# Patient Record
Sex: MALE | Race: BLACK OR AFRICAN AMERICAN | Employment: OTHER | URBAN - METROPOLITAN AREA
[De-identification: names, ages, dates, MRNs, and addresses within clinical notes are randomized per-mention and may not be internally consistent; named-entity substitution may affect disease eponyms.]

---

## 2018-05-12 ENCOUNTER — HOSPITAL ENCOUNTER (EMERGENCY)
Age: 68
Discharge: HOME | End: 2018-05-12
Attending: EMERGENCY MEDICINE
Payer: MEDICARE

## 2018-05-12 ENCOUNTER — APPOINTMENT (OUTPATIENT)
Dept: CT IMAGING | Age: 68
End: 2018-05-12
Payer: MEDICARE

## 2018-05-12 VITALS
RESPIRATION RATE: 14 BRPM | HEART RATE: 87 BPM | WEIGHT: 195 LBS | OXYGEN SATURATION: 99 % | HEIGHT: 73 IN | BODY MASS INDEX: 25.84 KG/M2 | TEMPERATURE: 97.5 F | SYSTOLIC BLOOD PRESSURE: 148 MMHG | DIASTOLIC BLOOD PRESSURE: 96 MMHG

## 2018-05-12 DIAGNOSIS — E11.42 DIABETIC POLYNEUROPATHY ASSOCIATED WITH TYPE 2 DIABETES MELLITUS (HCC): Primary | ICD-10-CM

## 2018-05-12 LAB
ANION GAP SERPL CALCULATED.3IONS-SCNC: 16 MMOL/L (ref 7–16)
BASOPHILS ABSOLUTE: 0.03 E9/L (ref 0–0.2)
BASOPHILS RELATIVE PERCENT: 0.6 % (ref 0–2)
BUN BLDV-MCNC: 10 MG/DL (ref 8–23)
CALCIUM SERPL-MCNC: 9.2 MG/DL (ref 8.6–10.2)
CHLORIDE BLD-SCNC: 104 MMOL/L (ref 98–107)
CO2: 19 MMOL/L (ref 22–29)
CREAT SERPL-MCNC: 1.2 MG/DL (ref 0.7–1.2)
EKG ATRIAL RATE: 76 BPM
EKG P AXIS: 68 DEGREES
EKG P-R INTERVAL: 184 MS
EKG Q-T INTERVAL: 380 MS
EKG QRS DURATION: 84 MS
EKG QTC CALCULATION (BAZETT): 427 MS
EKG R AXIS: 18 DEGREES
EKG T AXIS: 18 DEGREES
EKG VENTRICULAR RATE: 76 BPM
EOSINOPHILS ABSOLUTE: 0.06 E9/L (ref 0.05–0.5)
EOSINOPHILS RELATIVE PERCENT: 1.1 % (ref 0–6)
GFR AFRICAN AMERICAN: >60
GFR NON-AFRICAN AMERICAN: >60 ML/MIN/1.73
GLUCOSE BLD-MCNC: 168 MG/DL (ref 74–109)
GLUCOSE BLD-MCNC: 198 MG/DL
HCT VFR BLD CALC: 35.8 % (ref 37–54)
HEMOGLOBIN: 12.4 G/DL (ref 12.5–16.5)
IMMATURE GRANULOCYTES #: 0.02 E9/L
IMMATURE GRANULOCYTES %: 0.4 % (ref 0–5)
LYMPHOCYTES ABSOLUTE: 1.4 E9/L (ref 1.5–4)
LYMPHOCYTES RELATIVE PERCENT: 26.6 % (ref 20–42)
MCH RBC QN AUTO: 28.4 PG (ref 26–35)
MCHC RBC AUTO-ENTMCNC: 34.6 % (ref 32–34.5)
MCV RBC AUTO: 82.1 FL (ref 80–99.9)
METER GLUCOSE: 198 MG/DL (ref 70–110)
MONOCYTES ABSOLUTE: 0.37 E9/L (ref 0.1–0.95)
MONOCYTES RELATIVE PERCENT: 7 % (ref 2–12)
NEUTROPHILS ABSOLUTE: 3.38 E9/L (ref 1.8–7.3)
NEUTROPHILS RELATIVE PERCENT: 64.3 % (ref 43–80)
PDW BLD-RTO: 13.5 FL (ref 11.5–15)
PLATELET # BLD: 169 E9/L (ref 130–450)
PMV BLD AUTO: 10.3 FL (ref 7–12)
POTASSIUM SERPL-SCNC: 4.6 MMOL/L (ref 3.5–5)
RBC # BLD: 4.36 E12/L (ref 3.8–5.8)
SODIUM BLD-SCNC: 139 MMOL/L (ref 132–146)
WBC # BLD: 5.3 E9/L (ref 4.5–11.5)

## 2018-05-12 PROCEDURE — 82962 GLUCOSE BLOOD TEST: CPT

## 2018-05-12 PROCEDURE — 36415 COLL VENOUS BLD VENIPUNCTURE: CPT

## 2018-05-12 PROCEDURE — 93005 ELECTROCARDIOGRAM TRACING: CPT | Performed by: EMERGENCY MEDICINE

## 2018-05-12 PROCEDURE — 85025 COMPLETE CBC W/AUTO DIFF WBC: CPT

## 2018-05-12 PROCEDURE — 99284 EMERGENCY DEPT VISIT MOD MDM: CPT

## 2018-05-12 PROCEDURE — 72125 CT NECK SPINE W/O DYE: CPT

## 2018-05-12 PROCEDURE — 80048 BASIC METABOLIC PNL TOTAL CA: CPT

## 2018-05-12 ASSESSMENT — PAIN DESCRIPTION - PAIN TYPE: TYPE: ACUTE PAIN

## 2018-05-12 ASSESSMENT — PAIN SCALES - GENERAL: PAINLEVEL_OUTOF10: 8

## 2018-05-12 ASSESSMENT — PAIN DESCRIPTION - DESCRIPTORS: DESCRIPTORS: BURNING;ACHING;NUMBNESS

## 2018-05-12 ASSESSMENT — ENCOUNTER SYMPTOMS
SHORTNESS OF BREATH: 0
VOMITING: 0
COUGH: 0
DIARRHEA: 0
WHEEZING: 0
NAUSEA: 0
BACK PAIN: 0
ABDOMINAL PAIN: 0

## 2018-05-12 ASSESSMENT — PAIN DESCRIPTION - ORIENTATION: ORIENTATION: RIGHT;LEFT

## 2018-05-12 ASSESSMENT — PAIN DESCRIPTION - PROGRESSION: CLINICAL_PROGRESSION: GRADUALLY WORSENING

## 2018-05-12 ASSESSMENT — PAIN DESCRIPTION - FREQUENCY: FREQUENCY: CONTINUOUS

## 2018-05-12 ASSESSMENT — PAIN DESCRIPTION - LOCATION: LOCATION: ARM

## 2019-09-14 ENCOUNTER — HOSPITAL ENCOUNTER (EMERGENCY)
Age: 69
Discharge: HOME | End: 2019-09-14
Attending: EMERGENCY MEDICINE
Payer: MEDICARE

## 2019-09-14 ENCOUNTER — APPOINTMENT (OUTPATIENT)
Dept: CT IMAGING | Age: 69
End: 2019-09-14
Payer: MEDICARE

## 2019-09-14 VITALS
BODY MASS INDEX: 26.24 KG/M2 | OXYGEN SATURATION: 99 % | RESPIRATION RATE: 14 BRPM | TEMPERATURE: 98 F | WEIGHT: 198 LBS | SYSTOLIC BLOOD PRESSURE: 142 MMHG | DIASTOLIC BLOOD PRESSURE: 80 MMHG | HEIGHT: 73 IN | HEART RATE: 63 BPM

## 2019-09-14 DIAGNOSIS — R10.9 ABDOMINAL PAIN, UNSPECIFIED ABDOMINAL LOCATION: Primary | ICD-10-CM

## 2019-09-14 LAB
ALBUMIN SERPL-MCNC: 4.2 G/DL (ref 3.5–5.2)
ALP BLD-CCNC: 58 U/L (ref 40–129)
ALT SERPL-CCNC: 28 U/L (ref 0–40)
ANION GAP SERPL CALCULATED.3IONS-SCNC: 13 MMOL/L (ref 7–16)
APTT: 26.7 SEC (ref 24.5–35.1)
AST SERPL-CCNC: 24 U/L (ref 0–39)
BACTERIA: NORMAL /HPF
BASOPHILS ABSOLUTE: 0.03 E9/L (ref 0–0.2)
BASOPHILS RELATIVE PERCENT: 0.6 % (ref 0–2)
BILIRUB SERPL-MCNC: 0.5 MG/DL (ref 0–1.2)
BILIRUBIN URINE: NEGATIVE
BLOOD, URINE: NEGATIVE
BUN BLDV-MCNC: 8 MG/DL (ref 8–23)
CALCIUM SERPL-MCNC: 9.5 MG/DL (ref 8.6–10.2)
CHLORIDE BLD-SCNC: 102 MMOL/L (ref 98–107)
CLARITY: CLEAR
CO2: 24 MMOL/L (ref 22–29)
COLOR: YELLOW
CREAT SERPL-MCNC: 1.5 MG/DL (ref 0.7–1.2)
EKG ATRIAL RATE: 76 BPM
EKG P AXIS: 46 DEGREES
EKG P-R INTERVAL: 184 MS
EKG Q-T INTERVAL: 386 MS
EKG QRS DURATION: 82 MS
EKG QTC CALCULATION (BAZETT): 434 MS
EKG R AXIS: 0 DEGREES
EKG T AXIS: 20 DEGREES
EKG VENTRICULAR RATE: 76 BPM
EOSINOPHILS ABSOLUTE: 0.06 E9/L (ref 0.05–0.5)
EOSINOPHILS RELATIVE PERCENT: 1.1 % (ref 0–6)
GFR AFRICAN AMERICAN: 56
GFR NON-AFRICAN AMERICAN: 56 ML/MIN/1.73
GLUCOSE BLD-MCNC: 178 MG/DL (ref 74–99)
GLUCOSE URINE: NEGATIVE MG/DL
HCT VFR BLD CALC: 36.8 % (ref 37–54)
HEMOGLOBIN: 12.3 G/DL (ref 12.5–16.5)
IMMATURE GRANULOCYTES #: 0.03 E9/L
IMMATURE GRANULOCYTES %: 0.6 % (ref 0–5)
INR BLD: 1
KETONES, URINE: NEGATIVE MG/DL
LACTIC ACID: 1.4 MMOL/L (ref 0.5–2.2)
LEUKOCYTE ESTERASE, URINE: NEGATIVE
LIPASE: 22 U/L (ref 13–60)
LYMPHOCYTES ABSOLUTE: 1.93 E9/L (ref 1.5–4)
LYMPHOCYTES RELATIVE PERCENT: 35.7 % (ref 20–42)
MCH RBC QN AUTO: 28.7 PG (ref 26–35)
MCHC RBC AUTO-ENTMCNC: 33.4 % (ref 32–34.5)
MCV RBC AUTO: 86 FL (ref 80–99.9)
MONOCYTES ABSOLUTE: 0.47 E9/L (ref 0.1–0.95)
MONOCYTES RELATIVE PERCENT: 8.7 % (ref 2–12)
NEUTROPHILS ABSOLUTE: 2.88 E9/L (ref 1.8–7.3)
NEUTROPHILS RELATIVE PERCENT: 53.3 % (ref 43–80)
NITRITE, URINE: NEGATIVE
PDW BLD-RTO: 13.2 FL (ref 11.5–15)
PH UA: 6 (ref 5–9)
PLATELET # BLD: 167 E9/L (ref 130–450)
PMV BLD AUTO: 10.5 FL (ref 7–12)
POTASSIUM REFLEX MAGNESIUM: 4 MMOL/L (ref 3.5–5)
PROTEIN UA: 30 MG/DL
PROTHROMBIN TIME: 10.8 SEC (ref 9.3–12.4)
RBC # BLD: 4.28 E12/L (ref 3.8–5.8)
RBC UA: NORMAL /HPF (ref 0–2)
SODIUM BLD-SCNC: 139 MMOL/L (ref 132–146)
SPECIFIC GRAVITY UA: 1.01 (ref 1–1.03)
TOTAL PROTEIN: 7.2 G/DL (ref 6.4–8.3)
TROPONIN: <0.01 NG/ML (ref 0–0.03)
UROBILINOGEN, URINE: 0.2 E.U./DL
WBC # BLD: 5.4 E9/L (ref 4.5–11.5)
WBC UA: NORMAL /HPF (ref 0–5)

## 2019-09-14 PROCEDURE — 85610 PROTHROMBIN TIME: CPT

## 2019-09-14 PROCEDURE — 80053 COMPREHEN METABOLIC PANEL: CPT

## 2019-09-14 PROCEDURE — 83690 ASSAY OF LIPASE: CPT

## 2019-09-14 PROCEDURE — 93005 ELECTROCARDIOGRAM TRACING: CPT | Performed by: EMERGENCY MEDICINE

## 2019-09-14 PROCEDURE — 36415 COLL VENOUS BLD VENIPUNCTURE: CPT

## 2019-09-14 PROCEDURE — 83605 ASSAY OF LACTIC ACID: CPT

## 2019-09-14 PROCEDURE — 85730 THROMBOPLASTIN TIME PARTIAL: CPT

## 2019-09-14 PROCEDURE — 96374 THER/PROPH/DIAG INJ IV PUSH: CPT

## 2019-09-14 PROCEDURE — 74177 CT ABD & PELVIS W/CONTRAST: CPT

## 2019-09-14 PROCEDURE — 81001 URINALYSIS AUTO W/SCOPE: CPT

## 2019-09-14 PROCEDURE — 2580000003 HC RX 258: Performed by: RADIOLOGY

## 2019-09-14 PROCEDURE — 99284 EMERGENCY DEPT VISIT MOD MDM: CPT

## 2019-09-14 PROCEDURE — 6360000002 HC RX W HCPCS: Performed by: EMERGENCY MEDICINE

## 2019-09-14 PROCEDURE — 85025 COMPLETE CBC W/AUTO DIFF WBC: CPT

## 2019-09-14 PROCEDURE — 96375 TX/PRO/DX INJ NEW DRUG ADDON: CPT

## 2019-09-14 PROCEDURE — 84484 ASSAY OF TROPONIN QUANT: CPT

## 2019-09-14 PROCEDURE — 6360000004 HC RX CONTRAST MEDICATION: Performed by: RADIOLOGY

## 2019-09-14 RX ORDER — SODIUM CHLORIDE 0.9 % (FLUSH) 0.9 %
10 SYRINGE (ML) INJECTION
Status: COMPLETED | OUTPATIENT
Start: 2019-09-14 | End: 2019-09-14

## 2019-09-14 RX ORDER — CIPROFLOXACIN 500 MG/1
500 TABLET, FILM COATED ORAL 2 TIMES DAILY
Qty: 20 TABLET | Refills: 0 | Status: SHIPPED | OUTPATIENT
Start: 2019-09-14 | End: 2019-09-21

## 2019-09-14 RX ORDER — ONDANSETRON 2 MG/ML
4 INJECTION INTRAMUSCULAR; INTRAVENOUS ONCE
Status: COMPLETED | OUTPATIENT
Start: 2019-09-14 | End: 2019-09-14

## 2019-09-14 RX ORDER — METRONIDAZOLE 500 MG/1
500 TABLET ORAL 2 TIMES DAILY
Qty: 14 TABLET | Refills: 0 | Status: SHIPPED | OUTPATIENT
Start: 2019-09-14 | End: 2019-09-21

## 2019-09-14 RX ORDER — MORPHINE SULFATE 2 MG/ML
2 INJECTION, SOLUTION INTRAMUSCULAR; INTRAVENOUS ONCE
Status: COMPLETED | OUTPATIENT
Start: 2019-09-14 | End: 2019-09-14

## 2019-09-14 RX ADMIN — Medication 10 ML: at 08:19

## 2019-09-14 RX ADMIN — IOPAMIDOL 110 ML: 755 INJECTION, SOLUTION INTRAVENOUS at 08:19

## 2019-09-14 RX ADMIN — MORPHINE SULFATE 2 MG: 2 INJECTION, SOLUTION INTRAMUSCULAR; INTRAVENOUS at 06:41

## 2019-09-14 RX ADMIN — ONDANSETRON HYDROCHLORIDE 4 MG: 2 SOLUTION INTRAMUSCULAR; INTRAVENOUS at 06:41

## 2019-09-14 ASSESSMENT — PAIN DESCRIPTION - LOCATION: LOCATION: ABDOMEN

## 2019-09-14 ASSESSMENT — PAIN SCALES - GENERAL
PAINLEVEL_OUTOF10: 7
PAINLEVEL_OUTOF10: 5

## 2019-09-14 ASSESSMENT — PAIN DESCRIPTION - DESCRIPTORS: DESCRIPTORS: SHARP

## 2019-09-14 ASSESSMENT — PAIN DESCRIPTION - PAIN TYPE: TYPE: ACUTE PAIN

## 2019-09-14 ASSESSMENT — PAIN DESCRIPTION - ORIENTATION: ORIENTATION: RIGHT;LOWER

## 2019-09-14 ASSESSMENT — PAIN DESCRIPTION - FREQUENCY: FREQUENCY: CONTINUOUS

## 2019-09-14 ASSESSMENT — ENCOUNTER SYMPTOMS: ABDOMINAL PAIN: 1

## 2019-09-14 NOTE — PROGRESS NOTES
Pt had a CT with contrast today and was given paper work to hold their Metformin for 48 hrs. Patient instructed to follow up with their Personal Physician before resuming Metformin.

## 2020-05-08 ENCOUNTER — HOSPITAL ENCOUNTER (EMERGENCY)
Age: 70
Discharge: HOME | End: 2020-05-08
Attending: EMERGENCY MEDICINE
Payer: MEDICARE

## 2020-05-08 VITALS
RESPIRATION RATE: 17 BRPM | TEMPERATURE: 97.6 F | DIASTOLIC BLOOD PRESSURE: 94 MMHG | HEIGHT: 73 IN | WEIGHT: 210 LBS | SYSTOLIC BLOOD PRESSURE: 199 MMHG | HEART RATE: 87 BPM | BODY MASS INDEX: 27.83 KG/M2 | OXYGEN SATURATION: 98 %

## 2020-05-08 PROCEDURE — 99282 EMERGENCY DEPT VISIT SF MDM: CPT

## 2020-05-08 PROCEDURE — 96372 THER/PROPH/DIAG INJ SC/IM: CPT

## 2020-05-08 PROCEDURE — 6360000002 HC RX W HCPCS: Performed by: EMERGENCY MEDICINE

## 2020-05-08 RX ORDER — KETOROLAC TROMETHAMINE 30 MG/ML
30 INJECTION, SOLUTION INTRAMUSCULAR; INTRAVENOUS ONCE
Status: COMPLETED | OUTPATIENT
Start: 2020-05-08 | End: 2020-05-08

## 2020-05-08 RX ORDER — IBUPROFEN 600 MG/1
600 TABLET ORAL EVERY 6 HOURS PRN
Qty: 40 TABLET | Refills: 0 | Status: ON HOLD | OUTPATIENT
Start: 2020-05-08 | End: 2021-01-01 | Stop reason: HOSPADM

## 2020-05-08 RX ADMIN — KETOROLAC TROMETHAMINE 30 MG: 30 INJECTION, SOLUTION INTRAMUSCULAR at 07:14

## 2020-05-08 ASSESSMENT — PAIN SCALES - GENERAL
PAINLEVEL_OUTOF10: 8
PAINLEVEL_OUTOF10: 8

## 2020-05-08 ASSESSMENT — ENCOUNTER SYMPTOMS
ABDOMINAL PAIN: 0
SHORTNESS OF BREATH: 0
COUGH: 0
BACK PAIN: 0
EYE REDNESS: 0
SINUS PRESSURE: 0
NAUSEA: 0
WHEEZING: 0
SORE THROAT: 0
EYE DISCHARGE: 0
VOMITING: 0
DIARRHEA: 0
EYE PAIN: 0

## 2020-05-08 ASSESSMENT — PAIN DESCRIPTION - ORIENTATION: ORIENTATION: LEFT

## 2020-05-08 ASSESSMENT — PAIN DESCRIPTION - DESCRIPTORS: DESCRIPTORS: DISCOMFORT

## 2020-05-08 ASSESSMENT — PAIN DESCRIPTION - PAIN TYPE: TYPE: CHRONIC PAIN

## 2020-05-08 ASSESSMENT — PAIN DESCRIPTION - LOCATION: LOCATION: SHOULDER

## 2020-05-08 NOTE — ED PROVIDER NOTES
73-year-old male presenting with left-sided arm pain and a mass. He states that this is been there for about a month and is gotten somewhat bigger since that time. States he is noticed pain with palpation and movement of his arm. He denies any fevers or chills, nausea or vomiting, numbness or tingling in the arm associated with the mass. He has no prior history of similar masses. He does have a history of prostate cancer, hypertension, hyperlipidemia, diabetes. He has not taken anything for his discomfort. He follows at the [facility] and discussed his concerns with them and they sent him here for further evaluation. Review of Systems   Constitutional: Negative for chills and fever. HENT: Negative for ear pain, sinus pressure and sore throat. Eyes: Negative for pain, discharge and redness. Respiratory: Negative for cough, shortness of breath and wheezing. Cardiovascular: Negative for chest pain. Gastrointestinal: Negative for abdominal pain, diarrhea, nausea and vomiting. Genitourinary: Negative for dysuria and frequency. Musculoskeletal: Negative for arthralgias and back pain. Left arm and shoulder pain   Skin: Negative for rash and wound. Neurological: Negative for weakness and headaches. Hematological: Negative for adenopathy. All other systems reviewed and are negative. Physical Exam  Vitals signs and nursing note reviewed. Constitutional:       Appearance: He is well-developed. HENT:      Head: Normocephalic and atraumatic. Eyes:      Conjunctiva/sclera: Conjunctivae normal.   Neck:      Musculoskeletal: Normal range of motion and neck supple. Cardiovascular:      Rate and Rhythm: Normal rate and regular rhythm. Heart sounds: Normal heart sounds. No murmur. Pulmonary:      Effort: Pulmonary effort is normal. No respiratory distress. Breath sounds: Normal breath sounds. No wheezing or rales.    Abdominal:      General: Bowel sounds are normal. Palpations: Abdomen is soft. Tenderness: There is no abdominal tenderness. There is no guarding or rebound. Musculoskeletal:         General: No tenderness or deformity. Comments: Pain with passive and active movement of the left shoulder, localized to the rotator cuff with exam.  4 cm x 3 cm freely mobile lesion noted on the shoulder. This was evaluated with ultrasound and appears to be solid, consistent with lipoma. No fluid collections or abscesses noted on ultrasound. Skin:     General: Skin is warm and dry. Neurological:      Mental Status: He is alert and oriented to person, place, and time. Cranial Nerves: No cranial nerve deficit. Coordination: Coordination normal.          Procedures     MDM  Number of Diagnoses or Management Options  Lipoma of left upper extremity:   Rotator cuff dysfunction, left:   Diagnosis management comments: 75-year-old male presenting with 1 month of left-sided arm and shoulder pain and a new mass in the area. Patient was evaluated and appears to have a lipoma on his left arm. He also has physical exam findings consistent with possible rotator cuff impingement. Physical exam is not consistent with an acute process requiring emergent intervention at this time. Patient was given NSAIDs for control of his symptoms and recommended to follow-up with orthopedics and general surgery, as his rotator cuff complaints appear to be longstanding based on history a lipoma may need to be removed of its impinging on superficial nerves. Patient was agreeable to this nose recommend they return if any of his symptoms worsen or if he experiences any issues related to his current complaints.   Patient is agreeable to this plan will be discharged home.           --------------------------------------------- PAST HISTORY ---------------------------------------------  Past Medical History:  has a past medical history of Cancer ([facility] Hyperlipidemia, evidence of an acute process requiring hospitalization or inpatient management. They have remained hemodynamically stable throughout their entire ED visit and are stable for discharge with outpatient follow-up. The plan has been discussed in detail and they are aware of the specific conditions for emergent return, as well as the importance of follow-up. Current Discharge Medication List          Diagnosis:  1. Lipoma of left upper extremity    2. Rotator cuff dysfunction, left        Disposition:  Patient's disposition: Discharge to home  Patient's condition is stable. NOTE: This report was transcribed using voice recognition software. Every effort was made to ensure accuracy; however, inadvertent computerized transcription errors may be present.        Minesh Bob DO  Resident  05/08/20 [id number]

## 2020-05-09 ENCOUNTER — CARE COORDINATION (OUTPATIENT)
Dept: CARE COORDINATION | Age: 70
End: 2020-05-09

## 2020-05-09 NOTE — CARE COORDINATION
Patient contacted regarding recent discharge and COVID-19 risk   Ambulatory Care Manager contacted the patient by telephone to perform post discharge assessment. Verified name and  with patient as identifiers. Patient has following risk factors of: diabetes. ACM reviewed discharge instructions, medical action plan and red flags related to discharge diagnosis. Reviewed and educated them on any new and changed medications related to discharge diagnosis. Advised obtaining a 90-day supply of all daily and as-needed medications. Spoke with Days, stated \"the pain has eased up. \" He has a f/u appointment scheduled with his PCP on 20 telephonically and has blood work scheduled for 20. Encouraged continued social distancing, good hand washing, and wearing a mask when out. He stated he has 3 masks. Education provided regarding infection prevention, and signs and symptoms of COVID-19 and when to seek medical attention with patient who verbalized understanding. Discussed exposure protocols and quarantine from [address] you at higher risk for severe illness  and given an opportunity for questions and concerns. The patient agrees to contact the COVID-19 hotline [phone] or PCP office for questions related to their healthcare. [facility] provided contact information for future reference. From CDC: Are you at higher risk for severe illness?  Wash your hands often.  Avoid close contact (6 feet, which is about two arm lengths) with people who are sick.  Put distance between yourself and other people if COVID-19 is spreading in your community.  Clean and disinfect frequently touched surfaces.  Avoid all cruise travel and non-essential air travel.  Call your healthcare professional if you have concerns about COVID-19 and your underlying condition or if you are sick.     For more information on steps you can take to protect yourself, see CDC's How to [address] [facility] for follow-up call in 7-14 days based on severity of symptoms and risk factors.     PLAN    14 day ED f/u call

## 2020-05-20 ENCOUNTER — CARE COORDINATION (OUTPATIENT)
Dept: CARE COORDINATION | Age: 70
End: 2020-05-20

## 2020-06-06 ENCOUNTER — HOSPITAL ENCOUNTER (EMERGENCY)
Age: 70
Discharge: HOME | End: 2020-06-06
Payer: MEDICARE

## 2020-06-06 VITALS
HEART RATE: 76 BPM | HEIGHT: 74 IN | SYSTOLIC BLOOD PRESSURE: 159 MMHG | OXYGEN SATURATION: 100 % | BODY MASS INDEX: 25.67 KG/M2 | DIASTOLIC BLOOD PRESSURE: 89 MMHG | TEMPERATURE: 98 F | RESPIRATION RATE: 16 BRPM | WEIGHT: 200 LBS

## 2020-06-06 LAB
ALBUMIN SERPL-MCNC: 3.9 G/DL (ref 3.5–5.2)
ALP BLD-CCNC: 56 U/L (ref 40–129)
ALT SERPL-CCNC: 15 U/L (ref 0–40)
ANION GAP SERPL CALCULATED.3IONS-SCNC: 15 MMOL/L (ref 7–16)
AST SERPL-CCNC: 14 U/L (ref 0–39)
BASOPHILS ABSOLUTE: 0.03 E9/L (ref 0–0.2)
BASOPHILS RELATIVE PERCENT: 0.4 % (ref 0–2)
BILIRUB SERPL-MCNC: 0.5 MG/DL (ref 0–1.2)
BUN BLDV-MCNC: 15 MG/DL (ref 8–23)
CALCIUM SERPL-MCNC: 9.1 MG/DL (ref 8.6–10.2)
CHLORIDE BLD-SCNC: 103 MMOL/L (ref 98–107)
CO2: 17 MMOL/L (ref 22–29)
CREAT SERPL-MCNC: 1.6 MG/DL (ref 0.7–1.2)
EOSINOPHILS ABSOLUTE: 0.06 E9/L (ref 0.05–0.5)
EOSINOPHILS RELATIVE PERCENT: 0.9 % (ref 0–6)
GFR AFRICAN AMERICAN: 52
GFR NON-AFRICAN AMERICAN: 52 ML/MIN/1.73
GLUCOSE BLD-MCNC: 210 MG/DL (ref 74–99)
HCT VFR BLD CALC: 36.1 % (ref 37–54)
HEMOGLOBIN: 12.6 G/DL (ref 12.5–16.5)
IMMATURE GRANULOCYTES #: 0.03 E9/L
IMMATURE GRANULOCYTES %: 0.4 % (ref 0–5)
LYMPHOCYTES ABSOLUTE: 1.29 E9/L (ref 1.5–4)
LYMPHOCYTES RELATIVE PERCENT: 18.4 % (ref 20–42)
MCH RBC QN AUTO: 28.7 PG (ref 26–35)
MCHC RBC AUTO-ENTMCNC: 34.9 % (ref 32–34.5)
MCV RBC AUTO: 82.2 FL (ref 80–99.9)
MONOCYTES ABSOLUTE: 0.6 E9/L (ref 0.1–0.95)
MONOCYTES RELATIVE PERCENT: 8.6 % (ref 2–12)
NEUTROPHILS ABSOLUTE: 5 E9/L (ref 1.8–7.3)
NEUTROPHILS RELATIVE PERCENT: 71.3 % (ref 43–80)
PDW BLD-RTO: 13.2 FL (ref 11.5–15)
PLATELET # BLD: 154 E9/L (ref 130–450)
PMV BLD AUTO: 10.5 FL (ref 7–12)
POTASSIUM REFLEX MAGNESIUM: 4.1 MMOL/L (ref 3.5–5)
RBC # BLD: 4.39 E12/L (ref 3.8–5.8)
SODIUM BLD-SCNC: 135 MMOL/L (ref 132–146)
TOTAL PROTEIN: 7.1 G/DL (ref 6.4–8.3)
WBC # BLD: 7 E9/L (ref 4.5–11.5)

## 2020-06-06 PROCEDURE — 80053 COMPREHEN METABOLIC PANEL: CPT

## 2020-06-06 PROCEDURE — 99282 EMERGENCY DEPT VISIT SF MDM: CPT

## 2020-06-06 PROCEDURE — 85025 COMPLETE CBC W/AUTO DIFF WBC: CPT

## 2020-06-06 PROCEDURE — 6370000000 HC RX 637 (ALT 250 FOR IP): Performed by: NURSE PRACTITIONER

## 2020-06-06 RX ORDER — PREDNISONE 20 MG/1
60 TABLET ORAL ONCE
Status: COMPLETED | OUTPATIENT
Start: 2020-06-06 | End: 2020-06-06

## 2020-06-06 RX ORDER — FAMOTIDINE 20 MG/1
20 TABLET, FILM COATED ORAL DAILY
Qty: 10 TABLET | Refills: 0 | Status: ON HOLD | OUTPATIENT
Start: 2020-06-06 | End: 2021-01-01 | Stop reason: HOSPADM

## 2020-06-06 RX ORDER — PREDNISONE 10 MG/1
TABLET ORAL
Qty: 30 TABLET | Refills: 0 | Status: SHIPPED | OUTPATIENT
Start: 2020-06-06 | End: 2020-06-16

## 2020-06-06 RX ORDER — CEPHALEXIN 500 MG/1
500 CAPSULE ORAL 3 TIMES DAILY
Qty: 21 CAPSULE | Refills: 0 | Status: SHIPPED | OUTPATIENT
Start: 2020-06-06 | End: 2020-06-13

## 2020-06-06 RX ORDER — DIPHENHYDRAMINE HCL 25 MG
25 CAPSULE ORAL EVERY 6 HOURS PRN
Qty: 40 CAPSULE | Refills: 0 | Status: SHIPPED | OUTPATIENT
Start: 2020-06-06 | End: 2020-06-16

## 2020-06-06 RX ADMIN — PREDNISONE 60 MG: 20 TABLET ORAL at 09:35

## 2020-06-06 NOTE — ED PROVIDER NOTES
[facility]     Department of Emergency Medicine   ED  Provider Note  Admit Date/RoomTime: 6/6/2020  9:00 AM  ED Room: 15/15   Chief Complaint:   Facial Swelling (woke up with left sided facial swelling, denies pain)    History of Present Illness   Source of history provided by:  patient. History/Exam Limitations: none. Andre Vance is a 65 y.o. old male with a past medical history of:   Past Medical History:   Diagnosis Date    Cancer ([facility]     Diabetes mellitus ([facility]     Hyperlipidemia     Hypertension     Prostate cancer ([facility]    presents to the emergency department by private vehicle, for swelling to left face which occured several hour(s) prior to arrival.  Mechanism of pain/injury: unknown, and is associated with nothing. Loss of consciousness: No.  Previous facial injury: no.  Since onset the symptoms have been mild in degree. There has been no no other pertinent symptoms. Patient is denying any type of facial pain or dental pain at this time. He states he woke up this a.m. and noticed left side of his cheek was swollen as well as just the left side of his bottom lip. He is denying any type of injury. N/A  ROS    Pertinent positives and negatives are stated within HPI, all other systems reviewed and are negative. Past Surgical History:  has a past surgical history that includes Colon surgery; Dilatation, esophagus; and Leg Surgery (Left, 6 years ago). Social History:  reports that he has quit smoking. His smoking use included cigarettes. He smoked 1.00 pack per day. He has never used smokeless tobacco. He reports current alcohol use. He reports that he does not use drugs. Family History: family history is not on file. Allergies: Patient has no known allergies.     Physical Exam           ED Triage Vitals   BP Temp Temp src Pulse Resp SpO2 Height Weight   06/06/20 0857 06/06/20 0858 -- 06/06/20 0857 06/06/20 0857 06/06/20 0857 06/06/20 0857 06/06/20 0857   (!) 183/102 98.3 °F department. He was monitored in the emergency department for several hours. He will be discharged at this time and is follow-up with his primary care provider. He was given prescriptions for Pepcid, Benadryl, prednisone as well as Keflex to go home with. He denied any type of difficulty swallowing or shortness of breath. I did inform him if any symptoms worsen that he is return to emergency room medially. Counseling: The emergency provider has spoken with the patient and discussed todays results, in addition to providing specific details for the plan of care and counseling regarding the diagnosis and prognosis. Questions are answered at this time and they are agreeable with the plan to be discharged. Assessment      1. Allergic reaction, initial encounter      Plan   Discharge to home  Patient condition is good    New Medications     Discharge Medication List as of 6/6/2020 10:08 AM      START taking these medications    Details   famotidine (PEPCID) 20 MG tablet Take 1 tablet by mouth daily for 10 days, Disp-10 tablet, R-0Print      diphenhydrAMINE (BENADRYL ALLERGY) 25 MG capsule Take 1 capsule by mouth every 6 hours as needed for Itching, Disp-40 capsule, R-0Print      predniSONE (DELTASONE) 10 MG tablet 5 tablets by mouth once a day x 2 days, then 4 tablets by mouth once a day x 2 days, then 3 tablets by mouth once a day x 2 days, then 2 tablets by mouth once a day x 2 days, then 1 tablets by mouth once a day x 2 days then stop., Disp-30 tablet, R-0Pri nt      cephALEXin (KEFLEX) 500 MG capsule Take 1 capsule by mouth 3 times daily for 7 days, Disp-21 capsule, R-0Print           Electronically signed by GURPREET Merchant NP   DD: 6/6/20  **This report was transcribed using voice recognition software. Every effort was made to ensure accuracy; however, inadvertent computerized transcription errors may be present.   END OF ED PROVIDER NOTE      GURPREET España NP  06/08/20 [id number]

## 2020-06-08 ENCOUNTER — CARE COORDINATION (OUTPATIENT)
Dept: CARE COORDINATION | Age: 70
End: 2020-06-08

## 2020-06-08 NOTE — CARE COORDINATION
Date/Time:  6/8/2020 3:21 PM  Attempted to reach patient by telephone. Left HIPPA compliant message requesting a return call. Will attempt to reach patient again.

## 2020-06-10 ENCOUNTER — CARE COORDINATION (OUTPATIENT)
Dept: CARE COORDINATION | Age: 70
End: 2020-06-10

## 2020-06-10 NOTE — CARE COORDINATION
Date/Time:  6/10/2020 3:39 PM  Second attempt made to reach patient by telephone. Left HIPPA compliant message requesting a return call. Will attempt to reach patient again.

## 2020-08-16 ENCOUNTER — APPOINTMENT (OUTPATIENT)
Dept: GENERAL RADIOLOGY | Age: 70
End: 2020-08-16
Payer: MEDICARE

## 2020-08-16 ENCOUNTER — HOSPITAL ENCOUNTER (EMERGENCY)
Age: 70
Discharge: HOME | End: 2020-08-16
Attending: EMERGENCY MEDICINE
Payer: MEDICARE

## 2020-08-16 ENCOUNTER — APPOINTMENT (OUTPATIENT)
Dept: CT IMAGING | Age: 70
End: 2020-08-16
Payer: MEDICARE

## 2020-08-16 VITALS
SYSTOLIC BLOOD PRESSURE: 158 MMHG | HEART RATE: 77 BPM | WEIGHT: 200 LBS | RESPIRATION RATE: 16 BRPM | OXYGEN SATURATION: 99 % | BODY MASS INDEX: 26.51 KG/M2 | DIASTOLIC BLOOD PRESSURE: 74 MMHG | HEIGHT: 73 IN | TEMPERATURE: 97.5 F

## 2020-08-16 LAB
ANION GAP SERPL CALCULATED.3IONS-SCNC: 14 MMOL/L (ref 7–16)
BASOPHILS ABSOLUTE: 0.02 E9/L (ref 0–0.2)
BASOPHILS RELATIVE PERCENT: 0.4 % (ref 0–2)
BUN BLDV-MCNC: 17 MG/DL (ref 8–23)
CALCIUM SERPL-MCNC: 9.9 MG/DL (ref 8.6–10.2)
CHLORIDE BLD-SCNC: 102 MMOL/L (ref 98–107)
CO2: 19 MMOL/L (ref 22–29)
CREAT SERPL-MCNC: 1.5 MG/DL (ref 0.7–1.2)
D DIMER: 736 NG/ML DDU
EKG ATRIAL RATE: 68 BPM
EKG P AXIS: 76 DEGREES
EKG P-R INTERVAL: 204 MS
EKG Q-T INTERVAL: 366 MS
EKG QRS DURATION: 82 MS
EKG QTC CALCULATION (BAZETT): 389 MS
EKG R AXIS: 25 DEGREES
EKG T AXIS: 35 DEGREES
EKG VENTRICULAR RATE: 68 BPM
EOSINOPHILS ABSOLUTE: 0.08 E9/L (ref 0.05–0.5)
EOSINOPHILS RELATIVE PERCENT: 1.6 % (ref 0–6)
GFR AFRICAN AMERICAN: 56
GFR NON-AFRICAN AMERICAN: 56 ML/MIN/1.73
GLUCOSE BLD-MCNC: 216 MG/DL (ref 74–99)
HCT VFR BLD CALC: 31.2 % (ref 37–54)
HEMOGLOBIN: 10.5 G/DL (ref 12.5–16.5)
IMMATURE GRANULOCYTES #: 0.03 E9/L
IMMATURE GRANULOCYTES %: 0.6 % (ref 0–5)
LYMPHOCYTES ABSOLUTE: 1.31 E9/L (ref 1.5–4)
LYMPHOCYTES RELATIVE PERCENT: 26.6 % (ref 20–42)
MCH RBC QN AUTO: 28.2 PG (ref 26–35)
MCHC RBC AUTO-ENTMCNC: 33.7 % (ref 32–34.5)
MCV RBC AUTO: 83.9 FL (ref 80–99.9)
MONOCYTES ABSOLUTE: 0.39 E9/L (ref 0.1–0.95)
MONOCYTES RELATIVE PERCENT: 7.9 % (ref 2–12)
NEUTROPHILS ABSOLUTE: 3.1 E9/L (ref 1.8–7.3)
NEUTROPHILS RELATIVE PERCENT: 62.9 % (ref 43–80)
PDW BLD-RTO: 13.8 FL (ref 11.5–15)
PLATELET # BLD: 134 E9/L (ref 130–450)
PMV BLD AUTO: 10.5 FL (ref 7–12)
POTASSIUM REFLEX MAGNESIUM: 4.7 MMOL/L (ref 3.5–5)
RBC # BLD: 3.72 E12/L (ref 3.8–5.8)
REASON FOR REJECTION: NORMAL
REJECTED TEST: NORMAL
SODIUM BLD-SCNC: 135 MMOL/L (ref 132–146)
WBC # BLD: 4.9 E9/L (ref 4.5–11.5)

## 2020-08-16 PROCEDURE — 71275 CT ANGIOGRAPHY CHEST: CPT

## 2020-08-16 PROCEDURE — 93005 ELECTROCARDIOGRAM TRACING: CPT | Performed by: STUDENT IN AN ORGANIZED HEALTH CARE EDUCATION/TRAINING PROGRAM

## 2020-08-16 PROCEDURE — 85378 FIBRIN DEGRADE SEMIQUANT: CPT

## 2020-08-16 PROCEDURE — 71045 X-RAY EXAM CHEST 1 VIEW: CPT

## 2020-08-16 PROCEDURE — 85025 COMPLETE CBC W/AUTO DIFF WBC: CPT

## 2020-08-16 PROCEDURE — 99284 EMERGENCY DEPT VISIT MOD MDM: CPT

## 2020-08-16 PROCEDURE — 6360000004 HC RX CONTRAST MEDICATION: Performed by: RADIOLOGY

## 2020-08-16 PROCEDURE — 2580000003 HC RX 258: Performed by: RADIOLOGY

## 2020-08-16 PROCEDURE — 36415 COLL VENOUS BLD VENIPUNCTURE: CPT

## 2020-08-16 PROCEDURE — 80048 BASIC METABOLIC PNL TOTAL CA: CPT

## 2020-08-16 PROCEDURE — 99285 EMERGENCY DEPT VISIT HI MDM: CPT

## 2020-08-16 PROCEDURE — 93010 ELECTROCARDIOGRAM REPORT: CPT | Performed by: INTERNAL MEDICINE

## 2020-08-16 RX ORDER — SODIUM CHLORIDE 0.9 % (FLUSH) 0.9 %
10 SYRINGE (ML) INJECTION
Status: COMPLETED | OUTPATIENT
Start: 2020-08-16 | End: 2020-08-16

## 2020-08-16 RX ORDER — PREDNISONE 20 MG/1
20 TABLET ORAL DAILY
Qty: 5 TABLET | Refills: 0 | Status: SHIPPED | OUTPATIENT
Start: 2020-08-16 | End: 2020-08-21

## 2020-08-16 RX ADMIN — Medication 10 ML: at 09:47

## 2020-08-16 RX ADMIN — IOPAMIDOL 60 ML: 755 INJECTION, SOLUTION INTRAVENOUS at 09:47

## 2020-08-16 ASSESSMENT — ENCOUNTER SYMPTOMS
COLOR CHANGE: 0
EYE DISCHARGE: 0
VOMITING: 0
SHORTNESS OF BREATH: 0
SORE THROAT: 0
WHEEZING: 0
COUGH: 0
ABDOMINAL DISTENTION: 0
ABDOMINAL PAIN: 0
EYE PAIN: 0
NAUSEA: 0
EYE REDNESS: 0
SINUS PRESSURE: 0
BACK PAIN: 0
DIARRHEA: 0

## 2020-08-16 ASSESSMENT — PAIN DESCRIPTION - ORIENTATION: ORIENTATION: LEFT

## 2020-08-16 ASSESSMENT — PAIN DESCRIPTION - ONSET: ONSET: ON-GOING

## 2020-08-16 ASSESSMENT — PAIN SCALES - GENERAL: PAINLEVEL_OUTOF10: 7

## 2020-08-16 ASSESSMENT — PAIN DESCRIPTION - PROGRESSION: CLINICAL_PROGRESSION: GRADUALLY IMPROVING

## 2020-08-16 ASSESSMENT — PAIN DESCRIPTION - FREQUENCY: FREQUENCY: INTERMITTENT

## 2020-08-16 ASSESSMENT — PAIN DESCRIPTION - LOCATION: LOCATION: RIB CAGE

## 2020-08-16 ASSESSMENT — PAIN DESCRIPTION - DESCRIPTORS: DESCRIPTORS: STABBING

## 2020-08-16 NOTE — ED PROVIDER NOTES
77-year-old male with a past medical history of diabetes, hyperlipidemia, hypertension, prostate cancer presents with complaints of left rib pain. Patient states that for the last 3 days he has had sharp, pleuritic, chest pain. Is nonradiating and moderate in nature. No alleviating factors. He is not taking any medications to alleviate his pain. States he has never had this pain before in his life. Denies fevers, chills, nausea, vomiting, shortness of breath, abdominal pain, flank pain, dysuria, hematuria, diarrhea, constipation, new rashes or sores, recent illness, and recent muscle strain. Review of Systems   Constitutional: Negative for chills and fever. HENT: Negative for congestion, ear pain, sinus pressure and sore throat. Eyes: Negative for pain, discharge and redness. Respiratory: Negative for cough, shortness of breath and wheezing. Cardiovascular: Negative for chest pain. Gastrointestinal: Negative for abdominal distention, abdominal pain, diarrhea, nausea and vomiting. Genitourinary: Negative for dysuria and frequency. Musculoskeletal: Negative for arthralgias, back pain, myalgias, neck pain and neck stiffness. Rib pain left. Skin: Negative for color change, pallor, rash and wound. Neurological: Negative for weakness and headaches. Hematological: Negative for adenopathy. Psychiatric/Behavioral: Negative for agitation. All other systems reviewed and are negative. Physical Exam  Vitals signs and nursing note reviewed. Constitutional:       General: He is not in acute distress. Appearance: Normal appearance. He is well-developed. He is not ill-appearing or diaphoretic. HENT:      Head: Normocephalic and atraumatic. Nose: No congestion or rhinorrhea. Mouth/Throat:      Mouth: Mucous membranes are moist.      Pharynx: Oropharynx is clear. No oropharyngeal exudate or posterior oropharyngeal erythema.    Eyes:      Extraocular Movements: nodules on patient's chest.  There is no bruising. Patient's EKG is normal sinus rhythm at 68 bpm.  No ST elevations or depressions. Diagnostic labs are significant for an elevated d-dimer. A CTA will be done to rule out a pulmonary embolism. CTA was negative. Patient's pain is likely pleurisy. Patient was given steroids outpatient for his symptoms. Patient was formed all the results of evaluation. Patient is agreeable with plan. ED Course as of Aug 16 1746   Sun Aug 16, 2020   9496 CTA pulmonary ordered. D-Dimer, Quant: 620 [JV]      ED Course User Index  [JV] Alexa Garibay MD            ED Course as of Aug 16 1746   Sun Aug 16, 2020   4221 CTA pulmonary ordered. D-Dimer, Quant: 497 [JV]      ED Course User Index  [JV] Alexa Garibay MD       --------------------------------------------- PAST HISTORY ---------------------------------------------  Past Medical History:  has a [facility] Hyperlipidemia, Hypertension, and Prostate cancer ([facility] Past Surgical History:  has a past surgical history that includes Colon surgery; Dilatation, esophagus; and Leg Surgery (Left, 6 years ago). Social History:  reports that he has quit smoking. His smoking use included cigarettes. He smoked 1.00 pack per day. He has never used smokeless tobacco. He reports current alcohol use. He reports that he does not use drugs. Family History: family history is not on file. The patients home medications have been reviewed. Allergies: Patient has no known allergies.     -------------------------------------------------- RESULTS -------------------------------------------------  Labs:  Results for orders placed or performed during the hospital encounter of 08/16/20   CBC Auto Differential   Result Value Ref Range    WBC 4.9 4.5 - 11.5 E9/L    RBC 3.72 (L) 3.80 - 5.80 E12/L    Hemoglobin 10.5 (L) 12.5 - 16.5 g/dL    Hematocrit 31.2 (L) 37.0 - 54.0 %    MCV 83.9 80.0 - 99.9 fL    MCH 28.2 26.0 - 35.0 pg    MCHC 33.7 32.0 - 34.5 %    RDW 13.8 11.5 - 15.0 fL    Platelets 417 306 - 950 E9/L    MPV 10.5 7.0 - 12.0 fL    Neutrophils % 62.9 43.0 - 80.0 %    Immature Granulocytes % 0.6 0.0 - 5.0 %    Lymphocytes % 26.6 20.0 - 42.0 %    Monocytes % 7.9 2.0 - 12.0 %    Eosinophils % 1.6 0.0 - 6.0 %    Basophils % 0.4 0.0 - 2.0 %    Neutrophils Absolute 3.10 1.80 - 7.30 E9/L    Immature Granulocytes # 0.03 E9/L    Lymphocytes Absolute 1.31 (L) 1.50 - 4.00 E9/L    Monocytes Absolute 0.39 0.10 - 0.95 E9/L    Eosinophils Absolute 0.08 0.05 - 0.50 E9/L    Basophils Absolute 0.02 0.00 - 0.20 Z1/H   Basic Metabolic Panel w/ Reflex to MG   Result Value Ref Range    Sodium 135 132 - 146 mmol/L    Potassium reflex Magnesium 4.7 3.5 - 5.0 mmol/L    Chloride 102 98 - 107 mmol/L    CO2 19 (L) 22 - 29 mmol/L    Anion Gap 14 7 - 16 mmol/L    Glucose 216 (H) 74 - 99 mg/dL    BUN 17 8 - 23 mg/dL    CREATININE 1.5 (H) 0.7 - 1.2 mg/dL    GFR Non-African American 56 >=60 mL/min/1.73    GFR African American 56     Calcium 9.9 8.6 - 10.2 mg/dL   SPECIMEN REJECTION   Result Value Ref Range    Rejected Test dimer     Reason for Rejection see below    D-Dimer, Quantitative   Result Value Ref Range    D-Dimer, Quant 736 ng/mL DDU   EKG 12 Lead   Result Value Ref Range    Ventricular Rate 68 BPM    Atrial Rate 68 BPM    P-R Interval 204 ms    QRS Duration 82 ms    Q-T Interval 366 ms    QTc Calculation (Bazett) 389 ms    P Axis 76 degrees    R Axis 25 degrees    T Axis 35 degrees       Radiology:  CTA PULMONARY W CONTRAST   Final Result   Findings suggesting scar superimposed upon emphysema   Findings compatible with atherosclerotic disease                  XR CHEST PORTABLE   Final Result   No acute cardiopulmonary disease process is identified.                         ------------------------- NURSING NOTES AND VITALS REVIEWED ---------------------------  Date / Time Roomed:  8/16/2020  6:39 AM  ED Bed Assignment:  07/07    The nursing notes within the ED encounter and vital signs as below have been reviewed. BP (!) 158/74   Pulse 77   Temp 97.5 °F (36.4 °C)   Resp 16   Ht 6' 1\" (1.854 m)   Wt 200 lb (90.7 kg)   SpO2 99%   BMI 26.39 kg/m²   Oxygen Saturation Interpretation: Normal      ------------------------------------------ PROGRESS NOTES ------------------------------------------  6:41 AM EDT  I have spoken with the patient and discussed todays results, in addition to providing specific details for the plan of care and counseling regarding the diagnosis and prognosis. Their questions are answered at this time and they are agreeable with the plan. I discussed at length with them reasons for immediate return here for re evaluation. They will followup with their primary care physician by calling their office Tomorrow. Vincenzo Tinsley --------------------------------- ADDITIONAL PROVIDER NOTES ---------------------------------  At this time the patient is without objective evidence of an acute process requiring hospitalization or inpatient management. They have remained hemodynamically stable throughout their entire ED visit and are stable for discharge with outpatient follow-up. The plan has been discussed in detail and they are aware of the specific conditions for emergent return, as well as the importance of follow-up. Discharge Medication List as of 8/16/2020 10:08 AM      START taking these medications    Details   predniSONE (DELTASONE) 20 MG tablet Take 1 tablet by mouth daily for 5 days, Disp-5 tablet,R-0Print             Diagnosis:  1. Pleurisy    2. Rib pain on left side        Disposition:  Patient's disposition: Discharge to home  Patient's condition is stable.        Sarah Liz MD  Resident  08/16/20 [id number]

## 2020-08-16 NOTE — ED NOTES
Bed: 07  Expected date:   Expected time:   Means of arrival:   Comments:  Triage     Scott Agudelo RN  08/16/20 [id number]

## 2020-10-22 ENCOUNTER — HOSPITAL ENCOUNTER (EMERGENCY)
Age: 70
Discharge: HOME | End: 2020-10-22
Attending: EMERGENCY MEDICINE
Payer: MEDICARE

## 2020-10-22 ENCOUNTER — APPOINTMENT (OUTPATIENT)
Dept: GENERAL RADIOLOGY | Age: 70
End: 2020-10-22
Payer: MEDICARE

## 2020-10-22 ENCOUNTER — APPOINTMENT (OUTPATIENT)
Dept: CT IMAGING | Age: 70
End: 2020-10-22
Payer: MEDICARE

## 2020-10-22 VITALS
SYSTOLIC BLOOD PRESSURE: 170 MMHG | TEMPERATURE: 97.1 F | HEART RATE: 72 BPM | HEIGHT: 74 IN | RESPIRATION RATE: 17 BRPM | BODY MASS INDEX: 26.95 KG/M2 | OXYGEN SATURATION: 98 % | DIASTOLIC BLOOD PRESSURE: 99 MMHG | WEIGHT: 210 LBS

## 2020-10-22 LAB
ALBUMIN SERPL-MCNC: 4.2 G/DL (ref 3.5–5.2)
ALP BLD-CCNC: 51 U/L (ref 40–129)
ALT SERPL-CCNC: 20 U/L (ref 0–40)
ANION GAP SERPL CALCULATED.3IONS-SCNC: 13 MMOL/L (ref 7–16)
AST SERPL-CCNC: 15 U/L (ref 0–39)
BASOPHILS ABSOLUTE: 0.02 E9/L (ref 0–0.2)
BASOPHILS RELATIVE PERCENT: 0.5 % (ref 0–2)
BILIRUB SERPL-MCNC: 0.3 MG/DL (ref 0–1.2)
BUN BLDV-MCNC: 12 MG/DL (ref 8–23)
CALCIUM SERPL-MCNC: 9.9 MG/DL (ref 8.6–10.2)
CHLORIDE BLD-SCNC: 103 MMOL/L (ref 98–107)
CO2: 20 MMOL/L (ref 22–29)
CREAT SERPL-MCNC: 1.4 MG/DL (ref 0.7–1.2)
EKG ATRIAL RATE: 77 BPM
EKG P AXIS: 65 DEGREES
EKG P-R INTERVAL: 190 MS
EKG Q-T INTERVAL: 370 MS
EKG QRS DURATION: 80 MS
EKG QTC CALCULATION (BAZETT): 418 MS
EKG R AXIS: 22 DEGREES
EKG T AXIS: 48 DEGREES
EKG VENTRICULAR RATE: 77 BPM
EOSINOPHILS ABSOLUTE: 0.05 E9/L (ref 0.05–0.5)
EOSINOPHILS RELATIVE PERCENT: 1.2 % (ref 0–6)
GFR AFRICAN AMERICAN: >60
GFR NON-AFRICAN AMERICAN: >60 ML/MIN/1.73
GLUCOSE BLD-MCNC: 225 MG/DL (ref 74–99)
HCT VFR BLD CALC: 30.8 % (ref 37–54)
HEMOGLOBIN: 10.7 G/DL (ref 12.5–16.5)
IMMATURE GRANULOCYTES #: 0.02 E9/L
IMMATURE GRANULOCYTES %: 0.5 % (ref 0–5)
LYMPHOCYTES ABSOLUTE: 1.17 E9/L (ref 1.5–4)
LYMPHOCYTES RELATIVE PERCENT: 28.1 % (ref 20–42)
MCH RBC QN AUTO: 29.2 PG (ref 26–35)
MCHC RBC AUTO-ENTMCNC: 34.7 % (ref 32–34.5)
MCV RBC AUTO: 83.9 FL (ref 80–99.9)
MONOCYTES ABSOLUTE: 0.35 E9/L (ref 0.1–0.95)
MONOCYTES RELATIVE PERCENT: 8.4 % (ref 2–12)
NEUTROPHILS ABSOLUTE: 2.56 E9/L (ref 1.8–7.3)
NEUTROPHILS RELATIVE PERCENT: 61.3 % (ref 43–80)
PDW BLD-RTO: 12.6 FL (ref 11.5–15)
PLATELET # BLD: 154 E9/L (ref 130–450)
PMV BLD AUTO: 10.6 FL (ref 7–12)
POTASSIUM SERPL-SCNC: 4.8 MMOL/L (ref 3.5–5)
PRO-BNP: 231 PG/ML (ref 0–125)
RBC # BLD: 3.67 E12/L (ref 3.8–5.8)
SODIUM BLD-SCNC: 136 MMOL/L (ref 132–146)
TOTAL PROTEIN: 6.7 G/DL (ref 6.4–8.3)
TROPONIN: <0.01 NG/ML (ref 0–0.03)
TROPONIN: <0.01 NG/ML (ref 0–0.03)
WBC # BLD: 4.2 E9/L (ref 4.5–11.5)

## 2020-10-22 PROCEDURE — 85025 COMPLETE CBC W/AUTO DIFF WBC: CPT

## 2020-10-22 PROCEDURE — 71045 X-RAY EXAM CHEST 1 VIEW: CPT

## 2020-10-22 PROCEDURE — 93005 ELECTROCARDIOGRAM TRACING: CPT | Performed by: EMERGENCY MEDICINE

## 2020-10-22 PROCEDURE — 96374 THER/PROPH/DIAG INJ IV PUSH: CPT

## 2020-10-22 PROCEDURE — 99283 EMERGENCY DEPT VISIT LOW MDM: CPT

## 2020-10-22 PROCEDURE — 93010 ELECTROCARDIOGRAM REPORT: CPT | Performed by: INTERNAL MEDICINE

## 2020-10-22 PROCEDURE — 6360000002 HC RX W HCPCS: Performed by: EMERGENCY MEDICINE

## 2020-10-22 PROCEDURE — 80053 COMPREHEN METABOLIC PANEL: CPT

## 2020-10-22 PROCEDURE — 84484 ASSAY OF TROPONIN QUANT: CPT

## 2020-10-22 PROCEDURE — 72125 CT NECK SPINE W/O DYE: CPT

## 2020-10-22 PROCEDURE — 83880 ASSAY OF NATRIURETIC PEPTIDE: CPT

## 2020-10-22 RX ORDER — KETOROLAC TROMETHAMINE 30 MG/ML
15 INJECTION, SOLUTION INTRAMUSCULAR; INTRAVENOUS ONCE
Status: COMPLETED | OUTPATIENT
Start: 2020-10-22 | End: 2020-10-22

## 2020-10-22 RX ORDER — NAPROXEN 500 MG/1
500 TABLET ORAL 2 TIMES DAILY
Qty: 30 TABLET | Refills: 0 | Status: ON HOLD | OUTPATIENT
Start: 2020-10-22 | End: 2021-01-01 | Stop reason: HOSPADM

## 2020-10-22 RX ADMIN — KETOROLAC TROMETHAMINE 15 MG: 30 INJECTION, SOLUTION INTRAMUSCULAR; INTRAVENOUS at 08:59

## 2020-10-22 ASSESSMENT — PAIN DESCRIPTION - ORIENTATION
ORIENTATION: LEFT;RIGHT
ORIENTATION: LEFT;RIGHT

## 2020-10-22 ASSESSMENT — PAIN SCALES - GENERAL
PAINLEVEL_OUTOF10: 7
PAINLEVEL_OUTOF10: 5
PAINLEVEL_OUTOF10: 7

## 2020-10-22 ASSESSMENT — PAIN DESCRIPTION - DESCRIPTORS
DESCRIPTORS: BURNING
DESCRIPTORS: DISCOMFORT

## 2020-10-22 ASSESSMENT — PAIN DESCRIPTION - LOCATION
LOCATION: ARM
LOCATION: ARM

## 2020-10-22 ASSESSMENT — PAIN DESCRIPTION - PAIN TYPE
TYPE: ACUTE PAIN
TYPE: ACUTE PAIN

## 2020-10-22 NOTE — ED PROVIDER NOTES
Department of Emergency Medicine   ED  Provider Note  Admit Date/RoomTime: 10/22/2020  8:34 AM  ED Room: 19/19          History of Present Illness:  10/22/20, Time: 9:24 AM EDT  Chief Complaint   Patient presents with    Arm Pain     COMPLAINS OF BILATERAL ARM PAIN AND LEFT LOWER RIB CAGE AREA FOR OVER A WEEK, DENIES INJURY                Days Audrey Mendez is a 65 y.o. male presenting to the ED for arm pain. Patient had continuous bilateral arm pain for the past week. Denies any direct injury or,, describes a shooting sensation that starts at the base of his neck and goes down, worse when he turns his head, improves with rest.  He has not had this in the past.  Denies any weakness or numbness in the arms. He still has full range of motion and strength in the arms. He also complains of a left-sided rib pain. He said he is got a left rib pain, worse when he bends and twists, also when he pushes on the area, denies any direct injury or trauma. He has had this in the past.  Cough, sputum, shortness of breath, change in bowel or bladder, neck pain or stiffness, paresthesias, lethargy, or any other symptoms or complaints. Review of Systems:   Pertinent positives and negatives are stated within HPI, all other systems reviewed and are negative.        --------------------------------------------- PAST HISTORY ---------------------------------------------  Past Medical History:  has a past medical history of Cancer ([facility] [address] Diabetes mellitus ([facility] [address] Hyperlipidemia, Hypertension, and [facility] Past Surgical History:  has a past surgical history that includes Colon surgery; Dilatation, esophagus; and Leg Surgery (Left, 6 years ago). Social History:  reports that he has quit smoking. His smoking use included cigarettes. He smoked 1.00 pack per day. He has never used smokeless tobacco. He reports current alcohol use. He reports that he does not use drugs.     Family History: family history is not on file.. Unless otherwise noted, family history is non contributory    The patients home medications have been reviewed. Allergies: Patient has no known allergies. ---------------------------------------------------PHYSICAL EXAM--------------------------------------    Constitutional/General: Alert and oriented x3  Head: Normocephalic and atraumatic  Eyes: PERRL, EOMI, sclera non icteric  Mouth: Oropharynx clear, handling secretions, no trismus, no asymmetry of the posterior oropharynx or uvular edema  Neck: Supple, full ROM, no stridor, no meningeal signs  Respiratory: Lungs clear to auscultation bilaterally, no wheezes, rales, or rhonchi. Not in respiratory distress  Cardiovascular:  Regular rate. Regular rhythm. 2+ distal pulses. Equal extremity pulses. Chest: Point tenderness of left mid rib, axillary area  GI:  Abdomen Soft, Non tender, Non distended. No rebound, guarding, or rigidity. No pulsatile masses. Musculoskeletal: Moves all extremities x 4. Warm and well perfused, no clubbing, cyanosis, or edema. Capillary refill <3 seconds. No gross signs of injury or trauma, radial pulse 2+ bilaterally, no signs of ischemic limb or compartment syndrome in the upper extremities  Integument: skin warm and dry. No rashes. Neurologic: GCS 15, no focal deficits, symmetric strength 5/5 in the upper and lower extremities bilaterally  Psychiatric: Normal Affect          -------------------------------------------------- RESULTS -------------------------------------------------  I have personally reviewed all laboratory and imaging results for this patient. Results are listed below.      LABS: (Lab results interpreted by me)  Results for orders placed or performed during the hospital encounter of 10/22/20   CBC Auto Differential   Result Value Ref Range    WBC 4.2 (L) 4.5 - 11.5 E9/L    RBC 3.67 (L) 3.80 - 5.80 E12/L    Hemoglobin 10.7 (L) 12.5 - 16.5 g/dL    Hematocrit 30.8 (L) 37.0 - 54.0 %    MCV 83.9 80.0 - 99.9 fL    MCH 29.2 26.0 - 35.0 pg    MCHC 34.7 (H) 32.0 - 34.5 %    RDW 12.6 11.5 - 15.0 fL    Platelets 353 819 - 923 E9/L    MPV 10.6 7.0 - 12.0 fL    Neutrophils % 61.3 43.0 - 80.0 %    Immature Granulocytes % 0.5 0.0 - 5.0 %    Lymphocytes % 28.1 20.0 - 42.0 %    Monocytes % 8.4 2.0 - 12.0 %    Eosinophils % 1.2 0.0 - 6.0 %    Basophils % 0.5 0.0 - 2.0 %    Neutrophils Absolute 2.56 1.80 - 7.30 E9/L    Immature Granulocytes # 0.02 E9/L    Lymphocytes Absolute 1.17 (L) 1.50 - 4.00 E9/L    Monocytes Absolute 0.35 0.10 - 0.95 E9/L    Eosinophils Absolute 0.05 0.05 - 0.50 E9/L    Basophils Absolute 0.02 0.00 - 0.20 E9/L   Troponin   Result Value Ref Range    Troponin <0.01 0.00 - 0.03 ng/mL   Comprehensive Metabolic Panel   Result Value Ref Range    Sodium 136 132 - 146 mmol/L    Potassium 4.8 3.5 - 5.0 mmol/L    Chloride 103 98 - 107 mmol/L    CO2 20 (L) 22 - 29 mmol/L    Anion Gap 13 7 - 16 mmol/L    Glucose 225 (H) 74 - 99 mg/dL    BUN 12 8 - 23 mg/dL    CREATININE 1.4 (H) 0.7 - 1.2 mg/dL    GFR Non-African American >60 >=60 mL/min/1.73    GFR African American >60     Calcium 9.9 8.6 - 10.2 mg/dL    Total Protein 6.7 6.4 - 8.3 g/dL    Alb 4.2 3.5 - 5.2 g/dL    Total Bilirubin 0.3 0.0 - 1.2 mg/dL    Alkaline Phosphatase 51 40 - 129 U/L    ALT 20 0 - 40 U/L    AST 15 0 - 39 U/L   Brain Natriuretic Peptide   Result Value Ref Range    Pro- (H) 0 - 125 pg/mL   Troponin   Result Value Ref Range    Troponin <0.01 0.00 - 0.03 ng/mL   EKG 12 Lead   Result Value Ref Range    Ventricular Rate 77 BPM    Atrial Rate 77 BPM    P-R Interval 190 ms    QRS Duration 80 ms    Q-T Interval 370 ms    QTc Calculation (Bazett) 418 ms    P Axis 65 degrees    R Axis 22 degrees    T Axis 48 degrees   ,       RADIOLOGY:  Interpreted by Radiologist unless otherwise specified  CT CERVICAL SPINE WO CONTRAST   Final Result   1. There is no acute compression fracture or malalignment of the cervical   spine   2.  Advanced multilevel degenerative disc and degenerative joint disease. 3. Mild bilateral bony neural foraminal narrowing at the C2-3 through C4-5   level secondary to the degenerative changes. XR CHEST PORTABLE   Final Result   No acute process. EKG Interpretation  Interpreted by emergency department physician, Dr. Vicky Arevalo     Sinus rhythm, rate 77, no ischemic change        ------------------------- NURSING NOTES AND VITALS REVIEWED ---------------------------   The nursing notes within the ED encounter and vital signs as below have been reviewed by myself  BP (!) 170/99   Pulse 72   Temp 97.1 °F (36.2 °C)   Resp 17   Ht 6' 2\" (1.88 m)   Wt 210 lb (95.3 kg)   SpO2 98%   BMI 26.96 kg/m²     Oxygen Saturation Interpretation: Normal    The patients available past medical records and past encounters were reviewed. ------------------------------ ED COURSE/MEDICAL DECISION MAKING----------------------  Medications   ketorolac (TORADOL) injection 15 mg (15 mg Intravenous Given 10/22/20 0859)           The cardiac monitor revealed Sinus with a heart rate in the 70s as interpreted by me. The cardiac monitor was ordered secondary to the patient's arm pain and to monitor the patient for dysrhythmia. CPT [id number]         Medical Decision Making:    Patient medicated as above. Labs and imaging reviewed. Reevaluation, patient's resting comfortably. He has no symptoms or complaints after medication. Repeat troponin negative. Patient's pain was reproducible over his chest, been gone for several days, with a normal EKG and 2 - troponins. Unlikely to be cardiac. His arm pain seems to be radicular nature. Therefore, patient be discharged, he is to follow-up with his PCP in 1 to 2 days. He is educated on signs and symptoms that require emergent evaluation. Counseling:    The emergency provider has spoken with the patient and discussed todays results, in addition to providing specific details for the plan of care and counseling regarding the diagnosis and prognosis. Questions are answered at this time and they are agreeable with the plan.       --------------------------------- IMPRESSION AND DISPOSITION ---------------------------------    IMPRESSION  1. Radiculopathy, unspecified spinal region    2. Chest wall pain        DISPOSITION  Disposition: Discharge to home  Patient condition is stable        NOTE: This report was transcribed using voice recognition software.  Every effort was made to ensure accuracy; however, inadvertent computerized transcription errors may be present        Celso Becker MD  10/22/20 9789

## 2020-12-22 ENCOUNTER — APPOINTMENT (OUTPATIENT)
Dept: GENERAL RADIOLOGY | Age: 70
DRG: 207 | End: 2020-12-22
Payer: MEDICARE

## 2020-12-22 ENCOUNTER — HOSPITAL ENCOUNTER (INPATIENT)
Age: 70
LOS: 10 days | Discharge: ANOTHER HOSPITAL | DRG: 207 | End: 2021-01-01
Attending: EMERGENCY MEDICINE | Admitting: FAMILY MEDICINE
Payer: MEDICARE

## 2020-12-22 ENCOUNTER — APPOINTMENT (OUTPATIENT)
Dept: CT IMAGING | Age: 70
DRG: 207 | End: 2020-12-22
Payer: MEDICARE

## 2020-12-22 DIAGNOSIS — E10.10 DKA, TYPE 1, NOT AT GOAL (HCC): Primary | ICD-10-CM

## 2020-12-22 DIAGNOSIS — N17.9 ACUTE RENAL FAILURE, UNSPECIFIED ACUTE RENAL FAILURE TYPE (HCC): ICD-10-CM

## 2020-12-22 DIAGNOSIS — E87.29 HIGH ANION GAP METABOLIC ACIDOSIS: ICD-10-CM

## 2020-12-22 DIAGNOSIS — U07.1 COVID-19: ICD-10-CM

## 2020-12-22 DIAGNOSIS — E87.5 HYPERKALEMIA: ICD-10-CM

## 2020-12-22 DIAGNOSIS — K85.90 ACUTE PANCREATITIS, UNSPECIFIED COMPLICATION STATUS, UNSPECIFIED PANCREATITIS TYPE: ICD-10-CM

## 2020-12-22 DIAGNOSIS — G93.41 ACUTE METABOLIC ENCEPHALOPATHY: ICD-10-CM

## 2020-12-22 LAB
ALBUMIN SERPL-MCNC: 2.3 G/DL (ref 3.5–5.2)
ALBUMIN SERPL-MCNC: 2.5 G/DL (ref 3.5–5.2)
ALBUMIN SERPL-MCNC: 2.9 G/DL (ref 3.5–5.2)
ALP BLD-CCNC: 102 U/L (ref 40–129)
ALP BLD-CCNC: 103 U/L (ref 40–129)
ALP BLD-CCNC: 93 U/L (ref 40–129)
ALT SERPL-CCNC: 11 U/L (ref 0–40)
ALT SERPL-CCNC: 13 U/L (ref 0–40)
ALT SERPL-CCNC: 16 U/L (ref 0–40)
AMORPHOUS: ABNORMAL
AMORPHOUS: ABNORMAL
ANION GAP SERPL CALCULATED.3IONS-SCNC: 19 MMOL/L (ref 7–16)
ANION GAP SERPL CALCULATED.3IONS-SCNC: 20 MMOL/L (ref 7–16)
ANION GAP SERPL CALCULATED.3IONS-SCNC: 25 MMOL/L (ref 7–16)
ANION GAP SERPL CALCULATED.3IONS-SCNC: 28 MMOL/L (ref 7–16)
APTT: 20.4 SEC (ref 24.5–35.1)
AST SERPL-CCNC: 14 U/L (ref 0–39)
AST SERPL-CCNC: 26 U/L (ref 0–39)
AST SERPL-CCNC: 39 U/L (ref 0–39)
B.E.: -25.9 MMOL/L (ref -3–3)
BACTERIA: ABNORMAL /HPF
BACTERIA: ABNORMAL /HPF
BASOPHILS ABSOLUTE: 0.07 E9/L (ref 0–0.2)
BASOPHILS RELATIVE PERCENT: 0.4 % (ref 0–2)
BILIRUB SERPL-MCNC: 0.6 MG/DL (ref 0–1.2)
BILIRUB SERPL-MCNC: 0.6 MG/DL (ref 0–1.2)
BILIRUB SERPL-MCNC: 0.7 MG/DL (ref 0–1.2)
BILIRUBIN URINE: NEGATIVE
BILIRUBIN URINE: NEGATIVE
BLOOD, URINE: ABNORMAL
BLOOD, URINE: ABNORMAL
BUN BLDV-MCNC: 91 MG/DL (ref 8–23)
BUN BLDV-MCNC: 95 MG/DL (ref 8–23)
BURR CELLS: ABNORMAL
CALCIUM SERPL-MCNC: 8.3 MG/DL (ref 8.6–10.2)
CALCIUM SERPL-MCNC: 8.3 MG/DL (ref 8.6–10.2)
CALCIUM SERPL-MCNC: 8.5 MG/DL (ref 8.6–10.2)
CALCIUM SERPL-MCNC: 9.1 MG/DL (ref 8.6–10.2)
CHLORIDE BLD-SCNC: 100 MMOL/L (ref 98–107)
CHLORIDE BLD-SCNC: 91 MMOL/L (ref 98–107)
CHLORIDE BLD-SCNC: 95 MMOL/L (ref 98–107)
CHLORIDE BLD-SCNC: 98 MMOL/L (ref 98–107)
CHLORIDE URINE RANDOM: <20 MMOL/L
CHLORIDE URINE RANDOM: <20 MMOL/L
CHP ED QC CHECK: YES
CLARITY: CLEAR
CLARITY: CLEAR
CO2: 10 MMOL/L (ref 22–29)
CO2: 15 MMOL/L (ref 22–29)
CO2: 4 MMOL/L (ref 22–29)
CO2: 4 MMOL/L (ref 22–29)
COHB: 0.3 % (ref 0–1.5)
COLOR: YELLOW
COLOR: YELLOW
CREAT SERPL-MCNC: 5.1 MG/DL (ref 0.7–1.2)
CREAT SERPL-MCNC: 5.4 MG/DL (ref 0.7–1.2)
CREAT SERPL-MCNC: 5.5 MG/DL (ref 0.7–1.2)
CREAT SERPL-MCNC: 5.7 MG/DL (ref 0.7–1.2)
CREATININE URINE: 68 MG/DL (ref 40–278)
CRITICAL: ABNORMAL
D DIMER: >5250 NG/ML DDU
DATE ANALYZED: ABNORMAL
DATE OF COLLECTION: ABNORMAL
EOSINOPHILS ABSOLUTE: 0.01 E9/L (ref 0.05–0.5)
EOSINOPHILS RELATIVE PERCENT: 0.1 % (ref 0–6)
EPITHELIAL CELLS, UA: ABNORMAL /HPF
EPITHELIAL CELLS, UA: ABNORMAL /HPF
GFR AFRICAN AMERICAN: 12
GFR AFRICAN AMERICAN: 12
GFR AFRICAN AMERICAN: 13
GFR AFRICAN AMERICAN: 14
GFR AFRICAN AMERICAN: 14
GFR AFRICAN AMERICAN: 15
GFR NON-AFRICAN AMERICAN: 11 ML/MIN/1.73
GFR NON-AFRICAN AMERICAN: 12 ML/MIN/1.73
GFR NON-AFRICAN AMERICAN: 13 ML/MIN/1.73
GFR NON-AFRICAN AMERICAN: 14 ML/MIN/1.73
GLUCOSE BLD-MCNC: 1020 MG/DL (ref 74–99)
GLUCOSE BLD-MCNC: 1162 MG/DL (ref 74–99)
GLUCOSE BLD-MCNC: 119 MG/DL (ref 74–99)
GLUCOSE BLD-MCNC: 1332 MG/DL (ref 74–99)
GLUCOSE BLD-MCNC: 856 MG/DL (ref 74–99)
GLUCOSE BLD-MCNC: 955 MG/DL (ref 74–99)
GLUCOSE BLD-MCNC: >700 MG/DL (ref 74–99)
GLUCOSE BLD-MCNC: >700 MG/DL (ref 74–99)
GLUCOSE BLD-MCNC: NORMAL MG/DL
GLUCOSE URINE: >=1000 MG/DL
GLUCOSE URINE: >=1000 MG/DL
HCO3: 4.5 MMOL/L (ref 22–26)
HCT VFR BLD CALC: 35.7 % (ref 37–54)
HEMOGLOBIN: 11.1 G/DL (ref 12.5–16.5)
HHB: 4.6 % (ref 0–5)
IMMATURE GRANULOCYTES #: 0.74 E9/L
IMMATURE GRANULOCYTES %: 4.1 % (ref 0–5)
INR BLD: 1.1
KETONES, URINE: NEGATIVE MG/DL
KETONES, URINE: NEGATIVE MG/DL
LAB: ABNORMAL
LACTIC ACID, SEPSIS: 11.8 MMOL/L (ref 0.5–1.9)
LEUKOCYTE ESTERASE, URINE: NEGATIVE
LEUKOCYTE ESTERASE, URINE: NEGATIVE
LIPASE: 2067 U/L (ref 13–60)
LIPASE: >3000 U/L (ref 13–60)
LYMPHOCYTES ABSOLUTE: 2.34 E9/L (ref 1.5–4)
LYMPHOCYTES RELATIVE PERCENT: 12.9 % (ref 20–42)
Lab: ABNORMAL
MAGNESIUM: 4 MG/DL (ref 1.6–2.6)
MAGNESIUM: 4.5 MG/DL (ref 1.6–2.6)
MCH RBC QN AUTO: 28.2 PG (ref 26–35)
MCHC RBC AUTO-ENTMCNC: 31.1 % (ref 32–34.5)
MCV RBC AUTO: 90.8 FL (ref 80–99.9)
METER GLUCOSE: 488 MG/DL (ref 74–99)
METER GLUCOSE: >500 MG/DL (ref 74–99)
METHB: 0.3 % (ref 0–1.5)
MODE: ABNORMAL
MONOCYTES ABSOLUTE: 1.1 E9/L (ref 0.1–0.95)
MONOCYTES RELATIVE PERCENT: 6.1 % (ref 2–12)
NEUTROPHILS ABSOLUTE: 13.92 E9/L (ref 1.8–7.3)
NEUTROPHILS RELATIVE PERCENT: 76.4 % (ref 43–80)
NITRITE, URINE: NEGATIVE
NITRITE, URINE: NEGATIVE
O2 CONTENT: 17.1 ML/DL
O2 SATURATION: 95.4 % (ref 92–98.5)
O2HB: 94.8 % (ref 94–97)
OPERATOR ID: ABNORMAL
OSMOLALITY URINE: 429 MOSM/KG (ref 300–900)
PATIENT TEMP: 37 C
PCO2: 19.9 MMHG (ref 35–45)
PDW BLD-RTO: 13.7 FL (ref 11.5–15)
PERFORMED ON: ABNORMAL
PERFORMED ON: ABNORMAL
PH BLOOD GAS: 6.97 (ref 7.35–7.45)
PH UA: 5 (ref 5–9)
PH UA: 5 (ref 5–9)
PHOSPHORUS: 13 MG/DL (ref 2.5–4.5)
PHOSPHORUS: 7.4 MG/DL (ref 2.5–4.5)
PLATELET # BLD: 224 E9/L (ref 130–450)
PMV BLD AUTO: 11.4 FL (ref 7–12)
PO2: 109.1 MMHG (ref 75–100)
POC CHLORIDE: 102 MMOL/L (ref 100–108)
POC CHLORIDE: 109 MMOL/L (ref 100–108)
POC CREATININE: 4.8 MG/DL (ref 0.7–1.2)
POC CREATININE: 5 MG/DL (ref 0.7–1.2)
POC POTASSIUM: 6.7 MMOL/L (ref 3.5–5)
POC POTASSIUM: 7.1 MMOL/L (ref 3.5–5)
POC SODIUM: 125 MMOL/L (ref 132–146)
POC SODIUM: 129 MMOL/L (ref 132–146)
POIKILOCYTES: ABNORMAL
POLYCHROMASIA: ABNORMAL
POTASSIUM SERPL-SCNC: 5.2 MMOL/L (ref 3.5–5)
POTASSIUM SERPL-SCNC: 5.4 MMOL/L (ref 3.5–5)
POTASSIUM SERPL-SCNC: 6.6 MMOL/L (ref 3.5–5)
POTASSIUM SERPL-SCNC: 7.1 MMOL/L (ref 3.5–5)
POTASSIUM, UR: 20.9 MMOL/L
POTASSIUM, UR: 21 MMOL/L
PRO-BNP: 1249 PG/ML (ref 0–125)
PROTEIN PROTEIN: 170 MG/DL (ref 0–12)
PROTEIN UA: 100 MG/DL
PROTEIN UA: 100 MG/DL
PROTEIN/CREAT RATIO: 2.5
PROTEIN/CREAT RATIO: 2.5 (ref 0–0.2)
PROTHROMBIN TIME: 12 SEC (ref 9.3–12.4)
RBC # BLD: 3.93 E12/L (ref 3.8–5.8)
RBC UA: ABNORMAL /HPF (ref 0–2)
RBC UA: ABNORMAL /HPF (ref 0–2)
SARS-COV-2, NAAT: DETECTED
SODIUM BLD-SCNC: 123 MMOL/L (ref 132–146)
SODIUM BLD-SCNC: 127 MMOL/L (ref 132–146)
SODIUM BLD-SCNC: 129 MMOL/L (ref 132–146)
SODIUM BLD-SCNC: 130 MMOL/L (ref 132–146)
SODIUM URINE: <20 MMOL/L
SODIUM URINE: <20 MMOL/L
SOURCE, BLOOD GAS: ABNORMAL
SPECIFIC GRAVITY UA: 1.02 (ref 1–1.03)
SPECIFIC GRAVITY UA: 1.02 (ref 1–1.03)
THB: 12.7 G/DL (ref 11.5–16.5)
TIME ANALYZED: 1217
TOTAL PROTEIN: 6 G/DL (ref 6.4–8.3)
TOTAL PROTEIN: 6.1 G/DL (ref 6.4–8.3)
TOTAL PROTEIN: 6.6 G/DL (ref 6.4–8.3)
TROPONIN: 0.01 NG/ML (ref 0–0.03)
UREA NITROGEN, UR: 361 MG/DL (ref 800–1666)
UROBILINOGEN, URINE: 0.2 E.U./DL
UROBILINOGEN, URINE: 0.2 E.U./DL
WBC # BLD: 18.2 E9/L (ref 4.5–11.5)
WBC UA: ABNORMAL /HPF (ref 0–5)
WBC UA: ABNORMAL /HPF (ref 0–5)

## 2020-12-22 PROCEDURE — 81001 URINALYSIS AUTO W/SCOPE: CPT

## 2020-12-22 PROCEDURE — 84132 ASSAY OF SERUM POTASSIUM: CPT

## 2020-12-22 PROCEDURE — 83690 ASSAY OF LIPASE: CPT

## 2020-12-22 PROCEDURE — 85025 COMPLETE CBC W/AUTO DIFF WBC: CPT

## 2020-12-22 PROCEDURE — 82570 ASSAY OF URINE CREATININE: CPT

## 2020-12-22 PROCEDURE — 84295 ASSAY OF SERUM SODIUM: CPT

## 2020-12-22 PROCEDURE — 6370000000 HC RX 637 (ALT 250 FOR IP): Performed by: EMERGENCY MEDICINE

## 2020-12-22 PROCEDURE — 80048 BASIC METABOLIC PNL TOTAL CA: CPT

## 2020-12-22 PROCEDURE — 85378 FIBRIN DEGRADE SEMIQUANT: CPT

## 2020-12-22 PROCEDURE — 2580000003 HC RX 258: Performed by: EMERGENCY MEDICINE

## 2020-12-22 PROCEDURE — 83880 ASSAY OF NATRIURETIC PEPTIDE: CPT

## 2020-12-22 PROCEDURE — 96374 THER/PROPH/DIAG INJ IV PUSH: CPT

## 2020-12-22 PROCEDURE — 84484 ASSAY OF TROPONIN QUANT: CPT

## 2020-12-22 PROCEDURE — 82805 BLOOD GASES W/O2 SATURATION: CPT

## 2020-12-22 PROCEDURE — 6360000002 HC RX W HCPCS: Performed by: EMERGENCY MEDICINE

## 2020-12-22 PROCEDURE — 80053 COMPREHEN METABOLIC PANEL: CPT

## 2020-12-22 PROCEDURE — 71045 X-RAY EXAM CHEST 1 VIEW: CPT

## 2020-12-22 PROCEDURE — 36415 COLL VENOUS BLD VENIPUNCTURE: CPT

## 2020-12-22 PROCEDURE — 83935 ASSAY OF URINE OSMOLALITY: CPT

## 2020-12-22 PROCEDURE — 85730 THROMBOPLASTIN TIME PARTIAL: CPT

## 2020-12-22 PROCEDURE — 84100 ASSAY OF PHOSPHORUS: CPT

## 2020-12-22 PROCEDURE — 84300 ASSAY OF URINE SODIUM: CPT

## 2020-12-22 PROCEDURE — 87040 BLOOD CULTURE FOR BACTERIA: CPT

## 2020-12-22 PROCEDURE — 82962 GLUCOSE BLOOD TEST: CPT

## 2020-12-22 PROCEDURE — 82436 ASSAY OF URINE CHLORIDE: CPT

## 2020-12-22 PROCEDURE — 70450 CT HEAD/BRAIN W/O DYE: CPT

## 2020-12-22 PROCEDURE — 84540 ASSAY OF URINE/UREA-N: CPT

## 2020-12-22 PROCEDURE — 99284 EMERGENCY DEPT VISIT MOD MDM: CPT

## 2020-12-22 PROCEDURE — 84156 ASSAY OF PROTEIN URINE: CPT

## 2020-12-22 PROCEDURE — 2500000003 HC RX 250 WO HCPCS: Performed by: EMERGENCY MEDICINE

## 2020-12-22 PROCEDURE — 82565 ASSAY OF CREATININE: CPT

## 2020-12-22 PROCEDURE — U0002 COVID-19 LAB TEST NON-CDC: HCPCS

## 2020-12-22 PROCEDURE — 83735 ASSAY OF MAGNESIUM: CPT

## 2020-12-22 PROCEDURE — 85610 PROTHROMBIN TIME: CPT

## 2020-12-22 PROCEDURE — 2000000000 HC ICU R&B

## 2020-12-22 PROCEDURE — 82435 ASSAY OF BLOOD CHLORIDE: CPT

## 2020-12-22 PROCEDURE — 93005 ELECTROCARDIOGRAM TRACING: CPT | Performed by: EMERGENCY MEDICINE

## 2020-12-22 PROCEDURE — 83605 ASSAY OF LACTIC ACID: CPT

## 2020-12-22 PROCEDURE — 84133 ASSAY OF URINE POTASSIUM: CPT

## 2020-12-22 PROCEDURE — 82947 ASSAY GLUCOSE BLOOD QUANT: CPT

## 2020-12-22 PROCEDURE — 74176 CT ABD & PELVIS W/O CONTRAST: CPT

## 2020-12-22 RX ORDER — DEXTROSE, SODIUM CHLORIDE, AND POTASSIUM CHLORIDE 5; .45; .15 G/100ML; G/100ML; G/100ML
INJECTION INTRAVENOUS CONTINUOUS PRN
Status: CANCELLED | OUTPATIENT
Start: 2020-12-22

## 2020-12-22 RX ORDER — MAGNESIUM SULFATE 1 G/100ML
1 INJECTION INTRAVENOUS PRN
Status: CANCELLED | OUTPATIENT
Start: 2020-12-22

## 2020-12-22 RX ORDER — DEXTROSE MONOHYDRATE 25 G/50ML
12.5 INJECTION, SOLUTION INTRAVENOUS PRN
Status: CANCELLED | OUTPATIENT
Start: 2020-12-22

## 2020-12-22 RX ORDER — ONDANSETRON 2 MG/ML
4 INJECTION INTRAMUSCULAR; INTRAVENOUS EVERY 6 HOURS PRN
Status: CANCELLED | OUTPATIENT
Start: 2020-12-22

## 2020-12-22 RX ORDER — SODIUM CHLORIDE 9 MG/ML
INJECTION, SOLUTION INTRAVENOUS CONTINUOUS
Status: DISCONTINUED | OUTPATIENT
Start: 2020-12-22 | End: 2020-12-22

## 2020-12-22 RX ORDER — DEXTROSE MONOHYDRATE 25 G/50ML
12.5 INJECTION, SOLUTION INTRAVENOUS PRN
Status: DISCONTINUED | OUTPATIENT
Start: 2020-12-22 | End: 2021-01-01 | Stop reason: HOSPADM

## 2020-12-22 RX ORDER — SODIUM CHLORIDE 0.9 % (FLUSH) 0.9 %
10 SYRINGE (ML) INJECTION PRN
Status: CANCELLED | OUTPATIENT
Start: 2020-12-22

## 2020-12-22 RX ORDER — POLYETHYLENE GLYCOL 3350 17 G/17G
17 POWDER, FOR SOLUTION ORAL DAILY PRN
Status: CANCELLED | OUTPATIENT
Start: 2020-12-22

## 2020-12-22 RX ORDER — 0.9 % SODIUM CHLORIDE 0.9 %
30 INTRAVENOUS SOLUTION INTRAVENOUS ONCE
Status: COMPLETED | OUTPATIENT
Start: 2020-12-22 | End: 2020-12-22

## 2020-12-22 RX ORDER — SODIUM CHLORIDE 0.9 % (FLUSH) 0.9 %
10 SYRINGE (ML) INJECTION EVERY 12 HOURS SCHEDULED
Status: CANCELLED | OUTPATIENT
Start: 2020-12-22

## 2020-12-22 RX ORDER — ACETAMINOPHEN 650 MG/1
650 SUPPOSITORY RECTAL EVERY 6 HOURS PRN
Status: CANCELLED | OUTPATIENT
Start: 2020-12-22

## 2020-12-22 RX ORDER — SODIUM CHLORIDE 9 MG/ML
INJECTION, SOLUTION INTRAVENOUS CONTINUOUS
Status: CANCELLED | OUTPATIENT
Start: 2020-12-22

## 2020-12-22 RX ORDER — PROMETHAZINE HYDROCHLORIDE 25 MG/1
12.5 TABLET ORAL EVERY 6 HOURS PRN
Status: CANCELLED | OUTPATIENT
Start: 2020-12-22

## 2020-12-22 RX ORDER — HEPARIN SODIUM 5000 [USP'U]/ML
5000 INJECTION, SOLUTION INTRAVENOUS; SUBCUTANEOUS EVERY 8 HOURS SCHEDULED
Status: CANCELLED | OUTPATIENT
Start: 2020-12-22

## 2020-12-22 RX ORDER — DEXTROSE AND SODIUM CHLORIDE 5; .45 G/100ML; G/100ML
INJECTION, SOLUTION INTRAVENOUS CONTINUOUS PRN
Status: DISCONTINUED | OUTPATIENT
Start: 2020-12-22 | End: 2020-12-22

## 2020-12-22 RX ORDER — VITAMIN B COMPLEX
6000 TABLET ORAL DAILY
Status: CANCELLED | OUTPATIENT
Start: 2020-12-22

## 2020-12-22 RX ORDER — GUAIFENESIN/DEXTROMETHORPHAN 100-10MG/5
5 SYRUP ORAL EVERY 4 HOURS PRN
Status: CANCELLED | OUTPATIENT
Start: 2020-12-22

## 2020-12-22 RX ORDER — DEXTROSE AND SODIUM CHLORIDE 5; .45 G/100ML; G/100ML
INJECTION, SOLUTION INTRAVENOUS CONTINUOUS PRN
Status: DISCONTINUED | OUTPATIENT
Start: 2020-12-22 | End: 2020-12-24

## 2020-12-22 RX ORDER — SODIUM CHLORIDE 450 MG/100ML
INJECTION, SOLUTION INTRAVENOUS CONTINUOUS
Status: DISCONTINUED | OUTPATIENT
Start: 2020-12-22 | End: 2020-12-24

## 2020-12-22 RX ORDER — POTASSIUM CHLORIDE 7.45 MG/ML
10 INJECTION INTRAVENOUS PRN
Status: CANCELLED | OUTPATIENT
Start: 2020-12-22

## 2020-12-22 RX ORDER — ACETAMINOPHEN 325 MG/1
650 TABLET ORAL EVERY 6 HOURS PRN
Status: CANCELLED | OUTPATIENT
Start: 2020-12-22

## 2020-12-22 RX ORDER — DEXAMETHASONE SODIUM PHOSPHATE 10 MG/ML
6 INJECTION INTRAMUSCULAR; INTRAVENOUS ONCE
Status: COMPLETED | OUTPATIENT
Start: 2020-12-22 | End: 2020-12-22

## 2020-12-22 RX ADMIN — DEXAMETHASONE SODIUM PHOSPHATE 6 MG: 10 INJECTION INTRAMUSCULAR; INTRAVENOUS at 14:50

## 2020-12-22 RX ADMIN — SODIUM CHLORIDE 0.1 UNITS/KG/HR: 9 INJECTION, SOLUTION INTRAVENOUS at 13:21

## 2020-12-22 RX ADMIN — INSULIN HUMAN 10 UNITS: 100 INJECTION, SOLUTION PARENTERAL at 13:20

## 2020-12-22 RX ADMIN — CEFTRIAXONE SODIUM 1 G: 1 INJECTION, POWDER, FOR SOLUTION INTRAMUSCULAR; INTRAVENOUS at 14:50

## 2020-12-22 RX ADMIN — SODIUM CHLORIDE: 4.5 INJECTION, SOLUTION INTRAVENOUS at 23:21

## 2020-12-22 RX ADMIN — SODIUM BICARBONATE: 84 INJECTION, SOLUTION INTRAVENOUS at 12:49

## 2020-12-22 RX ADMIN — SODIUM CHLORIDE 2859 ML: 9 INJECTION, SOLUTION INTRAVENOUS at 12:33

## 2020-12-22 RX ADMIN — Medication 1 G: at 15:05

## 2020-12-22 RX ADMIN — SODIUM CHLORIDE: 4.5 INJECTION, SOLUTION INTRAVENOUS at 19:12

## 2020-12-22 RX ADMIN — SODIUM BICARBONATE: 84 INJECTION, SOLUTION INTRAVENOUS at 23:21

## 2020-12-22 ASSESSMENT — PAIN SCALES - GENERAL: PAINLEVEL_OUTOF10: 0

## 2020-12-22 NOTE — ED PROVIDER NOTES
Department of Emergency Medicine   ED  Provider Note  Admit Date/RoomTime: 12/22/2020 11:54 AM  ED Room: 16/16          History of Present Illness:  12/22/20, Time: 12:19 PM EST  Chief Complaint   Patient presents with    Shortness of Breath    Altered Mental Status    Positive For Covid-19                Days Luciano Damian is a 65 y.o. male presenting to the ED for AMS, beginning unknown time. The complaint has been persistent, moderate in severity, and worsened by nothing. Patient presents via EMS for altered mental status. Reportedly called to the patient's home for altered mental status, he is reportedly Covid positive although the timeline on this as well as when the patient's diagnosis was is not clear. Upon arrival he is orientated to person only. Cannot give additional history. Tachypneic. In respiratory distress. Seen upon arrival in the resuscitation bay    Review of Systems:   Unobtainable secondary to patient's mental status        --------------------------------------------- PAST HISTORY ---------------------------------------------  Past Medical History:  has a past medical history of Cancer ([facility] [address] Diabetes mellitus ([facility] [address] Hyperlipidemia, Hypertension, and Prostate cancer ([facility] Past Surgical History:  has a past surgical history that includes Colon surgery; Dilatation, esophagus; and Leg Surgery (Left, 6 years ago). Social History:  reports that he has quit smoking. His smoking use included cigarettes. He smoked 1.00 pack per day. He has never used smokeless tobacco. He reports current alcohol use. He reports that he does not use drugs. Family History: family history is not on file. . Unless otherwise noted, family history is non contributory    The patients home medications have been reviewed. Allergies: Patient has no known allergies.         ---------------------------------------------------PHYSICAL EXAM--------------------------------------    Constitutional/General: Alert and oriented x1 tachypneic, altered,  Head: Normocephalic and atraumatic  Eyes: PERRL, EOMI, sclera non icteric  Mouth: Oropharynx clear, handling secretions, no trismus, no asymmetry of the posterior oropharynx or uvular edema right mucous membranes  Neck: Supple, full ROM, no stridor, no meningeal signs no JVD   Respiratory: Diminished throughout, tachypneic,,Not in respiratory distress  Cardiovascular: Tachycardic and regular 2+ distal pulses. Equal extremity pulses. Chest: No chest wall tenderness  GI:  Abdomen Soft, Non tender, Non distended. No rebound, guarding, or rigidity. Musculoskeletal: Moves all extremities x 4. Warm and well perfused, no clubbing, cyanosis, or edema. Capillary refill <3 seconds  Integument: skin warm and dry. No rashes. Neurologic: Glascow Coma Scale  Best Eye Response 3 - Opens eyes to loud noise or command   Best Verbal Response 4 - Seems confused, disoriented   Best Motor Response 6 - Follows simple motor commands   Total 13           EKG: Interpreted by emergency department physician, Dr. Gordon Ring   This EKG is signed and interpreted by me. Rate: 86  Rhythm: Sinus  Interpretation: This rhythm with borderline first-degree block, MA is 216, QRS is 88, QTc is 437, nonspecific T changes. The MA interval has increased compared prior EKG from August 2020  Comparison: changes compared to previous EKG      -------------------------------------------------- RESULTS -------------------------------------------------  I have personally reviewed all laboratory and imaging results for this patient. Results are listed below.      LABS: (Lab results interpreted by me)  Results for orders placed or performed during the hospital encounter of 12/22/20   Troponin   Result Value Ref Range    Troponin 0.01 0.00 - 0.03 ng/mL   CBC Auto Differential   Result Value Ref Range    WBC 18.2 (H) 4.5 - 11.5 E9/L    RBC 3.93 3.80 - 5.80 E12/L    Hemoglobin 11.1 (L) 12.5 - 16.5 g/dL    Hematocrit 35.7 (L) 37.0 - 54.0 %    MCV 90.8 80.0 - 99.9 fL    MCH 28.2 26.0 - 35.0 pg    MCHC 31.1 (L) 32.0 - 34.5 %    RDW 13.7 11.5 - 15.0 fL    Platelets 456 869 - 162 E9/L    MPV 11.4 7.0 - 12.0 fL    Neutrophils % 76.4 43.0 - 80.0 %    Immature Granulocytes % 4.1 0.0 - 5.0 %    Lymphocytes % 12.9 (L) 20.0 - 42.0 %    Monocytes % 6.1 2.0 - 12.0 %    Eosinophils % 0.1 0.0 - 6.0 %    Basophils % 0.4 0.0 - 2.0 %    Neutrophils Absolute 13.92 (H) 1.80 - 7.30 E9/L    Immature Granulocytes # 0.74 E9/L    Lymphocytes Absolute 2.34 1.50 - 4.00 E9/L    Monocytes Absolute 1.10 (H) 0.10 - 0.95 E9/L    Eosinophils Absolute 0.01 (L) 0.05 - 0.50 E9/L    Basophils Absolute 0.07 0.00 - 0.20 E9/L    Polychromasia 1+     Poikilocytes 2+     [place] Cells 2+    Protime-INR   Result Value Ref Range    Protime 12.0 9.3 - 12.4 sec    INR 1.1    Comprehensive Metabolic Panel   Result Value Ref Range    Sodium 123 (L) 132 - 146 mmol/L    Potassium 7.1 (HH) 3.5 - 5.0 mmol/L    Chloride 91 (L) 98 - 107 mmol/L    CO2 4 (LL) 22 - 29 mmol/L    Anion Gap 28 (H) 7 - 16 mmol/L    Glucose 1,332 (HH) 74 - 99 mg/dL    BUN 95 (H) 8 - 23 mg/dL    CREATININE 5.7 (H) 0.7 - 1.2 mg/dL    GFR Non-African American 12 >=60 mL/min/1.73    GFR African American 12     Calcium 9.1 8.6 - 10.2 mg/dL    Total Protein 6.6 6.4 - 8.3 g/dL    Alb 2.9 (L) 3.5 - 5.2 g/dL    Total Bilirubin 0.6 0.0 - 1.2 mg/dL    Alkaline Phosphatase 103 40 - 129 U/L    ALT 11 0 - 40 U/L    AST 14 0 - 39 U/L   APTT   Result Value Ref Range    aPTT 20.4 (L) 24.5 - 35.1 sec   Brain Natriuretic Peptide   Result Value Ref Range    Pro-BNP 1,249 (H) 0 - 125 pg/mL   Magnesium   Result Value Ref Range    Magnesium 4.5 (H) 1.6 - 2.6 mg/dL   D-Dimer, Quantitative   Result Value Ref Range    D-Dimer, Quant >5250 ng/mL DDU   Phosphorus   Result Value Ref Range    Phosphorus 13.0 (HH) 2.5 - 4.5 mg/dL   Urinalysis   Result Value Ref Range    Color, UA Yellow Straw/Yellow    Clarity, UA Clear Clear    Glucose, Ur >=1000 (A) Negative mg/dL    Bilirubin Urine Negative Negative    Ketones, Urine Negative Negative mg/dL    Specific Gravity, UA 1.020 1.005 - 1.030    Blood, Urine SMALL (A) Negative    pH, UA 5.0 5.0 - 9.0    Protein,  (A) Negative mg/dL    Urobilinogen, Urine 0.2 <2.0 E.U./dL    Nitrite, Urine Negative Negative    Leukocyte Esterase, Urine Negative Negative   Lipase   Result Value Ref Range    Lipase >3,000 (H) 13 - 60 U/L   Lactate, Sepsis   Result Value Ref Range    Lactic Acid, Sepsis 11.8 (HH) 0.5 - 1.9 mmol/L   COVID-19   Result Value Ref Range    SARS-CoV-2, NAAT DETECTED (A) Not Detected   Blood Gas, Arterial   Result Value Ref Range    Date Analyzed 20201222     Time Analyzed 1217     Source: Blood Arterial     pH, Blood Gas 6.969 (LL) 7.350 - 7.450    PCO2 19.9 (L) 35.0 - 45.0 mmHg    PO2 109.1 (H) 75.0 - 100.0 mmHg    HCO3 4.5 (L) 22.0 - 26.0 mmol/L    B.E. -25.9 (L) -3.0 - 3.0 mmol/L    O2 Sat 95.4 92.0 - 98.5 %    O2Hb 94.8 94.0 - 97.0 %    COHb 0.3 0.0 - 1.5 %    MetHb 0.3 0.0 - 1.5 %    O2 Content 17.1 mL/dL    HHb 4.6 0.0 - 5.0 %    tHb (est) 12.7 11.5 - 16.5 g/dL    Mode RA     Date Of Collection      Time Collected      Pt Temp 37.0 C     ID [id number]     Lab [id number]     Critical(s) Notified Handed report to Dr/RN    Microscopic Urinalysis   Result Value Ref Range    WBC, UA 0-1 0 - 5 /HPF    RBC, UA NONE 0 - 2 /HPF    Epithelial Cells, UA RARE /HPF    Bacteria, UA RARE (A) None Seen /HPF    Amorphous, UA FEW    Comprehensive Metabolic Panel   Result Value Ref Range    Sodium 127 (L) 132 - 146 mmol/L    Potassium 6.6 (HH) 3.5 - 5.0 mmol/L    Chloride 98 98 - 107 mmol/L    CO2 4 (LL) 22 - 29 mmol/L    Anion Gap 25 (H) 7 - 16 mmol/L    Glucose 1,162 (HH) 74 - 99 mg/dL    BUN 95 (H) 8 - 23 mg/dL    CREATININE 5.5 (H) 0.7 - 1.2 mg/dL    GFR Non-African American 12 >=60 mL/min/1.73    GFR African American 12     Calcium 8.3 (L) 8.6 - 10.2 mg/dL    Total Protein 6.1 (L) 6.4 - 8.3 g/dL    Alb 2.3 (L) 3.5 - 5.2 g/dL    Total Bilirubin 0.7 0.0 - 1.2 mg/dL    Alkaline Phosphatase 93 40 - 129 U/L    ALT 13 0 - 40 U/L    AST 26 0 - 39 U/L   POCT Glucose   Result Value Ref Range    Glucose  mg/dL    QC OK? yes    POCT Glucose   Result Value Ref Range    Meter Glucose >500 (H) 74 - 99 mg/dL   POCT Venous   Result Value Ref Range    POC Sodium 125 (L) 132 - 146 mmol/L    POC Potassium 7.1 (HH) 3.5 - 5.0 mmol/L    POC Chloride 102 100 - 108 mmol/L    POC Glucose >700 (HH) 74 - 99 mg/dl    POC Creatinine 4.8 (H) 0.7 - 1.2 mg/dL    GFR Non-African American 12 >=60 mL/min/1.73    GFR  15     Performed on SEE BELOW    POCT Glucose   Result Value Ref Range    Meter Glucose >500 (H) 74 - 99 mg/dL   POCT Venous   Result Value Ref Range    POC Sodium 129 (L) 132 - 146 mmol/L    POC Potassium 6.7 (HH) 3.5 - 5.0 mmol/L    POC Chloride 109 (H) 100 - 108 mmol/L    POC Glucose >700 (HH) 74 - 99 mg/dl    POC Creatinine 5.0 (H) 0.7 - 1.2 mg/dL    GFR Non-African American 11 >=60 mL/min/1.73    GFR  14     Performed on SEE BELOW    EKG 12 Lead   Result Value Ref Range    Ventricular Rate 86 BPM    Atrial Rate 86 BPM    P-R Interval 216 ms    QRS Duration 88 ms    Q-T Interval 366 ms    QTc Calculation (Bazett) 437 ms    P Axis 7 degrees    R Axis 8 degrees    T Axis 49 degrees   ,       RADIOLOGY:  Interpreted by Radiologist unless otherwise specified  CT HEAD WO CONTRAST   Final Result   1. Negative for acute intracranial process, within the limits of this   non-contrast CT exam.   RECOMMENDATION:   1. If unexplained symptoms persist, consider MRI of the brain. Purm[facility]       CT ABDOMEN PELVIS WO CONTRAST Additional Contrast? None   Final Result   Significant inflammatory changes in the upper abdomen with the epicenter   surrounding the pancreas concerning for acute pancreatitis with inflammation   and inflammatory fluid extending and involving the root of the mesentery,   duodenum and ascending colon resulting in enteritis/colitis. Small amount of   inflammatory ascites is also present. Surveillance recommended. Atelectasis/infiltrates lung bases concerning for pneumonia. XR CHEST PORTABLE   Final Result   No acute process. ------------------------- NURSING NOTES AND VITALS REVIEWED ---------------------------   The nursing notes within the ED encounter and vital signs as below have been reviewed by myself  /67   Pulse 68   Temp 96.5 °F (35.8 °C) (Rectal)   Resp 20   Ht 6' 2\" (1.88 m)   Wt 210 lb (95.3 kg)   SpO2 94%   BMI 26.96 kg/m²     Oxygen Saturation Interpretation: Abnormal    The cardiac monitor revealed NSR with ST with a heart rate in the 100s as interpreted by me. The cardiac monitor was ordered secondary to the patient's heart rate and to monitor the patient for dysrhythmia. CPT 93544    The patients available past medical records and past encounters were reviewed.         ------------------------------ ED COURSE/MEDICAL DECISION MAKING----------------------  Medications   sodium bicarbonate 150 mEq in sterile water 1,000 mL infusion ( Intravenous New Bag 12/22/20 1249)   dextrose 50 % IV solution (has no administration in time range)   insulin regular (HUMULIN R;NOVOLIN R) 100 Units in sodium chloride 0.9 % 100 mL infusion (4.75 Units/hr Intravenous Rate/Dose Change 12/22/20 1547)   0.45 % sodium chloride infusion (has no administration in time range)   dextrose 5 % and 0.45 % sodium chloride infusion (has no administration in time range)   0.9 % sodium chloride bolus (2,859 mLs Intravenous New Bag 12/22/20 1233)   insulin regular (HUMULIN R;NOVOLIN R) injection 10 Units (10 Units Intravenous Given 12/22/20 1320)   dexamethasone (DECADRON) injection 6 mg (6 mg Intravenous Given 12/22/20 1450)   cefTRIAXone (ROCEPHIN) 1 g in sterile water 10 mL IV syringe (0 g Intravenous Stopped 12/22/20 4995)   calcium gluconate 1 g in dextrose 5% 100 mL IVPB (1 g Intravenous New Bag 12/22/20 1505)                    Medical Decision Making:     I, Dr. Augusto Gonzalez am the primary provider of record    Patient was volume resuscitated, evidence of high nongap metabolic acidosis. COVID-19. Multiple consults including medicine, critical care, nephrology. Patient will be mated to the intensive care unit      Re-Evaluations:       Time: 1300  Re-evaluation. Patients symptoms are improving  Repeat physical examination is significantly improved    Time: 1400  Re-evaluation. Patients symptoms show no change  Repeat physical examination is not changed      Sepsis Re-examination  12/22/20   1500 PM EST          Vital Signs:   Vitals:    12/22/20 1157 12/22/20 1332   BP: 110/67 118/67   Pulse: 93 68   Resp: (!) 46 20   Temp: 96.5 °F (35.8 °C)    TempSrc: Rectal    SpO2:  94%   Weight: 210 lb (95.3 kg)    Height: 6' 2\" (1.88 m)      Card/Pulm:  Rhythm: normal rate. Heart Sounds: Normal S1, S2. decreased breath sounds. Capillary Refill: normal.  Radial Pulse:  present 2+. Skin:  Warm. This patient's ED course included: a personal history and physicial examination, multiple bedside re-evaluations, IV medications, cardiac monitoring, continuous pulse oximetry and complex medical decision making and emergency management    This patient has been closely monitored during their ED course. Consultations:  [place] (Medicine). Discussed case. They will admit this patient. Spoke with Dr. Jan Altamirano (Critical care). Discussed case. They will provide consultation. Spoke with Dr. Yovani Starks (Nephrology). Discussed case. They will provide consultation. Critical Care: Please note that the withdrawal or failure to initiate urgent interventions for this patient would likely result in a life threatening deterioration or permanent disability. Accordingly this patient received 46 minutes of critical care time, excluding separately billable procedures. Counseling: The emergency provider has spoken with the patient and discussed todays results, in addition to providing specific details for the plan of care and counseling regarding the diagnosis and prognosis. Questions are answered at this time and they are agreeable with the plan.       --------------------------------- IMPRESSION AND DISPOSITION ---------------------------------    IMPRESSION  1. DKA, type 1, not at goal [facility])    2. COVID-19    3. Acute metabolic encephalopathy    4. Acute pancreatitis, unspecified complication status, unspecified pancreatitis type    5. Acute renal failure, unspecified acute renal failure type ([facility] [address]    6. Hyperkalemia    7. High anion gap metabolic acidosis        DISPOSITION  Disposition: Admit to CCU/ICU  Patient condition is serious        NOTE: This report was transcribed using voice recognition software.  Every effort was made to ensure accuracy; however, inadvertent computerized transcription errors may be present       Alethia Saint, DO  12/22/20 9626

## 2020-12-22 NOTE — H&P
Hospitalist History & Physical      PCP: Lidya Garcia DO    Date of Admission: 12/22/2020    Date of Service: Pt seen/examined on 12/22/2020 and is  admitted to Inpatient with expected LOS greater than two midnights due to medical therapy. Chief Complaint:  had concerns including Shortness of Breath, Altered Mental Status, and Positive For Covid-19. History Of Present Illness:    Mr. Indira Sorto, a 65y.o. year old male  who  has a past medical history of Cancer ([address] Diabetes mellitus ([address] Hyperlipidemia, Hypertension, and Prostate cancer ([address] 65 y.o. male presenting to the ED for AMS, beginning unknown time. The complaint has been persistent, moderate in severity, and worsened by nothing. Patient presents via EMS for altered mental status. Reportedly called to the patient's home for altered mental status, he is reportedly Covid positive although the timeline on this as well as when the patient's diagnosis was is not clear. Upon arrival he is orientated to person only. Cannot give additional history. Tachypneic. In respiratory distress. Seen upon arrival in the resuscitation bay    ER work significant for hyponatremia, HAGMA, glucose of 1162, wbc of 18, + COVID. Past Medical History:        Diagnosis Date    Cancer ([address]     Diabetes mellitus ([address]     Hyperlipidemia     Hypertension     Prostate cancer ([address]        Past Surgical History:        Procedure Laterality Date    COLON SURGERY      DILATATION, ESOPHAGUS      LEG SURGERY Left 6 years ago       Medications Prior to Admission:      Prior to Admission medications    Medication Sig Start Date End Date Taking?  Authorizing Provider   naproxen (NAPROSYN) 500 MG tablet Take 1 tablet by mouth 2 times daily for 30 doses 10/22/20 11/6/20  Patricia Lou MD   famotidine (PEPCID) 20 MG tablet Take 1 tablet by mouth daily for 10 days 6/6/20 6/16/20  GURPREET Cantrell NP   ibuprofen (IBU) 600 MG tablet Take 1 commands  Psychiatric: deferred    Reviewed EKG and CXR personally      CBC:   Recent Labs     12/22/20  1232   WBC 18.2*   RBC 3.93   HGB 11.1*   HCT 35.7*   MCV 90.8   RDW 13.7        BMP:   Recent Labs     12/22/20  1232 12/22/20  1232 12/22/20  1446 12/22/20  1651 12/22/20  1900   *  --  127* 130* 129*   K 7.1*  --  6.6* 5.4* 5.2*   CL 91*  --  98 100 95*   CO2 4*  --  4* 10* 15*   BUN 95*  --  95* 95* 91*   CREATININE 5.7*   < > 5.5* 5.4* 5.1*   MG 4.5*  --   --  4.0*  --    PHOS 13.0*  --   --  7.4*  --     < > = values in this interval not displayed. LFT:  Recent Labs     12/22/20  1232 12/22/20  1446 12/22/20  1651   PROT 6.6 6.1* 6.0*   ALKPHOS 103 93 102   ALT 11 13 16   AST 14 26 39   BILITOT 0.6 0.7 0.6   LIPASE >3,000* 2,067*  --      CE:  Recent Labs     12/22/20  1232   TROPONINI 0.01     PT/INR:   Recent Labs     12/22/20  1232   INR 1.1   APTT 20.4*     BNP: No results for input(s): BNP in the last 72 hours.   ESR: No results found for: SEDRATE  CRP: No results found for: CRP  D Dimer:   Lab Results   Component Value Date    DDIMER >5250 12/22/2020      Folate and B12: No results found for: [id number], No results found for: FOLATE  Lactic Acid:   Lab Results   Component Value Date    LACTA 1.4 09/14/2019     Thyroid Studies: No results found for: TSH, Deborha Sings    Oupatient labs:  Lab Results   Component Value Date    INR 1.1 12/22/2020    LABA1C 6.3 (H) 09/27/2013       Urinalysis:    Lab Results   Component Value Date    NITRU Negative 12/22/2020    WBCUA 0-1 12/22/2020    BACTERIA RARE 12/22/2020    RBCUA 0-1 12/22/2020    BLOODU MODERATE 12/22/2020    SPECGRAV 1.020 12/22/2020    GLUCOSEU >=1000 12/22/2020       Imaging:  Xr Chest Portable    Result Date: 12/22/2020  EXAMINATION: ONE XRAY VIEW OF THE CHEST 12/22/2020 11:46 am COMPARISON: 22 October 2020 HISTORY: ORDERING SYSTEM PROVIDED HISTORY: ams TECHNOLOGIST PROVIDED HISTORY: Reason for exam:->ams What reading provider will be dictating this exam?->CRC FINDINGS: The lungs are without acute focal process. There is no effusion or pneumothorax. The cardiomediastinal silhouette is without acute process. The osseous structures are without acute process. No acute process. ASSESSMENT:    Principal Problem:    Acute metabolic encephalopathy  Active Problems:    Hyperlipidemia    Prostate cancer ([facility]    Hypertension    Diabetes mellitus ([facility]    DKA, type 1, not at goal [facility])    Acute pancreatitis    Acute renal failure (ARF) (HCC)    High anion gap metabolic acidosis  Resolved Problems:    * No resolved hospital problems. *      PLAN:    Admit to ICU  DKA management protocol  Decadron, supportive care for pneumonia  BLAIR management per nephrology.   Appreciate support  Keep n.p.o.  Recommend consultation with general surgery for evaluation management of acute pancreatitis in the setting of alcohol abuse  Agree with IV Zosyn      Diet: Diet NPO Effective Now  Code Status: No Order  Surrogate decision maker confirmed with patient:   Extended Emergency Contact Information  Primary Emergency Contact: Yamileth Irene  Address: [address] [facility], [address] [place] [address] Phone: [phone]  Mobile Phone: [phone]  Relation: Domestic Partner  Secondary Emergency Contact: Manuel Brown  Address: [place], [address] Phone: [phone]  Mobile Phone: [phone]  Relation: Brother/Sister      DVT Prophylaxis: []Lovenox []Heparin []PCD [] [address] []Encouraged ambulation  Disposition: []Med/Surg [] Intermediate [] ICU/CCU  Admit status: [] Observation [] Inpatient     +++++++++++++++++++++++++++++++++++++++++++++++++  Maco Pretty [place]  +++++++++++++++++++++++++++++++++++++++++++++++++  NOTE: This report was transcribed using voice recognition software. Every effort was made to ensure accuracy; however, inadvertent computerized transcription errors may be present.

## 2020-12-22 NOTE — CONSULTS
Department of Internal Medicine  Nephrology Attending Consult Note      Reason for Consult: BLAIR  Requesting Physician:  Dr Leighton Arrington:  Weakness, AMS    History Obtained From:  patient, electronic medical record    HISTORY OF PRESENT ILLNESS:    This is a 65year old  [place] gentleman with past medical history significant for DM2, hypertension, prostate cancer and stage 3 CKD, presented to  ED for AMS beginning unknown time. History is limited to chart review. Upon arrival patient was in respiratory distress and was found to have elevated blood sugar of 1332, lactic acid levelof 11.8, BUN of 95, serum creatinine of 5.7 and serum potassium of 7.1 with CO2 level of 4. Therefore this consultation was requested for management of his BLAIR. His recent history is significant for COVID positivity. Patient is currently receiving iv fluids and insulin drip per DKA protocol and has a chery catheter inserted. Past Medical History:        Diagnosis Date    Cancer ([facility]     Diabetes mellitus ([facility]     Hyperlipidemia     Hypertension     Prostate cancer ([facility]      Past Surgical History:        Procedure Laterality Date    COLON SURGERY      DILATATION, ESOPHAGUS      LEG SURGERY Left 6 years ago     Current Medications:    Current Facility-Administered Medications: sodium bicarbonate 150 mEq in sterile water 1,000 mL infusion, , Intravenous, Continuous  dextrose 50 % IV solution, 12.5 g, Intravenous, PRN  insulin regular (HUMULIN R;NOVOLIN R) 100 Units in sodium chloride 0.9 % 100 mL infusion, 0.1 Units/kg/hr, Intravenous, Continuous  0.45 % sodium chloride infusion, , Intravenous, Continuous  dextrose 5 % and 0.45 % sodium chloride infusion, , Intravenous, Continuous PRN  Allergies:  Patient has no known allergies. Social History:    reports that he has quit smoking. His smoking use included cigarettes. He smoked 1.00 pack per day.  He has never used smokeless tobacco. He reports current alcohol use. He reports that he does not use drugs. Family History:   History reviewed. No pertinent family history. REVIEW OF SYSTEMS:    Review of systems not obtained due to patient factors - mental status  PHYSICAL EXAM:      Vitals:    VITALS:  /67   Pulse 68   Temp 96.5 °F (35.8 °C) (Rectal)   Resp 20   Ht 6' 2\" (1.88 m)   Wt 210 lb (95.3 kg)   SpO2 94%   BMI 26.96 kg/m²   24HR RESPIRATORY RATE RANGE:  Resp  Av  Min: 20  Max: 46  24HR PULSE RANGE: Pulse  Av.5  Min: 68  Max: 93  24HR BLOOD PRESSURE RANGE:  Systolic (49NHR), NFK:695 , Min:110 , HEP:637   ; Diastolic (53FAW), LPA:46, Min:67, Max:67    24HR INTAKE/OUTPUT:  No intake or output data in the 24 hours ending 20 1715  8HR INTAKE OUTPUT:  No intake/output data recorded.     Constitutional:  Alert and oriented x 1 , in mild respiratory distress  HEENT:  NC/AT, oral mucosa dry  Respiratory:  clear  Cardiovascular/Edema:  RRR, s1, s2 no rub or gallop  Gastrointestinal:  Soft, non tender, benign , bowel sounds present  Neurologic:  Confused, disoriented  Skin:  turgor decreased  Other:  No edema    DATA:    CBC:   Lab Results   Component Value Date    WBC 18.2 2020    RBC 3.93 2020    HGB 11.1 2020    HCT 35.7 2020    MCV 90.8 2020    MCH 28.2 2020    MCHC 31.1 2020    RDW 13.7 2020     2020    MPV 11.4 2020     CMP:    Lab Results   Component Value Date     2020    K 6.6 2020    K 4.7 2020    CL 98 2020    CO2 4 2020    BUN 95 2020    CREATININE 5.5 2020    GFRAA 12 2020    LABGLOM 12 2020    GLUCOSE 1,162 2020    PROT 6.1 2020    LABALBU 2.3 2020    CALCIUM 8.3 2020    BILITOT 0.7 2020    ALKPHOS 93 2020    AST 26 2020    ALT 13 2020     Magnesium:    Lab Results   Component Value Date    MG 4.5 2020     Phosphorus:    Lab Results Component Value Date    PHOS 13.0 12/22/2020     U/A:    Lab Results   Component Value Date    COLORU Yellow 12/22/2020    PROTEINU 100 12/22/2020    PHUR 5.0 12/22/2020    WBCUA 0-1 12/22/2020    RBCUA NONE 12/22/2020    BACTERIA RARE 12/22/2020    CLARITYU Clear 12/22/2020    SPECGRAV 1.020 12/22/2020    LEUKOCYTESUR Negative 12/22/2020    UROBILINOGEN 0.2 12/22/2020    BILIRUBINUR Negative 12/22/2020    BLOODU SMALL 12/22/2020    GLUCOSEU >=1000 12/22/2020    AMORPHOUS FEW 12/22/2020     ABG:    Lab Results   Component Value Date    PH 6.969 12/22/2020    PCO2 19.9 12/22/2020    PO2 109.1 12/22/2020    HCO3 4.5 12/22/2020    BE -25.9 12/22/2020    O2SAT 95.4 12/22/2020     HgBA1c:    Lab Results   Component Value Date    LABA1C 6.3 09/27/2013     Microalbumen/Creatinine ratio:  No components found for: RUCREAT  Radiology Review: The lungs are without acute focal process.  There is no effusion or   pneumothorax. The cardiomediastinal silhouette is without acute process. The   osseous structures are without acute process. Significant inflammatory changes in the upper abdomen with the epicenter   surrounding the pancreas concerning for acute pancreatitis with inflammation   and inflammatory fluid extending and involving the root of the mesentery,   duodenum and ascending colon resulting in enteritis/colitis.  Small amount of   inflammatory ascites is also present.  Surveillance recommended. Atelectasis/infiltrates lung bases concerning for pneumonia. 1.  Negative for acute intracranial process, within the limits of this   non-contrast CT exam.       IMPRESSION/RECOMMENDATIONS:      This is a 65year old  [place] gentleman with past medical history significant for DM2, hypertension, prostate cancer and stage 3 CKD, presented to ro ED for AMS beginning unknown time. History is limited to chart review.    Upon arrival patient was in respiratory distress and was found to have elevated blood sugar of 1332, lactic acid levelof 11.8, BUN of 95, serum creatinine of 5.7 and serum potassium of 7.1 with CO2 level of 4. Therefore this consultation was requested for management of his BLAIR. 1. BLAIR on top of stage 2-3 CKD with baseline serum creatinine 1.4 mg/dl- secondary hyperosmolar hypovolemic state with DKA    - chery catheter draining clear yellow urine  - continue 1/2NS at 250 cc/hr  - check urine studies   - monitor urine output  - monitor bmp every 3 hours  - keep MAP>65 mm hg    2. HAGMA secondary to DKA and lactic acdisosis  - continue bicarbonate drip at 150 ml/hr  - monitor bmp every 3 hours  - no urgent indication for dialytic intervention yet    3. Hyperkalemia secondary to BLAIR and metabolic acidosis/DKA and hyperosmolar state  - improving slowly with iv fluids and bicarb drip, last level 6.6  - will monitor    4. Pseudohyponatremia - improving slowly  5. DKA/ acute pancreatitis - lipase>3000      Case was discussed with ED staff. Thank you for letting me take part in the care of this gentleman. Margie Gil MD

## 2020-12-22 NOTE — ED NOTES
Bed: 16  Expected date:   Expected time:   Means of arrival:   Comments:  [id number] [facility], RN  12/22/20 3680

## 2020-12-23 ENCOUNTER — APPOINTMENT (OUTPATIENT)
Dept: GENERAL RADIOLOGY | Age: 70
DRG: 207 | End: 2020-12-23
Payer: MEDICARE

## 2020-12-23 LAB
ACETAMINOPHEN LEVEL: <5 MCG/ML (ref 10–30)
ALBUMIN SERPL-MCNC: 2.7 G/DL (ref 3.5–5.2)
ALP BLD-CCNC: 93 U/L (ref 40–129)
ALT SERPL-CCNC: 25 U/L (ref 0–40)
AMMONIA: 25 UMOL/L (ref 16–60)
AMPHETAMINE SCREEN, URINE: NOT DETECTED
ANION GAP SERPL CALCULATED.3IONS-SCNC: 13 MMOL/L (ref 7–16)
ANION GAP SERPL CALCULATED.3IONS-SCNC: 14 MMOL/L (ref 7–16)
ANION GAP SERPL CALCULATED.3IONS-SCNC: 15 MMOL/L (ref 7–16)
ANION GAP SERPL CALCULATED.3IONS-SCNC: 15 MMOL/L (ref 7–16)
ANION GAP SERPL CALCULATED.3IONS-SCNC: 16 MMOL/L (ref 7–16)
ANION GAP SERPL CALCULATED.3IONS-SCNC: 17 MMOL/L (ref 7–16)
ANION GAP SERPL CALCULATED.3IONS-SCNC: 17 MMOL/L (ref 7–16)
ANION GAP SERPL CALCULATED.3IONS-SCNC: 18 MMOL/L (ref 7–16)
ANISOCYTOSIS: ABNORMAL
AST SERPL-CCNC: 49 U/L (ref 0–39)
B.E.: -5.4 MMOL/L (ref -3–3)
B.E.: -6.3 MMOL/L (ref -3–3)
BARBITURATE SCREEN URINE: NOT DETECTED
BASOPHILS ABSOLUTE: 0 E9/L (ref 0–0.2)
BASOPHILS RELATIVE PERCENT: 0.1 % (ref 0–2)
BENZODIAZEPINE SCREEN, URINE: NOT DETECTED
BETA-HYDROXYBUTYRATE: 0.8 MMOL/L (ref 0.02–0.27)
BILIRUB SERPL-MCNC: 0.6 MG/DL (ref 0–1.2)
BUN BLDV-MCNC: 90 MG/DL (ref 8–23)
BUN BLDV-MCNC: 90 MG/DL (ref 8–23)
BUN BLDV-MCNC: 91 MG/DL (ref 8–23)
BUN BLDV-MCNC: 91 MG/DL (ref 8–23)
BUN BLDV-MCNC: 92 MG/DL (ref 8–23)
BUN BLDV-MCNC: 96 MG/DL (ref 8–23)
BURR CELLS: ABNORMAL
C-REACTIVE PROTEIN: 10.5 MG/DL (ref 0–0.4)
CALCIUM SERPL-MCNC: 7.5 MG/DL (ref 8.6–10.2)
CALCIUM SERPL-MCNC: 7.6 MG/DL (ref 8.6–10.2)
CALCIUM SERPL-MCNC: 7.6 MG/DL (ref 8.6–10.2)
CALCIUM SERPL-MCNC: 7.7 MG/DL (ref 8.6–10.2)
CALCIUM SERPL-MCNC: 7.8 MG/DL (ref 8.6–10.2)
CALCIUM SERPL-MCNC: 8 MG/DL (ref 8.6–10.2)
CANNABINOID SCREEN URINE: NOT DETECTED
CHLORIDE BLD-SCNC: 100 MMOL/L (ref 98–107)
CHLORIDE BLD-SCNC: 100 MMOL/L (ref 98–107)
CHLORIDE BLD-SCNC: 102 MMOL/L (ref 98–107)
CHLORIDE BLD-SCNC: 97 MMOL/L (ref 98–107)
CHLORIDE BLD-SCNC: 98 MMOL/L (ref 98–107)
CHOLESTEROL, TOTAL: 160 MG/DL (ref 0–199)
CO2: 14 MMOL/L (ref 22–29)
CO2: 16 MMOL/L (ref 22–29)
CO2: 18 MMOL/L (ref 22–29)
CO2: 19 MMOL/L (ref 22–29)
COCAINE METABOLITE SCREEN URINE: NOT DETECTED
COHB: 0.3 % (ref 0–1.5)
COHB: 0.3 % (ref 0–1.5)
CREAT SERPL-MCNC: 4.9 MG/DL (ref 0.7–1.2)
CREAT SERPL-MCNC: 5 MG/DL (ref 0.7–1.2)
CREAT SERPL-MCNC: 5 MG/DL (ref 0.7–1.2)
CREAT SERPL-MCNC: 5.1 MG/DL (ref 0.7–1.2)
CREAT SERPL-MCNC: 5.4 MG/DL (ref 0.7–1.2)
CRITICAL: ABNORMAL
CRITICAL: ABNORMAL
DATE ANALYZED: ABNORMAL
DATE ANALYZED: ABNORMAL
DATE OF COLLECTION: ABNORMAL
DATE OF COLLECTION: ABNORMAL
EOSINOPHILS ABSOLUTE: 0 E9/L (ref 0.05–0.5)
EOSINOPHILS RELATIVE PERCENT: 0 % (ref 0–6)
ETHANOL: <10 MG/DL (ref 0–0.08)
FENTANYL SCREEN, URINE: NOT DETECTED
FERRITIN: NORMAL NG/ML
FOLATE: 16.2 NG/ML (ref 4.8–24.2)
GFR AFRICAN AMERICAN: 13
GFR AFRICAN AMERICAN: 14
GFR NON-AFRICAN AMERICAN: 13 ML/MIN/1.73
GFR NON-AFRICAN AMERICAN: 14 ML/MIN/1.73
GLUCOSE BLD-MCNC: 272 MG/DL (ref 74–99)
GLUCOSE BLD-MCNC: 392 MG/DL (ref 74–99)
GLUCOSE BLD-MCNC: 460 MG/DL (ref 74–99)
GLUCOSE BLD-MCNC: 624 MG/DL (ref 74–99)
GLUCOSE BLD-MCNC: 727 MG/DL (ref 74–99)
GLUCOSE BLD-MCNC: 813 MG/DL (ref 74–99)
GLUCOSE BLD-MCNC: 830 MG/DL (ref 74–99)
GLUCOSE BLD-MCNC: 832 MG/DL (ref 74–99)
GLUCOSE BLD-MCNC: 854 MG/DL (ref 74–99)
GLUCOSE BLD-MCNC: 858 MG/DL (ref 74–99)
GLUCOSE BLD-MCNC: 876 MG/DL (ref 74–99)
HBA1C MFR BLD: 11.1 % (ref 4–5.6)
HCO3: 16.9 MMOL/L (ref 22–26)
HCO3: 18.2 MMOL/L (ref 22–26)
HCT VFR BLD CALC: 26.8 % (ref 37–54)
HDLC SERPL-MCNC: 14 MG/DL
HEMOGLOBIN: 9.4 G/DL (ref 12.5–16.5)
HHB: 3.9 % (ref 0–5)
HHB: 6.3 % (ref 0–5)
INR BLD: 1.2
L. PNEUMOPHILA SEROGP 1 UR AG: NORMAL
LAB: ABNORMAL
LAB: ABNORMAL
LACTIC ACID, SEPSIS: 1.5 MMOL/L (ref 0.5–1.9)
LACTIC ACID, SEPSIS: 2.5 MMOL/L (ref 0.5–1.9)
LACTIC ACID: 1.7 MMOL/L (ref 0.5–2.2)
LDL CHOLESTEROL CALCULATED: ABNORMAL MG/DL (ref 0–99)
LIPASE: 2452 U/L (ref 13–60)
LYMPHOCYTES ABSOLUTE: 0.07 E9/L (ref 1.5–4)
LYMPHOCYTES RELATIVE PERCENT: 0.9 % (ref 20–42)
Lab: ABNORMAL
Lab: ABNORMAL
Lab: NORMAL
MAGNESIUM: 2.8 MG/DL (ref 1.6–2.6)
MAGNESIUM: 3 MG/DL (ref 1.6–2.6)
MAGNESIUM: 3 MG/DL (ref 1.6–2.6)
MAGNESIUM: 3.1 MG/DL (ref 1.6–2.6)
MAGNESIUM: 3.4 MG/DL (ref 1.6–2.6)
MCH RBC QN AUTO: 27.9 PG (ref 26–35)
MCHC RBC AUTO-ENTMCNC: 35.1 % (ref 32–34.5)
MCV RBC AUTO: 79.5 FL (ref 80–99.9)
METER GLUCOSE: 216 MG/DL (ref 74–99)
METER GLUCOSE: 264 MG/DL (ref 74–99)
METER GLUCOSE: 278 MG/DL (ref 74–99)
METER GLUCOSE: 319 MG/DL (ref 74–99)
METER GLUCOSE: 323 MG/DL (ref 74–99)
METER GLUCOSE: 328 MG/DL (ref 74–99)
METER GLUCOSE: 382 MG/DL (ref 74–99)
METER GLUCOSE: 399 MG/DL (ref 74–99)
METER GLUCOSE: 419 MG/DL (ref 74–99)
METER GLUCOSE: 428 MG/DL (ref 74–99)
METER GLUCOSE: 445 MG/DL (ref 74–99)
METER GLUCOSE: >500 MG/DL (ref 74–99)
METHADONE SCREEN, URINE: NOT DETECTED
METHB: 0.2 % (ref 0–1.5)
METHB: 0.4 % (ref 0–1.5)
MODE: ABNORMAL
MODE: ABNORMAL
MONOCYTES ABSOLUTE: 0.22 E9/L (ref 0.1–0.95)
MONOCYTES RELATIVE PERCENT: 2.6 % (ref 2–12)
MYELOCYTE PERCENT: 0.9 % (ref 0–0)
NEUTROPHILS ABSOLUTE: 7.08 E9/L (ref 1.8–7.3)
NEUTROPHILS RELATIVE PERCENT: 95.7 % (ref 43–80)
NUCLEATED RED BLOOD CELLS: 1.7 /100 WBC
O2 CONTENT: 13.7 ML/DL
O2 CONTENT: 13.8 ML/DL
O2 SATURATION: 93.7 % (ref 92–98.5)
O2 SATURATION: 96.1 % (ref 92–98.5)
O2HB: 93.2 % (ref 94–97)
O2HB: 95.4 % (ref 94–97)
OPERATOR ID: 1874
OPERATOR ID: ABNORMAL
OPIATE SCREEN URINE: NOT DETECTED
OSMOLALITY: 326 MOSM/KG (ref 285–310)
OVALOCYTES: ABNORMAL
OXYCODONE URINE: NOT DETECTED
PATIENT TEMP: 37 C
PATIENT TEMP: 37 C
PCO2: 26.5 MMHG (ref 35–45)
PCO2: 29.3 MMHG (ref 35–45)
PDW BLD-RTO: 13.1 FL (ref 11.5–15)
PH BLOOD GAS: 7.41 (ref 7.35–7.45)
PH BLOOD GAS: 7.42 (ref 7.35–7.45)
PHENCYCLIDINE SCREEN URINE: NOT DETECTED
PHOSPHORUS: 3.2 MG/DL (ref 2.5–4.5)
PHOSPHORUS: 3.9 MG/DL (ref 2.5–4.5)
PHOSPHORUS: 4.4 MG/DL (ref 2.5–4.5)
PHOSPHORUS: 5.1 MG/DL (ref 2.5–4.5)
PHOSPHORUS: 5.7 MG/DL (ref 2.5–4.5)
PHOSPHORUS: 6 MG/DL (ref 2.5–4.5)
PHOSPHORUS: 6 MG/DL (ref 2.5–4.5)
PLATELET # BLD: 116 E9/L (ref 130–450)
PMV BLD AUTO: 10.7 FL (ref 7–12)
PO2: 71.1 MMHG (ref 75–100)
PO2: 88.8 MMHG (ref 75–100)
POLYCHROMASIA: ABNORMAL
POTASSIUM SERPL-SCNC: 4 MMOL/L (ref 3.5–5)
POTASSIUM SERPL-SCNC: 4.1 MMOL/L (ref 3.5–5)
POTASSIUM SERPL-SCNC: 4.3 MMOL/L (ref 3.5–5)
POTASSIUM SERPL-SCNC: 4.4 MMOL/L (ref 3.5–5)
POTASSIUM SERPL-SCNC: 4.4 MMOL/L (ref 3.5–5)
POTASSIUM SERPL-SCNC: 5.1 MMOL/L (ref 3.5–5)
POTASSIUM SERPL-SCNC: 5.2 MMOL/L (ref 3.5–5)
POTASSIUM SERPL-SCNC: 5.7 MMOL/L (ref 3.5–5)
PROCALCITONIN: 4.17 NG/ML (ref 0–0.08)
PROCALCITONIN: 4.65 NG/ML (ref 0–0.08)
PROTHROMBIN TIME: 13 SEC (ref 9.3–12.4)
RBC # BLD: 3.37 E12/L (ref 3.8–5.8)
REASON FOR REJECTION: NORMAL
REJECTED TEST: NORMAL
SALICYLATE, SERUM: <0.3 MG/DL (ref 0–30)
SEDIMENTATION RATE, ERYTHROCYTE: 72 MM/HR (ref 0–15)
SODIUM BLD-SCNC: 127 MMOL/L (ref 132–146)
SODIUM BLD-SCNC: 130 MMOL/L (ref 132–146)
SODIUM BLD-SCNC: 130 MMOL/L (ref 132–146)
SODIUM BLD-SCNC: 131 MMOL/L (ref 132–146)
SODIUM BLD-SCNC: 131 MMOL/L (ref 132–146)
SODIUM BLD-SCNC: 133 MMOL/L (ref 132–146)
SODIUM BLD-SCNC: 134 MMOL/L (ref 132–146)
SODIUM BLD-SCNC: 134 MMOL/L (ref 132–146)
SOURCE, BLOOD GAS: ABNORMAL
SOURCE, BLOOD GAS: ABNORMAL
STREP PNEUMONIAE ANTIGEN, URINE: NORMAL
TEAR DROP CELLS: ABNORMAL
THB: 10.2 G/DL (ref 11.5–16.5)
THB: 10.4 G/DL (ref 11.5–16.5)
TIME ANALYZED: 1122
TIME ANALYZED: 2123
TOTAL PROTEIN: 5.6 G/DL (ref 6.4–8.3)
TRICYCLIC ANTIDEPRESSANTS SCREEN SERUM: NEGATIVE NG/ML
TRIGL SERPL-MCNC: 439 MG/DL (ref 0–149)
TROPONIN: 0.03 NG/ML (ref 0–0.03)
TROPONIN: 0.03 NG/ML (ref 0–0.03)
TROPONIN: 0.04 NG/ML (ref 0–0.03)
TSH SERPL DL<=0.05 MIU/L-ACNC: 0.54 UIU/ML (ref 0.27–4.2)
VITAMIN B-12: 1518 PG/ML (ref 211–946)
VLDLC SERPL CALC-MCNC: ABNORMAL MG/DL
WBC # BLD: 7.3 E9/L (ref 4.5–11.5)

## 2020-12-23 PROCEDURE — 83735 ASSAY OF MAGNESIUM: CPT

## 2020-12-23 PROCEDURE — 82010 KETONE BODYS QUAN: CPT

## 2020-12-23 PROCEDURE — C9113 INJ PANTOPRAZOLE SODIUM, VIA: HCPCS | Performed by: INTERNAL MEDICINE

## 2020-12-23 PROCEDURE — 6360000002 HC RX W HCPCS: Performed by: INTERNAL MEDICINE

## 2020-12-23 PROCEDURE — 2500000003 HC RX 250 WO HCPCS: Performed by: EMERGENCY MEDICINE

## 2020-12-23 PROCEDURE — 2000000000 HC ICU R&B

## 2020-12-23 PROCEDURE — 2580000003 HC RX 258: Performed by: INTERNAL MEDICINE

## 2020-12-23 PROCEDURE — 80061 LIPID PANEL: CPT

## 2020-12-23 PROCEDURE — 85610 PROTHROMBIN TIME: CPT

## 2020-12-23 PROCEDURE — 87081 CULTURE SCREEN ONLY: CPT

## 2020-12-23 PROCEDURE — 85025 COMPLETE CBC W/AUTO DIFF WBC: CPT

## 2020-12-23 PROCEDURE — 82728 ASSAY OF FERRITIN: CPT

## 2020-12-23 PROCEDURE — 80307 DRUG TEST PRSMV CHEM ANLYZR: CPT

## 2020-12-23 PROCEDURE — 85651 RBC SED RATE NONAUTOMATED: CPT

## 2020-12-23 PROCEDURE — 84100 ASSAY OF PHOSPHORUS: CPT

## 2020-12-23 PROCEDURE — 84145 PROCALCITONIN (PCT): CPT

## 2020-12-23 PROCEDURE — 84681 ASSAY OF C-PEPTIDE: CPT

## 2020-12-23 PROCEDURE — 2500000003 HC RX 250 WO HCPCS: Performed by: INTERNAL MEDICINE

## 2020-12-23 PROCEDURE — 71045 X-RAY EXAM CHEST 1 VIEW: CPT

## 2020-12-23 PROCEDURE — 83605 ASSAY OF LACTIC ACID: CPT

## 2020-12-23 PROCEDURE — 02HV33Z INSERTION OF INFUSION DEVICE INTO SUPERIOR VENA CAVA, PERCUTANEOUS APPROACH: ICD-10-PCS | Performed by: EMERGENCY MEDICINE

## 2020-12-23 PROCEDURE — 82746 ASSAY OF FOLIC ACID SERUM: CPT

## 2020-12-23 PROCEDURE — 82947 ASSAY GLUCOSE BLOOD QUANT: CPT

## 2020-12-23 PROCEDURE — 6370000000 HC RX 637 (ALT 250 FOR IP): Performed by: INTERNAL MEDICINE

## 2020-12-23 PROCEDURE — 87088 URINE BACTERIA CULTURE: CPT

## 2020-12-23 PROCEDURE — 6370000000 HC RX 637 (ALT 250 FOR IP): Performed by: EMERGENCY MEDICINE

## 2020-12-23 PROCEDURE — 36415 COLL VENOUS BLD VENIPUNCTURE: CPT

## 2020-12-23 PROCEDURE — 83036 HEMOGLOBIN GLYCOSYLATED A1C: CPT

## 2020-12-23 PROCEDURE — 82607 VITAMIN B-12: CPT

## 2020-12-23 PROCEDURE — 2580000003 HC RX 258: Performed by: EMERGENCY MEDICINE

## 2020-12-23 PROCEDURE — 87449 NOS EACH ORGANISM AG IA: CPT

## 2020-12-23 PROCEDURE — G0480 DRUG TEST DEF 1-7 CLASSES: HCPCS

## 2020-12-23 PROCEDURE — 84443 ASSAY THYROID STIM HORMONE: CPT

## 2020-12-23 PROCEDURE — 82805 BLOOD GASES W/O2 SATURATION: CPT

## 2020-12-23 PROCEDURE — 84484 ASSAY OF TROPONIN QUANT: CPT

## 2020-12-23 PROCEDURE — 83690 ASSAY OF LIPASE: CPT

## 2020-12-23 PROCEDURE — 82962 GLUCOSE BLOOD TEST: CPT

## 2020-12-23 PROCEDURE — 80053 COMPREHEN METABOLIC PANEL: CPT

## 2020-12-23 PROCEDURE — 36556 INSERT NON-TUNNEL CV CATH: CPT

## 2020-12-23 PROCEDURE — 86140 C-REACTIVE PROTEIN: CPT

## 2020-12-23 PROCEDURE — 82140 ASSAY OF AMMONIA: CPT

## 2020-12-23 PROCEDURE — 80048 BASIC METABOLIC PNL TOTAL CA: CPT

## 2020-12-23 PROCEDURE — 83930 ASSAY OF BLOOD OSMOLALITY: CPT

## 2020-12-23 RX ORDER — PHENOBARBITAL SODIUM 65 MG/ML
65 INJECTION INTRAMUSCULAR ONCE
Status: COMPLETED | OUTPATIENT
Start: 2020-12-23 | End: 2020-12-23

## 2020-12-23 RX ORDER — ONDANSETRON 2 MG/ML
4 INJECTION INTRAMUSCULAR; INTRAVENOUS EVERY 6 HOURS PRN
Status: DISCONTINUED | OUTPATIENT
Start: 2020-12-23 | End: 2020-12-23

## 2020-12-23 RX ORDER — NICOTINE POLACRILEX 4 MG
15 LOZENGE BUCCAL PRN
Status: DISCONTINUED | OUTPATIENT
Start: 2020-12-23 | End: 2021-01-01 | Stop reason: HOSPADM

## 2020-12-23 RX ORDER — PANTOPRAZOLE SODIUM 40 MG/10ML
40 INJECTION, POWDER, LYOPHILIZED, FOR SOLUTION INTRAVENOUS DAILY
Status: DISCONTINUED | OUTPATIENT
Start: 2020-12-23 | End: 2020-12-24

## 2020-12-23 RX ORDER — CHOLECALCIFEROL (VITAMIN D3) 50 MCG
2000 TABLET ORAL DAILY
Status: DISCONTINUED | OUTPATIENT
Start: 2020-12-23 | End: 2021-01-01 | Stop reason: HOSPADM

## 2020-12-23 RX ORDER — LABETALOL HYDROCHLORIDE 5 MG/ML
10 INJECTION, SOLUTION INTRAVENOUS EVERY 4 HOURS PRN
Status: DISCONTINUED | OUTPATIENT
Start: 2020-12-23 | End: 2021-01-01

## 2020-12-23 RX ORDER — LORAZEPAM 1 MG/1
1 TABLET ORAL
Status: DISCONTINUED | OUTPATIENT
Start: 2020-12-23 | End: 2020-12-24

## 2020-12-23 RX ORDER — POLYETHYLENE GLYCOL 3350 17 G/17G
17 POWDER, FOR SOLUTION ORAL DAILY PRN
Status: DISCONTINUED | OUTPATIENT
Start: 2020-12-23 | End: 2021-01-01 | Stop reason: HOSPADM

## 2020-12-23 RX ORDER — PROMETHAZINE HYDROCHLORIDE 25 MG/1
12.5 TABLET ORAL EVERY 6 HOURS PRN
Status: DISCONTINUED | OUTPATIENT
Start: 2020-12-23 | End: 2020-12-23

## 2020-12-23 RX ORDER — LORAZEPAM 2 MG/ML
3 INJECTION INTRAMUSCULAR
Status: DISCONTINUED | OUTPATIENT
Start: 2020-12-23 | End: 2020-12-24

## 2020-12-23 RX ORDER — THIAMINE HYDROCHLORIDE 100 MG/ML
100 INJECTION, SOLUTION INTRAMUSCULAR; INTRAVENOUS DAILY
Status: DISCONTINUED | OUTPATIENT
Start: 2021-01-01 | End: 2021-01-01 | Stop reason: HOSPADM

## 2020-12-23 RX ORDER — ACETAMINOPHEN 650 MG/1
650 SUPPOSITORY RECTAL EVERY 6 HOURS PRN
Status: DISCONTINUED | OUTPATIENT
Start: 2020-12-23 | End: 2020-12-24 | Stop reason: SDUPTHER

## 2020-12-23 RX ORDER — LORAZEPAM 1 MG/1
4 TABLET ORAL
Status: DISCONTINUED | OUTPATIENT
Start: 2020-12-23 | End: 2020-12-24

## 2020-12-23 RX ORDER — SODIUM CHLORIDE 9 MG/ML
10 INJECTION INTRAVENOUS DAILY
Status: DISCONTINUED | OUTPATIENT
Start: 2020-12-23 | End: 2021-01-01 | Stop reason: HOSPADM

## 2020-12-23 RX ORDER — PHENOBARBITAL SODIUM 65 MG/ML
130 INJECTION INTRAMUSCULAR ONCE
Status: COMPLETED | OUTPATIENT
Start: 2020-12-23 | End: 2020-12-23

## 2020-12-23 RX ORDER — LORAZEPAM 2 MG/ML
1 INJECTION INTRAMUSCULAR
Status: DISCONTINUED | OUTPATIENT
Start: 2020-12-23 | End: 2020-12-24

## 2020-12-23 RX ORDER — FOLIC ACID 5 MG/ML
1 INJECTION, SOLUTION INTRAMUSCULAR; INTRAVENOUS; SUBCUTANEOUS DAILY
Status: DISCONTINUED | OUTPATIENT
Start: 2020-12-23 | End: 2021-01-01 | Stop reason: HOSPADM

## 2020-12-23 RX ORDER — INSULIN GLARGINE 100 [IU]/ML
9 INJECTION, SOLUTION SUBCUTANEOUS NIGHTLY
Status: DISCONTINUED | OUTPATIENT
Start: 2020-12-23 | End: 2020-12-24

## 2020-12-23 RX ORDER — SODIUM CHLORIDE 0.9 % (FLUSH) 0.9 %
10 SYRINGE (ML) INJECTION EVERY 12 HOURS SCHEDULED
Status: DISCONTINUED | OUTPATIENT
Start: 2020-12-23 | End: 2021-01-01 | Stop reason: HOSPADM

## 2020-12-23 RX ORDER — SODIUM CHLORIDE 0.9 % (FLUSH) 0.9 %
10 SYRINGE (ML) INJECTION PRN
Status: DISCONTINUED | OUTPATIENT
Start: 2020-12-23 | End: 2021-01-01 | Stop reason: HOSPADM

## 2020-12-23 RX ORDER — ZINC SULFATE 50(220)MG
50 CAPSULE ORAL DAILY
Status: DISCONTINUED | OUTPATIENT
Start: 2020-12-23 | End: 2021-01-01 | Stop reason: HOSPADM

## 2020-12-23 RX ORDER — LORAZEPAM 2 MG/ML
4 INJECTION INTRAMUSCULAR
Status: DISCONTINUED | OUTPATIENT
Start: 2020-12-23 | End: 2020-12-24

## 2020-12-23 RX ORDER — HYDRALAZINE HYDROCHLORIDE 20 MG/ML
10 INJECTION INTRAMUSCULAR; INTRAVENOUS EVERY 4 HOURS PRN
Status: DISCONTINUED | OUTPATIENT
Start: 2020-12-23 | End: 2021-01-01 | Stop reason: HOSPADM

## 2020-12-23 RX ORDER — ACETAMINOPHEN 325 MG/1
650 TABLET ORAL EVERY 6 HOURS PRN
Status: DISCONTINUED | OUTPATIENT
Start: 2020-12-23 | End: 2020-12-24 | Stop reason: SDUPTHER

## 2020-12-23 RX ORDER — LORAZEPAM 2 MG/ML
1 INJECTION INTRAMUSCULAR EVERY 4 HOURS PRN
Status: DISCONTINUED | OUTPATIENT
Start: 2020-12-23 | End: 2020-12-23

## 2020-12-23 RX ORDER — LORAZEPAM 1 MG/1
2 TABLET ORAL
Status: DISCONTINUED | OUTPATIENT
Start: 2020-12-23 | End: 2020-12-24

## 2020-12-23 RX ORDER — SODIUM CHLORIDE 0.9 % (FLUSH) 0.9 %
10 SYRINGE (ML) INJECTION PRN
Status: DISCONTINUED | OUTPATIENT
Start: 2020-12-23 | End: 2020-12-23 | Stop reason: SDUPTHER

## 2020-12-23 RX ORDER — DEXTROSE MONOHYDRATE 50 MG/ML
100 INJECTION, SOLUTION INTRAVENOUS PRN
Status: DISCONTINUED | OUTPATIENT
Start: 2020-12-23 | End: 2021-01-01 | Stop reason: HOSPADM

## 2020-12-23 RX ORDER — ASCORBIC ACID 500 MG
500 TABLET ORAL DAILY
Status: DISCONTINUED | OUTPATIENT
Start: 2020-12-23 | End: 2021-01-01 | Stop reason: HOSPADM

## 2020-12-23 RX ORDER — THIAMINE HYDROCHLORIDE 100 MG/ML
100 INJECTION, SOLUTION INTRAMUSCULAR; INTRAVENOUS DAILY
Status: DISCONTINUED | OUTPATIENT
Start: 2021-01-02 | End: 2020-12-23 | Stop reason: CLARIF

## 2020-12-23 RX ORDER — LORAZEPAM 2 MG/ML
2 INJECTION INTRAMUSCULAR
Status: DISCONTINUED | OUTPATIENT
Start: 2020-12-23 | End: 2020-12-24

## 2020-12-23 RX ORDER — SODIUM CHLORIDE 0.9 % (FLUSH) 0.9 %
10 SYRINGE (ML) INJECTION EVERY 12 HOURS SCHEDULED
Status: DISCONTINUED | OUTPATIENT
Start: 2020-12-23 | End: 2020-12-23 | Stop reason: SDUPTHER

## 2020-12-23 RX ORDER — LORAZEPAM 1 MG/1
3 TABLET ORAL
Status: DISCONTINUED | OUTPATIENT
Start: 2020-12-23 | End: 2020-12-24

## 2020-12-23 RX ORDER — HEPARIN SODIUM 10000 [USP'U]/ML
5000 INJECTION, SOLUTION INTRAVENOUS; SUBCUTANEOUS EVERY 8 HOURS SCHEDULED
Status: DISCONTINUED | OUTPATIENT
Start: 2020-12-23 | End: 2020-12-28

## 2020-12-23 RX ORDER — DEXTROSE MONOHYDRATE 25 G/50ML
12.5 INJECTION, SOLUTION INTRAVENOUS PRN
Status: DISCONTINUED | OUTPATIENT
Start: 2020-12-23 | End: 2021-01-01 | Stop reason: HOSPADM

## 2020-12-23 RX ORDER — THIAMINE HYDROCHLORIDE 100 MG/ML
100 INJECTION, SOLUTION INTRAMUSCULAR; INTRAVENOUS 2 TIMES DAILY
Status: COMPLETED | OUTPATIENT
Start: 2020-12-28 | End: 2020-12-31

## 2020-12-23 RX ADMIN — INSULIN GLARGINE 9 UNITS: 100 INJECTION, SOLUTION SUBCUTANEOUS at 21:51

## 2020-12-23 RX ADMIN — THIAMINE HYDROCHLORIDE 500 MG: 100 INJECTION, SOLUTION INTRAMUSCULAR; INTRAVENOUS at 09:25

## 2020-12-23 RX ADMIN — SODIUM CHLORIDE 0.02 UNITS/KG/HR: 9 INJECTION, SOLUTION INTRAVENOUS at 10:05

## 2020-12-23 RX ADMIN — LORAZEPAM 2 MG: 2 INJECTION INTRAMUSCULAR; INTRAVENOUS at 22:03

## 2020-12-23 RX ADMIN — LORAZEPAM 1 MG: 2 INJECTION INTRAMUSCULAR; INTRAVENOUS at 21:03

## 2020-12-23 RX ADMIN — Medication 10 ML: at 22:08

## 2020-12-23 RX ADMIN — HEPARIN SODIUM 5000 UNITS: 10000 INJECTION INTRAVENOUS; SUBCUTANEOUS at 14:52

## 2020-12-23 RX ADMIN — DEXTROSE AND SODIUM CHLORIDE: 5; 450 INJECTION, SOLUTION INTRAVENOUS at 20:37

## 2020-12-23 RX ADMIN — LABETALOL HYDROCHLORIDE 10 MG: 5 INJECTION INTRAVENOUS at 06:06

## 2020-12-23 RX ADMIN — PIPERACILLIN AND TAZOBACTAM 3.38 G: 3; .375 INJECTION, POWDER, LYOPHILIZED, FOR SOLUTION INTRAVENOUS at 09:28

## 2020-12-23 RX ADMIN — PHENOBARBITAL SODIUM 65 MG: 65 INJECTION INTRAMUSCULAR at 22:07

## 2020-12-23 RX ADMIN — PANTOPRAZOLE SODIUM 40 MG: 40 INJECTION, POWDER, FOR SOLUTION INTRAVENOUS at 09:27

## 2020-12-23 RX ADMIN — HEPARIN SODIUM 5000 UNITS: 10000 INJECTION INTRAVENOUS; SUBCUTANEOUS at 05:35

## 2020-12-23 RX ADMIN — HEPARIN SODIUM 5000 UNITS: 10000 INJECTION INTRAVENOUS; SUBCUTANEOUS at 21:50

## 2020-12-23 RX ADMIN — ACETAMINOPHEN 650 MG: 650 SUPPOSITORY RECTAL at 23:07

## 2020-12-23 RX ADMIN — SODIUM CHLORIDE: 4.5 INJECTION, SOLUTION INTRAVENOUS at 03:43

## 2020-12-23 RX ADMIN — FOLIC ACID 1 MG: 5 INJECTION, SOLUTION INTRAMUSCULAR; INTRAVENOUS; SUBCUTANEOUS at 09:28

## 2020-12-23 RX ADMIN — SODIUM CHLORIDE: 4.5 INJECTION, SOLUTION INTRAVENOUS at 18:47

## 2020-12-23 RX ADMIN — SODIUM CHLORIDE: 4.5 INJECTION, SOLUTION INTRAVENOUS at 10:04

## 2020-12-23 RX ADMIN — PIPERACILLIN AND TAZOBACTAM 3.38 G: 3; .375 INJECTION, POWDER, LYOPHILIZED, FOR SOLUTION INTRAVENOUS at 04:35

## 2020-12-23 RX ADMIN — VANCOMYCIN HYDROCHLORIDE 2000 MG: 10 INJECTION, POWDER, LYOPHILIZED, FOR SOLUTION INTRAVENOUS at 03:33

## 2020-12-23 RX ADMIN — THIAMINE HYDROCHLORIDE 500 MG: 100 INJECTION, SOLUTION INTRAMUSCULAR; INTRAVENOUS at 03:33

## 2020-12-23 RX ADMIN — PIPERACILLIN AND TAZOBACTAM 3.38 G: 3; .375 INJECTION, POWDER, LYOPHILIZED, FOR SOLUTION INTRAVENOUS at 21:41

## 2020-12-23 RX ADMIN — SODIUM BICARBONATE: 84 INJECTION, SOLUTION INTRAVENOUS at 08:20

## 2020-12-23 RX ADMIN — THIAMINE HYDROCHLORIDE 500 MG: 100 INJECTION, SOLUTION INTRAMUSCULAR; INTRAVENOUS at 20:39

## 2020-12-23 RX ADMIN — PHENOBARBITAL SODIUM 130 MG: 65 INJECTION INTRAMUSCULAR at 22:40

## 2020-12-23 RX ADMIN — SODIUM CHLORIDE: 9 INJECTION, SOLUTION INTRAVENOUS at 12:53

## 2020-12-23 ASSESSMENT — PAIN SCALES - GENERAL
PAINLEVEL_OUTOF10: 0

## 2020-12-23 NOTE — PLAN OF CARE
Problem: Airway Clearance - Ineffective  Goal: Achieve or maintain patent airway  Outcome: Met This Shift     Problem: Gas Exchange - Impaired  Goal: Absence of hypoxia  Outcome: Met This Shift  Goal: Promote optimal lung function  Outcome: Met This Shift     Problem: Breathing Pattern - Ineffective  Goal: Ability to achieve and maintain a regular respiratory rate  Outcome: Met This Shift     Problem: Isolation Precautions - Risk of Spread of Infection  Goal: Prevent transmission of infection  Outcome: Met This Shift     Problem: Risk for Fluid Volume Deficit  Goal: Maintain normal heart rhythm  Outcome: Met This Shift     Problem: Skin Integrity:  Goal: Will show no infection signs and symptoms  Description: Will show no infection signs and symptoms  Outcome: Met This Shift  Goal: Absence of new skin breakdown  Description: Absence of new skin breakdown  Outcome: Met This Shift     Problem:  Body Temperature -  Risk of, Imbalanced  Goal: Ability to maintain a body temperature within defined limits  Outcome: Not Met This Shift

## 2020-12-23 NOTE — PROGRESS NOTES
Chemistry called to notify of hemolyzed BMP, Magnesium, and Phosphorus tube. New tube sent. Awaiting results.

## 2020-12-23 NOTE — FLOWSHEET NOTE
Attempted multi[ple positions for pulse oximer placement and the pulse oximeter will not read despite changing it out for a new one.

## 2020-12-23 NOTE — CONSULTS
[facility]  Internal Medicine Residency Program  History and Physical  MICU    Patient:  Andre Moreno 65 y.o. male MRN: [id number]     Date of Service: 12/23/2020    Hospital Day: 2      Chief complaint: AMS x unknown time     History Obtained From:  patient, electronic medical record    History of Present Illness   Days Liya Moreno is a 65 y.o. male who came in with CC of AMS since unknown time. EMS called to home as patient had AMS x unknown time. While in the ED, he was oriented to person only. COVID test returned positive. Other significant labs in ED were Glucose of 1332, pH 6.96, pco2 19, bicarb 4, Na 123, K 7.1, Cl 91, BUN 65, Cr 5.7 ( baseline 1.4) , Phos 13, Mg 4.5, Lactic acid 11.8 , Lipase 3000. ProBNP 1249, Trop T -ve, EKG showed NSR + 1st degree AV block. CXR shows developing right sided infiltrate. CT Abd showed acute pancreatic inflammatory changes + small amount of ascites . CT cervical spine showed advanced multilevel degenerative disc and joint disease. Mild bilateral narrowing C2-3, C4-5 . CT Head negative for acute process. When I examined the patient, he was extremely lethargic, and admitted to having dry cough, abdominal pain , diarrhea . He denied fevers but admitted to drinking more alcohol in the past week than his usual amount which he could not confirm. He was answering in few words . PMHx shows Pt [facility] to [facility] for health issues which include Anxiety, Major depression, B/L Carpal tunnel, Vit D deficiency, Diabetes with neuropathy, erectile dysfunction, hemorrhoids, HLD, HTN , BPH , Hx of lung neoplasm, Prostate neoplasm , tobacco dependence, hx of exposure to [employer] .        Previous Hospital Admission:-    Past Medical History:       Diagnosis Date    [facility] ([facility] [address]     Diabetes mellitus ([facility] [address]     Hyperlipidemia     Hypertension     Prostate cancer [facility])        Past Surgical History:        Procedure Laterality Date    COLON SURGERY      nodules, no cervical lymphadenopathy  · Pulmonary/Chest: clear to auscultation bilaterally- no wheezes, rales or rhonchi, normal air movement, no respiratory distress  · Cardiovascular: normal rate, regular rhythm, normal S1 and S2, no murmurs, rubs, clicks, or gallops, distal pulses intact, no carotid bruits  · Abdomen: epigastric tenderness, BS +ve   · Extremities: no cyanosis, clubbing or edema     Lines     site day    Art line   None    TLC R IJ <1   PICC None    Hemoaccess None      ABG:     Lab Results   Component Value Date    PH 6.969 12/22/2020    PCO2 19.9 12/22/2020    PO2 109.1 12/22/2020    HCO3 4.5 12/22/2020    BE -25.9 12/22/2020    THB 12.7 12/22/2020    O2SAT 95.4 12/22/2020     Labs and Imaging Studies   Basic Labs  CBC:   Lab Results   Component Value Date    WBC 18.2 12/22/2020    RBC 3.93 12/22/2020    HGB 11.1 12/22/2020    HCT 35.7 12/22/2020    MCV 90.8 12/22/2020    RDW 13.7 12/22/2020     12/22/2020     CMP:  Lab Results   Component Value Date     12/23/2020    K 5.2 12/23/2020    K 4.7 08/16/2020    CL 97 12/23/2020    CO2 16 12/23/2020    BUN 92 12/23/2020    PROT 6.0 12/22/2020       Imaging Studies:     Ct Abdomen Pelvis Wo Contrast Additional Contrast? None    Result Date: 12/22/2020  EXAMINATION: CT OF THE ABDOMEN AND PELVIS WITHOUT CONTRAST 12/22/2020 3:17 pm TECHNIQUE: CT of the abdomen and pelvis was performed without the administration of intravenous contrast. Multiplanar reformatted images are provided for review. Dose modulation, iterative reconstruction, and/or weight based adjustment of the mA/kV was utilized to reduce the radiation dose to as low as reasonably achievable.  COMPARISON: September 14, 2019 HISTORY: ORDERING SYSTEM PROVIDED HISTORY: DKA, acute pancreatitis TECHNOLOGIST PROVIDED HISTORY: Reason for exam:->DKA, acute pancreatitis Additional Contrast?->None What reading provider will be dictating this exam?->CRC FINDINGS: The lung bases demonstrate COPD with bronchiectasis. There is minimal atelectasis/infiltrates in the lung bases concerning for pneumonia. There is coronary artery calcification. The liver is fatty infiltrated. Gallbladder is partially distended with mild wall thickening. Consider ultrasonography. Spleen appears normal.  There is diffuse inflammatory changes and edema surrounding the pancreas with inflammatory changes extending to the root of the mesentery to involve the duodenum. Inflammatory fluid is identified in the Morison's pouch with inflammation extending to the ascending colon which is thickened. There is inflammatory stranding densities in the mesentery and fluid in the splenic hilum. Degenerative changes are identified in the lumbar spine. Pelvis. The bladder is contracted with the Jolly catheter and wall thickening. There is diverticulosis of colon without diverticulitis. Significant inflammatory changes in the upper abdomen with the epicenter surrounding the pancreas concerning for acute pancreatitis with inflammation and inflammatory fluid extending and involving the root of the mesentery, duodenum and ascending colon resulting in enteritis/colitis. Small amount of inflammatory ascites is also present. Surveillance recommended. Atelectasis/infiltrates lung bases concerning for pneumonia. Ct Head Wo Contrast    Result Date: 12/22/2020  EXAMINATION: CT OF THE HEAD WITHOUT CONTRAST  12/22/2020 3:17 pm TECHNIQUE: CT of the head was performed without the administration of intravenous contrast. Dose modulation, iterative reconstruction, and/or weight based adjustment of the mA/kV was utilized to reduce the radiation dose to as low as reasonably achievable. COMPARISON: None available to me at the time of this interpretation. HISTORY: ORDERING SYSTEM PROVIDED HISTORY: ams TECHNOLOGIST PROVIDED HISTORY: Has a \"code stroke\" or \"stroke alert\" been called? ->No Reason for exam:->[facility] What reading provider will be dictating this exam?->CRC FINDINGS: This exam is slightly limited by beam-hardening artifacts. Given these: BRAIN/VENTRICLES: Negative for acute intracranial hemorrhage, other intra-/extra-axial fluid collection, or mass effect/midline shift. Minimal atherosclerotic calcification is scattered about the cavernous/supraclinoid segments of both internal carotid arteries. Gray/white matter differentiation appears grossly unremarkable, given technical artifacts. There is age-appropriate, generalized parenchymal volume loss. ORBITS: The right ocular lens has been previously surgically replaced. SINUSES: Trace to mild scattered paranasal sinus mucosal thickening is most notable within the ethmoid sinus. Minimal hyperostosis is noted about the osseous margin of the left aspect of the sphenoid sinus, suggestive of chronic osteitis. The frontal sinus is hypoplastic, markedly on the right, and moderately on the left. There is moderate left, and mild right, mastoid air cell complex hypoplasia. Tiny probable cerumen is present within the right external auditory canal. SOFT TISSUES/SKULL: There is background diffuse osteopenia, with at least minimal upper cervical degenerative disease only incidentally demonstrated. .    1. Negative for acute intracranial process, within the limits of this non-contrast CT exam. RECOMMENDATION: 1. If unexplained symptoms persist, consider MRI of the brain. [place]  Chest Portable    Result Date: 12/22/2020  EXAMINATION: ONE XRAY VIEW OF THE CHEST 12/22/2020 11:46 am COMPARISON: 22 October 2020 HISTORY: ORDERING SYSTEM PROVIDED HISTORY: ams TECHNOLOGIST PROVIDED HISTORY: Reason for exam:->ams What reading provider will be dictating this exam?->CRC FINDINGS: The lungs are without acute focal process. There is no effusion or pneumothorax. The cardiomediastinal silhouette is without acute process. The osseous structures are without acute process. No acute process.       EKG: normal sinus rhythm, 1st degree AV block, unchanged from previous tracings. Resident's Assessment and Plan     Days Alexandra Bullock is a 65 y.o. male came with   has a past medical history of Cancer ([facility] [address] Diabetes mellitus ([facility] [address] Hyperlipidemia, Hypertension, and Prostate cancer ([facility] [address] CC of AMS x unknown time        Currently being managed for :    Neurology:   AMS 2/2 HHS VS DKA vs alcohol use  - Patient obtunded, answering in 2-3 words  - Currently not on any sedatives  - Will continue to monitor mental status    Cardiology:   HTN   - Home med includes amlodipine 10mg and lisinopril 20mg  Will resume amlodipine if BP elevated    HLD  On atorvastatin at home, currently held    Pulmonology  COVID PNA   - CXR shows right sided infiltrate  - Would be unwise to start remdesivir as patient is in renal failure , will consult Pharmacy for the same  - Trending inflammatory markers   - Decadron held in view of severe hyperglycemia   - Currently maintaining SpO2 on room air   - Will give Vit C, Zinc      Nephrology (Fluids/ Electrolytes & Nutrition):    BLAIR Stage 3 on CKD Stage 2/3   - Lowest Cr available is 1.4  Cr 5.7 on admission , FENa 1.1% (intrinsic )  Pt received 8L fluid as part of treatment for DKA/HHS and is net +7.9L , Cr down to 4.9  - Nephrology consulted, will follow recs  - Follow BMP ,Mg, Phos q4 till AG closed     HAGMA ( 2/2 Lactic acidosis vs DKA ) ( resolving )  Alb 2.9, corrected normal AG for this pt is 9 , delta gap 19, Corrected HCO3 23 so only AG acidosis present +   - After compensation pCO2 should be 12-16  , pco2 19 , so maybe a component of respiratory acidosis   -Lactic Acid 11.8-> 1.5 after receiving fluids , AG is 14 now  - Continue IVF     Pseudohyponatremia 2/2 severe hyperglycemia  - Na 123 on admission , when corrected for glucose it is 146 ( WNL )      Gastroenterology:   Acute pancreatitis likely 2/2 alcohol consumption vs COVID   - Pt reports drinking more alcohol recently but cannot quantify as lethargic and altered mental status   - Triglyceride level 439 , Lipase 3000 initially -> 2067-> 2452   - Trend lipase as CRP can be altered due to COVID  - Concern that this could develop into necrotizing pancreatitis, so covering with Zosyn   - Keeping NPO currently but depending on resolution of Severe hyperglycemia , will reintroduce diet gradually once gap is closed     Infectious Disease:   COVID PNA   - Management as above    Rule out other infectious source  - Pancultured, Procal 4.65   - Covering with Zosyn  - Will follow and modify accordingly     Endocrine:   Severe hyperglycemia 2/2 DKA Vs HHS vs mixed   Home meds include 3 oral antidiabetic meds  - Presented with BG of 1132, but BHB not checked   - BHB after 12 hours is elevated at 0.8   - Started on insulin drip + bicarb drip for severe acidosis (pH of 6.9) + 1/2NS @ 250ml/hr   Received total of 8L since admission   - Monitor BG every hour till AG closed     Other problems :  Hx of Anxiety, Major depression  B/L Carpal Tunnel  Vit D deficiency  Erectil dysfunction  Hemorrhoids  BPH, Hx of lung and prostate neoplasm  Tobacco dependence  Exposure to Agent [employer] in [facility]    Next of Kind/ POA:   Jarocho Zavala (Domestic Partner)   [phone]    Code Status:   Full       DVT ppx: Heparin due to renal failure  GI ppx: Protonix     Final Note: DKA vs HHS. Bringing down BG. Treating COVID PNA. Monitoring acute pancreatitis. Treating BLAIR Stage 3. Covering for Sepsis. Deniz Camacho MD, PGY-1   Attending physician: Dr. Rochelle Pryor    I personally saw, examined and provided care for the patient. Radiographs, labs and medication list were reviewed by me independently. I spoke with bedside nursing, therapists and consultants. Critical care services and times documented are independent of procedures and multidisciplinary rounds with Residents.  Additionally comprehensive, multidisciplinary rounds were conducted with the MICU team. The case was discussed in detail and plans for care were established. Review of Residents documentation was conducted and revisions were made as appropriate. I agree with the above documented exam, problem list and plan of care.   Malik Peters MD   CCT excluding procedures:40'

## 2020-12-23 NOTE — PROCEDURES
Central Line Insertion     Procedure: right internal jugular vein Triple Lumen Catheter placement. Indications: vascular access    Consent: The spouse was counseled regarding the procedure, its indications, risks, potential complications and alternatives, and any questions were answered. Consent was obtained to proceed. Number of sticks: 3    Number of Kits used: 1    Procedure: Time Out: Immediately prior to the procedure a \"timeout\" was called to verify the correct patient and procedure. The patient was place in the trendelenburg position and the skin over the right internal jugular vein was prepped with betadine and draped in a sterile fashion. Local anesthesia was obtained by infiltration using 1% Lidocaine without epinephrine. With Ultrasound guidance a large bore needle was used to identify the vein, dark non pulsatile blood returned. The guide wire was then inserted through the needle with minimal resistance. 2 mm nick was made in the skin beside the guidewire. Then a dilator was inserted and removed. A triple lumen catheter was then inserted into the vessel over the guide wire using the Seldinger technique to the  18 cm cynthia. All ports showed good, free flowing blood return and were flushed with saline solution. The catheter was then securely fastened to the skin with sutures and covered with a bio patch and sterile dressing. A post procedure X-ray was ordered and showed good line position. Complications: None   The patient tolerated the procedure well. Estimated blood loss: 5 ml.     Kevin Francisco MD PGY-1  12/23/2020  3:03 AM      Attending: Dr. Bryant Gonsalez

## 2020-12-23 NOTE — FLOWSHEET NOTE
Patient gave consent for his fiance Teresa Owen to receive his VISA Card number [id number] exp 4/21 officer Maria Ines Haynes was to verify ID at door and document rosanna receiving visa card.

## 2020-12-23 NOTE — CARE COORDINATION
12/23 Care Coordination: Days was in ED for AMS,respiratory distress. Also found to have BLAIR and in DKA. He is positive COVID. Admit to MICU, management DKA and BLAIR. Let message for Lexis Hilton to discuss discharge planning. Await her call back. Pt did come in from home. Lives with Lexis Hilton. CM/SW will continue to follow for discharge planning.    Cecelia PATEN,RN-BC  [phone]

## 2020-12-23 NOTE — PROGRESS NOTES
Department of Internal Medicine  Nephrology Attending Progress Note    Subjective: patient is alert and oriented x 2 today, has some abdominal pain, no chest pain or SOB. Drank bottle of [place] prior to admission.     Current Medications:    Current Facility-Administered Medications: sodium chloride flush 0.9 % injection 10 mL, 10 mL, Intravenous, 2 times per day  sodium chloride flush 0.9 % injection 10 mL, 10 mL, Intravenous, PRN  polyethylene glycol (GLYCOLAX) packet 17 g, 17 g, Oral, Daily PRN  acetaminophen (TYLENOL) tablet 650 mg, 650 mg, Oral, Q6H PRN **OR** acetaminophen (TYLENOL) suppository 650 mg, 650 mg, Rectal, Q6H PRN  sennosides (SENOKOT) 8.8 MG/5ML syrup 5 mL, 5 mL, Oral, BID PRN  pantoprazole (PROTONIX) injection 40 mg, 40 mg, Intravenous, Daily **AND** sodium chloride (PF) 0.9 % injection 10 mL, 10 mL, Intravenous, Daily  heparin (porcine) injection 5,000 Units, 5,000 Units, Subcutaneous, 3 times per day  thiamine (B-1) 500 mg in sodium chloride 0.9 % 100 mL IVPB, 500 mg, Intravenous, BID **FOLLOWED BY** [START ON 12/25/2020] thiamine (B-1) 250 mg in sodium chloride 0.9 % 100 mL IVPB, 250 mg, Intravenous, BID **FOLLOWED BY** [START ON 12/28/2020] thiamine (B-1) injection 100 mg, 100 mg, Intravenous, BID **FOLLOWED BY** [START ON 1/1/2021] thiamine (B-1) injection 100 mg, 100 mg, Intravenous, Daily  hydrALAZINE (APRESOLINE) injection 10 mg, 10 mg, Intravenous, Q4H PRN  labetalol (NORMODYNE;TRANDATE) injection 10 mg, 10 mg, Intravenous, Q9Q PRN  folic acid injection 1 mg, 1 mg, Intravenous, Daily  vitamin D (CHOLECALCIFEROL) tablet 2,000 Units, 2,000 Units, Oral, Daily  zinc sulfate (ZINCATE) capsule 50 mg, 50 mg, Oral, Daily  ascorbic acid (VITAMIN C) tablet 500 mg, 500 mg, Oral, Daily  piperacillin-tazobactam (ZOSYN) 3.375 g in dextrose 5 % 100 mL IVPB extended infusion (mini-bag), 3.375 g, Intravenous, Q12H  0.9 % sodium chloride infusion (Zosyn flush), , Intravenous, Q12H  dextrose 50 % IV 12/23/2020    LABALBU 2.7 12/23/2020    CALCIUM 7.5 12/23/2020    BILITOT 0.6 12/23/2020    ALKPHOS 93 12/23/2020    AST 49 12/23/2020    ALT 25 12/23/2020     Magnesium:    Lab Results   Component Value Date    MG 2.8 12/23/2020     Phosphorus:    Lab Results   Component Value Date    PHOS 4.4 12/23/2020     U/A:    Lab Results   Component Value Date    COLORU Yellow 12/22/2020    PROTEINU 100 12/22/2020    PHUR 5.0 12/22/2020    WBCUA 0-1 12/22/2020    RBCUA 0-1 12/22/2020    BACTERIA RARE 12/22/2020    CLARITYU Clear 12/22/2020    SPECGRAV 1.020 12/22/2020    LEUKOCYTESUR Negative 12/22/2020    UROBILINOGEN 0.2 12/22/2020    BILIRUBINUR Negative 12/22/2020    BLOODU MODERATE 12/22/2020    GLUCOSEU >=1000 12/22/2020    AMORPHOUS FEW 12/22/2020     ABG:    Lab Results   Component Value Date    PH 7.411 12/23/2020    PCO2 29.3 12/23/2020    PO2 71.1 12/23/2020    HCO3 18.2 12/23/2020    BE -5.4 12/23/2020    O2SAT 93.7 12/23/2020     HgBA1c:    Lab Results   Component Value Date    LABA1C 11.1 12/23/2020     Microalbumen/Creatinine ratio:  No components found for: RUCREAT  Radiology Review: The lungs are without acute focal process.  There is no effusion or   pneumothorax. The cardiomediastinal silhouette is without acute process. The   osseous structures are without acute process. Significant inflammatory changes in the upper abdomen with the epicenter   surrounding the pancreas concerning for acute pancreatitis with inflammation   and inflammatory fluid extending and involving the root of the mesentery,   duodenum and ascending colon resulting in enteritis/colitis.  Small amount of   inflammatory ascites is also present.  Surveillance recommended. Atelectasis/infiltrates lung bases concerning for pneumonia.      1.  Negative for acute intracranial process, within the limits of this   non-contrast CT exam.       IMPRESSION/RECOMMENDATIONS:      This is a 65year old  [place] gentleman with past medical history significant for DM2, hypertension, prostate cancer and stage 3 CKD, presented to ro ED for AMS beginning unknown time. History is limited to chart review. Upon arrival patient was in respiratory distress and was found to have elevated blood sugar of 1332, lactic acid levelof 11.8, BUN of 95, serum creatinine of 5.7 and serum potassium of 7.1 with CO2 level of 4. Therefore this consultation was requested for management of his BLAIR. 1. BLAIR on top of stage 2-3 CKD with baseline serum creatinine 1.4 mg/dl- secondary hyperosmolar hypovolemic state with DKA  FENa 1.1% (intrinsic )  Pt received 8L fluid as part of treatment for DKA/HHS and is net +7.9L , Cr down to 4.9    - continue 1/2NS at 250 cc/hr  - stop bicarb drip  - monitor urine output  - monitor bmp every 6 hours  - keep MAP>65 mm hg    2. HAGMA secondary to DKA and lactic acdisosis  - stop bicarbonate drip at 150 ml/hr  - lactic acidosis resolved  - monitor bmp every 6 hours  - no urgent indication for dialytic intervention     3. Hyperkalemia secondary to BLAIR and metabolic acidosis/DKA and hyperosmolar state  - improving slowly with iv fluids and bicarb drip, last level 6.6  - will monitor  - resolved    4. Pseudohyponatremia - improving slowly  5. DKA/ acute pancreatitis - lipase>3000  6. Hypocalcemia - check ionized calcium      Case was discussed with MICU staff. Thank you for letting me take part in the care of this gentleman. Teresa Skiff MD

## 2020-12-23 NOTE — PROGRESS NOTES
Pharmacy Consultation Note  (Antibiotic Dosing and Monitoring)    Initial consult date: 12-23-20  Consulting physician: Marciano Ramirez   Drug: Vancomycin  Indication:     Discussed with team and decision to consolidate to Zosyn for now. Will sign off, please re-consult if further assistance is needed with vancomycin.     Oumar Sommers, PharmD, BCPS 12/23/2020 10:36 AM

## 2020-12-23 NOTE — PROGRESS NOTES
Hospitalist Progress Note      SYNOPSIS: Patient admitted on 2020 for       SUBJECTIVE:    Patient seen and examined  Records reviewed. Somewhat oriented today  Complaining of some abdominal issues. Admits to binge drinking    Stable overnight. No other overnight issues reported. Temp (24hrs), Av.1 °F (35.6 °C), Min:93.2 °F (34 °C), Max:98.2 °F (36.8 °C)    DIET: Diet NPO Effective Now  CODE: Full Code    Intake/Output Summary (Last 24 hours) at 2020 1640  Last data filed at 2020 1600  Gross per 24 hour   Intake 7906.76 ml   Output 1440 ml   Net 6466.76 ml       OBJECTIVE:    BP (!) 153/87   Pulse 97   Temp 97.2 °F (36.2 °C) (Bladder)   Resp (!) 34   Ht 6' 2\" (1.88 m)   Wt 208 lb 11.2 oz (94.7 kg)   SpO2 96%   BMI 26.80 kg/m²     General appearance: No apparent distress, appears stated age and cooperative. HEENT:  Conjunctivae/corneas clear. Neck: Supple. No jugular venous distention. Respiratory: Clear to auscultation bilaterally, normal respiratory effort  Cardiovascular: Regular rate rhythm, normal S1-S2  Abdomen: Soft, nontender, nondistended  Musculoskeletal: No clubbing, cyanosis, no bilateral lower extremity edema. Brisk capillary refill. Skin:  No rashes  on visible skin  Neurologic: awake, alert and following commands     ASSESSMENT:    Principal Problem:    Acute metabolic encephalopathy  Active Problems:    Hyperlipidemia    Prostate cancer ([facility] [address]    Hypertension    Diabetes mellitus ([facility] [address]    DKA, type 1, not at goal [facility])    Acute pancreatitis    Acute renal failure (ARF) (HCC)    High anion gap metabolic acidosis  Resolved Problems:    * No resolved hospital problems. *         PLAN:    Manage alcohol withdrawal with CIWA protocol  Continue Decadron, vitamin C, zinc.  Continue Zosyn.   Surgery consulted for pancreatitis evaluation management  Nephrology following for BLAIR  DKA protocol    DISPOSITION: Continue MICU    Medications:  REVIEWED DAILY    Infusion Medications    insulin 0.041 Units/kg/hr (12/23/20 1557)    sodium chloride 250 mL/hr at 12/23/20 1004    dextrose 5 % and 0.45 % NaCl       Scheduled Medications    sodium chloride flush  10 mL Intravenous 2 times per day    pantoprazole  40 mg Intravenous Daily    And    sodium chloride (PF)  10 mL Intravenous Daily    heparin (porcine)  5,000 Units Subcutaneous 3 times per day    thiamine (VITAMIN B1) IVPB  500 mg Intravenous BID    Followed by   Tessa Benavides ON 12/25/2020] thiamine (VITAMIN B1) IVPB  250 mg Intravenous BID    Followed by   Tessa Benavides ON 12/28/2020] thiamine  100 mg Intravenous BID    Followed by   Tessa Benavides ON 1/1/2021] thiamine  100 mg Intravenous Daily    folic acid  1 mg Intravenous Daily    vitamin D  2,000 Units Oral Daily    zinc sulfate  50 mg Oral Daily    ascorbic acid  500 mg Oral Daily    piperacillin-tazobactam  3.375 g Intravenous Q12H    sodium chloride   Intravenous Q12H     PRN Meds: sodium chloride flush, polyethylene glycol, acetaminophen **OR** acetaminophen, sennosides, hydrALAZINE, labetalol, dextrose, dextrose 5 % and 0.45 % NaCl    Labs:     Recent Labs     12/22/20  1232 12/23/20  1119   WBC 18.2* 7.3   HGB 11.1* 9.4*   HCT 35.7* 26.8*    116*       Recent Labs     12/23/20  0812 12/23/20  0944 12/23/20  1119 12/23/20  1507   * 131* 133 134   K 4.4 4.3 4.1 4.4   CL 97* 98 100 100   CO2 16* 19* 18* 19*   BUN 91* 92* 90* 91*   CREATININE 4.9* 4.9* 4.9* 5.1*   CALCIUM 7.5* 7.5* 7.5* 7.6*   PHOS 5.1*  --  4.4 3.9       Recent Labs     12/22/20  1232 12/22/20  1446 12/22/20  1651 12/23/20  0430 12/23/20  0944   PROT 6.6 6.1* 6.0*  --  5.6*   ALKPHOS 103 93 102  --  93   ALT 11 13 16  --  25   AST 14 26 39  --  49*   BILITOT 0.6 0.7 0.6  --  0.6   LIPASE >3,000* 2,067*  --  2,452*  --        Recent Labs     12/22/20  1232 12/23/20  0430   INR 1.1 1.2       Recent Labs     12/22/20  1232 12/23/20  0301 12/23/20  0812   TROPONINI 0.01 0.03 0.04*       Chronic labs:    Lab Results   Component Value Date    CHOL 160 12/23/2020    TRIG 439 (H) 12/23/2020    HDL 14 12/23/2020    LDLCALC - (AA) 12/23/2020    TSH 0.540 12/23/2020    INR 1.2 12/23/2020    LABA1C 11.1 (H) 12/23/2020       Radiology: REVIEWED DAILY    +++++++++++++++++++++++++++++++++++++++++++++++++  [place], [place]  +++++++++++++++++++++++++++++++++++++++++++++++++  NOTE: This report was transcribed using voice recognition software. Every effort was made to ensure accuracy; however, inadvertent computerized transcription errors may be present.

## 2020-12-24 ENCOUNTER — ANESTHESIA EVENT (OUTPATIENT)
Dept: ICU | Age: 70
DRG: 207 | End: 2020-12-24
Payer: MEDICARE

## 2020-12-24 ENCOUNTER — ANESTHESIA (OUTPATIENT)
Dept: ICU | Age: 70
DRG: 207 | End: 2020-12-24
Payer: MEDICARE

## 2020-12-24 ENCOUNTER — APPOINTMENT (OUTPATIENT)
Dept: GENERAL RADIOLOGY | Age: 70
DRG: 207 | End: 2020-12-24
Payer: MEDICARE

## 2020-12-24 LAB
AADO2: 445.1 MMHG
ANION GAP SERPL CALCULATED.3IONS-SCNC: 14 MMOL/L (ref 7–16)
ANION GAP SERPL CALCULATED.3IONS-SCNC: 15 MMOL/L (ref 7–16)
ANION GAP SERPL CALCULATED.3IONS-SCNC: 18 MMOL/L (ref 7–16)
ANISOCYTOSIS: ABNORMAL
ANTISTREPTOLYSIN-O: 31 IU/ML (ref 0–200)
ATYPICAL LYMPHOCYTE RELATIVE PERCENT: 0.9 % (ref 0–4)
B.E.: -10.1 MMOL/L (ref -3–3)
B.E.: -9.9 MMOL/L (ref -3–3)
BASOPHILS ABSOLUTE: 0 E9/L (ref 0–0.2)
BASOPHILS RELATIVE PERCENT: 0.2 % (ref 0–2)
BUN BLDV-MCNC: 90 MG/DL (ref 8–23)
BUN BLDV-MCNC: 95 MG/DL (ref 8–23)
BUN BLDV-MCNC: 96 MG/DL (ref 8–23)
BURR CELLS: ABNORMAL
C-REACTIVE PROTEIN: 20 MG/DL (ref 0–0.4)
CALCIUM SERPL-MCNC: 6.3 MG/DL (ref 8.6–10.2)
CALCIUM SERPL-MCNC: 7.7 MG/DL (ref 8.6–10.2)
CALCIUM SERPL-MCNC: 7.7 MG/DL (ref 8.6–10.2)
CHLORIDE BLD-SCNC: 100 MMOL/L (ref 98–107)
CHLORIDE BLD-SCNC: 102 MMOL/L (ref 98–107)
CHLORIDE BLD-SCNC: 108 MMOL/L (ref 98–107)
CO2: 15 MMOL/L (ref 22–29)
CO2: 16 MMOL/L (ref 22–29)
CO2: 19 MMOL/L (ref 22–29)
COHB: 0.3 % (ref 0–1.5)
COHB: 0.5 % (ref 0–1.5)
CORTISOL TOTAL: 16.32 MCG/DL (ref 2.68–18.4)
CREAT SERPL-MCNC: 5.5 MG/DL (ref 0.7–1.2)
CREAT SERPL-MCNC: 6 MG/DL (ref 0.7–1.2)
CREAT SERPL-MCNC: 6 MG/DL (ref 0.7–1.2)
CRITICAL: ABNORMAL
CRITICAL: ABNORMAL
DATE ANALYZED: ABNORMAL
DATE ANALYZED: ABNORMAL
DATE OF COLLECTION: ABNORMAL
DATE OF COLLECTION: ABNORMAL
EKG ATRIAL RATE: 86 BPM
EKG P AXIS: 7 DEGREES
EKG P-R INTERVAL: 216 MS
EKG Q-T INTERVAL: 366 MS
EKG QRS DURATION: 88 MS
EKG QTC CALCULATION (BAZETT): 437 MS
EKG R AXIS: 8 DEGREES
EKG T AXIS: 49 DEGREES
EKG VENTRICULAR RATE: 86 BPM
EOSINOPHILS ABSOLUTE: 0 E9/L (ref 0.05–0.5)
EOSINOPHILS RELATIVE PERCENT: 0.1 % (ref 0–6)
FERRITIN: 9769 NG/ML
FIO2: 100 %
GFR AFRICAN AMERICAN: 11
GFR AFRICAN AMERICAN: 11
GFR AFRICAN AMERICAN: 12
GFR NON-AFRICAN AMERICAN: 11 ML/MIN/1.73
GFR NON-AFRICAN AMERICAN: 11 ML/MIN/1.73
GFR NON-AFRICAN AMERICAN: 12 ML/MIN/1.73
GLUCOSE BLD-MCNC: 240 MG/DL (ref 74–99)
GLUCOSE BLD-MCNC: 301 MG/DL (ref 74–99)
GLUCOSE BLD-MCNC: 329 MG/DL (ref 74–99)
HBV SURFACE AB TITR SER: REACTIVE {TITER}
HCO3: 15.2 MMOL/L (ref 22–26)
HCO3: 15.8 MMOL/L (ref 22–26)
HCT VFR BLD CALC: 27.2 % (ref 37–54)
HEMOGLOBIN: 9.6 G/DL (ref 12.5–16.5)
HEPATITIS B CORE IGM ANTIBODY: NORMAL
HEPATITIS B SURFACE ANTIGEN INTERPRETATION: NORMAL
HHB: 1.3 % (ref 0–5)
HHB: 9.1 % (ref 0–5)
HYPOCHROMIA: ABNORMAL
INR BLD: 1.2
LAB: ABNORMAL
LAB: ABNORMAL
LIPASE: 709 U/L (ref 13–60)
LYMPHOCYTES ABSOLUTE: 0.71 E9/L (ref 1.5–4)
LYMPHOCYTES RELATIVE PERCENT: 5.2 % (ref 20–42)
Lab: ABNORMAL
Lab: ABNORMAL
MAGNESIUM: 2.4 MG/DL (ref 1.6–2.6)
MAGNESIUM: 2.6 MG/DL (ref 1.6–2.6)
MAGNESIUM: 2.7 MG/DL (ref 1.6–2.6)
MCH RBC QN AUTO: 28.7 PG (ref 26–35)
MCHC RBC AUTO-ENTMCNC: 35.3 % (ref 32–34.5)
MCV RBC AUTO: 81.4 FL (ref 80–99.9)
METER GLUCOSE: 190 MG/DL (ref 74–99)
METER GLUCOSE: 202 MG/DL (ref 74–99)
METER GLUCOSE: 270 MG/DL (ref 74–99)
METER GLUCOSE: 319 MG/DL (ref 74–99)
METER GLUCOSE: 325 MG/DL (ref 74–99)
METER GLUCOSE: 368 MG/DL (ref 74–99)
METHB: 0.2 % (ref 0–1.5)
METHB: 0.6 % (ref 0–1.5)
MODE: ABNORMAL
MODE: AC
MONOCYTES ABSOLUTE: 0.48 E9/L (ref 0.1–0.95)
MONOCYTES RELATIVE PERCENT: 3.5 % (ref 2–12)
MRSA CULTURE ONLY: NORMAL
NEUTROPHILS ABSOLUTE: 10.71 E9/L (ref 1.8–7.3)
NEUTROPHILS RELATIVE PERCENT: 90.4 % (ref 43–80)
NUCLEATED RED BLOOD CELLS: 2.6 /100 WBC
O2 CONTENT: 12.8 ML/DL
O2 CONTENT: 13.1 ML/DL
O2 SATURATION: 90.8 % (ref 92–98.5)
O2 SATURATION: 98.7 % (ref 92–98.5)
O2HB: 90.2 % (ref 94–97)
O2HB: 97.8 % (ref 94–97)
OPERATOR ID: 1023
OPERATOR ID: ABNORMAL
OVALOCYTES: ABNORMAL
PATIENT TEMP: 37 C
PATIENT TEMP: 37 C
PCO2: 31.1 MMHG (ref 35–45)
PCO2: 34.7 MMHG (ref 35–45)
PDW BLD-RTO: 13.6 FL (ref 11.5–15)
PEEP/CPAP: 5 CMH2O
PFO2: 2.08 MMHG/%
PH BLOOD GAS: 7.28 (ref 7.35–7.45)
PH BLOOD GAS: 7.31 (ref 7.35–7.45)
PHOSPHORUS: 4.7 MG/DL (ref 2.5–4.5)
PHOSPHORUS: 5.5 MG/DL (ref 2.5–4.5)
PHOSPHORUS: 6.2 MG/DL (ref 2.5–4.5)
PLATELET # BLD: 137 E9/L (ref 130–450)
PMV BLD AUTO: 11.5 FL (ref 7–12)
PO2: 208.2 MMHG (ref 75–100)
PO2: 66 MMHG (ref 75–100)
POIKILOCYTES: ABNORMAL
POLYCHROMASIA: ABNORMAL
POTASSIUM SERPL-SCNC: 4.6 MMOL/L (ref 3.5–5)
POTASSIUM SERPL-SCNC: 4.7 MMOL/L (ref 3.5–5)
POTASSIUM SERPL-SCNC: 5.2 MMOL/L (ref 3.5–5)
PROTHROMBIN TIME: 13 SEC (ref 9.3–12.4)
RBC # BLD: 3.34 E12/L (ref 3.8–5.8)
RI(T): 2.14
RR MECHANICAL: 14 B/MIN
SEDIMENTATION RATE, ERYTHROCYTE: 106 MM/HR (ref 0–15)
SODIUM BLD-SCNC: 134 MMOL/L (ref 132–146)
SODIUM BLD-SCNC: 136 MMOL/L (ref 132–146)
SODIUM BLD-SCNC: 137 MMOL/L (ref 132–146)
SOURCE, BLOOD GAS: ABNORMAL
SOURCE, BLOOD GAS: ABNORMAL
THB: 10.3 G/DL (ref 11.5–16.5)
THB: 8.9 G/DL (ref 11.5–16.5)
TIME ANALYZED: 1545
TIME ANALYZED: 413
TROPONIN: 0.03 NG/ML (ref 0–0.03)
VT MECHANICAL: 500 ML
WBC # BLD: 11.9 E9/L (ref 4.5–11.5)

## 2020-12-24 PROCEDURE — 82728 ASSAY OF FERRITIN: CPT

## 2020-12-24 PROCEDURE — 82533 TOTAL CORTISOL: CPT

## 2020-12-24 PROCEDURE — 2580000003 HC RX 258: Performed by: INTERNAL MEDICINE

## 2020-12-24 PROCEDURE — 71045 X-RAY EXAM CHEST 1 VIEW: CPT

## 2020-12-24 PROCEDURE — 87340 HEPATITIS B SURFACE AG IA: CPT

## 2020-12-24 PROCEDURE — 6360000002 HC RX W HCPCS: Performed by: INTERNAL MEDICINE

## 2020-12-24 PROCEDURE — 5A1955Z RESPIRATORY VENTILATION, GREATER THAN 96 CONSECUTIVE HOURS: ICD-10-PCS | Performed by: INTERNAL MEDICINE

## 2020-12-24 PROCEDURE — 82962 GLUCOSE BLOOD TEST: CPT

## 2020-12-24 PROCEDURE — 6370000000 HC RX 637 (ALT 250 FOR IP): Performed by: INTERNAL MEDICINE

## 2020-12-24 PROCEDURE — 86705 HEP B CORE ANTIBODY IGM: CPT

## 2020-12-24 PROCEDURE — 86060 ANTISTREPTOLYSIN O TITER: CPT

## 2020-12-24 PROCEDURE — 02HV33Z INSERTION OF INFUSION DEVICE INTO SUPERIOR VENA CAVA, PERCUTANEOUS APPROACH: ICD-10-PCS | Performed by: STUDENT IN AN ORGANIZED HEALTH CARE EDUCATION/TRAINING PROGRAM

## 2020-12-24 PROCEDURE — 5A1D70Z PERFORMANCE OF URINARY FILTRATION, INTERMITTENT, LESS THAN 6 HOURS PER DAY: ICD-10-PCS | Performed by: INTERNAL MEDICINE

## 2020-12-24 PROCEDURE — 85025 COMPLETE CBC W/AUTO DIFF WBC: CPT

## 2020-12-24 PROCEDURE — 85610 PROTHROMBIN TIME: CPT

## 2020-12-24 PROCEDURE — 6360000002 HC RX W HCPCS

## 2020-12-24 PROCEDURE — 2000000000 HC ICU R&B

## 2020-12-24 PROCEDURE — 99223 1ST HOSP IP/OBS HIGH 75: CPT | Performed by: SURGERY

## 2020-12-24 PROCEDURE — 31500 INSERT EMERGENCY AIRWAY: CPT

## 2020-12-24 PROCEDURE — 0BH17EZ INSERTION OF ENDOTRACHEAL AIRWAY INTO TRACHEA, VIA NATURAL OR ARTIFICIAL OPENING: ICD-10-PCS | Performed by: INTERNAL MEDICINE

## 2020-12-24 PROCEDURE — 83690 ASSAY OF LIPASE: CPT

## 2020-12-24 PROCEDURE — 86140 C-REACTIVE PROTEIN: CPT

## 2020-12-24 PROCEDURE — 80048 BASIC METABOLIC PNL TOTAL CA: CPT

## 2020-12-24 PROCEDURE — 2500000003 HC RX 250 WO HCPCS: Performed by: INTERNAL MEDICINE

## 2020-12-24 PROCEDURE — 84100 ASSAY OF PHOSPHORUS: CPT

## 2020-12-24 PROCEDURE — 84484 ASSAY OF TROPONIN QUANT: CPT

## 2020-12-24 PROCEDURE — 74018 RADEX ABDOMEN 1 VIEW: CPT

## 2020-12-24 PROCEDURE — 36592 COLLECT BLOOD FROM PICC: CPT

## 2020-12-24 PROCEDURE — 94002 VENT MGMT INPAT INIT DAY: CPT

## 2020-12-24 PROCEDURE — 86706 HEP B SURFACE ANTIBODY: CPT

## 2020-12-24 PROCEDURE — 90935 HEMODIALYSIS ONE EVALUATION: CPT

## 2020-12-24 PROCEDURE — C9113 INJ PANTOPRAZOLE SODIUM, VIA: HCPCS | Performed by: INTERNAL MEDICINE

## 2020-12-24 PROCEDURE — 83735 ASSAY OF MAGNESIUM: CPT

## 2020-12-24 PROCEDURE — 85651 RBC SED RATE NONAUTOMATED: CPT

## 2020-12-24 PROCEDURE — 82805 BLOOD GASES W/O2 SATURATION: CPT

## 2020-12-24 PROCEDURE — 31500 INSERT EMERGENCY AIRWAY: CPT | Performed by: ANESTHESIOLOGY

## 2020-12-24 RX ORDER — PHENYLEPHRINE HYDROCHLORIDE 10 MG/ML
INJECTION INTRAVENOUS
Status: COMPLETED
Start: 2020-12-24 | End: 2020-12-24

## 2020-12-24 RX ORDER — ETOMIDATE 2 MG/ML
INJECTION INTRAVENOUS
Status: DISCONTINUED
Start: 2020-12-24 | End: 2020-12-24 | Stop reason: WASHOUT

## 2020-12-24 RX ORDER — ACETAMINOPHEN 650 MG/1
650 SUPPOSITORY RECTAL EVERY 6 HOURS PRN
Status: DISCONTINUED | OUTPATIENT
Start: 2020-12-24 | End: 2021-01-01 | Stop reason: HOSPADM

## 2020-12-24 RX ORDER — PHENOBARBITAL SODIUM 65 MG/ML
65 INJECTION INTRAMUSCULAR ONCE
Status: COMPLETED | OUTPATIENT
Start: 2020-12-24 | End: 2020-12-24

## 2020-12-24 RX ORDER — HEPARIN SODIUM (PORCINE) LOCK FLUSH IV SOLN 100 UNIT/ML 100 UNIT/ML
SOLUTION INTRAVENOUS
Status: COMPLETED
Start: 2020-12-24 | End: 2020-12-24

## 2020-12-24 RX ORDER — INSULIN GLARGINE 100 [IU]/ML
15 INJECTION, SOLUTION SUBCUTANEOUS NIGHTLY
Status: DISCONTINUED | OUTPATIENT
Start: 2020-12-24 | End: 2020-12-25

## 2020-12-24 RX ORDER — PANTOPRAZOLE SODIUM 40 MG/10ML
40 INJECTION, POWDER, LYOPHILIZED, FOR SOLUTION INTRAVENOUS DAILY
Status: DISCONTINUED | OUTPATIENT
Start: 2020-12-25 | End: 2020-12-26

## 2020-12-24 RX ORDER — 0.9 % SODIUM CHLORIDE 0.9 %
1000 INTRAVENOUS SOLUTION INTRAVENOUS ONCE
Status: COMPLETED | OUTPATIENT
Start: 2020-12-24 | End: 2020-12-24

## 2020-12-24 RX ORDER — PROPOFOL 10 MG/ML
INJECTION, EMULSION INTRAVENOUS
Status: COMPLETED
Start: 2020-12-24 | End: 2020-12-24

## 2020-12-24 RX ORDER — 0.9 % SODIUM CHLORIDE 0.9 %
1500 INTRAVENOUS SOLUTION INTRAVENOUS ONCE
Status: COMPLETED | OUTPATIENT
Start: 2020-12-24 | End: 2020-12-24

## 2020-12-24 RX ORDER — DEXAMETHASONE SODIUM PHOSPHATE 4 MG/ML
6 INJECTION, SOLUTION INTRA-ARTICULAR; INTRALESIONAL; INTRAMUSCULAR; INTRAVENOUS; SOFT TISSUE EVERY 24 HOURS
Status: DISCONTINUED | OUTPATIENT
Start: 2020-12-24 | End: 2021-01-01 | Stop reason: HOSPADM

## 2020-12-24 RX ORDER — SUCCINYLCHOLINE CHLORIDE 20 MG/ML
INJECTION INTRAMUSCULAR; INTRAVENOUS
Status: DISCONTINUED
Start: 2020-12-24 | End: 2020-12-24 | Stop reason: WASHOUT

## 2020-12-24 RX ORDER — ACETAMINOPHEN 160 MG/5ML
650 SOLUTION ORAL EVERY 6 HOURS PRN
Status: DISCONTINUED | OUTPATIENT
Start: 2020-12-24 | End: 2021-01-01 | Stop reason: HOSPADM

## 2020-12-24 RX ORDER — SODIUM CHLORIDE 9 MG/ML
10 INJECTION INTRAVENOUS DAILY
Status: DISCONTINUED | OUTPATIENT
Start: 2020-12-25 | End: 2020-12-26

## 2020-12-24 RX ORDER — ACETAMINOPHEN 160 MG/5ML
650 SUSPENSION, ORAL (FINAL DOSE FORM) ORAL EVERY 6 HOURS PRN
Status: DISCONTINUED | OUTPATIENT
Start: 2020-12-24 | End: 2020-12-24 | Stop reason: SDUPTHER

## 2020-12-24 RX ADMIN — SODIUM CHLORIDE 1500 ML: 9 INJECTION, SOLUTION INTRAVENOUS at 10:03

## 2020-12-24 RX ADMIN — MIDAZOLAM 1 MG/HR: 5 INJECTION INTRAMUSCULAR; INTRAVENOUS at 09:34

## 2020-12-24 RX ADMIN — Medication 50 MCG/HR: at 09:15

## 2020-12-24 RX ADMIN — INSULIN GLARGINE 15 UNITS: 100 INJECTION, SOLUTION SUBCUTANEOUS at 21:07

## 2020-12-24 RX ADMIN — FOLIC ACID 1 MG: 5 INJECTION, SOLUTION INTRAMUSCULAR; INTRAVENOUS; SUBCUTANEOUS at 09:38

## 2020-12-24 RX ADMIN — HEPARIN SODIUM 5000 UNITS: 10000 INJECTION INTRAVENOUS; SUBCUTANEOUS at 15:16

## 2020-12-24 RX ADMIN — HEPARIN SODIUM 5000 UNITS: 10000 INJECTION INTRAVENOUS; SUBCUTANEOUS at 05:47

## 2020-12-24 RX ADMIN — DEXAMETHASONE SODIUM PHOSPHATE 6 MG: 4 INJECTION, SOLUTION INTRAMUSCULAR; INTRAVENOUS at 12:17

## 2020-12-24 RX ADMIN — INSULIN LISPRO 2 UNITS: 100 INJECTION, SOLUTION INTRAVENOUS; SUBCUTANEOUS at 21:03

## 2020-12-24 RX ADMIN — HEPARIN SODIUM 5000 UNITS: 10000 INJECTION INTRAVENOUS; SUBCUTANEOUS at 21:07

## 2020-12-24 RX ADMIN — PANTOPRAZOLE SODIUM 40 MG: 40 INJECTION, POWDER, FOR SOLUTION INTRAVENOUS at 11:31

## 2020-12-24 RX ADMIN — SODIUM CHLORIDE 1000 ML: 9 INJECTION, SOLUTION INTRAVENOUS at 09:34

## 2020-12-24 RX ADMIN — INSULIN LISPRO 10 UNITS: 100 INJECTION, SOLUTION INTRAVENOUS; SUBCUTANEOUS at 17:02

## 2020-12-24 RX ADMIN — PHENYLEPHRINE HYDROCHLORIDE 2 MG: 10 INJECTION INTRAVENOUS at 09:31

## 2020-12-24 RX ADMIN — THIAMINE HYDROCHLORIDE 500 MG: 100 INJECTION, SOLUTION INTRAMUSCULAR; INTRAVENOUS at 21:09

## 2020-12-24 RX ADMIN — LORAZEPAM 2 MG: 2 INJECTION INTRAMUSCULAR; INTRAVENOUS at 02:34

## 2020-12-24 RX ADMIN — PROPOFOL INJECTABLE EMULSION 70 MG: 10 INJECTION, EMULSION INTRAVENOUS at 09:32

## 2020-12-24 RX ADMIN — SODIUM CHLORIDE, PRESERVATIVE FREE: 5 INJECTION INTRAVENOUS at 15:17

## 2020-12-24 RX ADMIN — INSULIN LISPRO 4 UNITS: 100 INJECTION, SOLUTION INTRAVENOUS; SUBCUTANEOUS at 23:58

## 2020-12-24 RX ADMIN — OXYCODONE HYDROCHLORIDE AND ACETAMINOPHEN 500 MG: 500 TABLET ORAL at 15:15

## 2020-12-24 RX ADMIN — PIPERACILLIN AND TAZOBACTAM 3.38 G: 3; .375 INJECTION, POWDER, LYOPHILIZED, FOR SOLUTION INTRAVENOUS at 11:30

## 2020-12-24 RX ADMIN — SODIUM CHLORIDE, PRESERVATIVE FREE 10 ML: 5 INJECTION INTRAVENOUS at 11:31

## 2020-12-24 RX ADMIN — INSULIN LISPRO 4 UNITS: 100 INJECTION, SOLUTION INTRAVENOUS; SUBCUTANEOUS at 12:17

## 2020-12-24 RX ADMIN — Medication 2000 UNITS: at 15:15

## 2020-12-24 RX ADMIN — Medication 10 ML: at 21:09

## 2020-12-24 RX ADMIN — PHENOBARBITAL SODIUM 65 MG: 65 INJECTION INTRAMUSCULAR at 08:27

## 2020-12-24 RX ADMIN — Medication 50 MG: at 15:15

## 2020-12-24 RX ADMIN — Medication 10 ML: at 11:31

## 2020-12-24 RX ADMIN — THIAMINE HYDROCHLORIDE 500 MG: 100 INJECTION, SOLUTION INTRAMUSCULAR; INTRAVENOUS at 09:39

## 2020-12-24 RX ADMIN — INSULIN LISPRO 2 UNITS: 100 INJECTION, SOLUTION INTRAVENOUS; SUBCUTANEOUS at 00:30

## 2020-12-24 RX ADMIN — INSULIN LISPRO 4 UNITS: 100 INJECTION, SOLUTION INTRAVENOUS; SUBCUTANEOUS at 05:47

## 2020-12-24 ASSESSMENT — PULMONARY FUNCTION TESTS
PIF_VALUE: 30
PIF_VALUE: 23
PIF_VALUE: 24
PIF_VALUE: 22
PIF_VALUE: 25
PIF_VALUE: 24
PIF_VALUE: 27
PIF_VALUE: 22

## 2020-12-24 ASSESSMENT — PAIN SCALES - GENERAL: PAINLEVEL_OUTOF10: 0

## 2020-12-24 NOTE — PROGRESS NOTES
Department of Internal Medicine  Nephrology Attending Progress Note    Above events reviewed and noted: patient was intubated this morning    Subjective: patient is intubated and sedated    Current Medications:    Current Facility-Administered Medications: fentaNYL 5 mcg/ml in 0.9%  ml infusion, 25 mcg/hr, Intravenous, Continuous  midazolam (VERSED) 100 mg in dextrose 5 % 100 mL infusion, 1 mg/hr, Intravenous, Continuous  norepinephrine (LEVOPHED) 16 mg in dextrose 5% 250 mL infusion, 2 mcg/min, Intravenous, Continuous  0.9 % sodium chloride bolus, 1,500 mL, Intravenous, Once  polyethylene glycol (GLYCOLAX) packet 17 g, 17 g, Oral, Daily PRN  acetaminophen (TYLENOL) tablet 650 mg, 650 mg, Oral, Q6H PRN **OR** acetaminophen (TYLENOL) suppository 650 mg, 650 mg, Rectal, Q6H PRN  sennosides (SENOKOT) 8.8 MG/5ML syrup 5 mL, 5 mL, Oral, BID PRN  pantoprazole (PROTONIX) injection 40 mg, 40 mg, Intravenous, Daily **AND** sodium chloride (PF) 0.9 % injection 10 mL, 10 mL, Intravenous, Daily  heparin (porcine) injection 5,000 Units, 5,000 Units, Subcutaneous, 3 times per day  thiamine (B-1) 500 mg in sodium chloride 0.9 % 100 mL IVPB, 500 mg, Intravenous, BID **FOLLOWED BY** [START ON 12/25/2020] thiamine (B-1) 250 mg in sodium chloride 0.9 % 100 mL IVPB, 250 mg, Intravenous, BID **FOLLOWED BY** [START ON 12/28/2020] thiamine (B-1) injection 100 mg, 100 mg, Intravenous, BID **FOLLOWED BY** [START ON 1/1/2021] thiamine (B-1) injection 100 mg, 100 mg, Intravenous, Daily  hydrALAZINE (APRESOLINE) injection 10 mg, 10 mg, Intravenous, Q4H PRN  labetalol (NORMODYNE;TRANDATE) injection 10 mg, 10 mg, Intravenous, C7S PRN  folic acid injection 1 mg, 1 mg, Intravenous, Daily  vitamin D (CHOLECALCIFEROL) tablet 2,000 Units, 2,000 Units, Oral, Daily  zinc sulfate (ZINCATE) capsule 50 mg, 50 mg, Oral, Daily  ascorbic acid (VITAMIN C) tablet 500 mg, 500 mg, Oral, Daily  piperacillin-tazobactam (ZOSYN) 3.375 g in dextrose 5 % 100 mL IVPB extended infusion (mini-bag), 3.375 g, Intravenous, Q12H  0.9 % sodium chloride infusion (Zosyn flush), , Intravenous, Q12H  sodium chloride flush 0.9 % injection 10 mL, 10 mL, Intravenous, 2 times per day  sodium chloride flush 0.9 % injection 10 mL, 10 mL, Intravenous, PRN  insulin glargine (LANTUS) injection vial 9 Units, 9 Units, Subcutaneous, Nightly  glucose (GLUTOSE) 40 % oral gel 15 g, 15 g, Oral, PRN  dextrose 50 % IV solution, 12.5 g, Intravenous, PRN  glucagon (rDNA) injection 1 mg, 1 mg, Intramuscular, PRN  dextrose 5 % solution, 100 mL/hr, Intravenous, PRN  insulin lispro (HUMALOG) injection vial 0-6 Units, 0-6 Units, Subcutaneous, Q6H  dextrose 50 % IV solution, 12.5 g, Intravenous, PRN  0.45 % sodium chloride infusion, , Intravenous, Continuous  dextrose 5 % and 0.45 % sodium chloride infusion, , Intravenous, Continuous PRN  Allergies:  Patient has no known allergies.     PHYSICAL EXAM:      Vitals:    VITALS:  /67   Pulse 95   Temp 99.5 °F (37.5 °C) (Bladder)   Resp 16   Ht 6' 2\" (1.88 m)   Wt 208 lb 15.9 oz (94.8 kg)   SpO2 100%   BMI 26.83 kg/m²   24HR RESPIRATORY RATE RANGE:  Resp  Av.4  Min: 16  Max: 61  24HR PULSE RANGE: Pulse  Av.2  Min: 93  Max: 130  24HR BLOOD PRESSURE RANGE:  Systolic (05UGQ), HKW:725 , Min:100 , GGE:742   ; Diastolic (06BHQ), MUP:19, Min:59, Max:106    24HR INTAKE/OUTPUT:      Intake/Output Summary (Last 24 hours) at 2020 1140  Last data filed at 2020 1130  Gross per 24 hour   Intake 4293.83 ml   Output 1080 ml   Net 3213.83 ml     8HR INTAKE OUTPUT:  In: 10 [I.V.:10]  Out: 15 [Urine:15]    Constitutional:  Sedated, NAD  HEENT:  NC/AT, intubated  Respiratory:  clear  Cardiovascular/Edema:  RRR, s1, s2 no rub or gallop  Gastrointestinal:  Soft, mildly distended , bowel sounds present  Neurologic:  sedated  Skin:  turgor normal  Other:  No edema    DATA:    CBC:   Lab Results   Component Value Date    WBC 11.9 2020    RBC 3.34 12/24/2020    HGB 9.6 12/24/2020    HCT 27.2 12/24/2020    MCV 81.4 12/24/2020    MCH 28.7 12/24/2020    MCHC 35.3 12/24/2020    RDW 13.6 12/24/2020     12/24/2020    MPV 11.5 12/24/2020     CMP:    Lab Results   Component Value Date     12/24/2020    K 5.2 12/24/2020    K 4.7 08/16/2020     12/24/2020    CO2 16 12/24/2020    BUN 96 12/24/2020    CREATININE 6.0 12/24/2020    GFRAA 11 12/24/2020    LABGLOM 11 12/24/2020    GLUCOSE 301 12/24/2020    PROT 5.6 12/23/2020    LABALBU 2.7 12/23/2020    CALCIUM 7.7 12/24/2020    BILITOT 0.6 12/23/2020    ALKPHOS 93 12/23/2020    AST 49 12/23/2020    ALT 25 12/23/2020     Magnesium:    Lab Results   Component Value Date    MG 2.6 12/24/2020     Phosphorus:    Lab Results   Component Value Date    PHOS 5.5 12/24/2020     U/A:    Lab Results   Component Value Date    COLORU Yellow 12/22/2020    PROTEINU 100 12/22/2020    PHUR 5.0 12/22/2020    WBCUA 0-1 12/22/2020    RBCUA 0-1 12/22/2020    BACTERIA RARE 12/22/2020    CLARITYU Clear 12/22/2020    SPECGRAV 1.020 12/22/2020    LEUKOCYTESUR Negative 12/22/2020    UROBILINOGEN 0.2 12/22/2020    BILIRUBINUR Negative 12/22/2020    BLOODU MODERATE 12/22/2020    GLUCOSEU >=1000 12/22/2020    AMORPHOUS FEW 12/22/2020     ABG:    Lab Results   Component Value Date    PH 7.308 12/24/2020    PCO2 31.1 12/24/2020    PO2 66.0 12/24/2020    HCO3 15.2 12/24/2020    BE -9.9 12/24/2020    O2SAT 90.8 12/24/2020     HgBA1c:    Lab Results   Component Value Date    LABA1C 11.1 12/23/2020     Microalbumen/Creatinine ratio:  No components found for: RUCREAT  Radiology Review: The lungs are without acute focal process.  There is no effusion or   pneumothorax. The cardiomediastinal silhouette is without acute process. The   osseous structures are without acute process.      Significant inflammatory changes in the upper abdomen with the epicenter   surrounding the pancreas concerning for acute pancreatitis with inflammation   and inflammatory fluid extending and involving the root of the mesentery,   duodenum and ascending colon resulting in enteritis/colitis.  Small amount of   inflammatory ascites is also present.  Surveillance recommended. Atelectasis/infiltrates lung bases concerning for pneumonia. 1.  Negative for acute intracranial process, within the limits of this   non-contrast CT exam.       IMPRESSION/RECOMMENDATIONS:      This is a 65year old  [place] gentleman with past medical history significant for DM2, hypertension, prostate cancer and stage 3 CKD, presented to ro ED for AMS beginning unknown time. History is limited to chart review. Upon arrival patient was in respiratory distress and was found to have elevated blood sugar of 1332, lactic acid levelof 11.8, BUN of 95, serum creatinine of 5.7 and serum potassium of 7.1 with CO2 level of 4. Therefore this consultation was requested for management of his BLAIR. 1. BLAIR on top of stage 2-3 CKD with baseline serum creatinine 1.4 mg/dl- secondary hyperosmolar hypovolemic state with DKA  FENa 1.1% (intrinsic )  Pt received 8L fluid as part of treatment for DKA/HHS and is net +7.9L , Cr upto 6 again with decrease in urine output to 90 cc/8 hours    - continue 1/2NS at 250 cc/hr  - monitor urine output  - monitor bmp   - keep MAP>65 mm hg  - will initiate dialytic support with SLED today    2. HAGMA secondary to DKA and lactic acdisosis    - lactic acidosis resolved  - monitor bmp     3. Hyperkalemia secondary to BLAIR and metabolic acidosis/DKA and hyperosmolar state  - improving slowly with iv fluids and bicarb drip, last level 6.6  - will monitor  - resolved    4. Pseudohyponatremia - improving slowly  5. DKA/ acute pancreatitis - lipase>3000 , CRP worse 20 today  6. Hypocalcemia - check ionized calcium      Case was discussed with MICU staff and Dr Demar Orantes    Thank you for letting me take part in the care of this gentleman. Lennox Copas MD

## 2020-12-24 NOTE — ANESTHESIA PROCEDURE NOTES
Airway  Date/Time: 12/24/2020 9:00 AM  Urgency: emergent    Airway not difficult    General Information and Staff    Patient location during procedure: ICU  Anesthesiologist: Venice Esposito MD  Resident/CRNA: GURPREET Whitehead CRNA  Performed: resident/CRNA     Consent for Airway (if performed for an anesthetic, see related documentation for consents)  Patient identity confirmed: per hospital policy  Consent: The procedure was performed in an emergent situation. Verbal consent not obtained. Written consent not obtained. Risks and benefits: risks, benefits and alternatives were not discussed      Code status verified:yes  Indications and Patient Condition  Indications for airway management: airway protection and CNS depression  Spontaneous ventilation: present  Sedation level: moderate (conscious sedation)  Preoxygenated: no  Patient position: sniffing  MILS not maintained throughout  Mask difficulty assessment: not attempted    Final Airway Details  Final airway type: endotracheal airway      Successful airway: ETT  Cuffed: yes   Successful intubation technique: video laryngoscopy  Blade type: C-MAC. Blade size: D.  ETT size (mm): 8.0  Cormack-Lehane Classification: grade I - full view of glottis  Placement verified by: chest auscultation   Measured from: lips  ETT to lips (cm): 25  Number of attempts at approach: 1  Ventilation between attempts: bag mask  Number of other approaches attempted: 0    Additional Comments  80 mg of propofol given in divided doses, 100 mcg Thee, during intubation attempt-heavy dry secretions obstructing the cords, slight improvement after suctioning. BBS po0st intubation.      Non-anticipated difficult airway: yes

## 2020-12-24 NOTE — PLAN OF CARE
Problem: Airway Clearance - Ineffective  Goal: Achieve or maintain patent airway  Outcome: Met This Shift     Problem: Isolation Precautions - Risk of Spread of Infection  Goal: Prevent transmission of infection  Outcome: Met This Shift     Problem: Skin Integrity:  Goal: Will show no infection signs and symptoms  Description: Will show no infection signs and symptoms  Outcome: Met This Shift  Goal: Absence of new skin breakdown  Description: Absence of new skin breakdown  Outcome: Met This Shift     Problem: Falls - Risk of:  Goal: Will remain free from falls  Description: Will remain free from falls  Outcome: Met This Shift  Goal: Absence of physical injury  Description: Absence of physical injury  Outcome: Met This Shift     Problem: Breathing Pattern - Ineffective  Goal: Ability to achieve and maintain a regular respiratory rate  Outcome: Not Met This Shift     Problem:  Body Temperature -  Risk of, Imbalanced  Goal: Ability to maintain a body temperature within defined limits  Outcome: Not Met This Shift

## 2020-12-24 NOTE — PROGRESS NOTES
Hospitalist Progress Note      SYNOPSIS: Patient admitted on 2020 for AMS for unknown time. Code positive. Chest x-ray showed right-sided infiltrate. CT abdomen showed acute pancreatic inflammation and small amount of ascites. SUBJECTIVE:    Patient seen and examined  Records reviewed. Intubated sedated    Stable overnight. No other overnight issues reported. Temp (24hrs), Av.6 °F (38.1 °C), Min:99.5 °F (37.5 °C), Max:101.8 °F (38.8 °C)    DIET: Diet NPO Effective Now  CODE: Full Code    Intake/Output Summary (Last 24 hours) at 2020 1632  Last data filed at 2020 1500  Gross per 24 hour   Intake 4050.83 ml   Output 580 ml   Net 3470.83 ml       OBJECTIVE:    BP 99/63   Pulse 75   Temp 99.5 °F (37.5 °C) (Bladder)   Resp 14   Ht 6' 2\" (1.88 m)   Wt 208 lb 15.9 oz (94.8 kg)   SpO2 100%   BMI 26.83 kg/m²     General appearance: Intubated sedated  HEENT:  Conjunctivae/corneas clear. Neck: Supple. No jugular venous distention. Respiratory: Clear to auscultation bilaterally, normal respiratory effort  Cardiovascular: Regular rate rhythm, normal S1-S2  Abdomen: Soft, nontender, nondistended  Musculoskeletal: No clubbing, cyanosis, no bilateral lower extremity edema. Brisk capillary refill. Skin:  No rashes  on visible skin  Neurologic: Intubated sedated    ASSESSMENT:    Principal Problem:    Acute metabolic encephalopathy  Active Problems:    Hyperlipidemia    Prostate cancer ([facility] [address]    Hypertension    Diabetes mellitus ([facility] [address]    DKA, type 1, not at goal [facility])    Acute pancreatitis    Acute renal failure (ARF) (HCC)    High anion gap metabolic acidosis  Resolved Problems:    * No resolved hospital problems. *         PLAN:    Temporal dialysis catheter per vascular surgery. Hemodialysis planned per nephrology noted. Manage alcohol withdrawal with CIWA protocol  Continue Decadron, vitamin C, zinc.  Continue Zosyn.   Surgery consulted for pancreatitis evaluation --  5.6*  --    ALKPHOS 93 102  --  93  --    ALT 13 16  --  25  --    AST 26 39  --  49*  --    BILITOT 0.7 0.6  --  0.6  --    LIPASE 2,067*  --  2,452*  --  709*       Recent Labs     12/22/20  1232 12/23/20  0430 12/24/20  0408   INR 1.1 1.2 1.2       Recent Labs     12/23/20  0812 12/23/20  1958 12/24/20  0025   TROPONINI 0.04* 0.03 0.03       Chronic labs:    Lab Results   Component Value Date    CHOL 160 12/23/2020    TRIG 439 (H) 12/23/2020    HDL 14 12/23/2020    LDLCALC - (AA) 12/23/2020    TSH 0.540 12/23/2020    INR 1.2 12/24/2020    LABA1C 11.1 (H) 12/23/2020       Radiology: REVIEWED DAILY    +++++++++++++++++++++++++++++++++++++++++++++++++  Ulysees Cushing  [employer] Physician - 2020 [facility]  +++++++++++++++++++++++++++++++++++++++++++++++++  NOTE: This report was transcribed using voice recognition software. Every effort was made to ensure accuracy; however, inadvertent computerized transcription errors may be present.

## 2020-12-24 NOTE — CONSULTS
Vascular Surgery Consultation Note    Reason for Consult:  Temporary dialysis access    HPI:    This is a 65 y.o. male who is admitted to the hospital for treatment of COVID pneumonia. He originally presented with altered mental status, hyperglycemia and acute pancreatitis pictire. His respiratory status declined significantly and he was intubated this morning. He also has BLAIR on CKD and nephrology is recommending dialysis.         ROS:  UNABLE TO COMPLETE DUE TO MENTAL STATUS        Past Medical History:   Diagnosis Date    Cancer ([facility]     Diabetes mellitus ([facility]     Hyperlipidemia     Hypertension     Prostate cancer ([facility]         Past Surgical History:   Procedure Laterality Date    COLON SURGERY      DILATATION, ESOPHAGUS      LEG SURGERY Left 6 years ago       Current Medications:    fentaNYL 5 mcg/ml in 0.9%  ml infusion 50 mcg/hr (12/24/20 0915)    midazolam 1 mg/hr (12/24/20 0934)    dextrose        acetaminophen **OR** acetaminophen, polyethylene glycol, sennosides, hydrALAZINE, labetalol, sodium chloride flush, glucose, dextrose, glucagon (rDNA), dextrose, dextrose    dexamethasone  6 mg Intravenous Q24H    insulin glargine  15 Units Subcutaneous Nightly    insulin lispro  0-12 Units Subcutaneous Q4H    [START ON 12/25/2020] pantoprazole  40 mg Intravenous Daily    And    [START ON 12/25/2020] sodium chloride (PF)  10 mL Intravenous Daily    sodium chloride (PF)  10 mL Intravenous Daily    heparin (porcine)  5,000 Units Subcutaneous 3 times per day    thiamine (VITAMIN B1) IVPB  500 mg Intravenous BID    Followed by   Kyle Cleary ON 12/25/2020] thiamine (VITAMIN B1) IVPB  250 mg Intravenous BID    Followed by   Kyle Cleary ON 12/28/2020] thiamine  100 mg Intravenous BID    Followed by   Kyle Cleary ON 1/1/2021] thiamine  100 mg Intravenous Daily    folic acid  1 mg Intravenous Daily    vitamin D  2,000 Units Oral Daily    zinc sulfate  50 mg Oral Daily    ascorbic acid  500 mg Oral Daily    piperacillin-tazobactam  3.375 g Intravenous Q12H    sodium chloride   Intravenous Q12H    sodium chloride flush  10 mL Intravenous 2 times per day        Allergies:  Patient has no known allergies. Social History     Socioeconomic History    Marital status: Single     Spouse name: Not on file    Number of children: Not on file    Years of education: Not on file    Highest education level: Not on file   Occupational History    Not on file   Social Needs    Financial resource strain: Not on file    Food insecurity     Worry: Not on file     Inability: Not on file    Transportation needs     Medical: Not on file     Non-medical: Not on file   Tobacco Use    Smoking status: Former Smoker     Packs/day: 1.00     Types: Cigarettes    Smokeless tobacco: Never Used   Substance and Sexual Activity    Alcohol use: Yes     Comment: \"daily shots\"    Drug use: No    Sexual activity: Not on file   Lifestyle    Physical activity     Days per week: Not on file     Minutes per session: Not on file    Stress: Not on file   Relationships    Social connections     Talks on phone: Not on file     Gets together: Not on file     Attends [employer]ism service: Not on file     Active member of club or organization: Not on file     Attends meetings of clubs or organizations: Not on file     Relationship status: Not on file    Intimate partner violence     Fear of current or ex partner: Not on file     Emotionally abused: Not on file     Physically abused: Not on file     Forced sexual activity: Not on file   Other Topics Concern    Not on file   Social History Narrative    Not on file        History reviewed. No pertinent family history.     PHYSICAL EXAM:    /62   Pulse 79   Temp 99.5 °F (37.5 °C) (Bladder)   Resp 14   Ht 6' 2\" (1.88 m)   Wt 208 lb 15.9 oz (94.8 kg)   SpO2 91%   BMI 26.83 kg/m²   CONSTITUTIONAL: intubated and sedated, not able to answer any questions  EYES:  lids and lashes normal, sclera clear and conjunctiva normal  ENT:  normocepalic, without obvious abnormality, external ears without lesions  NECK:  supple, symmetrical, trachea midline, no jugular venous distension  LUNGS:  On mechanical ventilation  CARDIOVASCULAR:  regular rate and rhythm  ABDOMEN:  soft, non-distended  SKIN:  no bruising or bleeding    EXTREMITIES:    R UE Swelling absent Incisions absent       unable to assess strength    L UE Swelling absent Incisions absent       Unable to assess strength    R LE Edema present     Incisions absent    Varicose veins absent    Wounds absent    normalcaprefill   Unable to assess strength     L LE Edema present     Incisions absent    Varicose veins absent    Wounds absent    normalcaprefill   Unable to assess strength       R brachial 2 L brachial 2   R radial 2 L radial 2   R femoral 2 L femoral 2       LABS:    Lab Results   Component Value Date    WBC 11.9 (H) 12/24/2020    HGB 9.6 (L) 12/24/2020    HCT 27.2 (L) 12/24/2020     12/24/2020    PROTIME 13.0 (H) 12/24/2020    INR 1.2 12/24/2020    K 5.2 (H) 12/24/2020    BUN 96 (H) 12/24/2020    CREATININE 6.0 (H) 12/24/2020       RADIOLOGY:  Ct Abdomen Pelvis Wo Contrast Additional Contrast? None    Result Date: 12/22/2020  EXAMINATION: CT OF THE ABDOMEN AND PELVIS WITHOUT CONTRAST 12/22/2020 3:17 pm TECHNIQUE: CT of the abdomen and pelvis was performed without the administration of intravenous contrast. Multiplanar reformatted images are provided for review. Dose modulation, iterative reconstruction, and/or weight based adjustment of the mA/kV was utilized to reduce the radiation dose to as low as reasonably achievable.  COMPARISON: September 14, 2019 HISTORY: ORDERING SYSTEM PROVIDED HISTORY: DKA, acute pancreatitis TECHNOLOGIST PROVIDED HISTORY: Reason for exam:->DKA, acute pancreatitis Additional Contrast?->None What reading provider will be dictating this exam?->CRC FINDINGS: The lung bases demonstrate COPD with exam?->CRC FINDINGS: This exam is slightly limited by beam-hardening artifacts. Given these: BRAIN/VENTRICLES: Negative for acute intracranial hemorrhage, other intra-/extra-axial fluid collection, or mass effect/midline shift. Minimal atherosclerotic calcification is scattered about the cavernous/supraclinoid segments of both internal carotid arteries. Gray/white matter differentiation appears grossly unremarkable, given technical artifacts. There is age-appropriate, generalized parenchymal volume loss. ORBITS: The right ocular lens has been previously surgically replaced. SINUSES: Trace to mild scattered paranasal sinus mucosal thickening is most notable within the ethmoid sinus. Minimal hyperostosis is noted about the osseous margin of the left aspect of the sphenoid sinus, suggestive of chronic osteitis. The frontal sinus is hypoplastic, markedly on the right, and moderately on the left. There is moderate left, and mild right, mastoid air cell complex hypoplasia. Tiny probable cerumen is present within the right external auditory canal. SOFT TISSUES/SKULL: There is background diffuse osteopenia, with at least minimal upper cervical degenerative disease only incidentally demonstrated. .    1. Negative for acute intracranial process, within the limits of this non-contrast CT exam. RECOMMENDATION: 1. If unexplained symptoms persist, consider MRI of the brain. Divya Gaspar Chest Portable    Result Date: 12/23/2020  EXAMINATION: ONE XRAY VIEW OF THE CHEST 12/23/2020 2:11 am COMPARISON: December 22, 2020 the. HISTORY: ORDERING SYSTEM PROVIDED HISTORY: check position of right sided CVC IJ TECHNOLOGIST PROVIDED HISTORY: Reason for exam:->check position of right sided CVC IJ What reading provider will be dictating this exam?->CRC FINDINGS: Interval right IJ central line placement with the tip projecting at the level of the upper portion of the right atrium.  Cardiomediastinal silhouette is normal.  New densities in the

## 2020-12-24 NOTE — PLAN OF CARE
Problem: Gas Exchange - Impaired  Goal: Absence of hypoxia  Outcome: Met This Shift  Goal: Promote optimal lung function  Outcome: Met This Shift     Problem: Breathing Pattern - Ineffective  Goal: Ability to achieve and maintain a regular respiratory rate  12/24/2020 1600 by Edmond Suggs  Outcome: Met This Shift  12/24/2020 0344 by Mary Pavon RN  Outcome: Not Met This Shift     Problem:  Body Temperature -  Risk of, Imbalanced  Goal: Ability to maintain a body temperature within defined limits  12/24/2020 1600 by Edmond Suggs  Outcome: Met This Shift  12/24/2020 0344 by Mary Pavon RN  Outcome: Not Met This Shift  Goal: Complications related to the disease process, condition or treatment will be avoided or minimized  Outcome: Met This Shift     Problem: Isolation Precautions - Risk of Spread of Infection  Goal: Prevent transmission of infection  12/24/2020 1600 by Nicole Suggs  Outcome: Met This Shift  12/24/2020 0344 by Mary Pavon RN  Outcome: Met This Shift     Problem: Risk for Fluid Volume Deficit  Goal: Maintain normal heart rhythm  Outcome: Met This Shift  Goal: Maintain normal serum potassium, sodium, calcium, phosphorus, and pH  Outcome: Met This Shift     Problem: Skin Integrity:  Goal: Will show no infection signs and symptoms  Description: Will show no infection signs and symptoms  12/24/2020 1600 by Edmond Suggs  Outcome: Met This Shift  12/24/2020 0344 by Mary Pavon RN  Outcome: Met This Shift  Goal: Absence of new skin breakdown  Description: Absence of new skin breakdown  12/24/2020 1600 by Nicole Suggs  Outcome: Met This Shift  12/24/2020 0344 by Mary Pavon RN  Outcome: Met This Shift     Problem: Falls - Risk of:  Goal: Will remain free from falls  Description: Will remain free from falls  12/24/2020 1600 by Edmond Suggs  Outcome: Met This Shift  12/24/2020 0344 by Mary Pavon RN  Outcome: Met This Shift  Goal: Absence of physical

## 2020-12-24 NOTE — PROGRESS NOTES
Patient's respiratory rate currently in the 40's with AMS. Resident's made aware. Will continue to monitor.

## 2020-12-24 NOTE — FLOWSHEET NOTE
Patient restless and reaching for ETT, unable to be verbally redirected. Patient placed in bilateral soft wrist restraints for patient safety.

## 2020-12-24 NOTE — PROCEDURES
Andre Gracia is a 65 y.o. male patient. 1. DKA, type 1, not at goal [facility])    2. COVID-19    3. Acute metabolic encephalopathy    4. Acute pancreatitis, unspecified complication status, unspecified pancreatitis type    5. Acute renal failure, unspecified acute renal failure type ([facility]    6. Hyperkalemia    7. High anion gap metabolic acidosis      Past Medical History:   Diagnosis Date    Cancer ([facility]     Diabetes mellitus ([facility]     Hyperlipidemia     Hypertension     Prostate cancer ([facility]      Blood pressure 105/62, pulse 79, temperature 99.5 °F (37.5 °C), temperature source Bladder, resp. rate 14, height 6' 2\" (1.88 m), weight 208 lb 15.9 oz (94.8 kg), SpO2 91 %. Central Line    Date/Time: 12/24/2020 3:38 PM  Performed by: Ras Whitney MD  Authorized by: Ronnie Villegas MD   Consent: Written consent obtained. Consent given by: power of   Patient identity confirmed: arm band  Time out: Immediately prior to procedure a \"time out\" was called to verify the correct patient, procedure, equipment, support staff and site/side marked as required.   Indications: vascular access  Anesthesia: local infiltration    Anesthesia:  Local Anesthetic: lidocaine 1% without epinephrine  Preparation: skin prepped with ChloraPrep  Skin prep agent dried: skin prep agent completely dried prior to procedure  Sterile barriers: all five maximum sterile barriers used - cap, mask, sterile gown, sterile gloves, and large sterile sheet  Hand hygiene: hand hygiene performed prior to central venous catheter insertion  Location details: right femoral  Patient position: flat  Catheter type: double lumen  Catheter size: 14 Fr  Pre-procedure: landmarks identified  Ultrasound guidance: yes  Sterile ultrasound techniques: sterile gel and sterile probe covers were used  Number of attempts: 1  Successful placement: yes  Post-procedure: line sutured and dressing applied  Assessment: blood return through all ports and free fluid flow  Patient tolerance: patient tolerated the procedure well with no immediate complications          Terry Steiner MD  12/24/2020

## 2020-12-24 NOTE — PROGRESS NOTES
[address] [facility]  Department of Internal Medicine   Internal Medicine Residency   MICU Progress Note    Patient:  Andre Damian 65 y.o. male  MRN: [id number]     Date of Service: 12/24/2020    Allergy: Patient has no known allergies. Subjective      24h change: Overnight, patient heart rate 120+ BPM.  On telemetry rhythm appeared to be MAT. Patient was also tachypneic in the 40s. Suspected patient is going through withdrawal from alcohol (normally drinks 1 bottle bourbon a day). Pt also desaturating to 88%. Patient improved with nonrebreather with sats above 92%. Patient started on CIWA protocol and also given Ativan 2 mg q2h as needed. Continues to have signs of physical withdrawal- patient also given phenobarbital 65 mg and then 180 mg.     Pt seen and examined this AM. He is altered and cannot answer any questions. Appears in respiratory distress. This a.m. patient continues to be tachypneic. His ABG this a.m. 7.3/31.1/66/15. 2.     Overnight he was bridged to 9 units of Lantus nightly and low-dose sliding scale.  Patient potassium this a.m. is 5.2. Creatinine is worsening from 5.5 to 6. Patient anion gap is at 18 (up from 15) after being bridged to lantus and ldss.  Patient intubated this AM.   Per anesthesia patient's esophagus is \"burned and scarred\". ROS: unable to obtain.    Objective     VS: BP 99/63   Pulse 86   Temp 99.5 °F (37.5 °C) (Bladder)   Resp 15   Ht 6' 2\" (1.88 m)   Wt 208 lb 15.9 oz (94.8 kg)   SpO2 100%   BMI 26.83 kg/m²           I & O - 24hr:     Intake/Output Summary (Last 24 hours) at 12/24/2020 1706  Last data filed at 12/24/2020 1500  Gross per 24 hour   Intake 4050.83 ml   Output 495 ml   Net 3555.83 ml       Physical Exam:  · General Appearance: intubated and sedated  · Neck: no adenopathy, no carotid bruit, no JVD, supple, symmetrical, trachea midline and thyroid not enlarged, symmetric, no tenderness/mass/nodules  · Lung: diminished breath sounds. Currently intubated on AC/VC 14/500/5/100%  · Heart: regular rate and rhythm, S1, S2 normal, no murmur, click, rub or gallop  · Abdomen: soft, non-tender; bowel sounds normal; no masses,  no organomegaly  · Extremities:  extremities normal, atraumatic, no cyanosis or edema  · Musculoskeletal: No joint swelling, no muscle tenderness. ROM normal in all joints of extremities.    · Neurologic: Mental status: sedated     Lines     site day    Art line   None    TLC R IJ 1   PICC None    Hemoaccess None    Oxygen:     Requiring mechanical venitlation  Mechanical Ventilation:   Mode: A/C    TV:500 ml RR: 14        PEEP 5cmH2O  FiO2 100%    ABG:     Lab Results   Component Value Date    PH 7.276 12/24/2020    PCO2 34.7 12/24/2020    PO2 208.2 12/24/2020    HCO3 15.8 12/24/2020    BE -10.1 12/24/2020    THB 8.9 12/24/2020    O2SAT 98.7 12/24/2020        Medications     Infusions: (Fluid, Sedation, Vasopressors)  IVF:    none  Vasopressors   none  Sedation   Propofol at rate of 70;Fentanly 50 mcg/ min; Midazolam 1 mg/hr     Nutrition:   NPO  ATB:   Antibiotics  Days   Zosyn              Patient currently has   Urinary cath  Isolation  Restraints  DVT prophylaxis/ GI prophylaxis,      Labs     CBC:   Lab Results   Component Value Date    WBC 11.9 12/24/2020    RBC 3.34 12/24/2020    HGB 9.6 12/24/2020    HCT 27.2 12/24/2020    MCV 81.4 12/24/2020    MCH 28.7 12/24/2020    MCHC 35.3 12/24/2020    RDW 13.6 12/24/2020     12/24/2020    MPV 11.5 12/24/2020     CMP:    Lab Results   Component Value Date     12/24/2020    K 5.2 12/24/2020    K 4.7 08/16/2020     12/24/2020    CO2 16 12/24/2020    BUN 96 12/24/2020    CREATININE 6.0 12/24/2020    GFRAA 11 12/24/2020    LABGLOM 11 12/24/2020    GLUCOSE 301 12/24/2020    PROT 5.6 12/23/2020    LABALBU 2.7 12/23/2020    CALCIUM 7.7 12/24/2020    BILITOT 0.6 12/23/2020    ALKPHOS 93 12/23/2020    AST 49 12/23/2020    ALT 25 12/23/2020       Imaging Studies:  CXR:  12/24/2020         Impression   Bibasilar infiltrates and moderate right effusion have occurred         Resident's Assessment and Plan     Days Lily Power is a 65 y.o. male came with   has a past medical history of Cancer ([facility] Diabetes mellitus ([facility] Hyperlipidemia, Hypertension, and Prostate cancer ([facility] CC of AMS x unknown time     MICU day: 2  Intubation day: 1    Currently being managed for :     Neurology:   Acute metabolic encephalopathy secondary to HHS versus DKA versus alcohol withdrawal  · HHS/DKA has resolved. Patient is now having alcohol withdrawal.  Patient was intubated this AM as he was in respiratory distress. · Patient is currently sedated with propofol and Versed. RASS goal -1. · He has received phenobarbital 65 mg and phenobarbital 180 mg. Continue CIWA protocol.     Cardiology:   HTN   · Home med includes amlodipine 10mg and lisinopril 20mg  · Will resume amlodipine if BP elevated     HLD  · On atorvastatin at home, currently held     Pulmonology  COVID PNA   · CXR significant for bibasilar infiltrates and right-sided effusion. · He is not a candidate for remdesivir as his creatinine is worsening. And creatinine clearance is only 13. · Continue to trend inflammatory markers. · Hold Decadron in the setting of hyperglycemia. · Continue vitamin C and zinc.     Nephrology (Fluids/ Electrolytes & Nutrition): BLAIR Stage 3 on CKD Stage 2/3   · Patient lowest creatinine 1.3 as per EMR. · At admission creatinine 5.7. FENa 1.1%. Patient creatinine continues to worsen. Today creatinine is 6.  · Nephrology recommendations appreciated. Patient will need hemodialysis per nephrology. · Will be receiving HD catheter per vascular surgery. · Hemodialysis to begin today. · Follow daily BMP.     High anion gap metabolic acidosis and normal anion gap acidosis. · This a.m. ABG shows 7.3/31/66/15. Lactic acid is at 1.7.   Suspect this is likely in the setting of alcohol withdrawal.    Pseudohyponatremia 2/2 severe hyperglycemia-resolved  · Sodium 134.     Gastroenterology:   Acute pancreatitis likely 2/2 alcohol consumption vs COVID   · CT abdomen shows acute pancreatic inflammation and small amount of ascites. · Triglyceride level 439 , Lipase 3000 initially -> 2067-> 2452 --> 709  · Continue coverage with Zosyn to cover for necrotizing pancreatitis.     Endocrine:   History of diabetes type 2  Severe hyperglycemia 2/2 DKA Vs HHS vs mixed- resolved  · Last hemoglobin A1c on 12/23/2020: 11.1.    · Home meds include 3 oral antidiabetic medications. · Patient initially presented with blood glucose of 1132; but beta hydroxybutyrate was not checked. After 12 hours beta hydroxybutyrate was elevated at 0.8 however at that time he had already been started on insulin drip and bicarb drip and had received 8 L of normal saline. · Patient bridged on 12/23/2020 to Lantus 90 units nightly and low-dose sliding scale. However this a.m. patient anion gap is open again at 18 and blood sugars continue to be in the upper 200s/low 300s. We will increase Lantus to 15 units and switch to medium dose sliding scale. Continue to monitor  blood glucose every 4 hours.     Other problems :  Hx of Anxiety, Major depression  B/L Carpal Tunnel  Vit D deficiency  Erectil dysfunction  Hemorrhoids  BPH, Hx of lung and prostate neoplasm  Tobacco dependence  Exposure to Agent Orange in [facility]     Next of Kind/ POA:   Nirmal Meadows (Domestic Partner)   [phone]     Code Status:   Full       DVT ppx: Heparin due to renal failure  GI ppx: [facility], , PGY-1    Attending physician: Dr. Larry Hodgkins    I personally saw, examined and provided care for the patient. Radiographs, labs and medication list were reviewed by me independently. I spoke with bedside nursing, therapists and consultants.  Critical care services and times documented are independent of procedures and multidisciplinary rounds

## 2020-12-25 ENCOUNTER — APPOINTMENT (OUTPATIENT)
Dept: GENERAL RADIOLOGY | Age: 70
DRG: 207 | End: 2020-12-25
Payer: MEDICARE

## 2020-12-25 LAB
AADO2: 321.4 MMHG
ANION GAP SERPL CALCULATED.3IONS-SCNC: 13 MMOL/L (ref 7–16)
ANION GAP SERPL CALCULATED.3IONS-SCNC: 14 MMOL/L (ref 7–16)
ANION GAP SERPL CALCULATED.3IONS-SCNC: 14 MMOL/L (ref 7–16)
ANISOCYTOSIS: ABNORMAL
B.E.: -4.5 MMOL/L (ref -3–3)
BASOPHILIC STIPPLING: ABNORMAL
BASOPHILS ABSOLUTE: 0 E9/L (ref 0–0.2)
BASOPHILS ABSOLUTE: 0 E9/L (ref 0–0.2)
BASOPHILS RELATIVE PERCENT: 0 % (ref 0–2)
BASOPHILS RELATIVE PERCENT: 0.2 % (ref 0–2)
BUN BLDV-MCNC: 64 MG/DL (ref 8–23)
BUN BLDV-MCNC: 71 MG/DL (ref 8–23)
BUN BLDV-MCNC: 80 MG/DL (ref 8–23)
C-REACTIVE PROTEIN: 31.8 MG/DL (ref 0–0.4)
CALCIUM IONIZED: 1.05 MMOL/L (ref 1.15–1.33)
CALCIUM SERPL-MCNC: 7.3 MG/DL (ref 8.6–10.2)
CALCIUM SERPL-MCNC: 7.3 MG/DL (ref 8.6–10.2)
CALCIUM SERPL-MCNC: 7.4 MG/DL (ref 8.6–10.2)
CHLORIDE BLD-SCNC: 100 MMOL/L (ref 98–107)
CHLORIDE BLD-SCNC: 103 MMOL/L (ref 98–107)
CHLORIDE BLD-SCNC: 104 MMOL/L (ref 98–107)
CO2: 20 MMOL/L (ref 22–29)
CO2: 21 MMOL/L (ref 22–29)
CO2: 21 MMOL/L (ref 22–29)
COHB: 0.8 % (ref 0–1.5)
CREAT SERPL-MCNC: 4.6 MG/DL (ref 0.7–1.2)
CREAT SERPL-MCNC: 5 MG/DL (ref 0.7–1.2)
CREAT SERPL-MCNC: 5.7 MG/DL (ref 0.7–1.2)
CRITICAL: ABNORMAL
D DIMER: >5250 NG/ML DDU
DATE ANALYZED: ABNORMAL
DATE OF COLLECTION: ABNORMAL
EOSINOPHILS ABSOLUTE: 0 E9/L (ref 0.05–0.5)
EOSINOPHILS ABSOLUTE: 0 E9/L (ref 0.05–0.5)
EOSINOPHILS RELATIVE PERCENT: 0 % (ref 0–6)
EOSINOPHILS RELATIVE PERCENT: 0 % (ref 0–6)
FERRITIN: 5283 NG/ML
FIO2: 70 %
GFR AFRICAN AMERICAN: 12
GFR AFRICAN AMERICAN: 14
GFR AFRICAN AMERICAN: 15
GFR NON-AFRICAN AMERICAN: 12 ML/MIN/1.73
GFR NON-AFRICAN AMERICAN: 14 ML/MIN/1.73
GFR NON-AFRICAN AMERICAN: 15 ML/MIN/1.73
GLUCOSE BLD-MCNC: 225 MG/DL (ref 74–99)
GLUCOSE BLD-MCNC: 239 MG/DL (ref 74–99)
GLUCOSE BLD-MCNC: 260 MG/DL (ref 74–99)
HCO3: 19.1 MMOL/L (ref 22–26)
HCT VFR BLD CALC: 20.1 % (ref 37–54)
HCT VFR BLD CALC: 21.6 % (ref 37–54)
HEMOGLOBIN: 6.9 G/DL (ref 12.5–16.5)
HEMOGLOBIN: 7.3 G/DL (ref 12.5–16.5)
HHB: 1.6 % (ref 0–5)
INR BLD: 1.2
LAB: ABNORMAL
LIPASE: 126 U/L (ref 13–60)
LYMPHOCYTES ABSOLUTE: 0.2 E9/L (ref 1.5–4)
LYMPHOCYTES ABSOLUTE: 0.24 E9/L (ref 1.5–4)
LYMPHOCYTES RELATIVE PERCENT: 2.6 % (ref 20–42)
LYMPHOCYTES RELATIVE PERCENT: 4.3 % (ref 20–42)
Lab: ABNORMAL
MAGNESIUM: 2.3 MG/DL (ref 1.6–2.6)
MAGNESIUM: 2.3 MG/DL (ref 1.6–2.6)
MAGNESIUM: 2.6 MG/DL (ref 1.6–2.6)
MCH RBC QN AUTO: 27.5 PG (ref 26–35)
MCH RBC QN AUTO: 27.8 PG (ref 26–35)
MCHC RBC AUTO-ENTMCNC: 33.8 % (ref 32–34.5)
MCHC RBC AUTO-ENTMCNC: 34.3 % (ref 32–34.5)
MCV RBC AUTO: 81 FL (ref 80–99.9)
MCV RBC AUTO: 81.5 FL (ref 80–99.9)
METER GLUCOSE: 159 MG/DL (ref 74–99)
METER GLUCOSE: 197 MG/DL (ref 74–99)
METER GLUCOSE: 230 MG/DL (ref 74–99)
METER GLUCOSE: 258 MG/DL (ref 74–99)
METER GLUCOSE: 258 MG/DL (ref 74–99)
METER GLUCOSE: 260 MG/DL (ref 74–99)
METHB: 0.5 % (ref 0–1.5)
MODE: AC
MONOCYTES ABSOLUTE: 0.07 E9/L (ref 0.1–0.95)
MONOCYTES ABSOLUTE: 0.18 E9/L (ref 0.1–0.95)
MONOCYTES RELATIVE PERCENT: 0.9 % (ref 2–12)
MONOCYTES RELATIVE PERCENT: 2.6 % (ref 2–12)
MYELOCYTE PERCENT: 0.9 % (ref 0–0)
NEUTROPHILS ABSOLUTE: 5.49 E9/L (ref 1.8–7.3)
NEUTROPHILS ABSOLUTE: 6.6 E9/L (ref 1.8–7.3)
NEUTROPHILS RELATIVE PERCENT: 93 % (ref 43–80)
NEUTROPHILS RELATIVE PERCENT: 95.6 % (ref 43–80)
NUCLEATED RED BLOOD CELLS: 0.9 /100 WBC
O2 CONTENT: 11.1 ML/DL
O2 SATURATION: 98.4 % (ref 92–98.5)
O2HB: 97.1 % (ref 94–97)
OPERATOR ID: ABNORMAL
OVALOCYTES: ABNORMAL
PATIENT TEMP: 37 C
PCO2: 29 MMHG (ref 35–45)
PDW BLD-RTO: 14.2 FL (ref 11.5–15)
PDW BLD-RTO: 14.4 FL (ref 11.5–15)
PEEP/CPAP: 5 CMH2O
PFO2: 1.84 MMHG/%
PH BLOOD GAS: 7.44 (ref 7.35–7.45)
PHOSPHORUS: 5.4 MG/DL (ref 2.5–4.5)
PHOSPHORUS: 5.7 MG/DL (ref 2.5–4.5)
PHOSPHORUS: 5.9 MG/DL (ref 2.5–4.5)
PLATELET # BLD: 85 E9/L (ref 130–450)
PLATELET # BLD: 85 E9/L (ref 130–450)
PLATELET CONFIRMATION: NORMAL
PLATELET CONFIRMATION: NORMAL
PMV BLD AUTO: 11.2 FL (ref 7–12)
PMV BLD AUTO: 11.4 FL (ref 7–12)
PO2: 129 MMHG (ref 75–100)
POIKILOCYTES: ABNORMAL
POIKILOCYTES: ABNORMAL
POLYCHROMASIA: ABNORMAL
POLYCHROMASIA: ABNORMAL
POTASSIUM SERPL-SCNC: 4.3 MMOL/L (ref 3.5–5)
POTASSIUM SERPL-SCNC: 4.3 MMOL/L (ref 3.5–5)
POTASSIUM SERPL-SCNC: 4.4 MMOL/L (ref 3.5–5)
PROTHROMBIN TIME: 13.7 SEC (ref 9.3–12.4)
RBC # BLD: 2.48 E12/L (ref 3.8–5.8)
RBC # BLD: 2.65 E12/L (ref 3.8–5.8)
RI(T): 2.49
RR MECHANICAL: 16 B/MIN
SEDIMENTATION RATE, ERYTHROCYTE: 145 MM/HR (ref 0–15)
SODIUM BLD-SCNC: 134 MMOL/L (ref 132–146)
SODIUM BLD-SCNC: 137 MMOL/L (ref 132–146)
SODIUM BLD-SCNC: 139 MMOL/L (ref 132–146)
SOURCE, BLOOD GAS: ABNORMAL
TARGET CELLS: ABNORMAL
TARGET CELLS: ABNORMAL
THB: 7.9 G/DL (ref 11.5–16.5)
TIME ANALYZED: 438
URINE CULTURE, ROUTINE: NORMAL
VT MECHANICAL: 500 ML
WBC # BLD: 5.9 E9/L (ref 4.5–11.5)
WBC # BLD: 6.8 E9/L (ref 4.5–11.5)

## 2020-12-25 PROCEDURE — 85025 COMPLETE CBC W/AUTO DIFF WBC: CPT

## 2020-12-25 PROCEDURE — 2580000003 HC RX 258: Performed by: INTERNAL MEDICINE

## 2020-12-25 PROCEDURE — 6370000000 HC RX 637 (ALT 250 FOR IP): Performed by: INTERNAL MEDICINE

## 2020-12-25 PROCEDURE — 2500000003 HC RX 250 WO HCPCS: Performed by: INTERNAL MEDICINE

## 2020-12-25 PROCEDURE — 80048 BASIC METABOLIC PNL TOTAL CA: CPT

## 2020-12-25 PROCEDURE — 86140 C-REACTIVE PROTEIN: CPT

## 2020-12-25 PROCEDURE — 2000000000 HC ICU R&B

## 2020-12-25 PROCEDURE — 82728 ASSAY OF FERRITIN: CPT

## 2020-12-25 PROCEDURE — 82330 ASSAY OF CALCIUM: CPT

## 2020-12-25 PROCEDURE — 84100 ASSAY OF PHOSPHORUS: CPT

## 2020-12-25 PROCEDURE — 83690 ASSAY OF LIPASE: CPT

## 2020-12-25 PROCEDURE — 85378 FIBRIN DEGRADE SEMIQUANT: CPT

## 2020-12-25 PROCEDURE — 85651 RBC SED RATE NONAUTOMATED: CPT

## 2020-12-25 PROCEDURE — 6360000002 HC RX W HCPCS: Performed by: INTERNAL MEDICINE

## 2020-12-25 PROCEDURE — 83735 ASSAY OF MAGNESIUM: CPT

## 2020-12-25 PROCEDURE — 85610 PROTHROMBIN TIME: CPT

## 2020-12-25 PROCEDURE — 82805 BLOOD GASES W/O2 SATURATION: CPT

## 2020-12-25 PROCEDURE — 82962 GLUCOSE BLOOD TEST: CPT

## 2020-12-25 PROCEDURE — 36592 COLLECT BLOOD FROM PICC: CPT

## 2020-12-25 PROCEDURE — P9047 ALBUMIN (HUMAN), 25%, 50ML: HCPCS | Performed by: INTERNAL MEDICINE

## 2020-12-25 PROCEDURE — 71045 X-RAY EXAM CHEST 1 VIEW: CPT

## 2020-12-25 PROCEDURE — 90935 HEMODIALYSIS ONE EVALUATION: CPT

## 2020-12-25 PROCEDURE — 94003 VENT MGMT INPAT SUBQ DAY: CPT

## 2020-12-25 PROCEDURE — C9113 INJ PANTOPRAZOLE SODIUM, VIA: HCPCS | Performed by: INTERNAL MEDICINE

## 2020-12-25 RX ORDER — LORAZEPAM 2 MG/ML
1 INJECTION INTRAMUSCULAR
Status: DISCONTINUED | OUTPATIENT
Start: 2020-12-25 | End: 2020-12-28

## 2020-12-25 RX ORDER — SODIUM CHLORIDE 0.9 % (FLUSH) 0.9 %
10 SYRINGE (ML) INJECTION PRN
Status: DISCONTINUED | OUTPATIENT
Start: 2020-12-25 | End: 2021-01-01 | Stop reason: HOSPADM

## 2020-12-25 RX ORDER — ALBUMIN (HUMAN) 12.5 G/50ML
50 SOLUTION INTRAVENOUS
Status: COMPLETED | OUTPATIENT
Start: 2020-12-25 | End: 2020-12-25

## 2020-12-25 RX ORDER — SODIUM CHLORIDE 0.9 % (FLUSH) 0.9 %
10 SYRINGE (ML) INJECTION EVERY 12 HOURS SCHEDULED
Status: DISCONTINUED | OUTPATIENT
Start: 2020-12-25 | End: 2021-01-01 | Stop reason: HOSPADM

## 2020-12-25 RX ORDER — LORAZEPAM 1 MG/1
3 TABLET ORAL
Status: DISCONTINUED | OUTPATIENT
Start: 2020-12-25 | End: 2020-12-28

## 2020-12-25 RX ORDER — LORAZEPAM 2 MG/ML
2 INJECTION INTRAMUSCULAR
Status: DISCONTINUED | OUTPATIENT
Start: 2020-12-25 | End: 2020-12-28

## 2020-12-25 RX ORDER — LORAZEPAM 1 MG/1
1 TABLET ORAL
Status: DISCONTINUED | OUTPATIENT
Start: 2020-12-25 | End: 2020-12-28

## 2020-12-25 RX ORDER — INSULIN GLARGINE 100 [IU]/ML
20 INJECTION, SOLUTION SUBCUTANEOUS NIGHTLY
Status: DISCONTINUED | OUTPATIENT
Start: 2020-12-25 | End: 2020-12-27

## 2020-12-25 RX ORDER — LORAZEPAM 2 MG/ML
3 INJECTION INTRAMUSCULAR
Status: DISCONTINUED | OUTPATIENT
Start: 2020-12-25 | End: 2020-12-28

## 2020-12-25 RX ORDER — LORAZEPAM 1 MG/1
4 TABLET ORAL
Status: DISCONTINUED | OUTPATIENT
Start: 2020-12-25 | End: 2020-12-28

## 2020-12-25 RX ORDER — LORAZEPAM 1 MG/1
2 TABLET ORAL
Status: DISCONTINUED | OUTPATIENT
Start: 2020-12-25 | End: 2020-12-28

## 2020-12-25 RX ORDER — ANTICOAGULANT SODIUM CITRATE SOLUTION 4 G/100ML
2.8 SOLUTION INTRAVENOUS PRN
Status: DISCONTINUED | OUTPATIENT
Start: 2020-12-25 | End: 2021-01-01 | Stop reason: HOSPADM

## 2020-12-25 RX ORDER — LORAZEPAM 2 MG/ML
4 INJECTION INTRAMUSCULAR
Status: DISCONTINUED | OUTPATIENT
Start: 2020-12-25 | End: 2020-12-28

## 2020-12-25 RX ADMIN — Medication 10 ML: at 08:48

## 2020-12-25 RX ADMIN — INSULIN LISPRO 2 UNITS: 100 INJECTION, SOLUTION INTRAVENOUS; SUBCUTANEOUS at 20:37

## 2020-12-25 RX ADMIN — HEPARIN SODIUM 5000 UNITS: 10000 INJECTION INTRAVENOUS; SUBCUTANEOUS at 22:19

## 2020-12-25 RX ADMIN — PIPERACILLIN AND TAZOBACTAM 3.38 G: 3; .375 INJECTION, POWDER, LYOPHILIZED, FOR SOLUTION INTRAVENOUS at 00:06

## 2020-12-25 RX ADMIN — INSULIN LISPRO 6 UNITS: 100 INJECTION, SOLUTION INTRAVENOUS; SUBCUTANEOUS at 04:29

## 2020-12-25 RX ADMIN — FOLIC ACID 1 MG: 5 INJECTION, SOLUTION INTRAMUSCULAR; INTRAVENOUS; SUBCUTANEOUS at 08:47

## 2020-12-25 RX ADMIN — THIAMINE HYDROCHLORIDE 250 MG: 100 INJECTION, SOLUTION INTRAMUSCULAR; INTRAVENOUS at 20:39

## 2020-12-25 RX ADMIN — INSULIN GLARGINE 20 UNITS: 100 INJECTION, SOLUTION SUBCUTANEOUS at 20:37

## 2020-12-25 RX ADMIN — INSULIN LISPRO 6 UNITS: 100 INJECTION, SOLUTION INTRAVENOUS; SUBCUTANEOUS at 11:42

## 2020-12-25 RX ADMIN — PANTOPRAZOLE SODIUM 40 MG: 40 INJECTION, POWDER, FOR SOLUTION INTRAVENOUS at 08:48

## 2020-12-25 RX ADMIN — Medication 75 MCG/HR: at 02:41

## 2020-12-25 RX ADMIN — HEPARIN SODIUM 5000 UNITS: 10000 INJECTION INTRAVENOUS; SUBCUTANEOUS at 14:15

## 2020-12-25 RX ADMIN — PIPERACILLIN AND TAZOBACTAM 3.38 G: 3; .375 INJECTION, POWDER, LYOPHILIZED, FOR SOLUTION INTRAVENOUS at 11:42

## 2020-12-25 RX ADMIN — SODIUM CHLORIDE 0.2 MCG/KG/HR: 9 INJECTION, SOLUTION INTRAVENOUS at 14:17

## 2020-12-25 RX ADMIN — DEXAMETHASONE SODIUM PHOSPHATE 6 MG: 4 INJECTION, SOLUTION INTRAMUSCULAR; INTRAVENOUS at 12:34

## 2020-12-25 RX ADMIN — Medication 4 MCG/MIN: at 18:55

## 2020-12-25 RX ADMIN — Medication 2000 UNITS: at 08:48

## 2020-12-25 RX ADMIN — SODIUM CHLORIDE, PRESERVATIVE FREE 10 ML: 5 INJECTION INTRAVENOUS at 08:48

## 2020-12-25 RX ADMIN — THIAMINE HYDROCHLORIDE 500 MG: 100 INJECTION, SOLUTION INTRAMUSCULAR; INTRAVENOUS at 08:48

## 2020-12-25 RX ADMIN — Medication 10 ML: at 20:39

## 2020-12-25 RX ADMIN — OXYCODONE HYDROCHLORIDE AND ACETAMINOPHEN 500 MG: 500 TABLET ORAL at 08:48

## 2020-12-25 RX ADMIN — INSULIN LISPRO 2 UNITS: 100 INJECTION, SOLUTION INTRAVENOUS; SUBCUTANEOUS at 17:48

## 2020-12-25 RX ADMIN — Medication 50 MG: at 08:47

## 2020-12-25 RX ADMIN — INSULIN LISPRO 6 UNITS: 100 INJECTION, SOLUTION INTRAVENOUS; SUBCUTANEOUS at 08:49

## 2020-12-25 RX ADMIN — ALBUMIN (HUMAN) 50 G: 0.25 INJECTION, SOLUTION INTRAVENOUS at 19:03

## 2020-12-25 ASSESSMENT — PULMONARY FUNCTION TESTS
PIF_VALUE: 17
PIF_VALUE: 18
PIF_VALUE: 20
PIF_VALUE: 19
PIF_VALUE: 17
PIF_VALUE: 16
PIF_VALUE: 18
PIF_VALUE: 16
PIF_VALUE: 16
PIF_VALUE: 18
PIF_VALUE: 16
PIF_VALUE: 19
PIF_VALUE: 18
PIF_VALUE: 21

## 2020-12-25 NOTE — PROGRESS NOTES
Hospitalist Progress Note      SYNOPSIS: Patient admitted on 2020 for AMS for unknown time. Code positive. Chest x-ray showed right-sided infiltrate. CT abdomen showed acute pancreatic inflammation and small amount of ascites. SUBJECTIVE:    Patient seen and examined  Records reviewed. Intubated sedated    Stable overnight. No other overnight issues reported. Temp (24hrs), Av.6 °F (37 °C), Min:98.1 °F (36.7 °C), Max:99 °F (37.2 °C)    DIET: Diet NPO Effective Now  CODE: Full Code    Intake/Output Summary (Last 24 hours) at 2020 1554  Last data filed at 2020 1400  Gross per 24 hour   Intake 923 ml   Output 103 ml   Net 820 ml       OBJECTIVE:    /70   Pulse 82   Temp 98.4 °F (36.9 °C) (Bladder)   Resp 25   Ht 6' 2\" (1.88 m)   Wt 216 lb 0.8 oz (98 kg)   SpO2 90%   BMI 27.74 kg/m²     General appearance: Intubated sedated  HEENT:  Conjunctivae/corneas clear. Neck: Supple. No jugular venous distention. Respiratory: Clear to auscultation bilaterally, normal respiratory effort  Cardiovascular: Regular rate rhythm, normal S1-S2  Abdomen: Soft, nontender, nondistended  Musculoskeletal: No clubbing, cyanosis, no bilateral lower extremity edema. Brisk capillary refill. Skin:  No rashes  on visible skin  Neurologic: Intubated sedated    ASSESSMENT:    Principal Problem:    Acute metabolic encephalopathy  Active Problems:    Hyperlipidemia    Prostate cancer ([facility] [address]    Hypertension    Diabetes mellitus ([facility] [address]    DKA, type 1, not at goal [facility])    Acute pancreatitis    Acute renal failure (ARF) (HCC)    High anion gap metabolic acidosis  Resolved Problems:    * No resolved hospital problems. *         PLAN:    Temporal dialysis catheter per vascular surgery. Hemodialysis planned per nephrology noted. Manage alcohol withdrawal with CIWA protocol  Continue Decadron, vitamin C, zinc.  Continue Zosyn.   Surgery consulted for pancreatitis evaluation management  DKA protocol    DISPOSITION: Continue MICU    Medications:  REVIEWED DAILY    Infusion Medications    dexmedetomidine (PRECEDEX) IV infusion 0.2 mcg/kg/hr (12/25/20 1417)    fentaNYL 5 mcg/ml in 0.9%  ml infusion Stopped (12/25/20 1250)    dextrose       Scheduled Medications    insulin glargine  20 Units Subcutaneous Nightly    sodium chloride flush  10 mL Intravenous 2 times per day    dexamethasone  6 mg Intravenous Q24H    insulin lispro  0-12 Units Subcutaneous Q4H    pantoprazole  40 mg Intravenous Daily    And    sodium chloride (PF)  10 mL Intravenous Daily    sodium chloride (PF)  10 mL Intravenous Daily    heparin (porcine)  5,000 Units Subcutaneous 3 times per day    thiamine (VITAMIN B1) IVPB  250 mg Intravenous BID    Followed by   Jennifer Crabtree ON 12/28/2020] thiamine  100 mg Intravenous BID    Followed by   Jennifer Crabtree ON 1/1/2021] thiamine  100 mg Intravenous Daily    folic acid  1 mg Intravenous Daily    vitamin D  2,000 Units Oral Daily    zinc sulfate  50 mg Oral Daily    ascorbic acid  500 mg Oral Daily    piperacillin-tazobactam  3.375 g Intravenous Q12H    sodium chloride   Intravenous Q12H    sodium chloride flush  10 mL Intravenous 2 times per day     PRN Meds: sodium chloride flush, LORazepam **OR** LORazepam **OR** LORazepam **OR** LORazepam **OR** LORazepam **OR** LORazepam **OR** LORazepam **OR** LORazepam, acetaminophen **OR** acetaminophen, polyethylene glycol, sennosides, hydrALAZINE, labetalol, sodium chloride flush, glucose, dextrose, glucagon (rDNA), dextrose, dextrose    Labs:     Recent Labs     12/24/20  0408 12/25/20  0414 12/25/20  0600   WBC 11.9* 5.9 6.8   HGB 9.6* 6.9* 7.3*   HCT 27.2* 20.1* 21.6*    85* 85*       Recent Labs     12/24/20  2354 12/25/20  0414 12/25/20  1227    134 139   K 4.3 4.4 4.3    100 104   CO2 21* 20* 21*   BUN 64* 71* 80*   CREATININE 4.6* 5.0* 5.7*   CALCIUM 7.3* 7.3* 7.4*   PHOS 5.4* 5.7* 5.9*       Recent Labs 12/22/20  1651 12/23/20  0430 12/23/20  0944 12/24/20  0409 12/25/20  0414   PROT 6.0*  --  5.6*  --   --    ALKPHOS 102  --  93  --   --    ALT 16  --  25  --   --    AST 39  --  49*  --   --    BILITOT 0.6  --  0.6  --   --    LIPASE  --  2,452*  --  709* 126*       Recent Labs     12/23/20  0430 12/24/20  0408 12/25/20  0414   INR 1.2 1.2 1.2       Recent Labs     12/23/20  0812 12/23/20  1958 12/24/20  0025   TROPONINI 0.04* 0.03 0.03       Chronic labs:    Lab Results   Component Value Date    CHOL 160 12/23/2020    TRIG 439 (H) 12/23/2020    HDL 14 12/23/2020    LDLCALC - (AA) 12/23/2020    TSH 0.540 12/23/2020    INR 1.2 12/25/2020    LABA1C 11.1 (H) 12/23/2020       Radiology: REVIEWED DAILY    +++++++++++++++++++++++++++++++++++++++++++++++++  Alma Dallas Physician - [address] [place], [place]  +++++++++++++++++++++++++++++++++++++++++++++++++  NOTE: This report was transcribed using voice recognition software. Every effort was made to ensure accuracy; however, inadvertent computerized transcription errors may be present.

## 2020-12-25 NOTE — PROGRESS NOTES
Department of Internal Medicine  Nephrology Attending Progress Note    Above events reviewed and noted    Subjective: patient is intubated and sedated    Current Medications:    Current Facility-Administered Medications: insulin glargine (LANTUS) injection vial 20 Units, 20 Units, Subcutaneous, Nightly  dexmedetomidine (PRECEDEX) 400 mcg in sodium chloride 0.9 % 100 mL infusion, 0.2 mcg/kg/hr, Intravenous, Continuous  sodium chloride flush 0.9 % injection 10 mL, 10 mL, Intravenous, 2 times per day  sodium chloride flush 0.9 % injection 10 mL, 10 mL, Intravenous, PRN  LORazepam (ATIVAN) tablet 1 mg, 1 mg, Oral, Q1H PRN **OR** LORazepam (ATIVAN) injection 1 mg, 1 mg, Intravenous, Q1H PRN **OR** LORazepam (ATIVAN) tablet 2 mg, 2 mg, Oral, Q1H PRN **OR** LORazepam (ATIVAN) injection 2 mg, 2 mg, Intravenous, Q1H PRN **OR** LORazepam (ATIVAN) tablet 3 mg, 3 mg, Oral, Q1H PRN **OR** LORazepam (ATIVAN) injection 3 mg, 3 mg, Intravenous, Q1H PRN **OR** LORazepam (ATIVAN) tablet 4 mg, 4 mg, Oral, Q1H PRN **OR** LORazepam (ATIVAN) injection 4 mg, 4 mg, Intravenous, Q1H PRN  fentaNYL 5 mcg/ml in 0.9%  ml infusion, 25 mcg/hr, Intravenous, Continuous  midazolam (VERSED) 100 mg in dextrose 5 % 100 mL infusion, 1 mg/hr, Intravenous, Continuous  dexamethasone (DECADRON) injection 6 mg, 6 mg, Intravenous, Q24H  insulin lispro (HUMALOG) injection vial 0-12 Units, 0-12 Units, Subcutaneous, Q4H  pantoprazole (PROTONIX) injection 40 mg, 40 mg, Intravenous, Daily **AND** sodium chloride (PF) 0.9 % injection 10 mL, 10 mL, Intravenous, Daily  acetaminophen (TYLENOL) 160 MG/5ML solution 650 mg, 650 mg, Oral, Q6H PRN **OR** acetaminophen (TYLENOL) suppository 650 mg, 650 mg, Rectal, Q6H PRN  polyethylene glycol (GLYCOLAX) packet 17 g, 17 g, Oral, Daily PRN  sennosides (SENOKOT) 8.8 MG/5ML syrup 5 mL, 5 mL, Oral, BID PRN  [DISCONTINUED] pantoprazole (PROTONIX) injection 40 mg, 40 mg, Intravenous, Daily **AND** sodium chloride (PF) 0.9 % injection 10 mL, 10 mL, Intravenous, Daily  heparin (porcine) injection 5,000 Units, 5,000 Units, Subcutaneous, 3 times per day  [COMPLETED] thiamine (B-1) 500 mg in sodium chloride 0.9 % 100 mL IVPB, 500 mg, Intravenous, BID **FOLLOWED BY** thiamine (B-1) 250 mg in sodium chloride 0.9 % 100 mL IVPB, 250 mg, Intravenous, BID **FOLLOWED BY** [START ON 2020] thiamine (B-1) injection 100 mg, 100 mg, Intravenous, BID **FOLLOWED BY** [START ON 2021] thiamine (B-1) injection 100 mg, 100 mg, Intravenous, Daily  hydrALAZINE (APRESOLINE) injection 10 mg, 10 mg, Intravenous, Q4H PRN  labetalol (NORMODYNE;TRANDATE) injection 10 mg, 10 mg, Intravenous, V3E PRN  folic acid injection 1 mg, 1 mg, Intravenous, Daily  vitamin D (CHOLECALCIFEROL) tablet 2,000 Units, 2,000 Units, Oral, Daily  zinc sulfate (ZINCATE) capsule 50 mg, 50 mg, Oral, Daily  ascorbic acid (VITAMIN C) tablet 500 mg, 500 mg, Oral, Daily  piperacillin-tazobactam (ZOSYN) 3.375 g in dextrose 5 % 100 mL IVPB extended infusion (mini-bag), 3.375 g, Intravenous, Q12H  0.9 % sodium chloride infusion (Zosyn flush), , Intravenous, Q12H  sodium chloride flush 0.9 % injection 10 mL, 10 mL, Intravenous, 2 times per day  sodium chloride flush 0.9 % injection 10 mL, 10 mL, Intravenous, PRN  glucose (GLUTOSE) 40 % oral gel 15 g, 15 g, Oral, PRN  dextrose 50 % IV solution, 12.5 g, Intravenous, PRN  glucagon (rDNA) injection 1 mg, 1 mg, Intramuscular, PRN  dextrose 5 % solution, 100 mL/hr, Intravenous, PRN  dextrose 50 % IV solution, 12.5 g, Intravenous, PRN  Allergies:  Patient has no known allergies.     PHYSICAL EXAM:      Vitals:    VITALS:  /64   Pulse 78   Temp 98.4 °F (36.9 °C) (Bladder)   Resp 17   Ht 6' 2\" (1.88 m)   Wt 216 lb 0.8 oz (98 kg)   SpO2 96%   BMI 27.74 kg/m²   24HR RESPIRATORY RATE RANGE:  Resp  Av  Min: 14  Max: 18  24HR PULSE RANGE: Pulse  Av.9  Min: 69  Max: 106  24HR BLOOD PRESSURE RANGE:  Systolic (41NXC), HMQ:20 , Min:83 PCO2 29.0 12/25/2020    PO2 129.0 12/25/2020    HCO3 19.1 12/25/2020    BE -4.5 12/25/2020    O2SAT 98.4 12/25/2020     HgBA1c:    Lab Results   Component Value Date    LABA1C 11.1 12/23/2020     Microalbumen/Creatinine ratio:  No components found for: RUCREAT  Radiology Review: The lungs are without acute focal process.  There is no effusion or   pneumothorax. The cardiomediastinal silhouette is without acute process. The   osseous structures are without acute process. Significant inflammatory changes in the upper abdomen with the epicenter   surrounding the pancreas concerning for acute pancreatitis with inflammation   and inflammatory fluid extending and involving the root of the mesentery,   duodenum and ascending colon resulting in enteritis/colitis.  Small amount of   inflammatory ascites is also present.  Surveillance recommended. Atelectasis/infiltrates lung bases concerning for pneumonia. 1.  Negative for acute intracranial process, within the limits of this   non-contrast CT exam.       IMPRESSION/RECOMMENDATIONS:      This is a 65year old  [place] gentleman with past medical history significant for DM2, hypertension, prostate cancer and stage 3 CKD, presented to ro ED for AMS beginning unknown time. History is limited to chart review. Upon arrival patient was in respiratory distress and was found to have elevated blood sugar of 1332, lactic acid levelof 11.8, BUN of 95, serum creatinine of 5.7 and serum potassium of 7.1 with CO2 level of 4. Therefore this consultation was requested for management of his BLAIR. 1. BLAIR on top of stage 2-3 CKD with baseline serum creatinine 1.4 mg/dl- secondary hyperosmolar hypovolemic state with DKA  FENa 1.1% (intrinsic )  Pt received 8L fluid as part of treatment for DKA/HHS and is net +7.9L , Cr upto 6 again with decrease in urine output to 90 cc/8 hours.   S/p SLED yesterday, remains anuric    - monitor urine output  - monitor bmp   - keep MAP>65

## 2020-12-25 NOTE — PROGRESS NOTES
[facility]  Department of Internal Medicine   Internal Medicine Residency   MICU Progress Note    Patient:  Andre Chua 65 y.o. male  MRN: [id number]     Date of Service: 12/25/2020    Allergy: Patient has no known allergies. Subjective     Patient seen and examined at bedside, was intubated yesterday due to agitation, tachypnea due to alcohol withdrawal.  No seizures, on minimal sedation, sedation vacation patient following commands. On Trial of PS. To rest on AC mode. Objective     VS: BP (!) 104/59   Pulse 73   Temp 98.4 °F (36.9 °C) (Bladder)   Resp 16   Ht 6' 2\" (1.88 m)   Wt 216 lb 0.8 oz (98 kg)   SpO2 97%   BMI 27.74 kg/m²           I & O - 24hr:     Intake/Output Summary (Last 24 hours) at 12/25/2020 1225  Last data filed at 12/25/2020 0848  Gross per 24 hour   Intake 2777 ml   Output 153 ml   Net 2624 ml       Physical Exam:  · General Appearance: alert, appears stated age, cooperative, no distress and slowed mentation  · Neck: no adenopathy and supple, symmetrical, trachea midline  · Lung: clear to auscultation bilaterally  · Heart: regular rate and rhythm, S1, S2 normal, no murmur, click, rub or gallop  · Abdomen: soft, non-tender; bowel sounds normal; no masses,  no organomegaly  · Extremities:  extremities normal, atraumatic, no cyanosis or edema  · Musculoskeletal: No joint swelling, no muscle tenderness. ROM normal in all joints of extremities.    · Neurologic: Mental status: alertness: alert    Lines     site day    Art line   None    TLC R IJ    PICC None    Hemoaccess R Fem      Oxygen:      Mechanical Ventilation:   Mode: AC/VC   TV:500 ml RR: 16  PEEP 5cmH2O FiO2 60   Pplat: 22    ABG:     Lab Results   Component Value Date    PH 7.436 12/25/2020    PCO2 29.0 12/25/2020    PO2 129.0 12/25/2020    HCO3 19.1 12/25/2020    BE -4.5 12/25/2020    THB 7.9 12/25/2020    O2SAT 98.4 12/25/2020        Medications     Infusions: (Fluid, Sedation, Vasopressors)  IVF:  precedex  Vasopressors   None    Nutrition:   NPO    ATB:   Antibiotics  Days   Zosyn  2             Skin issues: none     Patient currently has   Urinary cath  Isolation  Restraints  DVT prophylaxis/ GI prophylaxis,      Labs     CBC with Differential:    Lab Results   Component Value Date    WBC 6.8 12/25/2020    RBC 2.65 12/25/2020    HGB 7.3 12/25/2020    HCT 21.6 12/25/2020    PLT 85 12/25/2020    MCV 81.5 12/25/2020    MCH 27.5 12/25/2020    MCHC 33.8 12/25/2020    RDW 14.4 12/25/2020    NRBC 0.9 12/25/2020    SEGSPCT 63 09/27/2013    LYMPHOPCT 2.6 12/25/2020    MONOPCT 0.9 12/25/2020    MYELOPCT 0.9 12/25/2020    BASOPCT 0.0 12/25/2020    MONOSABS 0.07 12/25/2020    LYMPHSABS 0.20 12/25/2020    EOSABS 0.00 12/25/2020    BASOSABS 0.00 12/25/2020     CMP:    Lab Results   Component Value Date     12/25/2020    K 4.4 12/25/2020    K 4.7 08/16/2020     12/25/2020    CO2 20 12/25/2020    BUN 71 12/25/2020    CREATININE 5.0 12/25/2020    GFRAA 14 12/25/2020    LABGLOM 14 12/25/2020    GLUCOSE 239 12/25/2020    PROT 5.6 12/23/2020    LABALBU 2.7 12/23/2020    CALCIUM 7.3 12/25/2020    BILITOT 0.6 12/23/2020    ALKPHOS 93 12/23/2020    AST 49 12/23/2020    ALT 25 12/23/2020     Ionized Calcium:  No results found for: IONCA  Magnesium:    Lab Results   Component Value Date    MG 2.3 12/25/2020     Phosphorus:    Lab Results   Component Value Date    PHOS 5.7 12/25/2020     ABG:    Lab Results   Component Value Date    PH 7.436 12/25/2020    PCO2 29.0 12/25/2020    PO2 129.0 12/25/2020    HCO3 19.1 12/25/2020    BE -4.5 12/25/2020    O2SAT 98.4 12/25/2020       Imaging Studies:    CXR: Depth of inspiration is not ideal.  There is bibasilar atelectasis/infiltrate   but some clearing of the right basilar infiltrate and effusion since the   prior study. Lines/tubes are appropriate.     CT scan head: 12/22/20  This exam is slightly limited by beam-hardening artifacts.  Given these:   BRAIN/VENTRICLES: Negative for acute intracranial hemorrhage, other intra-/extra-axial fluid   collection, or mass effect/midline shift. Minimal atherosclerotic calcification is scattered about the   cavernous/supraclinoid segments of both internal carotid arteries. Gray/white matter differentiation appears grossly unremarkable, given   technical artifacts. There is age-appropriate, generalized parenchymal volume loss. ORBITS: The right ocular lens has been previously surgically replaced. SINUSES:   Trace to mild scattered paranasal sinus mucosal thickening is most notable   within the ethmoid sinus.  Minimal hyperostosis is noted about the osseous   margin of the left aspect of the sphenoid sinus, suggestive of chronic   osteitis.  The frontal sinus is hypoplastic, markedly on the right, and   moderately on the left.  There is moderate left, and mild right, mastoid air   cell complex hypoplasia.  Tiny probable cerumen is present within the right   external auditory canal.   SOFT TISSUES/SKULL:   There is background diffuse osteopenia, with at least minimal upper cervical   degenerative disease only incidentally demonstrated. CT abdomen: 12/22/20  The lung bases demonstrate COPD with bronchiectasis. Rafi Letts is minimal   atelectasis/infiltrates in the lung bases concerning for pneumonia.  There is   coronary artery calcification.  The liver is fatty infiltrated.  Gallbladder   is partially distended with mild wall thickening.  Consider ultrasonography.    Spleen appears normal.  There is diffuse inflammatory changes and edema   surrounding the pancreas with inflammatory changes extending to the root of   the mesentery to involve the duodenum.  Inflammatory fluid is identified in   the Morison's pouch with inflammation extending to the ascending colon which   is thickened. Rafi Letts is inflammatory stranding densities in the mesentery and   fluid in the splenic hilum.  Degenerative changes are identified in the   lumbar Vance Cruz MD   CCT excluding procedures:40'

## 2020-12-25 NOTE — PLAN OF CARE
Absence of restraint-related injury  Description: Absence of restraint-related injury  12/25/2020 1846 by Jyoti Suggs  Outcome: Met This Shift  12/25/2020 0653 by Tony Araujo RN  Outcome: Met This Shift     Problem:  Body Temperature -  Risk of, Imbalanced  Goal: Will regain or maintain usual level of consciousness  Outcome: Not Met This Shift     Problem: Nutrition Deficits  Goal: Optimize nutrtional status  Outcome: Not Met This Shift     Problem: Restraint Use - Nonviolent/Non-Self-Destructive Behavior:  Goal: Absence of restraint indications  Description: Absence of restraint indications  12/25/2020 1846 by Jyoti Suggs  Outcome: Not Met This Shift  12/25/2020 1278 by Tony Araujo, RN  Outcome: Not Met This Shift

## 2020-12-25 NOTE — PROGRESS NOTES
Hemodialysis tx #1 completed, tolerated fairly well. UF 600mls, hypotesion throughout tx. Dressing clean dry and intact. Good flows through newly placed Femoral CVC.   Report given to Alfredo

## 2020-12-26 ENCOUNTER — APPOINTMENT (OUTPATIENT)
Dept: GENERAL RADIOLOGY | Age: 70
DRG: 207 | End: 2020-12-26
Payer: MEDICARE

## 2020-12-26 LAB
AADO2: 238.1 MMHG
ABO/RH: NORMAL
ALBUMIN SERPL-MCNC: 3.2 G/DL (ref 3.5–5.2)
ALP BLD-CCNC: 116 U/L (ref 40–129)
ALT SERPL-CCNC: 22 U/L (ref 0–40)
ANION GAP SERPL CALCULATED.3IONS-SCNC: 16 MMOL/L (ref 7–16)
ANION GAP SERPL CALCULATED.3IONS-SCNC: 16 MMOL/L (ref 7–16)
ANION GAP SERPL CALCULATED.3IONS-SCNC: 19 MMOL/L (ref 7–16)
ANION GAP SERPL CALCULATED.3IONS-SCNC: 22 MMOL/L (ref 7–16)
ANISOCYTOSIS: ABNORMAL
ANTIBODY SCREEN: NORMAL
AST SERPL-CCNC: 24 U/L (ref 0–39)
B.E.: -5 MMOL/L (ref -3–3)
BASOPHILS ABSOLUTE: 0 E9/L (ref 0–0.2)
BASOPHILS RELATIVE PERCENT: 0 % (ref 0–2)
BILIRUB SERPL-MCNC: 0.9 MG/DL (ref 0–1.2)
BILIRUBIN DIRECT: 0.4 MG/DL (ref 0–0.3)
BILIRUBIN, INDIRECT: 0.5 MG/DL (ref 0–1)
BLOOD BANK DISPENSE STATUS: NORMAL
BLOOD BANK PRODUCT CODE: NORMAL
BPU ID: NORMAL
BUN BLDV-MCNC: 23 MG/DL (ref 8–23)
BUN BLDV-MCNC: 35 MG/DL (ref 8–23)
BUN BLDV-MCNC: 38 MG/DL (ref 8–23)
BUN BLDV-MCNC: 48 MG/DL (ref 8–23)
C-PEPTIDE: 0.6 NG/ML (ref 0.8–3.5)
C-REACTIVE PROTEIN: 18.9 MG/DL (ref 0–0.4)
CALCIUM IONIZED: 1.12 MMOL/L (ref 1.15–1.33)
CALCIUM SERPL-MCNC: 7.8 MG/DL (ref 8.6–10.2)
CALCIUM SERPL-MCNC: 8 MG/DL (ref 8.6–10.2)
CALCIUM SERPL-MCNC: 8.1 MG/DL (ref 8.6–10.2)
CALCIUM SERPL-MCNC: 8.2 MG/DL (ref 8.6–10.2)
CHLORIDE BLD-SCNC: 96 MMOL/L (ref 98–107)
CHLORIDE BLD-SCNC: 98 MMOL/L (ref 98–107)
CHLORIDE BLD-SCNC: 99 MMOL/L (ref 98–107)
CHLORIDE BLD-SCNC: 99 MMOL/L (ref 98–107)
CO2: 18 MMOL/L (ref 22–29)
CO2: 20 MMOL/L (ref 22–29)
CO2: 23 MMOL/L (ref 22–29)
CO2: 25 MMOL/L (ref 22–29)
COHB: 0.6 % (ref 0–1.5)
CREAT SERPL-MCNC: 2 MG/DL (ref 0.7–1.2)
CREAT SERPL-MCNC: 2.8 MG/DL (ref 0.7–1.2)
CREAT SERPL-MCNC: 3 MG/DL (ref 0.7–1.2)
CREAT SERPL-MCNC: 3.5 MG/DL (ref 0.7–1.2)
CRITICAL: ABNORMAL
D DIMER: >5250 NG/ML DDU
DATE ANALYZED: ABNORMAL
DATE OF COLLECTION: ABNORMAL
DESCRIPTION BLOOD BANK: NORMAL
EOSINOPHILS ABSOLUTE: 0 E9/L (ref 0.05–0.5)
EOSINOPHILS RELATIVE PERCENT: 0 % (ref 0–6)
FERRITIN: 4454 NG/ML
FIO2: 50 %
GFR AFRICAN AMERICAN: 21
GFR AFRICAN AMERICAN: 25
GFR AFRICAN AMERICAN: 27
GFR AFRICAN AMERICAN: 40
GFR NON-AFRICAN AMERICAN: 21 ML/MIN/1.73
GFR NON-AFRICAN AMERICAN: 25 ML/MIN/1.73
GFR NON-AFRICAN AMERICAN: 27 ML/MIN/1.73
GFR NON-AFRICAN AMERICAN: 40 ML/MIN/1.73
GLUCOSE BLD-MCNC: 157 MG/DL (ref 74–99)
GLUCOSE BLD-MCNC: 195 MG/DL (ref 74–99)
GLUCOSE BLD-MCNC: 251 MG/DL (ref 74–99)
GLUCOSE BLD-MCNC: 266 MG/DL (ref 74–99)
HCO3: 19.3 MMOL/L (ref 22–26)
HCT VFR BLD CALC: 20.4 % (ref 37–54)
HCT VFR BLD CALC: 24.2 % (ref 37–54)
HEMOGLOBIN: 6.8 G/DL (ref 12.5–16.5)
HEMOGLOBIN: 8.4 G/DL (ref 12.5–16.5)
HHB: 7.8 % (ref 0–5)
HYPOCHROMIA: ABNORMAL
INR BLD: 1.2
LAB: ABNORMAL
LIPASE: 44 U/L (ref 13–60)
LYMPHOCYTES ABSOLUTE: 0.08 E9/L (ref 1.5–4)
LYMPHOCYTES RELATIVE PERCENT: 0.9 % (ref 20–42)
Lab: ABNORMAL
MAGNESIUM: 2.1 MG/DL (ref 1.6–2.6)
MAGNESIUM: 2.2 MG/DL (ref 1.6–2.6)
MAGNESIUM: 2.2 MG/DL (ref 1.6–2.6)
MAGNESIUM: 2.4 MG/DL (ref 1.6–2.6)
MCH RBC QN AUTO: 27.9 PG (ref 26–35)
MCHC RBC AUTO-ENTMCNC: 33.3 % (ref 32–34.5)
MCV RBC AUTO: 83.6 FL (ref 80–99.9)
METER GLUCOSE: 146 MG/DL (ref 74–99)
METER GLUCOSE: 208 MG/DL (ref 74–99)
METER GLUCOSE: 226 MG/DL (ref 74–99)
METER GLUCOSE: 230 MG/DL (ref 74–99)
METER GLUCOSE: 232 MG/DL (ref 74–99)
METER GLUCOSE: 268 MG/DL (ref 74–99)
METHB: 0.1 % (ref 0–1.5)
MODE: AC
MONOCYTES ABSOLUTE: 0.16 E9/L (ref 0.1–0.95)
MONOCYTES RELATIVE PERCENT: 1.7 % (ref 2–12)
MYELOCYTE PERCENT: 0.9 % (ref 0–0)
NEUTROPHILS ABSOLUTE: 7.66 E9/L (ref 1.8–7.3)
NEUTROPHILS RELATIVE PERCENT: 96.5 % (ref 43–80)
NUCLEATED RED BLOOD CELLS: 0.9 /100 WBC
O2 CONTENT: 9.6 ML/DL
O2 SATURATION: 92.1 % (ref 92–98.5)
O2HB: 91.5 % (ref 94–97)
OPERATOR ID: ABNORMAL
OVALOCYTES: ABNORMAL
PATIENT TEMP: 37 C
PCO2: 32.2 MMHG (ref 35–45)
PDW BLD-RTO: 14.3 FL (ref 11.5–15)
PEEP/CPAP: 5 CMH2O
PFO2: 1.39 MMHG/%
PH BLOOD GAS: 7.4 (ref 7.35–7.45)
PHOSPHORUS: 2.8 MG/DL (ref 2.5–4.5)
PHOSPHORUS: 4.5 MG/DL (ref 2.5–4.5)
PHOSPHORUS: 4.6 MG/DL (ref 2.5–4.5)
PHOSPHORUS: 4.9 MG/DL (ref 2.5–4.5)
PLATELET # BLD: 97 E9/L (ref 130–450)
PLATELET CONFIRMATION: NORMAL
PMV BLD AUTO: 11.6 FL (ref 7–12)
PO2: 69.7 MMHG (ref 75–100)
POIKILOCYTES: ABNORMAL
POLYCHROMASIA: ABNORMAL
POTASSIUM SERPL-SCNC: 4 MMOL/L (ref 3.5–5)
POTASSIUM SERPL-SCNC: 4.1 MMOL/L (ref 3.5–5)
POTASSIUM SERPL-SCNC: 4.3 MMOL/L (ref 3.5–5)
POTASSIUM SERPL-SCNC: 4.4 MMOL/L (ref 3.5–5)
PROTHROMBIN TIME: 13.1 SEC (ref 9.3–12.4)
RBC # BLD: 2.44 E12/L (ref 3.8–5.8)
RI(T): 3.42
RR MECHANICAL: 16 B/MIN
SEDIMENTATION RATE, ERYTHROCYTE: 141 MM/HR (ref 0–15)
SODIUM BLD-SCNC: 135 MMOL/L (ref 132–146)
SODIUM BLD-SCNC: 138 MMOL/L (ref 132–146)
SODIUM BLD-SCNC: 139 MMOL/L (ref 132–146)
SODIUM BLD-SCNC: 139 MMOL/L (ref 132–146)
SOURCE, BLOOD GAS: ABNORMAL
TEAR DROP CELLS: ABNORMAL
THB: 7.4 G/DL (ref 11.5–16.5)
TIME ANALYZED: 436
TOTAL PROTEIN: 6.7 G/DL (ref 6.4–8.3)
VT MECHANICAL: 500 ML
WBC # BLD: 7.9 E9/L (ref 4.5–11.5)

## 2020-12-26 PROCEDURE — 82805 BLOOD GASES W/O2 SATURATION: CPT

## 2020-12-26 PROCEDURE — 82962 GLUCOSE BLOOD TEST: CPT

## 2020-12-26 PROCEDURE — 85014 HEMATOCRIT: CPT

## 2020-12-26 PROCEDURE — 82728 ASSAY OF FERRITIN: CPT

## 2020-12-26 PROCEDURE — 86850 RBC ANTIBODY SCREEN: CPT

## 2020-12-26 PROCEDURE — 2500000003 HC RX 250 WO HCPCS: Performed by: INTERNAL MEDICINE

## 2020-12-26 PROCEDURE — 80048 BASIC METABOLIC PNL TOTAL CA: CPT

## 2020-12-26 PROCEDURE — 85018 HEMOGLOBIN: CPT

## 2020-12-26 PROCEDURE — 36415 COLL VENOUS BLD VENIPUNCTURE: CPT

## 2020-12-26 PROCEDURE — 2580000003 HC RX 258: Performed by: INTERNAL MEDICINE

## 2020-12-26 PROCEDURE — 6360000002 HC RX W HCPCS: Performed by: INTERNAL MEDICINE

## 2020-12-26 PROCEDURE — 71045 X-RAY EXAM CHEST 1 VIEW: CPT

## 2020-12-26 PROCEDURE — 2000000000 HC ICU R&B

## 2020-12-26 PROCEDURE — 86901 BLOOD TYPING SEROLOGIC RH(D): CPT

## 2020-12-26 PROCEDURE — 85651 RBC SED RATE NONAUTOMATED: CPT

## 2020-12-26 PROCEDURE — P9016 RBC LEUKOCYTES REDUCED: HCPCS

## 2020-12-26 PROCEDURE — 86923 COMPATIBILITY TEST ELECTRIC: CPT

## 2020-12-26 PROCEDURE — C9113 INJ PANTOPRAZOLE SODIUM, VIA: HCPCS | Performed by: INTERNAL MEDICINE

## 2020-12-26 PROCEDURE — 36430 TRANSFUSION BLD/BLD COMPNT: CPT

## 2020-12-26 PROCEDURE — 86900 BLOOD TYPING SEROLOGIC ABO: CPT

## 2020-12-26 PROCEDURE — 85025 COMPLETE CBC W/AUTO DIFF WBC: CPT

## 2020-12-26 PROCEDURE — 85610 PROTHROMBIN TIME: CPT

## 2020-12-26 PROCEDURE — XW033E5 INTRODUCTION OF REMDESIVIR ANTI-INFECTIVE INTO PERIPHERAL VEIN, PERCUTANEOUS APPROACH, NEW TECHNOLOGY GROUP 5: ICD-10-PCS | Performed by: EMERGENCY MEDICINE

## 2020-12-26 PROCEDURE — 90935 HEMODIALYSIS ONE EVALUATION: CPT

## 2020-12-26 PROCEDURE — 36592 COLLECT BLOOD FROM PICC: CPT

## 2020-12-26 PROCEDURE — 6370000000 HC RX 637 (ALT 250 FOR IP): Performed by: INTERNAL MEDICINE

## 2020-12-26 PROCEDURE — P9047 ALBUMIN (HUMAN), 25%, 50ML: HCPCS | Performed by: INTERNAL MEDICINE

## 2020-12-26 PROCEDURE — 94003 VENT MGMT INPAT SUBQ DAY: CPT

## 2020-12-26 PROCEDURE — 82330 ASSAY OF CALCIUM: CPT

## 2020-12-26 PROCEDURE — 86140 C-REACTIVE PROTEIN: CPT

## 2020-12-26 PROCEDURE — 84100 ASSAY OF PHOSPHORUS: CPT

## 2020-12-26 PROCEDURE — 83690 ASSAY OF LIPASE: CPT

## 2020-12-26 PROCEDURE — 83735 ASSAY OF MAGNESIUM: CPT

## 2020-12-26 PROCEDURE — 80076 HEPATIC FUNCTION PANEL: CPT

## 2020-12-26 PROCEDURE — 85378 FIBRIN DEGRADE SEMIQUANT: CPT

## 2020-12-26 RX ORDER — 0.9 % SODIUM CHLORIDE 0.9 %
20 INTRAVENOUS SOLUTION INTRAVENOUS ONCE
Status: DISCONTINUED | OUTPATIENT
Start: 2020-12-26 | End: 2021-01-01 | Stop reason: HOSPADM

## 2020-12-26 RX ORDER — ALBUMIN (HUMAN) 12.5 G/50ML
25 SOLUTION INTRAVENOUS ONCE
Status: COMPLETED | OUTPATIENT
Start: 2020-12-26 | End: 2020-12-26

## 2020-12-26 RX ORDER — SODIUM CHLORIDE 9 MG/ML
10 INJECTION INTRAVENOUS 2 TIMES DAILY
Status: DISCONTINUED | OUTPATIENT
Start: 2020-12-26 | End: 2021-01-01 | Stop reason: HOSPADM

## 2020-12-26 RX ORDER — PANTOPRAZOLE SODIUM 40 MG/10ML
40 INJECTION, POWDER, LYOPHILIZED, FOR SOLUTION INTRAVENOUS 2 TIMES DAILY
Status: DISCONTINUED | OUTPATIENT
Start: 2020-12-26 | End: 2021-01-01 | Stop reason: HOSPADM

## 2020-12-26 RX ORDER — 0.9 % SODIUM CHLORIDE 0.9 %
30 INTRAVENOUS SOLUTION INTRAVENOUS PRN
Status: DISCONTINUED | OUTPATIENT
Start: 2020-12-26 | End: 2021-01-01 | Stop reason: HOSPADM

## 2020-12-26 RX ADMIN — Medication 125 MCG/HR: at 00:41

## 2020-12-26 RX ADMIN — Medication 175 MCG/HR: at 12:04

## 2020-12-26 RX ADMIN — FOLIC ACID 1 MG: 5 INJECTION, SOLUTION INTRAMUSCULAR; INTRAVENOUS; SUBCUTANEOUS at 14:15

## 2020-12-26 RX ADMIN — Medication 50 MG: at 14:16

## 2020-12-26 RX ADMIN — INSULIN LISPRO 6 UNITS: 100 INJECTION, SOLUTION INTRAVENOUS; SUBCUTANEOUS at 20:57

## 2020-12-26 RX ADMIN — Medication 10 ML: at 08:06

## 2020-12-26 RX ADMIN — INSULIN LISPRO 2 UNITS: 100 INJECTION, SOLUTION INTRAVENOUS; SUBCUTANEOUS at 12:13

## 2020-12-26 RX ADMIN — INSULIN LISPRO 4 UNITS: 100 INJECTION, SOLUTION INTRAVENOUS; SUBCUTANEOUS at 00:34

## 2020-12-26 RX ADMIN — DEXAMETHASONE SODIUM PHOSPHATE 6 MG: 4 INJECTION, SOLUTION INTRAMUSCULAR; INTRAVENOUS at 12:03

## 2020-12-26 RX ADMIN — Medication 175 MCG/HR: at 20:32

## 2020-12-26 RX ADMIN — SODIUM CHLORIDE, PRESERVATIVE FREE 10 ML: 5 INJECTION INTRAVENOUS at 20:41

## 2020-12-26 RX ADMIN — ALBUMIN (HUMAN) 25 G: 25 SOLUTION INTRAVENOUS at 11:30

## 2020-12-26 RX ADMIN — INSULIN LISPRO 4 UNITS: 100 INJECTION, SOLUTION INTRAVENOUS; SUBCUTANEOUS at 04:08

## 2020-12-26 RX ADMIN — THIAMINE HYDROCHLORIDE 250 MG: 100 INJECTION, SOLUTION INTRAMUSCULAR; INTRAVENOUS at 14:15

## 2020-12-26 RX ADMIN — PANTOPRAZOLE SODIUM 40 MG: 40 INJECTION, POWDER, FOR SOLUTION INTRAVENOUS at 20:40

## 2020-12-26 RX ADMIN — REMDESIVIR 200 MG: 100 INJECTION, POWDER, LYOPHILIZED, FOR SOLUTION INTRAVENOUS at 15:47

## 2020-12-26 RX ADMIN — SODIUM CHLORIDE, PRESERVATIVE FREE 10 ML: 5 INJECTION INTRAVENOUS at 08:05

## 2020-12-26 RX ADMIN — CALCIUM GLUCONATE 1 G: 98 INJECTION, SOLUTION INTRAVENOUS at 14:15

## 2020-12-26 RX ADMIN — Medication 10 ML: at 20:40

## 2020-12-26 RX ADMIN — SODIUM CHLORIDE, PRESERVATIVE FREE 10 ML: 5 INJECTION INTRAVENOUS at 12:03

## 2020-12-26 RX ADMIN — PIPERACILLIN AND TAZOBACTAM 3.38 G: 3; .375 INJECTION, POWDER, LYOPHILIZED, FOR SOLUTION INTRAVENOUS at 15:47

## 2020-12-26 RX ADMIN — THIAMINE HYDROCHLORIDE 250 MG: 100 INJECTION, SOLUTION INTRAMUSCULAR; INTRAVENOUS at 22:33

## 2020-12-26 RX ADMIN — OXYCODONE HYDROCHLORIDE AND ACETAMINOPHEN 500 MG: 500 TABLET ORAL at 14:16

## 2020-12-26 RX ADMIN — PANTOPRAZOLE SODIUM 40 MG: 40 INJECTION, POWDER, FOR SOLUTION INTRAVENOUS at 08:05

## 2020-12-26 RX ADMIN — Medication 2000 UNITS: at 14:16

## 2020-12-26 RX ADMIN — PIPERACILLIN AND TAZOBACTAM 3.38 G: 3; .375 INJECTION, POWDER, LYOPHILIZED, FOR SOLUTION INTRAVENOUS at 00:42

## 2020-12-26 RX ADMIN — INSULIN GLARGINE 20 UNITS: 100 INJECTION, SOLUTION SUBCUTANEOUS at 21:00

## 2020-12-26 RX ADMIN — SODIUM CHLORIDE, PRESERVATIVE FREE 10 ML: 5 INJECTION INTRAVENOUS at 08:15

## 2020-12-26 RX ADMIN — INSULIN LISPRO 4 UNITS: 100 INJECTION, SOLUTION INTRAVENOUS; SUBCUTANEOUS at 08:30

## 2020-12-26 RX ADMIN — INSULIN LISPRO 4 UNITS: 100 INJECTION, SOLUTION INTRAVENOUS; SUBCUTANEOUS at 16:12

## 2020-12-26 ASSESSMENT — PAIN SCALES - GENERAL
PAINLEVEL_OUTOF10: 0
PAINLEVEL_OUTOF10: 0

## 2020-12-26 ASSESSMENT — PULMONARY FUNCTION TESTS
PIF_VALUE: 18
PIF_VALUE: 25
PIF_VALUE: 20
PIF_VALUE: 17
PIF_VALUE: 30
PIF_VALUE: 19
PIF_VALUE: 18
PIF_VALUE: 25
PIF_VALUE: 17
PIF_VALUE: 19
PIF_VALUE: 24
PIF_VALUE: 17

## 2020-12-26 NOTE — PROGRESS NOTES
Department of Internal Medicine  Nephrology Attending Progress Note    Above events reviewed and noted    Subjective: patient is intubated and sedated    Current Medications:    Current Facility-Administered Medications: 0.9 % sodium chloride bolus, 20 mL, Intravenous, Once  pantoprazole (PROTONIX) injection 40 mg, 40 mg, Intravenous, BID **AND** sodium chloride (PF) 0.9 % injection 10 mL, 10 mL, Intravenous, BID  remdesivir 200 mg in sodium chloride 0.9 % 250 mL IVPB, 200 mg, Intravenous, Once **FOLLOWED BY** [START ON 12/27/2020] remdesivir 100 mg in sodium chloride 0.9 % 250 mL IVPB, 100 mg, Intravenous, Q24H  0.9 % sodium chloride bolus, 30 mL, Intravenous, PRN  albumin human 25 % IV solution 25 g, 25 g, Intravenous, Once  insulin glargine (LANTUS) injection vial 20 Units, 20 Units, Subcutaneous, Nightly  dexmedetomidine (PRECEDEX) 400 mcg in sodium chloride 0.9 % 100 mL infusion, 0.2 mcg/kg/hr, Intravenous, Continuous  sodium chloride flush 0.9 % injection 10 mL, 10 mL, Intravenous, 2 times per day  sodium chloride flush 0.9 % injection 10 mL, 10 mL, Intravenous, PRN  LORazepam (ATIVAN) tablet 1 mg, 1 mg, Oral, Q1H PRN **OR** LORazepam (ATIVAN) injection 1 mg, 1 mg, Intravenous, Q1H PRN **OR** LORazepam (ATIVAN) tablet 2 mg, 2 mg, Oral, Q1H PRN **OR** LORazepam (ATIVAN) injection 2 mg, 2 mg, Intravenous, Q1H PRN **OR** LORazepam (ATIVAN) tablet 3 mg, 3 mg, Oral, Q1H PRN **OR** LORazepam (ATIVAN) injection 3 mg, 3 mg, Intravenous, Q1H PRN **OR** LORazepam (ATIVAN) tablet 4 mg, 4 mg, Oral, Q1H PRN **OR** LORazepam (ATIVAN) injection 4 mg, 4 mg, Intravenous, Q1H PRN  norepinephrine (LEVOPHED) 16 mg in dextrose 5% 250 mL infusion, 2 mcg/min, Intravenous, Continuous  anticoagulant sodium citrate 4 GM/100ML solution 0.112 g, 2.8 mL, Intracatheter, PRN  fentaNYL 5 mcg/ml in 0.9%  ml infusion, 25 mcg/hr, Intravenous, Continuous  dexamethasone (DECADRON) injection 6 mg, 6 mg, Intravenous, Q24H  insulin lispro (HUMALOG) injection vial 0-12 Units, 0-12 Units, Subcutaneous, Q4H  acetaminophen (TYLENOL) 160 MG/5ML solution 650 mg, 650 mg, Oral, Q6H PRN **OR** acetaminophen (TYLENOL) suppository 650 mg, 650 mg, Rectal, Q6H PRN  polyethylene glycol (GLYCOLAX) packet 17 g, 17 g, Oral, Daily PRN  sennosides (SENOKOT) 8.8 MG/5ML syrup 5 mL, 5 mL, Oral, BID PRN  [DISCONTINUED] pantoprazole (PROTONIX) injection 40 mg, 40 mg, Intravenous, Daily **AND** sodium chloride (PF) 0.9 % injection 10 mL, 10 mL, Intravenous, Daily  [Held by provider] heparin (porcine) injection 5,000 Units, 5,000 Units, Subcutaneous, 3 times per day  [COMPLETED] thiamine (B-1) 500 mg in sodium chloride 0.9 % 100 mL IVPB, 500 mg, Intravenous, BID **FOLLOWED BY** thiamine (B-1) 250 mg in sodium chloride 0.9 % 100 mL IVPB, 250 mg, Intravenous, BID **FOLLOWED BY** [START ON 12/28/2020] thiamine (B-1) injection 100 mg, 100 mg, Intravenous, BID **FOLLOWED BY** [START ON 1/1/2021] thiamine (B-1) injection 100 mg, 100 mg, Intravenous, Daily  hydrALAZINE (APRESOLINE) injection 10 mg, 10 mg, Intravenous, Q4H PRN  labetalol (NORMODYNE;TRANDATE) injection 10 mg, 10 mg, Intravenous, I1L PRN  folic acid injection 1 mg, 1 mg, Intravenous, Daily  vitamin D (CHOLECALCIFEROL) tablet 2,000 Units, 2,000 Units, Oral, Daily  zinc sulfate (ZINCATE) capsule 50 mg, 50 mg, Oral, Daily  ascorbic acid (VITAMIN C) tablet 500 mg, 500 mg, Oral, Daily  piperacillin-tazobactam (ZOSYN) 3.375 g in dextrose 5 % 100 mL IVPB extended infusion (mini-bag), 3.375 g, Intravenous, Q12H  0.9 % sodium chloride infusion (Zosyn flush), , Intravenous, Q12H  sodium chloride flush 0.9 % injection 10 mL, 10 mL, Intravenous, 2 times per day  sodium chloride flush 0.9 % injection 10 mL, 10 mL, Intravenous, PRN  glucose (GLUTOSE) 40 % oral gel 15 g, 15 g, Oral, PRN  dextrose 50 % IV solution, 12.5 g, Intravenous, PRN  glucagon (rDNA) injection 1 mg, 1 mg, Intramuscular, PRN  dextrose 5 % solution, 100 mL/hr, Intravenous, PRN  dextrose 50 % IV solution, 12.5 g, Intravenous, PRN  Allergies:  Patient has no known allergies. PHYSICAL EXAM:      Vitals:    VITALS:  BP (!) 174/87   Pulse 103   Temp 97.7 °F (36.5 °C)   Resp 17   Ht 6' 2\" (1.88 m)   Wt 215 lb 9.8 oz (97.8 kg)   SpO2 94%   BMI 27.68 kg/m²   24HR RESPIRATORY RATE RANGE:  Resp  Av.9  Min: 15  Max: 26  24HR PULSE RANGE: Pulse  Av.7  Min: 52  Max: 103  24HR BLOOD PRESSURE RANGE:  Systolic (40IVA), YYS:976 , Min:74 , ETF:842   ; Diastolic (06FVY), EBH:07, Min:45, Max:107    24HR INTAKE/OUTPUT:      Intake/Output Summary (Last 24 hours) at 2020 1130  Last data filed at 2020 1000  Gross per 24 hour   Intake 2238 ml   Output 1325 ml   Net 913 ml     8HR INTAKE OUTPUT:  No intake/output data recorded.     Constitutional:  Sedated, NAD  HEENT:  NC/AT, intubated  Respiratory:  clear  Cardiovascular/Edema:  RRR, s1, s2 no rub or gallop  Gastrointestinal:  Soft, mildly distended , bowel sounds present  Neurologic:  sedated  Skin:  turgor normal  Other:  No edema    DATA:    CBC:   Lab Results   Component Value Date    WBC 7.9 2020    RBC 2.44 2020    HGB 6.8 2020    HCT 20.4 2020    MCV 83.6 2020    MCH 27.9 2020    MCHC 33.3 2020    RDW 14.3 2020    PLT 97 2020    MPV 11.6 2020     CMP:    Lab Results   Component Value Date     2020    K 4.3 2020    K 4.7 2020    CL 99 2020    CO2 18 2020    BUN 48 2020    CREATININE 3.5 2020    GFRAA 21 2020    LABGLOM 21 2020    GLUCOSE 251 2020    PROT 5.6 2020    LABALBU 2.7 2020    CALCIUM 8.1 2020    BILITOT 0.6 2020    ALKPHOS 93 2020    AST 49 2020    ALT 25 2020     Magnesium:    Lab Results   Component Value Date    MG 2.4 2020     Phosphorus:    Lab Results   Component Value Date    PHOS 4.9 2020     U/A:    Lab Results Component Value Date    COLORU Yellow 12/22/2020    PROTEINU 100 12/22/2020    PHUR 5.0 12/22/2020    WBCUA 0-1 12/22/2020    RBCUA 0-1 12/22/2020    BACTERIA RARE 12/22/2020    CLARITYU Clear 12/22/2020    SPECGRAV 1.020 12/22/2020    LEUKOCYTESUR Negative 12/22/2020    UROBILINOGEN 0.2 12/22/2020    BILIRUBINUR Negative 12/22/2020    BLOODU MODERATE 12/22/2020    GLUCOSEU >=1000 12/22/2020    AMORPHOUS FEW 12/22/2020     ABG:    Lab Results   Component Value Date    PH 7.396 12/26/2020    PCO2 32.2 12/26/2020    PO2 69.7 12/26/2020    HCO3 19.3 12/26/2020    BE -5.0 12/26/2020    O2SAT 92.1 12/26/2020     HgBA1c:    Lab Results   Component Value Date    LABA1C 11.1 12/23/2020     Microalbumen/Creatinine ratio:  No components found for: [facility]  Radiology Review: The lungs are without acute focal process.  There is no effusion or   pneumothorax. The cardiomediastinal silhouette is without acute process. The   osseous structures are without acute process. Significant inflammatory changes in the upper abdomen with the epicenter   surrounding the pancreas concerning for acute pancreatitis with inflammation   and inflammatory fluid extending and involving the root of the mesentery,   duodenum and ascending colon resulting in enteritis/colitis.  Small amount of   inflammatory ascites is also present.  Surveillance recommended. Atelectasis/infiltrates lung bases concerning for pneumonia. 1.  Negative for acute intracranial process, within the limits of this   non-contrast CT exam.       IMPRESSION/RECOMMENDATIONS:      This is a 65year old  [place] gentleman with past medical history significant for DM2, hypertension, prostate cancer and stage 3 CKD, presented to ro ED for AMS beginning unknown time. History is limited to chart review.    Upon arrival patient was in respiratory distress and was found to have elevated blood sugar of 1332, lactic acid levelof 11.8, BUN of 95, serum creatinine of 5.7 and serum potassium of 7.1 with CO2 level of 4. Therefore this consultation was requested for management of his BLAIR. 1. BLAIR on top of stage 2-3 CKD with baseline serum creatinine 1.4 mg/dl- secondary hyperosmolar hypovolemic state with DKA  FENa 1.1% (intrinsic )  Pt received 8L fluid as part of treatment for DKA/HHS and is net +7.9L , Cr upto 6 again with decrease in urine output to 90 cc/8 hours. S/p SLED yesterday, remains anuric    - monitor urine output  - monitor bmp   - keep MAP>65 mm hg  - will continue dialytic support with SLED x 6 hours again today    2. HAGMA secondary to DKA and lactic acdisosis    - lactic acidosis resolved  - monitor bmp     3. Hyperkalemia secondary to LBAIR and metabolic acidosis/DKA and hyperosmolar state  - improving slowly with iv fluids and bicarb drip, last level 6.6  - will monitor  - resolved    4. Pseudohyponatremia - improving slowly  5. DKA/ acute pancreatitis - lipase>3000 , CRP worse 31.8 today  6. Hypocalcemia - check ionized calcium, replace PRN      Case was discussed with MICU staff    Thank you for letting me take part in the care of this gentleman. Radha Ward MD

## 2020-12-26 NOTE — FLOWSHEET NOTE
Pt ran 5.5 hours, Tx terminated 30 min early r/t impending clott & rising TMP; 89345 bath; No net Fluid removed; stable/tolerated well; denies c/o. HD CVC Citrate locked per lumen fill volume, capped & clamped post Tx. Fair Access flow Lines reversed to achieve prescribed BFR. Next Tx scheduled tomorrow 12/27.     12/26/20 1315   Vital Signs   BP (!) 155/86   Temp 98.4 °F (36.9 °C)   Pulse 97   Resp 19   SpO2 92 %   Weight 216 lb 0.8 oz (98 kg)   Percent Weight Change 0.2   Pain Assessment   Pain Assessment Faces   Pain Level 0   Post-Hemodialysis Assessment   Post-Treatment Procedures Blood returned;Catheter capped, clamped with Citrate x 2 ports   Machine Disinfection Process Exterior Machine Disinfection   Rinseback Volume (ml) 300 ml   Total Liters Processed (l/min) 81.3 l/min   Dialyzer Clearance Heavily streaked   Duration of Treatment (minutes) 330 minutes   Heparin amount administered during treatment (units) 0 units   Hemodialysis Intake (ml) 1200 ml   Hemodialysis Output (ml) 1500 ml   NET Removed (ml) 0 ml   Tolerated Treatment Good   Patient Response to Treatment increasing TMP   Bilateral Breath Sounds Diminished   Edema Generalized Trace   Physician Notified?  No

## 2020-12-26 NOTE — PLAN OF CARE
Problem: Restraint Use - Nonviolent/Non-Self-Destructive Behavior:  Goal: Absence of restraint-related injury  Description: Absence of restraint-related injury  12/26/2020 1813 by Dwaine Gonzalez RN  Outcome: Met This Shift     Problem: Restraint Use - Nonviolent/Non-Self-Destructive Behavior:  Goal: Absence of restraint indications  Description: Absence of restraint indications  12/26/2020 1813 by Dwaine Gonzalez RN  Outcome: Not Met This Shift

## 2020-12-26 NOTE — FLOWSHEET NOTE
12/25/20 2200   Vital Signs   /76   Temp 97 °F (36.1 °C)   Pulse 92   Resp 18   SpO2 95 %   Post-Hemodialysis Assessment   Post-Treatment Procedures Blood returned;Catheter capped, clamped with Citrate x 2 ports   Machine Disinfection Process Acid/Vinegar Clean;Heat Disinfect; Exterior Machine Disinfection   Rinseback Volume (ml) 300 ml   Total Liters Processed (l/min) 74.7 l/min   Dialyzer Clearance Moderately streaked   Duration of Treatment (minutes) 360 minutes   Heparin amount administered during treatment (units) 0 units   Hemodialysis Intake (ml) 1300 ml   Hemodialysis Output (ml) 1300 ml   NET Removed (ml) 0 ml   Tolerated Treatment Fair   Patient Response to Treatment see note   Edema Generalized;Right lower extremity; Left lower extremity;Right upper extremity; Left upper extremity   Edema Generalized +1   RUE Edema Trace   LUE Edema Trace   RLE Edema +1;Non-pitting   LLE Edema +1;Non-pitting   Tolerated  6hr tx fair on 3K 2.25Ca bath per orders, no fluid removed per orders. HD CVC flushed, citrate to dwell, clamped and capped  post tx per policy. Report to floor RN, remains in care of staff.

## 2020-12-26 NOTE — PLAN OF CARE
Problem: Gas Exchange - Impaired  Goal: Absence of hypoxia  12/26/2020 0150 by Christie Bess RN  Outcome: Met This Shift     Problem: Skin Integrity:  Goal: Absence of new skin breakdown  Description: Absence of new skin breakdown  12/26/2020 0150 by Christie Bess RN  Outcome: Met This Shift     Problem: Restraint Use - Nonviolent/Non-Self-Destructive Behavior:  Goal: Absence of restraint-related injury  Description: Absence of restraint-related injury  12/26/2020 0150 by Christie Bess RN  Outcome: Met This Shift     Problem: Restraint Use - Nonviolent/Non-Self-Destructive Behavior:  Goal: Absence of restraint indications  Description: Absence of restraint indications  12/26/2020 0150 by Christie Bess RN  Outcome: Not Met This Shift

## 2020-12-27 ENCOUNTER — APPOINTMENT (OUTPATIENT)
Dept: GENERAL RADIOLOGY | Age: 70
DRG: 207 | End: 2020-12-27
Payer: MEDICARE

## 2020-12-27 LAB
AADO2: 169.8 MMHG
ALBUMIN SERPL-MCNC: 3 G/DL (ref 3.5–5.2)
ALP BLD-CCNC: 140 U/L (ref 40–129)
ALT SERPL-CCNC: 20 U/L (ref 0–40)
ANION GAP SERPL CALCULATED.3IONS-SCNC: 15 MMOL/L (ref 7–16)
ANION GAP SERPL CALCULATED.3IONS-SCNC: 15 MMOL/L (ref 7–16)
ANION GAP SERPL CALCULATED.3IONS-SCNC: 16 MMOL/L (ref 7–16)
APTT: 24.4 SEC (ref 24.5–35.1)
APTT: 39.3 SEC (ref 24.5–35.1)
AST SERPL-CCNC: 21 U/L (ref 0–39)
B.E.: -1.7 MMOL/L (ref -3–3)
BASOPHILS ABSOLUTE: 0.02 E9/L (ref 0–0.2)
BASOPHILS RELATIVE PERCENT: 0.2 % (ref 0–2)
BILIRUB SERPL-MCNC: 0.6 MG/DL (ref 0–1.2)
BILIRUBIN DIRECT: 0.3 MG/DL (ref 0–0.3)
BILIRUBIN, INDIRECT: 0.3 MG/DL (ref 0–1)
BLOOD CULTURE, ROUTINE: NORMAL
BUN BLDV-MCNC: 38 MG/DL (ref 8–23)
BUN BLDV-MCNC: 43 MG/DL (ref 8–23)
BUN BLDV-MCNC: 56 MG/DL (ref 8–23)
C-REACTIVE PROTEIN: 10.2 MG/DL (ref 0–0.4)
CALCIUM IONIZED: 1.15 MMOL/L (ref 1.15–1.33)
CALCIUM SERPL-MCNC: 8 MG/DL (ref 8.6–10.2)
CALCIUM SERPL-MCNC: 8.3 MG/DL (ref 8.6–10.2)
CALCIUM SERPL-MCNC: 8.6 MG/DL (ref 8.6–10.2)
CHLORIDE BLD-SCNC: 96 MMOL/L (ref 98–107)
CHLORIDE BLD-SCNC: 97 MMOL/L (ref 98–107)
CHLORIDE BLD-SCNC: 97 MMOL/L (ref 98–107)
CO2: 22 MMOL/L (ref 22–29)
CO2: 23 MMOL/L (ref 22–29)
CO2: 23 MMOL/L (ref 22–29)
COHB: 0.8 % (ref 0–1.5)
CREAT SERPL-MCNC: 3.1 MG/DL (ref 0.7–1.2)
CREAT SERPL-MCNC: 3.3 MG/DL (ref 0.7–1.2)
CREAT SERPL-MCNC: 4.3 MG/DL (ref 0.7–1.2)
CRITICAL: ABNORMAL
CULTURE, BLOOD 2: NORMAL
D DIMER: >5250 NG/ML DDU
DATE ANALYZED: ABNORMAL
DATE OF COLLECTION: ABNORMAL
EOSINOPHILS ABSOLUTE: 0 E9/L (ref 0.05–0.5)
EOSINOPHILS RELATIVE PERCENT: 0 % (ref 0–6)
FERRITIN: 3614 NG/ML
FIO2: 40 %
GFR AFRICAN AMERICAN: 16
GFR AFRICAN AMERICAN: 22
GFR AFRICAN AMERICAN: 24
GFR NON-AFRICAN AMERICAN: 16 ML/MIN/1.73
GFR NON-AFRICAN AMERICAN: 22 ML/MIN/1.73
GFR NON-AFRICAN AMERICAN: 24 ML/MIN/1.73
GLUCOSE BLD-MCNC: 225 MG/DL (ref 74–99)
GLUCOSE BLD-MCNC: 226 MG/DL (ref 74–99)
GLUCOSE BLD-MCNC: 229 MG/DL (ref 74–99)
HCO3: 21.8 MMOL/L (ref 22–26)
HCT VFR BLD CALC: 25.7 % (ref 37–54)
HCT VFR BLD CALC: 26.2 % (ref 37–54)
HCT VFR BLD CALC: 26.5 % (ref 37–54)
HEMOGLOBIN: 8.5 G/DL (ref 12.5–16.5)
HEMOGLOBIN: 8.7 G/DL (ref 12.5–16.5)
HEMOGLOBIN: 8.8 G/DL (ref 12.5–16.5)
HHB: 6.3 % (ref 0–5)
IMMATURE GRANULOCYTES #: 0.48 E9/L
IMMATURE GRANULOCYTES %: 3.6 % (ref 0–5)
INR BLD: 1.1
INR BLD: 1.1
LAB: ABNORMAL
LIPASE: 25 U/L (ref 13–60)
LYMPHOCYTES ABSOLUTE: 0.8 E9/L (ref 1.5–4)
LYMPHOCYTES RELATIVE PERCENT: 6 % (ref 20–42)
Lab: ABNORMAL
MAGNESIUM: 2.2 MG/DL (ref 1.6–2.6)
MAGNESIUM: 2.2 MG/DL (ref 1.6–2.6)
MAGNESIUM: 2.6 MG/DL (ref 1.6–2.6)
MCH RBC QN AUTO: 27.7 PG (ref 26–35)
MCH RBC QN AUTO: 27.9 PG (ref 26–35)
MCHC RBC AUTO-ENTMCNC: 33.1 % (ref 32–34.5)
MCHC RBC AUTO-ENTMCNC: 33.2 % (ref 32–34.5)
MCV RBC AUTO: 83.7 FL (ref 80–99.9)
MCV RBC AUTO: 84.1 FL (ref 80–99.9)
METER GLUCOSE: 218 MG/DL (ref 74–99)
METER GLUCOSE: 222 MG/DL (ref 74–99)
METER GLUCOSE: 228 MG/DL (ref 74–99)
METER GLUCOSE: 231 MG/DL (ref 74–99)
METER GLUCOSE: 253 MG/DL (ref 74–99)
METER GLUCOSE: 261 MG/DL (ref 74–99)
METHB: 0.4 % (ref 0–1.5)
MODE: AC
MONOCYTES ABSOLUTE: 0.82 E9/L (ref 0.1–0.95)
MONOCYTES RELATIVE PERCENT: 6.2 % (ref 2–12)
NEUTROPHILS ABSOLUTE: 11.17 E9/L (ref 1.8–7.3)
NEUTROPHILS RELATIVE PERCENT: 84 % (ref 43–80)
O2 CONTENT: 10.8 ML/DL
O2 SATURATION: 93.6 % (ref 92–98.5)
O2HB: 92.5 % (ref 94–97)
OPERATOR ID: 1632
PATIENT TEMP: 37 C
PCO2: 31.4 MMHG (ref 35–45)
PDW BLD-RTO: 14.6 FL (ref 11.5–15)
PDW BLD-RTO: 14.6 FL (ref 11.5–15)
PEEP/CPAP: 5 CMH2O
PFO2: 1.73 MMHG/%
PH BLOOD GAS: 7.46 (ref 7.35–7.45)
PHOSPHORUS: 4.8 MG/DL (ref 2.5–4.5)
PHOSPHORUS: 5 MG/DL (ref 2.5–4.5)
PHOSPHORUS: 6.1 MG/DL (ref 2.5–4.5)
PLATELET # BLD: 139 E9/L (ref 130–450)
PLATELET # BLD: 155 E9/L (ref 130–450)
PMV BLD AUTO: 11.3 FL (ref 7–12)
PMV BLD AUTO: 11.5 FL (ref 7–12)
PO2: 69.3 MMHG (ref 75–100)
POTASSIUM SERPL-SCNC: 4 MMOL/L (ref 3.5–5)
POTASSIUM SERPL-SCNC: 4.1 MMOL/L (ref 3.5–5)
POTASSIUM SERPL-SCNC: 4.2 MMOL/L (ref 3.5–5)
PROTHROMBIN TIME: 12.9 SEC (ref 9.3–12.4)
PROTHROMBIN TIME: 12.9 SEC (ref 9.3–12.4)
RBC # BLD: 3.07 E12/L (ref 3.8–5.8)
RBC # BLD: 3.15 E12/L (ref 3.8–5.8)
RI(T): 2.45
RR MECHANICAL: 16 B/MIN
SEDIMENTATION RATE, ERYTHROCYTE: 116 MM/HR (ref 0–15)
SODIUM BLD-SCNC: 134 MMOL/L (ref 132–146)
SODIUM BLD-SCNC: 135 MMOL/L (ref 132–146)
SODIUM BLD-SCNC: 135 MMOL/L (ref 132–146)
SOURCE, BLOOD GAS: ABNORMAL
THB: 8.2 G/DL (ref 11.5–16.5)
TIME ANALYZED: 439
TOTAL PROTEIN: 6.3 G/DL (ref 6.4–8.3)
TROPONIN: 0.01 NG/ML (ref 0–0.03)
TROPONIN: <0.01 NG/ML (ref 0–0.03)
VT MECHANICAL: 500 ML
WBC # BLD: 13.3 E9/L (ref 4.5–11.5)
WBC # BLD: 14.1 E9/L (ref 4.5–11.5)

## 2020-12-27 PROCEDURE — 6370000000 HC RX 637 (ALT 250 FOR IP): Performed by: INTERNAL MEDICINE

## 2020-12-27 PROCEDURE — 80076 HEPATIC FUNCTION PANEL: CPT

## 2020-12-27 PROCEDURE — 94003 VENT MGMT INPAT SUBQ DAY: CPT

## 2020-12-27 PROCEDURE — 2580000003 HC RX 258: Performed by: INTERNAL MEDICINE

## 2020-12-27 PROCEDURE — 85520 HEPARIN ASSAY: CPT

## 2020-12-27 PROCEDURE — 85018 HEMOGLOBIN: CPT

## 2020-12-27 PROCEDURE — C9113 INJ PANTOPRAZOLE SODIUM, VIA: HCPCS | Performed by: INTERNAL MEDICINE

## 2020-12-27 PROCEDURE — 6360000002 HC RX W HCPCS: Performed by: INTERNAL MEDICINE

## 2020-12-27 PROCEDURE — 71045 X-RAY EXAM CHEST 1 VIEW: CPT

## 2020-12-27 PROCEDURE — 84100 ASSAY OF PHOSPHORUS: CPT

## 2020-12-27 PROCEDURE — 85378 FIBRIN DEGRADE SEMIQUANT: CPT

## 2020-12-27 PROCEDURE — 82728 ASSAY OF FERRITIN: CPT

## 2020-12-27 PROCEDURE — 85730 THROMBOPLASTIN TIME PARTIAL: CPT

## 2020-12-27 PROCEDURE — 82805 BLOOD GASES W/O2 SATURATION: CPT

## 2020-12-27 PROCEDURE — 86140 C-REACTIVE PROTEIN: CPT

## 2020-12-27 PROCEDURE — 84484 ASSAY OF TROPONIN QUANT: CPT

## 2020-12-27 PROCEDURE — 85651 RBC SED RATE NONAUTOMATED: CPT

## 2020-12-27 PROCEDURE — 80048 BASIC METABOLIC PNL TOTAL CA: CPT

## 2020-12-27 PROCEDURE — 85027 COMPLETE CBC AUTOMATED: CPT

## 2020-12-27 PROCEDURE — 85610 PROTHROMBIN TIME: CPT

## 2020-12-27 PROCEDURE — 2500000003 HC RX 250 WO HCPCS: Performed by: INTERNAL MEDICINE

## 2020-12-27 PROCEDURE — 85025 COMPLETE CBC W/AUTO DIFF WBC: CPT

## 2020-12-27 PROCEDURE — 82330 ASSAY OF CALCIUM: CPT

## 2020-12-27 PROCEDURE — 2000000000 HC ICU R&B

## 2020-12-27 PROCEDURE — 90935 HEMODIALYSIS ONE EVALUATION: CPT | Performed by: INTERNAL MEDICINE

## 2020-12-27 PROCEDURE — 85014 HEMATOCRIT: CPT

## 2020-12-27 PROCEDURE — 82962 GLUCOSE BLOOD TEST: CPT

## 2020-12-27 PROCEDURE — 2500000003 HC RX 250 WO HCPCS

## 2020-12-27 PROCEDURE — 83735 ASSAY OF MAGNESIUM: CPT

## 2020-12-27 PROCEDURE — 83690 ASSAY OF LIPASE: CPT

## 2020-12-27 PROCEDURE — 36592 COLLECT BLOOD FROM PICC: CPT

## 2020-12-27 PROCEDURE — 93005 ELECTROCARDIOGRAM TRACING: CPT | Performed by: INTERNAL MEDICINE

## 2020-12-27 PROCEDURE — 05HN33Z INSERTION OF INFUSION DEVICE INTO LEFT INTERNAL JUGULAR VEIN, PERCUTANEOUS APPROACH: ICD-10-PCS | Performed by: INTERNAL MEDICINE

## 2020-12-27 RX ORDER — HEPARIN SODIUM 1000 [USP'U]/ML
60 INJECTION, SOLUTION INTRAVENOUS; SUBCUTANEOUS ONCE
Status: DISCONTINUED | OUTPATIENT
Start: 2020-12-27 | End: 2020-12-27 | Stop reason: ALTCHOICE

## 2020-12-27 RX ORDER — HEPARIN SODIUM 10000 [USP'U]/100ML
12 INJECTION, SOLUTION INTRAVENOUS CONTINUOUS
Status: DISCONTINUED | OUTPATIENT
Start: 2020-12-27 | End: 2020-12-30

## 2020-12-27 RX ORDER — INSULIN GLARGINE 100 [IU]/ML
25 INJECTION, SOLUTION SUBCUTANEOUS NIGHTLY
Status: DISCONTINUED | OUTPATIENT
Start: 2020-12-27 | End: 2021-01-01 | Stop reason: HOSPADM

## 2020-12-27 RX ORDER — HEPARIN SODIUM 1000 [USP'U]/ML
30 INJECTION, SOLUTION INTRAVENOUS; SUBCUTANEOUS PRN
Status: DISCONTINUED | OUTPATIENT
Start: 2020-12-27 | End: 2020-12-27 | Stop reason: ALTCHOICE

## 2020-12-27 RX ORDER — METOPROLOL TARTRATE 5 MG/5ML
5 INJECTION INTRAVENOUS ONCE
Status: COMPLETED | OUTPATIENT
Start: 2020-12-27 | End: 2020-12-27

## 2020-12-27 RX ORDER — METOPROLOL TARTRATE 5 MG/5ML
INJECTION INTRAVENOUS
Status: COMPLETED
Start: 2020-12-27 | End: 2020-12-27

## 2020-12-27 RX ORDER — HEPARIN SODIUM 1000 [USP'U]/ML
60 INJECTION, SOLUTION INTRAVENOUS; SUBCUTANEOUS PRN
Status: DISCONTINUED | OUTPATIENT
Start: 2020-12-27 | End: 2020-12-27 | Stop reason: ALTCHOICE

## 2020-12-27 RX ADMIN — INSULIN LISPRO 4 UNITS: 100 INJECTION, SOLUTION INTRAVENOUS; SUBCUTANEOUS at 04:15

## 2020-12-27 RX ADMIN — SODIUM CHLORIDE, PRESERVATIVE FREE 10 ML: 5 INJECTION INTRAVENOUS at 07:57

## 2020-12-27 RX ADMIN — REMDESIVIR 100 MG: 100 INJECTION, POWDER, LYOPHILIZED, FOR SOLUTION INTRAVENOUS at 18:53

## 2020-12-27 RX ADMIN — INSULIN GLARGINE 25 UNITS: 100 INJECTION, SOLUTION SUBCUTANEOUS at 20:49

## 2020-12-27 RX ADMIN — INSULIN LISPRO 6 UNITS: 100 INJECTION, SOLUTION INTRAVENOUS; SUBCUTANEOUS at 00:25

## 2020-12-27 RX ADMIN — METOPROLOL TARTRATE 12.5 MG: 25 TABLET, FILM COATED ORAL at 20:16

## 2020-12-27 RX ADMIN — Medication 50 MG: at 07:57

## 2020-12-27 RX ADMIN — PIPERACILLIN AND TAZOBACTAM 3.38 G: 3; .375 INJECTION, POWDER, LYOPHILIZED, FOR SOLUTION INTRAVENOUS at 18:12

## 2020-12-27 RX ADMIN — INSULIN LISPRO 6 UNITS: 100 INJECTION, SOLUTION INTRAVENOUS; SUBCUTANEOUS at 07:56

## 2020-12-27 RX ADMIN — HEPARIN SODIUM 12 UNITS/KG/HR: 10000 INJECTION, SOLUTION INTRAVENOUS at 11:54

## 2020-12-27 RX ADMIN — PANTOPRAZOLE SODIUM 40 MG: 40 INJECTION, POWDER, FOR SOLUTION INTRAVENOUS at 07:56

## 2020-12-27 RX ADMIN — FOLIC ACID 1 MG: 5 INJECTION, SOLUTION INTRAMUSCULAR; INTRAVENOUS; SUBCUTANEOUS at 08:01

## 2020-12-27 RX ADMIN — METOPROLOL TARTRATE 12.5 MG: 25 TABLET, FILM COATED ORAL at 11:55

## 2020-12-27 RX ADMIN — INSULIN LISPRO 4 UNITS: 100 INJECTION, SOLUTION INTRAVENOUS; SUBCUTANEOUS at 11:53

## 2020-12-27 RX ADMIN — Medication 10 ML: at 21:58

## 2020-12-27 RX ADMIN — THIAMINE HYDROCHLORIDE 250 MG: 100 INJECTION, SOLUTION INTRAMUSCULAR; INTRAVENOUS at 11:55

## 2020-12-27 RX ADMIN — PANTOPRAZOLE SODIUM 40 MG: 40 INJECTION, POWDER, FOR SOLUTION INTRAVENOUS at 21:57

## 2020-12-27 RX ADMIN — PIPERACILLIN AND TAZOBACTAM 3.38 G: 3; .375 INJECTION, POWDER, LYOPHILIZED, FOR SOLUTION INTRAVENOUS at 05:43

## 2020-12-27 RX ADMIN — OXYCODONE HYDROCHLORIDE AND ACETAMINOPHEN 500 MG: 500 TABLET ORAL at 07:56

## 2020-12-27 RX ADMIN — DEXAMETHASONE SODIUM PHOSPHATE 6 MG: 4 INJECTION, SOLUTION INTRAMUSCULAR; INTRAVENOUS at 11:55

## 2020-12-27 RX ADMIN — SODIUM CHLORIDE, PRESERVATIVE FREE 10 ML: 5 INJECTION INTRAVENOUS at 21:58

## 2020-12-27 RX ADMIN — Medication 2000 UNITS: at 07:56

## 2020-12-27 RX ADMIN — METOPROLOL TARTRATE 5 MG: 5 INJECTION INTRAVENOUS at 05:52

## 2020-12-27 RX ADMIN — THIAMINE HYDROCHLORIDE 250 MG: 100 INJECTION, SOLUTION INTRAMUSCULAR; INTRAVENOUS at 20:22

## 2020-12-27 RX ADMIN — INSULIN LISPRO 4 UNITS: 100 INJECTION, SOLUTION INTRAVENOUS; SUBCUTANEOUS at 20:50

## 2020-12-27 RX ADMIN — Medication 10 ML: at 07:58

## 2020-12-27 RX ADMIN — SODIUM CHLORIDE 0.2 MCG/HR: 9 INJECTION, SOLUTION INTRAVENOUS at 20:29

## 2020-12-27 RX ADMIN — Medication 175 MCG/HR: at 03:06

## 2020-12-27 RX ADMIN — INSULIN LISPRO 4 UNITS: 100 INJECTION, SOLUTION INTRAVENOUS; SUBCUTANEOUS at 18:53

## 2020-12-27 ASSESSMENT — PULMONARY FUNCTION TESTS
PIF_VALUE: 16
PIF_VALUE: 17
PIF_VALUE: 16
PIF_VALUE: 20
PIF_VALUE: 17
PIF_VALUE: 17
PIF_VALUE: 15
PIF_VALUE: 16
PIF_VALUE: 16
PIF_VALUE: 19
PIF_VALUE: 19
PIF_VALUE: 17
PIF_VALUE: 18
PIF_VALUE: 16
PIF_VALUE: 16
PIF_VALUE: 17

## 2020-12-27 ASSESSMENT — PAIN SCALES - GENERAL
PAINLEVEL_OUTOF10: 0

## 2020-12-27 NOTE — PLAN OF CARE
Problem: Restraint Use - Nonviolent/Non-Self-Destructive Behavior:  Goal: Absence of restraint indications  Description: Absence of restraint indications  12/27/2020 0557 by Rosemarie Humphreys RN  Outcome: Not Met This Shift     Problem: Restraint Use - Nonviolent/Non-Self-Destructive Behavior:  Goal: Absence of restraint-related injury  Description: Absence of restraint-related injury  12/27/2020 0557 by Rosemarie Humphreys RN  Outcome: Met This Shift

## 2020-12-27 NOTE — PROGRESS NOTES
[address] [facility]  Department of Internal Medicine   Internal Medicine Residency   MICU Progress Note    Patient:  Andre Pelaez 65 y.o. male  MRN: [id number]     Date of Service: 12/27/2020    Allergy: Patient has no known allergies. Subjective     12/26: Hb 6.8 - > Transfused 1 unit . Undergoing CVVHD. 12/27: Pt sedated , intubated. Responds to pain , cough and gag reflex present  Overnight patient had new onset Afib with HR in 110-120s--> Given lopressor 5mg and HR reduced to under 100. Currently in AM HR 110s   Objective     VS: BP (!) 154/94   Pulse 116   Temp 98.1 °F (36.7 °C)   Resp 20   Ht 6' 2\" (1.88 m)   Wt 220 lb 7.4 oz (100 kg)   SpO2 92%   BMI 28.31 kg/m²           I & O - 24hr:     Intake/Output Summary (Last 24 hours) at 12/27/2020 1454  Last data filed at 12/27/2020 0547  Gross per 24 hour   Intake 985 ml   Output 43 ml   Net 942 ml       Physical Exam:  · General Appearance: sedated ,intubated, lying in bed  · Neck: no adenopathy, no carotid bruit, no JVD, supple, symmetrical, trachea midline and thyroid not enlarged, symmetric, no tenderness/mass/nodules  · Lung: decreased breath sounds bilaterally  · Heart: irregularly irregular rhythm and tachycardia  · Abdomen: soft, non-tender; bowel sounds normal; no masses,  no organomegaly  · Extremities:  extremities normal, atraumatic, no cyanosis or edema  · Musculoskeletal: No joint swelling, no muscle tenderness. ROM normal in all joints of extremities.    · Neurologic: Mental status: sedated, intubated, cough, gag reflex +ve , pupils reactive, withdraws to pain, cannot follow commands     Lines     site day    Art line   None    TLC R IJ 4   PICC None    HD cath  Right fem vein 3     Mechanical Ventilation:   Pt was on ACVC but switched to PS overnight and tolerating well   Can consider weaning tomorrow and extubation  ABG:     Lab Results   Component Value Date    PH 7.459 12/27/2020    PCO2 31.4 12/27/2020    PO2 69.3 12/27/2020    HCO3 21.8 12/27/2020    BE -1.7 12/27/2020    THB 8.2 12/27/2020    O2SAT 93.6 12/27/2020        Medications     Infusions: (Fluid, Sedation, Vasopressors)  IVF:    Fentanyl 200 mcg/hr     Nutrition:   NPO    ATB:   Antibiotics  Date   Zossyn 12/23             Cultures; Not growing anything    Consults:    Nephrology For Acute renal failure   Vascular surgery for temporary dialysis access    Labs     CBC with Differential:    Lab Results   Component Value Date    WBC 14.1 12/27/2020    RBC 3.15 12/27/2020    HGB 8.8 12/27/2020    HCT 26.5 12/27/2020     12/27/2020    MCV 84.1 12/27/2020    MCH 27.9 12/27/2020    MCHC 33.2 12/27/2020    RDW 14.6 12/27/2020    NRBC 0.9 12/26/2020    SEGSPCT 63 09/27/2013    LYMPHOPCT 6.0 12/27/2020    MONOPCT 6.2 12/27/2020    MYELOPCT 0.9 12/26/2020    BASOPCT 0.2 12/27/2020    MONOSABS 0.82 12/27/2020    LYMPHSABS 0.80 12/27/2020    EOSABS 0.00 12/27/2020    BASOSABS 0.02 12/27/2020     CMP:    Lab Results   Component Value Date     12/27/2020    K 4.0 12/27/2020    K 4.7 08/16/2020    CL 97 12/27/2020    CO2 23 12/27/2020    BUN 56 12/27/2020    CREATININE 4.3 12/27/2020    GFRAA 16 12/27/2020    LABGLOM 16 12/27/2020    GLUCOSE 229 12/27/2020    PROT 6.3 12/27/2020    LABALBU 3.0 12/27/2020    CALCIUM 8.6 12/27/2020    BILITOT 0.6 12/27/2020    ALKPHOS 140 12/27/2020    AST 21 12/27/2020    ALT 20 12/27/2020       Imaging Studies:  CXR: Stable bilateral opacities unchanged       Resident's Assessment and Plan     Days J Kevin   , 65 y.o. , male came with CC : AMS x unknown time      has a past medical history of Cancer ([facility] [address] Diabetes mellitus ([facility] [address] Hyperlipidemia, Hypertension, and Prostate cancer ([facility] [address] SUMMARY OF HOSPITAL STAY: Briefly, nAdre Kaur is a 65 y.o. male who came in with CC of AMS since unknown time. EMS called to home as patient had AMS x unknown time. While in the ED, he was oriented to person only.  COVID test returned positive. Other significant labs in ED were Glucose of 1332, pH 6.96, pco2 19, bicarb 4, Na 123, K 7.1, Cl 91, BUN 65, Cr 5.7 ( baseline 1.4) , Phos 13, Mg 4.5, Lactic acid 11.8 , Lipase 3000. ProBNP 1249, Trop T -ve, EKG showed NSR + 1st degree AV block. CXR shows developing right sided infiltrate. CT Abd showed acute pancreatic inflammatory changes + small amount of ascites . CT cervical spine showed advanced multilevel degenerative disc and joint disease. Mild bilateral narrowing C2-3, C4-5 . CT Head negative for acute process. When I examined the patient, he was extremely lethargic, and admitted to having dry cough, abdominal pain , diarrhea . He denied fevers but admitted to drinking more alcohol in the past week than his usual amount which he could not confirm. He was answering in few words . PMHx shows Pt goes to [place] for health issues which include Anxiety, Major depression, B/L Carpal tunnel, Vit D deficiency, Diabetes with neuropathy, erectile dysfunction, hemorrhoids, HLD, HTN , BPH , Hx of lung neoplasm, Prostate neoplasm , tobacco dependence, hx of exposure to [employer] . Days since Admission: 5  Days on Ventilator : 3    Currently being managed for :  Acute encephalopathy 2/2 HHS/DKA/mixed vs alcohol withdrawal  - While HHS/DKA has been treated, pt became less responsive, tachypneic and this was consistent with symptoms of alcohol withdrawal. He was therefore intubated on 12/24 due to respiratory distress.   - Currently on fentanyl for sedation, will aim to get off fentanyl and resume precedex if needed for agitation   - Has received 2 doses of phenobarb (65mg+ 180mg) and is on CIWA protocol.     Cardiology:   New onset paroxysmal Afib on 12/26 night   CHADVASC score 3   - Started lopressor 12.g mg BID + heparin low dose drip for anticoag  - Will follow echo, EKG, troponin      HTN   - Home med includes amlodipine 10mg and lisinopril 20mg  Will resume amlodipine if BP elevated     HLD  On atorvastatin at home, currently held     Pulmonology  Acute hypoxic resp failure 2/2 alcohol withdrawal  - Initially was fine on room air but became altered on 12/23 night and was intubated on 12/24 morning due to respiratory distress  Following daily ABGs and will attempt to wean off the vent  - Currently doing well on pressure support, can try weaning paramaters and extubation tomorrow    COVID PNA   - CXR shows right sided infiltrate, currently unchanged  - Trending inflammatory markers   -Started remdesivir, decadron, vit c and zinc         Nephrology (Fluids/ Electrolytes & Nutrition):    BLAIR Stage 3 on CKD Stage 2/3   - Lowest Cr available is 1.4  Cr 5.7 on admission , FENa 1.1% (intrinsic )  Pt received 8L fluid as part of treatment for DKA/HHS and is net +7.9L , Cr down to 4.9 initially  - Cr increased again to 6 with decrease in urine output, pt underwent SLED on 12/26 , and will continue SLEd today x6 hours as per Nephro   - Cr 3.3 today        HAGMA ( 2/2 Lactic acidosis vs DKA ) ( resolved )  Alb 2.9, corrected normal AG for this pt is 9 , delta gap 19, Corrected HCO3 23 so only AG acidosis present +   - After compensation pCO2 should be 12-16  , pco2 19 , so maybe a component of respiratory acidosis   -Lactic Acid 11.8-> 1.5 after receiving fluids , AG is 16 now  - Bridged and transitioned to subQ insulin      Pseudohyponatremia 2/2 severe hyperglycemia (resolved)  - Na 123 on admission , when corrected for glucose it is 146 ( WNL )        Gastroenterology:   Acute pancreatitis likely 2/2 alcohol consumption vs COVID  (resolving)  - Pt reports drinking more alcohol recently but cannot quantify as lethargic and altered mental status   - Triglyceride level 439 , Lipase 3000 initially -> 2067-> 2452 --> 25  - Trend lipase as CRP can be altered due to COVID  - Concern that this could develop into necrotizing pancreatitis, so covering with Zosyn   - Keeping NPO currently     Infectious Disease:   COVID PNA - Management as above     Rule out other infectious source  - Pancultured, Procal 4.65   - Covering with Zosyn  - Will follow and modify accordingly      Endocrine:   Severe hyperglycemia 2/2 DKA Vs HHS vs mixed (resolving)   Home meds include 3 oral antidiabetic meds  - Presented with BG of 1132, but BHB not checked   - BHB after 12 hours is elevated at 0.8   - AG closed and pt bridged to subQ insulin   Monitor POCT Glucose q4,   - Currently on lantus 25 units + MDSSI      Heme/Onc  Normocytic Anemia  Hb 6.8 - was transferred     Other problems :  Hx of Anxiety, Major depression  B/L Carpal Tunnel  Vit D deficiency  Erectil dysfunction  Hemorrhoids  BPH, Hx of lung and prostate neoplasm  Tobacco dependence  Exposure to Agent [place] in [facility]     Next of Kind/ POA:   Damaris Mckeon (Domestic Partner)   [phone]     Code Status:   Full         DVT ppx: Heparin low dose for Afib  GI ppx: Protonix    Final Note:  Intubated after resp distress from alcohol withdrawal . Treating COVID PNA. New onset Afib last night which is being treated with lopressor+ heparin. Acute panc resolving. Watching H&H due to acute anemia. Could benefit from podiatry consult once stable. Valentina Santos MD, PGY-1    Attending physician: Dr. Annie Garcia    I personally saw, examined and provided care for the patient. Radiographs, labs and medication list were reviewed by me independently. I spoke with bedside nursing, therapists and consultants. Critical care services and times documented are independent of procedures and multidisciplinary rounds with Residents. Additionally comprehensive, multidisciplinary rounds were conducted with the MICU team. The case was discussed in detail and plans for care were established. Review of Residents documentation was conducted and revisions were made as appropriate. I agree with the above documented exam, problem list and plan of care.   Celine Kim MD   CCT excluding procedures:38'

## 2020-12-27 NOTE — PROGRESS NOTES
Hospitalist Progress Note      SYNOPSIS: Patient admitted on 2020 for AMS for unknown time. Code positive. Chest x-ray showed right-sided infiltrate. CT abdomen showed acute pancreatic inflammation and small amount of ascites. SUBJECTIVE:    Patient seen and examined  Records reviewed. Intubated sedated    Stable overnight. No other overnight issues reported. Temp (24hrs), Av.8 °F (37.1 °C), Min:98.1 °F (36.7 °C), Max:99.1 °F (37.3 °C)    DIET: Diet NPO Effective Now  CODE: Full Code    Intake/Output Summary (Last 24 hours) at 2020 1420  Last data filed at 2020 0547  Gross per 24 hour   Intake 985 ml   Output 43 ml   Net 942 ml       OBJECTIVE:    BP (!) 147/104   Pulse 115   Temp 98.1 °F (36.7 °C)   Resp 20   Ht 6' 2\" (1.88 m)   Wt 220 lb 7.4 oz (100 kg)   SpO2 92%   BMI 28.31 kg/m²     General appearance: Intubated sedated  HEENT:  Conjunctivae/corneas clear. Neck: Supple. No jugular venous distention. Respiratory: Clear to auscultation bilaterally, normal respiratory effort  Cardiovascular: Regular rate rhythm, normal S1-S2  Abdomen: Soft, nontender, nondistended  Musculoskeletal: No clubbing, cyanosis, no bilateral lower extremity edema. Brisk capillary refill. Skin:  No rashes  on visible skin  Neurologic: Intubated sedated    ASSESSMENT:    Principal Problem:    Acute metabolic encephalopathy  Active Problems:    Hyperlipidemia    Prostate cancer ([facility] [address]    Hypertension    Diabetes mellitus ([facility] [address]    DKA, type 1, not at goal [facility])    Acute pancreatitis    Acute renal failure (ARF) (HCC)    High anion gap metabolic acidosis  Resolved Problems:    * No resolved hospital problems. *         PLAN:    Temporal dialysis catheter per vascular surgery. Hemodialysis planned per nephrology noted. Manage alcohol withdrawal with CIWA protocol  Continue Decadron, vitamin C, zinc.  Continue Zosyn.   Surgery consulted for pancreatitis evaluation management  Start IV heparin  Has been transitioned to subcutaneous insulin.     Echo cardiogram ordered    DISPOSITION: Continue MICU    Medications:  REVIEWED DAILY    Infusion Medications    heparin (PORCINE) Infusion 12 Units/kg/hr (12/27/20 1154)    dexmedetomidine (PRECEDEX) IV infusion Stopped (12/25/20 2006)    norepinephrine Stopped (12/25/20 2120)    fentaNYL 5 mcg/ml in 0.9%  ml infusion 200 mcg/hr (12/27/20 0441)    dextrose       Scheduled Medications    metoprolol tartrate  12.5 mg Oral BID    insulin glargine  25 Units Subcutaneous Nightly    sodium chloride  20 mL Intravenous Once    pantoprazole  40 mg Intravenous BID    And    sodium chloride (PF)  10 mL Intravenous BID    remdesivir IVPB  100 mg Intravenous Q24H    sodium chloride flush  10 mL Intravenous 2 times per day    dexamethasone  6 mg Intravenous Q24H    insulin lispro  0-12 Units Subcutaneous Q4H    sodium chloride (PF)  10 mL Intravenous Daily    [Held by provider] heparin (porcine)  5,000 Units Subcutaneous 3 times per day    thiamine (VITAMIN B1) IVPB  250 mg Intravenous BID    Followed by   Robert Gross ON 12/28/2020] thiamine  100 mg Intravenous BID    Followed by   Robert Gross ON 1/1/2021] thiamine  100 mg Intravenous Daily    folic acid  1 mg Intravenous Daily    vitamin D  2,000 Units Oral Daily    zinc sulfate  50 mg Oral Daily    ascorbic acid  500 mg Oral Daily    piperacillin-tazobactam  3.375 g Intravenous Q12H    sodium chloride   Intravenous Q12H    sodium chloride flush  10 mL Intravenous 2 times per day     PRN Meds: perflutren lipid microspheres, sodium chloride, sodium chloride flush, LORazepam **OR** LORazepam **OR** LORazepam **OR** LORazepam **OR** LORazepam **OR** LORazepam **OR** LORazepam **OR** LORazepam, anticoagulant sodium citrate, acetaminophen **OR** acetaminophen, polyethylene glycol, sennosides, hydrALAZINE, labetalol, sodium chloride flush, glucose, dextrose, glucagon (rDNA), dextrose, dextrose    Labs:

## 2020-12-27 NOTE — PROCEDURES
Arterial line placement    Procedure: Left/Right radial arterial line placement. Indications: Continuous monitoring of blood pressure in a patient with hypotension +/- shock, on Levophed. Anesthesia: Local infiltration of 1% lidocaine. Consent: The family members were counseled regarding the procedure, its indications, risks, potential complications and alternatives, and any questions were answered. Consent was obtained to proceed. Technique: Time Out: Immediately prior to the procedure a \"timeout\" was called to verify the correct patient and procedure. Procedure was done using strict aseptic technique. Tae's test was performed and was normal. left radial site was cleaned with chloraprep and draped. Radial artery was identified, then Lidocaine 1% was infiltrated locally. Radial arterial line was inserted, a good blood flow was obtained, after which guidewire was inserted all the way with no resistance. Then the canula was inserted and needle with guidewire was withdrawn. Pulsatile bright red blood flow was observed. The canula was connected to BP monitoring apparatus and a good quality waveform was noted. Then the canula was secured with 2 stay sutures of 3-0 silk after Lidocaine infiltration, following which dressing was applied. Number of sticks: 1. Number of Kits used: 1. Complications: No immediate complication. Estimated blood loss: About 1 ml. Comment: Patient tolerated the procedure well.      Matt Sales MD PGY-3   12/27/2020 2:20 PM

## 2020-12-27 NOTE — FLOWSHEET NOTE
12/27/20 1608   Vital Signs   BP (!) 144/108   Temp 98.1 °F (36.7 °C)   Pulse 155   Resp 22   SpO2 91 %   Weight 220 lb 7.4 oz (100 kg)   Weight Method Bed scale   Percent Weight Change 0   Pain Assessment   Pain Assessment CPOT   Pain Level 0   Patient's Stated Pain Goal No pain   Response to Pain Intervention Asleep with RR greater than 10   Post-Hemodialysis Assessment   Post-Treatment Procedures Blood returned;Catheter capped, clamped with Citrate x 2 ports   Machine Disinfection Process Acid/Vinegar Clean;Exterior Machine Disinfection; Heat Disinfect; Machine Absence of Bleach Machine   Rinseback Volume (ml) 300 ml   Total Liters Processed (l/min) 41.5 l/min   Dialyzer Clearance Lightly streaked   Duration of Treatment (minutes) 180 minutes   Hemodialysis Intake (ml) 884 ml   Hemodialysis Output (ml) 884 ml   NET Removed (ml) 0 ml   Tolerated Treatment Fair   Patient Response to Treatment dialysis stopped at 3 hour cynthia per Dr Toney Cleary, pt is a fib RVR, with -150., pt blood returned, cath care given, both ports of cath flushed c 10 cc ns, citrate instilled to fill volume, caps applied, report given to Amy Chin, HALLIE   Bilateral Breath Sounds Diminished   Edema Generalized;Right upper extremity; Left upper extremity;Right lower extremity; Left lower extremity   Edema Generalized Pitting;+2   RUE Edema +3;Pitting   LUE Edema +1;Pitting   RLE Edema Trace   LLE Edema Trace   Physician Notified?  Yes

## 2020-12-27 NOTE — PROCEDURES
Central Line Insertion     Procedure: left internal jugular vein Triple Lumen Catheter placement. Indications: vascular access    Consent: The family members were counseled regarding the procedure, its indications, risks, potential complications and alternatives, and any questions were answered. Consent was obtained to proceed. Number of sticks: 1    Number of Kits used: 1    Procedure: Time Out: Immediately prior to the procedure a \"timeout\" was called to verify the correct patient and procedure. The patient was place in the trendelenburg position and the skin over the left internal jugular vein was prepped with betadine and draped in a sterile fashion. Local anesthesia was not performed patient already sedated. With Ultrasound guidance a large bore needle was used to identify the vein, dark non pulsatile blood returned. The guide wire was then inserted through the needle with minimal resistance. 2 mm nick was made in the skin beside the guidewire. Then a dilator was inserted and removed. A triple lumen catheter was then inserted into the vessel over the guide wire using the Seldinger technique to the 15 cm cynthia. All ports showed good, free flowing blood return and were flushed with saline solution. The catheter was then securely fastened to the skin with sutures and covered with a bio patch and sterile dressing. A post procedure X-ray was ordered and is still pending at this time. Complications: None   The patient tolerated the procedure well. Estimated blood loss: 2 ml.     Gt Sutherland MD PGY-3  12/27/2020  2:18 PM

## 2020-12-27 NOTE — PROGRESS NOTES
Hospitalist Progress Note      SYNOPSIS: Patient admitted on 2020 for AMS for unknown time. Code positive. Chest x-ray showed right-sided infiltrate. CT abdomen showed acute pancreatic inflammation and small amount of ascites. SUBJECTIVE:    Patient seen and examined  Records reviewed. Intubated sedated    Stable overnight. No other overnight issues reported. Temp (24hrs), Av.8 °F (37.1 °C), Min:98.1 °F (36.7 °C), Max:99.1 °F (37.3 °C)    DIET: Diet NPO Effective Now  CODE: Full Code    Intake/Output Summary (Last 24 hours) at 2020 1419  Last data filed at 2020 0547  Gross per 24 hour   Intake 985 ml   Output 43 ml   Net 942 ml       OBJECTIVE:    BP (!) 147/104   Pulse 115   Temp 98.1 °F (36.7 °C)   Resp 20   Ht 6' 2\" (1.88 m)   Wt 220 lb 7.4 oz (100 kg)   SpO2 92%   BMI 28.31 kg/m²     General appearance: Intubated sedated  HEENT:  Conjunctivae/corneas clear. Neck: Supple. No jugular venous distention. Respiratory: Clear to auscultation bilaterally, normal respiratory effort  Cardiovascular: Regular rate rhythm, normal S1-S2  Abdomen: Soft, nontender, nondistended  Musculoskeletal: No clubbing, cyanosis, no bilateral lower extremity edema. Brisk capillary refill. Skin:  No rashes  on visible skin  Neurologic: Intubated sedated    ASSESSMENT:    Principal Problem:    Acute metabolic encephalopathy  Active Problems:    Hyperlipidemia    Prostate cancer ([facility] [address]    Hypertension    Diabetes mellitus ([facility] [address]    DKA, type 1, not at goal [facility])    Acute pancreatitis    Acute renal failure (ARF) (HCC)    High anion gap metabolic acidosis  Resolved Problems:    * No resolved hospital problems. *         PLAN:    Temporal dialysis catheter per vascular surgery. Hemodialysis planned per nephrology noted. Manage alcohol withdrawal with CIWA protocol  Continue Decadron, vitamin C, zinc.  Continue Zosyn.   Surgery consulted for pancreatitis evaluation management  DKA protocol    DISPOSITION: Continue MICU    Medications:  REVIEWED DAILY    Infusion Medications    heparin (PORCINE) Infusion 12 Units/kg/hr (12/27/20 1154)    dexmedetomidine (PRECEDEX) IV infusion Stopped (12/25/20 2006)    norepinephrine Stopped (12/25/20 2120)    fentaNYL 5 mcg/ml in 0.9%  ml infusion 200 mcg/hr (12/27/20 0441)    dextrose       Scheduled Medications    metoprolol tartrate  12.5 mg Oral BID    insulin glargine  25 Units Subcutaneous Nightly    sodium chloride  20 mL Intravenous Once    pantoprazole  40 mg Intravenous BID    And    sodium chloride (PF)  10 mL Intravenous BID    remdesivir IVPB  100 mg Intravenous Q24H    sodium chloride flush  10 mL Intravenous 2 times per day    dexamethasone  6 mg Intravenous Q24H    insulin lispro  0-12 Units Subcutaneous Q4H    sodium chloride (PF)  10 mL Intravenous Daily    [Held by provider] heparin (porcine)  5,000 Units Subcutaneous 3 times per day    thiamine (VITAMIN B1) IVPB  250 mg Intravenous BID    Followed by   Jorge Vázquez ON 12/28/2020] thiamine  100 mg Intravenous BID    Followed by   Jorge Vázquez ON 1/1/2021] thiamine  100 mg Intravenous Daily    folic acid  1 mg Intravenous Daily    vitamin D  2,000 Units Oral Daily    zinc sulfate  50 mg Oral Daily    ascorbic acid  500 mg Oral Daily    piperacillin-tazobactam  3.375 g Intravenous Q12H    sodium chloride   Intravenous Q12H    sodium chloride flush  10 mL Intravenous 2 times per day     PRN Meds: perflutren lipid microspheres, sodium chloride, sodium chloride flush, LORazepam **OR** LORazepam **OR** LORazepam **OR** LORazepam **OR** LORazepam **OR** LORazepam **OR** LORazepam **OR** LORazepam, anticoagulant sodium citrate, acetaminophen **OR** acetaminophen, polyethylene glycol, sennosides, hydrALAZINE, labetalol, sodium chloride flush, glucose, dextrose, glucagon (rDNA), dextrose, dextrose    Labs:     Recent Labs     12/26/20  0522 12/26/20  1402 12/27/20  8295 12/27/20  1143   WBC 7.9  --  13.3* 14.1*   HGB 6.8* 8.4* 8.5* 8.8*   HCT 20.4* 24.2* 25.7* 26.5*   PLT 97*  --  139 155       Recent Labs     12/26/20  2046 12/27/20  0211 12/27/20  1143    135 135   K 4.4 4.2 4.0   CL 96* 97* 97*   CO2 23 22 23   BUN 35* 43* 56*   CREATININE 2.8* 3.3* 4.3*   CALCIUM 8.0* 8.0* 8.6   PHOS 4.6* 4.8* 6.1*       Recent Labs     12/25/20  0414 12/26/20  0522 12/26/20  1402 12/27/20  0533   PROT  --   --  6.7 6.3*   ALKPHOS  --   --  116 140*   ALT  --   --  22 20   AST  --   --  24 21   BILITOT  --   --  0.9 0.6   LIPASE 126* 44  --  25       Recent Labs     12/26/20  0522 12/27/20  0533 12/27/20  1143   INR 1.2 1.1 1.1       Recent Labs     12/27/20  1143   TROPONINI 0.01       Chronic labs:    Lab Results   Component Value Date    CHOL 160 12/23/2020    TRIG 439 (H) 12/23/2020    HDL 14 12/23/2020    LDLCALC - (AA) 12/23/2020    TSH 0.540 12/23/2020    INR 1.1 12/27/2020    LABA1C 11.1 (H) 12/23/2020       Radiology: REVIEWED DAILY    +++++++++++++++++++++++++++++++++++++++++++++++++  Louisa Dallas Physician - 2020 [facility], [place]  +++++++++++++++++++++++++++++++++++++++++++++++++  NOTE: This report was transcribed using voice recognition software. Every effort was made to ensure accuracy; however, inadvertent computerized transcription errors may be present.

## 2020-12-27 NOTE — PROGRESS NOTES
Intracatheter, PRN  fentaNYL 5 mcg/ml in 0.9%  ml infusion, 25 mcg/hr, Intravenous, Continuous  dexamethasone (DECADRON) injection 6 mg, 6 mg, Intravenous, Q24H  insulin lispro (HUMALOG) injection vial 0-12 Units, 0-12 Units, Subcutaneous, Q4H  acetaminophen (TYLENOL) 160 MG/5ML solution 650 mg, 650 mg, Oral, Q6H PRN **OR** acetaminophen (TYLENOL) suppository 650 mg, 650 mg, Rectal, Q6H PRN  polyethylene glycol (GLYCOLAX) packet 17 g, 17 g, Oral, Daily PRN  sennosides (SENOKOT) 8.8 MG/5ML syrup 5 mL, 5 mL, Oral, BID PRN  [DISCONTINUED] pantoprazole (PROTONIX) injection 40 mg, 40 mg, Intravenous, Daily **AND** sodium chloride (PF) 0.9 % injection 10 mL, 10 mL, Intravenous, Daily  [Held by provider] heparin (porcine) injection 5,000 Units, 5,000 Units, Subcutaneous, 3 times per day  [COMPLETED] thiamine (B-1) 500 mg in sodium chloride 0.9 % 100 mL IVPB, 500 mg, Intravenous, BID **FOLLOWED BY** thiamine (B-1) 250 mg in sodium chloride 0.9 % 100 mL IVPB, 250 mg, Intravenous, BID **FOLLOWED BY** [START ON 12/28/2020] thiamine (B-1) injection 100 mg, 100 mg, Intravenous, BID **FOLLOWED BY** [START ON 1/1/2021] thiamine (B-1) injection 100 mg, 100 mg, Intravenous, Daily  hydrALAZINE (APRESOLINE) injection 10 mg, 10 mg, Intravenous, Q4H PRN  labetalol (NORMODYNE;TRANDATE) injection 10 mg, 10 mg, Intravenous, E0F PRN  folic acid injection 1 mg, 1 mg, Intravenous, Daily  vitamin D (CHOLECALCIFEROL) tablet 2,000 Units, 2,000 Units, Oral, Daily  zinc sulfate (ZINCATE) capsule 50 mg, 50 mg, Oral, Daily  ascorbic acid (VITAMIN C) tablet 500 mg, 500 mg, Oral, Daily  piperacillin-tazobactam (ZOSYN) 3.375 g in dextrose 5 % 100 mL IVPB extended infusion (mini-bag), 3.375 g, Intravenous, Q12H  0.9 % sodium chloride infusion (Zosyn flush), , Intravenous, Q12H  sodium chloride flush 0.9 % injection 10 mL, 10 mL, Intravenous, 2 times per day  sodium chloride flush 0.9 % injection 10 mL, 10 mL, Intravenous, PRN  glucose (GLUTOSE) 40 % oral gel 15 g, 15 g, Oral, PRN  dextrose 50 % IV solution, 12.5 g, Intravenous, PRN  glucagon (rDNA) injection 1 mg, 1 mg, Intramuscular, PRN  dextrose 5 % solution, 100 mL/hr, Intravenous, PRN  dextrose 50 % IV solution, 12.5 g, Intravenous, PRN  Allergies:  Patient has no known allergies. PHYSICAL EXAM:      Vitals:    VITALS:  BP (!) 147/104   Pulse 115   Temp 98.1 °F (36.7 °C)   Resp 20   Ht 6' 2\" (1.88 m)   Wt 220 lb 7.4 oz (100 kg)   SpO2 92%   BMI 28.31 kg/m²   24HR RESPIRATORY RATE RANGE:  Resp  Av  Min: 15  Max: 31  24HR PULSE RANGE: Pulse  Av.4  Min: 60  Max: 135  24HR BLOOD PRESSURE RANGE:  Systolic (23XFB), RTH:771 , Min:112 , PAN:258   ; Diastolic (97BKV), USN:21, Min:67, Max:107    24HR INTAKE/OUTPUT:      Intake/Output Summary (Last 24 hours) at 2020 1439  Last data filed at 2020 0547  Gross per 24 hour   Intake 985 ml   Output 43 ml   Net 942 ml     8HR INTAKE OUTPUT:  No intake/output data recorded.     Constitutional:  Sedated, NAD  HEENT:  NC/AT, intubated  Respiratory:  clear  Cardiovascular/Edema:  RRR, s1, s2 no rub or gallop  Gastrointestinal:  Soft, mildly distended , bowel sounds present  Neurologic:  sedated  Skin:  turgor normal  Other:  No edema    DATA:    CBC:   Lab Results   Component Value Date    WBC 14.1 2020    RBC 3.15 2020    HGB 8.8 2020    HCT 26.5 2020    MCV 84.1 2020    MCH 27.9 2020    MCHC 33.2 2020    RDW 14.6 2020     2020    MPV 11.5 2020     CMP:    Lab Results   Component Value Date     2020    K 4.0 2020    K 4.7 2020    CL 97 2020    CO2 23 2020    BUN 56 2020    CREATININE 4.3 2020    GFRAA 16 2020    LABGLOM 16 2020    GLUCOSE 229 2020    PROT 6.3 2020    LABALBU 3.0 2020    CALCIUM 8.6 2020    BILITOT 0.6 2020    ALKPHOS 140 2020    AST 21 2020    ALT 20 2020 Magnesium:    Lab Results   Component Value Date    MG 2.6 12/27/2020     Phosphorus:    Lab Results   Component Value Date    PHOS 6.1 12/27/2020     U/A:    Lab Results   Component Value Date    COLORU Yellow 12/22/2020    PROTEINU 100 12/22/2020    PHUR 5.0 12/22/2020    WBCUA 0-1 12/22/2020    RBCUA 0-1 12/22/2020    BACTERIA RARE 12/22/2020    CLARITYU Clear 12/22/2020    SPECGRAV 1.020 12/22/2020    LEUKOCYTESUR Negative 12/22/2020    UROBILINOGEN 0.2 12/22/2020    BILIRUBINUR Negative 12/22/2020    BLOODU MODERATE 12/22/2020    GLUCOSEU >=1000 12/22/2020    AMORPHOUS FEW 12/22/2020     ABG:    Lab Results   Component Value Date    PH 7.459 12/27/2020    PCO2 31.4 12/27/2020    PO2 69.3 12/27/2020    HCO3 21.8 12/27/2020    BE -1.7 12/27/2020    O2SAT 93.6 12/27/2020     HgBA1c:    Lab Results   Component Value Date    LABA1C 11.1 12/23/2020     Microalbumen/Creatinine ratio:  No components found for: RUCREAT  Radiology Review: The lungs are without acute focal process.  There is no effusion or   pneumothorax. The cardiomediastinal silhouette is without acute process. The   osseous structures are without acute process. Significant inflammatory changes in the upper abdomen with the epicenter   surrounding the pancreas concerning for acute pancreatitis with inflammation   and inflammatory fluid extending and involving the root of the mesentery,   duodenum and ascending colon resulting in enteritis/colitis.  Small amount of   inflammatory ascites is also present.  Surveillance recommended. Atelectasis/infiltrates lung bases concerning for pneumonia. 1.  Negative for acute intracranial process, within the limits of this   non-contrast CT exam.       IMPRESSION/RECOMMENDATIONS:      This is a 65year old  [place] gentleman with past medical history significant for DM2, hypertension, prostate cancer and stage 3 CKD, presented to ro ED for AMS beginning unknown time.  History is limited to chart review. Upon arrival patient was in respiratory distress and was found to have elevated blood sugar of 1332, lactic acid levelof 11.8, BUN of 95, serum creatinine of 5.7 and serum potassium of 7.1 with CO2 level of 4. Therefore this consultation was requested for management of his BLAIR. 1. BLAIR on top of stage 2-3 CKD with baseline serum creatinine 1.4 mg/dl- secondary hyperosmolar hypovolemic state with DKA  FENa 1.1% (intrinsic )  Pt received 8L fluid as part of treatment for DKA/HHS and is net +7.9L , Cr upto 6 again with decrease in urine output to 90 cc/8 hours. S/p SLED yesterday, remains anuric    - monitor urine output  - monitor bmp   - keep MAP>65 mm hg  - will continue dialytic support with SLED x 6 hours again today    2. HAGMA secondary to DKA and lactic acdisosis    - lactic acidosis resolved  - monitor bmp     3. Hyperkalemia secondary to BLAIR and metabolic acidosis/DKA and hyperosmolar state  - improving slowly with iv fluids and bicarb drip, last level 6.6  - will monitor  - resolved    4. Pseudohyponatremia - improving slowly  5. DKA/ acute pancreatitis - lipase>3000 , CRP worse 31.8 today  6. Hypocalcemia - check ionized calcium, replace PRN      Case was discussed with MICU staff    Thank you for letting me take part in the care of this gentleman. Leah Pacheco MD

## 2020-12-28 ENCOUNTER — APPOINTMENT (OUTPATIENT)
Dept: GENERAL RADIOLOGY | Age: 70
DRG: 207 | End: 2020-12-28
Payer: MEDICARE

## 2020-12-28 LAB
AADO2: 180.5 MMHG
ALBUMIN SERPL-MCNC: 2.6 G/DL (ref 3.5–5.2)
ALP BLD-CCNC: 157 U/L (ref 40–129)
ALT SERPL-CCNC: 17 U/L (ref 0–40)
ANION GAP SERPL CALCULATED.3IONS-SCNC: 11 MMOL/L (ref 7–16)
ANION GAP SERPL CALCULATED.3IONS-SCNC: 13 MMOL/L (ref 7–16)
ANION GAP SERPL CALCULATED.3IONS-SCNC: 13 MMOL/L (ref 7–16)
ANION GAP SERPL CALCULATED.3IONS-SCNC: 16 MMOL/L (ref 7–16)
APTT: 106.1 SEC (ref 24.5–35.1)
APTT: 45.1 SEC (ref 24.5–35.1)
APTT: 49.7 SEC (ref 24.5–35.1)
AST SERPL-CCNC: 22 U/L (ref 0–39)
B.E.: -0.7 MMOL/L (ref -3–3)
BASOPHILS ABSOLUTE: 0.01 E9/L (ref 0–0.2)
BASOPHILS RELATIVE PERCENT: 0.1 % (ref 0–2)
BILIRUB SERPL-MCNC: 0.4 MG/DL (ref 0–1.2)
BILIRUBIN DIRECT: <0.2 MG/DL (ref 0–0.3)
BILIRUBIN, INDIRECT: ABNORMAL MG/DL (ref 0–1)
BUN BLDV-MCNC: 29 MG/DL (ref 8–23)
BUN BLDV-MCNC: 36 MG/DL (ref 8–23)
BUN BLDV-MCNC: 45 MG/DL (ref 8–23)
BUN BLDV-MCNC: 48 MG/DL (ref 8–23)
C-REACTIVE PROTEIN: 6.2 MG/DL (ref 0–0.4)
CALCIUM IONIZED: 1.15 MMOL/L (ref 1.15–1.33)
CALCIUM SERPL-MCNC: 8 MG/DL (ref 8.6–10.2)
CALCIUM SERPL-MCNC: 8.2 MG/DL (ref 8.6–10.2)
CALCIUM SERPL-MCNC: 8.4 MG/DL (ref 8.6–10.2)
CALCIUM SERPL-MCNC: 8.4 MG/DL (ref 8.6–10.2)
CHLORIDE BLD-SCNC: 101 MMOL/L (ref 98–107)
CHLORIDE BLD-SCNC: 96 MMOL/L (ref 98–107)
CHLORIDE BLD-SCNC: 97 MMOL/L (ref 98–107)
CHLORIDE BLD-SCNC: 99 MMOL/L (ref 98–107)
CO2: 22 MMOL/L (ref 22–29)
CO2: 23 MMOL/L (ref 22–29)
CO2: 24 MMOL/L (ref 22–29)
CO2: 27 MMOL/L (ref 22–29)
COHB: 0.1 % (ref 0–1.5)
CREAT SERPL-MCNC: 2.7 MG/DL (ref 0.7–1.2)
CREAT SERPL-MCNC: 3 MG/DL (ref 0.7–1.2)
CREAT SERPL-MCNC: 3.6 MG/DL (ref 0.7–1.2)
CREAT SERPL-MCNC: 3.9 MG/DL (ref 0.7–1.2)
CRITICAL: ABNORMAL
D DIMER: >5250 NG/ML DDU
DATE ANALYZED: ABNORMAL
DATE OF COLLECTION: ABNORMAL
EKG ATRIAL RATE: 153 BPM
EKG Q-T INTERVAL: 298 MS
EKG QRS DURATION: 82 MS
EKG QTC CALCULATION (BAZETT): 454 MS
EKG R AXIS: 14 DEGREES
EKG T AXIS: 141 DEGREES
EKG VENTRICULAR RATE: 140 BPM
EOSINOPHILS ABSOLUTE: 0 E9/L (ref 0.05–0.5)
EOSINOPHILS RELATIVE PERCENT: 0 % (ref 0–6)
FERRITIN: 2819 NG/ML
FIO2: 40 %
GFR AFRICAN AMERICAN: 18
GFR AFRICAN AMERICAN: 20
GFR AFRICAN AMERICAN: 25
GFR AFRICAN AMERICAN: 28
GFR NON-AFRICAN AMERICAN: 18 ML/MIN/1.73
GFR NON-AFRICAN AMERICAN: 20 ML/MIN/1.73
GFR NON-AFRICAN AMERICAN: 25 ML/MIN/1.73
GFR NON-AFRICAN AMERICAN: 28 ML/MIN/1.73
GLUCOSE BLD-MCNC: 117 MG/DL (ref 74–99)
GLUCOSE BLD-MCNC: 179 MG/DL (ref 74–99)
GLUCOSE BLD-MCNC: 212 MG/DL (ref 74–99)
GLUCOSE BLD-MCNC: 222 MG/DL (ref 74–99)
HCO3: 21.5 MMOL/L (ref 22–26)
HCT VFR BLD CALC: 24.8 % (ref 37–54)
HCT VFR BLD CALC: 25.1 % (ref 37–54)
HCT VFR BLD CALC: 25.1 % (ref 37–54)
HEMOGLOBIN: 8.5 G/DL (ref 12.5–16.5)
HEMOGLOBIN: 8.6 G/DL (ref 12.5–16.5)
HEMOGLOBIN: 8.7 G/DL (ref 12.5–16.5)
HHB: 8 % (ref 0–5)
IMMATURE GRANULOCYTES #: 0.25 E9/L
IMMATURE GRANULOCYTES %: 2.5 % (ref 0–5)
INR BLD: 1.3
LAB: ABNORMAL
LIPASE: 17 U/L (ref 13–60)
LYMPHOCYTES ABSOLUTE: 0.69 E9/L (ref 1.5–4)
LYMPHOCYTES RELATIVE PERCENT: 7 % (ref 20–42)
Lab: ABNORMAL
MAGNESIUM: 2.1 MG/DL (ref 1.6–2.6)
MAGNESIUM: 2.2 MG/DL (ref 1.6–2.6)
MAGNESIUM: 2.2 MG/DL (ref 1.6–2.6)
MAGNESIUM: 2.3 MG/DL (ref 1.6–2.6)
MCH RBC QN AUTO: 29.1 PG (ref 26–35)
MCHC RBC AUTO-ENTMCNC: 34.7 % (ref 32–34.5)
MCV RBC AUTO: 83.8 FL (ref 80–99.9)
METER GLUCOSE: 118 MG/DL (ref 74–99)
METER GLUCOSE: 162 MG/DL (ref 74–99)
METER GLUCOSE: 177 MG/DL (ref 74–99)
METER GLUCOSE: 220 MG/DL (ref 74–99)
METER GLUCOSE: 220 MG/DL (ref 74–99)
METER GLUCOSE: 226 MG/DL (ref 74–99)
METHB: 0.2 % (ref 0–1.5)
MODE: AC
MONOCYTES ABSOLUTE: 0.85 E9/L (ref 0.1–0.95)
MONOCYTES RELATIVE PERCENT: 8.6 % (ref 2–12)
NEUTROPHILS ABSOLUTE: 8.06 E9/L (ref 1.8–7.3)
NEUTROPHILS RELATIVE PERCENT: 81.8 % (ref 43–80)
O2 CONTENT: 13.6 ML/DL
O2 SATURATION: 92 % (ref 92–98.5)
O2HB: 91.7 % (ref 94–97)
OPERATOR ID: 1632
PATIENT TEMP: 37 C
PCO2: 27.5 MMHG (ref 35–45)
PDW BLD-RTO: 14.4 FL (ref 11.5–15)
PEEP/CPAP: 5 CMH2O
PFO2: 1.58 MMHG/%
PH BLOOD GAS: 7.51 (ref 7.35–7.45)
PHOSPHORUS: 3.7 MG/DL (ref 2.5–4.5)
PHOSPHORUS: 4.9 MG/DL (ref 2.5–4.5)
PHOSPHORUS: 5.5 MG/DL (ref 2.5–4.5)
PHOSPHORUS: 5.7 MG/DL (ref 2.5–4.5)
PLATELET # BLD: 143 E9/L (ref 130–450)
PMV BLD AUTO: 12 FL (ref 7–12)
PO2: 63.1 MMHG (ref 75–100)
POTASSIUM SERPL-SCNC: 3.5 MMOL/L (ref 3.5–5)
POTASSIUM SERPL-SCNC: 4.1 MMOL/L (ref 3.5–5)
POTASSIUM SERPL-SCNC: 4.2 MMOL/L (ref 3.5–5)
POTASSIUM SERPL-SCNC: 4.3 MMOL/L (ref 3.5–5)
PROTHROMBIN TIME: 14.5 SEC (ref 9.3–12.4)
RBC # BLD: 2.96 E12/L (ref 3.8–5.8)
RI(T): 2.86
RR MECHANICAL: 16 B/MIN
SEDIMENTATION RATE, ERYTHROCYTE: 100 MM/HR (ref 0–15)
SODIUM BLD-SCNC: 133 MMOL/L (ref 132–146)
SODIUM BLD-SCNC: 134 MMOL/L (ref 132–146)
SODIUM BLD-SCNC: 136 MMOL/L (ref 132–146)
SODIUM BLD-SCNC: 139 MMOL/L (ref 132–146)
SOURCE, BLOOD GAS: ABNORMAL
THB: 10.5 G/DL (ref 11.5–16.5)
TIME ANALYZED: 500
TOTAL PROTEIN: 5.7 G/DL (ref 6.4–8.3)
VT MECHANICAL: 500 ML
WBC # BLD: 9.9 E9/L (ref 4.5–11.5)

## 2020-12-28 PROCEDURE — 2580000003 HC RX 258: Performed by: INTERNAL MEDICINE

## 2020-12-28 PROCEDURE — 6370000000 HC RX 637 (ALT 250 FOR IP): Performed by: INTERNAL MEDICINE

## 2020-12-28 PROCEDURE — 82728 ASSAY OF FERRITIN: CPT

## 2020-12-28 PROCEDURE — 85018 HEMOGLOBIN: CPT

## 2020-12-28 PROCEDURE — 6360000002 HC RX W HCPCS: Performed by: INTERNAL MEDICINE

## 2020-12-28 PROCEDURE — C9113 INJ PANTOPRAZOLE SODIUM, VIA: HCPCS | Performed by: INTERNAL MEDICINE

## 2020-12-28 PROCEDURE — 84100 ASSAY OF PHOSPHORUS: CPT

## 2020-12-28 PROCEDURE — 71045 X-RAY EXAM CHEST 1 VIEW: CPT

## 2020-12-28 PROCEDURE — 2000000000 HC ICU R&B

## 2020-12-28 PROCEDURE — 85651 RBC SED RATE NONAUTOMATED: CPT

## 2020-12-28 PROCEDURE — 90935 HEMODIALYSIS ONE EVALUATION: CPT

## 2020-12-28 PROCEDURE — 83735 ASSAY OF MAGNESIUM: CPT

## 2020-12-28 PROCEDURE — 86140 C-REACTIVE PROTEIN: CPT

## 2020-12-28 PROCEDURE — 85378 FIBRIN DEGRADE SEMIQUANT: CPT

## 2020-12-28 PROCEDURE — 82330 ASSAY OF CALCIUM: CPT

## 2020-12-28 PROCEDURE — 85014 HEMATOCRIT: CPT

## 2020-12-28 PROCEDURE — 36415 COLL VENOUS BLD VENIPUNCTURE: CPT

## 2020-12-28 PROCEDURE — 2500000003 HC RX 250 WO HCPCS: Performed by: INTERNAL MEDICINE

## 2020-12-28 PROCEDURE — 85025 COMPLETE CBC W/AUTO DIFF WBC: CPT

## 2020-12-28 PROCEDURE — 82805 BLOOD GASES W/O2 SATURATION: CPT

## 2020-12-28 PROCEDURE — 93010 ELECTROCARDIOGRAM REPORT: CPT | Performed by: INTERNAL MEDICINE

## 2020-12-28 PROCEDURE — 85730 THROMBOPLASTIN TIME PARTIAL: CPT

## 2020-12-28 PROCEDURE — 82962 GLUCOSE BLOOD TEST: CPT

## 2020-12-28 PROCEDURE — 99233 SBSQ HOSP IP/OBS HIGH 50: CPT | Performed by: INTERNAL MEDICINE

## 2020-12-28 PROCEDURE — 80048 BASIC METABOLIC PNL TOTAL CA: CPT

## 2020-12-28 PROCEDURE — 85610 PROTHROMBIN TIME: CPT

## 2020-12-28 PROCEDURE — 94003 VENT MGMT INPAT SUBQ DAY: CPT

## 2020-12-28 PROCEDURE — 80076 HEPATIC FUNCTION PANEL: CPT

## 2020-12-28 PROCEDURE — 85520 HEPARIN ASSAY: CPT

## 2020-12-28 PROCEDURE — 83690 ASSAY OF LIPASE: CPT

## 2020-12-28 RX ORDER — MIDAZOLAM HYDROCHLORIDE 2 MG/2ML
2 INJECTION, SOLUTION INTRAMUSCULAR; INTRAVENOUS
Status: DISCONTINUED | OUTPATIENT
Start: 2020-12-28 | End: 2021-01-01 | Stop reason: HOSPADM

## 2020-12-28 RX ADMIN — Medication 10 ML: at 21:23

## 2020-12-28 RX ADMIN — DEXAMETHASONE SODIUM PHOSPHATE 6 MG: 4 INJECTION, SOLUTION INTRAMUSCULAR; INTRAVENOUS at 12:28

## 2020-12-28 RX ADMIN — INSULIN LISPRO 2 UNITS: 100 INJECTION, SOLUTION INTRAVENOUS; SUBCUTANEOUS at 17:05

## 2020-12-28 RX ADMIN — PIPERACILLIN AND TAZOBACTAM 3.38 G: 3; .375 INJECTION, POWDER, LYOPHILIZED, FOR SOLUTION INTRAVENOUS at 17:00

## 2020-12-28 RX ADMIN — INSULIN LISPRO 4 UNITS: 100 INJECTION, SOLUTION INTRAVENOUS; SUBCUTANEOUS at 00:53

## 2020-12-28 RX ADMIN — LORAZEPAM 1 MG: 2 INJECTION INTRAMUSCULAR; INTRAVENOUS at 08:46

## 2020-12-28 RX ADMIN — SODIUM CHLORIDE, PRESERVATIVE FREE 10 ML: 5 INJECTION INTRAVENOUS at 14:24

## 2020-12-28 RX ADMIN — INSULIN LISPRO 4 UNITS: 100 INJECTION, SOLUTION INTRAVENOUS; SUBCUTANEOUS at 04:44

## 2020-12-28 RX ADMIN — SODIUM CHLORIDE 0.4 MCG/KG/HR: 9 INJECTION, SOLUTION INTRAVENOUS at 21:23

## 2020-12-28 RX ADMIN — SODIUM CHLORIDE, PRESERVATIVE FREE 10 ML: 5 INJECTION INTRAVENOUS at 08:52

## 2020-12-28 RX ADMIN — INSULIN LISPRO 4 UNITS: 100 INJECTION, SOLUTION INTRAVENOUS; SUBCUTANEOUS at 20:46

## 2020-12-28 RX ADMIN — Medication 10 ML: at 20:43

## 2020-12-28 RX ADMIN — Medication 50 MG: at 08:23

## 2020-12-28 RX ADMIN — LABETALOL HYDROCHLORIDE 10 MG: 5 INJECTION INTRAVENOUS at 14:24

## 2020-12-28 RX ADMIN — METOPROLOL TARTRATE 12.5 MG: 25 TABLET, FILM COATED ORAL at 08:23

## 2020-12-28 RX ADMIN — THIAMINE HYDROCHLORIDE 100 MG: 100 INJECTION, SOLUTION INTRAMUSCULAR; INTRAVENOUS at 21:23

## 2020-12-28 RX ADMIN — Medication 2 MCG/MIN: at 09:32

## 2020-12-28 RX ADMIN — SODIUM CHLORIDE, PRESERVATIVE FREE 10 ML: 5 INJECTION INTRAVENOUS at 20:43

## 2020-12-28 RX ADMIN — REMDESIVIR 100 MG: 100 INJECTION, POWDER, LYOPHILIZED, FOR SOLUTION INTRAVENOUS at 18:09

## 2020-12-28 RX ADMIN — INSULIN GLARGINE 25 UNITS: 100 INJECTION, SOLUTION SUBCUTANEOUS at 20:46

## 2020-12-28 RX ADMIN — SODIUM CHLORIDE, PRESERVATIVE FREE 10 ML: 5 INJECTION INTRAVENOUS at 12:30

## 2020-12-28 RX ADMIN — PANTOPRAZOLE SODIUM 40 MG: 40 INJECTION, POWDER, FOR SOLUTION INTRAVENOUS at 20:43

## 2020-12-28 RX ADMIN — Medication 10 ML: at 08:28

## 2020-12-28 RX ADMIN — HEPARIN SODIUM 14 UNITS/KG/HR: 10000 INJECTION, SOLUTION INTRAVENOUS at 07:30

## 2020-12-28 RX ADMIN — SODIUM CHLORIDE, PRESERVATIVE FREE 10 ML: 5 INJECTION INTRAVENOUS at 08:30

## 2020-12-28 RX ADMIN — PIPERACILLIN AND TAZOBACTAM 3.38 G: 3; .375 INJECTION, POWDER, LYOPHILIZED, FOR SOLUTION INTRAVENOUS at 04:41

## 2020-12-28 RX ADMIN — Medication 2000 UNITS: at 08:23

## 2020-12-28 RX ADMIN — FOLIC ACID 1 MG: 5 INJECTION, SOLUTION INTRAMUSCULAR; INTRAVENOUS; SUBCUTANEOUS at 08:26

## 2020-12-28 RX ADMIN — INSULIN LISPRO 2 UNITS: 100 INJECTION, SOLUTION INTRAVENOUS; SUBCUTANEOUS at 08:50

## 2020-12-28 RX ADMIN — METOPROLOL TARTRATE 12.5 MG: 25 TABLET, FILM COATED ORAL at 20:43

## 2020-12-28 RX ADMIN — PANTOPRAZOLE SODIUM 40 MG: 40 INJECTION, POWDER, FOR SOLUTION INTRAVENOUS at 08:23

## 2020-12-28 RX ADMIN — OXYCODONE HYDROCHLORIDE AND ACETAMINOPHEN 500 MG: 500 TABLET ORAL at 08:23

## 2020-12-28 RX ADMIN — THIAMINE HYDROCHLORIDE 250 MG: 100 INJECTION, SOLUTION INTRAMUSCULAR; INTRAVENOUS at 08:27

## 2020-12-28 ASSESSMENT — PAIN SCALES - GENERAL
PAINLEVEL_OUTOF10: 0
PAINLEVEL_OUTOF10: 0

## 2020-12-28 ASSESSMENT — PULMONARY FUNCTION TESTS
PIF_VALUE: 17
PIF_VALUE: 18
PIF_VALUE: 19
PIF_VALUE: 18
PIF_VALUE: 16
PIF_VALUE: 27
PIF_VALUE: 17
PIF_VALUE: 18
PIF_VALUE: 17

## 2020-12-28 NOTE — FLOWSHEET NOTE
Pt continues to reach for lines and tubes despite attempts to deter. Restraints continued for pt safety.  Francisco J Kruse

## 2020-12-28 NOTE — FLOWSHEET NOTE
12/28/20 1119   Vital Signs   BP (!) 129/95   Temp 97.5 °F (36.4 °C)   Pulse 115   Resp 20   SpO2 99 %   Weight 218 lb 11.1 oz (99.2 kg)   Percent Weight Change 0.2   Pain Assessment   Pain Assessment Faces   Pain Level 0   Post-Hemodialysis Assessment   Post-Treatment Procedures Blood returned;Catheter capped, clamped with Citrate x 2 ports   Machine Disinfection Process Exterior Machine Disinfection   Rinseback Volume (ml) 300 ml   Dialyzer Clearance Heavily streaked   Duration of Treatment (minutes) 240 minutes   Hemodialysis Intake (ml) 800 ml   Hemodialysis Output (ml) 0 ml   NET Removed (ml) 0 ml   Tolerated Treatment Fair   Patient Response to Treatment Tolerated tx fair had low BPs throughout   Bilateral Breath Sounds Diminished   Edema Generalized

## 2020-12-28 NOTE — PROGRESS NOTES
Department of Internal Medicine  Nephrology  Progress Note       Events reviewed. SUBJECTIVE: We are following Mr. Brown for BLAIR on dialysis. Bedside exam deferred due to patient's Covid positive status. Chart review performed and discussion held with dialysis nursing. Patient requiring Levophed this AM 2/2 hypotension during SLED. No fluid removal planned during dialysis today. Physical Exam:    VITALS:  /70   Pulse 120   Temp 97.3 °F (36.3 °C) (Bladder)   Resp 19   Ht 6' 2\" (1.88 m)   Wt 218 lb 11.1 oz (99.2 kg)   SpO2 96%   BMI 28.08 kg/m²   24HR INTAKE/OUTPUT:    Intake/Output Summary (Last 24 hours) at 12/28/2020 1333  Last data filed at 12/28/2020 1119  Gross per 24 hour   Intake 3153 ml   Output 1539 ml   Net 1614 ml     Physical exam:  Physical exam deferred as patient is currently Covid positive. Chart review performed, patient newly requiring Levophed this AM 2/2 hypotension during SLED. Per nursing review, patient is having improved urine output, but does still have mild edema of BUE.     Scheduled Meds:   metoprolol tartrate  12.5 mg Oral BID    insulin glargine  25 Units Subcutaneous Nightly    sodium chloride  20 mL Intravenous Once    pantoprazole  40 mg Intravenous BID    And    sodium chloride (PF)  10 mL Intravenous BID    remdesivir IVPB  100 mg Intravenous Q24H    sodium chloride flush  10 mL Intravenous 2 times per day    dexamethasone  6 mg Intravenous Q24H    insulin lispro  0-12 Units Subcutaneous Q4H    sodium chloride (PF)  10 mL Intravenous Daily    thiamine  100 mg Intravenous BID    Followed by   Dhruv Fritz ON 1/1/2021] thiamine  100 mg Intravenous Daily    folic acid  1 mg Intravenous Daily    vitamin D  2,000 Units Oral Daily    zinc sulfate  50 mg Oral Daily    ascorbic acid  500 mg Oral Daily    piperacillin-tazobactam  3.375 g Intravenous Q12H    sodium chloride   Intravenous Q12H    sodium chloride flush  10 mL Intravenous 2 times per day HCO3 21.5 12/28/2020    BE -0.7 12/28/2020    O2SAT 92.0 12/28/2020     Ionized Calcium  Calcium, Ion 1.15  1.15 - 1.33 mmol/L Final 12/28/2020  4:24 AM  - [facility]     Radiology Results:           Impression   1. No interval change in the multifocal bilateral lower lobe infiltrates. 2. Stable position of support lines and tubes. BRIEF SUMMARY OF INITIAL CONSULT:  Briefly, Mr. Jim Chris is a 65year old AA man with h/o CKD stage 3, baseline creatinine 1.4 mg/dL, HTN, Type 2 DM, prostate cancer, who was admitted on 12/22/2020 with altered metal status. He was found to have glucose level of 1332 mg/dL, lactic acid levelof 11.8, BUN of 95, serum creatinine of 5.7 mg/dL and serum potassium of 7.1 with CO2 level of 4; reason for this consultation. His medications prior to admission included lisinopril, ibuprofen and naproxen. Resolved problems:  · Hyperkalemia  · Pseudohyponatremia  · Hypocalcemia  · Acute pancreatitis  · Hemodynamic shock       IMPRESSION/RECOMMENDATIONS:      1. BLAIR stage III on CKD, ischemic ATN, multifactorial 2/2 to severe intravascular volume depletion (osmotic diurseis-hyperglycemia) in the setting of ACE inhibition and NSAID's. Started on renal replacement therapy on 12/25/2020. Urine output has improved last 24 hours. Patient currently net +16.1 L, received 8 L of fluid for treatment of DKA. Having SLED today. 2. CKD stage III, baseline creatinine approximately 1.4 mg/dL, with proteinuria, probably DKD   3. Alkalemia, PH: 7.5; with respiratory alkalosis, metabolic alkalosis (bicarbonate administration on dialysis) and HAGMA (uremia)     -------------------------------------------------  4. Respiratory failure s/p intubation   5. COVID-19  infection, on dexamethasone, remdesivir    6. Pneumonia, on piperacillin-tazobactam   7.  DKA with subsequent acute pancreatitis-initial lipase >3000, now improved to 17 this AM. Glucose control improving this AM -patient still n.p.o.  8. Anemia, normocytic, dropping H/H   9. Nutrition, n.p.o.     Plan:    · SLED today x 4 hours   · We will continue to assess need for SLED daily    · We will consider gentle diuresis if patient does not need SLED tomorrow.   If SLED is required, will consider removing fluid if BP tolerates  · Continue to monitor urine output  · MAP goal >65 mmHg  · Monitor ionized calcium-we will replete as necessary      Case discussed with Dr. Inocencio Elkins and ICU team     Valeri Ferguson DO, PGY1  12/28/2020

## 2020-12-28 NOTE — PROGRESS NOTES
[address] [facility]  Department of Internal Medicine   Internal Medicine Residency   MICU Progress Note    Patient:  Andre Kong 65 y.o. male  MRN: [id number]     Date of Service: 2020    Allergy: Patient has no known allergies. Hospital day #6, Vent #4  Subjective     Patient seen and examined. No acute changes overnight. Patient intubated and sedated (precedex). Oliguric (0.21 cc/kg/hr), 505 cc out over last 24 hrs, net +16 L since admission. On heparin gtt, Hg stable. On Zosyn for concerns of necrotizing pancreatitis. AC/VC 16/500/5/40% FiO2, plan for SBT tomorrow. AB.510, pCO2 27.5, pO2 63, bicarb 21.5, p/f ratio 158. Patient had SLED this morning without fluid removal, but became hypotensive and required 2 mcg/min levophed. Objective     VS: BP (!) 160/105   Pulse 144   Temp 98.1 °F (36.7 °C)   Resp 24   Ht 6' 2\" (1.88 m)   Wt 220 lb 7.4 oz (100 kg)   SpO2 94%   BMI 28.31 kg/m²           I & O - 24hr:     Intake/Output Summary (Last 24 hours) at 2020  Last data filed at 2020  Gross per 24 hour   Intake 1869 ml   Output 1142 ml   Net 727 ml       Physical Exam:  · General Appearance: Intubated and sedated. · Neck: no JVD and supple, symmetrical, trachea midline  · Lung: clear to auscultation bilaterally and diminished breath sounds bilaterally  · Heart: irregularly irregular rhythm  · Abdomen: soft, non-tender; bowel sounds normal; no masses,  no organomegaly  · Extremities:  extremities normal, atraumatic, no cyanosis or edema  · Musculoskeletal: No joint swelling. · Neurologic: Mental status: Intubated, sedated, positive cough and gag reflex, withdraws to pain, does not follow commands. Lines     site day    Art line   None    TLC R IJ 5   PICC None    Hemoaccess R Fem 4     Mechanical Ventilation:   On PS yesterday and tolerated well. Plan to wean and extubate soon.      AC/VC 16/500/5/40% FiO2    ABG:     Lab Results   Component C4-5.  CT head unremarkable. Neurology:   1. Acute Encephalopathy likely 2/2 HHS/DKA/mixed vs alcohol withdrawal. S/p HHS/DKA treated. Patient became less responsive and tachypneic, consistent with alcohol withdrawal and intubated on  due to respiratory distress   -Patient currently sedated on fentanyl 200 mcg/hr   -Plan to wean off fentanyl, and resume Precedex as needed for agitation   -Continue folic acid 1 mg IV daily   -Continue thiamine 250 mg IV BID x 6 doses -> 100 mg BID x 6 doses -> 100 mg IV daily     Cardiology:   1. New onset paroxysmal A. fib with RVR  likely 2/2 Covid infection. Troponins negative. -[id number] score 3   -Continue Lopressor 12.5 mg twice daily   --120 today    -Continue heparin drip   -APTT 49.7 today   -Continue to monitor APTT Q6 hrs    -Repeat EKG showing   -Follow-up echo    2. History of hypertension, home medications include amlodipine 10 mg and lisinopril 20 mg OD   -Blood pressure stable   -We will resume amlodipine if blood pressure is elevated    3. History of HLD   -Continue to hold atorvastatin    Pulmonary:  1. Acute hypoxic respiratory Failure likely 2/2 Covid infection versus alcohol withdrawal.  Patient was intubated on  for respiratory distress.   -Patient did well on pressure support yesterday   -Plan to wean off vent and extubate soon   -AC/VC 16/500/5/40% FiO2   -Plan for SBT tomorrow. -AB.510, pCO2 27.5, pO2 63, bicarb 21.5, p/f ratio 158. Nephrology (Fluids/ Electrolytes & Nutrition):   1. BLAIR stage III on CKD stage III, Likely 2/2 oliguric intrinsic ATN 2/2 hypotension. Baseline creatinine 1.4 mg/dL, creatinine 5.7 mg/dL on admission and went up to 6 mg/dL, requiring temporary dialysis. Urine electrolytes unreliable due to BMP and electrolytes not drawn correctly. Received sled , , and .    -Patient had 240 min SLED today and fairly tolerated it, requiring 2 mcg/min levophed due to low BP throughout. -Creatinine up-trending from 2.7 to 3.0 mg/dL. -Plan to repeat urine indices.    -Continue to monitor urine output, strict I's and O's   -Avoid hypotension   -Continue to hold home dose lisinopril 10 mg OD   -Plan to hold SLED tomorrow.    -Continue to monitor renal function with BMP Q6 hrs    -Nephrology following    2. History of CKD stage II-III likely 2/2 hypertensive nephrosclerosis versus diabetic nephropathy    Infectious Disease:   1. COVID-19 pneumonia, procalcitonin 4.65   -Chest x-ray with right-sided infiltrate, stable   -Continue remdesivir (day 3)   -Continue Decadron 6 mg IV Q 24 hrs    -Continue vitamin C and zinc   -Continue to follow inflammatory markers   -Pancultures pending to rule out other infectious source    Endocrine:   1. Type II DM, with severe hyperglycemia 2/2 DKA vs HHS vs mixed on admission- Resolved. Patient presented with blood glucose >1000, but BHB was not checked. BHB after 12 hours was 0.8. Anion gap closed and patient bridged to subcutaneous insulin. Hemoglobin A1c 11.1%. -BG range, 160-220    -Continue Lantus 25 units and  MDSS   -Continue POCT glucose every 4 hours    Hematology/ Oncology:  1. Acute on Chronic normocytic anemia likely 2/2 ACD vs acute blood loss. Baseline hemoglobin 10-12 g/dL. -Hemoglobin stable   -Continue to monitor H&H Q8 hrs    -Maintain hemoglobin >7, transfuse as needed    Gastroenterology:   1. Acute pancreatitis likely 2/2 alcohol versus Covid, resolving. Patient reported drinking more alcohol recently, , lipase 3000 initially. Lipase trended instead of COPD due to Covid. -Lipase downtrending 25->17 today   -Plan to discontinue daily lipase labs. -Continue Zosyn for concerns of developing necrotizing pancreatitis (started 12/23)   -Currently n.p.o., we can begin TF now that the lipase is WNL.        Next of Kin/ POA:   Jose Antonio Malagon (domestic partner)  Code Status:   Full    PT/OT evaluation: Not indicated at this time   DVT

## 2020-12-28 NOTE — PROGRESS NOTES
Hospitalist Progress Note      SYNOPSIS: Patient admitted on 2020 for Acute metabolic encephalopathy. CXR done showed a right sided infiltrate and CT of the abdomen showed acute pancreatitis. He is also Covid positive. Patient also has BLAIR and is undergoing dialysis. SUBJECTIVE:    Patient seen. He is on low-dose Levophed for vasopressor support. He was having dialysis at time of review. He remains intubated and sedated. Temp (24hrs), Av.2 °F (36.8 °C), Min:97.5 °F (36.4 °C), Max:99.3 °F (37.4 °C)    DIET: Diet NPO Effective Now  CODE: Full Code    Intake/Output Summary (Last 24 hours) at 2020 1128  Last data filed at 2020 1119  Gross per 24 hour   Intake 3153 ml   Output 1539 ml   Net 1614 ml       OBJECTIVE:    BP (!) 129/95   Pulse 115   Temp 97.5 °F (36.4 °C)   Resp 20   Ht 6' 2\" (1.88 m)   Wt 218 lb 11.1 oz (99.2 kg)   SpO2 99%   BMI 28.08 kg/m²     Patient not examined in ICU due to Covid positive status and to preserve PPE's father health caregivers. Patient's labs and notes reviewed. ASSESSMENT:  Acute hypoxic respiratory failure respiratory distress   acute alcohol withdrawal  COVID 19 pneumonia  Acute metabolic encephalopathy  BLAIR  Acute pancreatitis  Anion gap metabolic acidosis  Anemia  New onset paroxysmal afib     PLAN:  Has dialysis catheter in place, and is having dialysis now. Nephrology on board  Remains on low dose levophed for vasopressor support  On remdesivir and decadron; on vitamin C and zinc  Continue lantus and ISS for diabetes mellitus  Also on IV zosyn due to concerns that the pancreatitis could evolve into necrotizing pancreatitis.   On alcohol withdrawal protocol as needed for alcohol withdrawal  Monitor Hb; to transfuse if Hb <7; has been transfused one unit of PRBCs  Received lopressor prn for paroxysmal afib; on heparin drip  2D echo ordered  On thiamine    DISPOSITION: to be determined once medically stable    Medications:  REVIEWED DAILY    Infusion Medications    heparin (PORCINE) Infusion 16 Units/kg/hr (12/28/20 1013)    dexmedetomidine (PRECEDEX) IV infusion 0.4 mcg/kg/hr (12/28/20 0655)    norepinephrine Stopped (12/25/20 2120)    dextrose       Scheduled Medications    metoprolol tartrate  12.5 mg Oral BID    insulin glargine  25 Units Subcutaneous Nightly    sodium chloride  20 mL Intravenous Once    pantoprazole  40 mg Intravenous BID    And    sodium chloride (PF)  10 mL Intravenous BID    remdesivir IVPB  100 mg Intravenous Q24H    sodium chloride flush  10 mL Intravenous 2 times per day    dexamethasone  6 mg Intravenous Q24H    insulin lispro  0-12 Units Subcutaneous Q4H    sodium chloride (PF)  10 mL Intravenous Daily    [Held by provider] heparin (porcine)  5,000 Units Subcutaneous 3 times per day    thiamine  100 mg Intravenous BID    Followed by   Chano Aguillon ON 1/1/2021] thiamine  100 mg Intravenous Daily    folic acid  1 mg Intravenous Daily    vitamin D  2,000 Units Oral Daily    zinc sulfate  50 mg Oral Daily    ascorbic acid  500 mg Oral Daily    piperacillin-tazobactam  3.375 g Intravenous Q12H    sodium chloride   Intravenous Q12H    sodium chloride flush  10 mL Intravenous 2 times per day     PRN Meds: perflutren lipid microspheres, sodium chloride, sodium chloride flush, LORazepam **OR** LORazepam **OR** LORazepam **OR** LORazepam **OR** LORazepam **OR** LORazepam **OR** LORazepam **OR** LORazepam, anticoagulant sodium citrate, acetaminophen **OR** acetaminophen, polyethylene glycol, sennosides, hydrALAZINE, labetalol, sodium chloride flush, glucose, dextrose, glucagon (rDNA), dextrose, dextrose    Labs:     Recent Labs     12/27/20  0533 12/27/20  1143 12/27/20  1143 12/28/20  0147 12/28/20  0424 12/28/20  0934   WBC 13.3* 14.1*  --   --  9.9  --    HGB 8.5* 8.8*   < > 8.5* 8.6* 8.7*   HCT 25.7* 26.5*   < > 25.1* 24.8* 25.1*    155  --   --  143  --     < > = values in this interval not displayed. Recent Labs     12/27/20 1958 12/28/20  0147 12/28/20  0424    133 134   K 4.1 4.2 4.1   CL 96* 97* 96*   CO2 23 23 22   BUN 38* 45* 48*   CREATININE 3.1* 3.6* 3.9*   CALCIUM 8.3* 8.2* 8.4*   PHOS 5.0* 5.5* 5.7*       Recent Labs     12/26/20  0522 12/26/20  1402 12/27/20  0533 12/28/20  0424   PROT  --  6.7 6.3* 5.7*   ALKPHOS  --  116 140* 157*   ALT  --  22 20 17   AST  --  24 21 22   BILITOT  --  0.9 0.6 0.4   LIPASE 44  --  25 17       Recent Labs     12/27/20  0533 12/27/20  1143 12/28/20  0424   INR 1.1 1.1 1.3       Recent Labs     12/27/20  1143 12/27/20  1528   TROPONINI 0.01 <0.01       Chronic labs:    Lab Results   Component Value Date    CHOL 160 12/23/2020    TRIG 439 (H) 12/23/2020    HDL 14 12/23/2020    LDLCALC - (AA) 12/23/2020    TSH 0.540 12/23/2020    INR 1.3 12/28/2020    LABA1C 11.1 (H) 12/23/2020       Radiology: REVIEWED DAILY    +++++++++++++++++++++++++++++++++++++++++++++++++  [place], [place]  +++++++++++++++++++++++++++++++++++++++++++++++++  NOTE: This report was transcribed using voice recognition software. Every effort was made to ensure accuracy; however, inadvertent computerized transcription errors may be present.

## 2020-12-28 NOTE — PROGRESS NOTES
Gave the following belongings to patient's yecenia Hookariatia Abbottludwinkaren: Jeans, Belt, Underwear, socks, Shirt, 4 Gold rings, 1 sliver ring, 1 bracelet, 1 mask, and some coins.

## 2020-12-28 NOTE — CARE COORDINATION
12/28 Care Coordination:Days remains in MICU, On vent,BLAIR,HD. On IV pressor,sedation. COVID positive. Spoke with Elmervioleta, She states PTA he was independent. Discussed discharge planning. Talked about LTAC. Options reviewed, [facility]st choice, [facility] 2nd choice. Referral called [facility]. She at this time did not want to talk about [facility]. CM/SW will continue to follow for discharge planning.    Bradley Jackson BSN,RN-BC  [phone]

## 2020-12-29 ENCOUNTER — APPOINTMENT (OUTPATIENT)
Dept: GENERAL RADIOLOGY | Age: 70
DRG: 207 | End: 2020-12-29
Payer: MEDICARE

## 2020-12-29 LAB
AADO2: 146.3 MMHG
ALBUMIN SERPL-MCNC: 2.5 G/DL (ref 3.5–5.2)
ALP BLD-CCNC: 138 U/L (ref 40–129)
ALT SERPL-CCNC: 15 U/L (ref 0–40)
ANION GAP SERPL CALCULATED.3IONS-SCNC: 14 MMOL/L (ref 7–16)
ANION GAP SERPL CALCULATED.3IONS-SCNC: 15 MMOL/L (ref 7–16)
ANION GAP SERPL CALCULATED.3IONS-SCNC: 15 MMOL/L (ref 7–16)
ANION GAP SERPL CALCULATED.3IONS-SCNC: 19 MMOL/L (ref 7–16)
APTT: 71.7 SEC (ref 24.5–35.1)
APTT: 93.7 SEC (ref 24.5–35.1)
AST SERPL-CCNC: 18 U/L (ref 0–39)
B.E.: -0.6 MMOL/L (ref -3–3)
BILIRUB SERPL-MCNC: 0.4 MG/DL (ref 0–1.2)
BILIRUBIN DIRECT: <0.2 MG/DL (ref 0–0.3)
BILIRUBIN, INDIRECT: ABNORMAL MG/DL (ref 0–1)
BUN BLDV-MCNC: 44 MG/DL (ref 8–23)
BUN BLDV-MCNC: 50 MG/DL (ref 8–23)
BUN BLDV-MCNC: 53 MG/DL (ref 8–23)
BUN BLDV-MCNC: 58 MG/DL (ref 8–23)
C-REACTIVE PROTEIN: 5.3 MG/DL (ref 0–0.4)
CALCIUM IONIZED: 1.13 MMOL/L (ref 1.15–1.33)
CALCIUM SERPL-MCNC: 8.1 MG/DL (ref 8.6–10.2)
CALCIUM SERPL-MCNC: 8.1 MG/DL (ref 8.6–10.2)
CALCIUM SERPL-MCNC: 8.4 MG/DL (ref 8.6–10.2)
CALCIUM SERPL-MCNC: 8.8 MG/DL (ref 8.6–10.2)
CHLORIDE BLD-SCNC: 100 MMOL/L (ref 98–107)
CHLORIDE BLD-SCNC: 97 MMOL/L (ref 98–107)
CHLORIDE BLD-SCNC: 97 MMOL/L (ref 98–107)
CHLORIDE BLD-SCNC: 99 MMOL/L (ref 98–107)
CHLORIDE URINE RANDOM: 67 MMOL/L
CO2: 21 MMOL/L (ref 22–29)
CO2: 23 MMOL/L (ref 22–29)
CO2: 23 MMOL/L (ref 22–29)
CO2: 24 MMOL/L (ref 22–29)
COHB: 0.3 % (ref 0–1.5)
CREAT SERPL-MCNC: 3.7 MG/DL (ref 0.7–1.2)
CREAT SERPL-MCNC: 3.9 MG/DL (ref 0.7–1.2)
CREAT SERPL-MCNC: 4.4 MG/DL (ref 0.7–1.2)
CREAT SERPL-MCNC: 4.8 MG/DL (ref 0.7–1.2)
CREATININE URINE: 17 MG/DL (ref 40–278)
CRITICAL: ABNORMAL
D DIMER: >5250 NG/ML DDU
DATE ANALYZED: ABNORMAL
DATE OF COLLECTION: ABNORMAL
FERRITIN: 2600 NG/ML
FIBRINOGEN: 621 MG/DL (ref 225–540)
FIO2: 40 %
GFR AFRICAN AMERICAN: 15
GFR AFRICAN AMERICAN: 16
GFR AFRICAN AMERICAN: 18
GFR AFRICAN AMERICAN: 20
GFR NON-AFRICAN AMERICAN: 15 ML/MIN/1.73
GFR NON-AFRICAN AMERICAN: 16 ML/MIN/1.73
GFR NON-AFRICAN AMERICAN: 18 ML/MIN/1.73
GFR NON-AFRICAN AMERICAN: 20 ML/MIN/1.73
GLUCOSE BLD-MCNC: 163 MG/DL (ref 74–99)
GLUCOSE BLD-MCNC: 183 MG/DL (ref 74–99)
GLUCOSE BLD-MCNC: 183 MG/DL (ref 74–99)
GLUCOSE BLD-MCNC: 208 MG/DL (ref 74–99)
HCO3: 21.4 MMOL/L (ref 22–26)
HCT VFR BLD CALC: 23.3 % (ref 37–54)
HCT VFR BLD CALC: 23.7 % (ref 37–54)
HCT VFR BLD CALC: 25 % (ref 37–54)
HEMOGLOBIN: 8 G/DL (ref 12.5–16.5)
HEMOGLOBIN: 8.3 G/DL (ref 12.5–16.5)
HEMOGLOBIN: 8.8 G/DL (ref 12.5–16.5)
HHB: 2.7 % (ref 0–5)
INR BLD: 1.5
LAB: ABNORMAL
Lab: ABNORMAL
MAGNESIUM: 2.1 MG/DL (ref 1.6–2.6)
MAGNESIUM: 2.1 MG/DL (ref 1.6–2.6)
MAGNESIUM: 2.2 MG/DL (ref 1.6–2.6)
MAGNESIUM: 2.3 MG/DL (ref 1.6–2.6)
METER GLUCOSE: 169 MG/DL (ref 74–99)
METER GLUCOSE: 169 MG/DL (ref 74–99)
METER GLUCOSE: 187 MG/DL (ref 74–99)
METER GLUCOSE: 189 MG/DL (ref 74–99)
METER GLUCOSE: 201 MG/DL (ref 74–99)
METER GLUCOSE: 213 MG/DL (ref 74–99)
METHB: 0.2 % (ref 0–1.5)
MODE: AC
O2 CONTENT: 13.2 ML/DL
O2 SATURATION: 97.3 % (ref 92–98.5)
O2HB: 96.8 % (ref 94–97)
OPERATOR ID: 2593
PATIENT TEMP: 37 C
PCO2: 26.4 MMHG (ref 35–45)
PEEP/CPAP: 5 CMH2O
PFO2: 2.46 MMHG/%
PH BLOOD GAS: 7.53 (ref 7.35–7.45)
PHOSPHORUS: 6.2 MG/DL (ref 2.5–4.5)
PHOSPHORUS: 6.5 MG/DL (ref 2.5–4.5)
PHOSPHORUS: 6.5 MG/DL (ref 2.5–4.5)
PHOSPHORUS: 7.1 MG/DL (ref 2.5–4.5)
PO2: 98.5 MMHG (ref 75–100)
POTASSIUM SERPL-SCNC: 3.8 MMOL/L (ref 3.5–5)
POTASSIUM SERPL-SCNC: 3.8 MMOL/L (ref 3.5–5)
POTASSIUM SERPL-SCNC: 3.9 MMOL/L (ref 3.5–5)
POTASSIUM SERPL-SCNC: 4.1 MMOL/L (ref 3.5–5)
POTASSIUM, UR: 14.2 MMOL/L
PROTHROMBIN TIME: 16.4 SEC (ref 9.3–12.4)
RI(T): 1.49
RR MECHANICAL: 16 B/MIN
SEDIMENTATION RATE, ERYTHROCYTE: 85 MM/HR (ref 0–15)
SODIUM BLD-SCNC: 135 MMOL/L (ref 132–146)
SODIUM BLD-SCNC: 137 MMOL/L (ref 132–146)
SODIUM BLD-SCNC: 137 MMOL/L (ref 132–146)
SODIUM BLD-SCNC: 138 MMOL/L (ref 132–146)
SODIUM URINE: 86 MMOL/L
SOURCE, BLOOD GAS: ABNORMAL
THB: 9.6 G/DL (ref 11.5–16.5)
TIME ANALYZED: 435
TOTAL PROTEIN: 5.2 G/DL (ref 6.4–8.3)
UREA NITROGEN, UR: 139 MG/DL (ref 800–1666)
VT MECHANICAL: 500 ML

## 2020-12-29 PROCEDURE — 6360000002 HC RX W HCPCS: Performed by: INTERNAL MEDICINE

## 2020-12-29 PROCEDURE — 2580000003 HC RX 258: Performed by: INTERNAL MEDICINE

## 2020-12-29 PROCEDURE — 85018 HEMOGLOBIN: CPT

## 2020-12-29 PROCEDURE — 2500000003 HC RX 250 WO HCPCS: Performed by: INTERNAL MEDICINE

## 2020-12-29 PROCEDURE — 99233 SBSQ HOSP IP/OBS HIGH 50: CPT | Performed by: INTERNAL MEDICINE

## 2020-12-29 PROCEDURE — 82436 ASSAY OF URINE CHLORIDE: CPT

## 2020-12-29 PROCEDURE — 83735 ASSAY OF MAGNESIUM: CPT

## 2020-12-29 PROCEDURE — C9113 INJ PANTOPRAZOLE SODIUM, VIA: HCPCS | Performed by: INTERNAL MEDICINE

## 2020-12-29 PROCEDURE — 6370000000 HC RX 637 (ALT 250 FOR IP): Performed by: INTERNAL MEDICINE

## 2020-12-29 PROCEDURE — 2700000000 HC OXYGEN THERAPY PER DAY

## 2020-12-29 PROCEDURE — 71045 X-RAY EXAM CHEST 1 VIEW: CPT

## 2020-12-29 PROCEDURE — 82570 ASSAY OF URINE CREATININE: CPT

## 2020-12-29 PROCEDURE — 84133 ASSAY OF URINE POTASSIUM: CPT

## 2020-12-29 PROCEDURE — 80048 BASIC METABOLIC PNL TOTAL CA: CPT

## 2020-12-29 PROCEDURE — 85610 PROTHROMBIN TIME: CPT

## 2020-12-29 PROCEDURE — 85384 FIBRINOGEN ACTIVITY: CPT

## 2020-12-29 PROCEDURE — 85651 RBC SED RATE NONAUTOMATED: CPT

## 2020-12-29 PROCEDURE — 36415 COLL VENOUS BLD VENIPUNCTURE: CPT

## 2020-12-29 PROCEDURE — 85730 THROMBOPLASTIN TIME PARTIAL: CPT

## 2020-12-29 PROCEDURE — 86140 C-REACTIVE PROTEIN: CPT

## 2020-12-29 PROCEDURE — 82805 BLOOD GASES W/O2 SATURATION: CPT

## 2020-12-29 PROCEDURE — P9047 ALBUMIN (HUMAN), 25%, 50ML: HCPCS | Performed by: INTERNAL MEDICINE

## 2020-12-29 PROCEDURE — 94003 VENT MGMT INPAT SUBQ DAY: CPT

## 2020-12-29 PROCEDURE — 84540 ASSAY OF URINE/UREA-N: CPT

## 2020-12-29 PROCEDURE — 82962 GLUCOSE BLOOD TEST: CPT

## 2020-12-29 PROCEDURE — 85378 FIBRIN DEGRADE SEMIQUANT: CPT

## 2020-12-29 PROCEDURE — 80076 HEPATIC FUNCTION PANEL: CPT

## 2020-12-29 PROCEDURE — 82330 ASSAY OF CALCIUM: CPT

## 2020-12-29 PROCEDURE — 2000000000 HC ICU R&B

## 2020-12-29 PROCEDURE — 85014 HEMATOCRIT: CPT

## 2020-12-29 PROCEDURE — 84300 ASSAY OF URINE SODIUM: CPT

## 2020-12-29 PROCEDURE — 84100 ASSAY OF PHOSPHORUS: CPT

## 2020-12-29 PROCEDURE — 36592 COLLECT BLOOD FROM PICC: CPT

## 2020-12-29 PROCEDURE — 85520 HEPARIN ASSAY: CPT

## 2020-12-29 PROCEDURE — 82728 ASSAY OF FERRITIN: CPT

## 2020-12-29 RX ORDER — POTASSIUM CHLORIDE 29.8 MG/ML
20 INJECTION INTRAVENOUS ONCE
Status: COMPLETED | OUTPATIENT
Start: 2020-12-29 | End: 2020-12-29

## 2020-12-29 RX ORDER — BUMETANIDE 0.25 MG/ML
2 INJECTION, SOLUTION INTRAMUSCULAR; INTRAVENOUS EVERY 12 HOURS
Status: COMPLETED | OUTPATIENT
Start: 2020-12-29 | End: 2020-12-30

## 2020-12-29 RX ORDER — METOPROLOL TARTRATE 5 MG/5ML
2.5 INJECTION INTRAVENOUS EVERY 6 HOURS
Status: DISCONTINUED | OUTPATIENT
Start: 2020-12-29 | End: 2020-12-30

## 2020-12-29 RX ORDER — ALBUMIN (HUMAN) 12.5 G/50ML
25 SOLUTION INTRAVENOUS EVERY 8 HOURS
Status: COMPLETED | OUTPATIENT
Start: 2020-12-29 | End: 2020-12-31

## 2020-12-29 RX ADMIN — INSULIN GLARGINE 25 UNITS: 100 INJECTION, SOLUTION SUBCUTANEOUS at 20:35

## 2020-12-29 RX ADMIN — SODIUM CHLORIDE 0.04 MCG/KG/HR: 9 INJECTION, SOLUTION INTRAVENOUS at 08:42

## 2020-12-29 RX ADMIN — SODIUM CHLORIDE, PRESERVATIVE FREE 10 ML: 5 INJECTION INTRAVENOUS at 09:08

## 2020-12-29 RX ADMIN — SODIUM CHLORIDE, PRESERVATIVE FREE 10 ML: 5 INJECTION INTRAVENOUS at 08:43

## 2020-12-29 RX ADMIN — INSULIN LISPRO 4 UNITS: 100 INJECTION, SOLUTION INTRAVENOUS; SUBCUTANEOUS at 00:27

## 2020-12-29 RX ADMIN — PANTOPRAZOLE SODIUM 40 MG: 40 INJECTION, POWDER, FOR SOLUTION INTRAVENOUS at 20:31

## 2020-12-29 RX ADMIN — METOPROLOL TARTRATE 2.5 MG: 5 INJECTION INTRAVENOUS at 20:31

## 2020-12-29 RX ADMIN — INSULIN LISPRO 4 UNITS: 100 INJECTION, SOLUTION INTRAVENOUS; SUBCUTANEOUS at 20:35

## 2020-12-29 RX ADMIN — THIAMINE HYDROCHLORIDE 100 MG: 100 INJECTION, SOLUTION INTRAMUSCULAR; INTRAVENOUS at 08:44

## 2020-12-29 RX ADMIN — Medication 2000 UNITS: at 09:07

## 2020-12-29 RX ADMIN — SODIUM CHLORIDE, PRESERVATIVE FREE 10 ML: 5 INJECTION INTRAVENOUS at 20:31

## 2020-12-29 RX ADMIN — ALBUMIN (HUMAN) 25 G: 0.25 INJECTION, SOLUTION INTRAVENOUS at 09:09

## 2020-12-29 RX ADMIN — Medication 50 MG: at 08:43

## 2020-12-29 RX ADMIN — INSULIN LISPRO 2 UNITS: 100 INJECTION, SOLUTION INTRAVENOUS; SUBCUTANEOUS at 12:13

## 2020-12-29 RX ADMIN — INSULIN LISPRO 2 UNITS: 100 INJECTION, SOLUTION INTRAVENOUS; SUBCUTANEOUS at 08:47

## 2020-12-29 RX ADMIN — PIPERACILLIN AND TAZOBACTAM 3.38 G: 3; .375 INJECTION, POWDER, LYOPHILIZED, FOR SOLUTION INTRAVENOUS at 15:36

## 2020-12-29 RX ADMIN — OXYCODONE HYDROCHLORIDE AND ACETAMINOPHEN 500 MG: 500 TABLET ORAL at 08:43

## 2020-12-29 RX ADMIN — POTASSIUM CHLORIDE 20 MEQ: 400 INJECTION, SOLUTION INTRAVENOUS at 09:09

## 2020-12-29 RX ADMIN — Medication 10 ML: at 09:08

## 2020-12-29 RX ADMIN — LABETALOL HYDROCHLORIDE 10 MG: 5 INJECTION INTRAVENOUS at 17:44

## 2020-12-29 RX ADMIN — METOPROLOL TARTRATE 12.5 MG: 25 TABLET, FILM COATED ORAL at 08:43

## 2020-12-29 RX ADMIN — FOLIC ACID 1 MG: 5 INJECTION, SOLUTION INTRAMUSCULAR; INTRAVENOUS; SUBCUTANEOUS at 08:44

## 2020-12-29 RX ADMIN — Medication 10 ML: at 20:31

## 2020-12-29 RX ADMIN — CALCIUM GLUCONATE 1 G: 98 INJECTION, SOLUTION INTRAVENOUS at 08:43

## 2020-12-29 RX ADMIN — THIAMINE HYDROCHLORIDE 100 MG: 100 INJECTION, SOLUTION INTRAMUSCULAR; INTRAVENOUS at 20:30

## 2020-12-29 RX ADMIN — HEPARIN SODIUM 12 UNITS/KG/HR: 10000 INJECTION, SOLUTION INTRAVENOUS at 00:57

## 2020-12-29 RX ADMIN — DEXAMETHASONE SODIUM PHOSPHATE 6 MG: 4 INJECTION, SOLUTION INTRAMUSCULAR; INTRAVENOUS at 12:05

## 2020-12-29 RX ADMIN — REMDESIVIR 100 MG: 100 INJECTION, POWDER, LYOPHILIZED, FOR SOLUTION INTRAVENOUS at 17:22

## 2020-12-29 RX ADMIN — INSULIN LISPRO 2 UNITS: 100 INJECTION, SOLUTION INTRAVENOUS; SUBCUTANEOUS at 04:22

## 2020-12-29 RX ADMIN — INSULIN LISPRO 2 UNITS: 100 INJECTION, SOLUTION INTRAVENOUS; SUBCUTANEOUS at 15:36

## 2020-12-29 RX ADMIN — PIPERACILLIN AND TAZOBACTAM 3.38 G: 3; .375 INJECTION, POWDER, LYOPHILIZED, FOR SOLUTION INTRAVENOUS at 04:16

## 2020-12-29 RX ADMIN — ALBUMIN (HUMAN) 25 G: 0.25 INJECTION, SOLUTION INTRAVENOUS at 15:36

## 2020-12-29 RX ADMIN — PANTOPRAZOLE SODIUM 40 MG: 40 INJECTION, POWDER, FOR SOLUTION INTRAVENOUS at 08:43

## 2020-12-29 RX ADMIN — HEPARIN SODIUM 12 UNITS/KG/HR: 10000 INJECTION, SOLUTION INTRAVENOUS at 22:52

## 2020-12-29 RX ADMIN — BUMETANIDE 2 MG: 0.25 INJECTION, SOLUTION INTRAMUSCULAR; INTRAVENOUS at 15:35

## 2020-12-29 ASSESSMENT — PAIN SCALES - GENERAL
PAINLEVEL_OUTOF10: 0

## 2020-12-29 ASSESSMENT — PULMONARY FUNCTION TESTS
PIF_VALUE: 16
PIF_VALUE: 16
PIF_VALUE: 18
PIF_VALUE: 16
PIF_VALUE: 17
PIF_VALUE: 17
PIF_VALUE: 16
PIF_VALUE: 16
PIF_VALUE: 17
PIF_VALUE: 15
PIF_VALUE: 17
PIF_VALUE: 16

## 2020-12-29 NOTE — FLOWSHEET NOTE
Inpatient Wound Care (Initial consult) 4430    Admit Date: 12/22/2020 11:54 AM    Reason for consult:  Right foot    Significant history: Per H&P     Chief Complaint:  had concerns including Shortness of Breath, Altered Mental Status, and Positive For Covid-19.     History Of Present Illness:    Mr. Rohit Sargent, a 65y.o. year old male  who  has a past medical history of Cancer ([facility] [address] Diabetes mellitus ([facility] [address] Hyperlipidemia, Hypertension, and Prostate cancer ([facility] [address]       65 y. o. male presenting to the ED for AMS, beginning unknown time.  The complaint has been persistent, moderate in severity, and worsened by nothing.  Patient presents via EMS for altered mental status.  Reportedly called to the patient's home for altered mental status, he is reportedly Covid positive although the timeline on this as well as when the patient's diagnosis was is not clear. [address] arrival he is orientated to person only. Nelia Cabral give additional history.  Tachypneic.  In respiratory distress.  Seen upon arrival in the resuscitation bay    Findings:      12/29/20 0927   Skin Integrity   Skin Integrity   (skin discoloration)   Location   (right heel)   Wound 12/25/20 Foot Plantar   Date First Assessed/Time First Assessed: 12/25/20 0555   Present on Hospital Admission: No  Primary Wound Type: Pressure Injury  Location: Foot  Wound Location Orientation: Plantar   Wound Image    Wound Etiology Deep tissue/Injury   Dressing/Treatment Open to air   Wound Length (cm) 2.8 cm   Wound Width (cm) 5 cm   Wound Depth (cm)   (unable to determine)   Wound Surface Area (cm^2) 14 cm^2   Change in Wound Size % (l*w) -3.7   Wound Assessment Purple/maroon   Drainage Amount None   Shaina-wound Assessment Intact   Wound 12/29/20 Heel Left   Date First Assessed/Time First Assessed: 12/29/20 0928   Present on Hospital Admission: No  Location: Heel  Wound Location Orientation: Left   Wound Image    Wound Etiology Deep tissue/Injury   Dressing/Treatment Open

## 2020-12-29 NOTE — PROGRESS NOTES
Department of Internal Medicine  Nephrology  Progress Note    Events reviewed. SUBJECTIVE: We are following Mr. Brown for BLAIR on dialysis. Bedside exam deferred due to patient's Covid positive status. Chart review performed and discussion held with nursing staff. Precedex is being weaned to assess pt's baseline mental status. No acute events overnight. Physical Exam:    VITALS:  /77   Pulse 96   Temp 99 °F (37.2 °C) (Bladder)   Resp 16   Ht 6' 2\" (1.88 m)   Wt 218 lb 0.6 oz (98.9 kg)   SpO2 99%   BMI 27.99 kg/m²   24HR INTAKE/OUTPUT:      Intake/Output Summary (Last 24 hours) at 12/29/2020 0838  Last data filed at 12/29/2020 0600  Gross per 24 hour   Intake 2251 ml   Output 1010 ml   Net 1241 ml     Physical exam:  Physical exam deferred as patient is currently Covid positive. Chart review performed, patient not on any vasopressors at this time. Per nursing review, pt is still having BUE edema, but continues to have good urine output.      Scheduled Meds:   potassium chloride  20 mEq Intravenous Once    calcium gluconate IVPB  1 g Intravenous Once    albumin human  25 g Intravenous Q8H    metoprolol tartrate  12.5 mg Oral BID    insulin glargine  25 Units Subcutaneous Nightly    sodium chloride  20 mL Intravenous Once    pantoprazole  40 mg Intravenous BID    And    sodium chloride (PF)  10 mL Intravenous BID    remdesivir IVPB  100 mg Intravenous Q24H    sodium chloride flush  10 mL Intravenous 2 times per day    dexamethasone  6 mg Intravenous Q24H    insulin lispro  0-12 Units Subcutaneous Q4H    sodium chloride (PF)  10 mL Intravenous Daily    thiamine  100 mg Intravenous BID    Followed by   Tish Marquez ON 1/1/2021] thiamine  100 mg Intravenous Daily    folic acid  1 mg Intravenous Daily    vitamin D  2,000 Units Oral Daily    zinc sulfate  50 mg Oral Daily    ascorbic acid  500 mg Oral Daily    piperacillin-tazobactam  3.375 g Intravenous Q12H    sodium chloride Date    PH 7.526 12/29/2020    PCO2 26.4 12/29/2020    PO2 98.5 12/29/2020    HCO3 21.4 12/29/2020    BE -0.6 12/29/2020    O2SAT 97.3 12/29/2020     Ionized Calcium  Calcium, Ion 1.15  1.15 - 1.33 mmol/L Final 12/28/2020  4:24 AM [facility]     Radiology Results:     Narrative   EXAMINATION:   ONE XRAY VIEW OF THE CHEST   12/29/2020 8:44 am   COMPARISON:   12/28/2020   HISTORY:   ORDERING SYSTEM PROVIDED HISTORY: intubated   TECHNOLOGIST PROVIDED HISTORY:   Reason for exam:->intubated   What reading provider will be dictating this exam?->CRC   FINDINGS:   Bibasilar infiltrates and small effusions are unchanged.  Lines/tubes are   appropriate.  Heart size is at the upper limits of normal.     BRIEF SUMMARY OF INITIAL CONSULT:    Briefly, Mr. Diane He is a 65year old AA man with h/o CKD stage 3, baseline creatinine 1.4 mg/dL, HTN, Type 2 DM, prostate cancer, who was admitted on 12/22/2020 with altered metal status. He was found to have glucose level of 1332 mg/dL, lactic acid levelof 11.8, BUN of 95, serum creatinine of 5.7 mg/dL and serum potassium of 7.1 with CO2 level of 4; reason for this consultation. His medications prior to admission included lisinopril, ibuprofen and naproxen. Resolved problems:  · Hyperkalemia  · Pseudohyponatremia  · Hypocalcemia  · Acute pancreatitis  · Hemodynamic shock     IMPRESSION/RECOMMENDATIONS:      1. BLAIR stage III on CKD, ischemic ATN, multifactorial 2/2 to severe intravascular volume depletion (osmotic diurseis-hyperglycemia) in the setting of ACE inhibition and NSAID's. Started on renal replacement therapy on 12/25/2020. Patient currently net +17.4 L, received 8 L of fluid for treatment of DKA. · Urine volume has increase but without significant clearance, creatinine has increased since yesterday. We will hold dialysis today and add diuretics.      2. CKD stage III, baseline creatinine approximately 1.4 mg/dL, with proteinuria, probably DKD 3. Hypocalcemia, due to coexisting alkalemia, rule out vitamin D deficiency  4. Alkalemia, PH: 7.526; with respiratory alkalosis, metabolic alkalosis (bicarbonate administration on dialysis) and HAGMA (uremia)     -------------------------------------------------  5. Respiratory failure s/p intubation   6. COVID-19  infection, on dexamethasone, remdesivir    7. Pneumonia, on piperacillin-tazobactam   8. DKA with subsequent acute pancreatitis-initial lipase >3000, now improved to 17 this AM. Glucose control improving this AM -patient still n.p.o.  9. Anemia, normocytic, dropping H/H   10.  Nutrition, n.p.o.     Plan:    · Will hold SLED today and continue to monitor renal function recovery  · We will continue to assess need for SLED daily    · Will give Bumex 2mg every 12 hours x2 doses   · Continue to monitor urine output  · MAP goal >65 mmHg  · Monitor ionized calcium-we will replete as necessary    Case discussed with Dr. Anna Danielson and ICU team     Jayla Solomon DO, PGY1  12/29/2020

## 2020-12-29 NOTE — PLAN OF CARE
Problem: Restraint Use - Nonviolent/Non-Self-Destructive Behavior:  Goal: Absence of restraint indications  Description: Absence of restraint indications  12/29/2020 1021 by Giovanna Easley RN  Outcome: Not Met This Shift     Problem: Restraint Use - Nonviolent/Non-Self-Destructive Behavior:  Goal: Absence of restraint-related injury  Description: Absence of restraint-related injury  12/29/2020 1021 by Giovanna Easley RN  Outcome: Met This Shift

## 2020-12-29 NOTE — PLAN OF CARE
Problem: Restraint Use - Nonviolent/Non-Self-Destructive Behavior:  Goal: Absence of restraint indications  Description: Absence of restraint indications  12/29/2020 1718 by Gerald Sotomayor RN  Outcome: Completed     Problem: Restraint Use - Nonviolent/Non-Self-Destructive Behavior:  Goal: Absence of restraint-related injury  Description: Absence of restraint-related injury  12/29/2020 1718 by Gerald Sotomayor RN  Outcome: Completed  Note: Patient following commands and redirects appropriately; bilateral restraints discontinued.

## 2020-12-29 NOTE — PLAN OF CARE
Problem: Airway Clearance - Ineffective  Goal: Achieve or maintain patent airway  Outcome: Met This Shift     Problem: Gas Exchange - Impaired  Goal: Absence of hypoxia  Outcome: Met This Shift  Goal: Promote optimal lung function  Outcome: Met This Shift     Problem: Breathing Pattern - Ineffective  Goal: Ability to achieve and maintain a regular respiratory rate  Outcome: Met This Shift     Problem:  Body Temperature -  Risk of, Imbalanced  Goal: Ability to maintain a body temperature within defined limits  Outcome: Met This Shift  Goal: Will regain or maintain usual level of consciousness  Outcome: Met This Shift  Goal: Complications related to the disease process, condition or treatment will be avoided or minimized  Outcome: Met This Shift     Problem: Isolation Precautions - Risk of Spread of Infection  Goal: Prevent transmission of infection  Outcome: Met This Shift     Problem: Nutrition Deficits  Goal: Optimize nutrtional status  Outcome: Met This Shift     Problem: Risk for Fluid Volume Deficit  Goal: Maintain normal heart rhythm  Outcome: Met This Shift  Goal: Maintain normal serum potassium, sodium, calcium, phosphorus, and pH  Outcome: Met This Shift     Problem: Skin Integrity:  Goal: Will show no infection signs and symptoms  Description: Will show no infection signs and symptoms  Outcome: Met This Shift  Goal: Absence of new skin breakdown  Description: Absence of new skin breakdown  Outcome: Met This Shift     Problem: Falls - Risk of:  Goal: Will remain free from falls  Description: Will remain free from falls  Outcome: Met This Shift  Goal: Absence of physical injury  Description: Absence of physical injury  Outcome: Met This Shift     Problem: Restraint Use - Nonviolent/Non-Self-Destructive Behavior:  Goal: Absence of restraint-related injury  Description: Absence of restraint-related injury  12/29/2020 0357 by Kathi Suggs  Outcome: Met This Shift    Problem: Restraint Use -

## 2020-12-29 NOTE — PROGRESS NOTES
[address] [facility]  Department of Internal Medicine   Internal Medicine Residency   MICU Progress Note    Patient:  Andre Pelaez 65 y.o. male  MRN: [id number]     Date of Service: 12/29/2020    Allergy: Patient has no known allergies. Hospital day #7, Vent #5  Subjective     Patient seen and examined. No acute changes overnight. Awake and following commands. Urine output improving today, 1 L out over last 24 hours, net +17.4 L since admission. Albumin 2.5, replacing with 25 g x 6 doses every 8 hours. Calcium ionized is 1.13, replaced. Potassium 3.8, replaced. On heparin gtt, Hg stable. Patient extubated and placed on 5 L nasal cannula. Weaning off Precedex today. Failed bedside swallow, plan for SLP eval tomorrow. No plan for sled today   consulting podiatry today for nail care and right plantar foot wound. Objective     VS: /77   Pulse 102   Temp 99 °F (37.2 °C) (Bladder)   Resp 16   Ht 6' 2\" (1.88 m)   Wt 218 lb 0.6 oz (98.9 kg)   SpO2 98%   BMI 27.99 kg/m²           I & O - 24hr:     Intake/Output Summary (Last 24 hours) at 12/29/2020 9317  Last data filed at 12/29/2020 0600  Gross per 24 hour   Intake 2251 ml   Output 1010 ml   Net 1241 ml       Physical Exam:  · General Appearance: Extubated on nasal cannula, Awake and following commands  · Neck: no JVD and supple, symmetrical, trachea midline  · Lung: clear to auscultation bilaterally and diminished breath sounds bilaterally  · Heart: irregularly irregular rhythm  · Abdomen: soft, non-tender; bowel sounds normal; no masses,  no organomegaly  · Extremities:  extremities normal, atraumatic, no cyanosis or edema. Right plantar wound with skin discoloration. · Musculoskeletal: No joint swelling. · Neurologic: Mental status:positive cough and gag reflex, withdraws to pain, follows commands. Lines     site day    Art line   None    TLC R IJ 6   PICC None    Hemoaccess R Fem 5     Oxygen:  On 5 L nasal cannula    ABG: Lab Results   Component Value Date    PH 7.526 12/29/2020    PCO2 26.4 12/29/2020    PO2 98.5 12/29/2020    HCO3 21.4 12/29/2020    BE -0.6 12/29/2020    THB 9.6 12/29/2020    O2SAT 97.3 12/29/2020        Medications     Infusions: (Fluid, Sedation, Vasopressors)  Sedation   precedex at 0.1 mcg/kg/hr    Nutrition:   N.p.o.  ATB:   Antibiotics  Date   Zosyn 12/23             Skin issues:   Patient currently has   Urinary cath  Isolation  DVT prophylaxis/ GI prophylaxis,     Labs     CBC:   Lab Results   Component Value Date    WBC 9.9 12/28/2020    RBC 2.96 12/28/2020    HGB 8.3 12/29/2020    HCT 23.7 12/29/2020    MCV 83.8 12/28/2020    MCH 29.1 12/28/2020    MCHC 34.7 12/28/2020    RDW 14.4 12/28/2020     12/28/2020    MPV 12.0 12/28/2020     BMP:    Lab Results   Component Value Date     12/29/2020    K 3.8 12/29/2020    K 4.7 08/16/2020     12/29/2020    CO2 23 12/29/2020    BUN 50 12/29/2020    LABALBU 2.5 12/29/2020    CREATININE 3.9 12/29/2020    CALCIUM 8.1 12/29/2020    GFRAA 18 12/29/2020    LABGLOM 18 12/29/2020    GLUCOSE 183 12/29/2020       Imaging Studies:  CXR:      Resident's Assessment and Plan   Patient is a 64-year-old male with past medical history of anxiety, depression, diabetes with neuropathy, HLD, HTN, history of lung and prostate neoplasm. Patient presented by EMS from home for AMS, for unknown amount of time. Patient was found to have Covid positive, glucose 1332, pH 6.96, PCO2 19, bicarb 4, sodium 123, potassium 7.1, chloride 91, BUN 65, creatinine 5.7 (baseline 1.4), phosphate 13, magnesium 4.5, lactic acid 11.8, lipase 3000. EKG NSR with first-degree AV block. Chest x-ray with right sided infiltrate. CT abdomen with acute pancreatic inflammatory changes in the small bowel with ascites. CT cervical spine showing DJD with narrowing C2-3, C4-5. CT head unremarkable. Neurology:   1.  Acute Encephalopathy likely 2/2 HHS/DKA/mixed vs alcohol withdrawal. S/p Hypocalcemia likely 2/2 vitamin D deficiency    -Calcium ionized is 1.13, replaced   -Follow up vitamin D 25 hydroxy level    -Continue to monitor ionized calcium daily and replace as needed     Infectious Disease:   1. COVID-19 pneumonia, procalcitonin 4.65   -Chest x-ray with right-sided infiltrate, stable   -Continue remdesivir (day 4)   -Continue Decadron 6 mg IV Q 24 hrs    -Continue vitamin C and zinc   -Continue to follow inflammatory markers   -Pancultures negative     Endocrine:   1. Type II DM, with severe hyperglycemia 2/2 DKA vs HHS vs mixed on admission- Resolved. Patient presented with blood glucose >1000, but BHB was not checked. BHB after 12 hours was 0.8. Anion gap closed and patient bridged to subcutaneous insulin. Hemoglobin A1c 11.1%. -BG range, 160-220    -Continue Lantus 25 units and  MDSS   -Continue POCT glucose every 4 hours    Hematology/ Oncology:  1. Acute on Chronic normocytic anemia likely 2/2 ACD vs acute blood loss. Baseline hemoglobin 10-12 g/dL. -Hemoglobin stable   -Continue to monitor H&H Q8 hrs    -Maintain hemoglobin >7, transfuse as needed    Gastroenterology:   1. Acute pancreatitis likely 2/2 alcohol versus Covid, resolving. Patient reported drinking more alcohol recently, , lipase 3000 initially. Lipase trended instead of COPD due to Covid.  Lipase 17.    -Continue Zosyn for concerns of developing necrotizing pancreatitis (started 12/23)   -Currently n.p.o., failed bedside swallow and plan for SLP eval tomorrow      Next of Kin/ POA:   Smith Millet (domestic partner)  Code Status:   Full    PT/OT evaluation:  Consulted  for tomorrow  DVT prophylaxis/ GI prophylaxis: Heparin/Protonix  Disposition: Continue current care      Lety Nesbitt MD, PGY-1  Attending physician: [address] [facility]  Department of Pulmonary, Critical Care and Sleep Medicine  [address] [facility]  Department of Internal Medicine      During multidisciplinary team rounds Days Chad Chu is a 65 y.o. male was seen, examined and discussed. This is confirmation that I have personally seen and examined the patient and that the key elements of the encounter were performed by me (> 85 % time). The medications & laboratory data was discussed and adjusted where necessary. The radiographic images were reviewed or with radiologist or consultant if felt dis-concordant with the exam or history. The above findings were corroborated, plans confirmed and changes made if needed. Family is updated at the bedside as available. Key issues of the case were discussed among consultants. Critical Care time is documented if appropriate.       [employer], DO, FACP, FCCP, [facility],

## 2020-12-29 NOTE — PROGRESS NOTES
Comprehensive Nutrition Assessment    Type and Reason for Visit:  Initial    Nutrition Recommendations/Plan: NPO status x 7 days    If ongoing NPO x 10 days, initiate TPN. Please consult for recs as needed. Pt at high risk for re-feeding syndrome   Noted pancreatitis lipase >3000 on admit now WNL  Noted DKA glucose improving   Noted SLED, Phos 7.1     Nutrition Assessment:  Pt at nutritional risk d/t inability to consume po 2/2 intubation +COVID19, DKA, & Pancreatitis 2/2 ETOH abuse. Pt at further risk d/t SLED initiation. Noted hx lung/prostate CA. NPO x 7 days. Will continue to monitor/ provide recs as needed. Malnutrition Assessment:  Malnutrition Status: At risk for malnutrition  Context:  Acute Illness     Findings of the 6 clinical characteristics of malnutrition:  Energy Intake: 50% or less of estimated energy requirements for 5 or more days  Weight Loss:  Unable to assess(no hx on file)     Body Fat Loss:  Unable to assess   Muscle Mass Loss:  Unable to assess  Fluid Accumulation:  No significant fluid accumulation   Strength:  Not Performed    Estimated Daily Nutrient Needs:  Energy (kcal):  PS3B 1961; 3515-7908;  Weight Used for Energy Requirements: admit  Protein (g):  130-155(1.5-1.8 g/kg monitor LFTS ammonia/Bili  WNL)  Weight Used for Protein Requirements:  Ideal        Fluid (ml/day):  per renal    Nutrition Related Findings:  Pt intubated/sedated, hypotension on pressor, SLED, pancreatitis (Lipase >3000 on admit), ETOH abuse small ascites noted (ammonia WNL), , +I/O's 17L, +1/+2 edema, diarrhea, active BS, OGT clamped      Wounds:  Multiple, Deep Tissue Injury(foot/heel)       Current Nutrition Therapies:    Diet NPO Effective Now    Anthropometric Measures:  · Height: 6' 2\" (188 cm)  · Current Body Weight: 208 lb (94.3 kg)(12/23 admit wt as CBW elevated d/t +fluids)   · Admission Body Weight: 208 lb (94.3 kg)(first measured)    · Usual Body Weight: (UTO no actual EMR hx on file) · Ideal Body Weight: 190 lbs  · BMI: 26.7 BMI Categories: Overweight (BMI 25.0-29. 9)       Nutrition Diagnosis:   · Inadequate oral intake related to inadequate protein-energy intake as evidenced by NPO or clear liquid status due to medical condition, intubation    Nutrition Interventions:   Nutrition Education/Counseling:  Education not indicated   Coordination of Nutrition Care:  Continue to monitor while inpatient    Goals:  Nutrition progression       Nutrition Monitoring and Evaluation:   Food/Nutrient Intake Outcomes:  Diet Advancement/Tolerance  Physical Signs/Symptoms Outcomes:  Biochemical Data, Nutrition Focused Physical Findings, Skin, Weight, GI Status, Fluid Status or Edema, Hemodynamic Status, Diarrhea     Discharge Planning:     Too soon to determine     Electronically signed by Mary Morton RD, LD on 12/29/20 at 1:43 PM EST    Contact: Ext [phone]

## 2020-12-29 NOTE — PROGRESS NOTES
Wean parameter done    VT= 508 mls  F= 19 B/M  V= 9.66 l/m  NIF=-25 cmH2O  VC= na L    Rsbi=22    Gross air leak detected. Patient following commands.

## 2020-12-29 NOTE — PROGRESS NOTES
Hospitalist Progress Note      SYNOPSIS: Patient admitted on 2020 for Acute metabolic encephalopathy. CXR done showed a right sided infiltrate and CT of the abdomen showed acute pancreatitis. He is also Covid positive. Patient also has BLAIR and is undergoing regular dialysis.         SUBJECTIVE:    Patient seen. Per discussion with his nurse, he is being weaned off precedex slowly. He is more responsive now, per his nurse. He remains on heparin drip. No other active issues  Records reviewed. Temp (24hrs), Av.7 °F (37.1 °C), Min:97.3 °F (36.3 °C), Max:99.3 °F (37.4 °C)    DIET: Diet NPO Effective Now  CODE: Full Code    Intake/Output Summary (Last 24 hours) at 2020 1126  Last data filed at 2020 1100  Gross per 24 hour   Intake 1371 ml   Output 1135 ml   Net 236 ml       OBJECTIVE:    BP (!) 140/74   Pulse 119   Temp 98.4 °F (36.9 °C) (Bladder)   Resp 18   Ht 6' 2\" (1.88 m)   Wt 218 lb 0.6 oz (98.9 kg)   SpO2 98%   BMI 27.99 kg/m²     Due to the current efforts to prevent transmission of COVID-19 and also the need to preserve PPE for other caregivers, a face-to-face encounter with the patient was not performed. That being said, all relevant records and diagnostic tests were reviewed, including laboratory results and imaging. Please reference any relevant documentation elsewhere. Care will be coordinated with the primary service.     ASSESSMENT:  Acute hypoxic respiratory failure respiratory distress   acute alcohol withdrawal  COVID 19 pneumonia  Acute metabolic encephalopathy  BLAIR  Acute pancreatitis  Anion gap metabolic acidosis  Anemia  New onset paroxysmal afib       PLAN:  Has dialysis catheter in place, getting regular dialysis. Nephrology on board  Weaned off levophed  On remdesivir and decadron; on vitamin C and zinc  Continue lantus and ISS for diabetes mellitus  Also on IV zosyn due to concerns that the pancreatitis could evolve into necrotizing pancreatitis.   Received albumin per critical care  On alcohol withdrawal protocol as needed for alcohol withdrawal  Monitor Hb; to transfuse if Hb <7; has been transfused one unit of PRBCs  Received lopressor prn for paroxysmal afib; on heparin drip  Being weaned off precedex.  Spontaneous breathing trial to be attempted later today  2D echo ordered and pending  On thiamine    DVT prophylaxis: on heparin drip   GI prophylaxis: pantoprazole    DISPOSITION: to be determined once medically stable    Medications:  REVIEWED DAILY    Infusion Medications    heparin (PORCINE) Infusion 12 Units/kg/hr (12/29/20 0057)    dexmedetomidine (PRECEDEX) IV infusion 0.1 mcg/kg/hr (12/29/20 1112)    dextrose       Scheduled Medications    albumin human  25 g Intravenous Q8H    metoprolol tartrate  12.5 mg Oral BID    insulin glargine  25 Units Subcutaneous Nightly    sodium chloride  20 mL Intravenous Once    pantoprazole  40 mg Intravenous BID    And    sodium chloride (PF)  10 mL Intravenous BID    remdesivir IVPB  100 mg Intravenous Q24H    sodium chloride flush  10 mL Intravenous 2 times per day    dexamethasone  6 mg Intravenous Q24H    insulin lispro  0-12 Units Subcutaneous Q4H    sodium chloride (PF)  10 mL Intravenous Daily    thiamine  100 mg Intravenous BID    Followed by   Ariel Reagan ON 1/1/2021] thiamine  100 mg Intravenous Daily    folic acid  1 mg Intravenous Daily    vitamin D  2,000 Units Oral Daily    zinc sulfate  50 mg Oral Daily    ascorbic acid  500 mg Oral Daily    piperacillin-tazobactam  3.375 g Intravenous Q12H    sodium chloride   Intravenous Q12H    sodium chloride flush  10 mL Intravenous 2 times per day     PRN Meds: midazolam, perflutren lipid microspheres, sodium chloride, sodium chloride flush, anticoagulant sodium citrate, acetaminophen **OR** acetaminophen, polyethylene glycol, sennosides, hydrALAZINE, labetalol, sodium chloride flush, glucose, dextrose, glucagon (rDNA), dextrose, dextrose    Labs: Recent Labs     12/27/20  0533 12/27/20  1143 12/27/20  1143 12/28/20  0424 12/28/20  0934 12/29/20  0247   WBC 13.3* 14.1*  --  9.9  --   --    HGB 8.5* 8.8*   < > 8.6* 8.7* 8.3*   HCT 25.7* 26.5*   < > 24.8* 25.1* 23.7*    155  --  143  --   --     < > = values in this interval not displayed. Recent Labs     12/28/20  1616 12/29/20  0015 12/29/20  0410    135 138   K 4.3 4.1 3.8   CL 99 97* 100   CO2 24 24 23   BUN 36* 44* 50*   CREATININE 3.0* 3.7* 3.9*   CALCIUM 8.4* 8.1* 8.1*   PHOS 4.9* 6.2* 6.5*       Recent Labs     12/27/20  0533 12/28/20  0424 12/29/20  0410   PROT 6.3* 5.7* 5.2*   ALKPHOS 140* 157* 138*   ALT 20 17 15   AST 21 22 18   BILITOT 0.6 0.4 0.4   LIPASE 25 17  --        Recent Labs     12/27/20  1143 12/28/20  0424 12/29/20  0410   INR 1.1 1.3 1.5       Recent Labs     12/27/20  1143 12/27/20  1528   TROPONINI 0.01 <0.01       Chronic labs:    Lab Results   Component Value Date    CHOL 160 12/23/2020    TRIG 439 (H) 12/23/2020    HDL 14 12/23/2020    LDLCALC - (AA) 12/23/2020    TSH 0.540 12/23/2020    INR 1.5 12/29/2020    LABA1C 11.1 (H) 12/23/2020       Radiology: REVIEWED DAILY    +++++++++++++++++++++++++++++++++++++++++++++++++  Sameer Elizondo  +++++++++++++++++++++++++++++++++++++++++++++++++  NOTE: This report was transcribed using voice recognition software. Every effort was made to ensure accuracy; however, inadvertent computerized transcription errors may be present.

## 2020-12-29 NOTE — CARE COORDINATION
Social work followed up with [address] liaison tSefany Andres, pt has LTACH criteria and [facility] will continue to follow until pt is medically stable. SW provided update to Beata Tovar, she is grateful and agreeable for the plan. Sticky notes placed for RN and physician.     Electronically signed by NICK Brown on 12/29/2020 at 10:54 AM

## 2020-12-30 ENCOUNTER — APPOINTMENT (OUTPATIENT)
Dept: GENERAL RADIOLOGY | Age: 70
DRG: 207 | End: 2020-12-30
Payer: MEDICARE

## 2020-12-30 LAB
ALBUMIN SERPL-MCNC: 3.4 G/DL (ref 3.5–5.2)
ALP BLD-CCNC: 108 U/L (ref 40–129)
ALT SERPL-CCNC: 13 U/L (ref 0–40)
ANION GAP SERPL CALCULATED.3IONS-SCNC: 18 MMOL/L (ref 7–16)
ANION GAP SERPL CALCULATED.3IONS-SCNC: 19 MMOL/L (ref 7–16)
ANION GAP SERPL CALCULATED.3IONS-SCNC: 19 MMOL/L (ref 7–16)
ANION GAP SERPL CALCULATED.3IONS-SCNC: 20 MMOL/L (ref 7–16)
ANTI-XA LMW HEPARIN: 0.12 U/ML (ref 0.5–1.1)
ANTI-XA LMW HEPARIN: 0.14 U/ML (ref 0.5–1.1)
ANTI-XA LMW HEPARIN: 0.19 U/ML (ref 0.5–1.1)
ANTI-XA LMW HEPARIN: 0.21 U/ML (ref 0.5–1.1)
APTT: 60.3 SEC (ref 24.5–35.1)
AST SERPL-CCNC: 16 U/L (ref 0–39)
B.E.: -3.1 MMOL/L (ref -3–3)
BASOPHILS ABSOLUTE: 0 E9/L (ref 0–0.2)
BASOPHILS RELATIVE PERCENT: 0 % (ref 0–2)
BILIRUB SERPL-MCNC: 0.5 MG/DL (ref 0–1.2)
BILIRUBIN DIRECT: 0.2 MG/DL (ref 0–0.3)
BILIRUBIN, INDIRECT: 0.3 MG/DL (ref 0–1)
BUN BLDV-MCNC: 67 MG/DL (ref 8–23)
BUN BLDV-MCNC: 68 MG/DL (ref 8–23)
BUN BLDV-MCNC: 68 MG/DL (ref 8–23)
BUN BLDV-MCNC: 74 MG/DL (ref 8–23)
C-REACTIVE PROTEIN: 4.6 MG/DL (ref 0–0.4)
CALCIUM IONIZED: 1.16 MMOL/L (ref 1.15–1.33)
CALCIUM SERPL-MCNC: 8.6 MG/DL (ref 8.6–10.2)
CALCIUM SERPL-MCNC: 8.6 MG/DL (ref 8.6–10.2)
CALCIUM SERPL-MCNC: 8.7 MG/DL (ref 8.6–10.2)
CALCIUM SERPL-MCNC: 8.7 MG/DL (ref 8.6–10.2)
CHLORIDE BLD-SCNC: 101 MMOL/L (ref 98–107)
CHLORIDE BLD-SCNC: 97 MMOL/L (ref 98–107)
CHLORIDE BLD-SCNC: 98 MMOL/L (ref 98–107)
CHLORIDE BLD-SCNC: 98 MMOL/L (ref 98–107)
CO2: 22 MMOL/L (ref 22–29)
CO2: 24 MMOL/L (ref 22–29)
COHB: 0.4 % (ref 0–1.5)
CREAT SERPL-MCNC: 5.4 MG/DL (ref 0.7–1.2)
CREAT SERPL-MCNC: 5.6 MG/DL (ref 0.7–1.2)
CREAT SERPL-MCNC: 6.1 MG/DL (ref 0.7–1.2)
CREAT SERPL-MCNC: 6.5 MG/DL (ref 0.7–1.2)
CRITICAL: ABNORMAL
D DIMER: 4618 NG/ML DDU
DATE ANALYZED: ABNORMAL
DATE OF COLLECTION: ABNORMAL
EOSINOPHILS ABSOLUTE: 0 E9/L (ref 0.05–0.5)
EOSINOPHILS RELATIVE PERCENT: 0 % (ref 0–6)
FERRITIN: 1938 NG/ML
GFR AFRICAN AMERICAN: 10
GFR AFRICAN AMERICAN: 11
GFR AFRICAN AMERICAN: 12
GFR AFRICAN AMERICAN: 13
GFR NON-AFRICAN AMERICAN: 10 ML/MIN/1.73
GFR NON-AFRICAN AMERICAN: 11 ML/MIN/1.73
GFR NON-AFRICAN AMERICAN: 12 ML/MIN/1.73
GFR NON-AFRICAN AMERICAN: 13 ML/MIN/1.73
GLUCOSE BLD-MCNC: 114 MG/DL (ref 74–99)
GLUCOSE BLD-MCNC: 144 MG/DL (ref 74–99)
GLUCOSE BLD-MCNC: 149 MG/DL (ref 74–99)
GLUCOSE BLD-MCNC: 172 MG/DL (ref 74–99)
HCO3: 19.6 MMOL/L (ref 22–26)
HCT VFR BLD CALC: 21.9 % (ref 37–54)
HCT VFR BLD CALC: 22.6 % (ref 37–54)
HCT VFR BLD CALC: 22.6 % (ref 37–54)
HCT VFR BLD CALC: 22.7 % (ref 37–54)
HEMOGLOBIN: 7.6 G/DL (ref 12.5–16.5)
HEMOGLOBIN: 7.6 G/DL (ref 12.5–16.5)
HEMOGLOBIN: 7.8 G/DL (ref 12.5–16.5)
HEMOGLOBIN: 7.8 G/DL (ref 12.5–16.5)
HHB: 3.7 % (ref 0–5)
IMMATURE GRANULOCYTES #: 0.14 E9/L
IMMATURE GRANULOCYTES %: 1.7 % (ref 0–5)
INR BLD: 1.5
LAB: ABNORMAL
LACTIC ACID: 1.6 MMOL/L (ref 0.5–2.2)
LYMPHOCYTES ABSOLUTE: 0.82 E9/L (ref 1.5–4)
LYMPHOCYTES RELATIVE PERCENT: 10.2 % (ref 20–42)
Lab: ABNORMAL
MAGNESIUM: 2.3 MG/DL (ref 1.6–2.6)
MAGNESIUM: 2.4 MG/DL (ref 1.6–2.6)
MCH RBC QN AUTO: 28.6 PG (ref 26–35)
MCHC RBC AUTO-ENTMCNC: 34.5 % (ref 32–34.5)
MCV RBC AUTO: 82.8 FL (ref 80–99.9)
METER GLUCOSE: 107 MG/DL (ref 74–99)
METER GLUCOSE: 130 MG/DL (ref 74–99)
METER GLUCOSE: 130 MG/DL (ref 74–99)
METER GLUCOSE: 152 MG/DL (ref 74–99)
METER GLUCOSE: 155 MG/DL (ref 74–99)
METER GLUCOSE: 177 MG/DL (ref 74–99)
METHB: 0.2 % (ref 0–1.5)
MODE: ABNORMAL
MONOCYTES ABSOLUTE: 0.91 E9/L (ref 0.1–0.95)
MONOCYTES RELATIVE PERCENT: 11.3 % (ref 2–12)
NEUTROPHILS ABSOLUTE: 6.19 E9/L (ref 1.8–7.3)
NEUTROPHILS RELATIVE PERCENT: 76.8 % (ref 43–80)
O2 CONTENT: 11.7 ML/DL
O2 SATURATION: 96.3 % (ref 92–98.5)
O2HB: 95.7 % (ref 94–97)
OPERATOR ID: 2067
PATIENT TEMP: 37 C
PCO2: 26.7 MMHG (ref 35–45)
PDW BLD-RTO: 14.6 FL (ref 11.5–15)
PH BLOOD GAS: 7.48 (ref 7.35–7.45)
PHOSPHORUS: 7.5 MG/DL (ref 2.5–4.5)
PHOSPHORUS: 7.8 MG/DL (ref 2.5–4.5)
PHOSPHORUS: 8.2 MG/DL (ref 2.5–4.5)
PHOSPHORUS: 8.2 MG/DL (ref 2.5–4.5)
PLATELET # BLD: 130 E9/L (ref 130–450)
PMV BLD AUTO: 12.1 FL (ref 7–12)
PO2: 92.3 MMHG (ref 75–100)
POTASSIUM SERPL-SCNC: 3.4 MMOL/L (ref 3.5–5)
POTASSIUM SERPL-SCNC: 3.7 MMOL/L (ref 3.5–5)
POTASSIUM SERPL-SCNC: 3.9 MMOL/L (ref 3.5–5)
POTASSIUM SERPL-SCNC: 4.6 MMOL/L (ref 3.5–5)
PROTHROMBIN TIME: 16.9 SEC (ref 9.3–12.4)
RBC # BLD: 2.73 E12/L (ref 3.8–5.8)
SEDIMENTATION RATE, ERYTHROCYTE: 65 MM/HR (ref 0–15)
SODIUM BLD-SCNC: 139 MMOL/L (ref 132–146)
SODIUM BLD-SCNC: 139 MMOL/L (ref 132–146)
SODIUM BLD-SCNC: 141 MMOL/L (ref 132–146)
SODIUM BLD-SCNC: 141 MMOL/L (ref 132–146)
SOURCE, BLOOD GAS: ABNORMAL
THB: 8.6 G/DL (ref 11.5–16.5)
TIME ANALYZED: 501
TOTAL PROTEIN: 6 G/DL (ref 6.4–8.3)
WBC # BLD: 8.1 E9/L (ref 4.5–11.5)

## 2020-12-30 PROCEDURE — 82962 GLUCOSE BLOOD TEST: CPT

## 2020-12-30 PROCEDURE — 6360000002 HC RX W HCPCS: Performed by: INTERNAL MEDICINE

## 2020-12-30 PROCEDURE — P9047 ALBUMIN (HUMAN), 25%, 50ML: HCPCS | Performed by: INTERNAL MEDICINE

## 2020-12-30 PROCEDURE — 2700000000 HC OXYGEN THERAPY PER DAY

## 2020-12-30 PROCEDURE — 2500000003 HC RX 250 WO HCPCS: Performed by: INTERNAL MEDICINE

## 2020-12-30 PROCEDURE — 71045 X-RAY EXAM CHEST 1 VIEW: CPT

## 2020-12-30 PROCEDURE — 84100 ASSAY OF PHOSPHORUS: CPT

## 2020-12-30 PROCEDURE — 97162 PT EVAL MOD COMPLEX 30 MIN: CPT

## 2020-12-30 PROCEDURE — 2580000003 HC RX 258: Performed by: INTERNAL MEDICINE

## 2020-12-30 PROCEDURE — 97530 THERAPEUTIC ACTIVITIES: CPT

## 2020-12-30 PROCEDURE — 85730 THROMBOPLASTIN TIME PARTIAL: CPT

## 2020-12-30 PROCEDURE — 85651 RBC SED RATE NONAUTOMATED: CPT

## 2020-12-30 PROCEDURE — 6370000000 HC RX 637 (ALT 250 FOR IP): Performed by: INTERNAL MEDICINE

## 2020-12-30 PROCEDURE — 83735 ASSAY OF MAGNESIUM: CPT

## 2020-12-30 PROCEDURE — 82330 ASSAY OF CALCIUM: CPT

## 2020-12-30 PROCEDURE — 92610 EVALUATE SWALLOWING FUNCTION: CPT

## 2020-12-30 PROCEDURE — 85378 FIBRIN DEGRADE SEMIQUANT: CPT

## 2020-12-30 PROCEDURE — 83605 ASSAY OF LACTIC ACID: CPT

## 2020-12-30 PROCEDURE — 2000000000 HC ICU R&B

## 2020-12-30 PROCEDURE — C9113 INJ PANTOPRAZOLE SODIUM, VIA: HCPCS | Performed by: INTERNAL MEDICINE

## 2020-12-30 PROCEDURE — 82805 BLOOD GASES W/O2 SATURATION: CPT

## 2020-12-30 PROCEDURE — 85025 COMPLETE CBC W/AUTO DIFF WBC: CPT

## 2020-12-30 PROCEDURE — 85014 HEMATOCRIT: CPT

## 2020-12-30 PROCEDURE — 80076 HEPATIC FUNCTION PANEL: CPT

## 2020-12-30 PROCEDURE — 85520 HEPARIN ASSAY: CPT

## 2020-12-30 PROCEDURE — 82728 ASSAY OF FERRITIN: CPT

## 2020-12-30 PROCEDURE — 99233 SBSQ HOSP IP/OBS HIGH 50: CPT | Performed by: INTERNAL MEDICINE

## 2020-12-30 PROCEDURE — 97166 OT EVAL MOD COMPLEX 45 MIN: CPT

## 2020-12-30 PROCEDURE — 80048 BASIC METABOLIC PNL TOTAL CA: CPT

## 2020-12-30 PROCEDURE — 86140 C-REACTIVE PROTEIN: CPT

## 2020-12-30 PROCEDURE — 85610 PROTHROMBIN TIME: CPT

## 2020-12-30 PROCEDURE — 36592 COLLECT BLOOD FROM PICC: CPT

## 2020-12-30 PROCEDURE — 85018 HEMOGLOBIN: CPT

## 2020-12-30 PROCEDURE — 97535 SELF CARE MNGMENT TRAINING: CPT

## 2020-12-30 RX ORDER — POTASSIUM CHLORIDE 29.8 MG/ML
40 INJECTION INTRAVENOUS ONCE
Status: COMPLETED | OUTPATIENT
Start: 2020-12-30 | End: 2020-12-30

## 2020-12-30 RX ORDER — ACETAMINOPHEN 650 MG
TABLET, EXTENDED RELEASE ORAL
Status: DISPENSED
Start: 2020-12-30 | End: 2020-12-31

## 2020-12-30 RX ORDER — METOPROLOL TARTRATE 5 MG/5ML
5 INJECTION INTRAVENOUS EVERY 6 HOURS
Status: DISCONTINUED | OUTPATIENT
Start: 2020-12-30 | End: 2021-01-01

## 2020-12-30 RX ADMIN — SODIUM CHLORIDE, PRESERVATIVE FREE 10 ML: 5 INJECTION INTRAVENOUS at 08:25

## 2020-12-30 RX ADMIN — PANTOPRAZOLE SODIUM 40 MG: 40 INJECTION, POWDER, FOR SOLUTION INTRAVENOUS at 08:24

## 2020-12-30 RX ADMIN — Medication 10 ML: at 08:24

## 2020-12-30 RX ADMIN — PANTOPRAZOLE SODIUM 40 MG: 40 INJECTION, POWDER, FOR SOLUTION INTRAVENOUS at 20:49

## 2020-12-30 RX ADMIN — METOPROLOL TARTRATE 2.5 MG: 5 INJECTION INTRAVENOUS at 08:24

## 2020-12-30 RX ADMIN — ALBUMIN (HUMAN) 25 G: 0.25 INJECTION, SOLUTION INTRAVENOUS at 08:24

## 2020-12-30 RX ADMIN — INSULIN GLARGINE 25 UNITS: 100 INJECTION, SOLUTION SUBCUTANEOUS at 22:28

## 2020-12-30 RX ADMIN — METOPROLOL TARTRATE 2.5 MG: 5 INJECTION INTRAVENOUS at 02:03

## 2020-12-30 RX ADMIN — REMDESIVIR 100 MG: 100 INJECTION, POWDER, LYOPHILIZED, FOR SOLUTION INTRAVENOUS at 18:02

## 2020-12-30 RX ADMIN — BUMETANIDE 2 MG: 0.25 INJECTION, SOLUTION INTRAMUSCULAR; INTRAVENOUS at 02:03

## 2020-12-30 RX ADMIN — INSULIN LISPRO 2 UNITS: 100 INJECTION, SOLUTION INTRAVENOUS; SUBCUTANEOUS at 00:22

## 2020-12-30 RX ADMIN — Medication 10 ML: at 20:49

## 2020-12-30 RX ADMIN — Medication 10 ML: at 21:00

## 2020-12-30 RX ADMIN — ALBUMIN (HUMAN) 25 G: 0.25 INJECTION, SOLUTION INTRAVENOUS at 16:32

## 2020-12-30 RX ADMIN — DEXAMETHASONE SODIUM PHOSPHATE 6 MG: 4 INJECTION, SOLUTION INTRAMUSCULAR; INTRAVENOUS at 12:30

## 2020-12-30 RX ADMIN — ALBUMIN (HUMAN) 25 G: 0.25 INJECTION, SOLUTION INTRAVENOUS at 00:22

## 2020-12-30 RX ADMIN — METOPROLOL TARTRATE 5 MG: 5 INJECTION INTRAVENOUS at 20:49

## 2020-12-30 RX ADMIN — SODIUM CHLORIDE, PRESERVATIVE FREE 10 ML: 5 INJECTION INTRAVENOUS at 22:00

## 2020-12-30 RX ADMIN — SODIUM CHLORIDE 0.2 MCG/KG/HR: 9 INJECTION, SOLUTION INTRAVENOUS at 16:32

## 2020-12-30 RX ADMIN — FOLIC ACID 1 MG: 5 INJECTION, SOLUTION INTRAMUSCULAR; INTRAVENOUS; SUBCUTANEOUS at 08:24

## 2020-12-30 RX ADMIN — INSULIN LISPRO 2 UNITS: 100 INJECTION, SOLUTION INTRAVENOUS; SUBCUTANEOUS at 04:36

## 2020-12-30 RX ADMIN — THIAMINE HYDROCHLORIDE 100 MG: 100 INJECTION, SOLUTION INTRAMUSCULAR; INTRAVENOUS at 20:49

## 2020-12-30 RX ADMIN — PIPERACILLIN AND TAZOBACTAM 3.38 G: 3; .375 INJECTION, POWDER, LYOPHILIZED, FOR SOLUTION INTRAVENOUS at 04:35

## 2020-12-30 RX ADMIN — POTASSIUM CHLORIDE 40 MEQ: 400 INJECTION, SOLUTION INTRAVENOUS at 15:37

## 2020-12-30 RX ADMIN — INSULIN LISPRO 2 UNITS: 100 INJECTION, SOLUTION INTRAVENOUS; SUBCUTANEOUS at 20:54

## 2020-12-30 RX ADMIN — METOPROLOL TARTRATE 5 MG: 5 INJECTION INTRAVENOUS at 15:37

## 2020-12-30 RX ADMIN — THIAMINE HYDROCHLORIDE 100 MG: 100 INJECTION, SOLUTION INTRAMUSCULAR; INTRAVENOUS at 08:24

## 2020-12-30 ASSESSMENT — PAIN SCALES - GENERAL
PAINLEVEL_OUTOF10: 0

## 2020-12-30 ASSESSMENT — PULMONARY FUNCTION TESTS: PIF_VALUE: 0

## 2020-12-30 NOTE — PLAN OF CARE
Problem: Airway Clearance - Ineffective  Goal: Achieve or maintain patent airway  Outcome: Met This Shift     Problem: Gas Exchange - Impaired  Goal: Absence of hypoxia  Outcome: Met This Shift  Goal: Promote optimal lung function  Outcome: Met This Shift     Problem: Breathing Pattern - Ineffective  Goal: Ability to achieve and maintain a regular respiratory rate  Outcome: Met This Shift     Problem:  Body Temperature -  Risk of, Imbalanced  Goal: Ability to maintain a body temperature within defined limits  Outcome: Met This Shift  Goal: Will regain or maintain usual level of consciousness  Outcome: Met This Shift  Goal: Complications related to the disease process, condition or treatment will be avoided or minimized  Outcome: Met This Shift     Problem: Isolation Precautions - Risk of Spread of Infection  Goal: Prevent transmission of infection  Outcome: Met This Shift     Problem: Risk for Fluid Volume Deficit  Goal: Maintain absence of muscle cramping  Outcome: Met This Shift  Goal: Maintain normal serum potassium, sodium, calcium, phosphorus, and pH  Outcome: Met This Shift     Problem: Loneliness or Risk for Loneliness  Goal: Demonstrate positive use of time alone when socialization is not possible  Outcome: Met This Shift     Problem: Skin Integrity:  Goal: Will show no infection signs and symptoms  Description: Will show no infection signs and symptoms  Outcome: Met This Shift  Goal: Absence of new skin breakdown  Description: Absence of new skin breakdown  Outcome: Met This Shift     Problem: Falls - Risk of:  Goal: Will remain free from falls  Description: Will remain free from falls  Outcome: Met This Shift  Goal: Absence of physical injury  Description: Absence of physical injury  Outcome: Met This Shift     Problem: Nutrition Deficits  Goal: Optimize nutrtional status  Outcome: Not Met This Shift     Problem: Risk for Fluid Volume Deficit  Goal: Maintain normal heart rhythm  Outcome: Not Met This Shift     Problem: Fatigue  Goal: Verbalize increase energy and improved vitality  Outcome: Not Met This Shift

## 2020-12-30 NOTE — PROGRESS NOTES
SPEECH/LANGUAGE PATHOLOGY  BEDSIDE SWALLOWING EVALUATION    PATIENT NAME:  Andre Brown      :  1950          TODAY'S DATE:  2020 ROOM:  4430/4430-A      SUMMARY OF EVALUATION     DYSPHAGIA DIAGNOSIS:  Marked pharyngeal dysphagia      DIET RECOMMENDATIONS:  NPO (nothing by mouth including oral meds)       FEEDING RECOMMENDATIONS:     Assistance level:  Not applicable      Compensatory strategies recommended: Not applicable    THERAPY RECOMMENDATIONS:      Repeat Bedside Swallow evaluation is recommended in 3-5 days and requires a physician order                   PROCEDURE     Consistencies Administered During the Evaluation   Liquids: thin liquid   Solids:  pureed foods      Method of Intake:   cup, spoon  Fed by clinician      Position:   Seated, upright, Lateral plane                  RESULTS     Oral Stage:         Delayed A-P transit due to: cognitive function       Pharyngeal Stage:      Wet respirations were noted after presentation of thin liquid, Wet/gurgly vocal quality was noted after presentation of thin liquid and pureed foods, Multiple swallows were noted after presentation of thin liquid and pureed foods and Delayed initiation of the pharyngeal swallow noted                    CPT code:  99124  bedside swallow eval      [x]The admitting diagnosis and active problem list, as listed below have been reviewed prior to initiation of this evaluation.      ADMITTING DIAGNOSIS: DKA, type 1, not at goal [facility]) [E10.10]     ACTIVE PROBLEM LIST:   Patient Active Problem List   Diagnosis    Hyperlipidemia    Prostate cancer ([facility] [address]    Hypertension    Diabetes mellitus ([facility] [address]    Shoulder pain, bilateral    Abdominal pain, right lower quadrant    DKA, type 1, not at goal [facility])    Acute metabolic encephalopathy    Acute pancreatitis    Acute renal failure (ARF) (HCC)    High anion gap metabolic acidosis

## 2020-12-30 NOTE — PROGRESS NOTES
Occupational Therapy  Occupational Therapy Initial Evaluation   Date:2020  Patient Name: Natali Hunt  MRN: [id number]  : 1950  Room: 30/[facility]    Referring Provider: Crys Payne MD    Evaluating OT: Anamaria Helton OTR/L #[id number]    AM-PAC Daily Activity Raw Score: 10/24    Recommended Adaptive Equipment: TBD     Diagnosis: DKA, type 1, not at goal [facility]) [E10.10]    Reason for admission: SOB, AMS, covid+    Surgery/Procedure:  Intubated ; Extubated      Pertinent Medical History: cancer, DM, HLD, HTN, prostate cancer    Precautions:  Falls, prevalon boots, covid+, droplet plus precautions, O2     Home Living: Pt lives with girlfriend  in a 2 story house with 5 step(s) to enter and 1 rail(s); bed/bath on 2nd floor; Bathroom setup: tub/shower on 1st floor  Equipment owned: cane    Prior Level of Function: Independent with ADLs; Independent with IADLs. No AD for ambulation. Driving: Yes  Occupation: retired    Pain Level: pt c/o minimal pain this session; did not identify location       Cognition: A&O: (self and \"hospital\") Follows 1-2 step commands intermittently; intermittently confused   Memory: fair   Comprehension fair   Problem solving: fair   Judgement/safety: fair               Communication skills:            Vision: wfl              Glasses: readers                                                   Hearing: wfl     RASS: 0 to -1  CAM-ICU: (NT) Delirium    UE Assessment:  Hand Dominance: Right [x]  Left []     ROM Strength STM goal: PRN   RUE  WFL 3+/5 4/5     LUE WFL 3+/5 4/5       Sensation: No c/o numbness or tingling in extremities   Tone: WNL   Edema: none noted     Functional Assessment   Initial Eval Status  Date: 20 Treatment Status  Date: STG=LTG  5-14  days    Feeding NPO                        Independent  while seated up in chair to increase activity tolerance        Grooming Mod.  A to wash face/hands seated EOB (thoroughness of completion of task)                    SBA   while standing sink level requiring AD for balance and demonstrating good tolerance      UB dressing Mod. A                        SBA       LB dressing Dep to don socks  Mod. A   using AE as needed for safe reach/ energy conservation     Bathing Max A (simulated)                        Min. A   using AE as needed for safe reach/ energy conservation       Toileting Dep (assisted with bedpan; assistance w/ hygiene/clothing management)                        Mod. A     Bed Mobility  Supine to sit: Max A    Sit to supine: Max A                        SBA  in prep of ADL tasks & transfers   Functional Transfers Sit to stand: Max A x2    Stand to sit: Max A x2                        SBA  sit<>stand/functional bathroom transfers using AD/DME as needed for balance and safety   Functional Mobility Max A x2 w/ ww (small side step towards HOB in preparation for functional mobility)                       Min. A   functional/bathroom mobility using AD as needed & demonstrating good safety     Balance Sitting:     Static: SBA    Dynamic:Min. A  Standing: Max A x2   Independent dynamic sitting balance; min. A dynamic standing balance  during ADL tasks & transfers   Endurance/Activity Tolerance   fair tolerance with light activity. Increased HR during session. Rest breaks noted during session. Fair+   tolerance with moderate activity/self care routine   Visual/  Perceptual   WFL                       Vitals:   HR at rest: 127 bpm HR at end of session: 112 bpm   Spo2 at rest: 92% Spo2 at end of session 93%   BP at rest:133/84 mmHg BP at end of session 124/75 mmHg   HR increased >150 bpm occasionally throughout session     Treatment:   · Bed mobility: Facilitated bed mobility with cues for proper body mechanics and sequencing to prepare for ADL completion. · Functional transfers: Facilitated transfers from EOB with cues for body alignment, safety and hand placement.   · ADL completion: Self-care retraining for the above-mentioned ADLs; training on proper hand placement, safety technique, sequencing, and energy conservation techniques. · Therapeutic Exercises: B UE AROM completed at all levels to maintain strength/flexibility and to decrease edema/contractures to enhance ADL completion. · Postural Balance: Sitting and standing balance retraining to improve righting reactions with postural changes during ADLS. Use of ww for support in standing. · Cognitive/Perceptual training: retraining exercises to improve attention, mentation, and spatial awareness for ADLs & transfers. · Skilled positioning: Proper positioning to improve interaction with environment, overall functioning and decrease/prevent edema and contractures. · Delirium Prevention/Management: Implementation of non-pharmacologic interventions for delirium prevention incorporated throughout session to patient. Including environmental and sensory modifications to decrease patient's internal distraction caused by delirium, and improve overall mentation. Comments: OK from RN to see patient. Upon arrival, patient lying in bed. Pt demo fair tolerance with fair understanding of education/techniques. At end of session, patient lying in bed w/ HOB elevated. Call light within reach, all lines and tubes intact. Pt instructed on use of call light for assistance and fall prevention. Line management and environmental modifications made prior to and end of session to ensure patient safety and to increase efficiency of session. Skilled monitoring of HR, O2 saturation, blood pressure and patient's response to activity performed throughout session. Overall, pt presents with decreased activity tolerance, dynamic balance, functional mobility limiting completion of ADLs and safe return home. Pt can benefit from continued skilled OT to increase safety and functional independence.      Evaluation Complexity: Mod    · History: Expanded chart review of consults, imaging, and psychosocial history related to current functional performance. · Exam: 5+ performance deficits identified limiting functional independence and safe return home   · Assistance/Modification: Min/mod assistance or modifications required to perform tasks. May have comorbidities that affect occupational performance. Assessment of current deficits   Functional mobility [x]  ADLs [x] Strength [x]  Cognition [x]  Functional transfers  [x] IADLs [x] Safety Awareness [x]  Endurance [x]  Fine Motor Coordination [x] Balance [x] Vision/perception [] Sensation [x]   Gross Motor Coordination [] ROM [x] Delirium [x]                  Communication []    Plan of Care: 1-3 days/week for 1-2 weeks PRN   [x]ADL retraining/adapted techniques and AE recommendations to increase functional independence within precautions                    [x]Energy conservation techniques to improve tolerance for selfcare routine   [x]Functional transfer/mobility training/DME recommendations for increased independence, safety and fall prevention         [x]Patient/family education to increase safety and functional independence             [x]Environmental modifications for safe mobility and completion of ADLs                             [x]Cognitive retraining ex's to improve problem solving skills & safe participation in ADLs/IADLs     [x]Sensory re-education techniques to improve extremity awareness, maintain skin integrity and improve hand function                             []Visual/Perceptual retraining  to improve body awareness and safety during transfers and ADLs  []Splinting/positioning needs to maintain joint/skin integrity and prevent contractures  [x]Therapeutic activity to improve functional performance during ADLs.                                          [x]Therapeutic exercise to improve tolerance and functional strength for ADLs   [x]Balance retraining/tolerance tasks for facilitation of postural control with dynamic challenges during ADLs . [x]Neuromuscular re-education: facilitation of righting/equilibrium reactions, midline orientation, scapular stability/mobility, Normalization muscle     tone and facilitation active functional movement/Attention                         []Delirium prevention/treatment    []Positioning to improve functional independence  []Other:       Rehab Potential:  Good for established goals     Patient / Family Goal: not stated      Patient and/or family were instructed on functional diagnosis, prognosis/goals and OT plan of care. Demonstrated fair understanding. Time In: 10:35  Time Out: 11:20  Total Treatment Time: 38 minutes    Min Units   OT Eval Low 01347       OT Eval Medium 97166  x 1   OT Eval High 44621       OT Re-Eval [id number]       Therapeutic Ex 57777       Therapeutic Activities 61482  23  2   ADL/Self Care 76583  15  1   Orthotic Management 08958       Neuro Re-Ed 11788       Non-Billable Time          Evaluation Time includes thorough review of current medical information, gathering information on past medical history/social history and prior level of function, completion of standardized testing/informal observation of tasks, assessment of data and education on plan of care and goals.     Martha Elias OTR/L #[id number]

## 2020-12-30 NOTE — PROGRESS NOTES
Department of Internal Medicine  Nephrology  Progress Note    Events reviewed. SUBJECTIVE: We are following Mr. Brown for BLAIR on dialysis. Remains intubated. Physical Exam:    VITALS:  /84   Pulse 129   Temp 98.4 °F (36.9 °C) (Bladder)   Resp 24   Ht 6' 2\" (1.88 m)   Wt 233 lb 4 oz (105.8 kg)   SpO2 96%   BMI 29.95 kg/m²   24HR INTAKE/OUTPUT:      Intake/Output Summary (Last 24 hours) at 12/30/2020 1316  Last data filed at 12/30/2020 1100  Gross per 24 hour   Intake 1343 ml   Output 2780 ml   Net -1437 ml     Physical exam:  Physical exam deferred as patient is currently Covid positive. Chart review performed, patient not on any vasopressors at this time. Per nursing review, pt is still having BUE edema, but continues to have good urine output.      Scheduled Meds:   metoprolol  5 mg Intravenous Q6H    albumin human  25 g Intravenous Q8H    insulin glargine  25 Units Subcutaneous Nightly    sodium chloride  20 mL Intravenous Once    pantoprazole  40 mg Intravenous BID    And    sodium chloride (PF)  10 mL Intravenous BID    remdesivir IVPB  100 mg Intravenous Q24H    sodium chloride flush  10 mL Intravenous 2 times per day    dexamethasone  6 mg Intravenous Q24H    insulin lispro  0-12 Units Subcutaneous Q4H    sodium chloride (PF)  10 mL Intravenous Daily    thiamine  100 mg Intravenous BID    Followed by   Enoch Goodman ON 1/1/2021] thiamine  100 mg Intravenous Daily    folic acid  1 mg Intravenous Daily    vitamin D  2,000 Units Oral Daily    zinc sulfate  50 mg Oral Daily    ascorbic acid  500 mg Oral Daily    piperacillin-tazobactam  3.375 g Intravenous Q12H    sodium chloride   Intravenous Q12H    sodium chloride flush  10 mL Intravenous 2 times per day     Continuous Infusions:   heparin (PORCINE) Infusion 12 Units/kg/hr (12/29/20 2252)    dexmedetomidine (PRECEDEX) IV infusion 0.2 mcg/kg/hr (12/29/20 2252)    dextrose       PRN Meds:.midazolam, perflutren lipid microspheres, sodium chloride, sodium chloride flush, anticoagulant sodium citrate, acetaminophen **OR** acetaminophen, polyethylene glycol, sennosides, hydrALAZINE, labetalol, sodium chloride flush, glucose, dextrose, glucagon (rDNA), dextrose, dextrose    DATA:    CBC with Differential:    Lab Results   Component Value Date    WBC 8.1 12/30/2020    RBC 2.73 12/30/2020    HGB 7.8 12/30/2020    HCT 22.6 12/30/2020     12/30/2020    MCV 82.8 12/30/2020    MCH 28.6 12/30/2020    MCHC 34.5 12/30/2020    RDW 14.6 12/30/2020    NRBC 0.9 12/26/2020    SEGSPCT 63 09/27/2013    LYMPHOPCT 10.2 12/30/2020    MONOPCT 11.3 12/30/2020    MYELOPCT 0.9 12/26/2020    BASOPCT 0.0 12/30/2020    MONOSABS 0.91 12/30/2020    LYMPHSABS 0.82 12/30/2020    EOSABS 0.00 12/30/2020    BASOSABS 0.00 12/30/2020     BMP:    Lab Results   Component Value Date     12/30/2020    K 3.4 12/30/2020    K 4.7 08/16/2020    CL 98 12/30/2020    CO2 24 12/30/2020    BUN 68 12/30/2020    LABALBU 3.4 12/30/2020    CREATININE 6.1 12/30/2020    CALCIUM 8.7 12/30/2020    GFRAA 11 12/30/2020    LABGLOM 11 12/30/2020    GLUCOSE 114 12/30/2020     Hepatic Function Panel:    Lab Results   Component Value Date    ALKPHOS 108 12/30/2020    ALT 13 12/30/2020    AST 16 12/30/2020    PROT 6.0 12/30/2020    BILITOT 0.5 12/30/2020    BILIDIR 0.2 12/30/2020    IBILI 0.3 12/30/2020    LABALBU 3.4 12/30/2020     Magnesium:    Lab Results   Component Value Date    MG 2.4 12/30/2020     Phosphorus:    Lab Results   Component Value Date    PHOS 8.2 12/30/2020     PTT:    Lab Results   Component Value Date    APTT 60.3 12/30/2020     ABG:    Lab Results   Component Value Date    PH 7.483 12/30/2020    PCO2 26.7 12/30/2020    PO2 92.3 12/30/2020    HCO3 19.6 12/30/2020    BE -3.1 12/30/2020    O2SAT 96.3 12/30/2020     Ionized Calcium  Calcium, Ion 1.15  1.15 - 1.33 mmol/L Final 12/28/2020  4:24 AM  - [facility]     Radiology Results: Narrative   EXAMINATION:   ONE XRAY VIEW OF THE CHEST   12/29/2020 8:44 am   COMPARISON:   12/28/2020   HISTORY:   ORDERING SYSTEM PROVIDED HISTORY: intubated   TECHNOLOGIST PROVIDED HISTORY:   Reason for exam:->intubated   What reading provider will be dictating this exam?->CRC   FINDINGS:   Bibasilar infiltrates and small effusions are unchanged.  Lines/tubes are   appropriate.  Heart size is at the upper limits of normal.     BRIEF SUMMARY OF INITIAL CONSULT:    Briefly, Mr. Andrés Mena is a 65year old AA man with h/o CKD stage 3, baseline creatinine 1.4 mg/dL, HTN, Type 2 DM, prostate cancer, who was admitted on 12/22/2020 with altered metal status. He was found to have glucose level of 1332 mg/dL, lactic acid levelof 11.8, BUN of 95, serum creatinine of 5.7 mg/dL and serum potassium of 7.1 with CO2 level of 4; reason for this consultation. His medications prior to admission included lisinopril, ibuprofen and naproxen. Resolved problems:  · Hyperkalemia  · Pseudohyponatremia  · Hypocalcemia  · Acute pancreatitis  · Hemodynamic shock     IMPRESSION/RECOMMENDATIONS:      1. BLAIR stage III on CKD, ischemic ATN, multifactorial 2/2 to severe intravascular volume depletion (osmotic diurseis-hyperglycemia) in the setting of ACE inhibition and NSAID's. Started on renal replacement therapy on 12/25/2020. Patient currently net + 16.4L. Very good response to Bumex 2 mg IV x2 doses. To hold dialysis today and monitor renal function and urine output. 2. CKD stage III, baseline creatinine approximately 1.4 mg/dL, with proteinuria, probably DKD   3. Hypocalcemia, due to coexisting alkalemia, rule out vitamin D deficiency  4. Alkalemia, PH: 7.483; with respiratory alkalosis, metabolic alkalosis (bicarbonate administration on dialysis) and HAGMA (uremia)     -------------------------------------------------  5. Respiratory failure s/p intubation   6. COVID-19  infection, on dexamethasone, remdesivir    7.  Pneumonia, on piperacillin-tazobactam   8. DKA with subsequent acute pancreatitis-initial lipase >3000, now improved to 17 this AM. Glucose control improving this AM -patient still n.p.o.  9. Anemia, normocytic, dropping H/H   10. Nutrition, n.p.o.     Plan:    · Hold dialysis today  · HD x4 hours tomorrow  · Watch urine function and urine output without diuretics today  · Continue to monitor renal function.

## 2020-12-30 NOTE — PLAN OF CARE
Problem: Skin Integrity:  Goal: Will show no infection signs and symptoms  Description: Will show no infection signs and symptoms  12/30/2020 1847 by Jozef Kessler RN  Outcome: Met This Shift     Problem: Skin Integrity:  Goal: Absence of new skin breakdown  Description: Absence of new skin breakdown  12/30/2020 1847 by Jozef Kessler RN  Outcome: Met This Shift     Problem: Falls - Risk of:  Goal: Will remain free from falls  Description: Will remain free from falls  12/30/2020 1847 by Jozef Kessler RN  Outcome: Met This Shift     Problem: Falls - Risk of:  Goal: Absence of physical injury  Description: Absence of physical injury  12/30/2020 1847 by Jozef Kessler RN  Outcome: Met This Shift

## 2020-12-30 NOTE — PROGRESS NOTES
Hospitalist Progress Note      SYNOPSIS: Patient admitted on 2020 for Acute metabolic encephalopathy. CXR done showed a right sided infiltrate and CT of the abdomen showed acute pancreatitis.  He is also Covid positive.  Patient also has BLAIR and is undergoing regular dialysis.         SUBJECTIVE:    Patient seen. He was extubated yesterday and is now on 3 L. He remains on heparin drip. No other active issues. Review of systems otherwise negative. Records reviewed. Temp (24hrs), Av.4 °F (36.9 °C), Min:98.1 °F (36.7 °C), Max:99 °F (37.2 °C)    DIET: Diet NPO Effective Now  CODE: Full Code    Intake/Output Summary (Last 24 hours) at 2020 1219  Last data filed at 2020 1100  Gross per 24 hour   Intake 1343 ml   Output 2780 ml   Net -1437 ml       OBJECTIVE:    BP (!) 132/105   Pulse 131   Temp 98.4 °F (36.9 °C) (Bladder)   Resp 23   Ht 6' 2\" (1.88 m)   Wt 233 lb 4 oz (105.8 kg)   SpO2 93%   BMI 29.95 kg/m²     Due to the current efforts to prevent transmission of COVID-19 and also the need to preserve PPE for other caregivers, a face-to-face encounter with the patient was not performed. That being said, all relevant records and diagnostic tests were reviewed, including laboratory results and imaging. Please reference any relevant documentation elsewhere. Care will be coordinated with the primary service.     ASSESSMENT:  Acute hypoxic respiratory failure respiratory distress   acute alcohol withdrawal  COVID 19 pneumonia  Acute metabolic encephalopathy  BLAIR  Acute pancreatitis  Anemia  New onset paroxysmal afib     PLAN:  Was extubated yesterday, and is doing well on 3L of oxygen  Has dialysis catheter in place, getting regular dialysis.  Nephrology on board  Weaned off levophed  On remdesivir and decadron; on vitamin C and zinc  Continue lantus and ISS for diabetes mellitus  Also on IV zosyn   On  Albumin prn per critical care  On alcohol withdrawal protocol as needed for alcohol withdrawal  Monitor Hb Hb today is 7.8. ; to transfuse if Hb <7; has been transfused one unit of PRBCs  Received lopressor prn for paroxysmal afib; on heparin drip  2D echo ordered and pending  On thiamine     DVT prophylaxis: on heparin drip   GI prophylaxis: pantoprazole    DISPOSITION: to be determined once medically stable    Medications:  REVIEWED DAILY    Infusion Medications    heparin (PORCINE) Infusion 12 Units/kg/hr (12/29/20 2252)    dexmedetomidine (PRECEDEX) IV infusion 0.2 mcg/kg/hr (12/29/20 2252)    dextrose       Scheduled Medications    metoprolol  5 mg Intravenous Q6H    albumin human  25 g Intravenous Q8H    insulin glargine  25 Units Subcutaneous Nightly    sodium chloride  20 mL Intravenous Once    pantoprazole  40 mg Intravenous BID    And    sodium chloride (PF)  10 mL Intravenous BID    remdesivir IVPB  100 mg Intravenous Q24H    sodium chloride flush  10 mL Intravenous 2 times per day    dexamethasone  6 mg Intravenous Q24H    insulin lispro  0-12 Units Subcutaneous Q4H    sodium chloride (PF)  10 mL Intravenous Daily    thiamine  100 mg Intravenous BID    Followed by   Dossie  ON 1/1/2021] thiamine  100 mg Intravenous Daily    folic acid  1 mg Intravenous Daily    vitamin D  2,000 Units Oral Daily    zinc sulfate  50 mg Oral Daily    ascorbic acid  500 mg Oral Daily    piperacillin-tazobactam  3.375 g Intravenous Q12H    sodium chloride   Intravenous Q12H    sodium chloride flush  10 mL Intravenous 2 times per day     PRN Meds: midazolam, perflutren lipid microspheres, sodium chloride, sodium chloride flush, anticoagulant sodium citrate, acetaminophen **OR** acetaminophen, polyethylene glycol, sennosides, hydrALAZINE, labetalol, sodium chloride flush, glucose, dextrose, glucagon (rDNA), dextrose, dextrose    Labs:     Recent Labs     12/28/20  0424 12/28/20  0424 12/30/20  0154 12/30/20  0412 12/30/20  1113   WBC 9.9  --   --  8.1  --    HGB 8.6*   < > 7.6* 7. 8* 7.8*   HCT 24.8*   < > 22.7* 22.6* 22.6*     --   --  130  --     < > = values in this interval not displayed. Recent Labs     12/29/20  1710 12/30/20  0002 12/30/20  0412 12/30/20  1113    139 139 141   K 3.9 3.9 3.7 3.4*   CL 97* 98 97* 98   CO2 21* 22 22 24   BUN 58* 67* 68* 68*   CREATININE 4.8* 5.4* 5.6* 6.1*   CALCIUM 8.8 8.6 8.6 8.7   PHOS 6.5* 7.5* 7.8*  --        Recent Labs     12/28/20  0424 12/29/20  0410 12/30/20  0412   PROT 5.7* 5.2* 6.0*   ALKPHOS 157* 138* 108   ALT 17 15 13   AST 22 18 16   BILITOT 0.4 0.4 0.5   LIPASE 17  --   --        Recent Labs     12/28/20  0424 12/29/20  0410 12/30/20  0412   INR 1.3 1.5 1.5       Recent Labs     12/27/20  1528   TROPONINI <0.01       Chronic labs:    Lab Results   Component Value Date    CHOL 160 12/23/2020    TRIG 439 (H) 12/23/2020    HDL 14 12/23/2020    LDLCALC - (AA) 12/23/2020    TSH 0.540 12/23/2020    INR 1.5 12/30/2020    LABA1C 11.1 (H) 12/23/2020       Radiology: REVIEWED DAILY    +++++++++++++++++++++++++++++++++++++++++++++++++  [place], [place]  +++++++++++++++++++++++++++++++++++++++++++++++++  NOTE: This report was transcribed using voice recognition software. Every effort was made to ensure accuracy; however, inadvertent computerized transcription errors may be present.

## 2020-12-30 NOTE — PROGRESS NOTES
Physical Therapy    Physical Therapy Initial Assessment     Name: Shelby Dutton  : 1950  MRN: [id number]    Referring Provider:        Deborah You MD         Date of Service: 2020    Evaluating PT:  Galo Villavicencio PT, DPT [id number]     Room #:  4890/9653-W  Diagnosis:  DKA  PMHx/PSHx:  CA, DM, HLD, HTN, prostate CA  Procedure/Surgery:  Intubated ; Extubated    Precautions:  Falls, Droplet Plus isolation, COVID-19, O2  Equipment Needs:  TBD    SUBJECTIVE:    Pt lives with his girlfriend in a 2 story home with 5 stairs to enter and 1 rail. Bedroom is on the 2nd floor but pt states he can stay on the 1st floor and has full bathroom on 1st floor. Pt ambulated with no AD, independent, and drives PTA. OBJECTIVE:   Initial Evaluation  Date: 20 Treatment Short Term/ Long Term   Goals   AM-PAC 6 Clicks      Was pt agreeable to Eval/treatment? Yes     Does pt have pain? No c/o pain      Bed Mobility  Rolling: Mod A  Supine to sit: Max A  Sit to supine: Max A  Scooting: Max A  Rolling: SBA  Supine to sit: SBA  Sit to supine: SBA  Scooting: SBA   Transfers Sit to stand: Max A x2  Stand to sit: Max A x2  Stand pivot: NT  Sit to stand: SBA  Stand to sit: SBA  Stand pivot: SBA with AAD   Ambulation    Few side steps with [employer] Max A x2  >75 feet with AAD Min A   Stair negotiation: ascended and descended  NT  >2 steps with 2 rail Min A   ROM BUE:  Per OT  BLE:  WFL     Strength BUE:  Per OT  BLE:  Grossly 4+/5      Balance Sitting EOB:  SBA  Dynamic Standing:   Max A x2  Sitting EOB:  Independent   Dynamic Standing:  SBA     Pt is A & O x self and location -- stated \"2021\" for month/year and was not oriented to situation   Sensation:  Pt denies numbness and tingling to extremities  Edema:  Unremarkable     Vitals:   HR at rest: 127 bpm HR at end of session: 112 bpm   Spo2 at rest: 92% Spo2 at end of session 93%   BP at rest:133/84 mmHg BP at end of session 124/75 mmHg   HR increased >150 bpm occasionally throughout session       Therapeutic Exercises:     BLE ROM    Patient education  Pt educated on safety during functional mobility, PT role/POC, explained situation at length to improve cognition     Patient response to education:   Pt verbalized understanding Pt demonstrated skill Pt requires further education in this area   Yes  Yes  Reinforce      ASSESSMENT:    Comments:  Pt received supine and agreeable to PT evaluation with OT collab. Spoke with RN prior to session. Vitals monitored during session. Pt limited by gross weakness and tachycardia. Requires assistance of LE and trunk during supine>sit but he is able to participate and felt good sitting up. He did have spikes in HR up in the 150s both at EOB and during standing. Pt required assistance to stand to [employer] but was able to achieve full standing with assist.  He was able to take side steps along EOB but was stopped by PT d/t high HR and lowering SpO2. Pt recovered once sitting EOB. Assisted back to supine and scooted up. Stated he needed to have BM so rolled L<>R and placed bed pan. Assisted off bed pan and with hygiene. Placed in chair position. Spoke with RN. Pt left with call button in reach, lines attached, and needs met.       Treatment:  Patient practiced and was instructed in the following treatment:     Bed mobility training - pt given verbal and tactile cues to facilitate proper sequencing and safety during rolling and supine>sit as well as provided with physical assistance to complete task     Sitting EOB for >10 minutes for upright tolerance, postural awareness and BLE ROM   STS training and side stepping at EOB to progress gait tolerance - pt educated on proper hand and foot placement, safety and sequencing, and use of WW to safely complete sit<>stand and side stepping along EOB with hands on assistance to complete task safely    Skilled positioning - Pt placed in the chair position with pillows utilized to facilitate upright posture, joint and skin integrity, and interaction with environment.  Non-pharmacological treatment and prevention of ICU delirium - Pt oriented to date, time, time of day, place, and situation as well as provided with visual and auditory stimuli in order to improve cognition and combat effects of ICU delirium. Pt's/ family goals   1. Home with girlfriend     Patient and or family understand(s) diagnosis, prognosis, and plan of care. Yes     PLAN:    Current Treatment Recommendations     [x] Strengthening     [x] ROM   [x] Balance Training   [x] Endurance Training   [x] Transfer Training   [x] Gait Training   [x] Stair Training   [x] Positioning   [x] Safety and Education Training   [x] Patient/Caregiver Education   [x] HEP  [] Other     Frequency of treatments: 2-5x/week x 1-2 weeks. Time in  1035  Time out  1120    Total Treatment Time  40 minutes     Evaluation Time includes thorough review of current medical information, gathering information on past medical history/social history and prior level of function, completion of standardized testing/informal observation of tasks, assessment of data and education on plan of care and goals.     CPT codes:  [] Low Complexity PT evaluation 55839  [x] Moderate Complexity PT evaluation 55891  [] High Complexity PT evaluation 19803  [] PT Re-evaluation 64646  [] Gait training 35137 -- minutes  [] Manual therapy 22555 -- minutes  [x] Therapeutic activities 62402 40 minutes  [] Therapeutic exercises 51006 -- minutes  [] Neuromuscular reeducation 27180 -- minutes     Sergey Bliss, PT, DPT  [id number]

## 2020-12-31 ENCOUNTER — APPOINTMENT (OUTPATIENT)
Dept: GENERAL RADIOLOGY | Age: 70
DRG: 207 | End: 2020-12-31
Payer: MEDICARE

## 2020-12-31 LAB
ABO/RH: NORMAL
ANION GAP SERPL CALCULATED.3IONS-SCNC: 16 MMOL/L (ref 7–16)
ANION GAP SERPL CALCULATED.3IONS-SCNC: 20 MMOL/L (ref 7–16)
ANION GAP SERPL CALCULATED.3IONS-SCNC: 20 MMOL/L (ref 7–16)
ANION GAP SERPL CALCULATED.3IONS-SCNC: 22 MMOL/L (ref 7–16)
ANTI-XA LMW HEPARIN: 0.48 U/ML (ref 0.5–1.1)
ANTIBODY SCREEN: NORMAL
BASOPHILS ABSOLUTE: 0 E9/L (ref 0–0.2)
BASOPHILS RELATIVE PERCENT: 0 % (ref 0–2)
BLOOD BANK DISPENSE STATUS: NORMAL
BLOOD BANK PRODUCT CODE: NORMAL
BPU ID: NORMAL
BUN BLDV-MCNC: 49 MG/DL (ref 8–23)
BUN BLDV-MCNC: 74 MG/DL (ref 8–23)
BUN BLDV-MCNC: 82 MG/DL (ref 8–23)
BUN BLDV-MCNC: 86 MG/DL (ref 8–23)
C-REACTIVE PROTEIN: 5.5 MG/DL (ref 0–0.4)
CALCIUM IONIZED: 1.19 MMOL/L (ref 1.15–1.33)
CALCIUM SERPL-MCNC: 8 MG/DL (ref 8.6–10.2)
CALCIUM SERPL-MCNC: 8.6 MG/DL (ref 8.6–10.2)
CALCIUM SERPL-MCNC: 8.7 MG/DL (ref 8.6–10.2)
CALCIUM SERPL-MCNC: 8.9 MG/DL (ref 8.6–10.2)
CHLORIDE BLD-SCNC: 101 MMOL/L (ref 98–107)
CHLORIDE BLD-SCNC: 104 MMOL/L (ref 98–107)
CHLORIDE BLD-SCNC: 105 MMOL/L (ref 98–107)
CHLORIDE BLD-SCNC: 99 MMOL/L (ref 98–107)
CO2: 19 MMOL/L (ref 22–29)
CO2: 20 MMOL/L (ref 22–29)
CO2: 21 MMOL/L (ref 22–29)
CO2: 21 MMOL/L (ref 22–29)
CREAT SERPL-MCNC: 4.1 MG/DL (ref 0.7–1.2)
CREAT SERPL-MCNC: 6.7 MG/DL (ref 0.7–1.2)
CREAT SERPL-MCNC: 7 MG/DL (ref 0.7–1.2)
CREAT SERPL-MCNC: 7.7 MG/DL (ref 0.7–1.2)
D DIMER: 4989 NG/ML DDU
DESCRIPTION BLOOD BANK: NORMAL
EOSINOPHILS ABSOLUTE: 0 E9/L (ref 0.05–0.5)
EOSINOPHILS RELATIVE PERCENT: 0 % (ref 0–6)
FERRITIN: 1240 NG/ML
GFR AFRICAN AMERICAN: 10
GFR AFRICAN AMERICAN: 17
GFR AFRICAN AMERICAN: 8
GFR AFRICAN AMERICAN: 9
GFR NON-AFRICAN AMERICAN: 10 ML/MIN/1.73
GFR NON-AFRICAN AMERICAN: 17 ML/MIN/1.73
GFR NON-AFRICAN AMERICAN: 8 ML/MIN/1.73
GFR NON-AFRICAN AMERICAN: 9 ML/MIN/1.73
GLUCOSE BLD-MCNC: 108 MG/DL (ref 74–99)
GLUCOSE BLD-MCNC: 125 MG/DL (ref 74–99)
GLUCOSE BLD-MCNC: 149 MG/DL (ref 74–99)
GLUCOSE BLD-MCNC: 88 MG/DL (ref 74–99)
HCT VFR BLD CALC: 20.6 % (ref 37–54)
HCT VFR BLD CALC: 21.1 % (ref 37–54)
HCT VFR BLD CALC: 21.2 % (ref 37–54)
HCT VFR BLD CALC: 24.8 % (ref 37–54)
HEMOGLOBIN: 7 G/DL (ref 12.5–16.5)
HEMOGLOBIN: 7 G/DL (ref 12.5–16.5)
HEMOGLOBIN: 7.1 G/DL (ref 12.5–16.5)
HEMOGLOBIN: 8.4 G/DL (ref 12.5–16.5)
IMMATURE GRANULOCYTES #: 0.08 E9/L
IMMATURE GRANULOCYTES %: 1 % (ref 0–5)
INR BLD: 1.6
LYMPHOCYTES ABSOLUTE: 0.62 E9/L (ref 1.5–4)
LYMPHOCYTES RELATIVE PERCENT: 7.8 % (ref 20–42)
MAGNESIUM: 2 MG/DL (ref 1.6–2.6)
MAGNESIUM: 2.4 MG/DL (ref 1.6–2.6)
MAGNESIUM: 2.4 MG/DL (ref 1.6–2.6)
MAGNESIUM: 2.6 MG/DL (ref 1.6–2.6)
MCH RBC QN AUTO: 28.6 PG (ref 26–35)
MCHC RBC AUTO-ENTMCNC: 34.5 % (ref 32–34.5)
MCV RBC AUTO: 83.1 FL (ref 80–99.9)
METER GLUCOSE: 101 MG/DL (ref 74–99)
METER GLUCOSE: 112 MG/DL (ref 74–99)
METER GLUCOSE: 119 MG/DL (ref 74–99)
METER GLUCOSE: 150 MG/DL (ref 74–99)
METER GLUCOSE: 160 MG/DL (ref 74–99)
METER GLUCOSE: 80 MG/DL (ref 74–99)
MONOCYTES ABSOLUTE: 0.83 E9/L (ref 0.1–0.95)
MONOCYTES RELATIVE PERCENT: 10.5 % (ref 2–12)
NEUTROPHILS ABSOLUTE: 6.37 E9/L (ref 1.8–7.3)
NEUTROPHILS RELATIVE PERCENT: 80.7 % (ref 43–80)
PDW BLD-RTO: 14.7 FL (ref 11.5–15)
PHOSPHORUS: 5.6 MG/DL (ref 2.5–4.5)
PHOSPHORUS: 8.3 MG/DL (ref 2.5–4.5)
PHOSPHORUS: 8.3 MG/DL (ref 2.5–4.5)
PHOSPHORUS: 9.6 MG/DL (ref 2.5–4.5)
PLATELET # BLD: 131 E9/L (ref 130–450)
PMV BLD AUTO: 11.5 FL (ref 7–12)
POTASSIUM SERPL-SCNC: 3.7 MMOL/L (ref 3.5–5)
POTASSIUM SERPL-SCNC: 3.9 MMOL/L (ref 3.5–5)
POTASSIUM SERPL-SCNC: 4.1 MMOL/L (ref 3.5–5)
POTASSIUM SERPL-SCNC: 4.2 MMOL/L (ref 3.5–5)
PROTHROMBIN TIME: 18 SEC (ref 9.3–12.4)
RBC # BLD: 2.48 E12/L (ref 3.8–5.8)
SEDIMENTATION RATE, ERYTHROCYTE: 55 MM/HR (ref 0–15)
SODIUM BLD-SCNC: 135 MMOL/L (ref 132–146)
SODIUM BLD-SCNC: 142 MMOL/L (ref 132–146)
SODIUM BLD-SCNC: 145 MMOL/L (ref 132–146)
SODIUM BLD-SCNC: 146 MMOL/L (ref 132–146)
WBC # BLD: 7.9 E9/L (ref 4.5–11.5)

## 2020-12-31 PROCEDURE — 83735 ASSAY OF MAGNESIUM: CPT

## 2020-12-31 PROCEDURE — P9016 RBC LEUKOCYTES REDUCED: HCPCS

## 2020-12-31 PROCEDURE — 85018 HEMOGLOBIN: CPT

## 2020-12-31 PROCEDURE — P9047 ALBUMIN (HUMAN), 25%, 50ML: HCPCS | Performed by: INTERNAL MEDICINE

## 2020-12-31 PROCEDURE — 2580000003 HC RX 258: Performed by: INTERNAL MEDICINE

## 2020-12-31 PROCEDURE — 85014 HEMATOCRIT: CPT

## 2020-12-31 PROCEDURE — 6360000002 HC RX W HCPCS: Performed by: INTERNAL MEDICINE

## 2020-12-31 PROCEDURE — 97530 THERAPEUTIC ACTIVITIES: CPT

## 2020-12-31 PROCEDURE — 85025 COMPLETE CBC W/AUTO DIFF WBC: CPT

## 2020-12-31 PROCEDURE — C9113 INJ PANTOPRAZOLE SODIUM, VIA: HCPCS | Performed by: INTERNAL MEDICINE

## 2020-12-31 PROCEDURE — 84100 ASSAY OF PHOSPHORUS: CPT

## 2020-12-31 PROCEDURE — 90935 HEMODIALYSIS ONE EVALUATION: CPT

## 2020-12-31 PROCEDURE — 36430 TRANSFUSION BLD/BLD COMPNT: CPT

## 2020-12-31 PROCEDURE — 82962 GLUCOSE BLOOD TEST: CPT

## 2020-12-31 PROCEDURE — 86140 C-REACTIVE PROTEIN: CPT

## 2020-12-31 PROCEDURE — 6370000000 HC RX 637 (ALT 250 FOR IP): Performed by: INTERNAL MEDICINE

## 2020-12-31 PROCEDURE — 86923 COMPATIBILITY TEST ELECTRIC: CPT

## 2020-12-31 PROCEDURE — 97535 SELF CARE MNGMENT TRAINING: CPT

## 2020-12-31 PROCEDURE — 2700000000 HC OXYGEN THERAPY PER DAY

## 2020-12-31 PROCEDURE — 82728 ASSAY OF FERRITIN: CPT

## 2020-12-31 PROCEDURE — 2500000003 HC RX 250 WO HCPCS: Performed by: INTERNAL MEDICINE

## 2020-12-31 PROCEDURE — 82330 ASSAY OF CALCIUM: CPT

## 2020-12-31 PROCEDURE — 99233 SBSQ HOSP IP/OBS HIGH 50: CPT | Performed by: INTERNAL MEDICINE

## 2020-12-31 PROCEDURE — 36415 COLL VENOUS BLD VENIPUNCTURE: CPT

## 2020-12-31 PROCEDURE — 2000000000 HC ICU R&B

## 2020-12-31 PROCEDURE — 74018 RADEX ABDOMEN 1 VIEW: CPT

## 2020-12-31 PROCEDURE — 85378 FIBRIN DEGRADE SEMIQUANT: CPT

## 2020-12-31 PROCEDURE — 80048 BASIC METABOLIC PNL TOTAL CA: CPT

## 2020-12-31 PROCEDURE — 86901 BLOOD TYPING SEROLOGIC RH(D): CPT

## 2020-12-31 PROCEDURE — 85651 RBC SED RATE NONAUTOMATED: CPT

## 2020-12-31 PROCEDURE — 85610 PROTHROMBIN TIME: CPT

## 2020-12-31 PROCEDURE — 86850 RBC ANTIBODY SCREEN: CPT

## 2020-12-31 PROCEDURE — 86900 BLOOD TYPING SEROLOGIC ABO: CPT

## 2020-12-31 RX ORDER — 0.9 % SODIUM CHLORIDE 0.9 %
20 INTRAVENOUS SOLUTION INTRAVENOUS ONCE
Status: COMPLETED | OUTPATIENT
Start: 2020-12-31 | End: 2020-12-31

## 2020-12-31 RX ORDER — QUETIAPINE FUMARATE 25 MG/1
50 TABLET, FILM COATED ORAL 2 TIMES DAILY
Status: DISCONTINUED | OUTPATIENT
Start: 2020-12-31 | End: 2021-01-01 | Stop reason: HOSPADM

## 2020-12-31 RX ORDER — DEXTROSE AND SODIUM CHLORIDE 5; .45 G/100ML; G/100ML
INJECTION, SOLUTION INTRAVENOUS CONTINUOUS
Status: DISCONTINUED | OUTPATIENT
Start: 2020-12-31 | End: 2021-01-01 | Stop reason: HOSPADM

## 2020-12-31 RX ORDER — METOPROLOL TARTRATE 5 MG/5ML
2.5 INJECTION INTRAVENOUS ONCE
Status: COMPLETED | OUTPATIENT
Start: 2020-12-31 | End: 2020-12-31

## 2020-12-31 RX ADMIN — THIAMINE HYDROCHLORIDE 100 MG: 100 INJECTION, SOLUTION INTRAMUSCULAR; INTRAVENOUS at 08:12

## 2020-12-31 RX ADMIN — METOPROLOL TARTRATE 5 MG: 5 INJECTION INTRAVENOUS at 20:10

## 2020-12-31 RX ADMIN — ALBUMIN (HUMAN) 25 G: 0.25 INJECTION, SOLUTION INTRAVENOUS at 00:16

## 2020-12-31 RX ADMIN — DEXAMETHASONE SODIUM PHOSPHATE 6 MG: 4 INJECTION, SOLUTION INTRAMUSCULAR; INTRAVENOUS at 11:56

## 2020-12-31 RX ADMIN — MIDAZOLAM 2 MG: 1 INJECTION INTRAMUSCULAR; INTRAVENOUS at 10:12

## 2020-12-31 RX ADMIN — SODIUM CHLORIDE 1.2 MCG/KG/HR: 9 INJECTION, SOLUTION INTRAVENOUS at 10:43

## 2020-12-31 RX ADMIN — METOPROLOL TARTRATE 2.5 MG: 5 INJECTION INTRAVENOUS at 15:48

## 2020-12-31 RX ADMIN — Medication 10 ML: at 20:11

## 2020-12-31 RX ADMIN — Medication 2000 UNITS: at 08:12

## 2020-12-31 RX ADMIN — SODIUM CHLORIDE, PRESERVATIVE FREE 10 ML: 5 INJECTION INTRAVENOUS at 08:12

## 2020-12-31 RX ADMIN — Medication 10 ML: at 08:11

## 2020-12-31 RX ADMIN — FOLIC ACID 1 MG: 5 INJECTION, SOLUTION INTRAMUSCULAR; INTRAVENOUS; SUBCUTANEOUS at 08:48

## 2020-12-31 RX ADMIN — SODIUM CHLORIDE 1.2 MCG/KG/HR: 9 INJECTION, SOLUTION INTRAVENOUS at 08:48

## 2020-12-31 RX ADMIN — Medication 10 ML: at 09:04

## 2020-12-31 RX ADMIN — QUETIAPINE FUMARATE 50 MG: 25 TABLET ORAL at 18:05

## 2020-12-31 RX ADMIN — METOPROLOL TARTRATE 5 MG: 5 INJECTION INTRAVENOUS at 13:54

## 2020-12-31 RX ADMIN — MIDAZOLAM 2 MG: 1 INJECTION INTRAMUSCULAR; INTRAVENOUS at 15:36

## 2020-12-31 RX ADMIN — SODIUM CHLORIDE 20 ML: 9 INJECTION, SOLUTION INTRAVENOUS at 12:06

## 2020-12-31 RX ADMIN — Medication 50 MG: at 08:12

## 2020-12-31 RX ADMIN — QUETIAPINE FUMARATE 50 MG: 25 TABLET ORAL at 20:10

## 2020-12-31 RX ADMIN — OXYCODONE HYDROCHLORIDE AND ACETAMINOPHEN 500 MG: 500 TABLET ORAL at 09:04

## 2020-12-31 RX ADMIN — METOPROLOL TARTRATE 5 MG: 5 INJECTION INTRAVENOUS at 04:01

## 2020-12-31 RX ADMIN — METOPROLOL TARTRATE 5 MG: 5 INJECTION INTRAVENOUS at 08:11

## 2020-12-31 RX ADMIN — PANTOPRAZOLE SODIUM 40 MG: 40 INJECTION, POWDER, FOR SOLUTION INTRAVENOUS at 20:10

## 2020-12-31 RX ADMIN — DEXTROSE AND SODIUM CHLORIDE: 5; 450 INJECTION, SOLUTION INTRAVENOUS at 07:01

## 2020-12-31 RX ADMIN — PANTOPRAZOLE SODIUM 40 MG: 40 INJECTION, POWDER, FOR SOLUTION INTRAVENOUS at 08:11

## 2020-12-31 RX ADMIN — SODIUM CHLORIDE, PRESERVATIVE FREE 10 ML: 5 INJECTION INTRAVENOUS at 08:48

## 2020-12-31 ASSESSMENT — PAIN SCALES - GENERAL
PAINLEVEL_OUTOF10: 0

## 2020-12-31 NOTE — PLAN OF CARE
Problem: Restraint Use - Nonviolent/Non-Self-Destructive Behavior:  Goal: Absence of restraint-related injury  12/31/2020 1631 by Poonam Green RN  Outcome: Met This Shift     Problem: Restraint Use - Nonviolent/Non-Self-Destructive Behavior:  Goal: Absence of restraint indications  12/31/2020 1631 by Clay Woo RN  Outcome: Not Met This Shift

## 2020-12-31 NOTE — PROGRESS NOTES
Pt threatening to leave AMA. Pt oriented to person, and place; disoriented to time & situation. Intermittently confused, notified residents. Restraints applied as pt refusing all care. Discussed situation with partner Rose Bryant. Agrees with medical advice that he needs to stay and is unstable to leave. Monitoring closely for any other changes in mentation.

## 2020-12-31 NOTE — PROGRESS NOTES
[address] [facility]  Department of Internal Medicine   Internal Medicine Residency   MICU Progress Note    Patient:  Andre Montano 65 y.o. male  MRN: [id number]     Date of Service: 12/30/2020    Allergy: Patient has no known allergies. Hospital day #8, Vent #6  Subjective     Patient seen and examined. No acute changes overnight. Awake and following commands. On a small amount of sedation, Precedex 0.2 MCG/kg/hr. Extubated yesterday and doing well on 3 L nasal cannula, satting well. Hemoglobin dropped this morning from 8.8->7.8, no signs of active bleeding, held heparin drip and transitioning to Eliquis 5 mg twice daily today. Creatinine uptrending from 4.8 to 5.6 mg/dL today, no Sled plan for today per nephrology. Potassium 3.4, replace. Making good urine output, 2.3 L out over the last 24 hours, net +60 L since admission. Heart rate in the 110s-120s, on Lopressor IV 2.5 mg every 6 hours and increasing to 5 mg IV every 6 hours. Patient failed bedside swallow yesterday after extubation. Discontinued Zosyn today. Plan for SLP eval and PT/OT today    Objective     VS: BP (!) 144/100   Pulse 122   Temp 98.2 °F (36.8 °C) (Bladder)   Resp 21   Ht 6' 2\" (1.88 m)   Wt 233 lb 4 oz (105.8 kg)   SpO2 90%   BMI 29.95 kg/m²           I & O - 24hr:     Intake/Output Summary (Last 24 hours) at 12/30/2020 1934  Last data filed at 12/30/2020 1800  Gross per 24 hour   Intake 1481.98 ml   Output 2745 ml   Net -1263.02 ml       Physical Exam:  · General Appearance: Extubated on nasal cannula, Awake and following commands  · Neck: no JVD and supple, symmetrical, trachea midline  · Lung: clear to auscultation bilaterally and diminished breath sounds bilaterally  · Heart: irregularly irregular rhythm  · Abdomen: soft, non-tender; bowel sounds normal; no masses,  no organomegaly  · Extremities:  extremities normal, atraumatic, no cyanosis or edema.   Right plantar wound and heel with skin discoloration. · Musculoskeletal: No joint swelling. · Neurologic: Mental status: Following commands appropriately. Lines     site day    Art line   None    TLC R IJ 7   PICC None    Hemoaccess R Fem 6     Oxygen: On 3 L nasal cannula    ABG:     Lab Results   Component Value Date    PH 7.483 12/30/2020    PCO2 26.7 12/30/2020    PO2 92.3 12/30/2020    HCO3 19.6 12/30/2020    BE -3.1 12/30/2020    THB 8.6 12/30/2020    O2SAT 96.3 12/30/2020        Medications     Infusions: (Fluid, Sedation, Vasopressors)  Sedation   precedex at 0.2 mcg/kg/hr    Nutrition:   SLP eval  ATB:   Antibiotics  Date   Zosyn-discontinued 12/23             Skin issues:   Patient currently has   Urinary cath  Isolation  DVT prophylaxis/ GI prophylaxis,     Labs     CBC:   Lab Results   Component Value Date    WBC 8.1 12/30/2020    RBC 2.73 12/30/2020    HGB 7.8 12/30/2020    HCT 22.6 12/30/2020    MCV 82.8 12/30/2020    MCH 28.6 12/30/2020    MCHC 34.5 12/30/2020    RDW 14.6 12/30/2020     12/30/2020    MPV 12.1 12/30/2020     BMP:    Lab Results   Component Value Date     12/30/2020    K 3.4 12/30/2020    K 4.7 08/16/2020    CL 98 12/30/2020    CO2 24 12/30/2020    BUN 68 12/30/2020    LABALBU 3.4 12/30/2020    CREATININE 6.1 12/30/2020    CALCIUM 8.7 12/30/2020    GFRAA 11 12/30/2020    LABGLOM 11 12/30/2020    GLUCOSE 114 12/30/2020       Resident's Assessment and Plan   Patient is a 78-year-old male with past medical history of anxiety, depression, diabetes with neuropathy, HLD, HTN, history of lung and prostate neoplasm. Patient presented by EMS from home for AMS, for unknown amount of time. Patient was found to have Covid positive, glucose 1332, pH 6.96, PCO2 19, bicarb 4, sodium 123, potassium 7.1, chloride 91, BUN 65, creatinine 5.7 (baseline 1.4), phosphate 13, magnesium 4.5, lactic acid 11.8, lipase 3000. EKG NSR with first-degree AV block. Chest x-ray with right sided infiltrate.   CT abdomen with acute pancreatic inflammatory changes in the small bowel with ascites. CT cervical spine showing DJD with narrowing C2-3, C4-5. CT head unremarkable. Neurology:   1. Acute Encephalopathy likely 2/2 HHS/DKA/mixed vs alcohol withdrawal. S/p HHS/DKA treated. Patient became less responsive and tachypneic, consistent with alcohol withdrawal and intubated on 12/24 due to respiratory distress   -Patient extubated and weaning off Precedex   -Continue folic acid 1 mg IV daily   -Continue thiamine 250 mg IV BID x 6 doses -> 100 mg BID x 6 doses -> 100 mg IV daily     Cardiology:   1. New onset paroxysmal A. fib with RVR 12/26 likely 2/2 Covid infection. Troponins negative. -[id number] score 3   -Continue Lopressor 5 mg IV every 6 hours   --120 today; starting Eliquis 5 mg twice daily   -Follow-up echo    2. History of hypertension, home medications include amlodipine 10 mg and lisinopril 20 mg OD   -Blood pressure stable   -We will resume amlodipine if blood pressure is elevated    3. History of HLD   -Continue to hold atorvastatin    Pulmonary:  1. Acute hypoxic respiratory Failure likely 2/2 Covid infection versus alcohol withdrawal.  Patient was intubated on 12/24 for respiratory distress. Resolving   -Patient on 3 L nasal cannula    -Continue to monitor respiratory status    Nephrology (Fluids/ Electrolytes & Nutrition):   1. BLAIR stage III on CKD stage III, Likely 2/2 oliguric intrinsic ATN 2/2 hypotension. Baseline creatinine 1.4 mg/dL, creatinine 5.7 mg/dL on admission and went up to 6 mg/dL, requiring temporary dialysis. Urine electrolytes unreliable due to BMP and electrolytes not drawn correctly. Received sled 12/24, 12/25, and 12/28.    -No sled scheduled for today   -Creatinine up-trending from 4.8-5.6 mg/dL. -Continue to hold home dose lisinopril 10 mg OD   -Continue to monitor renal function with BMP Q6 hrs    -Nephrology following    2.  History of CKD stage II-III likely 2/2 hypertensive nephrosclerosis versus diabetic nephropathy    3. Hypocalcemia likely 2/2 vitamin D deficiency    -Calcium ionized is 1.13, replaced    Infectious Disease:   1. COVID-19 pneumonia, procalcitonin 4.65   -Chest x-ray with right-sided infiltrate, stable   -Continue remdesivir (day 5)   -Continue Decadron 6 mg IV Q 24 hrs    -Continue vitamin C and zinc   -Pancultures negative     Endocrine:   1. Type II DM, with severe hyperglycemia 2/2 DKA vs HHS vs mixed on admission- Resolved. Patient presented with blood glucose >1000, but BHB was not checked. BHB after 12 hours was 0.8. Anion gap closed and patient bridged to subcutaneous insulin. Hemoglobin A1c 11.1%. -BG stable   -Continue Lantus 25 units and  MDSS   -Continue POCT glucose every 4 hours    Hematology/ Oncology:  1. Acute on Chronic normocytic anemia likely 2/2 ACD vs acute blood loss. Baseline hemoglobin 10-12 g/dL. -Hemoglobin downtrending from 8.8-7.8, heparin drip held and no signs of active bleeding   -Continue to monitor H&H Q8 hrs    -Maintain hemoglobin >7, transfuse as needed    Gastroenterology:   1. Acute pancreatitis likely 2/2 alcohol versus Covid, resolving. Patient reported drinking more alcohol recently, , lipase 3000 initially. Lipase trended instead of COPD due to Covid. Lipase 17.    -Continuing Zosyn   -Currently n.p.o., failed bedside swallow and plan for SLP eval      Next of Kin/ POA:   Sotomayor Stairs (domestic partner)  Code Status:   Full    PT/OT evaluation:  Consulted   DVT prophylaxis/ GI prophylaxis: Eliquis/Protonix  Disposition: Continue current care      John Michele MD, PGY-1  Attending physician: [address] [facility]  Department of Pulmonary, Critical Care and Sleep Medicine  [address] [facility]  Department of Internal Medicine      During multidisciplinary team rounds Days Christine Kilgore is a 65 y.o. male was seen, examined and discussed.  This is confirmation

## 2020-12-31 NOTE — PLAN OF CARE
Problem: Restraint Use - Nonviolent/Non-Self-Destructive Behavior:  Goal: Absence of restraint-related injury  Outcome: Met This Shift     Problem: Restraint Use - Nonviolent/Non-Self-Destructive Behavior:  Goal: Absence of restraint indications  Outcome: Not Met This Shift

## 2020-12-31 NOTE — PROGRESS NOTES
Toro Ryan M.D.,[facility]  Mohinder Trimble D.O., F.A.C.O.I., Stephany Vail M.D. Allen Roberts M.D., Juan Sigala M.D. Shahab Benedict D.O. Daily Pulmonary Progress Note    Patient:  Andre Gonzalez 65 y.o. male MRN: [id number]     Date of Service: 12/31/2020        Subjective      Patient was seen and examined. No acute changes.     Objective   Vitals: /72   Pulse 120   Temp 96.1 °F (35.6 °C) (Bladder)   Resp (!) 36   Ht 6' 2\" (1.88 m)   Wt 232 lb 12.9 oz (105.6 kg)   SpO2 100%   BMI 29.89 kg/m²     I/O:    Intake/Output Summary (Last 24 hours) at 12/31/2020 1242  Last data filed at 12/31/2020 0900  Gross per 24 hour   Intake 1437.7 ml   Output 1925 ml   Net -487.3 ml       CURRENT MEDS :  Scheduled Meds:   QUEtiapine  50 mg Oral BID    sodium chloride  20 mL Intravenous Once    metoprolol  5 mg Intravenous Q6H    [Held by provider] apixaban  5 mg Oral BID    insulin glargine  25 Units Subcutaneous Nightly    sodium chloride  20 mL Intravenous Once    pantoprazole  40 mg Intravenous BID    And    sodium chloride (PF)  10 mL Intravenous BID    sodium chloride flush  10 mL Intravenous 2 times per day    dexamethasone  6 mg Intravenous Q24H    insulin lispro  0-12 Units Subcutaneous Q4H    sodium chloride (PF)  10 mL Intravenous Daily    [START ON 1/1/2021] thiamine  100 mg Intravenous Daily    folic acid  1 mg Intravenous Daily    vitamin D  2,000 Units Oral Daily    zinc sulfate  50 mg Oral Daily    ascorbic acid  500 mg Oral Daily    sodium chloride flush  10 mL Intravenous 2 times per day       Continuous Infusions:   dextrose 5 % and 0.45 % NaCl 40 mL/hr at 12/31/20 0701    dexmedetomidine (PRECEDEX) IV infusion 1.2 mcg/kg/hr (12/31/20 1043)    dextrose         PRN Meds:  midazolam, perflutren lipid microspheres, sodium chloride, sodium chloride flush, anticoagulant sodium citrate, acetaminophen **OR** acetaminophen, polyethylene glycol, sennosides, hydrALAZINE, labetalol, sodium chloride flush, glucose, dextrose, glucagon (rDNA), dextrose, dextrose      Physical Exam:  Due to the current efforts to prevent transmission of COVID-19 and also the need to preserve PPE for other caregivers, a face-to-face encounter with the patient was not performed. That being said, all relevant records and diagnostic tests were reviewed, including laboratory results and imaging. Please reference any relevant documentation elsewhere. Care will be coordinated with the primary service. Pertinent/ New Labs and Imaging Studies     Pulmonary Function Testing personally reviewed and interpreted. PERTINENT LAB RESULTS: Labs reviewed. DIAGNOSTICS: Pertinent imaging reviewed. Assessment:      1. Acute hypoxic respiratory failure  2. Covid 19 pneumonia  3. BLAIR requiring hemodialysis  4. Encephalopathy  5. Anemia  6. Acute pancreatitis  7. EtOH abuse  8. Paroxysmal A. fib      Plan:      Continue supplemental oxygen to maintain SPO2 greater than 88%   Monitor chest imaging   Bronchopulmonary gene   Fluid removal by HD   Anticoagulant started. Monitor hemoglobin levels, transfuse for hemoglobin greater than 7    Good LTAC candidate    Thank you for allowing me to participate in the care of Andre Brown. Please feel free to call with questions.      Electronically signed by Deanna Hernandez DO on 12/31/2020 at 12:42 PM

## 2020-12-31 NOTE — PROGRESS NOTES
Department of Internal Medicine  Nephrology  Progress Note    Events reviewed. Patient extubated. SUBJECTIVE: We are following Mr. Brown for BLAIR on dialysis. Patient lethargic. Physical Exam:    VITALS:  /84   Pulse 101   Temp 98.4 °F (36.9 °C) (Bladder)   Resp 25   Ht 6' 2\" (1.88 m)   Wt 233 lb 4 oz (105.8 kg)   SpO2 97%   BMI 29.95 kg/m²   24HR INTAKE/OUTPUT:      Intake/Output Summary (Last 24 hours) at 12/31/2020 0610  Last data filed at 12/31/2020 0400  Gross per 24 hour   Intake 943.8 ml   Output 2310 ml   Net -1366.2 ml     Physical exam:  Patient is lethargic, rest of physical exam deferred as patient is currently Covid positive. Chart review performed, patient not on any vasopressors at this time.      Scheduled Meds:   metoprolol  5 mg Intravenous Q6H    apixaban  5 mg Oral BID    povidone-iodine        insulin glargine  25 Units Subcutaneous Nightly    sodium chloride  20 mL Intravenous Once    pantoprazole  40 mg Intravenous BID    And    sodium chloride (PF)  10 mL Intravenous BID    sodium chloride flush  10 mL Intravenous 2 times per day    dexamethasone  6 mg Intravenous Q24H    insulin lispro  0-12 Units Subcutaneous Q4H    sodium chloride (PF)  10 mL Intravenous Daily    thiamine  100 mg Intravenous BID    Followed by   Luna Biggs ON 1/1/2021] thiamine  100 mg Intravenous Daily    folic acid  1 mg Intravenous Daily    vitamin D  2,000 Units Oral Daily    zinc sulfate  50 mg Oral Daily    ascorbic acid  500 mg Oral Daily    sodium chloride flush  10 mL Intravenous 2 times per day     Continuous Infusions:   dexmedetomidine (PRECEDEX) IV infusion 0.2 mcg/kg/hr (12/30/20 1632)    dextrose       PRN Meds:.midazolam, perflutren lipid microspheres, sodium chloride, sodium chloride flush, anticoagulant sodium citrate, acetaminophen **OR** acetaminophen, polyethylene glycol, sennosides, hydrALAZINE, labetalol, sodium chloride flush, glucose, dextrose, glucagon (rDNA), dextrose, dextrose    DATA:      CBC with Differential:    Lab Results   Component Value Date    WBC 8.1 12/30/2020    RBC 2.73 12/30/2020    HGB 7.0 12/31/2020    HCT 21.1 12/31/2020     12/30/2020    MCV 82.8 12/30/2020    MCH 28.6 12/30/2020    MCHC 34.5 12/30/2020    RDW 14.6 12/30/2020    NRBC 0.9 12/26/2020    SEGSPCT 63 09/27/2013    LYMPHOPCT 10.2 12/30/2020    MONOPCT 11.3 12/30/2020    MYELOPCT 0.9 12/26/2020    BASOPCT 0.0 12/30/2020    MONOSABS 0.91 12/30/2020    LYMPHSABS 0.82 12/30/2020    EOSABS 0.00 12/30/2020    BASOSABS 0.00 12/30/2020     CMP:    Lab Results   Component Value Date     12/31/2020    K 4.1 12/31/2020    K 4.7 08/16/2020     12/31/2020    CO2 21 12/31/2020    BUN 74 12/31/2020    CREATININE 6.7 12/31/2020    GFRAA 10 12/31/2020    LABGLOM 10 12/31/2020    GLUCOSE 125 12/31/2020    PROT 6.0 12/30/2020    LABALBU 3.4 12/30/2020    CALCIUM 8.6 12/31/2020    BILITOT 0.5 12/30/2020    ALKPHOS 108 12/30/2020    AST 16 12/30/2020    ALT 13 12/30/2020     Magnesium:    Lab Results   Component Value Date    MG 2.4 12/31/2020     Phosphorus:    Lab Results   Component Value Date    PHOS 8.3 12/31/2020     ABG:    Lab Results   Component Value Date    PH 7.483 12/30/2020    PCO2 26.7 12/30/2020    PO2 92.3 12/30/2020    HCO3 19.6 12/30/2020    BE -3.1 12/30/2020    O2SAT 96.3 12/30/2020       Radiology Results:     Chest x-ray December 30, 2020   Multifocal bilateral airspace disease, unchanged when compared with the prior   study. BRIEF SUMMARY OF INITIAL CONSULT:    Briefly, Mr. Rusty Cooper is a 65year old AA man with h/o CKD stage 3, baseline creatinine 1.4 mg/dL, HTN, Type 2 DM, prostate cancer, who was admitted on 12/22/2020 with altered metal status. He was found to have glucose level of 1332 mg/dL, lactic acid levelof 11.8, BUN of 95, serum creatinine of 5.7 mg/dL and serum potassium of 7.1 with CO2 level of 4; reason for this consultation.  His medications prior to admission included lisinopril, ibuprofen and naproxen. Resolved problems:  · Hyperkalemia  · Pseudohyponatremia  · Hypocalcemia  · Acute pancreatitis  · Hemodynamic shock   · Hypocalcemia, due to coexisting alkalemia, rule out vitamin D deficiency  · Acute pancreatitis-initial lipase >3000, now improved to 17  · Respiratory failure s/p intubation   · DKA   · Pneumonia, on piperacillin-tazobactam     IMPRESSION/RECOMMENDATIONS:      1. BLAIR stage III on CKD, ischemic ATN, multifactorial 2/2 to severe intravascular volume depletion (osmotic diurseis-hyperglycemia) in the setting of ACE inhibition and NSAID's. Started on renal replacement therapy on 12/25/2020. Nonoliguric with good response to diuretics, nevertheless renal clearance still lacking recovery. Patient currently net + 15L. We will proceed with dialysis today    2. CKD stage III, baseline creatinine approximately 1.4 mg/dL, with proteinuria, probably DKD   3. HTN, on metoprolol 5 mg IV every 6 hours  4. Alkalemia, PH: 7.483; with respiratory alkalosis, metabolic alkalosis (bicarbonate administration on dialysis) and HAGMA (uremia)     -------------------------------------------------  5. COVID-19  infection, on dexamethasone, remdesivir    6. New onset AF, on metoprolol and apixaban  7. Anemia, normocytic, dropping H/H   8. Nutrition, n.p.o.     Plan:    · Start D51/2 NS at 40 cc/hour until tube feedings restarted.   · HD x4 hours today  · Continue to monitor renal function

## 2020-12-31 NOTE — PROGRESS NOTES
[address] [facility]  Department of Internal Medicine   Internal Medicine Residency   MICU Progress Note    Patient:  Andre Hicks 65 y.o. male  MRN: [id number]     Date of Service: 12/31/2020    Allergy: Patient has no known allergies. Hospital day #9, extubated on 12/29  Subjective     Patient seen and examined. No acute changes overnight. Awake and following commands. Remained on precedex gtt -> will switch to Seroquel for sedation -> QTc wnl on 12/22, will repeat EKG  Saturation well on NC  hgb dropped to 7.0 this AM, will transfuse 1 U  Cr cont to trend up to 7 mg/dL today, dialysis plan for today per nephrology. Heart rate in the 110s-120s, on Lopressor IV 5 mg every 6 hours  Possible transfer to LTAC after dialysis and hgb >7, NG tube in for PO medication    Objective     VS: /80   Pulse 137   Temp 96.6 °F (35.9 °C) (Bladder)   Resp 26   Ht 6' 2\" (1.88 m)   Wt 232 lb 12.9 oz (105.6 kg)   SpO2 100%   BMI 29.89 kg/m²         I & O - 24hr:     Intake/Output Summary (Last 24 hours) at 12/31/2020 1127  Last data filed at 12/31/2020 0900  Gross per 24 hour   Intake 1437.7 ml   Output 2000 ml   Net -562.3 ml       Physical Exam:  · General Appearance: Extubated on nasal cannula, Awake and following commands  · Neck: no JVD and supple, symmetrical, trachea midline  · Lung: clear to auscultation bilaterally and diminished breath sounds bilaterally  · Heart: irregularly irregular rhythm  · Abdomen: soft, non-tender; bowel sounds normal; no masses,  no organomegaly  · Extremities:  extremities normal, atraumatic, no cyanosis or edema. Right plantar wound and heel with skin discoloration. · Musculoskeletal: No joint swelling. · Neurologic: Mental status: Following commands appropriately. Lines     site day    Art line   None    TLC R IJ 7   PICC None    Hemoaccess R Fem 6     Oxygen:  On 3 L nasal cannula    ABG:     Lab Results   Component Value Date    PH 7.483 12/30/2020    PCO2 26.7 12/30/2020    PO2 92.3 12/30/2020    HCO3 19.6 12/30/2020    BE -3.1 12/30/2020    THB 8.6 12/30/2020    O2SAT 96.3 12/30/2020        Medications     Infusions: (Fluid, Sedation, Vasopressors)  Sedation   precedex at 0.2 mcg/kg/hr    Nutrition:   SLP eval  ATB:   Antibiotics  Date   Zosyn-discontinued 12/23 - 12/30             Skin issues:   Patient currently has   Urinary cath  Isolation  DVT prophylaxis/ GI prophylaxis,     Labs     CBC:   Lab Results   Component Value Date    WBC 7.9 12/31/2020    RBC 2.48 12/31/2020    HGB 7.1 12/31/2020    HCT 20.6 12/31/2020    MCV 83.1 12/31/2020    MCH 28.6 12/31/2020    MCHC 34.5 12/31/2020    RDW 14.7 12/31/2020     12/31/2020    MPV 11.5 12/31/2020     BMP:    Lab Results   Component Value Date     12/31/2020    K 3.7 12/31/2020    K 4.7 08/16/2020     12/31/2020    CO2 21 12/31/2020    BUN 82 12/31/2020    LABALBU 3.4 12/30/2020    CREATININE 7.0 12/31/2020    CALCIUM 8.7 12/31/2020    GFRAA 9 12/31/2020    LABGLOM 9 12/31/2020    GLUCOSE 88 12/31/2020       Resident's Assessment and Plan   Patient is a 68-year-old male with past medical history of anxiety, depression, diabetes with neuropathy, HLD, HTN, history of lung and prostate neoplasm. Patient presented by EMS from home for AMS, for unknown amount of time. Patient was found to have Covid positive, glucose 1332, pH 6.96, PCO2 19, bicarb 4, sodium 123, potassium 7.1, chloride 91, BUN 65, creatinine 5.7 (baseline 1.4), phosphate 13, magnesium 4.5, lactic acid 11.8, lipase 3000. EKG NSR with first-degree AV block. Chest x-ray with right sided infiltrate. CT abdomen with acute pancreatic inflammatory changes in the small bowel with ascites. CT cervical spine showing DJD with narrowing C2-3, C4-5. CT head unremarkable. Neurology:   1. Acute Encephalopathy likely 2/2 HHS/DKA/mixed vs alcohol withdrawal. S/p HHS/DKA treated.  Patient became less responsive and tachypneic, consistent with alcohol withdrawal and intubated on 12/24 due to respiratory distress   -Patient extubated and weaning off Precedex               -switch to Seroquel for sedation     -Continue folic acid 1 mg IV daily   -Continue thiamine 250 mg IV BID x 6 doses -> 100 mg BID x 6 doses -> 100 mg IV daily     Cardiology:   1. New onset paroxysmal A. fib with RVR 12/26 likely 2/2 Covid infection. Troponins negative. -[id number] score 3   -Continue Lopressor 5 mg IV every 6 hours   --120 today; pending NG for initiating Eliquis 5 mg twice daily   -Follow-up echo    2. History of hypertension, home medications include amlodipine 10 mg and lisinopril 20 mg OD   -Blood pressure stable   -We will resume amlodipine if blood pressure is elevated    3. History of HLD   -Continue to hold atorvastatin    Pulmonary:  1. Acute hypoxic respiratory Failure likely 2/2 Covid infection versus alcohol withdrawal.  Patient was intubated on 12/24 for respiratory distress. Resolving   -Patient on 3 L nasal cannula    -Continue to monitor respiratory status    Nephrology (Fluids/ Electrolytes & Nutrition):   1. BLAIR stage III on CKD stage III, Likely 2/2 oliguric intrinsic ATN 2/2 hypotension. Baseline creatinine 1.4 mg/dL, creatinine 5.7 mg/dL on admission and went up to 6 mg/dL, requiring temporary dialysis. Urine electrolytes unreliable due to BMP and electrolytes not drawn correctly. Received sled 12/24, 12/25, and 12/28.    -dialysis scheduled for today   -Creatinine up-trending from 4.8-5.6 mg/dL. -Continue to hold home dose lisinopril 10 mg OD   -Continue to monitor renal function with BMP Q6 hrs    -Nephrology following    2. History of CKD stage II-III likely 2/2 hypertensive nephrosclerosis versus diabetic nephropathy    3. Hypocalcemia likely 2/2 vitamin D deficiency    -Calcium ionized is 1.13, replaced    Infectious Disease:   1.  COVID-19 pneumonia, procalcitonin 4.65   -Chest x-ray with right-sided infiltrate, stable   -Continue remdesivir (day 5)   -Continue Decadron 6 mg IV Q 24 hrs    -Continue vitamin C and zinc   -Pancultures negative     Endocrine:   1. Type II DM, with severe hyperglycemia 2/2 DKA vs HHS vs mixed on admission- Resolved. Patient presented with blood glucose >1000, but BHB was not checked. BHB after 12 hours was 0.8. Anion gap closed and patient bridged to subcutaneous insulin. Hemoglobin A1c 11.1%. -BG stable   -Continue Lantus 25 units and  MDSS   -Continue POCT glucose every 4 hours    Hematology/ Oncology:  1. Acute on Chronic normocytic anemia likely 2/2 ACD vs acute blood loss. Baseline hemoglobin 10-12 g/dL. -Hgb 7.0 this AM, will transfuse 1 U pRBC   -Continue to monitor H&H Q8 hrs    -Maintain hemoglobin >7, transfuse as needed    Gastroenterology:   1. Acute pancreatitis likely 2/2 alcohol versus Covid, resolving. Patient reported drinking more alcohol recently, , lipase 3000 initially. Lipase trended instead of COPD due to Covid. Lipase 17.    -Continuing Zosyn -> discontinued    -Currently n.p.o., failed bedside swallow X 2, will try NG tube for PO medication and tube feeding       Next of Kin/ POA:   Alisa Layton (domestic partner)  Code Status:   Full    PT/OT evaluation:  Consulted   DVT prophylaxis/ GI prophylaxis: Eliquis/Protonix  Disposition: Continue current care, possible transfer to Kaushik Roy MD, PGY-3  Attending physician: [facility]  Department of Pulmonary, Critical Care and Sleep Medicine  [address] [facility]  Department of Internal Medicine      During multidisciplinary team rounds Days Chad Chu is a 65 y.o. male was seen, examined and discussed. This is confirmation that I have personally seen and examined the patient and that the key elements of the encounter were performed by me (> 85 % time). The medications & laboratory data was discussed and adjusted where necessary.  The radiographic images were reviewed or with radiologist or consultant if felt dis-concordant with the exam or history. The above findings were corroborated, plans confirmed and changes made if needed. Family is updated at the bedside as available. Key issues of the case were discussed among consultants. Critical Care time is documented if appropriate.       Sumanth Soares DO, FACP, FCCP, [place],

## 2020-12-31 NOTE — PROGRESS NOTES
seated up in chair to increase activity tolerance         Grooming Mod. A to wash face/hands seated EOB (thoroughness of completion of task)  Mod. A to wash face/hands seated EOB (cueing for safety and thoroughness of completion of task)                   SBA   while standing sink level requiring AD for balance and demonstrating good tolerance       UB dressing Mod. A  Max A to don gown                       SBA         LB dressing Dep to don socks  Dep Mod. A   using AE as needed for safe reach/ energy conservation     Bathing Max A (simulated) Max A (simulated)                        Min. A   using AE as needed for safe reach/ energy conservation        Toileting Dep (assisted with bedpan; assistance w/ hygiene/clothing management)  Dep                       Mod. A      Bed Mobility  Supine to sit: Max A     Sit to supine: Max A Supine to sit: Mod. A x2    Sit to supine: Max A x2                        SBA  in prep of ADL tasks & transfers   Functional Transfers Sit to stand: Max A x2     Stand to sit: Max A x2  Sit to stand: Max A  Stand to sit: Max A                       SBA  sit<>stand/functional bathroom transfers using AD/DME as needed for balance and safety   Functional Mobility Max A x2 w/ ww (small side step towards HOB in preparation for functional mobility)  Max A w/ ww (side steps towards HOB)                      Min. A   functional/bathroom mobility using AD as needed & demonstrating good safety      Balance Sitting:     Static: SBA    Dynamic:Min. A  Standing: Max A x2 Sitting:     Static: SBA    Dynamic:Min. A  Standing: Max A    Independent dynamic sitting balance; min. A dynamic standing balance  during ADL tasks & transfers   Endurance/Activity Tolerance    fair tolerance with light activity. Increased HR during session. Rest breaks noted during session. Fair tolerance w/ light activity. Increased HR this session. Rest breaks noted seated EOB to decrease HR.    Fair+   tolerance with moderate of call light for assistance and fall prevention. Line management and environmental modifications made prior to and end of session to ensure patient safety and to increase efficiency of session. Skilled monitoring of HR, O2 saturation, blood pressure and patient's response to activity performed throughout session. Overall, pt presents with decreased activity tolerance, dynamic balance, functional mobility limiting completion of ADLs and safe return home. Pt can benefit from continued skilled OT to increase safety and functional independence. · Pt has made fair progress towards set goals.    · Continue with current plan of care    Time in: 7:50                               Time Out: 8:25  Total Treatment Time: 35 minutes           Treatment Charges: Mins Units   Ther Ex  [id number]     Manual Therapy Darlene Jackson [phone] [id number] [phone]   ADL/Home Mgt [id number] [phone] [id number]   Neuro Re-ed [id number]     Group Therapy      Orthotic manage/training  [id number]     Non-Billable Time         Zamzam Oliver OTR/L, #[id number]

## 2020-12-31 NOTE — CARE COORDINATION
12/31Care Coordination: Days remains in MICU. Evaluating today to see if ready for [facility] Adkins has COVID Beds. Spoke with resident, they will discuss with ICU attending. Ambulance and envelope in soft chart. If after hours Unit will need to call [facility] to set up  time. Also Unit will need to notify the family. CM/SW will continue to follow for discharge planning.    Hemanth PATEN,RN--[phone]

## 2020-12-31 NOTE — PROGRESS NOTES
Comprehensive Nutrition Assessment    Type and Reason for Visit:  Reassess    Nutrition Recommendations/Plan: Continue NPO. Recommend starting EN as medically feasible if access placed - please consult for appropriate recs if needed  Pt at high risk for refeeding syndrome d/t NPO status x9d since admit  Monitor electrolytes closely    Nutrition Assessment:  Pt remains at high nutritional risk d/t admit w/ COVID-19, DKA, pancreatitis, hx ETOH abuse s/p intubated. Pt extubated 12/29, s/p failed swallow eval. Continues w/ dialysis. Recommend starting EN as medically feasible. Will monitor. Malnutrition Assessment:  Malnutrition Status:   At risk for malnutrition (Comment)    Context:  Acute Illness     Findings of the 6 clinical characteristics of malnutrition:  Energy Intake:  7 - 50% or less of estimated energy requirements for 5 or more days  Weight Loss:  Unable to assess     Body Fat Loss:  Unable to assess     Muscle Mass Loss:  Unable to assess    Fluid Accumulation:  No significant fluid accumulation     Strength:  Not Performed    Estimated Daily Nutrient Needs:  Energy (kcal):  ; Weight Used for Energy Requirements:  Admission     Protein (g):  115-130; Weight Used for Protein Requirements:  Ideal(1.3-1.5)        Fluid (ml/day):  per renal; Method Used for Fluid Requirements:         Nutrition Related Findings:  pt extubated, A&Ox3, BS present, soft abd, +2 edema, +I/Os 15L, HD, ETOH abuse, failed swallow eval 12/29, lipase WNL      Wounds:  Multiple, Deep Tissue Injury(foot/heel)       Current Nutrition Therapies:    Diet NPO Effective Now    Anthropometric Measures:  · Height: 6' 2\" (188 cm)  · Current Body Weight: 232 lb (105.2 kg)(12/31)   · Admission Body Weight: 208 lb (94.3 kg)(first measured)    · Usual Body Weight: (UTO no actual EMR hx on file)     · Ideal Body Weight: 190 lbs; % Ideal Body Weight 122.1 %   · BMI: 29.8  · BMI Categories: Overweight (BMI 25.0-29.9)(using admit wt BMI 26.7)       Nutrition Diagnosis:   · Inadequate oral intake related to impaired respiratory function as evidenced by NPO or clear liquid status due to medical condition    Nutrition Interventions:   Food and/or Nutrient Delivery:  Continue NPO  Nutrition Education/Counseling:  Education not indicated   Coordination of Nutrition Care:  Continue to monitor while inpatient    Goals:  Nutrition progression       Nutrition Monitoring and Evaluation:   Food/Nutrient Intake Outcomes:  Diet Advancement/Tolerance  Physical Signs/Symptoms Outcomes:  Biochemical Data, Chewing or Swallowing, GI Status, Fluid Status or Edema, Hemodynamic Status, Nutrition Focused Physical Findings, Skin, Weight     Discharge Planning:     Too soon to determine     Electronically signed by Deysi Chopra MS, RD, LD on 12/31/20 at 1:30 PM EST    Contact: [phone]

## 2020-12-31 NOTE — PROGRESS NOTES
Hospitalist Progress Note    Chief complaint:  Chief Complaint   Patient presents with    Shortness of Breath    Altered Mental Status    Positive For Covid-19        Admit date:  12/22/2020    Days in hospital:    9    Synopsis :  Patient is a 64-year-old male with past medical history of anxiety, depression, diabetes with neuropathy, HLD, HTN, history of lung and prostate neoplasm. Patient presented by EMS from home for AMS, for unknown amount of time. Patient was found to have Covid positive, glucose 1332, pH 6.96, PCO2 19, bicarb 4, sodium 123, potassium 7.1, chloride 91, BUN 65, creatinine 5.7 (baseline 1.4), phosphate 13, magnesium 4.5, lactic acid 11.8, lipase 3000. EKG NSR with first-degree AV block. Chest x-ray with right sided infiltrate. CT abdomen with acute pancreatic inflammatory changes in the small bowel with ascites. CT cervical spine showing DJD with narrowing C2-3, C4-5. CT head unremarkable. He is now requiring dialysis for his acute kidney injury. Subjective:   Confused , encephalopathy  Mild distress due to SOB    Objective:    Review of system:  12 point review of system was done with the patient in detail. Review of systems negative except as above under subjective. Physical examination:  VS: /72   Pulse 120   Temp 96.1 °F (35.6 °C) (Bladder)   Resp (!) 36   Ht 6' 2\" (1.88 m)   Wt 232 lb 12.9 oz (105.6 kg)   SpO2 100%   BMI 29.89 kg/m²   I/O:     Intake/Output Summary (Last 24 hours) at 12/31/2020 1320  Last data filed at 12/31/2020 0900  Gross per 24 hour   Intake 1437.7 ml   Output 1825 ml   Net -387.3 ml     Due to the current efforts to prevent transmission of COVID-19 and also the need to preserve PPE for other caregivers, a face-to-face encounter with the patient was not performed.      Patient was visualized at bedside    That being said, all relevant records and diagnostic tests were reviewed, including laboratory results and imaging.   Plan discussed with nursing staff        Medications:  Scheduled Meds:   QUEtiapine  50 mg Oral BID    sodium chloride  20 mL Intravenous Once    metoprolol  5 mg Intravenous Q6H    [Held by provider] apixaban  5 mg Oral BID    insulin glargine  25 Units Subcutaneous Nightly    sodium chloride  20 mL Intravenous Once    pantoprazole  40 mg Intravenous BID    And    sodium chloride (PF)  10 mL Intravenous BID    sodium chloride flush  10 mL Intravenous 2 times per day    dexamethasone  6 mg Intravenous Q24H    insulin lispro  0-12 Units Subcutaneous Q4H    sodium chloride (PF)  10 mL Intravenous Daily    [START ON 1/1/2021] thiamine  100 mg Intravenous Daily    folic acid  1 mg Intravenous Daily    vitamin D  2,000 Units Oral Daily    zinc sulfate  50 mg Oral Daily    ascorbic acid  500 mg Oral Daily    sodium chloride flush  10 mL Intravenous 2 times per day       PRN Meds:  midazolam, perflutren lipid microspheres, sodium chloride, sodium chloride flush, anticoagulant sodium citrate, acetaminophen **OR** acetaminophen, polyethylene glycol, sennosides, hydrALAZINE, labetalol, sodium chloride flush, glucose, dextrose, glucagon (rDNA), dextrose, dextrose    IV:   dextrose 5 % and 0.45 % NaCl 40 mL/hr at 12/31/20 0701    dexmedetomidine (PRECEDEX) IV infusion 1.2 mcg/kg/hr (12/31/20 1043)    dextrose         LABS:  Recent Results (from the past 24 hour(s))   POCT Glucose    Collection Time: 12/30/20  4:38 PM   Result Value Ref Range    Meter Glucose 130 (H) 74 - 99 mg/dL   POCT Glucose    Collection Time: 12/30/20  8:52 PM   Result Value Ref Range    Meter Glucose 155 (H) 74 - 99 mg/dL   Magnesium    Collection Time: 12/30/20  9:10 PM   Result Value Ref Range    Magnesium 2.4 1.6 - 2.6 mg/dL   Phosphorus    Collection Time: 12/30/20  9:10 PM   Result Value Ref Range    Phosphorus 8.2 (H) 2.5 - 4.5 mg/dL   Basic Metabolic Panel    Collection Time: 12/30/20 9:10 PM   Result Value Ref Range    Sodium 141 132 - 146 mmol/L    Potassium 4.6 3.5 - 5.0 mmol/L    Chloride 101 98 - 107 mmol/L    CO2 22 22 - 29 mmol/L    Anion Gap 18 (H) 7 - 16 mmol/L    Glucose 149 (H) 74 - 99 mg/dL    BUN 74 (H) 8 - 23 mg/dL    CREATININE 6.5 (H) 0.7 - 1.2 mg/dL    GFR Non-African American 10 >=60 mL/min/1.73    GFR African American 10     Calcium 8.7 8.6 - 10.2 mg/dL   Hemoglobin and hematocrit, blood    Collection Time: 12/30/20  9:10 PM   Result Value Ref Range    Hemoglobin 7.6 (L) 12.5 - 16.5 g/dL    Hematocrit 21.9 (L) 37.0 - 54.0 %   POCT Glucose    Collection Time: 12/31/20 12:13 AM   Result Value Ref Range    Meter Glucose 119 (H) 74 - 99 mg/dL   Magnesium    Collection Time: 12/31/20 12:20 AM   Result Value Ref Range    Magnesium 2.4 1.6 - 2.6 mg/dL   Phosphorus    Collection Time: 12/31/20 12:20 AM   Result Value Ref Range    Phosphorus 8.3 (H) 2.5 - 4.5 mg/dL   Basic Metabolic Panel    Collection Time: 12/31/20 12:20 AM   Result Value Ref Range    Sodium 142 132 - 146 mmol/L    Potassium 4.1 3.5 - 5.0 mmol/L    Chloride 101 98 - 107 mmol/L    CO2 21 (L) 22 - 29 mmol/L    Anion Gap 20 (H) 7 - 16 mmol/L    Glucose 125 (H) 74 - 99 mg/dL    BUN 74 (H) 8 - 23 mg/dL    CREATININE 6.7 (H) 0.7 - 1.2 mg/dL    GFR Non-African American 10 >=60 mL/min/1.73    GFR African American 10     Calcium 8.6 8.6 - 10.2 mg/dL   POCT Glucose    Collection Time: 12/31/20  3:59 AM   Result Value Ref Range    Meter Glucose 101 (H) 74 - 99 mg/dL   Hemoglobin and hematocrit, blood    Collection Time: 12/31/20  4:10 AM   Result Value Ref Range    Hemoglobin 7.0 (L) 12.5 - 16.5 g/dL    Hematocrit 21.1 (L) 37.0 - 54.0 %   Protime-INR    Collection Time: 12/31/20  4:11 AM   Result Value Ref Range    Protime 18.0 (H) 9.3 - 12.4 sec    INR 1.6    Sedimentation Rate    Collection Time: 12/31/20  4:11 AM   Result Value Ref Range    Sed Rate 55 (H) 0 - 15 mm/Hr   C-reactive protein    Collection Time: 12/31/20  4:11 AM   Result Value Ref Range    CRP 5.5 (H) 0.0 - 0.4 mg/dL   Ferritin    Collection Time: 12/31/20  4:11 AM   Result Value Ref Range    Ferritin 1,240 ng/mL   D-dimer, quantitative    Collection Time: 12/31/20  4:11 AM   Result Value Ref Range    D-Dimer, Quant 4989 ng/mL DDU   CBC Auto Differential    Collection Time: 12/31/20  4:11 AM   Result Value Ref Range    WBC 7.9 4.5 - 11.5 E9/L    RBC 2.48 (L) 3.80 - 5.80 E12/L    Hemoglobin 7.1 (L) 12.5 - 16.5 g/dL    Hematocrit 20.6 (L) 37.0 - 54.0 %    MCV 83.1 80.0 - 99.9 fL    MCH 28.6 26.0 - 35.0 pg    MCHC 34.5 32.0 - 34.5 %    RDW 14.7 11.5 - 15.0 fL    Platelets 245 479 - 931 E9/L    MPV 11.5 7.0 - 12.0 fL    Neutrophils % 80.7 (H) 43.0 - 80.0 %    Immature Granulocytes % 1.0 0.0 - 5.0 %    Lymphocytes % 7.8 (L) 20.0 - 42.0 %    Monocytes % 10.5 2.0 - 12.0 %    Eosinophils % 0.0 0.0 - 6.0 %    Basophils % 0.0 0.0 - 2.0 %    Neutrophils Absolute 6.37 1.80 - 7.30 E9/L    Immature Granulocytes # 0.08 E9/L    Lymphocytes Absolute 0.62 (L) 1.50 - 4.00 E9/L    Monocytes Absolute 0.83 0.10 - 0.95 E9/L    Eosinophils Absolute 0.00 (L) 0.05 - 0.50 E9/L    Basophils Absolute 0.00 0.00 - 0.20 E9/L   Calcium, ionized    Collection Time: 12/31/20  4:11 AM   Result Value Ref Range    Calcium, Ion 1.19 1.15 - 1.33 mmol/L   Magnesium    Collection Time: 12/31/20  4:11 AM   Result Value Ref Range    Magnesium 2.4 1.6 - 2.6 mg/dL   Phosphorus    Collection Time: 12/31/20  4:11 AM   Result Value Ref Range    Phosphorus 8.3 (H) 2.5 - 4.5 mg/dL   Basic Metabolic Panel    Collection Time: 12/31/20  4:11 AM   Result Value Ref Range    Sodium 146 132 - 146 mmol/L    Potassium 3.7 3.5 - 5.0 mmol/L    Chloride 105 98 - 107 mmol/L    CO2 21 (L) 22 - 29 mmol/L    Anion Gap 20 (H) 7 - 16 mmol/L    Glucose 88 74 - 99 mg/dL    BUN 82 (H) 8 - 23 mg/dL    CREATININE 7.0 (H) 0.7 - 1.2 mg/dL    GFR Non-African American 9 >=60 mL/min/1.73    GFR African American 9     Calcium 8.7 8.6 - 10.2 mg/dL POCT Glucose    Collection Time: 12/31/20  8:25 AM   Result Value Ref Range    Meter Glucose 80 74 - 99 mg/dL   POCT Glucose    Collection Time: 12/31/20 11:57 AM   Result Value Ref Range    Meter Glucose 112 (H) 74 - 99 mg/dL   Hemoglobin and hematocrit, blood    Collection Time: 12/31/20 12:34 PM   Result Value Ref Range    Hemoglobin 7.0 (L) 12.5 - 16.5 g/dL    Hematocrit 21.2 (L) 37.0 - 54.0 %       RADIOLOGY:  Ct Abdomen Pelvis Wo Contrast Additional Contrast? None    Result Date: 12/22/2020  EXAMINATION: CT OF THE ABDOMEN AND PELVIS WITHOUT CONTRAST 12/22/2020 3:17 pm TECHNIQUE: CT of the abdomen and pelvis was performed without the administration of intravenous contrast. Multiplanar reformatted images are provided for review. Dose modulation, iterative reconstruction, and/or weight based adjustment of the mA/kV was utilized to reduce the radiation dose to as low as reasonably achievable. COMPARISON: September 14, 2019 HISTORY: ORDERING SYSTEM PROVIDED HISTORY: DKA, acute pancreatitis TECHNOLOGIST PROVIDED HISTORY: Reason for exam:->DKA, acute pancreatitis Additional Contrast?->None What reading provider will be dictating this exam?->CRC FINDINGS: The lung bases demonstrate COPD with bronchiectasis. There is minimal atelectasis/infiltrates in the lung bases concerning for pneumonia. There is coronary artery calcification. The liver is fatty infiltrated. Gallbladder is partially distended with mild wall thickening. Consider ultrasonography. Spleen appears normal.  There is diffuse inflammatory changes and edema surrounding the pancreas with inflammatory changes extending to the root of the mesentery to involve the duodenum. Inflammatory fluid is identified in the Morison's pouch with inflammation extending to the ascending colon which is thickened. There is inflammatory stranding densities in the mesentery and fluid in the splenic hilum. Degenerative changes are identified in the lumbar spine. Pelvis. The bladder is contracted with the Jolly catheter and wall thickening. There is diverticulosis of colon without diverticulitis. Significant inflammatory changes in the upper abdomen with the epicenter surrounding the pancreas concerning for acute pancreatitis with inflammation and inflammatory fluid extending and involving the root of the mesentery, duodenum and ascending colon resulting in enteritis/colitis. Small amount of inflammatory ascites is also present. Surveillance recommended. Atelectasis/infiltrates lung bases concerning for pneumonia. Ct Head Wo Contrast    Result Date: 12/22/2020  EXAMINATION: CT OF THE HEAD WITHOUT CONTRAST  12/22/2020 3:17 pm TECHNIQUE: CT of the head was performed without the administration of intravenous contrast. Dose modulation, iterative reconstruction, and/or weight based adjustment of the mA/kV was utilized to reduce the radiation dose to as low as reasonably achievable. COMPARISON: None available to me at the time of this interpretation. HISTORY: ORDERING SYSTEM PROVIDED HISTORY: ams TECHNOLOGIST PROVIDED HISTORY: Has a \"code stroke\" or \"stroke alert\" been called? ->No Reason for exam:->ams What reading provider will be dictating this exam?->CRC FINDINGS: This exam is slightly limited by beam-hardening artifacts. Given these: BRAIN/VENTRICLES: Negative for acute intracranial hemorrhage, other intra-/extra-axial fluid collection, or mass effect/midline shift. Minimal atherosclerotic calcification is scattered about the cavernous/supraclinoid segments of both internal carotid arteries. Gray/white matter differentiation appears grossly unremarkable, given technical artifacts. There is age-appropriate, generalized parenchymal volume loss. ORBITS: The right ocular lens has been previously surgically replaced. SINUSES: Trace to mild scattered paranasal sinus mucosal thickening is most notable within the ethmoid sinus.   Minimal hyperostosis is noted about the osseous margin of the left aspect of the sphenoid sinus, suggestive of chronic osteitis. The frontal sinus is hypoplastic, markedly on the right, and moderately on the left. There is moderate left, and mild right, mastoid air cell complex hypoplasia. Tiny probable cerumen is present within the right external auditory canal. SOFT TISSUES/SKULL: There is background diffuse osteopenia, with at least minimal upper cervical degenerative disease only incidentally demonstrated. .    1. Negative for acute intracranial process, within the limits of this non-contrast CT exam. RECOMMENDATION: 1. If unexplained symptoms persist, consider MRI of the brain. [facility] Chest Portable    Result Date: 12/30/2020  EXAMINATION: ONE XRAY VIEW OF THE CHEST 12/30/2020 8:15 am COMPARISON: 12/29/2020 HISTORY: ORDERING SYSTEM PROVIDED HISTORY: intubated TECHNOLOGIST PROVIDED HISTORY: Reason for exam:->intubated What reading provider will be dictating this exam?->CRC FINDINGS: The endotracheal tube and NG tube have been removed. The right internal jugular central venous catheter is unchanged in position. Multifocal bilateral airspace disease is noted, most prominent within the right lung base, unchanged when compared with the prior study. The heart is at the upper limits of normal in size. Multifocal bilateral airspace disease, unchanged when compared with the prior study. Xr Chest Portable    Result Date: 12/29/2020  EXAMINATION: ONE XRAY VIEW OF THE CHEST 12/29/2020 8:44 am COMPARISON: 12/28/2020 HISTORY: ORDERING SYSTEM PROVIDED HISTORY: intubated TECHNOLOGIST PROVIDED HISTORY: Reason for exam:->intubated What reading provider will be dictating this exam?->CRC FINDINGS: Bibasilar infiltrates and small effusions are unchanged. Lines/tubes are appropriate.   Heart size is at the upper limits of normal.    No interval change    Xr Chest Portable    Result Date: 12/28/2020  EXAMINATION: ONE XRAY VIEW OF THE CHEST 12/28/2020 12:35 pm pleural effusions. Heart size is enlarged. No pneumothorax. 1. Compatible atelectasis and/or pneumonia. Small bilateral pleural effusions. 2. Stable cardiomegaly. Xr Chest Portable    Result Date: 12/25/2020  EXAMINATION: ONE XRAY VIEW OF THE CHEST 12/25/2020 3:20 am COMPARISON: 12/24/2020 HISTORY: ORDERING SYSTEM PROVIDED HISTORY: intubated TECHNOLOGIST PROVIDED HISTORY: Reason for exam:->intubated FINDINGS: Depth of inspiration is not ideal.  There is bibasilar atelectasis/infiltrate but some clearing of the right basilar infiltrate and effusion since the prior study. Lines/tubes are appropriate. Some clearing of right basilar infiltrate and effusion since prior study    Xr Chest Portable    Result Date: 12/24/2020  EXAMINATION: ONE XRAY VIEW OF THE CHEST 12/24/2020 10:49 am COMPARISON: 12/23/2020 HISTORY: ORDERING SYSTEM PROVIDED HISTORY: s/p intubation TECHNOLOGIST PROVIDED HISTORY: Reason for exam:->s/p intubation What reading provider will be dictating this exam?->CRC FINDINGS: There has been significant elevation of the right hemidiaphragm and right lower lobe infiltrate and effusion. There is been placement of the ET and NG tubes which appear appropriate. There is suggestion of some left lower lobe infiltrate. Upper lobes are normal.    Bibasilar infiltrates and moderate right effusion have occurred    Xr Chest Portable    Result Date: 12/23/2020  EXAMINATION: ONE XRAY VIEW OF THE CHEST 12/23/2020 2:11 am COMPARISON: December 22, 2020 the. HISTORY: ORDERING SYSTEM PROVIDED HISTORY: check position of right sided CVC IJ TECHNOLOGIST PROVIDED HISTORY: Reason for exam:->check position of right sided CVC IJ What reading provider will be dictating this exam?->CRC FINDINGS: Interval right IJ central line placement with the tip projecting at the level of the upper portion of the right atrium. Cardiomediastinal silhouette is normal.  New densities in the right mid lung and left lung base.   No pneumothorax or pleural effusion. Osseous structures are unremarkable. 1.  Interval right IJ central line placement with the tip projecting at the level of the upper portion of the right atrium. 2.  New densities in the right mid lung and  left lung base which may represent atelectasis versus infiltrate. 3.  No pneumothorax. Xr Chest Portable    Result Date: 12/22/2020  EXAMINATION: ONE XRAY VIEW OF THE CHEST 12/22/2020 11:46 am COMPARISON: 22 October 2020 HISTORY: ORDERING SYSTEM PROVIDED HISTORY: ams TECHNOLOGIST PROVIDED HISTORY: Reason for exam:->ams What reading provider will be dictating this exam?->CRC FINDINGS: The lungs are without acute focal process. There is no effusion or pneumothorax. The cardiomediastinal silhouette is without acute process. The osseous structures are without acute process. No acute process. Xr Abdomen For Ng/og/ne Tube Placement    Result Date: 12/24/2020  EXAMINATION: ONE SUPINE XRAY VIEW(S) OF THE ABDOMEN 12/24/2020 1:24 pm COMPARISON: None. HISTORY: ORDERING SYSTEM PROVIDED HISTORY: Confirmation of course of NG/OG/NE tube and location of tip of tube TECHNOLOGIST PROVIDED HISTORY: Reason for exam:->Confirmation of course of NG/OG/NE tube and location of tip of tube Portable? ->Yes What reading provider will be dictating this exam?->CRC FINDINGS: There is satisfactory position of the NG tube within the stomach. 1. Satisfactory position of the NG tube within the stomach      Assessment and plan:  Principal Problem:    Acute metabolic encephalopathy  Active Problems:    Hyperlipidemia    Prostate cancer ([facility] [address]    Hypertension    Diabetes mellitus ([facility] [address]    DKA, type 1, not at goal [facility])    Acute pancreatitis    Acute renal failure (ARF) (HCC)    High anion gap metabolic acidosis  Resolved Problems:    * No resolved hospital problems. *      Impression  1. Ventilator dependent respiratory failure, status post extubation, in the setting of COVID-19 pneumonia  2.  Alcohol withdrawal, history of alcohol abuse  3. HHS/DKA status post treatment  4. Acute pancreatitis likely due to alcohol versus Covid, resolving  5. Acute metabolic encephalopathy in the setting of above  6. Acute kidney injury on chronic renal disease stage III, possible ATN due to hypotension, He is now requiring dialysis for his acute kidney injury. 7. New onset atrial fibrillation with RVR, [id number] score 3  8. Hypocalcemia due to vitamin D deficiency  9. Type 2 diabetes mellitus with severe hyperglycemia on presentation, hemoglobin A1c 11.1  10. Acute on chronic normocytic anemia likely anemia of chronic disease, no acute concern for blood loss. 11. History of hypertension  12. History of hyperlipidemia    Plan  · Continue COVID-19 treatment per infectious disease recommendation  · Continue Lantus and insulin sliding scale  · Continue current cardiac medication  · Echocardiogram pending  · Critical care infection disease following    DVT prophylaxis: Subcutaneous heparin  CODE STATUS: Patient is a full code  Discharge plan: Patient can be discharged next 3 to 4 days to home with and without home health.,  Pending clinical improvement, pending work-up and clearance by consulting services. Electronically signed by Vicenta Meléndez MD on 12/31/2020 at 1:20 PM  NOTE: This report was transcribed using voice recognition software. Every effort was made to ensure accuracy; however, inadvertent computerized transcription errors may be present.

## 2020-12-31 NOTE — PROGRESS NOTES
Physical Therapy    Physical Therapy Daily Treatment Note      Name: Leslee Rodriguez  : 1950  MRN: [id number]    Referring Provider:        Kaleb Owens MD         Date of Service: 2020    Evaluating PT:  Marquis Carrillo, PT, DPT [id number]     Room #:  8433/0319-B  Diagnosis:  DKA  PMHx/PSHx:  CA, DM, HLD, HTN, prostate CA  Procedure/Surgery:  Intubated ; Extubated    Precautions:  Falls, Droplet Plus isolation, COVID-19, O2  Equipment Needs:  TBD    SUBJECTIVE:    Pt lives with his girlfriend in a 2 story home with 5 stairs to enter and 1 rail. Bedroom is on the 2nd floor but pt states he can stay on the 1st floor and has full bathroom on 1st floor. Pt ambulated with no AD, independent, and drives PTA. OBJECTIVE:   Initial Evaluation  Date: 20 Treatment  20 Short Term/ Long Term   Goals   AM-PAC 6 Clicks  94/85    Was pt agreeable to Eval/treatment? Yes Yes    Does pt have pain? No c/o pain  No c/o pain     Bed Mobility  Rolling: Mod A  Supine to sit: Max A  Sit to supine: Max A  Scooting: Max A Rolling: Mod A  Supine to sit: Mod A x2  Sit to supine: Max A x2  Scooting: Max A Rolling: SBA  Supine to sit: SBA  Sit to supine: SBA  Scooting: SBA   Transfers Sit to stand: Max A x2  Stand to sit: Max A x2  Stand pivot: NT Sit to stand: Max A  Stand to sit: Max A  Stand pivot: NT Sit to stand: SBA  Stand to sit: SBA  Stand pivot: SBA with AAD   Ambulation    Few side steps with [employer] Max A x2 Few side steps with [employer] Max A    Limited by tachycardia  >75 feet with AAD Min A   Stair negotiation: ascended and descended  NT NT >2 steps with 2 rail Min A   ROM BUE:  Per OT  BLE:  WFL WFL    Strength BUE:  Per OT  BLE:  Grossly 4+/5  WFL    Balance Sitting EOB:  SBA  Dynamic Standing: Max A x2 Sitting EOB:  SBA  Dynamic Standing:   Max A with [employer] Sitting EOB:  Independent   Dynamic Standing:  SBA     Pt is A & O x self and location   Sensation:  Pt denies numbness and tingling to upright tolerance, postural awareness and BLE ROM   · Lower extremity therapeutic exercises    · STS training and side stepping at EOB to progress gait tolerance - pt educated on proper hand and foot placement, safety and sequencing, and use of WW to safely complete sit<>stand and side stepping along EOB with hands on assistance to complete task safely   ·  Skilled positioning - Pt placed in the chair position with pillows utilized to facilitate upright posture, joint and skin integrity, and interaction with environment. · Non-pharmacological treatment and prevention of ICU delirium - Pt oriented to date, time, time of day, place, and situation as well as provided with visual and auditory stimuli in order to improve cognition and combat effects of ICU delirium. PLAN:  Pt is making fair progress towards established goals. Continue PT POC.        Time in  0750  Time out  0825    Total Treatment Time  35 minutes       CPT codes:  [] Low Complexity PT evaluation 69878  [] Moderate Complexity PT evaluation 84146  [] High Complexity PT evaluation 93976  [] PT Re-evaluation 14737  [] Gait training 69725 -- minutes  [] Manual therapy 98725 -- minutes  [x] Therapeutic activities 89975 35 minutes  [] Therapeutic exercises 52177 -- minutes  [] Neuromuscular reeducation 53466 -- minutes     Arn Mortimer, PT, DPT  [id number]

## 2020-12-31 NOTE — FLOWSHEET NOTE
12/31/20 1705   Vital Signs   /73   Temp 96.3 °F (35.7 °C)   Pulse 136   Resp 24   SpO2 100 %   Weight 249 lb 1.9 oz (113 kg)   Weight Method Bed scale   Percent Weight Change 0.89   Post-Hemodialysis Assessment   Post-Treatment Procedures Blood returned;Catheter capped, clamped with Citrate x 2 ports   Machine Disinfection Process Acid/Vinegar Clean;Heat Disinfect; Exterior Machine Disinfection   Rinseback Volume (ml) 300 ml   Total Liters Processed (l/min) 68.4 l/min   Dialyzer Clearance Lightly streaked   Duration of Treatment (minutes) 240 minutes   Hemodialysis Intake (ml) 900 ml   Hemodialysis Output (ml) 0 ml   NET Removed (ml) 0 ml   Tolerated Treatment Fair   Patient Response to Treatment [place]. tx fair, tachycardic throught tx. BP stable upon dc   Bilateral Breath Sounds Diminished   Edema Generalized;Right upper extremity; Left upper extremity; Left lower extremity;Right lower extremity   Edema Generalized +2   RUE Edema +2   LUE Edema +2   RLE Edema +2   Physician Notified?  Yes

## 2021-01-01 VITALS
RESPIRATION RATE: 26 BRPM | HEIGHT: 74 IN | HEART RATE: 104 BPM | WEIGHT: 249.12 LBS | OXYGEN SATURATION: 98 % | TEMPERATURE: 98.1 F | DIASTOLIC BLOOD PRESSURE: 95 MMHG | BODY MASS INDEX: 31.97 KG/M2 | SYSTOLIC BLOOD PRESSURE: 129 MMHG

## 2021-01-01 LAB
ANION GAP SERPL CALCULATED.3IONS-SCNC: 15 MMOL/L (ref 7–16)
ANION GAP SERPL CALCULATED.3IONS-SCNC: 17 MMOL/L (ref 7–16)
ANION GAP SERPL CALCULATED.3IONS-SCNC: 18 MMOL/L (ref 7–16)
ANTI-XA LMW HEPARIN: 0.56 U/ML (ref 0.5–1.1)
BASOPHILS ABSOLUTE: 0.01 E9/L (ref 0–0.2)
BASOPHILS RELATIVE PERCENT: 0.1 % (ref 0–2)
BUN BLDV-MCNC: 60 MG/DL (ref 8–23)
BUN BLDV-MCNC: 64 MG/DL (ref 8–23)
BUN BLDV-MCNC: 67 MG/DL (ref 8–23)
C-REACTIVE PROTEIN: 5.1 MG/DL (ref 0–0.4)
CALCIUM IONIZED: 1.17 MMOL/L (ref 1.15–1.33)
CALCIUM SERPL-MCNC: 8.3 MG/DL (ref 8.6–10.2)
CALCIUM SERPL-MCNC: 8.4 MG/DL (ref 8.6–10.2)
CALCIUM SERPL-MCNC: 8.5 MG/DL (ref 8.6–10.2)
CHLORIDE BLD-SCNC: 100 MMOL/L (ref 98–107)
CHLORIDE BLD-SCNC: 101 MMOL/L (ref 98–107)
CHLORIDE BLD-SCNC: 97 MMOL/L (ref 98–107)
CO2: 21 MMOL/L (ref 22–29)
CO2: 22 MMOL/L (ref 22–29)
CO2: 23 MMOL/L (ref 22–29)
CREAT SERPL-MCNC: 4.9 MG/DL (ref 0.7–1.2)
CREAT SERPL-MCNC: 5.2 MG/DL (ref 0.7–1.2)
CREAT SERPL-MCNC: 5.3 MG/DL (ref 0.7–1.2)
D DIMER: 4957 NG/ML DDU
EOSINOPHILS ABSOLUTE: 0 E9/L (ref 0.05–0.5)
EOSINOPHILS RELATIVE PERCENT: 0 % (ref 0–6)
FERRITIN: 1886 NG/ML
GFR AFRICAN AMERICAN: 13
GFR AFRICAN AMERICAN: 13
GFR AFRICAN AMERICAN: 14
GFR NON-AFRICAN AMERICAN: 13 ML/MIN/1.73
GFR NON-AFRICAN AMERICAN: 13 ML/MIN/1.73
GFR NON-AFRICAN AMERICAN: 14 ML/MIN/1.73
GLUCOSE BLD-MCNC: 151 MG/DL (ref 74–99)
GLUCOSE BLD-MCNC: 180 MG/DL (ref 74–99)
GLUCOSE BLD-MCNC: 185 MG/DL (ref 74–99)
HCT VFR BLD CALC: 24.8 % (ref 37–54)
HCT VFR BLD CALC: 25.2 % (ref 37–54)
HCT VFR BLD CALC: 25.5 % (ref 37–54)
HEMOGLOBIN: 8.2 G/DL (ref 12.5–16.5)
HEMOGLOBIN: 8.4 G/DL (ref 12.5–16.5)
HEMOGLOBIN: 8.5 G/DL (ref 12.5–16.5)
IMMATURE GRANULOCYTES #: 0.05 E9/L
IMMATURE GRANULOCYTES %: 0.7 % (ref 0–5)
INR BLD: 1.5
LYMPHOCYTES ABSOLUTE: 0.75 E9/L (ref 1.5–4)
LYMPHOCYTES RELATIVE PERCENT: 10.1 % (ref 20–42)
MAGNESIUM: 2.1 MG/DL (ref 1.6–2.6)
MAGNESIUM: 2.2 MG/DL (ref 1.6–2.6)
MAGNESIUM: 2.3 MG/DL (ref 1.6–2.6)
MCH RBC QN AUTO: 28.7 PG (ref 26–35)
MCHC RBC AUTO-ENTMCNC: 33.7 % (ref 32–34.5)
MCV RBC AUTO: 85.1 FL (ref 80–99.9)
METER GLUCOSE: 111 MG/DL (ref 74–99)
METER GLUCOSE: 137 MG/DL (ref 74–99)
METER GLUCOSE: 151 MG/DL (ref 74–99)
METER GLUCOSE: 175 MG/DL (ref 74–99)
METER GLUCOSE: 184 MG/DL (ref 74–99)
MONOCYTES ABSOLUTE: 0.45 E9/L (ref 0.1–0.95)
MONOCYTES RELATIVE PERCENT: 6.1 % (ref 2–12)
NEUTROPHILS ABSOLUTE: 6.13 E9/L (ref 1.8–7.3)
NEUTROPHILS RELATIVE PERCENT: 83 % (ref 43–80)
PDW BLD-RTO: 15 FL (ref 11.5–15)
PHOSPHORUS: 7.9 MG/DL (ref 2.5–4.5)
PHOSPHORUS: 8.2 MG/DL (ref 2.5–4.5)
PHOSPHORUS: 8.9 MG/DL (ref 2.5–4.5)
PLATELET # BLD: 130 E9/L (ref 130–450)
PMV BLD AUTO: 12.5 FL (ref 7–12)
POTASSIUM SERPL-SCNC: 4 MMOL/L (ref 3.5–5)
POTASSIUM SERPL-SCNC: 4 MMOL/L (ref 3.5–5)
POTASSIUM SERPL-SCNC: 4.3 MMOL/L (ref 3.5–5)
PROTHROMBIN TIME: 17.6 SEC (ref 9.3–12.4)
RBC # BLD: 2.96 E12/L (ref 3.8–5.8)
SEDIMENTATION RATE, ERYTHROCYTE: 55 MM/HR (ref 0–15)
SODIUM BLD-SCNC: 136 MMOL/L (ref 132–146)
SODIUM BLD-SCNC: 138 MMOL/L (ref 132–146)
SODIUM BLD-SCNC: 140 MMOL/L (ref 132–146)
WBC # BLD: 7.4 E9/L (ref 4.5–11.5)

## 2021-01-01 PROCEDURE — 82962 GLUCOSE BLOOD TEST: CPT

## 2021-01-01 PROCEDURE — 99233 SBSQ HOSP IP/OBS HIGH 50: CPT | Performed by: INTERNAL MEDICINE

## 2021-01-01 PROCEDURE — 85018 HEMOGLOBIN: CPT

## 2021-01-01 PROCEDURE — 86140 C-REACTIVE PROTEIN: CPT

## 2021-01-01 PROCEDURE — 2580000003 HC RX 258: Performed by: INTERNAL MEDICINE

## 2021-01-01 PROCEDURE — 85025 COMPLETE CBC W/AUTO DIFF WBC: CPT

## 2021-01-01 PROCEDURE — 2500000003 HC RX 250 WO HCPCS: Performed by: INTERNAL MEDICINE

## 2021-01-01 PROCEDURE — 6370000000 HC RX 637 (ALT 250 FOR IP): Performed by: INTERNAL MEDICINE

## 2021-01-01 PROCEDURE — 2700000000 HC OXYGEN THERAPY PER DAY

## 2021-01-01 PROCEDURE — 85014 HEMATOCRIT: CPT

## 2021-01-01 PROCEDURE — C9113 INJ PANTOPRAZOLE SODIUM, VIA: HCPCS | Performed by: INTERNAL MEDICINE

## 2021-01-01 PROCEDURE — 6360000002 HC RX W HCPCS: Performed by: INTERNAL MEDICINE

## 2021-01-01 PROCEDURE — 80048 BASIC METABOLIC PNL TOTAL CA: CPT

## 2021-01-01 PROCEDURE — 82330 ASSAY OF CALCIUM: CPT

## 2021-01-01 PROCEDURE — 85651 RBC SED RATE NONAUTOMATED: CPT

## 2021-01-01 PROCEDURE — 36415 COLL VENOUS BLD VENIPUNCTURE: CPT

## 2021-01-01 PROCEDURE — 83735 ASSAY OF MAGNESIUM: CPT

## 2021-01-01 PROCEDURE — 85610 PROTHROMBIN TIME: CPT

## 2021-01-01 PROCEDURE — 85378 FIBRIN DEGRADE SEMIQUANT: CPT

## 2021-01-01 PROCEDURE — P9016 RBC LEUKOCYTES REDUCED: HCPCS

## 2021-01-01 PROCEDURE — 36430 TRANSFUSION BLD/BLD COMPNT: CPT

## 2021-01-01 PROCEDURE — 82728 ASSAY OF FERRITIN: CPT

## 2021-01-01 PROCEDURE — 84100 ASSAY OF PHOSPHORUS: CPT

## 2021-01-01 RX ORDER — POLYETHYLENE GLYCOL 3350 17 G/17G
17 POWDER, FOR SOLUTION ORAL DAILY PRN
Qty: 527 G | Refills: 1 | Status: SHIPPED | OUTPATIENT
Start: 2021-01-01 | End: 2021-01-31

## 2021-01-01 RX ORDER — PANTOPRAZOLE SODIUM 40 MG/1
40 TABLET, DELAYED RELEASE ORAL
Qty: 30 TABLET | Refills: 3 | Status: SHIPPED | OUTPATIENT
Start: 2021-01-01 | End: 2021-06-11

## 2021-01-01 RX ORDER — INSULIN GLARGINE 100 [IU]/ML
25 INJECTION, SOLUTION SUBCUTANEOUS NIGHTLY
Qty: 1 VIAL | Refills: 3 | Status: ON HOLD | OUTPATIENT
Start: 2021-01-01 | End: 2021-03-01 | Stop reason: HOSPADM

## 2021-01-01 RX ORDER — ASCORBIC ACID 500 MG
500 TABLET ORAL DAILY
Qty: 30 TABLET | Refills: 3 | Status: SHIPPED | OUTPATIENT
Start: 2021-01-02 | End: 2021-06-22

## 2021-01-01 RX ORDER — DEXAMETHASONE SODIUM PHOSPHATE 4 MG/ML
6 INJECTION, SOLUTION INTRA-ARTICULAR; INTRALESIONAL; INTRAMUSCULAR; INTRAVENOUS; SOFT TISSUE EVERY 24 HOURS
Qty: 7.5 ML | Refills: 0 | Status: SHIPPED | OUTPATIENT
Start: 2021-01-02 | End: 2021-01-07

## 2021-01-01 RX ORDER — QUETIAPINE FUMARATE 50 MG/1
50 TABLET, FILM COATED ORAL 2 TIMES DAILY
Qty: 60 TABLET | Refills: 3 | Status: ON HOLD | OUTPATIENT
Start: 2021-01-01 | End: 2021-01-16 | Stop reason: HOSPADM

## 2021-01-01 RX ORDER — BUMETANIDE 0.25 MG/ML
2 INJECTION, SOLUTION INTRAMUSCULAR; INTRAVENOUS ONCE
Status: COMPLETED | OUTPATIENT
Start: 2021-01-01 | End: 2021-01-01

## 2021-01-01 RX ORDER — ZINC SULFATE 50(220)MG
50 CAPSULE ORAL DAILY
Qty: 30 CAPSULE | Refills: 3 | COMMUNITY
Start: 2021-01-02 | End: 2021-06-11

## 2021-01-01 RX ORDER — CHOLECALCIFEROL (VITAMIN D3) 50 MCG
2000 TABLET ORAL DAILY
Qty: 30 TABLET | Refills: 0 | Status: SHIPPED | OUTPATIENT
Start: 2021-01-02 | End: 2021-06-22

## 2021-01-01 RX ORDER — METOPROLOL TARTRATE 50 MG/1
50 TABLET, FILM COATED ORAL 2 TIMES DAILY
Status: DISCONTINUED | OUTPATIENT
Start: 2021-01-01 | End: 2021-01-01 | Stop reason: HOSPADM

## 2021-01-01 RX ORDER — METOPROLOL TARTRATE 5 MG/5ML
2.5 INJECTION INTRAVENOUS EVERY 6 HOURS PRN
Status: DISCONTINUED | OUTPATIENT
Start: 2021-01-01 | End: 2021-01-01 | Stop reason: HOSPADM

## 2021-01-01 RX ADMIN — INSULIN LISPRO 2 UNITS: 100 INJECTION, SOLUTION INTRAVENOUS; SUBCUTANEOUS at 12:34

## 2021-01-01 RX ADMIN — PANTOPRAZOLE SODIUM 40 MG: 40 INJECTION, POWDER, FOR SOLUTION INTRAVENOUS at 09:24

## 2021-01-01 RX ADMIN — QUETIAPINE FUMARATE 50 MG: 25 TABLET ORAL at 09:24

## 2021-01-01 RX ADMIN — THIAMINE HYDROCHLORIDE 100 MG: 100 INJECTION, SOLUTION INTRAMUSCULAR; INTRAVENOUS at 09:25

## 2021-01-01 RX ADMIN — METOPROLOL TARTRATE 5 MG: 5 INJECTION INTRAVENOUS at 01:46

## 2021-01-01 RX ADMIN — Medication 10 ML: at 09:25

## 2021-01-01 RX ADMIN — METOPROLOL TARTRATE 5 MG: 5 INJECTION INTRAVENOUS at 09:24

## 2021-01-01 RX ADMIN — Medication 2000 UNITS: at 09:24

## 2021-01-01 RX ADMIN — Medication 50 MG: at 09:25

## 2021-01-01 RX ADMIN — DEXAMETHASONE SODIUM PHOSPHATE 6 MG: 4 INJECTION, SOLUTION INTRAMUSCULAR; INTRAVENOUS at 13:33

## 2021-01-01 RX ADMIN — SODIUM CHLORIDE, PRESERVATIVE FREE 10 ML: 5 INJECTION INTRAVENOUS at 12:18

## 2021-01-01 RX ADMIN — OXYCODONE HYDROCHLORIDE AND ACETAMINOPHEN 500 MG: 500 TABLET ORAL at 09:24

## 2021-01-01 RX ADMIN — DEXTROSE AND SODIUM CHLORIDE: 5; 450 INJECTION, SOLUTION INTRAVENOUS at 09:25

## 2021-01-01 RX ADMIN — INSULIN LISPRO 2 UNITS: 100 INJECTION, SOLUTION INTRAVENOUS; SUBCUTANEOUS at 04:24

## 2021-01-01 RX ADMIN — FOLIC ACID 1 MG: 5 INJECTION, SOLUTION INTRAMUSCULAR; INTRAVENOUS; SUBCUTANEOUS at 09:24

## 2021-01-01 RX ADMIN — BUMETANIDE 2 MG: 0.25 INJECTION, SOLUTION INTRAMUSCULAR; INTRAVENOUS at 14:32

## 2021-01-01 RX ADMIN — SODIUM CHLORIDE 0.5 MCG/KG/HR: 9 INJECTION, SOLUTION INTRAVENOUS at 05:53

## 2021-01-01 RX ADMIN — SODIUM CHLORIDE, PRESERVATIVE FREE 10 ML: 5 INJECTION INTRAVENOUS at 09:25

## 2021-01-01 RX ADMIN — Medication 10 ML: at 09:03

## 2021-01-01 NOTE — DISCHARGE INSTR - COC
Surface Area (cm^2) 0.09 cm^2 20 0927   Wound Assessment Purple/maroon 21 1600   Drainage Amount None 21 1600   Shaina-wound Assessment Intact 21 1600   Number of days: 3        Elimination:  Continence:   · Bowel: {YES / PZ:81083}  · Bladder: {YES / SM:17595}  Urinary Catheter: {Urinary Catheter:299496489}   Colostomy/Ileostomy/Ileal Conduit: {YES / RL:97603}       Date of Last BM: ***    Intake/Output Summary (Last 24 hours) at 2021 1622  Last data filed at 2021 1500  Gross per 24 hour   Intake 3448 ml   Output 1295 ml   Net 2153 ml     I/O last 3 completed shifts: In: 8621 [I.V.:1718;  Blood:650; NG/GT:240]  Out: 2604 [Urine:1345]    Safety Concerns:     [employer] Safety Concerns:536019420}    Impairments/Disabilities:      [employer] Impairments/Disabilities:947643399}    Nutrition Therapy:  Current Nutrition Therapy:   [employer] Diet List:415462965}    Routes of Feeding: {CHP DME Other Feedings:418084302}  Liquids: {Slp liquid thickness:35817}  Daily Fluid Restriction: {CHP DME Yes amt example:657891730}  Last Modified Barium Swallow with Video (Video Swallowing Test): {Done Not Done CJHD:907112086}    Treatments at the Time of Hospital Discharge:   Respiratory Treatments: ***  Oxygen Therapy:  {Therapy; copd oxygen:28654}  Ventilator:    { CC Vent TTOM:838736733}    Rehab Therapies: {THERAPEUTIC INTERVENTION:2190218400}  Weight Bearing Status/Restrictions: [employer] Weight Bearin}  Other Medical Equipment (for information only, NOT a DME order):  {EQUIPMENT:134385393}  Other Treatments: ***    Patient's personal belongings (please select all that are sent with patient):  {P DME Belongings:892716500}    RN SIGNATURE:  {Esignature:681910394}    CASE MANAGEMENT/SOCIAL WORK SECTION    Inpatient Status Date: ***    Readmission Risk Assessment Score:  Readmission Risk              Risk of Unplanned Readmission:        37           Discharging to Facility/ Agency   · Name: · Address:  · Phone:  · Fax:    Dialysis Facility (if applicable)   · Name:  · Address:  · Dialysis Schedule:  · Phone:  · Fax:    / signature: {Esignature:217692741}    PHYSICIAN SECTION    Prognosis: Guarded    Condition at Discharge: Stable    Rehab Potential (if transferring to Rehab): Fair    Recommended Labs or Other Treatments After Discharge: ***    Physician Certification: I certify the above information and transfer of Days J Kevin  is necessary for the continuing treatment of the diagnosis listed and that he requires {Admit to Appropriate Level of Care:37823} for {GREATER/LESS:980725873} 30 days.      Update Admission H&P: {CHP DME Changes in [facility]:277935577}    PHYSICIAN SIGNATURE:  {Esignature:087282876}

## 2021-01-01 NOTE — PROGRESS NOTES
Spoke with domestic partner, Aren Nichole, updated to approximate D/C place & time. States she does not want him leaving to [facility], does not drive & location is too far from where there reside currently,  requested he stay here on rehab floor. Will update all to POC.

## 2021-01-01 NOTE — PLAN OF CARE
Problem: Restraint Use - Nonviolent/Non-Self-Destructive Behavior:  Goal: Absence of restraint-related injury  1/1/2021 0727 by Ambreen Woo RN  Outcome: Met This Shift     Problem: Nutrition Deficits  Goal: Optimize nutrtional status  12/31/2020 2123 by Nilson Neely RN  Outcome: Ongoing     Problem: Restraint Use - Nonviolent/Non-Self-Destructive Behavior:  Goal: Absence of restraint indications  1/1/2021 0727 by Juan Manuel Monroe RN  Outcome: Not Met This Shift

## 2021-01-01 NOTE — PROGRESS NOTES
[facility] transport patient en route to [facility]. Tiffanie Cerda received report about an hour ago. Domestic partner \"Yamileth\" aware of all.

## 2021-01-01 NOTE — DISCHARGE SUMMARY
Hospitalist Discharge Summary    Patient ID:  Andre Chaparro  [id number]  1 y.o.  1950    Admit date: 12/22/2020    Discharge date: 1/1/2021    Disposition: Back to home    Admission Diagnoses:   Patient Active Problem List   Diagnosis    Hyperlipidemia    Prostate cancer ([facility]    Hypertension    Diabetes mellitus ([facility]    Shoulder pain, bilateral    Abdominal pain, right lower quadrant    DKA, type 1, not at goal [facility])    Acute metabolic encephalopathy    Acute pancreatitis    Acute renal failure (ARF) (HCC)    High anion gap metabolic acidosis       Discharge Diagnoses: Principal Problem:    Acute metabolic encephalopathy  Active Problems:    Hyperlipidemia    [facility] ([facility]    Hypertension    Diabetes mellitus ([facility]    DKA, type 1, not at goal [facility])    Acute pancreatitis    Acute renal failure (ARF) (HCC)    High anion gap metabolic acidosis  Resolved Problems:    * No resolved hospital problems. *      Code Status:  Full Code    Condition:  Stable     Discharge Diet: Diet:  DIET TUBE FEED CONTINUOUS/CYCLIC NPO; Diabetic 1.5; Nasogastric; 10; 48      Hospital Course:   Patient is a 22-year-old male with past medical history of anxiety, depression, diabetes with neuropathy, HLD, HTN, history of lung and prostate neoplasm. Patient presented by EMS from home for AMS, for unknown amount of time. Patient was found to have Covid positive, glucose 1332, pH 6.96, PCO2 19, bicarb 4, sodium 123, potassium 7.1, chloride 91, BUN 65, creatinine 5.7 (baseline 1.4), phosphate 13, magnesium 4.5, lactic acid 11.8, lipase 3000. EKG NSR with first-degree AV block. Chest x-ray with right sided infiltrate. CT abdomen with acute pancreatic inflammatory changes in the small bowel with ascites. CT cervical spine showing DJD with narrowing C2-3, C4-5. CT head unremarkable. He is now requiring dialysis for his acute kidney injury. Impression  1.  Ventilator dependent respiratory failure, status post extubation, in the setting of COVID-19 pneumonia  2. Alcohol withdrawal, history of alcohol abuse  3. Acute pancreatitis likely due to alcohol versus Covid, resolving  4. HHS/DKA status post treatment,  Type 2 diabetes mellitus with severe hyperglycemia on presentation, hemoglobin A1c 11.1  5. Acute kidney injury on chronic renal disease stage III, possible ATN due to hypotension, He is now requiring dialysis for his acute kidney injury. 6. New onset atrial fibrillation with RVR, [id number] score 3  7. Hypocalcemia due to vitamin D deficiency  8. Acute metabolic encephalopathy in the setting of above  9. Acute on chronic normocytic anemia likely anemia of chronic disease, no acute concern for blood loss. 10. History of hypertension  11. History of hyperlipidemia  12.  Patient is a full code      Plan  · Patient is status post extubation and respiratory status is stable, still requiring oxygen  · Patient kidney function pancreatitis clinically improved  · Continue Lantus  · Continue current cardiac medication  · Patient is being transferred to [address] [place] [address]      Discharge Medications:   Current Discharge Medication List      START taking these medications    Details   dexamethasone (DECADRON) 4 MG/ML injection Infuse 1.5 mLs intravenously every 24 hours for 5 days  Qty: 7.5 mL, Refills: 0      vitamin D (CHOLECALCIFEROL) 50 MCG (2000 UT) TABS tablet Take 1 tablet by mouth daily  Qty: 30 tablet, Refills: 0      ascorbic acid (VITAMIN C) 500 MG tablet Take 1 tablet by mouth daily  Qty: 30 tablet, Refills: 3      zinc sulfate (ZINCATE) 220 (50 Zn) MG capsule Take 1 capsule by mouth daily  Qty: 30 capsule, Refills: 3      polyethylene glycol (GLYCOLAX) 17 g packet Take 17 g by mouth daily as needed for Constipation  Qty: 527 g, Refills: 1      sennosides (SENOKOT) 8.8 MG/5ML syrup Take 5 mLs by mouth 2 times daily as needed (Constipation)  Qty: 1 Bottle, Refills: 0      metoprolol tartrate (LOPRESSOR) 25 MG tablet Take 1 tablet by mouth 2 times daily  Qty: 60 tablet, Refills: 3      QUEtiapine (SEROQUEL) 50 MG tablet Take 1 tablet by mouth 2 times daily  Qty: 60 tablet, Refills: 3      insulin glargine (LANTUS) 100 UNIT/ML injection vial Inject 25 Units into the skin nightly  Qty: 1 vial, Refills: 3      apixaban (ELIQUIS) 5 MG TABS tablet Take 1 tablet by mouth 2 times daily  Qty: 60 tablet, Refills: 0      pantoprazole (PROTONIX) 40 MG tablet Take 1 tablet by mouth daily (with breakfast)  Qty: 30 tablet, Refills: 3           Current Discharge Medication List        Current Discharge Medication List      CONTINUE these medications which have NOT CHANGED    Details   simvastatin (ZOCOR) 20 MG tablet Take 20 mg by mouth nightly. Current Discharge Medication List      STOP taking these medications       naproxen (NAPROSYN) 500 MG tablet Comments:   Reason for Stopping:         famotidine (PEPCID) 20 MG tablet Comments:   Reason for Stopping:         ibuprofen (IBU) 600 MG tablet Comments:   Reason for Stopping:         gabapentin (NEURONTIN) 300 MG capsule Comments:   Reason for Stopping:         aspirin EC 81 MG EC tablet Comments:   Reason for Stopping:         metformin (GLUCOPHAGE) 1000 MG tablet Comments:   Reason for Stopping:         lisinopril (PRINIVIL;ZESTRIL) 10 MG tablet Comments:   Reason for Stopping:                     Assessment on Discharge: Stable, improved       Discharge Exam:  BP (!) 117/90   Pulse 115   Temp 97.9 °F (36.6 °C) (Bladder)   Resp 19   Ht 6' 2\" (1.88 m)   Wt 249 lb 1.9 oz (113 kg)   SpO2 98%   BMI 31.99 kg/m²     Due to the current efforts to prevent transmission of COVID-19 and also the need to preserve PPE for other caregivers, a face-to-face encounter with the patient was not performed. Patient was visualized at bedside    That being said, all relevant records and diagnostic tests were reviewed, including laboratory results and imaging.   Plan discussed with nursing staff        Pertinent Studies During Hospital Stay:  Radiology:  Ct Abdomen Pelvis Wo Contrast Additional Contrast? None    Result Date: 12/22/2020  EXAMINATION: CT OF THE ABDOMEN AND PELVIS WITHOUT CONTRAST 12/22/2020 3:17 pm TECHNIQUE: CT of the abdomen and pelvis was performed without the administration of intravenous contrast. Multiplanar reformatted images are provided for review. Dose modulation, iterative reconstruction, and/or weight based adjustment of the mA/kV was utilized to reduce the radiation dose to as low as reasonably achievable. COMPARISON: September 14, 2019 HISTORY: ORDERING SYSTEM PROVIDED HISTORY: DKA, acute pancreatitis TECHNOLOGIST PROVIDED HISTORY: Reason for exam:->DKA, acute pancreatitis Additional Contrast?->None What reading provider will be dictating this exam?->CRC FINDINGS: The lung bases demonstrate COPD with bronchiectasis. There is minimal atelectasis/infiltrates in the lung bases concerning for pneumonia. There is coronary artery calcification. The liver is fatty infiltrated. Gallbladder is partially distended with mild wall thickening. Consider ultrasonography. Spleen appears normal.  There is diffuse inflammatory changes and edema surrounding the pancreas with inflammatory changes extending to the root of the mesentery to involve the duodenum. Inflammatory fluid is identified in the Morison's pouch with inflammation extending to the ascending colon which is thickened. There is inflammatory stranding densities in the mesentery and fluid in the splenic hilum. Degenerative changes are identified in the lumbar spine. Pelvis. The bladder is contracted with the Jolly catheter and wall thickening. There is diverticulosis of colon without diverticulitis.     Significant inflammatory changes in the upper abdomen with the epicenter surrounding the pancreas concerning for acute pancreatitis with inflammation and inflammatory fluid extending and involving the root of the mesentery, duodenum and ascending colon resulting in enteritis/colitis. Small amount of inflammatory ascites is also present. Surveillance recommended. Atelectasis/infiltrates lung bases concerning for pneumonia. Ct Head Wo Contrast    Result Date: 12/22/2020  EXAMINATION: CT OF THE HEAD WITHOUT CONTRAST  12/22/2020 3:17 pm TECHNIQUE: CT of the head was performed without the administration of intravenous contrast. Dose modulation, iterative reconstruction, and/or weight based adjustment of the mA/kV was utilized to reduce the radiation dose to as low as reasonably achievable. COMPARISON: None available to me at the time of this interpretation. HISTORY: ORDERING SYSTEM PROVIDED HISTORY: ams TECHNOLOGIST PROVIDED HISTORY: Has a \"code stroke\" or \"stroke alert\" been called? ->No Reason for exam:->ams What reading provider will be dictating this exam?->CRC FINDINGS: This exam is slightly limited by beam-hardening artifacts. Given these: BRAIN/VENTRICLES: Negative for acute intracranial hemorrhage, other intra-/extra-axial fluid collection, or mass effect/midline shift. Minimal atherosclerotic calcification is scattered about the cavernous/supraclinoid segments of both internal carotid arteries. Gray/white matter differentiation appears grossly unremarkable, given technical artifacts. There is age-appropriate, generalized parenchymal volume loss. ORBITS: The right ocular lens has been previously surgically replaced. SINUSES: Trace to mild scattered paranasal sinus mucosal thickening is most notable within the ethmoid sinus. Minimal hyperostosis is noted about the osseous margin of the left aspect of the sphenoid sinus, suggestive of chronic osteitis. The frontal sinus is hypoplastic, markedly on the right, and moderately on the left. There is moderate left, and mild right, mastoid air cell complex hypoplasia.   Tiny probable cerumen is present within the right external auditory canal. SOFT TISSUES/SKULL: There is background diffuse osteopenia, with at least minimal upper cervical degenerative disease only incidentally demonstrated. .    1. Negative for acute intracranial process, within the limits of this non-contrast CT exam. RECOMMENDATION: 1. If unexplained symptoms persist, consider MRI of the brain. Chiki Vegan Chest Portable    Result Date: 12/30/2020  EXAMINATION: ONE XRAY VIEW OF THE CHEST 12/30/2020 8:15 am COMPARISON: 12/29/2020 HISTORY: ORDERING SYSTEM PROVIDED HISTORY: intubated TECHNOLOGIST PROVIDED HISTORY: Reason for exam:->intubated What reading provider will be dictating this exam?->CRC FINDINGS: The endotracheal tube and NG tube have been removed. The right internal jugular central venous catheter is unchanged in position. Multifocal bilateral airspace disease is noted, most prominent within the right lung base, unchanged when compared with the prior study. The heart is at the upper limits of normal in size. Multifocal bilateral airspace disease, unchanged when compared with the prior study. Xr Chest Portable    Result Date: 12/29/2020  EXAMINATION: ONE XRAY VIEW OF THE CHEST 12/29/2020 8:44 am COMPARISON: 12/28/2020 HISTORY: ORDERING SYSTEM PROVIDED HISTORY: intubated TECHNOLOGIST PROVIDED HISTORY: Reason for exam:->intubated What reading provider will be dictating this exam?->CRC FINDINGS: Bibasilar infiltrates and small effusions are unchanged. Lines/tubes are appropriate. Heart size is at the upper limits of normal.    No interval change    Xr Chest Portable    Result Date: 12/28/2020  EXAMINATION: ONE XRAY VIEW OF THE CHEST 12/28/2020 12:35 pm COMPARISON: None. HISTORY: ORDERING SYSTEM PROVIDED HISTORY: intubated TECHNOLOGIST PROVIDED HISTORY: Reason for exam:->intubated What reading provider will be dictating this exam?->CRC FINDINGS: There is no change in position of the support lines and tubes. There are patchy infiltrates seen within the right lung base and within the left lower lobe.     1. No interval change in the multifocal bilateral lower lobe infiltrates. 2. Stable position of support lines and tubes. Xr Chest Portable    Result Date: 12/27/2020  EXAMINATION: ONE XRAY VIEW OF THE CHEST 12/27/2020 7:55 am COMPARISON: 12/26/2020 HISTORY: ORDERING SYSTEM PROVIDED HISTORY: intubated TECHNOLOGIST PROVIDED HISTORY: Reason for exam:->intubated What reading provider will be dictating this exam?->CRC FINDINGS: Pulmonary opacities in the lower lungs bilaterally are stable to mildly increased as of yesterday. The cardiomediastinal contour is mildly prominent. No pneumothorax is seen. Small pleural effusions cannot be excluded. The endotracheal tube is well positioned, with the tip approximately 5 cm above the jian. The tip of the nasogastric tube overlies the stomach. The right internal jugular vein central venous catheter is well positioned, with the tip overlying the cavoatrial junction. 1. Stable to mildly increased bibasilar pulmonary opacities which may represent atelectasis or pneumonia. 2.  The life-support lines and tubes are well positioned. Xr Chest Portable    Result Date: 12/26/2020  EXAMINATION: ONE XRAY VIEW OF THE CHEST 12/26/2020 7:59 pm COMPARISON: 12/25/2020 7:04 a.m. HISTORY: ORDERING SYSTEM PROVIDED HISTORY: intubated TECHNOLOGIST PROVIDED HISTORY: Reason for exam:->intubated What reading provider will be dictating this exam?->CRC FINDINGS: Monitor wires project over the chest.  Rotated chest.  ETT tip 3 cm above the jian. Right IJ catheter tip in the right atrium. Hypoinflated lungs. Bibasilar opacities. Bilateral pleural effusions. Heart size is enlarged. No pneumothorax. 1. Compatible atelectasis and/or pneumonia. Small bilateral pleural effusions. 2. Stable cardiomegaly.     Xr Chest Portable    Result Date: 12/25/2020  EXAMINATION: ONE XRAY VIEW OF THE CHEST 12/25/2020 3:20 am COMPARISON: 12/24/2020 HISTORY: ORDERING SYSTEM PROVIDED HISTORY: intubated TECHNOLOGIST PROVIDED HISTORY: Reason for exam:->intubated FINDINGS: Depth of inspiration is not ideal.  There is bibasilar atelectasis/infiltrate but some clearing of the right basilar infiltrate and effusion since the prior study. Lines/tubes are appropriate. Some clearing of right basilar infiltrate and effusion since prior study    Xr Chest Portable    Result Date: 12/24/2020  EXAMINATION: ONE XRAY VIEW OF THE CHEST 12/24/2020 10:49 am COMPARISON: 12/23/2020 HISTORY: ORDERING SYSTEM PROVIDED HISTORY: s/p intubation TECHNOLOGIST PROVIDED HISTORY: Reason for exam:->s/p intubation What reading provider will be dictating this exam?->CRC FINDINGS: There has been significant elevation of the right hemidiaphragm and right lower lobe infiltrate and effusion. There is been placement of the ET and NG tubes which appear appropriate. There is suggestion of some left lower lobe infiltrate. Upper lobes are normal.    Bibasilar infiltrates and moderate right effusion have occurred    Xr Chest Portable    Result Date: 12/23/2020  EXAMINATION: ONE XRAY VIEW OF THE CHEST 12/23/2020 2:11 am COMPARISON: December 22, 2020 the. HISTORY: ORDERING SYSTEM PROVIDED HISTORY: check position of right sided CVC IJ TECHNOLOGIST PROVIDED HISTORY: Reason for exam:->check position of right sided CVC IJ What reading provider will be dictating this exam?->CRC FINDINGS: Interval right IJ central line placement with the tip projecting at the level of the upper portion of the right atrium. Cardiomediastinal silhouette is normal.  New densities in the right mid lung and left lung base. No pneumothorax or pleural effusion. Osseous structures are unremarkable. 1.  Interval right IJ central line placement with the tip projecting at the level of the upper portion of the right atrium. 2.  New densities in the right mid lung and  left lung base which may represent atelectasis versus infiltrate. 3.  No pneumothorax.     Xr Chest Portable    Result Date: 12/22/2020  EXAMINATION: ONE XRAY VIEW OF THE CHEST 12/22/2020 11:46 am COMPARISON: 22 October 2020 HISTORY: ORDERING SYSTEM PROVIDED HISTORY: ams TECHNOLOGIST PROVIDED HISTORY: Reason for exam:->ams What reading provider will be dictating this exam?->CRC FINDINGS: The lungs are without acute focal process. There is no effusion or pneumothorax. The cardiomediastinal silhouette is without acute process. The osseous structures are without acute process. No acute process. Xr Abdomen For Ng/og/ne Tube Placement    Result Date: 12/31/2020  EXAMINATION: ONE SUPINE XRAY VIEW(S) OF THE ABDOMEN 12/31/2020 6:06 pm COMPARISON: None. HISTORY: ORDERING SYSTEM PROVIDED HISTORY: Confirmation of course of NG/OG/NE tube and location of tip of tube TECHNOLOGIST PROVIDED HISTORY: Reason for exam:->Confirmation of course of NG/OG/NE tube and location of tip of tube Portable? ->Yes What reading provider will be dictating this exam?->CRC FINDINGS: Tip of NG tube noted below the diaphragm. No free air. Nonobstructive bowel gas pattern. Tip of NG tube below the diaphragm in the mid abdomen. Xr Abdomen For Ng/og/ne Tube Placement    Result Date: 12/24/2020  EXAMINATION: ONE SUPINE XRAY VIEW(S) OF THE ABDOMEN 12/24/2020 1:24 pm COMPARISON: None. HISTORY: ORDERING SYSTEM PROVIDED HISTORY: Confirmation of course of NG/OG/NE tube and location of tip of tube TECHNOLOGIST PROVIDED HISTORY: Reason for exam:->Confirmation of course of NG/OG/NE tube and location of tip of tube Portable? ->Yes What reading provider will be dictating this exam?->CRC FINDINGS: There is satisfactory position of the NG tube within the stomach.     1. Satisfactory position of the NG tube within the stomach        Last Labs on Discharge:   Recent Results (from the past 24 hour(s))   Magnesium    Collection Time: 12/31/20  6:04 PM   Result Value Ref Range    Magnesium 2.0 1.6 - 2.6 mg/dL   Phosphorus    Collection Time: 12/31/20  6:04 PM   Result Value Ref Range    Phosphorus 5.6 (H) 2.5 - 4.5 mg/dL   Basic Metabolic Panel    Collection Time: 12/31/20  6:04 PM   Result Value Ref Range    Sodium 135 132 - 146 mmol/L    Potassium 3.9 3.5 - 5.0 mmol/L    Chloride 99 98 - 107 mmol/L    CO2 20 (L) 22 - 29 mmol/L    Anion Gap 16 7 - 16 mmol/L    Glucose 149 (H) 74 - 99 mg/dL    BUN 49 (H) 8 - 23 mg/dL    CREATININE 4.1 (H) 0.7 - 1.2 mg/dL    GFR Non-African American 17 >=60 mL/min/1.73    GFR African American 17     Calcium 8.0 (L) 8.6 - 10.2 mg/dL   Hemoglobin and hematocrit, blood    Collection Time: 12/31/20  6:04 PM   Result Value Ref Range    Hemoglobin 8.4 (L) 12.5 - 16.5 g/dL    Hematocrit 24.8 (L) 37.0 - 54.0 %   POCT Glucose    Collection Time: 12/31/20  6:25 PM   Result Value Ref Range    Meter Glucose 160 (H) 74 - 99 mg/dL   POCT Glucose    Collection Time: 12/31/20  9:42 PM   Result Value Ref Range    Meter Glucose 150 (H) 74 - 99 mg/dL   Magnesium    Collection Time: 01/01/21 12:17 AM   Result Value Ref Range    Magnesium 2.2 1.6 - 2.6 mg/dL   Phosphorus    Collection Time: 01/01/21 12:17 AM   Result Value Ref Range    Phosphorus 7.9 (H) 2.5 - 4.5 mg/dL   Basic Metabolic Panel    Collection Time: 01/01/21 12:17 AM   Result Value Ref Range    Sodium 140 132 - 146 mmol/L    Potassium 4.3 3.5 - 5.0 mmol/L    Chloride 100 98 - 107 mmol/L    CO2 23 22 - 29 mmol/L    Anion Gap 17 (H) 7 - 16 mmol/L    Glucose 185 (H) 74 - 99 mg/dL    BUN 60 (H) 8 - 23 mg/dL    CREATININE 4.9 (H) 0.7 - 1.2 mg/dL    GFR Non-African American 14 >=60 mL/min/1.73    GFR African American 14     Calcium 8.5 (L) 8.6 - 10.2 mg/dL   POCT Glucose    Collection Time: 01/01/21 12:21 AM   Result Value Ref Range    Meter Glucose 175 (H) 74 - 99 mg/dL   Protime-INR    Collection Time: 01/01/21  4:13 AM   Result Value Ref Range    Protime 17.6 (H) 9.3 - 12.4 sec    INR 1.5    Sedimentation Rate    Collection Time: 01/01/21  4:13 AM   Result Value Ref Range    Sed Rate 55 (H) 0 - 15 mm/Hr   C-reactive protein    Collection Time: 01/01/21  4:13 AM   Result Value Ref Range    CRP 5.1 (H) 0.0 - 0.4 mg/dL   Ferritin    Collection Time: 01/01/21  4:13 AM   Result Value Ref Range    Ferritin 1,886 ng/mL   D-dimer, quantitative    Collection Time: 01/01/21  4:13 AM   Result Value Ref Range    D-Dimer, Quant 4957 ng/mL DDU   CBC Auto Differential    Collection Time: 01/01/21  4:13 AM   Result Value Ref Range    WBC 7.4 4.5 - 11.5 E9/L    RBC 2.96 (L) 3.80 - 5.80 E12/L    Hemoglobin 8.5 (L) 12.5 - 16.5 g/dL    Hematocrit 25.2 (L) 37.0 - 54.0 %    MCV 85.1 80.0 - 99.9 fL    MCH 28.7 26.0 - 35.0 pg    MCHC 33.7 32.0 - 34.5 %    RDW 15.0 11.5 - 15.0 fL    Platelets 492 391 - 705 E9/L    MPV 12.5 (H) 7.0 - 12.0 fL    Neutrophils % 83.0 (H) 43.0 - 80.0 %    Immature Granulocytes % 0.7 0.0 - 5.0 %    Lymphocytes % 10.1 (L) 20.0 - 42.0 %    Monocytes % 6.1 2.0 - 12.0 %    Eosinophils % 0.0 0.0 - 6.0 %    Basophils % 0.1 0.0 - 2.0 %    Neutrophils Absolute 6.13 1.80 - 7.30 E9/L    Immature Granulocytes # 0.05 E9/L    Lymphocytes Absolute 0.75 (L) 1.50 - 4.00 E9/L    Monocytes Absolute 0.45 0.10 - 0.95 E9/L    Eosinophils Absolute 0.00 (L) 0.05 - 0.50 E9/L    Basophils Absolute 0.01 0.00 - 0.20 E9/L   Calcium, ionized    Collection Time: 01/01/21  4:13 AM   Result Value Ref Range    Calcium, Ion 1.17 1.15 - 1.33 mmol/L   Magnesium    Collection Time: 01/01/21  4:13 AM   Result Value Ref Range    Magnesium 2.1 1.6 - 2.6 mg/dL   Phosphorus    Collection Time: 01/01/21  4:13 AM   Result Value Ref Range    Phosphorus 8.2 (H) 2.5 - 4.5 mg/dL   Basic Metabolic Panel    Collection Time: 01/01/21  4:13 AM   Result Value Ref Range    Sodium 136 132 - 146 mmol/L    Potassium 4.0 3.5 - 5.0 mmol/L    Chloride 97 (L) 98 - 107 mmol/L    CO2 21 (L) 22 - 29 mmol/L    Anion Gap 18 (H) 7 - 16 mmol/L    Glucose 180 (H) 74 - 99 mg/dL    BUN 64 (H) 8 - 23 mg/dL    CREATININE 5.2 (H) 0.7 - 1.2 mg/dL    GFR Non-African American 13 >=60 mL/min/1.73 GFR African American 13     Calcium 8.4 (L) 8.6 - 10.2 mg/dL   POCT Glucose    Collection Time: 01/01/21  4:20 AM   Result Value Ref Range    Meter Glucose 184 (H) 74 - 99 mg/dL   Hemoglobin and hematocrit, blood    Collection Time: 01/01/21  5:23 AM   Result Value Ref Range    Hemoglobin 8.4 (L) 12.5 - 16.5 g/dL    Hematocrit 24.8 (L) 37.0 - 54.0 %   POCT Glucose    Collection Time: 01/01/21  9:20 AM   Result Value Ref Range    Meter Glucose 137 (H) 74 - 99 mg/dL   Hemoglobin and hematocrit, blood    Collection Time: 01/01/21 12:10 PM   Result Value Ref Range    Hemoglobin 8.2 (L) 12.5 - 16.5 g/dL    Hematocrit 25.5 (L) 37.0 - 54.0 %   Magnesium    Collection Time: 01/01/21 12:10 PM   Result Value Ref Range    Magnesium 2.3 1.6 - 2.6 mg/dL   Phosphorus    Collection Time: 01/01/21 12:10 PM   Result Value Ref Range    Phosphorus 8.9 (H) 2.5 - 4.5 mg/dL   Basic Metabolic Panel    Collection Time: 01/01/21 12:10 PM   Result Value Ref Range    Sodium 138 132 - 146 mmol/L    Potassium 4.0 3.5 - 5.0 mmol/L    Chloride 101 98 - 107 mmol/L    CO2 22 22 - 29 mmol/L    Anion Gap 15 7 - 16 mmol/L    Glucose 151 (H) 74 - 99 mg/dL    BUN 67 (H) 8 - 23 mg/dL    CREATININE 5.3 (H) 0.7 - 1.2 mg/dL    GFR Non-African American 13 >=60 mL/min/1.73    GFR African American 13     Calcium 8.3 (L) 8.6 - 10.2 mg/dL   POCT Glucose    Collection Time: 01/01/21 12:22 PM   Result Value Ref Range    Meter Glucose 151 (H) 74 - 99 mg/dL         Follow up: with Jazmine Cardenas DO    Note that over 30 minutes was spent in preparing discharge papers, discussing discharge with patient, medication review, etc.    Thank you Jazmine Cardenas DO for the opportunity to be involved in this patient's care. If you have any questions or concerns please feel free to contact me at [phone]. Electronically signed by Ana María Hayward MD on 1/1/2021 at 3:53 PM    NOTE: This report was transcribed using voice recognition software.  Every effort was made to ensure accuracy; however, inadvertent computerized transcription errors may be present.

## 2021-01-01 NOTE — PROGRESS NOTES
Department of Internal Medicine  Nephrology  Progress Note    Events reviewed. Patient extubated. SUBJECTIVE: We are following Mr. Brown for BLAIR on dialysis. Patient lethargic. Physical Exam:    VITALS:  BP (!) 134/90   Pulse 115   Temp 97.9 °F (36.6 °C) (Bladder)   Resp 18   Ht 6' 2\" (1.88 m)   Wt 249 lb 1.9 oz (113 kg)   SpO2 100%   BMI 31.99 kg/m²   24HR INTAKE/OUTPUT:      Intake/Output Summary (Last 24 hours) at 1/1/2021 1030  Last data filed at 1/1/2021 0555  Gross per 24 hour   Intake 2617 ml   Output 1000 ml   Net 1617 ml     Physical exam:  Patient is lethargic, rest of physical exam deferred as patient is currently Covid positive. Chart review performed, patient not on any vasopressors at this time.      Scheduled Meds:   metoprolol tartrate  25 mg Oral BID    QUEtiapine  50 mg Oral BID    apixaban  5 mg Oral BID    insulin glargine  25 Units Subcutaneous Nightly    sodium chloride  20 mL Intravenous Once    pantoprazole  40 mg Intravenous BID    And    sodium chloride (PF)  10 mL Intravenous BID    sodium chloride flush  10 mL Intravenous 2 times per day    dexamethasone  6 mg Intravenous Q24H    insulin lispro  0-12 Units Subcutaneous Q4H    sodium chloride (PF)  10 mL Intravenous Daily    thiamine  100 mg Intravenous Daily    folic acid  1 mg Intravenous Daily    vitamin D  2,000 Units Oral Daily    zinc sulfate  50 mg Oral Daily    ascorbic acid  500 mg Oral Daily    sodium chloride flush  10 mL Intravenous 2 times per day     Continuous Infusions:   dextrose 5 % and 0.45 % NaCl 40 mL/hr at 01/01/21 0925    dextrose       PRN Meds:.metoprolol, midazolam, perflutren lipid microspheres, sodium chloride, sodium chloride flush, anticoagulant sodium citrate, acetaminophen **OR** acetaminophen, polyethylene glycol, sennosides, hydrALAZINE, sodium chloride flush, glucose, dextrose, glucagon (rDNA), dextrose, dextrose    DATA:      CBC with Differential:    Lab Results Component Value Date    WBC 7.4 01/01/2021    RBC 2.96 01/01/2021    HGB 8.4 01/01/2021    HCT 24.8 01/01/2021     01/01/2021    MCV 85.1 01/01/2021    MCH 28.7 01/01/2021    MCHC 33.7 01/01/2021    RDW 15.0 01/01/2021    NRBC 0.9 12/26/2020    SEGSPCT 63 09/27/2013    LYMPHOPCT 10.1 01/01/2021    MONOPCT 6.1 01/01/2021    MYELOPCT 0.9 12/26/2020    BASOPCT 0.1 01/01/2021    MONOSABS 0.45 01/01/2021    LYMPHSABS 0.75 01/01/2021    EOSABS 0.00 01/01/2021    BASOSABS 0.01 01/01/2021     CMP:    Lab Results   Component Value Date     01/01/2021    K 4.0 01/01/2021    K 4.7 08/16/2020    CL 97 01/01/2021    CO2 21 01/01/2021    BUN 64 01/01/2021    CREATININE 5.2 01/01/2021    GFRAA 13 01/01/2021    LABGLOM 13 01/01/2021    GLUCOSE 180 01/01/2021    PROT 6.0 12/30/2020    LABALBU 3.4 12/30/2020    CALCIUM 8.4 01/01/2021    BILITOT 0.5 12/30/2020    ALKPHOS 108 12/30/2020    AST 16 12/30/2020    ALT 13 12/30/2020     Magnesium:    Lab Results   Component Value Date    MG 2.1 01/01/2021     Phosphorus:    Lab Results   Component Value Date    PHOS 8.2 01/01/2021     ABG:    Lab Results   Component Value Date    PH 7.483 12/30/2020    PCO2 26.7 12/30/2020    PO2 92.3 12/30/2020    HCO3 19.6 12/30/2020    BE -3.1 12/30/2020    O2SAT 96.3 12/30/2020       Radiology Results:     Chest x-ray December 30, 2020   Multifocal bilateral airspace disease, unchanged when compared with the prior   study. BRIEF SUMMARY OF INITIAL CONSULT:    Briefly, Mr. Abi Dow is a 65year old AA man with h/o CKD stage 3, baseline creatinine 1.4 mg/dL, HTN, Type 2 DM, prostate cancer, who was admitted on 12/22/2020 with altered metal status. He was found to have glucose level of 1332 mg/dL, lactic acid levelof 11.8, BUN of 95, serum creatinine of 5.7 mg/dL and serum potassium of 7.1 with CO2 level of 4; reason for this consultation. His medications prior to admission included lisinopril, ibuprofen and naproxen.     Resolved problems:  · Hyperkalemia  · Pseudohyponatremia  · Hypocalcemia  · Acute pancreatitis  · Hemodynamic shock   · Hypocalcemia, due to coexisting alkalemia, rule out vitamin D deficiency  · Acute pancreatitis-initial lipase >3000, now improved to 17  · Respiratory failure s/p intubation   · DKA   · Pneumonia, on piperacillin-tazobactam   · Alkalemia, PH: 7.483; with respiratory alkalosis, metabolic alkalosis (bicarbonate administration on dialysis) and HAGMA (uremia)     IMPRESSION/RECOMMENDATIONS:      1. BLAIR stage III on CKD, ischemic ATN, multifactorial 2/2 to severe intravascular volume depletion (osmotic diurseis-hyperglycemia) in the setting of ACE inhibition and NSAID's. Started on renal replacement therapy on 12/25/2020. Nonoliguric with good response to diuretics, nevertheless renal clearance still lacking recovery. Patient had dialysis yesterday. Chemistry values fair today. 2. CKD stage III, baseline creatinine approximately 1.4 mg/dL, with proteinuria, probably DKD   3. HTN, on metoprolol 5 mg IV every 6 hours    -------------------------------------------------  4. COVID-19  infection, on dexamethasone, remdesivir    5. New onset AF, on metoprolol and apixaban  6. Anemia, normocytic, dropping H/H   7. Nutrition, n.p.o.     Plan:    · Continue D51/2 NS at 40 cc/hour until tube feedings restarted.   · No HD today, will plan for HD x4 hours tomorrow  · Bumex 2 mg IV x one

## 2021-01-01 NOTE — PLAN OF CARE
Problem: Airway Clearance - Ineffective  Goal: Achieve or maintain patent airway  1/1/2021 0033 by Laura Gupta RN  Outcome: Ongoing  12/31/2020 2123 by Laura Gupta RN  Outcome: Ongoing     Problem: Gas Exchange - Impaired  Goal: Absence of hypoxia  1/1/2021 0033 by Laura Gupta RN  Outcome: Ongoing  12/31/2020 2123 by Laura Gupta RN  Outcome: Ongoing  Goal: Promote optimal lung function  1/1/2021 0033 by Laura Gupta RN  Outcome: Ongoing  12/31/2020 2123 by Laura Gupta RN  Outcome: Ongoing     Problem: Breathing Pattern - Ineffective  Goal: Ability to achieve and maintain a regular respiratory rate  1/1/2021 0033 by Laura Gupta RN  Outcome: Ongoing  12/31/2020 2123 by Laura Gupta RN  Outcome: Ongoing     Problem:  Body Temperature -  Risk of, Imbalanced  Goal: Ability to maintain a body temperature within defined limits  1/1/2021 0033 by Laura Gupta RN  Outcome: Ongoing  12/31/2020 2123 by Laura Gupta RN  Outcome: Ongoing  Goal: Will regain or maintain usual level of consciousness  1/1/2021 0033 by Laura Gupta RN  Outcome: Ongoing  12/31/2020 2123 by Laura Gupta RN  Outcome: Ongoing  Goal: Complications related to the disease process, condition or treatment will be avoided or minimized  1/1/2021 0033 by Laura Gupta RN  Outcome: Ongoing  12/31/2020 2123 by Laura Gupta RN  Outcome: Ongoing     Problem: Isolation Precautions - Risk of Spread of Infection  Goal: Prevent transmission of infection  1/1/2021 0033 by Laura Gupta RN  Outcome: Ongoing  12/31/2020 2123 by Laura Gupta RN  Outcome: Ongoing     Problem: Nutrition Deficits  Goal: Optimize nutrtional status  1/1/2021 0033 by Laura Gupta RN  Outcome: Ongoing  12/31/2020 2123 by Laura Gupta RN  Outcome: Ongoing     Problem: Risk for Fluid Volume Deficit  Goal: Maintain normal heart rhythm  1/1/2021 0033 by Laura Gupta RN  Outcome: Ongoing  12/31/2020 2123 by Laura Gupta RN  Outcome: Ongoing  Goal: Maintain absence of muscle cramping  1/1/2021 0033 by Tony Bradford RN  Outcome: Ongoing  12/31/2020 2123 by Tony Bradford RN  Outcome: Ongoing  Goal: Maintain normal serum potassium, sodium, calcium, phosphorus, and pH  1/1/2021 0033 by Tony Bradford RN  Outcome: Ongoing  12/31/2020 2123 by Tony Bradford RN  Outcome: Ongoing     Problem: Loneliness or Risk for Loneliness  Goal: Demonstrate positive use of time alone when socialization is not possible  1/1/2021 0033 by Tony Bradford RN  Outcome: Ongoing  12/31/2020 2123 by Tony Bradford RN  Outcome: Ongoing     Problem: Fatigue  Goal: Verbalize increase energy and improved vitality  1/1/2021 0033 by Tony Bradford RN  Outcome: Ongoing  12/31/2020 2123 by Tony Bradford RN  Outcome: Ongoing     Problem: Patient Education: Go to Patient Education Activity  Goal: Patient/Family Education  1/1/2021 0033 by Tony Bradford RN  Outcome: Ongoing  12/31/2020 2123 by Tony Bradford RN  Outcome: Ongoing     Problem: Skin Integrity:  Goal: Will show no infection signs and symptoms  Description: Will show no infection signs and symptoms  1/1/2021 0033 by Tony Bradford RN  Outcome: Ongoing  12/31/2020 2123 by Tony Bradford RN  Outcome: Ongoing  Goal: Absence of new skin breakdown  Description: Absence of new skin breakdown  1/1/2021 0033 by Tony Bradford RN  Outcome: Ongoing  12/31/2020 2123 by Toyn Bradford RN  Outcome: Ongoing     Problem: Falls - Risk of:  Goal: Will remain free from falls  Description: Will remain free from falls  1/1/2021 0033 by Tony Bradford RN  Outcome: Ongoing  12/31/2020 2123 by Tony Bradford RN  Outcome: Ongoing  Goal: Absence of physical injury  Description: Absence of physical injury  1/1/2021 0033 by Tony Bradford RN  Outcome: Ongoing  12/31/2020 2123 by Tony Bradford RN  Outcome: Ongoing     Problem: Restraint Use - Nonviolent/Non-Self-Destructive Behavior:  Goal: Absence of restraint indications  Description: Absence of restraint indications  1/1/2021 0033 by Bryant Ospina RN  Outcome: Not Met This Shift  12/31/2020 2123 by Bryant Ospina RN  Outcome: Not Met This Shift  12/31/2020 1631 by José Luis Olsen RN  Outcome: Not Met This Shift  Goal: Absence of restraint-related injury  Description: Absence of restraint-related injury  1/1/2021 0033 by Bryant Ospina RN  Outcome: Met This Shift  12/31/2020 2123 by Bryant Ospina RN  Outcome: Met This Shift  12/31/2020 1631 by José Luis Olsen RN  Outcome: Met This Shift     Problem: Inadequate oral food/beverage intake (NI-2.1)  Goal: Food and/or Nutrient Delivery  Description: Individualized approach for food/nutrient provision.   12/31/2020 1330 by Rashmi Conn MS, RD, LD  Outcome: Ongoing     Problem: Pain:  Goal: Pain level will decrease  Description: Pain level will decrease  1/1/2021 0033 by Bryant Ospina RN  Outcome: Ongoing  12/31/2020 2123 by Bryant Ospina RN  Outcome: Ongoing  Goal: Control of acute pain  Description: Control of acute pain  1/1/2021 0033 by Bryant Ospina RN  Outcome: Ongoing  12/31/2020 2123 by Bryant Ospina RN  Outcome: Ongoing  Goal: Control of chronic pain  Description: Control of chronic pain  1/1/2021 0033 by Bryant Ospina RN  Outcome: Ongoing  12/31/2020 2123 by Bryant Ospina RN  Outcome: Ongoing

## 2021-01-01 NOTE — FLOWSHEET NOTE
Patient confused and attempts to reach for ng tube and lines, unable to follow commands at this time restraints continued for patient safety

## 2021-01-01 NOTE — PROGRESS NOTES
[facility]  Department of Internal Medicine   Internal Medicine Residency   MICU Progress Note    Patient:  Andre Montano 65 y.o. male  MRN: [id number]     Date of Service: 1/1/2021    Allergy: Patient has no known allergies. Follow resp failure   Hospital day #10, extubated on 12/29  Subjective     Patient seen and examined. No acute changes overnight. Awake and following commands. precedex weaning off, Seroquel for sedation   Saturation well on NC  hgb up to 8.4 this AM post transfuse 1 U yesterday   Heart rate better controlled in 100-110s, on Lopressor IV 5 mg every 6 hours -> will switch to lopressor 25 BID for NG tube in   transfer to LTAC     Objective     VS: BP (!) 129/95   Pulse 104   Temp 98.1 °F (36.7 °C) (Bladder)   Resp 26   Ht 6' 2\" (1.88 m)   Wt 249 lb 1.9 oz (113 kg)   SpO2 98%   BMI 31.99 kg/m²         I & O - 24hr:     Intake/Output Summary (Last 24 hours) at 1/1/2021 2031  Last data filed at 1/1/2021 1700  Gross per 24 hour   Intake 1898 ml   Output 1185 ml   Net 713 ml       Physical Exam:  · General Appearance: Extubated on nasal cannula, Awake and following commands  · Neck: no JVD and supple, symmetrical, trachea midline  · Lung: clear to auscultation bilaterally and diminished breath sounds bilaterally  · Heart: irregularly irregular rhythm  · Abdomen: soft, non-tender; bowel sounds normal; no masses,  no organomegaly  · Extremities:  extremities normal, atraumatic, no cyanosis or edema. Right plantar wound and heel with skin discoloration. · Musculoskeletal: No joint swelling. · Neurologic: Mental status: Following commands appropriately. Lines     site day    Art line   None    TLC R IJ 7   PICC None    Hemoaccess R Fem 6     Oxygen:  On 3 L nasal cannula    ABG:     Lab Results   Component Value Date    PH 7.483 12/30/2020    PCO2 26.7 12/30/2020    PO2 92.3 12/30/2020    HCO3 19.6 12/30/2020    BE -3.1 12/30/2020    THB 8.6 12/30/2020    O2SAT 96.3 12/30/2020        Medications     Infusions: (Fluid, Sedation, Vasopressors)  Sedation   precedex at 0.2 mcg/kg/hr    Nutrition:   SLP eval  ATB:   Antibiotics  Date   Zosyn-discontinued 12/23 - 12/30             Skin issues:   Patient currently has   Urinary cath  Isolation  DVT prophylaxis/ GI prophylaxis,     Labs     CBC:   Lab Results   Component Value Date    WBC 7.4 01/01/2021    RBC 2.96 01/01/2021    HGB 8.2 01/01/2021    HCT 25.5 01/01/2021    MCV 85.1 01/01/2021    MCH 28.7 01/01/2021    MCHC 33.7 01/01/2021    RDW 15.0 01/01/2021     01/01/2021    MPV 12.5 01/01/2021     BMP:    Lab Results   Component Value Date     01/01/2021    K 4.0 01/01/2021    K 4.7 08/16/2020     01/01/2021    CO2 22 01/01/2021    BUN 67 01/01/2021    LABALBU 3.4 12/30/2020    CREATININE 5.3 01/01/2021    CALCIUM 8.3 01/01/2021    GFRAA 13 01/01/2021    LABGLOM 13 01/01/2021    GLUCOSE 151 01/01/2021       Resident's Assessment and Plan   Patient is a 58-year-old male with past medical history of anxiety, depression, diabetes with neuropathy, HLD, HTN, history of lung and prostate neoplasm. Patient presented by EMS from home for AMS, for unknown amount of time. Patient was found to have Covid positive, glucose 1332, pH 6.96, PCO2 19, bicarb 4, sodium 123, potassium 7.1, chloride 91, BUN 65, creatinine 5.7 (baseline 1.4), phosphate 13, magnesium 4.5, lactic acid 11.8, lipase 3000. EKG NSR with first-degree AV block. Chest x-ray with right sided infiltrate. CT abdomen with acute pancreatic inflammatory changes in the small bowel with ascites. CT cervical spine showing DJD with narrowing C2-3, C4-5. CT head unremarkable. Neurology:   1. Acute Encephalopathy likely 2/2 HHS/DKA/mixed vs alcohol withdrawal. S/p HHS/DKA treated.  Patient became less responsive and tachypneic, consistent with alcohol withdrawal and intubated on 12/24 due to respiratory distress   -Patient extubated and weaning off Precedex -switch to Seroquel for sedation     -Continue folic acid 1 mg IV daily   -Continue thiamine 250 mg IV BID x 6 doses -> 100 mg BID x 6 doses -> 100 mg IV daily     Cardiology:   1. New onset paroxysmal A. fib with RVR 12/26 likely 2/2 Covid infection. Troponins negative. -[id number] score 3   -Continue Lopressor for rate control   --120 today; AC: Eliquis 5 mg twice daily    2. History of hypertension, home medications include amlodipine 10 mg and lisinopril 20 mg OD   -Blood pressure stable   -We will resume amlodipine if blood pressure is more stable     3. History of HLD   -Continue to hold atorvastatin -> resumed for NG tube in     Pulmonary:  1. Acute hypoxic respiratory Failure likely 2/2 Covid infection versus alcohol withdrawal.  Patient was intubated on 12/24 for respiratory distress. Resolving   -Patient on 3 L nasal cannula    -Continue to monitor respiratory status    Nephrology (Fluids/ Electrolytes & Nutrition):   1. BLAIR stage III on CKD stage III, Likely 2/2 oliguric intrinsic ATN 2/2 hypotension. Baseline creatinine 1.4 mg/dL, creatinine 5.7 mg/dL on admission and went up to 6 mg/dL, requiring temporary dialysis. Urine electrolytes unreliable due to BMP and electrolytes not drawn correctly. Received sled 12/24, 12/25, and 12/28.    -dialysis scheduled for today   -Creatinine up-trending from 4.8-5.6 mg/dL. -Continue to hold home dose lisinopril 10 mg OD   -Nephrology following    2. History of CKD stage II-III likely 2/2 hypertensive nephrosclerosis versus diabetic nephropathy    Infectious Disease:   1. COVID-19 pneumonia, procalcitonin 4.65   -Chest x-ray with right-sided infiltrate, stable   -Completed remdesivir (day 5)   -Continue Decadron 6 mg IV Q 24 hrs    -Continue vitamin C and zinc   -Pancultures negative     Endocrine:   1. Type II DM, with severe hyperglycemia 2/2 DKA vs HHS vs mixed on admission- Resolved.   Patient presented with blood glucose >1000, but BHB was not checked. BHB after 12 hours was 0.8. Anion gap closed and patient bridged to subcutaneous insulin. Hemoglobin A1c 11.1%. -BG stable   -Continue Lantus 25 units and  MDSS -> held lantus last night for NPO   -Continue POCT glucose every 4 hours    Hematology/ Oncology:  1. Acute on Chronic normocytic anemia likely 2/2 ACD vs acute blood loss. Baseline hemoglobin 10-12 g/dL. -Hgb 8.4 stable, post 1 U transfusion yesterday    -Continue to monitor H&H Q8 hrs    -Maintain hemoglobin >7, transfuse as needed    Gastroenterology:   1. Acute pancreatitis likely 2/2 alcohol versus Covid, resolving. Patient reported drinking more alcohol recently, , lipase 3000 initially. Lipase trended instead of COPD due to Covid. Lipase 17.    -Continuing Zosyn -> discontinued    -Currently n.p.o., failed bedside swallow X 2, will try NG tube for PO medication and tube feeding       Next of Kin/ POA:   Charmayne Cruzito (domestic partner)  Code Status:   Full    PT/OT evaluation:  Consulted   DVT prophylaxis/ GI prophylaxis: Eliquis/Protonix  Disposition: transfer to Kirby Wells MD, PGY-3  Attending physician: Milton Peter     I personally saw, examined and provided care for the patient. Radiographs, labs and medication list were reviewed by me independently. I spoke with bedside nursing, therapists and consultants. Critical care services and times documented are independent of procedures and multidisciplinary rounds with Residents. Additionally comprehensive, multidisciplinary rounds were conducted with the MICU team. The case was discussed in detail and plans for care were established. Review of Residents documentation was conducted and revisions were made as appropriate. I agree with the above documented exam, problem list and plan of care.   Hospital [facility]  Came to pancreatis   COVID pneumonia  HHS   A fib   Lopressor/heparin drip   Extubated on 12/30   ESRD HD  Fail swallow study   On tube feeding    ,increase BB  Stable Hb   Check stool hemoccult   SUE CONWAY

## 2021-01-01 NOTE — PROGRESS NOTES
Spoke with Nicolette Buckley, updated to all re:  Discharge to [address] [place] [address]. Voices understanding.

## 2021-01-02 LAB
ANTI-XA LMW HEPARIN: 0.22 U/ML (ref 0.5–1.1)
ANTI-XA LMW HEPARIN: 0.22 U/ML (ref 0.5–1.1)
ANTI-XA LMW HEPARIN: 0.31 U/ML (ref 0.5–1.1)
ANTI-XA LMW HEPARIN: 0.34 U/ML (ref 0.5–1.1)

## 2021-01-03 LAB — ANTI-XA LMW HEPARIN: 0.23 U/ML (ref 0.5–1.1)

## 2021-01-09 ENCOUNTER — HOSPITAL ENCOUNTER (INPATIENT)
Age: 71
LOS: 6 days | Discharge: HOME | DRG: 314 | End: 2021-01-16
Attending: EMERGENCY MEDICINE | Admitting: INTERNAL MEDICINE
Payer: MEDICARE

## 2021-01-09 ENCOUNTER — APPOINTMENT (OUTPATIENT)
Dept: GENERAL RADIOLOGY | Age: 71
DRG: 314 | End: 2021-01-09
Payer: MEDICARE

## 2021-01-09 DIAGNOSIS — N18.6 ESRD ON DIALYSIS (HCC): ICD-10-CM

## 2021-01-09 DIAGNOSIS — R45.1 AGITATION: ICD-10-CM

## 2021-01-09 DIAGNOSIS — Z99.2 ESRD ON DIALYSIS (HCC): ICD-10-CM

## 2021-01-09 DIAGNOSIS — N30.01 ACUTE CYSTITIS WITH HEMATURIA: Primary | ICD-10-CM

## 2021-01-09 LAB
BASOPHILS ABSOLUTE: 0.01 E9/L (ref 0–0.2)
BASOPHILS RELATIVE PERCENT: 0.1 % (ref 0–2)
BILIRUBIN URINE: NEGATIVE
BLOOD, URINE: ABNORMAL
CLARITY: CLEAR
COLOR: YELLOW
EOSINOPHILS ABSOLUTE: 0.03 E9/L (ref 0.05–0.5)
EOSINOPHILS RELATIVE PERCENT: 0.3 % (ref 0–6)
GLUCOSE URINE: 500 MG/DL
HCT VFR BLD CALC: 33.3 % (ref 37–54)
HEMOGLOBIN: 10.7 G/DL (ref 12.5–16.5)
IMMATURE GRANULOCYTES #: 0.05 E9/L
IMMATURE GRANULOCYTES %: 0.6 % (ref 0–5)
KETONES, URINE: NEGATIVE MG/DL
LEUKOCYTE ESTERASE, URINE: ABNORMAL
LYMPHOCYTES ABSOLUTE: 0.72 E9/L (ref 1.5–4)
LYMPHOCYTES RELATIVE PERCENT: 8.2 % (ref 20–42)
MCH RBC QN AUTO: 27.8 PG (ref 26–35)
MCHC RBC AUTO-ENTMCNC: 32.1 % (ref 32–34.5)
MCV RBC AUTO: 86.5 FL (ref 80–99.9)
MONOCYTES ABSOLUTE: 0.65 E9/L (ref 0.1–0.95)
MONOCYTES RELATIVE PERCENT: 7.4 % (ref 2–12)
NEUTROPHILS ABSOLUTE: 7.34 E9/L (ref 1.8–7.3)
NEUTROPHILS RELATIVE PERCENT: 83.4 % (ref 43–80)
NITRITE, URINE: NEGATIVE
PDW BLD-RTO: 14 FL (ref 11.5–15)
PH UA: 6 (ref 5–9)
PLATELET # BLD: 162 E9/L (ref 130–450)
PMV BLD AUTO: 12.2 FL (ref 7–12)
PROTEIN UA: 100 MG/DL
RBC # BLD: 3.85 E12/L (ref 3.8–5.8)
SPECIFIC GRAVITY UA: 1.01 (ref 1–1.03)
UROBILINOGEN, URINE: 0.2 E.U./DL
WBC # BLD: 8.8 E9/L (ref 4.5–11.5)

## 2021-01-09 PROCEDURE — 99285 EMERGENCY DEPT VISIT HI MDM: CPT

## 2021-01-09 PROCEDURE — 80053 COMPREHEN METABOLIC PANEL: CPT

## 2021-01-09 PROCEDURE — 84484 ASSAY OF TROPONIN QUANT: CPT

## 2021-01-09 PROCEDURE — 83605 ASSAY OF LACTIC ACID: CPT

## 2021-01-09 PROCEDURE — 71045 X-RAY EXAM CHEST 1 VIEW: CPT

## 2021-01-09 PROCEDURE — 87040 BLOOD CULTURE FOR BACTERIA: CPT

## 2021-01-09 PROCEDURE — 83880 ASSAY OF NATRIURETIC PEPTIDE: CPT

## 2021-01-09 PROCEDURE — 85730 THROMBOPLASTIN TIME PARTIAL: CPT

## 2021-01-09 PROCEDURE — 81001 URINALYSIS AUTO W/SCOPE: CPT

## 2021-01-09 PROCEDURE — 96374 THER/PROPH/DIAG INJ IV PUSH: CPT

## 2021-01-09 PROCEDURE — 87186 SC STD MICRODIL/AGAR DIL: CPT

## 2021-01-09 PROCEDURE — 87088 URINE BACTERIA CULTURE: CPT

## 2021-01-09 PROCEDURE — 36415 COLL VENOUS BLD VENIPUNCTURE: CPT

## 2021-01-09 PROCEDURE — 85025 COMPLETE CBC W/AUTO DIFF WBC: CPT

## 2021-01-09 RX ORDER — SODIUM CHLORIDE 0.9 % (FLUSH) 0.9 %
10 SYRINGE (ML) INJECTION PRN
Status: DISCONTINUED | OUTPATIENT
Start: 2021-01-09 | End: 2021-01-11 | Stop reason: SDUPTHER

## 2021-01-09 RX ORDER — SODIUM CHLORIDE 0.9 % (FLUSH) 0.9 %
10 SYRINGE (ML) INJECTION EVERY 12 HOURS SCHEDULED
Status: DISCONTINUED | OUTPATIENT
Start: 2021-01-09 | End: 2021-01-11 | Stop reason: SDUPTHER

## 2021-01-10 ENCOUNTER — APPOINTMENT (OUTPATIENT)
Dept: CT IMAGING | Age: 71
DRG: 314 | End: 2021-01-10
Payer: MEDICARE

## 2021-01-10 PROBLEM — N39.0 COMPLICATED UTI (URINARY TRACT INFECTION): Status: ACTIVE | Noted: 2021-01-10

## 2021-01-10 LAB
ALBUMIN SERPL-MCNC: 2.8 G/DL (ref 3.5–5.2)
ALP BLD-CCNC: 130 U/L (ref 40–129)
ALT SERPL-CCNC: 27 U/L (ref 0–40)
ANION GAP SERPL CALCULATED.3IONS-SCNC: 12 MMOL/L (ref 7–16)
APTT: 27.4 SEC (ref 24.5–35.1)
AST SERPL-CCNC: 35 U/L (ref 0–39)
BACTERIA: ABNORMAL /HPF
BILIRUB SERPL-MCNC: 0.5 MG/DL (ref 0–1.2)
BUN BLDV-MCNC: 80 MG/DL (ref 8–23)
CALCIUM SERPL-MCNC: 9.2 MG/DL (ref 8.6–10.2)
CHLORIDE BLD-SCNC: 102 MMOL/L (ref 98–107)
CO2: 24 MMOL/L (ref 22–29)
CREAT SERPL-MCNC: 4 MG/DL (ref 0.7–1.2)
EKG ATRIAL RATE: 105 BPM
EKG Q-T INTERVAL: 308 MS
EKG QRS DURATION: 76 MS
EKG QTC CALCULATION (BAZETT): 395 MS
EKG R AXIS: 9 DEGREES
EKG T AXIS: 38 DEGREES
EKG VENTRICULAR RATE: 99 BPM
EPITHELIAL CELLS, UA: ABNORMAL /HPF
GFR AFRICAN AMERICAN: 18
GFR NON-AFRICAN AMERICAN: 18 ML/MIN/1.73
GLUCOSE BLD-MCNC: 253 MG/DL (ref 74–99)
LACTIC ACID, SEPSIS: 2.7 MMOL/L (ref 0.5–1.9)
METER GLUCOSE: 275 MG/DL (ref 74–99)
METER GLUCOSE: 295 MG/DL (ref 74–99)
METER GLUCOSE: 319 MG/DL (ref 74–99)
METER GLUCOSE: 331 MG/DL (ref 74–99)
METER GLUCOSE: 376 MG/DL (ref 74–99)
POTASSIUM REFLEX MAGNESIUM: 6.4 MMOL/L (ref 3.5–5)
POTASSIUM SERPL-SCNC: 4.8 MMOL/L (ref 3.5–5)
PRO-BNP: 2329 PG/ML (ref 0–125)
RBC UA: ABNORMAL /HPF (ref 0–2)
SARS-COV-2, NAAT: NOT DETECTED
SODIUM BLD-SCNC: 138 MMOL/L (ref 132–146)
TOTAL PROTEIN: 7.4 G/DL (ref 6.4–8.3)
TROPONIN: 0.03 NG/ML (ref 0–0.03)
WBC UA: ABNORMAL /HPF (ref 0–5)
YEAST: PRESENT /HPF

## 2021-01-10 PROCEDURE — 2580000003 HC RX 258: Performed by: INTERNAL MEDICINE

## 2021-01-10 PROCEDURE — 99223 1ST HOSP IP/OBS HIGH 75: CPT | Performed by: SURGERY

## 2021-01-10 PROCEDURE — 2580000003 HC RX 258: Performed by: STUDENT IN AN ORGANIZED HEALTH CARE EDUCATION/TRAINING PROGRAM

## 2021-01-10 PROCEDURE — U0002 COVID-19 LAB TEST NON-CDC: HCPCS

## 2021-01-10 PROCEDURE — 2700000000 HC OXYGEN THERAPY PER DAY

## 2021-01-10 PROCEDURE — 2500000003 HC RX 250 WO HCPCS: Performed by: INTERNAL MEDICINE

## 2021-01-10 PROCEDURE — 6360000002 HC RX W HCPCS: Performed by: STUDENT IN AN ORGANIZED HEALTH CARE EDUCATION/TRAINING PROGRAM

## 2021-01-10 PROCEDURE — 6370000000 HC RX 637 (ALT 250 FOR IP): Performed by: INTERNAL MEDICINE

## 2021-01-10 PROCEDURE — 70450 CT HEAD/BRAIN W/O DYE: CPT

## 2021-01-10 PROCEDURE — 36415 COLL VENOUS BLD VENIPUNCTURE: CPT

## 2021-01-10 PROCEDURE — 93005 ELECTROCARDIOGRAM TRACING: CPT | Performed by: STUDENT IN AN ORGANIZED HEALTH CARE EDUCATION/TRAINING PROGRAM

## 2021-01-10 PROCEDURE — 1200000000 HC SEMI PRIVATE

## 2021-01-10 PROCEDURE — 99223 1ST HOSP IP/OBS HIGH 75: CPT | Performed by: INTERNAL MEDICINE

## 2021-01-10 PROCEDURE — 82962 GLUCOSE BLOOD TEST: CPT

## 2021-01-10 PROCEDURE — 84132 ASSAY OF SERUM POTASSIUM: CPT

## 2021-01-10 RX ORDER — ASCORBIC ACID 500 MG
500 TABLET ORAL DAILY
Status: DISCONTINUED | OUTPATIENT
Start: 2021-01-10 | End: 2021-01-16 | Stop reason: HOSPADM

## 2021-01-10 RX ORDER — AMIODARONE HYDROCHLORIDE 200 MG/1
200 TABLET ORAL 2 TIMES DAILY
Status: ON HOLD | COMMUNITY
End: 2021-03-01 | Stop reason: SDUPTHER

## 2021-01-10 RX ORDER — BUMETANIDE 0.25 MG/ML
2 INJECTION, SOLUTION INTRAMUSCULAR; INTRAVENOUS ONCE
Status: COMPLETED | OUTPATIENT
Start: 2021-01-10 | End: 2021-01-10

## 2021-01-10 RX ORDER — SENNA PLUS 8.6 MG/1
1 TABLET ORAL 2 TIMES DAILY PRN
Status: DISCONTINUED | OUTPATIENT
Start: 2021-01-10 | End: 2021-01-16 | Stop reason: HOSPADM

## 2021-01-10 RX ORDER — FOLIC ACID 1 MG/1
1 TABLET ORAL DAILY
Status: ON HOLD | COMMUNITY
End: 2021-03-01 | Stop reason: SDUPTHER

## 2021-01-10 RX ORDER — HALOPERIDOL 5 MG/ML
5 INJECTION INTRAMUSCULAR ONCE
Status: COMPLETED | OUTPATIENT
Start: 2021-01-10 | End: 2021-01-10

## 2021-01-10 RX ORDER — PANTOPRAZOLE SODIUM 40 MG/1
40 TABLET, DELAYED RELEASE ORAL
Status: DISCONTINUED | OUTPATIENT
Start: 2021-01-10 | End: 2021-01-16 | Stop reason: HOSPADM

## 2021-01-10 RX ORDER — DEXTROSE MONOHYDRATE 50 MG/ML
100 INJECTION, SOLUTION INTRAVENOUS PRN
Status: DISCONTINUED | OUTPATIENT
Start: 2021-01-10 | End: 2021-01-16 | Stop reason: HOSPADM

## 2021-01-10 RX ORDER — POLYETHYLENE GLYCOL 3350 17 G/17G
17 POWDER, FOR SOLUTION ORAL DAILY PRN
Status: DISCONTINUED | OUTPATIENT
Start: 2021-01-10 | End: 2021-01-16 | Stop reason: HOSPADM

## 2021-01-10 RX ORDER — SIMVASTATIN 20 MG
20 TABLET ORAL NIGHTLY
Status: DISCONTINUED | OUTPATIENT
Start: 2021-01-10 | End: 2021-01-10 | Stop reason: CLARIF

## 2021-01-10 RX ORDER — VITAMIN B COMPLEX
2000 TABLET ORAL DAILY
Status: DISCONTINUED | OUTPATIENT
Start: 2021-01-10 | End: 2021-01-16 | Stop reason: HOSPADM

## 2021-01-10 RX ORDER — DEXTROSE MONOHYDRATE 25 G/50ML
12.5 INJECTION, SOLUTION INTRAVENOUS PRN
Status: DISCONTINUED | OUTPATIENT
Start: 2021-01-10 | End: 2021-01-16 | Stop reason: HOSPADM

## 2021-01-10 RX ORDER — NICOTINE POLACRILEX 4 MG
15 LOZENGE BUCCAL PRN
Status: DISCONTINUED | OUTPATIENT
Start: 2021-01-10 | End: 2021-01-16 | Stop reason: HOSPADM

## 2021-01-10 RX ORDER — OMEPRAZOLE 20 MG/1
20 CAPSULE, DELAYED RELEASE ORAL DAILY
Status: ON HOLD | COMMUNITY
End: 2021-01-16 | Stop reason: HOSPADM

## 2021-01-10 RX ORDER — SODIUM CHLORIDE 0.9 % (FLUSH) 0.9 %
10 SYRINGE (ML) INJECTION PRN
Status: DISCONTINUED | OUTPATIENT
Start: 2021-01-10 | End: 2021-01-16 | Stop reason: HOSPADM

## 2021-01-10 RX ORDER — ONDANSETRON 2 MG/ML
4 INJECTION INTRAMUSCULAR; INTRAVENOUS EVERY 6 HOURS PRN
Status: DISCONTINUED | OUTPATIENT
Start: 2021-01-10 | End: 2021-01-16 | Stop reason: HOSPADM

## 2021-01-10 RX ORDER — SODIUM CHLORIDE 0.9 % (FLUSH) 0.9 %
10 SYRINGE (ML) INJECTION EVERY 12 HOURS SCHEDULED
Status: DISCONTINUED | OUTPATIENT
Start: 2021-01-10 | End: 2021-01-16 | Stop reason: HOSPADM

## 2021-01-10 RX ORDER — CHLORHEXIDINE GLUCONATE 0.12 MG/ML
15 RINSE ORAL 2 TIMES DAILY
COMMUNITY
End: 2021-06-22

## 2021-01-10 RX ORDER — INSULIN GLARGINE 100 [IU]/ML
20 INJECTION, SOLUTION SUBCUTANEOUS NIGHTLY
Status: DISCONTINUED | OUTPATIENT
Start: 2021-01-10 | End: 2021-01-11

## 2021-01-10 RX ORDER — PROMETHAZINE HYDROCHLORIDE 25 MG/1
12.5 TABLET ORAL EVERY 6 HOURS PRN
Status: DISCONTINUED | OUTPATIENT
Start: 2021-01-10 | End: 2021-01-16 | Stop reason: HOSPADM

## 2021-01-10 RX ORDER — QUETIAPINE FUMARATE 25 MG/1
50 TABLET, FILM COATED ORAL 2 TIMES DAILY
Status: DISCONTINUED | OUTPATIENT
Start: 2021-01-10 | End: 2021-01-14

## 2021-01-10 RX ORDER — ACETAMINOPHEN 650 MG/1
650 SUPPOSITORY RECTAL EVERY 6 HOURS PRN
Status: DISCONTINUED | OUTPATIENT
Start: 2021-01-10 | End: 2021-01-16 | Stop reason: HOSPADM

## 2021-01-10 RX ORDER — ZINC SULFATE 50(220)MG
50 CAPSULE ORAL DAILY
Status: DISCONTINUED | OUTPATIENT
Start: 2021-01-10 | End: 2021-01-16 | Stop reason: HOSPADM

## 2021-01-10 RX ORDER — ACETAMINOPHEN 325 MG/1
650 TABLET ORAL EVERY 6 HOURS PRN
Status: DISCONTINUED | OUTPATIENT
Start: 2021-01-10 | End: 2021-01-16 | Stop reason: HOSPADM

## 2021-01-10 RX ORDER — SODIUM CHLORIDE 9 MG/ML
INJECTION, SOLUTION INTRAVENOUS CONTINUOUS
Status: DISCONTINUED | OUTPATIENT
Start: 2021-01-10 | End: 2021-01-11

## 2021-01-10 RX ORDER — INSULIN GLARGINE 100 [IU]/ML
25 INJECTION, SOLUTION SUBCUTANEOUS NIGHTLY
Status: DISCONTINUED | OUTPATIENT
Start: 2021-01-10 | End: 2021-01-10

## 2021-01-10 RX ORDER — ATORVASTATIN CALCIUM 10 MG/1
10 TABLET, FILM COATED ORAL NIGHTLY
Status: DISCONTINUED | OUTPATIENT
Start: 2021-01-10 | End: 2021-01-16 | Stop reason: HOSPADM

## 2021-01-10 RX ORDER — POLYETHYLENE GLYCOL 3350 17 G/17G
17 POWDER, FOR SOLUTION ORAL DAILY PRN
Status: DISCONTINUED | OUTPATIENT
Start: 2021-01-10 | End: 2021-01-11 | Stop reason: SDUPTHER

## 2021-01-10 RX ADMIN — HALOPERIDOL LACTATE 5 MG: 5 INJECTION, SOLUTION INTRAMUSCULAR at 01:50

## 2021-01-10 RX ADMIN — Medication 10 ML: at 09:33

## 2021-01-10 RX ADMIN — SODIUM CHLORIDE, PRESERVATIVE FREE 10 ML: 5 INJECTION INTRAVENOUS at 13:25

## 2021-01-10 RX ADMIN — INSULIN LISPRO 3 UNITS: 100 INJECTION, SOLUTION INTRAVENOUS; SUBCUTANEOUS at 16:31

## 2021-01-10 RX ADMIN — INSULIN GLARGINE 20 UNITS: 100 INJECTION, SOLUTION SUBCUTANEOUS at 20:23

## 2021-01-10 RX ADMIN — BUMETANIDE 2 MG: 0.25 INJECTION, SOLUTION INTRAMUSCULAR; INTRAVENOUS at 13:25

## 2021-01-10 RX ADMIN — Medication 10 ML: at 22:01

## 2021-01-10 RX ADMIN — CEFTRIAXONE SODIUM 1 G: 1 INJECTION, POWDER, FOR SOLUTION INTRAMUSCULAR; INTRAVENOUS at 02:36

## 2021-01-10 RX ADMIN — SODIUM CHLORIDE: 9 INJECTION, SOLUTION INTRAVENOUS at 06:00

## 2021-01-10 RX ADMIN — INSULIN LISPRO 5 UNITS: 100 INJECTION, SOLUTION INTRAVENOUS; SUBCUTANEOUS at 06:03

## 2021-01-10 RX ADMIN — INSULIN LISPRO 3 UNITS: 100 INJECTION, SOLUTION INTRAVENOUS; SUBCUTANEOUS at 20:23

## 2021-01-10 RX ADMIN — INSULIN LISPRO 4 UNITS: 100 INJECTION, SOLUTION INTRAVENOUS; SUBCUTANEOUS at 11:40

## 2021-01-10 RX ADMIN — SODIUM CHLORIDE: 9 INJECTION, SOLUTION INTRAVENOUS at 16:49

## 2021-01-10 ASSESSMENT — PAIN SCALES - GENERAL
PAINLEVEL_OUTOF10: 0
PAINLEVEL_OUTOF10: 0

## 2021-01-10 ASSESSMENT — PAIN SCALES - PAIN ASSESSMENT IN ADVANCED DEMENTIA (PAINAD)
BODYLANGUAGE: 0
CONSOLABILITY: 0
BREATHING: 0
NEGVOCALIZATION: 0
BREATHING: 0
BREATHING: 0
TOTALSCORE: 0
CONSOLABILITY: 0
FACIALEXPRESSION: 0
FACIALEXPRESSION: 0
TOTALSCORE: 0

## 2021-01-10 NOTE — ED NOTES
Bed: 09  Expected date:   Expected time:   Means of arrival:   Comments:  LANES Miller Kingfisher, RN  01/09/21 0025

## 2021-01-10 NOTE — H&P
ascorbic acid (VITAMIN C) 500 MG tablet Take 1 tablet by mouth daily 1/2/21   Chapito Ballesteros, MD   zinc sulfate (ZINCATE) 220 (50 Zn) MG capsule Take 1 capsule by mouth daily 1/2/21   Chapito Ballesteros, MD   polyethylene glycol (GLYCOLAX) 17 g packet Take 17 g by mouth daily as needed for Constipation 1/1/21 1/31/21  Chapito Ballesteros, MD   sennosides (SENOKOT) 8.8 MG/5ML syrup Take 5 mLs by mouth 2 times daily as needed (Constipation) 1/1/21 1/31/21  Chapito Ballesteros, MD   metoprolol tartrate (LOPRESSOR) 25 MG tablet Take 1 tablet by mouth 2 times daily 1/1/21   Chapito Ballesteros, MD   QUEtiapine (SEROQUEL) 50 MG tablet Take 1 tablet by mouth 2 times daily 1/1/21   Chapito Ballesteros, MD   insulin glargine (LANTUS) 100 UNIT/ML injection vial Inject 25 Units into the skin nightly 1/1/21   Chapito Ballesteros, MD   apixaban (ELIQUIS) 5 MG TABS tablet Take 1 tablet by mouth 2 times daily 1/1/21   Chapito Ballesteros, MD   pantoprazole (PROTONIX) 40 MG tablet Take 1 tablet by mouth daily (with breakfast) 1/1/21   Chapito Night, MD   simvastatin (ZOCOR) 20 MG tablet Take 20 mg by mouth nightly. Historical Provider, MD       Allergies:    Patient has no known allergies. Social History:    reports that he has quit smoking. His smoking use included cigarettes. He smoked 1.00 pack per day. He has never used smokeless tobacco. He reports current alcohol use. He reports that he does not use drugs. Family History:   family history is not on file. Patient unable to provide any family history. PHYSICAL EXAM:  Vitals:  BP (!) 128/106   Pulse 94   Temp 98 °F (36.7 °C) (Axillary)   Resp 16   Ht 6' (1.829 m)   Wt 230 lb (104.3 kg)   SpO2 100%   BMI 31.19 kg/m²     General Appearance: he was sleeping.   Skin: warm and dry  Head: normocephalic and atraumatic  Eyes:  Could not be evaluated due to sleeping  Neck: neck supple and non tender without mass   Pulmonary/Chest: clear to auscultation bilaterally- no wheezes, rales or rhonchi, normal air movement, no respiratory distress  Cardiovascular: irregularly irregular. Abdomen: soft, non-tender, non-distended, normal bowel sounds, no masses or organomegaly  Extremities: no cyanosis, no clubbing and no edema  Neurologic: could not be evaluated        LABS:  Recent Labs     01/09/21  2341 01/10/21  0153     --    K 6.4* 4.8     --    CO2 24  --    BUN 80*  --    CREATININE 4.0*  --    GLUCOSE 253*  --    CALCIUM 9.2  --        Recent Labs     01/09/21  2341   WBC 8.8   RBC 3.85   HGB 10.7*   HCT 33.3*   MCV 86.5   MCH 27.8   MCHC 32.1   RDW 14.0      MPV 12.2*       No results for input(s): POCGLU in the last 72 hours. Radiology:   XR CHEST PORTABLE   Final Result   Significantly decreased in the previously seen bilateral infiltrates with   minimal residual infiltrate in the right lung base. EKG: Atrial fibrillation    ASSESSMENT:      Active Problems:    Complicated UTI (urinary tract infection)  Resolved Problems:    * No resolved hospital problems. *      PLAN:    1. Agitations: Patient is sleeping now and controlled. 2.  Atrial fibrillation: Continue monitoring and hold Eliquis, possible vascular procedure for access of dialysis. 3.  End-stage renal disease: Patient missed dialysis Friday, need vascular access for dialysis, nephrology consult and vascular consult. 4.  Diabetes mellitus: He threw his NG tube out not eating anything at this moment, continue IV fluid, continue insulin, reinsert NG tube when patient wake up. 5.  UTI: Continue ceftriaxone IV 1 g daily follow-up urine culture and sensitivity. Code Status: Full  DVT prophylaxis: SCD      NOTE: This report was transcribed using voice recognition software. Every effort was made to ensure accuracy; however, inadvertent computerized transcription errors may be present.   Electronically signed by Jose De La Paz MD on 1/10/2021 at 2:37 AM

## 2021-01-10 NOTE — PROGRESS NOTES
Spoke with Kevin Plata RN from [facility] to get answers for the database. Patients baseline is lethargic, confused, and sometimes agitated.

## 2021-01-10 NOTE — ED PROVIDER NOTES
Patient is a 75-year-old male with past medical history of end-stage renal disease on dialysis, COVID-19 positive, dementia who presents via EMS from skilled nursing facility for report of agitation and pulled out his NG in Tessio today. EMS reports patient has been at the facility for the past 4 days and is receiving dialysis through his Tessio. They report that his last full treatment was last Wednesday. Patient does have COVID-19. Patient has been agitated and has been restrained for the past 3 days however today he was able to free himself from his restraints and pull out his NG tube as well as his right femoral Tessio. Bleeding was controlled. There is no trauma reported patient has dementia and ROS is limited. Review of Systems   Unable to perform ROS: Dementia        Physical Exam  Vitals signs and nursing note reviewed. Constitutional:       General: He is not in acute distress. Appearance: He is ill-appearing. He is not toxic-appearing. HENT:      Head: Normocephalic and atraumatic. Mouth/Throat:      Mouth: Mucous membranes are moist.      Pharynx: Oropharynx is clear. Eyes:      Extraocular Movements: Extraocular movements intact. Pupils: Pupils are equal, round, and reactive to light. Neck:      Musculoskeletal: Normal range of motion. Cardiovascular:      Rate and Rhythm: Regular rhythm. Tachycardia present. Pulses: Normal pulses. Heart sounds: Normal heart sounds. Neurological:      Mental Status: He is alert. MDM  Number of Diagnoses or Management Options  Acute cystitis with hematuria  Agitation  ESRD on dialysis [facility])  Diagnosis management comments: Is a 75-year-old male who presented from skilled nursing facility for agitation and patient self removal of his Tesio catheter as well as NG tube. Patient's last dialysis treatment was last Wednesday. Patient had been in restraints in the nursing facility for the last 3 days as per EMS. ---------------------------------------------  Past Medical History:  has a past medical history of Cancer ([facility] Diabetes mellitus ([facility] Hyperlipidemia, Hypertension, and Prostate cancer ([facility] Past Surgical History:  has a past surgical history that includes Colon surgery; Dilatation, esophagus; and Leg Surgery (Left, 6 years ago). Social History:  reports that he has quit smoking. His smoking use included cigarettes. He smoked 1.00 pack per day. He has never used smokeless tobacco. He reports current alcohol use. He reports that he does not use drugs. Family History: family history is not on file. The patients home medications have been reviewed. Allergies: Patient has no known allergies.     -------------------------------------------------- RESULTS -------------------------------------------------    LABS:  Results for orders placed or performed during the hospital encounter of 01/09/21   CBC Auto Differential   Result Value Ref Range    WBC 8.8 4.5 - 11.5 E9/L    RBC 3.85 3.80 - 5.80 E12/L    Hemoglobin 10.7 (L) 12.5 - 16.5 g/dL    Hematocrit 33.3 (L) 37.0 - 54.0 %    MCV 86.5 80.0 - 99.9 fL    MCH 27.8 26.0 - 35.0 pg    MCHC 32.1 32.0 - 34.5 %    RDW 14.0 11.5 - 15.0 fL    Platelets 718 676 - 440 E9/L    MPV 12.2 (H) 7.0 - 12.0 fL    Neutrophils % 83.4 (H) 43.0 - 80.0 %    Immature Granulocytes % 0.6 0.0 - 5.0 %    Lymphocytes % 8.2 (L) 20.0 - 42.0 %    Monocytes % 7.4 2.0 - 12.0 %    Eosinophils % 0.3 0.0 - 6.0 %    Basophils % 0.1 0.0 - 2.0 %    Neutrophils Absolute 7.34 (H) 1.80 - 7.30 E9/L    Immature Granulocytes # 0.05 E9/L    Lymphocytes Absolute 0.72 (L) 1.50 - 4.00 E9/L    Monocytes Absolute 0.65 0.10 - 0.95 E9/L    Eosinophils Absolute 0.03 (L) 0.05 - 0.50 E9/L    Basophils Absolute 0.01 0.00 - 0.20 E9/L   Comprehensive Metabolic Panel w/ Reflex to MG   Result Value Ref Range    Sodium 138 132 - 146 mmol/L    Potassium reflex Magnesium 6.4 (H) 3.5 - 5.0 mmol/L    Chloride 102 98 - 107 mmol/L    CO2 24 22 - 29 mmol/L    Anion Gap 12 7 - 16 mmol/L    Glucose 253 (H) 74 - 99 mg/dL    BUN 80 (H) 8 - 23 mg/dL    CREATININE 4.0 (H) 0.7 - 1.2 mg/dL    GFR Non-African American 18 >=60 mL/min/1.73    GFR African American 18     Calcium 9.2 8.6 - 10.2 mg/dL    Total Protein 7.4 6.4 - 8.3 g/dL    Alb 2.8 (L) 3.5 - 5.2 g/dL    Total Bilirubin 0.5 0.0 - 1.2 mg/dL    Alkaline Phosphatase 130 (H) 40 - 129 U/L    ALT 27 0 - 40 U/L    AST 35 0 - 39 U/L   Troponin   Result Value Ref Range    Troponin 0.03 0.00 - 0.03 ng/mL   APTT   Result Value Ref Range    aPTT 27.4 24.5 - 35.1 sec   Brain Natriuretic Peptide   Result Value Ref Range    Pro-BNP 2,329 (H) 0 - 125 pg/mL   Urinalysis, reflex to microscopic   Result Value Ref Range    Color, UA Yellow Straw/Yellow    Clarity, UA Clear Clear    Glucose, Ur 500 (A) Negative mg/dL    Bilirubin Urine Negative Negative    Ketones, Urine Negative Negative mg/dL    Specific Gravity, UA 1.015 1.005 - 1.030    Blood, Urine LARGE (A) Negative    pH, UA 6.0 5.0 - 9.0    Protein,  (A) Negative mg/dL    Urobilinogen, Urine 0.2 <2.0 E.U./dL    Nitrite, Urine Negative Negative    Leukocyte Esterase, Urine SMALL (A) Negative   Lactate, Sepsis   Result Value Ref Range    Lactic Acid, Sepsis 2.7 (H) 0.5 - 1.9 mmol/L   Microscopic Urinalysis   Result Value Ref Range    WBC, UA 5-10 (A) 0 - 5 /HPF    RBC, UA 10-20 (A) 0 - 2 /HPF    Epithelial Cells, UA RARE /HPF    Bacteria, UA NONE SEEN None Seen /HPF    Yeast, UA Present (A) None Seen /HPF   Potassium   Result Value Ref Range    Potassium 4.8 3.5 - 5.0 mmol/L   COVID-19   Result Value Ref Range    SARS-CoV-2, NAAT Not Detected Not Detected   POCT Glucose   Result Value Ref Range    Meter Glucose 331 (H) 74 - 99 mg/dL   POCT Glucose   Result Value Ref Range    Meter Glucose 376 (H) 74 - 99 mg/dL   EKG 12 Lead   Result Value Ref Range    Ventricular Rate 99 BPM    Atrial Rate 105 BPM    QRS Duration 76 ms    Q-T Interval 308 ms    QTc Calculation (Bazett) 395 ms    R Axis 9 degrees    T Axis 38 degrees       RADIOLOGY:  XR CHEST PORTABLE   Final Result   Significantly decreased in the previously seen bilateral infiltrates with   minimal residual infiltrate in the right lung base. EKG: This EKG is signed and interpreted by me. Rate: 105  Rhythm: Sinus  Interpretation: sinus tachycardia  Comparison: stable as compared to patient's most recent EKG      ------------------------- NURSING NOTES AND VITALS REVIEWED ---------------------------  Date / Time Roomed:  1/9/2021 10:39 PM  ED Bed Assignment:  [id number]    The nursing notes within the ED encounter and vital signs as below have been reviewed. Patient Vitals for the past 24 hrs:   BP Temp Temp src Pulse Resp SpO2 Height Weight   01/10/21 0420 115/75 97.4 °F (36.3 °C) Axillary 101 18 99 % -- --   01/10/21 0247 97/75 -- -- 91 16 97 % -- --   01/10/21 0234 -- -- -- 94 16 100 % -- --   01/10/21 0043 (!) 128/106 -- -- 90 16 100 % -- --   01/09/21 2250 (!) 128/106 98 °F (36.7 °C) Axillary 106 16 96 % 6' (1.829 m) 230 lb (104.3 kg)     Oxygen Saturation Interpretation: Abnormal and Improved after treatment    ------------------------------------------ PROGRESS NOTES ------------------------------------------  Re-evaluation(s):  Patients symptoms show no change  Repeat physical examination is not changed    Counseling:  I have spoken with the patient and SNF and discussed todays results, in addition to providing specific details for the plan of care and counseling regarding the diagnosis and prognosis. Their questions are answered at this time and they are agreeable with the plan of admission.    --------------------------------- ADDITIONAL PROVIDER NOTES ---------------------------------  Consultations:  Spoke with Dr. Emely Pinto.  Discussed case. They will admit the patient.     This patient's ED course included: a personal history and physicial examination, multiple bedside re-evaluations, IV medications, cardiac monitoring and continuous pulse oximetry    This patient has remained hemodynamically stable during their ED course. Diagnosis:  1. Acute cystitis with hematuria    2. ESRD on dialysis ([address]    3. Agitation      Disposition:  Patient's disposition: Admit to telemetry  Patient's condition is stable. 1/10/21, 2:33 AM EST.     This note is prepared by Marline Kendrick MD -PGY-1           Marline Kendrick MD  Resident  01/10/21 6779

## 2021-01-10 NOTE — PROGRESS NOTES
[facility] ([facility]) Hospitalist Progress Note    Admission Date  1/9/2021 10:39 PM  Chief Complaint Wound check  Admit Dx   Complicated UTI (urinary tract infection) [N39.0]    Subjective  History of Present Illness  70M PMH DM, HPL, HTN, prostate Ca, hx questionable EtOH abuse. Originally lived home independent. EMS initially called 12/22 for AMS, pt admitted to MICU at [facility] for sepsis / [place] / DKA, developed ESRD (s/p placement of Tesio and initiation of HD), anemia, and a fib. Required intubation and extubated 12/29, DC'd to [address] [place] [address] 1/1 with restraints on, NG in place and tube feeds running through that. Pt had not had formal swallow eval that I could find to determine how pt should even be fed. At [facility], pt's restraints were removed, he pulled out his NG tube and Tesio catheter and was sent here for further evaluation. not performed by me today as pt already examined and billed by another physician on this service today    Review of Systems not performed by me today as pt already examined and billed by another physician on this service today    Objective  Physical Exam not performed by me today as pt already examined and billed by another physician on this service today    Assessment / Plan  Agitation / encephalopathy - apparently baseline is A&Ox3 living home as early as mid-December before [place] / DKA / ICU / intubation. Sent to [facility] still altered in restraints on Jan 1, tube feeds via NG without formal swallow eval.  Had CT in ED, no further head imaging, no neuro consult, no psych consult during his admission. Check CT head    ? Dysphagia - arrived to LTAC w NG and tube feeds. Pt pulled NG. Would have speech see and make recommendations. NG and tube feeds not mentioned anywhere in DC summary from Jan 1    Hypoxic respiratory failure, acute vs acute-on-chronic - had been intubated related to admission for COVID in December, on Levindale Uzbek here initially now down to [facility].   CXR showing significant

## 2021-01-10 NOTE — PROGRESS NOTES
Called Dr. Kye Richmond regarding patients blood sugar of 331. See orders. Dr. Kye Richmond also stated to insert NG tube during day shift.

## 2021-01-11 ENCOUNTER — APPOINTMENT (OUTPATIENT)
Dept: GENERAL RADIOLOGY | Age: 71
DRG: 314 | End: 2021-01-11
Payer: MEDICARE

## 2021-01-11 LAB
ALBUMIN SERPL-MCNC: 2.7 G/DL (ref 3.5–5.2)
ALP BLD-CCNC: 119 U/L (ref 40–129)
ALT SERPL-CCNC: 20 U/L (ref 0–40)
ANION GAP SERPL CALCULATED.3IONS-SCNC: 17 MMOL/L (ref 7–16)
AST SERPL-CCNC: 12 U/L (ref 0–39)
BILIRUB SERPL-MCNC: 0.4 MG/DL (ref 0–1.2)
BUN BLDV-MCNC: 95 MG/DL (ref 8–23)
CALCIUM SERPL-MCNC: 9.2 MG/DL (ref 8.6–10.2)
CHLORIDE BLD-SCNC: 110 MMOL/L (ref 98–107)
CO2: 20 MMOL/L (ref 22–29)
CREAT SERPL-MCNC: 4.6 MG/DL (ref 0.7–1.2)
GFR AFRICAN AMERICAN: 15
GFR NON-AFRICAN AMERICAN: 15 ML/MIN/1.73
GLUCOSE BLD-MCNC: 282 MG/DL (ref 74–99)
HCT VFR BLD CALC: 30.5 % (ref 37–54)
HEMOGLOBIN: 9.5 G/DL (ref 12.5–16.5)
MCH RBC QN AUTO: 27.5 PG (ref 26–35)
MCHC RBC AUTO-ENTMCNC: 31.1 % (ref 32–34.5)
MCV RBC AUTO: 88.2 FL (ref 80–99.9)
METER GLUCOSE: 293 MG/DL (ref 74–99)
METER GLUCOSE: 301 MG/DL (ref 74–99)
METER GLUCOSE: 314 MG/DL (ref 74–99)
METER GLUCOSE: 341 MG/DL (ref 74–99)
PDW BLD-RTO: 14.2 FL (ref 11.5–15)
PHOSPHORUS: 6.5 MG/DL (ref 2.5–4.5)
PLATELET # BLD: 154 E9/L (ref 130–450)
PMV BLD AUTO: 12.3 FL (ref 7–12)
POTASSIUM SERPL-SCNC: 5.1 MMOL/L (ref 3.5–5)
RBC # BLD: 3.46 E12/L (ref 3.8–5.8)
SODIUM BLD-SCNC: 147 MMOL/L (ref 132–146)
TOTAL PROTEIN: 7.3 G/DL (ref 6.4–8.3)
WBC # BLD: 6.8 E9/L (ref 4.5–11.5)

## 2021-01-11 PROCEDURE — 84100 ASSAY OF PHOSPHORUS: CPT

## 2021-01-11 PROCEDURE — 6370000000 HC RX 637 (ALT 250 FOR IP): Performed by: INTERNAL MEDICINE

## 2021-01-11 PROCEDURE — U0003 INFECTIOUS AGENT DETECTION BY NUCLEIC ACID (DNA OR RNA); SEVERE ACUTE RESPIRATORY SYNDROME CORONAVIRUS 2 (SARS-COV-2) (CORONAVIRUS DISEASE [COVID-19]), AMPLIFIED PROBE TECHNIQUE, MAKING USE OF HIGH THROUGHPUT TECHNOLOGIES AS DESCRIBED BY CMS-2020-01-R: HCPCS

## 2021-01-11 PROCEDURE — 99233 SBSQ HOSP IP/OBS HIGH 50: CPT | Performed by: INTERNAL MEDICINE

## 2021-01-11 PROCEDURE — 2700000000 HC OXYGEN THERAPY PER DAY

## 2021-01-11 PROCEDURE — 2580000003 HC RX 258: Performed by: NURSE PRACTITIONER

## 2021-01-11 PROCEDURE — 82962 GLUCOSE BLOOD TEST: CPT

## 2021-01-11 PROCEDURE — 80053 COMPREHEN METABOLIC PANEL: CPT

## 2021-01-11 PROCEDURE — 2580000003 HC RX 258: Performed by: INTERNAL MEDICINE

## 2021-01-11 PROCEDURE — 92526 ORAL FUNCTION THERAPY: CPT | Performed by: SPEECH-LANGUAGE PATHOLOGIST

## 2021-01-11 PROCEDURE — 92611 MOTION FLUOROSCOPY/SWALLOW: CPT | Performed by: SPEECH-LANGUAGE PATHOLOGIST

## 2021-01-11 PROCEDURE — 2500000003 HC RX 250 WO HCPCS: Performed by: RADIOLOGY

## 2021-01-11 PROCEDURE — 85027 COMPLETE CBC AUTOMATED: CPT

## 2021-01-11 PROCEDURE — 1200000000 HC SEMI PRIVATE

## 2021-01-11 PROCEDURE — 99221 1ST HOSP IP/OBS SF/LOW 40: CPT | Performed by: INTERNAL MEDICINE

## 2021-01-11 PROCEDURE — 92610 EVALUATE SWALLOWING FUNCTION: CPT | Performed by: SPEECH-LANGUAGE PATHOLOGIST

## 2021-01-11 PROCEDURE — 36415 COLL VENOUS BLD VENIPUNCTURE: CPT

## 2021-01-11 PROCEDURE — 6360000002 HC RX W HCPCS: Performed by: INTERNAL MEDICINE

## 2021-01-11 PROCEDURE — 74230 X-RAY XM SWLNG FUNCJ C+: CPT

## 2021-01-11 RX ORDER — CALCIUM ACETATE 667 MG/1
1334 CAPSULE ORAL 2 TIMES DAILY WITH MEALS
Status: DISCONTINUED | OUTPATIENT
Start: 2021-01-11 | End: 2021-01-11

## 2021-01-11 RX ORDER — DEXTROSE MONOHYDRATE 50 MG/ML
INJECTION, SOLUTION INTRAVENOUS CONTINUOUS
Status: DISCONTINUED | OUTPATIENT
Start: 2021-01-11 | End: 2021-01-11

## 2021-01-11 RX ORDER — INSULIN GLARGINE 100 [IU]/ML
22 INJECTION, SOLUTION SUBCUTANEOUS NIGHTLY
Status: DISCONTINUED | OUTPATIENT
Start: 2021-01-11 | End: 2021-01-12

## 2021-01-11 RX ORDER — CHLORHEXIDINE GLUCONATE 0.12 MG/ML
15 RINSE ORAL 4 TIMES DAILY
Status: DISCONTINUED | OUTPATIENT
Start: 2021-01-11 | End: 2021-01-16 | Stop reason: HOSPADM

## 2021-01-11 RX ORDER — CALCIUM ACETATE 667 MG/1
1334 CAPSULE ORAL 2 TIMES DAILY WITH MEALS
Status: DISCONTINUED | OUTPATIENT
Start: 2021-01-12 | End: 2021-01-16 | Stop reason: HOSPADM

## 2021-01-11 RX ORDER — INSULIN GLARGINE 100 [IU]/ML
30 INJECTION, SOLUTION SUBCUTANEOUS NIGHTLY
Status: DISCONTINUED | OUTPATIENT
Start: 2021-01-11 | End: 2021-01-11

## 2021-01-11 RX ADMIN — CHLORHEXIDINE GLUCONATE 0.12% ORAL RINSE 15 ML: 1.2 LIQUID ORAL at 17:19

## 2021-01-11 RX ADMIN — INSULIN LISPRO 8 UNITS: 100 INJECTION, SOLUTION INTRAVENOUS; SUBCUTANEOUS at 22:00

## 2021-01-11 RX ADMIN — SODIUM CHLORIDE: 9 INJECTION, SOLUTION INTRAVENOUS at 11:45

## 2021-01-11 RX ADMIN — BARIUM SULFATE 120 ML: 400 SUSPENSION ORAL at 15:40

## 2021-01-11 RX ADMIN — METOPROLOL TARTRATE 25 MG: 25 TABLET, FILM COATED ORAL at 20:59

## 2021-01-11 RX ADMIN — Medication 10 ML: at 21:00

## 2021-01-11 RX ADMIN — INSULIN LISPRO 4 UNITS: 100 INJECTION, SOLUTION INTRAVENOUS; SUBCUTANEOUS at 06:25

## 2021-01-11 RX ADMIN — DEXTROSE MONOHYDRATE: 50 INJECTION, SOLUTION INTRAVENOUS at 14:31

## 2021-01-11 RX ADMIN — INSULIN LISPRO 6 UNITS: 100 INJECTION, SOLUTION INTRAVENOUS; SUBCUTANEOUS at 17:19

## 2021-01-11 RX ADMIN — INSULIN LISPRO 5 UNITS: 100 INJECTION, SOLUTION INTRAVENOUS; SUBCUTANEOUS at 17:18

## 2021-01-11 RX ADMIN — INSULIN GLARGINE 22 UNITS: 100 INJECTION, SOLUTION SUBCUTANEOUS at 21:04

## 2021-01-11 RX ADMIN — CHLORHEXIDINE GLUCONATE 0.12% ORAL RINSE 15 ML: 1.2 LIQUID ORAL at 09:30

## 2021-01-11 RX ADMIN — BARIUM SULFATE 20 ML: 0.81 POWDER, FOR SUSPENSION ORAL at 15:40

## 2021-01-11 RX ADMIN — ENOXAPARIN SODIUM 30 MG: 30 INJECTION SUBCUTANEOUS at 11:43

## 2021-01-11 RX ADMIN — ATORVASTATIN CALCIUM 10 MG: 10 TABLET, FILM COATED ORAL at 20:59

## 2021-01-11 RX ADMIN — CHLORHEXIDINE GLUCONATE 0.12% ORAL RINSE 15 ML: 1.2 LIQUID ORAL at 11:43

## 2021-01-11 RX ADMIN — INSULIN LISPRO 8 UNITS: 100 INJECTION, SOLUTION INTRAVENOUS; SUBCUTANEOUS at 12:40

## 2021-01-11 RX ADMIN — QUETIAPINE FUMARATE 50 MG: 25 TABLET ORAL at 20:59

## 2021-01-11 RX ADMIN — BARIUM SULFATE 10 ML: 400 PASTE ORAL at 15:40

## 2021-01-11 RX ADMIN — CHLORHEXIDINE GLUCONATE 0.12% ORAL RINSE 15 ML: 1.2 LIQUID ORAL at 20:59

## 2021-01-11 NOTE — PROGRESS NOTES
[facility] ([facility]) Hospitalist Progress Note    Admission Date  1/9/2021 10:39 PM  Chief Complaint Wound check  Admit Dx   Complicated UTI (urinary tract infection) [N39.0]    Subjective  History of Present Illness  70M PMH DM, HPL, HTN, prostate Ca, hx questionable EtOH abuse. Originally lived home independent. EMS initially called 12/22 for AMS, pt admitted to MICU at [facility] for sepsis / [place] / DKA, developed ESRD (s/p placement of Tesio and initiation of HD), anemia, and a fib. Required intubation and extubated 12/29, DC'd to [address] [place] [address] 1/1 with restraints on, NG in place and tube feeds running through that. Pt had not had formal swallow eval that I could find to determine how pt should even be fed. At [facility], pt's restraints were removed, he pulled out his NG tube and Tesio catheter and was sent here for further evaluation. Interim History  Pt seen at bedside, speech about to work with pt at the time, but due to the pt's exam, video swallow suggested and ordered. Nephro has seen pt, planning for HD tomorrow morning after tunneled cath placement.     Review of Systems - unable to obtain d/t pt mental status    Objective  Physical Exam  Vitals: /82   Pulse 95   Temp 97.4 °F (36.3 °C) (Axillary)   Resp 18   Ht 6' (1.829 m)   Wt 171 lb (77.6 kg)   SpO2 98%   BMI 23.19 kg/m²   General: well-developed, well-nourished, no acute distress, cooperative  Skin: warm, dry, intact, normal color without cyanosis  HEENT: normocephalic, atraumatic, mucous membranes normal  Respiratory: clear to auscultation bilaterally without respiratory distress  Cardiovascular: regular rate and rhythm without murmur / rub / gallop  Abdominal: soft, nontender, nondistended, normoactive bowel sounds  Extremities: no mottling, pulses intact, no edema  Neurologic: awake, alert, no focal deficits  Psychiatric: normal affect, cooperative    Assessment / Plan  Agitation / encephalopathy - apparently baseline is A&Ox3 living home as early as mid-December before COVID / DKA / ICU / intubation. Sent to [facility] still altered in restraints on Jan 1, tube feeds via NG without formal swallow eval.  During last admission had CT head in ED, no further head imaging, no neuro consult, no psych consult during his admission. CT head unremarkable. ? Dysphagia - arrived to LTAC w NG and tube feeds. Pt pulled NG.  NG and tube feeds not mentioned anywhere in DC summary from Jan 1. Speech has seen, pt sometimes coughing / choking; video swallow recommended and ordered. Hypoxic respiratory failure, acute vs acute-on-chronic - had been intubated related to admission for COVID in December, on Levindale Sami here initially now down to [facility]. CXR showing significant decrease in b/l infiltrates    ESRD / HD - recent dx developed during ICU hospitalization for COVID. Last tx reported last Weds and pt has pulled Tesio. Vascular planning for re-placement tomorrow 1/12    IDDM - home med list reviewed; on Lantus 20 U qHS, Humalog med-dose ISS, target -180, significantly above goal and have consulted endocrinology     Abnormal UA - not septic.  UA showed no nitrite / small LE / no bacteria; ur cx sent and NGTD; Rocephin given in ED x1 but do not feel active UTI and not continuing abx; given duration of pt's mental status changes would not expect this to be the culprit    Pt's PMH otherwise pertinent for HTN, HPL, hx prostate Ca. Continue outpt med regimen; if any particular issues regarding these items, will address and move to active problem list above.      Code status  Full  DVT prophylaxis Lovenox, SCDs  Disposition  Likely [facility]    Electronically signed by Liam Meraz DO on 1/11/2021 at 7:22 AM

## 2021-01-11 NOTE — DISCHARGE INSTR - COC
Continuity of Care Form    Patient Name: Sadie Crowley   :  1950  MRN:  [id number]    Admit date:  2021  Discharge date:  ***    Code Status Order: Full Code   Advance Directives:   Advance Care Flowsheet Documentation     Date/Time Healthcare Directive Type of Healthcare Directive Copy in [address] Agent's Name Healthcare Agent's Phone Number    01/10/21 0445  No, patient does not have an advance directive for healthcare treatment -- -- -- -- --          Admitting Physician:  Pepe Boykin MD  PCP: Rose Marie Whittaker DO    Discharging Nurse: [facility] Unit/Room#: 4686/2321-W  Discharging Unit Phone Number: ***    Emergency Contact:   Extended Emergency Contact Information  Primary Emergency Contact: Yamileth Irene  Address: [address] [facility] [address], [address] [place] [address] Phone: [phone]  Mobile Phone: [phone]  Relation: Domestic Partner  Secondary Emergency Contact: Manuel Brown  Address: [facility], [address] [place] [address] Phone: [phone]  Mobile Phone: [phone]  Relation: Brother/Sister    Past Surgical History:  Past Surgical History:   Procedure Laterality Date    COLON SURGERY      DILATATION, ESOPHAGUS      LEG SURGERY Left 6 years ago       Immunization History: There is no immunization history on file for this patient.     Active Problems:  Patient Active Problem List   Diagnosis Code    Hyperlipidemia E78.5    Prostate cancer ([facility] C61    Hypertension I10    Diabetes mellitus ([address] E11.9    Shoulder pain, bilateral M25.511, M25.512    Abdominal pain, right lower quadrant R10.31    DKA, type 1, not at goal [facility]) E10.10    Acute metabolic encephalopathy P74.95    Acute pancreatitis K85.90    Acute renal failure (ARF) (HCC) N17.9    High anion gap metabolic acidosis A44.7    Complicated UTI (urinary tract infection) N39.0       Isolation/Infection: Impairments/Disabilities:890031859}    Nutrition Therapy:  Current Nutrition Therapy:   [address] [place] Diet List:388782942}    Routes of Feeding: {CHP DME Other Feedings:241989111}  Liquids: {Slp liquid thickness:71541}  Daily Fluid Restriction: {CHP DME Yes amt example:055673554}  Last Modified Barium Swallow with Video (Video Swallowing Test): {Done Not Done BBKV:066639253}    Treatments at the Time of Hospital Discharge:   Respiratory Treatments: ***  Oxygen Therapy:  {Therapy; copd oxygen:40149}  Ventilator:    { CC Vent GEZE:943866004}    Rehab Therapies: {THERAPEUTIC INTERVENTION:6841802044}  Weight Bearing Status/Restrictions: [address] CC Weight Bearin}  Other Medical Equipment (for information only, NOT a DME order):  {EQUIPMENT:109777716}  Other Treatments: ***    Patient's personal belongings (please select all that are sent with patient):  {P DME Belongings:014251342}    RN SIGNATURE:  {Esignature:785328410}    CASE MANAGEMENT/SOCIAL WORK SECTION    Inpatient Status Date: ***    Readmission Risk Assessment Score:  Readmission Risk              Risk of Unplanned Readmission:        44           Discharging to Facility/ Agency   · Name: [facility]  Address:   [address] [place] [address]         Phone: [phone]       Fax: [phone]        Dialysis Facility (if applicable)   · Name:  · Address:  · Dialysis Schedule:  · Phone:  · Fax:    / signature: Electronically signed by ELLIOT Dubosi on 21 at 9:29 AM EST    PHYSICIAN SECTION    Prognosis: {Prognosis:7913526727}    Condition at Discharge: Stable    Rehab Potential (if transferring to Rehab): {Prognosis:5953858414}    Recommended Labs or Other Treatments After Discharge: ***    Physician Certification: I certify the above information and transfer of Andre Brown  is necessary for the continuing treatment of the diagnosis listed and that he requires LTAC for less 30 days.      Update Admission H&P: {CHP DME Changes in [facility]:669536166}    PHYSICIAN SIGNATURE:  {Esignature:270497863}

## 2021-01-11 NOTE — PROGRESS NOTES
Department of Internal Medicine  Nephrology Progress Note      Events Reviewed:      Subjective: We are following Mr. Andre Oliver for ESRD on HD- momoo pulled out.       Current Medications:    Current Facility-Administered Medications: chlorhexidine (PERIDEX) 0.12 % solution 15 mL, 15 mL, Mouth/Throat, 4x Daily  insulin glargine (LANTUS) injection vial 30 Units, 30 Units, Subcutaneous, Nightly  insulin lispro (HUMALOG) injection vial 0-12 Units, 0-12 Units, Subcutaneous, Q6H  ascorbic acid (VITAMIN C) tablet 500 mg, 500 mg, Oral, Daily  metoprolol tartrate (LOPRESSOR) tablet 25 mg, 25 mg, Oral, BID  pantoprazole (PROTONIX) tablet 40 mg, 40 mg, Oral, Daily with breakfast  polyethylene glycol (GLYCOLAX) packet 17 g, 17 g, Oral, Daily PRN  QUEtiapine (SEROQUEL) tablet 50 mg, 50 mg, Oral, BID  senna (SENOKOT) tablet 8.6 mg, 1 tablet, Oral, BID PRN  vitamin D (CHOLECALCIFEROL) tablet 2,000 Units, 2,000 Units, Oral, Daily  zinc sulfate (ZINCATE) capsule 50 mg, 50 mg, Oral, Daily  sodium chloride flush 0.9 % injection 10 mL, 10 mL, Intravenous, 2 times per day  sodium chloride flush 0.9 % injection 10 mL, 10 mL, Intravenous, PRN  enoxaparin (LOVENOX) injection 30 mg, 30 mg, Subcutaneous, Daily  promethazine (PHENERGAN) tablet 12.5 mg, 12.5 mg, Oral, Q6H PRN **OR** ondansetron (ZOFRAN) injection 4 mg, 4 mg, Intravenous, Q6H PRN  polyethylene glycol (GLYCOLAX) packet 17 g, 17 g, Oral, Daily PRN  acetaminophen (TYLENOL) tablet 650 mg, 650 mg, Oral, Q6H PRN **OR** acetaminophen (TYLENOL) suppository 650 mg, 650 mg, Rectal, Q6H PRN  atorvastatin (LIPITOR) tablet 10 mg, 10 mg, Oral, Nightly  0.9 % sodium chloride infusion, , Intravenous, Continuous  glucose (GLUTOSE) 40 % oral gel 15 g, 15 g, Oral, PRN  dextrose 50 % IV solution, 12.5 g, Intravenous, PRN  glucagon (rDNA) injection 1 mg, 1 mg, Intramuscular, PRN  dextrose 5 % solution, 100 mL/hr, Intravenous, PRN  ceFAZolin (ANCEF) 1 g in sterile water 10 mL IV syringe, 1 g, Intravenous, See Admin Instructions  sodium chloride flush 0.9 % injection 10 mL, 10 mL, Intravenous, 2 times per day  sodium chloride flush 0.9 % injection 10 mL, 10 mL, Intravenous, PRN  Allergies:  Patient has no known allergies. PHYSICAL EXAM:      Vitals:    VITALS:  BP (!) 152/79   Pulse 90   Temp 96.2 °F (35.7 °C) (Axillary)   Resp 18   Ht 6' (1.829 m)   Wt 171 lb (77.6 kg)   SpO2 100%   BMI 23.19 kg/m²   CURRENT PULSE OXIMETRY:  SpO2: 100 %  24HR INTAKE/OUTPUT:      Intake/Output Summary (Last 24 hours) at 1/11/2021 1142  Last data filed at 1/11/2021 0528  Gross per 24 hour   Intake 989 ml   Output 2000 ml   Net -1011 ml       Access:  None present. For tunneled catheter in the am  Constitutional:  Alert, speech difficult to understand, thick garbled  HEENT:  Normocephalic, Atraumatic, mucous membranes dry  Respiratory:  2 L per nasal cannula 98% sat  Cardiovascular/Edema:  Irregular S1/S2 No edema  Gastrointestinal:  Abdomen soft non distended BS + x 4 quadrants  Musculoskeletal: weak but ALVES, no edema  Neurologic:  Alert, speech difficult to understand.   Other: Behavior is calm     DATA:    CBC with Differential:    Lab Results   Component Value Date    WBC 6.8 01/11/2021    RBC 3.46 01/11/2021    HGB 9.5 01/11/2021    HCT 30.5 01/11/2021     01/11/2021    MCV 88.2 01/11/2021    MCH 27.5 01/11/2021    MCHC 31.1 01/11/2021    RDW 14.2 01/11/2021    NRBC 0.9 12/26/2020    SEGSPCT 63 09/27/2013    LYMPHOPCT 8.2 01/09/2021    MONOPCT 7.4 01/09/2021    MYELOPCT 0.9 12/26/2020    BASOPCT 0.1 01/09/2021    MONOSABS 0.65 01/09/2021    LYMPHSABS 0.72 01/09/2021    EOSABS 0.03 01/09/2021    BASOSABS 0.01 01/09/2021     CMP:    Lab Results   Component Value Date     01/11/2021    K 5.1 01/11/2021    K 6.4 01/09/2021     01/11/2021    CO2 20 01/11/2021    BUN 95 01/11/2021    CREATININE 4.6 01/11/2021    GFRAA 15 01/11/2021    LABGLOM 15 01/11/2021    GLUCOSE 282 01/11/2021    PROT 7.3 01/11/2021    LABALBU 2.7 01/11/2021    CALCIUM 9.2 01/11/2021    BILITOT 0.4 01/11/2021    ALKPHOS 119 01/11/2021    AST 12 01/11/2021    ALT 20 01/11/2021     Hepatic Function Panel:    Lab Results   Component Value Date    ALKPHOS 119 01/11/2021    ALT 20 01/11/2021    AST 12 01/11/2021    PROT 7.3 01/11/2021    BILITOT 0.4 01/11/2021    BILIDIR 0.2 12/30/2020    IBILI 0.3 12/30/2020    LABALBU 2.7 01/11/2021     Albumin:    Lab Results   Component Value Date    LABALBU 2.7 01/11/2021     Calcium:    Lab Results   Component Value Date    CALCIUM 9.2 01/11/2021   Magnesium:    Lab Results   Component Value Date    MG 2.3 01/01/2021     Phosphorus:    Lab Results   Component Value Date    PHOS 6.5 01/11/2021     PT/INR:    Lab Results   Component Value Date    PROTIME 17.6 01/01/2021    INR 1.5 01/01/2021     Radiology Review:     Chest xray 1/9/2021   Significantly decreased in the previously seen bilateral infiltrates with minimal residual infiltrate in the right lung base.         CT head 1/10/21   No acute intracranial abnormality. IMPRESSION/RECOMMENDATIONS:      1. End-stage-renal-disease. Needs tunneled catheter placed. Vascular surgery to place in       am  2. Chronic respiratory failure on 2 L nasal cannula  3. Chronic Atrial fibrillation. Eliquis is on hold for procedure  4. Diabetes mellitus  On SS insulin, endocrinology consulted  5. Anemia (10.7/33.3)  6. 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 16. Start phos binder when no longer NPO  7. Malnutrition, hypoalbuminemia , Pulled NGT out, currently NPO  8. Agitation, Received 1 x dose Haldol, on Seroquel BID    Plan:    · Vascular Surgery to place tunneled dialysis catheter in am  · Hemodialysis tomorrow after the tunneled dialysis cath placement- orders reviewed with dialysis nurse  · Start D5W at 50cc/hr.   · Daily weights  · Strict I & Os  · Start Calcium Acetate 1334mg PO BID when no longer NPO

## 2021-01-11 NOTE — PROGRESS NOTES
Per Surgery, no tessio insertion today. He will be on the board for tomorrow.     Electronically signed by Meet Mireles RN on 1/11/2021 at 9:36 AM

## 2021-01-11 NOTE — CONSULTS
ENDOCRINOLOGY INITIAL CONSULTATION NOTE      Date of admission: 1/9/2021  Date of service: 1/11/2021  Admitting physician: Telly Jose MD   Primary Care Physician: Lidya Garcia DO  Consultant physician: Amanda Arriaga MD     Reason for the consultation:  Uncontrolled DM    History of Present Illness: The history is provided from medical records. The patient has dementia and history is limited     Days HEALDIO Ramirez is a very pleasant 65 y.o. old male with PMH of DM type 2, HTN, HLD, ESRD on dialysis and other listed below admitted to [facility] on 1/9/2021 from nursing home because of agitation and pulled out his NG and right femoral Tessio, endocrine service was consulted for diabetes management. Patient does have COVID-19.  per medical records, the pt has been agitated and has been restrained for the past 3 days however on admission day he was able to free himself from his restraints and pull out his NG tube as well as his right femoral Tessio     Prior to admission  The patient was diagnosed with type 2 DM long time ago. Prior to admission patient was on humalog sliding scale. He was on Tube feeds. BS has been above goal prior to admission.  In addition, patient's DM complicated with both macrovascular or microvascular complications   Lab Results   Component Value Date    LABA1C 11.1 12/23/2020     Inpatient diet:   Carb Restricted diet     Point of care glucose monitoring (Independently reviewed)   Recent Labs     01/10/21  0433 01/10/21  0603 01/10/21  1100 01/10/21  1610 01/10/21  1959 01/11/21  0624   GLUMET 331* 376* 319* 295* 275* 301*       Past medical history:   Past Medical History:   Diagnosis Date    [facility]     Diabetes mellitus ([facility] [facility]     Hyperlipidemia     Hypertension     Prostate cancer ([facility] [address] [facility]        Past surgical history:  Past Surgical History:   Procedure Laterality Date    COLON SURGERY      DILATATION, ESOPHAGUS      LEG SURGERY Left 6 years ago       Social history:   Tobacco: reports that he has quit smoking. His smoking use included cigarettes. He smoked 1.00 pack per day. He has never used smokeless tobacco.  Alcohol:   reports current alcohol use. Drugs:   reports no history of drug use. Family history:    No family history on file.     Allergy and drug reactions:   No Known Allergies    Scheduled Meds:   chlorhexidine  15 mL Mouth/Throat 4x Daily    insulin glargine  30 Units Subcutaneous Nightly    insulin lispro  0-12 Units Subcutaneous Q6H    ascorbic acid  500 mg Oral Daily    metoprolol tartrate  25 mg Oral BID    pantoprazole  40 mg Oral Daily with breakfast    QUEtiapine  50 mg Oral BID    Vitamin D  2,000 Units Oral Daily    zinc sulfate  50 mg Oral Daily    sodium chloride flush  10 mL Intravenous 2 times per day    enoxaparin  30 mg Subcutaneous Daily    atorvastatin  10 mg Oral Nightly    ceFAZolin  1 g Intravenous See Admin Instructions    sodium chloride flush  10 mL Intravenous 2 times per day     PRN Meds:       polyethylene glycol, 17 g, Daily PRN      senna, 1 tablet, BID PRN      sodium chloride flush, 10 mL, PRN      promethazine, 12.5 mg, Q6H PRN    Or      ondansetron, 4 mg, Q6H PRN      polyethylene glycol, 17 g, Daily PRN      acetaminophen, 650 mg, Q6H PRN    Or      acetaminophen, 650 mg, Q6H PRN      glucose, 15 g, PRN      dextrose, 12.5 g, PRN      glucagon (rDNA), 1 mg, PRN      dextrose, 100 mL/hr, PRN      sodium chloride flush, 10 mL, PRN      Continuous Infusions:   sodium chloride 50 mL/hr at 01/10/21 1649    dextrose         Review of Systems  Non-obtainable     OBJECTIVE    /82   Pulse 95   Temp 97.4 °F (36.3 °C) (Axillary)   Resp 18   Ht 6' (1.829 m)   Wt 171 lb (77.6 kg)   SpO2 98%   BMI 23.19 kg/m²     Intake/Output Summary (Last 24 hours) at 1/11/2021 0737  Last data filed at 1/11/2021 0528  Gross per 24 hour   Intake 989 ml   Output 2000 ml   Net -1011 ml       Physical examination:  General: sleepy HEENT: normocephalic non-traumatic   Neck: supple,   Pulm: Clear equal air entry no added sounds, no wheezing or rhonchi    CVS: S1 + S2, no murmur, no heave  Abd: soft lax, no tenderness, no organomegaly, audible bowel sounds. Skin: warm, no lesions, no rash. Feet: no wounds, no ulcers   Neuro: CN intact, muscle power normal  Psych: normal mood, and affect    Review of Laboratory Data:  I personally reviewed the following labs:   Recent Labs     01/09/21  2341 01/11/21  0540   WBC 8.8 6.8   RBC 3.85 3.46*   HGB 10.7* 9.5*   HCT 33.3* 30.5*   MCV 86.5 88.2   MCH 27.8 27.5   MCHC 32.1 31.1*   RDW 14.0 14.2    154   MPV 12.2* 12.3*     Recent Labs     01/09/21  2341 01/10/21  0153 01/11/21  0540     --  147*   K 6.4* 4.8 5.1*     --  110*   CO2 24  --  20*   BUN 80*  --  95*   CREATININE 4.0*  --  4.6*   GLUCOSE 253*  --  282*   CALCIUM 9.2  --  9.2   PROT 7.4  --  7.3   LABALBU 2.8*  --  2.7*   BILITOT 0.5  --  0.4   ALKPHOS 130*  --  119   AST 35  --  12   ALT 27  --  20     Beta-Hydroxybutyrate   Date Value Ref Range Status   12/23/2020 0.80 (H) 0.02 - 0.27 mmol/L Final     Lab Results   Component Value Date    LABA1C 11.1 12/23/2020    LABA1C 6.3 09/27/2013     Lab Results   Component Value Date/Time    TSH 0.540 12/23/2020 03:00 AM     Lab Results   Component Value Date    LABA1C 11.1 12/23/2020    GLUCOSE 282 01/11/2021    LABCREA 17 12/29/2020     Lab Results   Component Value Date    TRIG 439 12/23/2020    HDL 14 12/23/2020    [address] - 12/23/2020    CHOL 160 12/23/2020       Blood culture   Lab Results   Component Value Date    BC 5 Days no growth 12/22/2020       Radiology:  CT HEAD WO CONTRAST   Final Result   No acute intracranial abnormality. XR CHEST PORTABLE   Final Result   Significantly decreased in the previously seen bilateral infiltrates with   minimal residual infiltrate in the right lung base.           Medical Records/Labs/Images review:   I personally reviewed and summarized previous records   All labs and imaging were reviewed independently     ASSESSMENT & PLAN   Days HELADIO Brown, a 65 y.o.-old male seen today for inpatient diabetes management     Diabetes Mellitus type 2   · Patient's diabetes is uncontrolled, A1c 11.1%    · Pt pulled out tube feeds and now NPO   · For now, will change diabetes regimen to:  · Change Lantus to 22 units nightly   · Medium dose sliding scale with meals and at night   · To start small dose of Humalog (5u) if he passed swallow eval   · Continue glucose check with meals and at bedtime   · Will titrate insulin dose based on the blood glucose trend & insulin requirement  · Will arrange for patient to be seen in endocrinology clinic upon discharge for routine diabetes maintenance and prevention. COVID   · Dx 12/22, repeated this on this admission was negative   · Managed by primary team  · Will continue monitoring BS and adjust insulin as needed     ESRD   On dialysis    Patient is at risk for hypoglycemia while receiving insulin due to ESRD   Continue to monitor blood glucose closely    HLD   · On lipitor 10 mg daily     UTI  · On Cefazolin   · Will continue monitoring BS while treating infection     Thank you for allowing us to participate in the care of this patient. Please do not hesitate to contact us with any additional questions. Alvaro Dahl MD  Endocrinologist, [facility] End[facility]   [address] [facility] [address]   Phone: [phone]  Fax: [phone]  --------------------------------------------  An electronic signature was used to authenticate this note.  Micah Snoi MD on 1/11/2021 at 7:37 AM

## 2021-01-11 NOTE — PLAN OF CARE
HALLIE Santos  Outcome: Met This Shift     Problem: Mood - Altered:  Goal: Mood stable  Description: Mood stable  1/11/2021 1417 by Itz Razo RN  Outcome: Met This Shift     Problem: Mood - Altered:  Goal: Absence of abusive behavior  Description: Absence of abusive behavior  1/11/2021 1417 by Itz Razo RN  Outcome: Met This Shift     Problem: Mood - Altered:  Goal: Verbalizations of feeling emotionally comfortable while being cared for will increase  Description: Verbalizations of feeling emotionally comfortable while being cared for will increase  1/11/2021 1417 by Itz Razo RN  Outcome: Met This Shift     Problem: Psychomotor Activity - Altered:  Goal: Absence of psychomotor disturbance signs and symptoms  Description: Absence of psychomotor disturbance signs and symptoms  1/11/2021 1417 by Itz Razo RN  Outcome: Met This Shift     Problem: Sensory Perception - Impaired:  Goal: Demonstrations of improved sensory functioning will increase  Description: Demonstrations of improved sensory functioning will increase  1/11/2021 1417 by Itz Razo RN  Outcome: Met This Shift     Problem: Sensory Perception - Impaired:  Goal: Decrease in sensory misperception frequency  Description: Decrease in sensory misperception frequency  1/11/2021 1417 by Itz Razo RN  Outcome: Met This Shift     Problem: Sensory Perception - Impaired:  Goal: Able to refrain from responding to false sensory perceptions  Description: Able to refrain from responding to false sensory perceptions  1/11/2021 1417 by Itz Razo RN  Outcome: Met This Shift     Problem: Sensory Perception - Impaired:  Goal: Demonstrates accurate environmental perceptions  Description: Demonstrates accurate environmental perceptions  1/11/2021 1417 by Itz Razo RN  Outcome: Met This Shift     Problem: Sensory Perception - Impaired:  Goal: Able to distinguish between reality-based and nonreality-based thinking  Description: Able to distinguish between reality-based and nonreality-based thinking  1/11/2021 1417 by Renay Garza RN  Outcome: Met This Shift     Problem: Sensory Perception - Impaired:  Goal: Able to interrupt nonreality-based thinking  Description: Able to interrupt nonreality-based thinking  1/11/2021 1417 by Renay Garza RN  Outcome: Met This Shift     Problem: Sleep Pattern Disturbance:  Goal: Appears well-rested  Description: Appears well-rested  1/11/2021 1417 by Renay Garza RN  Outcome: Met This Shift

## 2021-01-11 NOTE — PROGRESS NOTES
SPEECH/LANGUAGE PATHOLOGY  VIDEOFLUOROSCOPIC STUDY OF SWALLOWING (MBS)      PATIENT NAME:  Andre Brown      :  1950      TODAY'S DATE:  2021   ROOM:  [address] [place] [address]    SUMMARY OF EVALUATION    Pt noted to be less alert this PM. Pt verbalizing with weak voicing. DYSPHAGIA DIAGNOSIS:  Moderate-marked pharyngeal dysphagia-pt at risk of aspiration of thin, NTL and HTL liquids at this time      DIET RECOMMENDATIONS:  Dysphagia 2,  Mechanical Soft (Minced & Moist) solids with  pudding consistency (extremely thick) liquids as tolerated    If change in respiratory status occurs and pt requiring significant increase in supplemental oxygen please notify SLP to reassess toleration of current diet. FEEDING RECOMMENDATIONS:     Assistance level:  Stand by assistance is needed during all oral intake      Compensatory strategies recommended: Double swallow, Small bites/sips, No straw and Effortful swallow    THERAPY RECOMMENDATIONS:        Pt will tolerate the least restrictive PO diet to maintain adequate nutrition/ hydration via use of safe swallow/ compensatory strategies. SLP to follow-up with nursing and/or patient to ensure diet tolerance and adjust as needed.                   PROCEDURE     Consistencies Administered During the Evaluation   Liquids: nectar thick liquid and honey thick liquid   Solids:  pureed foods and solid foods      Method of Intake:   cup, spoon  Self fed, Fed by clinician      Position:   Seated, upright, Lateral plane    Current Respiratory Status   nasal canula                RESULTS     ORAL STAGE       The oral stage of swallowing was within functional limits        PHARYNGEAL STAGE           ONSET TIME     Delayed initiation of the pharyngeal swallow was noted with swallow reflex triggered at the level of the pyriform sinus with NTL       PHARYNGEAL RESIDUALS          Vallecula/Pharyngeal Wall           Residuals in the vallecula due to inadequate epiglottic inversion were noted for nectar consistency liquid, honey consistency liquid and solid foods which mostly cleared with spontaneous double swallow and cued multiple swallow      Pyriform Sinuses      Residuals in the pyriform sinuses were noted due to pharyngeal weakness and cricopharyngeal dysfunction for nectar consistency liquid, honey consistency liquid and solid foods  which  mostly cleared with spontaneous double swallow and cued multiple swallow    LARYNGEAL PENETRATION     Laryngeal penetration occurred in the absence of aspiration DURING the swallow for nectar consistency liquid and honey consistency liquid due to  inadequate laryngeal closure which remained in the laryngeal vestibule. and penetrated deep into the laryngeal vestibule (to the level of the true vocal folds). Laryngeal penetration was moderate-marked and occurred inconsistently-  an absent cough/throat clear was noted                 ASPIRATION    Aspiration  was not present during this evaluation however there is high risk for aspiration  of thin liquid, nectar consistency liquid and honey consistency liquid due to laryngeal penetration and pharyngeal residuals         COMPENSATORY STRATEGIES       Compensatory strategies that were beneficial included Double swallow, Multiple swallow, Small bites/sips and Effortful swallow      STRUCTURAL/FUNCTIONAL ANOMALIES       No structural/functional anomalies were noted      CERVICAL ESOPHAGEAL STAGE :        Was not assessed                            The Speech Language Pathologist (SLP) completed education with the patient regarding results of evaluation. Explained that Speech Pathology intervention is warranted  at this time   Prognosis for improvements is fair     This plan will be re-evaluated and revised in 1 week  if warranted.     Patient stated goals: Agreed with above,   Treatment goals discussed with Patient   The Patient understand(s) the diagnosis, prognosis and plan of care INTERVENTION/EDUCATION    Pt educated on above results and plan of care. Pt trained on compensatory strategies for safe swallow with fair outcome. Pt encouraged to engage in question/answer session. All questions answered and pt verbalized understanding of above. CPT code:  84790  dysphagia study, 68137 dysphagia therapy 15 mins        [x]The admitting diagnosis and active problem list, as listed below have been reviewed prior to initiation of this evaluation.      ADMITTING DIAGNOSIS: Complicated UTI (urinary tract infection) [N39.0]     ACTIVE PROBLEM LIST:   Patient Active Problem List   Diagnosis    Hyperlipidemia    [facility]    Hypertension    Diabetes mellitus ([facility] [facility]    Shoulder pain, bilateral    Abdominal pain, right lower quadrant    DKA, type 1, not at goal [facility])    Acute metabolic encephalopathy    Acute pancreatitis    Acute renal failure (ARF) (HCC)    High anion gap metabolic acidosis    Complicated UTI (urinary tract infection)

## 2021-01-11 NOTE — PATIENT CARE CONFERENCE
[facility] Quality Flow/Interdisciplinary Rounds Progress Note        Quality Flow Rounds held on January 11, 2021    Disciplines Attending:  Bedside Nurse, ,  and Nursing Unit Leadership    Days Alexandra Bullock was admitted on 1/9/2021 10:39 PM    Anticipated Discharge Date:  Expected Discharge Date: 01/13/21    Disposition:    Demetrius Score:  Demetrius Scale Score: 15    Readmission Risk              Risk of Unplanned Readmission:        44           Discussed patient goal for the day, patient clinical progression, and barriers to discharge.   The following Goal(s) of the Day/Commitment(s) have been identified:  Diagnostics - Report Results and Labs - Report Results      Myriam German  January 11, 2021

## 2021-01-11 NOTE — PROGRESS NOTES
Comprehensive Nutrition Assessment    Type and Reason for Visit:  Initial, Positive Nutrition Screen    Nutrition Recommendations/Plan: Continue NPO. Please consult for TF recs if EN access is placed    Nutrition Assessment:  Pt admit w/ noted AMS s/p pulled NG tube and tesio catheter. Noted h/o ESRD and HD. Pt pending MBSS. Will continue to monitor. Malnutrition Assessment:  Malnutrition Status:   At risk for malnutrition (Comment)    Context:  Acute Illness     Findings of the 6 clinical characteristics of malnutrition:  Energy Intake:  Mild decrease in energy intake (Comment)  Weight Loss:  Unable to assess(large wt loss/fluctuations noted per EMR, however may be r/t fluid shifts w/ HD)     Body Fat Loss:  Unable to assess     Muscle Mass Loss:  Unable to assess    Fluid Accumulation:  No significant fluid accumulation     Strength:  Not Performed    Estimated Daily Nutrient Needs:  Energy (kcal):  ; Weight Used for Energy Requirements:  Current     Protein (g):  100-115; Weight Used for Protein Requirements:  Current(1.3-1.5)        Fluid (ml/day):  per renal; Method Used for Fluid Requirements:  Standard Renal      Nutrition Related Findings:  AMS PTA, s/p pulled NGT, pt alert, active BS, soft abd, no edema, -I/Os, SLP bedside swallow eval recs NPO      Wounds:  (tessio site)       Current Nutrition Therapies:    Diet NPO, After Midnight    Anthropometric Measures:  · Height: 6' (182.9 cm)  · Current Body Weight: 171 lb (77.6 kg)(1/11)   · Usual Body Weight: 208 lb (94.3 kg)(12/23/20 actual per EMR)     · Ideal Body Weight: 178 lbs; % Ideal Body Weight 96.1 %   · BMI: 23.2  · BMI Categories: Normal Weight (BMI 22.0 to 24.9) age over 72       Nutrition Diagnosis:   · Inadequate oral intake related to cognitive or neurological impairment as evidenced by NPO or clear liquid status due to medical condition, poor intake prior to admission, swallow study results    Nutrition Interventions:   Food and/or Nutrient Delivery:  Continue NPO  Nutrition Education/Counseling:  Education not indicated   Coordination of Nutrition Care:  Continue to monitor while inpatient    Goals:  nutrition progression       Nutrition Monitoring and Evaluation:   Food/Nutrient Intake Outcomes:  Diet Advancement/Tolerance, Enteral Nutrition Intake/Tolerance  Physical Signs/Symptoms Outcomes:  Biochemical Data, Chewing or Swallowing, GI Status, Fluid Status or Edema, Nutrition Focused Physical Findings, Skin, Weight     Discharge Planning:     Too soon to determine     Electronically signed by Sung Rajput, MS, RD, LD on 1/11/21 at 2:54 PM EST    Contact: [phone]

## 2021-01-11 NOTE — PROGRESS NOTES
Per lexii Pimentel to order diet recommended per swallow study.     Electronically signed by Halle Rowland RN on 1/11/2021 at 4:40 PM

## 2021-01-12 ENCOUNTER — ANESTHESIA (OUTPATIENT)
Dept: OPERATING ROOM | Age: 71
DRG: 314 | End: 2021-01-12
Payer: MEDICARE

## 2021-01-12 ENCOUNTER — APPOINTMENT (OUTPATIENT)
Dept: GENERAL RADIOLOGY | Age: 71
DRG: 314 | End: 2021-01-12
Payer: MEDICARE

## 2021-01-12 ENCOUNTER — ANESTHESIA EVENT (OUTPATIENT)
Dept: OPERATING ROOM | Age: 71
DRG: 314 | End: 2021-01-12
Payer: MEDICARE

## 2021-01-12 VITALS — OXYGEN SATURATION: 100 % | DIASTOLIC BLOOD PRESSURE: 68 MMHG | SYSTOLIC BLOOD PRESSURE: 140 MMHG

## 2021-01-12 LAB
ALBUMIN SERPL-MCNC: 3 G/DL (ref 3.5–5.2)
ALP BLD-CCNC: 126 U/L (ref 40–129)
ALT SERPL-CCNC: 20 U/L (ref 0–40)
ANION GAP SERPL CALCULATED.3IONS-SCNC: 13 MMOL/L (ref 7–16)
AST SERPL-CCNC: 16 U/L (ref 0–39)
BILIRUB SERPL-MCNC: 0.4 MG/DL (ref 0–1.2)
BUN BLDV-MCNC: 92 MG/DL (ref 8–23)
C-REACTIVE PROTEIN: 7.9 MG/DL (ref 0–0.4)
CALCIUM SERPL-MCNC: 9.6 MG/DL (ref 8.6–10.2)
CHLORIDE BLD-SCNC: 116 MMOL/L (ref 98–107)
CO2: 21 MMOL/L (ref 22–29)
CREAT SERPL-MCNC: 4.3 MG/DL (ref 0.7–1.2)
GFR AFRICAN AMERICAN: 16
GFR NON-AFRICAN AMERICAN: 16 ML/MIN/1.73
GLUCOSE BLD-MCNC: 100 MG/DL (ref 74–99)
HCT VFR BLD CALC: 34.5 % (ref 37–54)
HEMOGLOBIN: 10.8 G/DL (ref 12.5–16.5)
MCH RBC QN AUTO: 28.1 PG (ref 26–35)
MCHC RBC AUTO-ENTMCNC: 31.3 % (ref 32–34.5)
MCV RBC AUTO: 89.6 FL (ref 80–99.9)
METER GLUCOSE: 167 MG/DL (ref 74–99)
METER GLUCOSE: 209 MG/DL (ref 74–99)
METER GLUCOSE: 87 MG/DL (ref 74–99)
PDW BLD-RTO: 14.5 FL (ref 11.5–15)
PLATELET # BLD: 172 E9/L (ref 130–450)
PMV BLD AUTO: 12.4 FL (ref 7–12)
POTASSIUM SERPL-SCNC: 4.6 MMOL/L (ref 3.5–5)
PROCALCITONIN: 0.52 NG/ML (ref 0–0.08)
RBC # BLD: 3.85 E12/L (ref 3.8–5.8)
SARS-COV-2, PCR: NOT DETECTED
SODIUM BLD-SCNC: 150 MMOL/L (ref 132–146)
TOTAL PROTEIN: 7.9 G/DL (ref 6.4–8.3)
WBC # BLD: 6.9 E9/L (ref 4.5–11.5)

## 2021-01-12 PROCEDURE — 82962 GLUCOSE BLOOD TEST: CPT

## 2021-01-12 PROCEDURE — 2709999900 HC NON-CHARGEABLE SUPPLY: Performed by: SURGERY

## 2021-01-12 PROCEDURE — 2580000003 HC RX 258: Performed by: SPECIALIST

## 2021-01-12 PROCEDURE — 84145 PROCALCITONIN (PCT): CPT

## 2021-01-12 PROCEDURE — 86140 C-REACTIVE PROTEIN: CPT

## 2021-01-12 PROCEDURE — 71045 X-RAY EXAM CHEST 1 VIEW: CPT

## 2021-01-12 PROCEDURE — 1200000000 HC SEMI PRIVATE

## 2021-01-12 PROCEDURE — 7100000011 HC PHASE II RECOVERY - ADDTL 15 MIN: Performed by: SURGERY

## 2021-01-12 PROCEDURE — 0JH63XZ INSERTION OF TUNNELED VASCULAR ACCESS DEVICE INTO CHEST SUBCUTANEOUS TISSUE AND FASCIA, PERCUTANEOUS APPROACH: ICD-10-PCS | Performed by: SURGERY

## 2021-01-12 PROCEDURE — 80053 COMPREHEN METABOLIC PANEL: CPT

## 2021-01-12 PROCEDURE — 99233 SBSQ HOSP IP/OBS HIGH 50: CPT | Performed by: INTERNAL MEDICINE

## 2021-01-12 PROCEDURE — 6370000000 HC RX 637 (ALT 250 FOR IP): Performed by: INTERNAL MEDICINE

## 2021-01-12 PROCEDURE — 2580000003 HC RX 258: Performed by: INTERNAL MEDICINE

## 2021-01-12 PROCEDURE — 7100000010 HC PHASE II RECOVERY - FIRST 15 MIN: Performed by: SURGERY

## 2021-01-12 PROCEDURE — 90935 HEMODIALYSIS ONE EVALUATION: CPT | Performed by: INTERNAL MEDICINE

## 2021-01-12 PROCEDURE — 5A1D70Z PERFORMANCE OF URINARY FILTRATION, INTERMITTENT, LESS THAN 6 HOURS PER DAY: ICD-10-PCS | Performed by: INTERNAL MEDICINE

## 2021-01-12 PROCEDURE — 36415 COLL VENOUS BLD VENIPUNCTURE: CPT

## 2021-01-12 PROCEDURE — 3209999900 FLUORO FOR SURGICAL PROCEDURES

## 2021-01-12 PROCEDURE — 6360000002 HC RX W HCPCS: Performed by: NURSE ANESTHETIST, CERTIFIED REGISTERED

## 2021-01-12 PROCEDURE — C1881 DIALYSIS ACCESS SYSTEM: HCPCS | Performed by: SURGERY

## 2021-01-12 PROCEDURE — 2500000003 HC RX 250 WO HCPCS: Performed by: SURGERY

## 2021-01-12 PROCEDURE — 77001 FLUOROGUIDE FOR VEIN DEVICE: CPT | Performed by: SURGERY

## 2021-01-12 PROCEDURE — 3600000013 HC SURGERY LEVEL 3 ADDTL 15MIN: Performed by: SURGERY

## 2021-01-12 PROCEDURE — 6360000002 HC RX W HCPCS: Performed by: SPECIALIST

## 2021-01-12 PROCEDURE — 2580000003 HC RX 258: Performed by: NURSE ANESTHETIST, CERTIFIED REGISTERED

## 2021-01-12 PROCEDURE — 6360000002 HC RX W HCPCS: Performed by: SURGERY

## 2021-01-12 PROCEDURE — 02HV33Z INSERTION OF INFUSION DEVICE INTO SUPERIOR VENA CAVA, PERCUTANEOUS APPROACH: ICD-10-PCS | Performed by: SURGERY

## 2021-01-12 PROCEDURE — 85027 COMPLETE CBC AUTOMATED: CPT

## 2021-01-12 PROCEDURE — 3600000003 HC SURGERY LEVEL 3 BASE: Performed by: SURGERY

## 2021-01-12 PROCEDURE — 36558 INSERT TUNNELED CV CATH: CPT | Performed by: SURGERY

## 2021-01-12 PROCEDURE — 6370000000 HC RX 637 (ALT 250 FOR IP): Performed by: SURGERY

## 2021-01-12 PROCEDURE — 99232 SBSQ HOSP IP/OBS MODERATE 35: CPT | Performed by: INTERNAL MEDICINE

## 2021-01-12 PROCEDURE — 3700000001 HC ADD 15 MINUTES (ANESTHESIA): Performed by: SURGERY

## 2021-01-12 PROCEDURE — 2580000003 HC RX 258: Performed by: SURGERY

## 2021-01-12 PROCEDURE — 2700000000 HC OXYGEN THERAPY PER DAY

## 2021-01-12 PROCEDURE — 3700000000 HC ANESTHESIA ATTENDED CARE: Performed by: SURGERY

## 2021-01-12 RX ORDER — HEPARIN SODIUM (PORCINE) LOCK FLUSH IV SOLN 100 UNIT/ML 100 UNIT/ML
SOLUTION INTRAVENOUS PRN
Status: DISCONTINUED | OUTPATIENT
Start: 2021-01-12 | End: 2021-01-12 | Stop reason: ALTCHOICE

## 2021-01-12 RX ORDER — FENTANYL CITRATE 50 UG/ML
INJECTION, SOLUTION INTRAMUSCULAR; INTRAVENOUS PRN
Status: DISCONTINUED | OUTPATIENT
Start: 2021-01-12 | End: 2021-01-12 | Stop reason: SDUPTHER

## 2021-01-12 RX ORDER — INSULIN GLARGINE 100 [IU]/ML
20 INJECTION, SOLUTION SUBCUTANEOUS NIGHTLY
Status: DISCONTINUED | OUTPATIENT
Start: 2021-01-12 | End: 2021-01-14

## 2021-01-12 RX ORDER — PROPOFOL 10 MG/ML
INJECTION, EMULSION INTRAVENOUS CONTINUOUS PRN
Status: DISCONTINUED | OUTPATIENT
Start: 2021-01-12 | End: 2021-01-12 | Stop reason: SDUPTHER

## 2021-01-12 RX ORDER — FENTANYL CITRATE 50 UG/ML
25 INJECTION, SOLUTION INTRAMUSCULAR; INTRAVENOUS EVERY 5 MIN PRN
Status: DISCONTINUED | OUTPATIENT
Start: 2021-01-12 | End: 2021-01-12 | Stop reason: HOSPADM

## 2021-01-12 RX ORDER — HEPARIN SODIUM 1000 [USP'U]/ML
INJECTION, SOLUTION INTRAVENOUS; SUBCUTANEOUS PRN
Status: DISCONTINUED | OUTPATIENT
Start: 2021-01-12 | End: 2021-01-12 | Stop reason: ALTCHOICE

## 2021-01-12 RX ORDER — SODIUM CHLORIDE 9 MG/ML
INJECTION, SOLUTION INTRAVENOUS CONTINUOUS PRN
Status: DISCONTINUED | OUTPATIENT
Start: 2021-01-12 | End: 2021-01-12 | Stop reason: SDUPTHER

## 2021-01-12 RX ORDER — HALOPERIDOL 5 MG/ML
5 INJECTION INTRAMUSCULAR
Status: ACTIVE | OUTPATIENT
Start: 2021-01-12 | End: 2021-01-12

## 2021-01-12 RX ADMIN — FENTANYL CITRATE 50 MCG: 50 INJECTION, SOLUTION INTRAMUSCULAR; INTRAVENOUS at 14:43

## 2021-01-12 RX ADMIN — PROPOFOL 50 MCG/KG/MIN: 10 INJECTION, EMULSION INTRAVENOUS at 14:58

## 2021-01-12 RX ADMIN — VANCOMYCIN HYDROCHLORIDE 1.5 G: 10 INJECTION, POWDER, LYOPHILIZED, FOR SOLUTION INTRAVENOUS at 15:11

## 2021-01-12 RX ADMIN — INSULIN GLARGINE 20 UNITS: 100 INJECTION, SOLUTION SUBCUTANEOUS at 22:44

## 2021-01-12 RX ADMIN — CHLORHEXIDINE GLUCONATE 0.12% ORAL RINSE 15 ML: 1.2 LIQUID ORAL at 22:48

## 2021-01-12 RX ADMIN — FENTANYL CITRATE 50 MCG: 50 INJECTION, SOLUTION INTRAMUSCULAR; INTRAVENOUS at 14:48

## 2021-01-12 RX ADMIN — SODIUM CHLORIDE: 9 INJECTION, SOLUTION INTRAVENOUS at 14:45

## 2021-01-12 RX ADMIN — INSULIN LISPRO 4 UNITS: 100 INJECTION, SOLUTION INTRAVENOUS; SUBCUTANEOUS at 22:45

## 2021-01-12 RX ADMIN — Medication 10 ML: at 22:48

## 2021-01-12 RX ADMIN — Medication 10 ML: at 08:45

## 2021-01-12 ASSESSMENT — PULMONARY FUNCTION TESTS
PIF_VALUE: 0
PIF_VALUE: 1
PIF_VALUE: 0
PIF_VALUE: 0

## 2021-01-12 ASSESSMENT — PAIN SCALES - GENERAL
PAINLEVEL_OUTOF10: 0

## 2021-01-12 ASSESSMENT — ENCOUNTER SYMPTOMS
DYSPNEA ACTIVITY LEVEL: AFTER AMBULATING 1 FLIGHT OF STAIRS
SHORTNESS OF BREATH: 1

## 2021-01-12 NOTE — ANESTHESIA POSTPROCEDURE EVALUATION
Department of Anesthesiology  Postprocedure Note    Patient: Andre Oscar  MRN: [id number]  YOB: 1950  Date of evaluation: 1/12/2021  Time:  3:25 PM     Procedure Summary     Date: 01/12/21 Room / Location: [address]    Anesthesia Start: 9357 Anesthesia Stop: 5651    Procedure: TEMPORARY HEMODIALYSIS CATHETER INSERTION (N/A ) Diagnosis: (/)    Surgeons: Timmy Felipe MD Responsible Provider: Peter Lu DO    Anesthesia Type: MAC ASA Status: 4          Anesthesia Type: No value filed. Blade Phase I: Blade Score: 9    Blade Phase II:      Last vitals: Reviewed and per EMR flowsheets.        Anesthesia Post Evaluation    Patient location during evaluation: bedside  Patient participation: complete - patient participated  Level of consciousness: responsive to verbal stimuli  Pain score: 3  Airway patency: patent  Nausea & Vomiting: no vomiting and no nausea  Complications: no  Cardiovascular status: hemodynamically stable  Respiratory status: acceptable  Hydration status: stable

## 2021-01-12 NOTE — PROGRESS NOTES
[facility] ([facility]) Hospitalist Progress Note    Admission Date  1/9/2021 10:39 PM  Chief Complaint Wound check  Admit Dx   Complicated UTI (urinary tract infection) [N39.0]    Subjective  History of Present Illness  70M PMH DM, HPL, HTN, prostate Ca, hx questionable EtOH abuse. Originally lived home independent. EMS initially called 12/22 for AMS, pt admitted to MICU at [facility] for sepsis / [place] / DKA, developed ESRD (s/p placement of Tesio and initiation of HD), anemia, and a fib. Required intubation and extubated 12/29, DC'd to [address] [place] [address] 1/1 with restraints on, NG in place and tube feeds running through that. Pt had not had formal swallow eval that I could find to determine how pt should even be fed. At [facility], pt's restraints were removed, he pulled out his NG tube and Tesio catheter and was sent here for further evaluation. Speech pathology working w pt, video swallow showed mod-marked pharyngeal dysphagia and diet recs made. Interim History  For tunneled HD cath placement this AM with sendout COVID negative. Hopefully w this neg we can get MRI to further evaluate behavioral changes. If negative, would ask for neurology recommendations. Importantly, pt did have positive blood cx GPC in clusters in 1 bottle. Checked procal, CRP, repeated cx, consulted ID who feels can proceed w HD cath, giving vanc instead of Ancef postop.       Review of Systems - unable to obtain d/t pt mental status    Objective  Physical Exam  Vitals: BP (!) 156/98   Pulse 104   Temp 97.3 °F (36.3 °C) (Temporal)   Resp 18   Ht 6' (1.829 m)   Wt 171 lb 9.6 oz (77.8 kg)   SpO2 99%   BMI 23.27 kg/m²   General: well-developed, well-nourished, no acute distress, cooperative  Skin: warm, dry, intact, normal color without cyanosis  HEENT: normocephalic, atraumatic, mucous membranes normal  Respiratory: clear to auscultation bilaterally without respiratory distress  Cardiovascular: regular rate and rhythm without murmur / rub / (Weds)    Electronically signed by Minerva Hawkins DO on 1/12/2021 at 3:19 PM

## 2021-01-12 NOTE — PATIENT CARE CONFERENCE
P Quality Flow/Interdisciplinary Rounds Progress Note        Quality Flow Rounds held on January 12, 2021    Disciplines Attending:  Bedside Nurse, ,  and Nursing Unit Leadership    Days Cassie Mckay was admitted on 1/9/2021 10:39 PM    Anticipated Discharge Date:  Expected Discharge Date: 01/13/21    Disposition:    Demetrius Score:  Demetrius Scale Score: 21    Readmission Risk              Risk of Unplanned Readmission:        44           Discussed patient goal for the day, patient clinical progression, and barriers to discharge.   The following Goal(s) of the Day/Commitment(s) have been identified:  Diagnostics - Report Results      Kenneth Kim  January 12, 2021

## 2021-01-12 NOTE — PROGRESS NOTES
Spoke with Karlie Vazquez from surgery and made her aware that patient is COVID isolation. Also made Dr. Power Presume aware that patient is covid isolation.     Electronically signed by Isatu Cody RN on 1/12/2021 at 9:53 AM

## 2021-01-12 NOTE — CONSULTS
[address] [place] [address] [facility]  Consultation Note     Admit Date: 1/9/2021 10:39 PM    Reason for Consult:   Positive blood cultures    Attending Physician:  Linh Haile DO    HISTORY OF PRESENT ILLNESS:             The history is obtained from extensive review of available past medical records. The patient is a 65 y.o. male who was admitted to [facility] on 10/22/2020 with SARS-CoV-2, DKA, pancreatitis. He was temporarily on a ventilator and managed in the ICU by pulmonary/critical care. He was not given Remdesivir because of chronic kidney disease. He was followed by nephrology and was on temporary hemodialysis. He was discharged on 1/1/2020 to a skilled nursing facility. The patient was readmitted to the hospital on 1/9/2021 because he had pulled an NG tube and his right femoral temporary HD catheter. He does recall being in a daze and confused when his happened. As part of his work-up he had blood cultures done. One bottle just turning out with gram-positive cocci in clusters. For this reason ID was asked to see him in consultation since the patient was scheduled to have a tunneled dialysis catheter.     Past Medical History:        Diagnosis Date    Cancer ([facility]     Diabetes mellitus ([facility]     Hyperlipidemia     Hypertension     Prostate cancer ([facility]      Past Surgical History:        Procedure Laterality Date    COLON SURGERY      DILATATION, ESOPHAGUS      LEG SURGERY Left 6 years ago     Current Medications:   Scheduled Meds:   chlorhexidine  15 mL Mouth/Throat 4x Daily    insulin lispro  0-12 Units Subcutaneous Q6H    Calcium Acetate (Phos Binder)  1,334 mg Oral BID WC    [Held by provider] insulin lispro  5 Units Subcutaneous TID     insulin glargine  22 Units Subcutaneous Nightly    ascorbic acid  500 mg Oral Daily    metoprolol tartrate  25 mg Oral BID    pantoprazole  40 mg Oral Daily with breakfast    QUEtiapine  50 mg Oral BID    Vitamin D 2,000 Units Oral Daily    zinc sulfate  50 mg Oral Daily    sodium chloride flush  10 mL Intravenous 2 times per day    enoxaparin  30 mg Subcutaneous Daily    atorvastatin  10 mg Oral Nightly    ceFAZolin  1 g Intravenous See Admin Instructions     Continuous Infusions:   dextrose       PRN Meds:haloperidol lactate, senna, sodium chloride flush, promethazine **OR** ondansetron, polyethylene glycol, acetaminophen **OR** acetaminophen, glucose, dextrose, glucagon (rDNA), dextrose    Allergies:  Patient has no known allergies.     Social History:   Social History     Socioeconomic History    Marital status: Single     Spouse name: Not on file    Number of children: Not on file    Years of education: Not on file    Highest education level: Not on file   Occupational History    Not on file   Social Needs    Financial resource strain: Not on file    Food insecurity     Worry: Not on file     Inability: Not on file    Transportation needs     Medical: Not on file     Non-medical: Not on file   Tobacco Use    Smoking status: Former Smoker     Packs/day: 1.00     Types: Cigarettes    Smokeless tobacco: Never Used   Substance and Sexual Activity    Alcohol use: Yes     Comment: \"daily shots\"    Drug use: No    Sexual activity: Not on file   Lifestyle    Physical activity     Days per week: Not on file     Minutes per session: Not on file    Stress: Not on file   Relationships    Social connections     Talks on phone: Not on file     Gets together: Not on file     Attends Protestant service: Not on file     Active member of club or organization: Not on file     Attends meetings of clubs or organizations: Not on file     Relationship status: Not on file    Intimate partner violence     Fear of current or ex partner: Not on file     Emotionally abused: Not on file     Physically abused: Not on file     Forced sexual activity: Not on file   Other Topics Concern    Not on file   Social History Narrative    Not on file      Pets: None  Travel: No  The patient was living at home with his wife before he got sick. 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  He retired from [employer]. Family History:   No family history on file. . Otherwise non-pertinent to the chief complaint. REVIEW OF SYSTEMS:    The patient denies having any headache, nausea, abdominal pain, chest pain, diarrhea. He is complaining of the Jolly catheter. He has not had any fevers during this admission. There is a history of dementia. A 10 point review of systems could not be obtained from the patient himself given a history of confusion. His mouth is also very dry and he cannot speak very well on account of this. PHYSICAL EXAM:    Vitals:   /82   Pulse 98   Temp 97.4 °F (36.3 °C) (Oral)   Resp 16   Ht 6' (1.829 m)   Wt 171 lb 9.6 oz (77.8 kg)   SpO2 97%   BMI 23.27 kg/m²   Constitutional: The patient is awake, alert, and oriented to person and place dry mouth. No thrush. No ulcerations. .   Skin: Warm and dry. No rashes were noted. HEENT: Eyes show round, and reactive pupils. No jaundice. Moist mucous membranes, no ulcerations, no thrush. Neck: Supple to movements. No lymphadenopathy. Chest: No use of accessory muscles to breathe. Symmetrical expansion. Auscultation reveals no wheezing, crackles, or rhonchi. Cardiovascular: S1 and S2 are rhythmic and regular. No murmurs appreciated. Abdomen: Positive bowel sounds to auscultation. Benign to palpation. No masses felt. No hepatosplenomegaly. Extremities: No clubbing, no cyanosis, no edema. Lines: peripheral  Jolly catheter with brownish urine.     CBC+dif:  Recent Labs     01/09/21  2341 01/11/21  0540 01/12/21  0540   WBC 8.8 6.8 6.9   HGB 10.7* 9.5* 10.8*   HCT 33.3* 30.5* 34.5*   MCV 86.5 88.2 89.6    154 172   NEUTROABS 7.34*  --   --      Lab Results   Component Value Date    CRP 5.1 (H) 01/01/2021    CRP 5.5 (H) 12/31/2020    CRP 4.6 (H) hours. No results for input(s): ASO in the last 72 hours. No results for input(s): CULTRESP in the last 72 hours. No results for input(s): PROCAL in the last 72 hours. Assessment:  · Acute on chronic kidney disease, requiring dialysis  · Confusion  · Bacteremia with GPC in clusters in 1 of 4 bottles. CLABSI is less likely. Blood cultures have turned +3 days after he was admitted. This is most likely a contaminant    Plan:    · The patient can have a tunneled dialysis catheter from ID standpoint since he has been line free for 3 days and he has been afebrile with a normal white count. I will, however, give him 1 dose of Vancomycin instead of Cefazolin preoperatively  · Check final blood cultures, baseline ESR, CRP  · We will not repeat blood cultures now unless blood cultures turn out Staphylococcus aureus  · Monitor labs  · Will follow with you    Thank you for having us see this patient in consultation. I will be discussing this case with the treating physicians. Spoke with nursing.     Lynn Llanos  12:55 PM  1/12/2021

## 2021-01-12 NOTE — OP NOTE
Surrounding Skin Dry; Intact 12/27/20 2200   Securement device Yes 12/29/20 1600   Status Clamped 12/29/20 1600   Placement Verified by X-Ray (Initial) 12/27/20 2200   NG/OG/NJ/NE External Measurement (cm) 59 cm 12/27/20 0547   Drainage Appearance Bile;Green 12/27/20 2200   Tube Feeding Intake (mL) 0 ml 12/29/20 0600   Free Water Flush (mL) 0 mL 12/29/20 0600   Residual Volume (ml) 0 ml 12/29/20 0600   Output (mL) 0 ml 12/29/20 0600       [REMOVED] NG/OG/NJ/NE Tube Nasogastric 16 fr Right nostril (Removed)   Surrounding Skin Dry; Intact 01/01/21 1000   Securement device Yes 01/01/21 1000   Status Clamped 12/31/20 1245   Placement Verified by Gastric Contents 12/31/20 1245   NG/OG/NJ/NE External Measurement (cm) 65 cm 01/01/21 1000   Drainage Appearance Yellow 12/31/20 1245   Tube Feeding Diabetic 01/01/21 1000   Tube Feeding Status Continuous 01/01/21 1000   Rate/Schedule 30 mL/hr 01/01/21 1400   Free Water Flush (mL) 60 mL 01/01/21 1400   Residual Volume (ml) 10 ml 12/31/20 1245       [REMOVED] Urethral Catheter Temperature probe (Removed)   Catheter Indications Need for fluid management in critically ill patients in a critical care setting not able to be managed by other means such as BSC with hat, bedpan, urinal, condom catheter, or short term intermittent urethral catherization 01/01/21 1600   Site Assessment No urethral drainage 01/01/21 1600   Urine Color Yellow 01/01/21 1600   Urine Appearance Hazy 01/01/21 1600   Output (mL) 75 mL 01/01/21 1700         Electronically signed by Ronnie Villegas MD on 1/12/2021 at 3:09 PM

## 2021-01-12 NOTE — CARE COORDINATION
Received notification from Rod Schneider at [facility] able to accept patient back today after line placement, noting that they can do HD treatment at facility if needed.

## 2021-01-13 ENCOUNTER — APPOINTMENT (OUTPATIENT)
Dept: MRI IMAGING | Age: 71
DRG: 314 | End: 2021-01-13
Payer: MEDICARE

## 2021-01-13 LAB
ALBUMIN SERPL-MCNC: 2.7 G/DL (ref 3.5–5.2)
ALP BLD-CCNC: 122 U/L (ref 40–129)
ALT SERPL-CCNC: 21 U/L (ref 0–40)
ANION GAP SERPL CALCULATED.3IONS-SCNC: 15 MMOL/L (ref 7–16)
AST SERPL-CCNC: 18 U/L (ref 0–39)
BILIRUB SERPL-MCNC: 0.4 MG/DL (ref 0–1.2)
BUN BLDV-MCNC: 61 MG/DL (ref 8–23)
CALCIUM SERPL-MCNC: 8.7 MG/DL (ref 8.6–10.2)
CHLORIDE BLD-SCNC: 105 MMOL/L (ref 98–107)
CO2: 24 MMOL/L (ref 22–29)
CREAT SERPL-MCNC: 3.8 MG/DL (ref 0.7–1.2)
GFR AFRICAN AMERICAN: 19
GFR NON-AFRICAN AMERICAN: 19 ML/MIN/1.73
GLUCOSE BLD-MCNC: 196 MG/DL (ref 74–99)
HCT VFR BLD CALC: 29.8 % (ref 37–54)
HEMOGLOBIN: 9.4 G/DL (ref 12.5–16.5)
MCH RBC QN AUTO: 27.6 PG (ref 26–35)
MCHC RBC AUTO-ENTMCNC: 31.5 % (ref 32–34.5)
MCV RBC AUTO: 87.4 FL (ref 80–99.9)
METER GLUCOSE: 116 MG/DL (ref 74–99)
METER GLUCOSE: 151 MG/DL (ref 74–99)
METER GLUCOSE: 173 MG/DL (ref 74–99)
METER GLUCOSE: 210 MG/DL (ref 74–99)
ORGANISM: ABNORMAL
PDW BLD-RTO: 14.2 FL (ref 11.5–15)
PLATELET # BLD: 147 E9/L (ref 130–450)
PMV BLD AUTO: 12.1 FL (ref 7–12)
POTASSIUM SERPL-SCNC: 4.4 MMOL/L (ref 3.5–5)
RBC # BLD: 3.41 E12/L (ref 3.8–5.8)
SODIUM BLD-SCNC: 144 MMOL/L (ref 132–146)
TOTAL PROTEIN: 7.5 G/DL (ref 6.4–8.3)
URINE CULTURE, ROUTINE: ABNORMAL
WBC # BLD: 6.4 E9/L (ref 4.5–11.5)

## 2021-01-13 PROCEDURE — 99232 SBSQ HOSP IP/OBS MODERATE 35: CPT | Performed by: INTERNAL MEDICINE

## 2021-01-13 PROCEDURE — 2580000003 HC RX 258: Performed by: SPECIALIST

## 2021-01-13 PROCEDURE — 92526 ORAL FUNCTION THERAPY: CPT | Performed by: SPEECH-LANGUAGE PATHOLOGIST

## 2021-01-13 PROCEDURE — 6360000002 HC RX W HCPCS: Performed by: INTERNAL MEDICINE

## 2021-01-13 PROCEDURE — 6370000000 HC RX 637 (ALT 250 FOR IP): Performed by: INTERNAL MEDICINE

## 2021-01-13 PROCEDURE — 1200000000 HC SEMI PRIVATE

## 2021-01-13 PROCEDURE — 6370000000 HC RX 637 (ALT 250 FOR IP): Performed by: SURGERY

## 2021-01-13 PROCEDURE — 80053 COMPREHEN METABOLIC PANEL: CPT

## 2021-01-13 PROCEDURE — 85027 COMPLETE CBC AUTOMATED: CPT

## 2021-01-13 PROCEDURE — 36415 COLL VENOUS BLD VENIPUNCTURE: CPT

## 2021-01-13 PROCEDURE — 2700000000 HC OXYGEN THERAPY PER DAY

## 2021-01-13 PROCEDURE — 87040 BLOOD CULTURE FOR BACTERIA: CPT

## 2021-01-13 PROCEDURE — 6360000002 HC RX W HCPCS: Performed by: SPECIALIST

## 2021-01-13 PROCEDURE — 70551 MRI BRAIN STEM W/O DYE: CPT

## 2021-01-13 PROCEDURE — 2580000003 HC RX 258: Performed by: SURGERY

## 2021-01-13 PROCEDURE — 2500000003 HC RX 250 WO HCPCS: Performed by: SURGERY

## 2021-01-13 PROCEDURE — 82962 GLUCOSE BLOOD TEST: CPT

## 2021-01-13 PROCEDURE — 99233 SBSQ HOSP IP/OBS HIGH 50: CPT | Performed by: INTERNAL MEDICINE

## 2021-01-13 RX ORDER — HEPARIN SODIUM 10000 [USP'U]/ML
5000 INJECTION, SOLUTION INTRAVENOUS; SUBCUTANEOUS EVERY 8 HOURS
Status: DISCONTINUED | OUTPATIENT
Start: 2021-01-13 | End: 2021-01-16 | Stop reason: HOSPADM

## 2021-01-13 RX ADMIN — METOPROLOL TARTRATE 25 MG: 25 TABLET, FILM COATED ORAL at 08:32

## 2021-01-13 RX ADMIN — INSULIN GLARGINE 20 UNITS: 100 INJECTION, SOLUTION SUBCUTANEOUS at 21:36

## 2021-01-13 RX ADMIN — INSULIN LISPRO 4 UNITS: 100 INJECTION, SOLUTION INTRAVENOUS; SUBCUTANEOUS at 11:20

## 2021-01-13 RX ADMIN — CHLORHEXIDINE GLUCONATE 0.12% ORAL RINSE 15 ML: 1.2 LIQUID ORAL at 13:04

## 2021-01-13 RX ADMIN — Medication 10 ML: at 08:42

## 2021-01-13 RX ADMIN — Medication 10 ML: at 21:41

## 2021-01-13 RX ADMIN — PANTOPRAZOLE SODIUM 40 MG: 40 TABLET, DELAYED RELEASE ORAL at 08:32

## 2021-01-13 RX ADMIN — CHLORHEXIDINE GLUCONATE 0.12% ORAL RINSE 15 ML: 1.2 LIQUID ORAL at 08:34

## 2021-01-13 RX ADMIN — CALCIUM ACETATE 1334 MG: 667 CAPSULE ORAL at 08:32

## 2021-01-13 RX ADMIN — QUETIAPINE FUMARATE 50 MG: 25 TABLET ORAL at 21:18

## 2021-01-13 RX ADMIN — EPOETIN ALFA-EPBX 3000 UNITS: 3000 INJECTION, SOLUTION INTRAVENOUS; SUBCUTANEOUS at 21:35

## 2021-01-13 RX ADMIN — CHLORHEXIDINE GLUCONATE 0.12% ORAL RINSE 15 ML: 1.2 LIQUID ORAL at 21:35

## 2021-01-13 RX ADMIN — Medication 2000 UNITS: at 08:32

## 2021-01-13 RX ADMIN — CALCIUM ACETATE 1334 MG: 667 CAPSULE ORAL at 16:48

## 2021-01-13 RX ADMIN — ZINC SULFATE 220 MG (50 MG) CAPSULE 50 MG: CAPSULE at 08:32

## 2021-01-13 RX ADMIN — QUETIAPINE FUMARATE 50 MG: 25 TABLET ORAL at 08:32

## 2021-01-13 RX ADMIN — METOPROLOL TARTRATE 25 MG: 25 TABLET, FILM COATED ORAL at 21:19

## 2021-01-13 RX ADMIN — ATORVASTATIN CALCIUM 10 MG: 10 TABLET, FILM COATED ORAL at 21:19

## 2021-01-13 RX ADMIN — CEFEPIME HYDROCHLORIDE 2 G: 2 INJECTION, POWDER, FOR SOLUTION INTRAVENOUS at 16:48

## 2021-01-13 RX ADMIN — OXYCODONE HYDROCHLORIDE AND ACETAMINOPHEN 500 MG: 500 TABLET ORAL at 08:32

## 2021-01-13 NOTE — PROGRESS NOTES
Upon attempt to assess patient and obtain vital signs on return from dialysis, patient became combative and verbally aggressive. Patient began swinging arms, hitting and grabbing forcefully at staff members yelling out \"you're all trying to kill me!\" Patient very agitated and acutely delirious. Patient seemed to calm down minimally after speaking to sister on the phone, still verbally aggressive with staff and his sister. I sat and talked quietly with patient for over 45 minutes for patient safety. Patient's fiancee/POA and sister stated upon phone update that they would prefer patient has a sedative if patient becomes similarly agitated again. Electronically signed by Hermila Segura.  HALLIE Sandoval on 1/13/2021 at 4:31 AM

## 2021-01-13 NOTE — PROGRESS NOTES
[facility] ([facility]) Hospitalist Progress Note    Admission Date  1/9/2021 10:39 PM  Chief Complaint Wound check  Admit Dx   Complicated UTI (urinary tract infection) [N39.0]    Subjective  History of Present Illness  70M PMH DM, HPL, HTN, prostate Ca, hx questionable EtOH abuse. Originally lived home independent. EMS initially called 12/22 for AMS, pt admitted to MICU at [facility] for sepsis / [place] / DKA, developed ESRD (s/p placement of Tesio and initiation of HD), anemia, and a fib. Required intubation and extubated 12/29, DC'd to [address] [place] [address] 1/1 with restraints on, NG in place and tube feeds running through that. Pt had not had formal swallow eval that I could find to determine how pt should even be fed. At [facility], pt's restraints were removed, he pulled out his NG tube and Tesio catheter and was sent here for further evaluation. Speech pathology working w pt, video swallow showed mod-marked pharyngeal dysphagia and diet recs made. S/p tunneled HD cath placement 1/12.  1 bottle blood cx also returned positive on 1/12, growing Staph aureus; ID was consulted and gave pt a dose of vancomycin. Interim History  Refusing labs this AM; per nursing notes:  Patient adamantly refusing lab work. Patient swinging arms and swatting upon attempt by phlebotomist, with staff assistance.      Patient states \"I'm leaving today no matter what, I don't care what anyone says. \" Explained to patient that we need to see his labwork so we can know if he needs dialysis today. Explained that if he gets dialysis he may get to go home sooner. Patient continuing to refuse labwork and states \"I don't care, I'm leaving anyway. \"     Definitely not safe to leave. ID repeating cx as original blood cx did show Staph aureus. Dose of cefepime given.     Review of Systems - unable to obtain d/t pt mental status    Objective  Physical Exam  Vitals: /86   Pulse 100   Temp 97.3 °F (36.3 °C) (Axillary)   Resp 18   Ht 6' (1.829 m)   Wt 170 lb (77.1 kg)   SpO2 100%   BMI 23.06 kg/m²   General: well-developed, well-nourished, no acute distress, cooperative  Skin: warm, dry, intact, normal color without cyanosis  HEENT: normocephalic, atraumatic, mucous membranes normal  Respiratory: clear to auscultation bilaterally without respiratory distress  Cardiovascular: regular rate and rhythm without murmur / rub / gallop  Abdominal: soft, nontender, nondistended, normoactive bowel sounds  Extremities: no mottling, pulses intact, no edema  Neurologic: awake, alert, no focal deficits  Psychiatric: normal affect, cooperative    Assessment / Plan  Agitation / encephalopathy - apparently baseline is A&Ox3 living home as early as mid-December before COVID / DKA / ICU / intubation. Sent to [facility] still altered in restraints on Jan 1, tube feeds via NG without formal swallow eval.  During last admission had CT head in ED, no further head imaging, no neuro consult, no psych consult during his admission. CT head unremarkable. Mod-marked pharyngeal dysphagia - arrived to LTAC w NG and tube feeds. Pt pulled NG.  NG and tube feeds not mentioned anywhere in DC summary from Jan 1. Video swallow showed mod-marked pharyngeal dysphagia, pt recommended mechanical soft food w pudding thick liquids. Hypoxic respiratory failure, acute vs acute-on-chronic - had been intubated / [facility] in December, on [facility] here initially now on [facility]. CXR showing significant decrease in b/l infiltrates    ESRD / HD - recent dx developed during ICU hospitalization for COVID. Last tx reported last Weds and pt has pulled Tesio.   S/p re-placement 1/12 with vascular    IDDM - home med list reviewed; on Lantus 20 U qHS, Humalog med-dose ISS, target -180, significantly above goal and have consulted endocrinology - input appreciated    Abnormal UA - do not feel pt has UTI, had Rocephin x1    Abnormal blood cx - GPC in clusters in 1/4 bottles; ID consulted; checking CRP procal; rpt cx unless current cx shows Staph aureus; this AM procal 0.52 CRP 7.9, ID giving dose of cefepime and repeating cx    Pt's PMH otherwise pertinent for HTN, HPL, hx prostate Ca. Continue outpt med regimen; if any particular issues regarding these items, will address and move to active problem list above. Code status  Full  DVT prophylaxis Lovenox, SCDs  Disposition  [place] soon?  If pt does not sign out [facility]    Electronically signed by Alesha Moore DO on 1/13/2021 at 2:58 PM

## 2021-01-13 NOTE — PATIENT CARE CONFERENCE
P Quality Flow/Interdisciplinary Rounds Progress Note        Quality Flow Rounds held on January 13, 2021    Disciplines Attending:  ,  and Nursing Unit Leadership    Days Rosetta Buckley was admitted on 1/9/2021 10:39 PM    Anticipated Discharge Date:  Expected Discharge Date: 01/13/21    Disposition:    Demetrius Score:  Demetrius Scale Score: 20    Readmission Risk              Risk of Unplanned Readmission:        44           Discussed patient goal for the day, patient clinical progression, and barriers to discharge.   The following Goal(s) of the Day/Commitment(s) have been identified:  Discharge - Obtain Order      Mari Barnett  January 13, 2021

## 2021-01-13 NOTE — PROGRESS NOTES
[facility] cancelled - spoke with Janell Xiong.   Electronically signed by Ness Witt RN on 1/13/2021 at 2:48 PM

## 2021-01-13 NOTE — PROGRESS NOTES
[address] [place] [address] [facility]  Progress Note    SUBJECTIVE:  Chief Complaint   Patient presents with    Wound Check     pulled tessio out of the right fem      The patient says he wants to go home on his own. Denies any other complaints. He had his dialysis catheter placed. Review of systems:  As stated above in the chief complaint, otherwise negative. Medications:  Scheduled Meds:   heparin (porcine)  5,000 Units Subcutaneous Q8H    cefepime  2 g Intravenous Once    insulin glargine  20 Units Subcutaneous Nightly    chlorhexidine  15 mL Mouth/Throat 4x Daily    insulin lispro  0-12 Units Subcutaneous Q6H    Calcium Acetate (Phos Binder)  1,334 mg Oral BID WC    [Held by provider] insulin lispro  5 Units Subcutaneous TID WC    ascorbic acid  500 mg Oral Daily    metoprolol tartrate  25 mg Oral BID    pantoprazole  40 mg Oral Daily with breakfast    QUEtiapine  50 mg Oral BID    Vitamin D  2,000 Units Oral Daily    zinc sulfate  50 mg Oral Daily    sodium chloride flush  10 mL Intravenous 2 times per day    atorvastatin  10 mg Oral Nightly     Continuous Infusions:   dextrose       PRN Meds:senna, sodium chloride flush, promethazine **OR** ondansetron, polyethylene glycol, acetaminophen **OR** acetaminophen, glucose, dextrose, glucagon (rDNA), dextrose    OBJECTIVE:  /86   Pulse 100   Temp 97.3 °F (36.3 °C) (Axillary)   Resp 18   Ht 6' (1.829 m)   Wt 170 lb (77.1 kg)   SpO2 100%   BMI 23.06 kg/m²   Temp  Av.9 °F (37.7 °C)  Min: 97.3 °F (36.3 °C)  Max: 111 °F (43.9 °C)  Constitutional: The patient is awake, alert, and partially oriented. Skin: Warm and dry. No rashes were noted. HEENT: Round and reactive pupils. Moist mucous membranes. No ulcerations or thrush. Neck: Supple to movements. Chest: No use of accessory muscles to breathe. Symmetrical expansion. No wheezing, crackles or rhonchi. Right tunneled HD catheter.   Cardiovascular: S1 and S2 are rhythmic and regular. No murmurs appreciated. Abdomen: Positive bowel sounds to auscultation. Benign to palpation. No masses felt. No hepatosplenomegaly. Extremities: No clubbing, no cyanosis, no edema. Lines: peripheral  Jolly catheter with bloody urine    Laboratory and Tests Review:  Lab Results   Component Value Date    WBC 6.4 01/13/2021    WBC 6.9 01/12/2021    WBC 6.8 01/11/2021    HGB 9.4 (L) 01/13/2021    HCT 29.8 (L) 01/13/2021    MCV 87.4 01/13/2021     01/13/2021     Lab Results   Component Value Date    NEUTROABS 7.34 (H) 01/09/2021    NEUTROABS 6.13 01/01/2021    NEUTROABS 6.37 12/31/2020     No results found for: [facility]  Lab Results   Component Value Date    ALT 21 01/13/2021    AST 18 01/13/2021    ALKPHOS 122 01/13/2021    BILITOT 0.4 01/13/2021     Lab Results   Component Value Date     01/13/2021    K 4.4 01/13/2021    K 6.4 01/09/2021     01/13/2021    CO2 24 01/13/2021    BUN 61 01/13/2021    CREATININE 3.8 01/13/2021    CREATININE 4.3 01/12/2021    CREATININE 4.6 01/11/2021    GFRAA 19 01/13/2021    LABGLOM 19 01/13/2021    GLUCOSE 196 01/13/2021    PROT 7.5 01/13/2021    LABALBU 2.7 01/13/2021    CALCIUM 8.7 01/13/2021    BILITOT 0.4 01/13/2021    ALKPHOS 122 01/13/2021    AST 18 01/13/2021    ALT 21 01/13/2021     Lab Results   Component Value Date    CRP 7.9 (H) 01/12/2021    CRP 5.1 (H) 01/01/2021    CRP 5.5 (H) 12/31/2020     Lab Results   Component Value Date    SEDRATE 55 (H) 01/01/2021    SEDRATE 55 (H) 12/31/2020    SEDRATE 65 (H) 12/30/2020     Radiology:      Microbiology:   Blood cultures 1/9/2021 M_SA, GNR  Urine culture 1/9/2021 >100 K nonalbicans yeast    Recent Labs     01/12/21  1700   PROCAL 0.52*       ASSESSMENT:  · Probable CLABSI secondary to infected temporary HD catheter. The patient removed it on his own and was sent to the hospital.  He had positive blood cultures on the day that he was admitted.   He has been line free for at least 3 days and is currently afebrile  · Polymicrobial bacteremia associated to the above with GNR and M_SA, most likely transient  · Candiduria  · Confusion    PLAN:  · We will treat him prophylactically with 1 dose of Cefepime  · Repeat blood cultures today  · Check random Vancomycin tomorrow  · Check final blood cultures  · Monitor labs    Franky Mckenna  1:54 PM  1/13/2021

## 2021-01-13 NOTE — PLAN OF CARE
Problem: Skin Integrity:  Goal: Will show no infection signs and symptoms  Description: Will show no infection signs and symptoms  Outcome: Met This Shift  Goal: Absence of new skin breakdown  Description: Absence of new skin breakdown  Outcome: Met This Shift     Problem: Falls - Risk of:  Goal: Will remain free from falls  Description: Will remain free from falls  Outcome: Met This Shift  Goal: Absence of physical injury  Description: Absence of physical injury  Outcome: Met This Shift     Problem: Confusion - Acute:  Goal: Absence of continued neurological deterioration signs and symptoms  Description: Absence of continued neurological deterioration signs and symptoms  Outcome: Met This Shift  Goal: Mental status will be restored to baseline  Description: Mental status will be restored to baseline  Outcome: Met This Shift     Problem: Discharge Planning:  Goal: Ability to perform activities of daily living will improve  Description: Ability to perform activities of daily living will improve  Outcome: Met This Shift  Goal: Participates in care planning  Description: Participates in care planning  Outcome: Met This Shift     Problem: Injury - Risk of, Physical Injury:  Goal: Will remain free from falls  Description: Will remain free from falls  Outcome: Met This Shift  Goal: Absence of physical injury  Description: Absence of physical injury  Outcome: Met This Shift     Problem: Mood - Altered:  Goal: Mood stable  Description: Mood stable  Outcome: Met This Shift  Goal: Absence of abusive behavior  Description: Absence of abusive behavior  Outcome: Met This Shift  Goal: Verbalizations of feeling emotionally comfortable while being cared for will increase  Description: Verbalizations of feeling emotionally comfortable while being cared for will increase  Outcome: Met This Shift     Problem: Psychomotor Activity - Altered:  Goal: Absence of psychomotor disturbance signs and symptoms  Description: Absence of

## 2021-01-13 NOTE — PROGRESS NOTES
Dr. Jose Luis Salinas notified of patient's refusal to undergo bloodwork. Electronically signed by Jorden Sandoval RN on 1/13/2021 at 8:02 AM

## 2021-01-13 NOTE — PROGRESS NOTES
Refusing Melburn Cheers at discharge and wants to go home per patient and patients sister Lauro Baileyc.

## 2021-01-13 NOTE — FLOWSHEET NOTE
01/12/21 1920   Vital Signs   /73   Temp 98.2 °F (36.8 °C)   Pulse 90   Resp 20   Pain Assessment   Pain Assessment 0-10   Pain Level 0   Post-Hemodialysis Assessment   Post-Treatment Procedures Blood returned;Catheter capped, clamped and heparinized x 2 ports   Machine Disinfection Process Acid/Vinegar Clean;Heat Disinfect; Exterior Machine Disinfection   Rinseback Volume (ml) 300 ml   Total Liters Processed (l/min) 48.5 l/min   Dialyzer Clearance Moderately streaked   Duration of Treatment (minutes) 150 minutes   Hemodialysis Intake (ml) 300 ml   Hemodialysis Output (ml) 900 ml   NET Removed (ml) 300 ml   Tolerated Treatment Poor   Patient Response to Treatment rinsed back with 43min left in tx; hypotensive; bolus' given throughout tx; Dr. Lisbeth Lyles notified   Bilateral Breath Sounds Diminished; Other (Comment)  (coarse)   Edema None   Physician Notified?  Yes

## 2021-01-13 NOTE — PROGRESS NOTES
Department of Internal Medicine  Nephrology Progress Note      Events Reviewed:      Subjective:  We are following Mr. Andre Oliver for ESRD on HD, I have seen him at bedside today, he is confused, states \" I am going home\"    Current Medications:    Current Facility-Administered Medications: heparin (porcine) injection 5,000 Units, 5,000 Units, Subcutaneous, Q8H  insulin glargine (LANTUS) injection vial 20 Units, 20 Units, Subcutaneous, Nightly  chlorhexidine (PERIDEX) 0.12 % solution 15 mL, 15 mL, Mouth/Throat, 4x Daily  insulin lispro (HUMALOG) injection vial 0-12 Units, 0-12 Units, Subcutaneous, Q6H  Calcium Acetate (Phos Binder) CAPS 1,334 mg, 1,334 mg, Oral, BID WC  [Held by provider] insulin lispro (HUMALOG) injection vial 5 Units, 5 Units, Subcutaneous, TID WC  ascorbic acid (VITAMIN C) tablet 500 mg, 500 mg, Oral, Daily  metoprolol tartrate (LOPRESSOR) tablet 25 mg, 25 mg, Oral, BID  pantoprazole (PROTONIX) tablet 40 mg, 40 mg, Oral, Daily with breakfast  QUEtiapine (SEROQUEL) tablet 50 mg, 50 mg, Oral, BID  senna (SENOKOT) tablet 8.6 mg, 1 tablet, Oral, BID PRN  vitamin D (CHOLECALCIFEROL) tablet 2,000 Units, 2,000 Units, Oral, Daily  zinc sulfate (ZINCATE) capsule 50 mg, 50 mg, Oral, Daily  sodium chloride flush 0.9 % injection 10 mL, 10 mL, Intravenous, 2 times per day  sodium chloride flush 0.9 % injection 10 mL, 10 mL, Intravenous, PRN  promethazine (PHENERGAN) tablet 12.5 mg, 12.5 mg, Oral, Q6H PRN **OR** ondansetron (ZOFRAN) injection 4 mg, 4 mg, Intravenous, Q6H PRN  polyethylene glycol (GLYCOLAX) packet 17 g, 17 g, Oral, Daily PRN  acetaminophen (TYLENOL) tablet 650 mg, 650 mg, Oral, Q6H PRN **OR** acetaminophen (TYLENOL) suppository 650 mg, 650 mg, Rectal, Q6H PRN  atorvastatin (LIPITOR) tablet 10 mg, 10 mg, Oral, Nightly  glucose (GLUTOSE) 40 % oral gel 15 g, 15 g, Oral, PRN  dextrose 50 % IV solution, 12.5 g, Intravenous, PRN  glucagon (rDNA) injection 1 mg, 1 mg, Intramuscular, PRN  dextrose 5 % solution, 100 mL/hr, Intravenous, PRN  Allergies:  Patient has no known allergies. PHYSICAL EXAM:      Vitals:    VITALS:  /86   Pulse 100   Temp 97.3 °F (36.3 °C) (Axillary)   Resp 18   Ht 6' (1.829 m)   Wt 170 lb (77.1 kg)   SpO2 100%   BMI 23.06 kg/m²   CURRENT PULSE OXIMETRY:  SpO2: 100 %  24HR INTAKE/OUTPUT:      Intake/Output Summary (Last 24 hours) at 1/13/2021 1335  Last data filed at 1/13/2021 1320  Gross per 24 hour   Intake 400 ml   Output 1550 ml   Net -1150 ml       Access: Right IJ TDC  Constitutional:  Alert, speech difficult to understand, thick garbled  HEENT:  Normocephalic, Atraumatic, mucous membranes dry  Respiratory:  2 L per nasal cannula 98% sat  Cardiovascular/Edema:  Irregular S1/S2 No edema  Gastrointestinal:  Abdomen soft non distended BS + x 4 quadrants, chery with evidence of gross hematuria  Musculoskeletal: weak but ALVES, no edema  Neurologic:  Alert, speech difficult to understand.   Other: Behavior is calm     DATA:    CBC with Differential:    Lab Results   Component Value Date    WBC 6.4 01/13/2021    RBC 3.41 01/13/2021    HGB 9.4 01/13/2021    HCT 29.8 01/13/2021     01/13/2021    MCV 87.4 01/13/2021    MCH 27.6 01/13/2021    MCHC 31.5 01/13/2021    RDW 14.2 01/13/2021    NRBC 0.9 12/26/2020    SEGSPCT 63 09/27/2013    LYMPHOPCT 8.2 01/09/2021    MONOPCT 7.4 01/09/2021    MYELOPCT 0.9 12/26/2020    BASOPCT 0.1 01/09/2021    MONOSABS 0.65 01/09/2021    LYMPHSABS 0.72 01/09/2021    EOSABS 0.03 01/09/2021    BASOSABS 0.01 01/09/2021     CMP:    Lab Results   Component Value Date     01/13/2021    K 4.4 01/13/2021    K 6.4 01/09/2021     01/13/2021    CO2 24 01/13/2021    BUN 61 01/13/2021    CREATININE 3.8 01/13/2021    GFRAA 19 01/13/2021    LABGLOM 19 01/13/2021    GLUCOSE 196 01/13/2021    PROT 7.5 01/13/2021    LABALBU 2.7 01/13/2021    CALCIUM 8.7 01/13/2021    BILITOT 0.4 01/13/2021    ALKPHOS 122 01/13/2021    AST 18 01/13/2021    ALT 21 01/13/2021     Hepatic Function Panel:    Lab Results   Component Value Date    ALKPHOS 122 01/13/2021    ALT 21 01/13/2021    AST 18 01/13/2021    PROT 7.5 01/13/2021    BILITOT 0.4 01/13/2021    BILIDIR 0.2 12/30/2020    IBILI 0.3 12/30/2020    LABALBU 2.7 01/13/2021     Albumin:    Lab Results   Component Value Date    LABALBU 2.7 01/13/2021     Calcium:    Lab Results   Component Value Date    CALCIUM 8.7 01/13/2021   Magnesium:    Lab Results   Component Value Date    MG 2.3 01/01/2021     Phosphorus:    Lab Results   Component Value Date    PHOS 6.5 01/11/2021     PT/INR:    Lab Results   Component Value Date    PROTIME 17.6 01/01/2021    INR 1.5 01/01/2021     Radiology Review:     Chest xray 1/9/2021   Significantly decreased in the previously seen bilateral infiltrates with minimal residual infiltrate in the right lung base.         CT head 1/10/21   No acute intracranial abnormality. IMPRESSION/RECOMMENDATIONS:      1. End-stage-renal-disease. Needs tunneled catheter placed. Vascular surgery to place in       am  2. Chronic respiratory failure on 2 L nasal cannula  3. Chronic Atrial fibrillation. Eliquis is on hold for procedure  4. Diabetes mellitus  On SS insulin, endocrinology consulted  5. Anemia (10.7/33.3)  6. 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 16. Start phos binder when no longer NPO  7. Malnutrition, hypoalbuminemia , Pulled NGT out, currently NPO  8.  Agitation, Received 1 x dose Haldol, on Seroquel BID    Plan:    · HD tomorrow, orders reviewed with the dialysis nurse  · Remove chery cath  · Antibiotics dosed to his GFR per ID recommendation  · Retacrit 3000 units SQ three times/week

## 2021-01-13 NOTE — PROGRESS NOTES
Patient adamantly refusing lab work. Patient swinging arms and swatting upon attempt by phlebotomist, with staff assistance. Patient states \"I'm leaving today no matter what, I don't care what anyone says. \" Explained to patient that we need to see his labwork so we can know if he needs dialysis today. Explained that if he gets dialysis he may get to go home sooner. Patient continuing to refuse labwork and states \"I don't care, I'm leaving anyway. \"   Patient left to rest with call light within reach, bed alarm applied. Electronically signed by Reshma Sandoval RN on 1/13/2021 at 4:23 AM

## 2021-01-13 NOTE — CARE COORDINATION
Social Work discharge planning   Sw called [facility] [phone] and spoke to [address] [place] [address] to set up transport back to [facility] today. Angie set pt for [facility]  today. Danna notified charge HALLIE Artis with [facility], pt's primary contact in [place] at [phone] all notified.   Electronically signed by Henry Lo on 1/13/2021 at 11:29 AM

## 2021-01-13 NOTE — PROGRESS NOTES
ENDOCRINOLOGY INITIAL CONSULTATION NOTE      Date of admission: 1/9/2021  Date of service: 1/12/2021  Admitting physician: Marylene Brick, MD   Primary Care Physician: Di Sutton DO  Consultant physician: Mitzy Sheth MD     Reason for the consultation:  Uncontrolled DM    History of Present Illness: The history is provided from medical records. The patient has dementia and history is limited     Days HELADIO Kaur is a very pleasant 65 y.o. old male with PMH of DM type 2, HTN, HLD, ESRD on dialysis and other listed below admitted to [facility] on 1/9/2021 from nursing home because of agitation and pulled out his NG and right femoral Tessio, endocrine service was consulted for diabetes management.     Subjective   Seen this AM, no acute issues overnight     Inpatient diet:   Carb Restricted diet     Point of care glucose monitoring (Independently reviewed)   Recent Labs     01/10/21  1610 01/10/21  1959 01/11/21  0624 01/11/21  1141 01/11/21  1718 01/11/21 2058 01/12/21  0539 01/12/21  1122   GLUMET 295* 275* 301* 314* 293* 341* 87 167*     Scheduled Meds:   insulin glargine  20 Units Subcutaneous Nightly    chlorhexidine  15 mL Mouth/Throat 4x Daily    insulin lispro  0-12 Units Subcutaneous Q6H    Calcium Acetate (Phos Binder)  1,334 mg Oral BID WC    [Held by provider] insulin lispro  5 Units Subcutaneous TID WC    ascorbic acid  500 mg Oral Daily    metoprolol tartrate  25 mg Oral BID    pantoprazole  40 mg Oral Daily with breakfast    QUEtiapine  50 mg Oral BID    Vitamin D  2,000 Units Oral Daily    zinc sulfate  50 mg Oral Daily    sodium chloride flush  10 mL Intravenous 2 times per day    enoxaparin  30 mg Subcutaneous Daily    atorvastatin  10 mg Oral Nightly     PRN Meds:       haloperidol lactate, 5 mg, Once PRN      fentanNYL, 25 mcg, Q5 Min PRN      senna, 1 tablet, BID PRN      sodium chloride flush, 10 mL, PRN      promethazine, 12.5 mg, Q6H PRN    Or      ondansetron, 4 mg, Q6H Lab Results   Component Value Date    LABA1C 11.1 12/23/2020    LABA1C 6.3 09/27/2013     Lab Results   Component Value Date/Time    TSH 0.540 12/23/2020 03:00 AM     Lab Results   Component Value Date    LABA1C 11.1 12/23/2020    GLUCOSE 100 01/12/2021    LABCREA 17 12/29/2020     Lab Results   Component Value Date    TRIG 439 12/23/2020    HDL 14 12/23/2020    [address] - 12/23/2020    CHOL 160 12/23/2020       Blood culture   Lab Results   Component Value Date    [facility]  01/09/2021     24 Hours no growth  Gram stain performed from blood culture bottle media  Gram positive cocci in clusters      BC 5 Days no growth 12/22/2020       Radiology:  XR CHEST PORTABLE   Final Result   Dialysis catheter proximal tip in the distal SVC and distal tip just above   the caval atrial junction. No pneumothorax. FLUORO FOR SURGICAL PROCEDURES   Final Result   Intraprocedural fluoroscopic spot images as above. See separate procedure   report for more information. Fluoroscopy modified barium swallow with video   Final Result   Penetration to nectar and honey consistencies of barium contrast.   Please see separate speech pathology report for full discussion of findings   and recommendations. CT HEAD WO CONTRAST   Final Result   No acute intracranial abnormality. XR CHEST PORTABLE   Final Result   Significantly decreased in the previously seen bilateral infiltrates with   minimal residual infiltrate in the right lung base.       MRI BRAIN WO CONTRAST    (Results Pending)       Medical Records/Labs/Images review:   I personally reviewed and summarized previous records   All labs and imaging were reviewed independently     ASSESSMENT & PLAN   Days HELADIO Kevin, a 65 y.o.-old male seen today for inpatient diabetes management     Diabetes Mellitus type 2   · Patient's diabetes is uncontrolled, A1c 11.1%    · Will change diabetes regimen to:  · Change Lantus to 20 units nightly   · Medium dose sliding scale with meals and at night   · To start small dose of Humalog once start eating   · Continue glucose check with meals and at bedtime   · Will titrate insulin dose based on the blood glucose trend & insulin requirement  · Will arrange for patient to be seen in endocrinology clinic upon discharge for routine diabetes maintenance and prevention. H/o COVID   · Dx 12/22, repeated this on this admission was negative   · Managed by primary team  · Will continue monitoring BS and adjust insulin as needed     ESRD   On dialysis    Patient is at risk for hypoglycemia while receiving insulin due to ESRD   Continue to monitor blood glucose closely    HLD   · On lipitor 10 mg daily       Thank you for allowing us to participate in the care of this patient. Please do not hesitate to contact us with any additional questions. Cruzito Bradley MD  Endocrinologist, [facility] Diabetes [facility] and Endocrinology   [address] [place] [id number]   Phone: [phone]  Fax: [phone]  --------------------------------------------  An electronic signature was used to authenticate this note.  Vincenzo Jennings MD on 1/12/2021 at 7:17 PM

## 2021-01-13 NOTE — PROGRESS NOTES
SPEECH LANGUAGE PATHOLOGY  DAILY PROGRESS NOTE        PATIENT NAME:  Andre Brown      :  1950          TODAY'S DATE:  2021 ROOM:  [address] [place], [place] [address]X        SWALLOWING    Pt in bed, alert and verbalizing however poor intelligibility noted. Oral cavity examined. Dried blood/mucous noted on hard and soft palate. Discussed with RN. RN reports poor oral intake due to refusal. SLP encouraged increase in oral care which may improve intelligibility as well as acceptance of oral intake. Oral trials to be completed following adequate oral care. DYSPHAGIA DIAGNOSIS:  Moderate-marked pharyngeal dysphagia-pt at risk of aspiration of thin, NTL and HTL liquids at this time                            DIET RECOMMENDATIONS:  Dysphagia 2,  Mechanical Soft (Minced & Moist) solids with  pudding consistency (extremely thick) liquids as tolerated     If change in respiratory status occurs and pt requiring significant increase in supplemental oxygen please notify SLP to reassess toleration of current diet.                     FEEDING RECOMMENDATIONS:                             Assistance level:  Stand by assistance is needed during all oral intake                             Compensatory strategies recommended: Double swallow, Small bites/sips, No straw and Effortful swallow        Education- Pt educated on results and POC. Pt trained on compensatory strategies for safe swallow with fair outcome. Questions answered. Will continue SP intervention as per previously established POC. Rosie ABDI CCC/SLP [id number]  Speech Pathologist              CPT code(s) 19826  dysphagia tx  Total minutes :  15 minutes

## 2021-01-14 LAB
ALBUMIN SERPL-MCNC: 2.8 G/DL (ref 3.5–5.2)
ALP BLD-CCNC: 121 U/L (ref 40–129)
ALT SERPL-CCNC: 22 U/L (ref 0–40)
ANION GAP SERPL CALCULATED.3IONS-SCNC: 16 MMOL/L (ref 7–16)
AST SERPL-CCNC: 19 U/L (ref 0–39)
BILIRUB SERPL-MCNC: 0.4 MG/DL (ref 0–1.2)
BUN BLDV-MCNC: 83 MG/DL (ref 8–23)
CALCIUM SERPL-MCNC: 9.5 MG/DL (ref 8.6–10.2)
CHLORIDE BLD-SCNC: 109 MMOL/L (ref 98–107)
CO2: 23 MMOL/L (ref 22–29)
CREAT SERPL-MCNC: 4.7 MG/DL (ref 0.7–1.2)
GFR AFRICAN AMERICAN: 15
GFR NON-AFRICAN AMERICAN: 15 ML/MIN/1.73
GLUCOSE BLD-MCNC: 155 MG/DL (ref 74–99)
HCT VFR BLD CALC: 30.5 % (ref 37–54)
HEMOGLOBIN: 9.2 G/DL (ref 12.5–16.5)
MCH RBC QN AUTO: 27 PG (ref 26–35)
MCHC RBC AUTO-ENTMCNC: 30.2 % (ref 32–34.5)
MCV RBC AUTO: 89.4 FL (ref 80–99.9)
METER GLUCOSE: 114 MG/DL (ref 74–99)
METER GLUCOSE: 148 MG/DL (ref 74–99)
METER GLUCOSE: 166 MG/DL (ref 74–99)
METER GLUCOSE: 193 MG/DL (ref 74–99)
PDW BLD-RTO: 14.3 FL (ref 11.5–15)
PLATELET # BLD: 167 E9/L (ref 130–450)
PMV BLD AUTO: 12.2 FL (ref 7–12)
POTASSIUM SERPL-SCNC: 4.4 MMOL/L (ref 3.5–5)
RBC # BLD: 3.41 E12/L (ref 3.8–5.8)
SODIUM BLD-SCNC: 148 MMOL/L (ref 132–146)
TOTAL PROTEIN: 7.7 G/DL (ref 6.4–8.3)
VANCOMYCIN RANDOM: 10.4 MCG/ML (ref 5–40)
WBC # BLD: 8.4 E9/L (ref 4.5–11.5)

## 2021-01-14 PROCEDURE — 2580000003 HC RX 258: Performed by: SURGERY

## 2021-01-14 PROCEDURE — 2700000000 HC OXYGEN THERAPY PER DAY

## 2021-01-14 PROCEDURE — 36415 COLL VENOUS BLD VENIPUNCTURE: CPT

## 2021-01-14 PROCEDURE — 2500000003 HC RX 250 WO HCPCS: Performed by: SURGERY

## 2021-01-14 PROCEDURE — 97161 PT EVAL LOW COMPLEX 20 MIN: CPT

## 2021-01-14 PROCEDURE — 6370000000 HC RX 637 (ALT 250 FOR IP): Performed by: SURGERY

## 2021-01-14 PROCEDURE — 2580000003 HC RX 258: Performed by: SPECIALIST

## 2021-01-14 PROCEDURE — 99232 SBSQ HOSP IP/OBS MODERATE 35: CPT | Performed by: INTERNAL MEDICINE

## 2021-01-14 PROCEDURE — 6370000000 HC RX 637 (ALT 250 FOR IP): Performed by: INTERNAL MEDICINE

## 2021-01-14 PROCEDURE — 97165 OT EVAL LOW COMPLEX 30 MIN: CPT

## 2021-01-14 PROCEDURE — 6360000002 HC RX W HCPCS: Performed by: INTERNAL MEDICINE

## 2021-01-14 PROCEDURE — 6360000002 HC RX W HCPCS: Performed by: SPECIALIST

## 2021-01-14 PROCEDURE — 80053 COMPREHEN METABOLIC PANEL: CPT

## 2021-01-14 PROCEDURE — 82962 GLUCOSE BLOOD TEST: CPT

## 2021-01-14 PROCEDURE — 2580000003 HC RX 258: Performed by: INTERNAL MEDICINE

## 2021-01-14 PROCEDURE — 85027 COMPLETE CBC AUTOMATED: CPT

## 2021-01-14 PROCEDURE — 90935 HEMODIALYSIS ONE EVALUATION: CPT | Performed by: INTERNAL MEDICINE

## 2021-01-14 PROCEDURE — 80202 ASSAY OF VANCOMYCIN: CPT

## 2021-01-14 PROCEDURE — 1200000000 HC SEMI PRIVATE

## 2021-01-14 PROCEDURE — 99233 SBSQ HOSP IP/OBS HIGH 50: CPT | Performed by: INTERNAL MEDICINE

## 2021-01-14 RX ORDER — QUETIAPINE FUMARATE 25 MG/1
25 TABLET, FILM COATED ORAL 2 TIMES DAILY
Status: DISCONTINUED | OUTPATIENT
Start: 2021-01-14 | End: 2021-01-16 | Stop reason: HOSPADM

## 2021-01-14 RX ORDER — ALBUMIN (HUMAN) 12.5 G/50ML
25 SOLUTION INTRAVENOUS
Status: DISPENSED | OUTPATIENT
Start: 2021-01-15 | End: 2021-01-15

## 2021-01-14 RX ORDER — SODIUM CHLORIDE 450 MG/100ML
INJECTION, SOLUTION INTRAVENOUS CONTINUOUS
Status: DISCONTINUED | OUTPATIENT
Start: 2021-01-14 | End: 2021-01-16 | Stop reason: HOSPADM

## 2021-01-14 RX ORDER — MIDODRINE HYDROCHLORIDE 5 MG/1
10 TABLET ORAL
Status: ACTIVE | OUTPATIENT
Start: 2021-01-15 | End: 2021-01-15

## 2021-01-14 RX ORDER — INSULIN GLARGINE 100 [IU]/ML
18 INJECTION, SOLUTION SUBCUTANEOUS NIGHTLY
Status: DISCONTINUED | OUTPATIENT
Start: 2021-01-14 | End: 2021-01-16 | Stop reason: HOSPADM

## 2021-01-14 RX ADMIN — Medication 2000 UNITS: at 08:33

## 2021-01-14 RX ADMIN — HEPARIN SODIUM 5000 UNITS: 10000 INJECTION INTRAVENOUS; SUBCUTANEOUS at 08:33

## 2021-01-14 RX ADMIN — ZINC SULFATE 220 MG (50 MG) CAPSULE 50 MG: CAPSULE at 08:33

## 2021-01-14 RX ADMIN — CHLORHEXIDINE GLUCONATE 0.12% ORAL RINSE 15 ML: 1.2 LIQUID ORAL at 16:08

## 2021-01-14 RX ADMIN — SODIUM CHLORIDE: 4.5 INJECTION, SOLUTION INTRAVENOUS at 17:58

## 2021-01-14 RX ADMIN — QUETIAPINE FUMARATE 50 MG: 25 TABLET ORAL at 08:33

## 2021-01-14 RX ADMIN — INSULIN LISPRO 2 UNITS: 100 INJECTION, SOLUTION INTRAVENOUS; SUBCUTANEOUS at 11:52

## 2021-01-14 RX ADMIN — CALCIUM ACETATE 1334 MG: 667 CAPSULE ORAL at 08:33

## 2021-01-14 RX ADMIN — PANTOPRAZOLE SODIUM 40 MG: 40 TABLET, DELAYED RELEASE ORAL at 08:33

## 2021-01-14 RX ADMIN — CHLORHEXIDINE GLUCONATE 0.12% ORAL RINSE 15 ML: 1.2 LIQUID ORAL at 08:33

## 2021-01-14 RX ADMIN — OXYCODONE HYDROCHLORIDE AND ACETAMINOPHEN 500 MG: 500 TABLET ORAL at 08:33

## 2021-01-14 RX ADMIN — Medication 10 ML: at 08:33

## 2021-01-14 RX ADMIN — INSULIN LISPRO 2 UNITS: 100 INJECTION, SOLUTION INTRAVENOUS; SUBCUTANEOUS at 06:44

## 2021-01-14 RX ADMIN — VANCOMYCIN HYDROCHLORIDE 1000 MG: 1 INJECTION, POWDER, LYOPHILIZED, FOR SOLUTION INTRAVENOUS at 16:08

## 2021-01-14 ASSESSMENT — PAIN SCALES - GENERAL
PAINLEVEL_OUTOF10: 0

## 2021-01-14 NOTE — PATIENT CARE CONFERENCE
[facility] Quality Flow/Interdisciplinary Rounds Progress Note        Quality Flow Rounds held on January 14, 2021    Disciplines Attending:  Bedside Nurse, ,  and Nursing Unit Leadership    Days Liya Moreno was admitted on 1/9/2021 10:39 PM    Anticipated Discharge Date:  Expected Discharge Date: 01/13/21    Disposition:    Demetrius Score:  Demetrius Scale Score: 20    Readmission Risk              Risk of Unplanned Readmission:        42           Discussed patient goal for the day, patient clinical progression, and barriers to discharge.   The following Goal(s) of the Day/Commitment(s) have been identified:  Diagnostics - Report Results      Jeannie Mccormack  January 14, 2021

## 2021-01-14 NOTE — PROGRESS NOTES
Occupational Therapy  OCCUPATIONAL THERAPY INITIAL EVALUATION    Date:2021  Patient Name: Felicitas Guillen  MRN: [id number]  : 1950  Room: [address] [place] [address]A    Referring Provider: Tonya Crowell DO  Evaluating OT: Clair Cat Anum Humberto - [id number]    AM-PAC Daily Activity Raw Score: 15/24    Recommended Adaptive Equipment: Continue to assess. Diagnosis: Complicated UTI (urinary tract infection) [N39.0]     Pertinent Medical History: COVID-19 (2020), DM, HTN, cancer, prostate cancer     Precautions: falls, bed alarm    Home Living: Patient admitted from [facility]. Prior to 2020, patient lived with his significant other in a two-floor home, per review of patient's medical record. Prior Level of Function (PLOF): Patient reported that he was needing assistance with ADLs recently. Patient was independent with ADLs, IADLs, and functional transfers/mobility prior to 2020, per review of patient's medical record. Pain Level: No complaints of pain. Cognition: Patient alert and oriented grossly. Fair command follow demonstrated. Verbal encouragement given to maximize participation in EOB activities. Memory: Fair   Sequencing: Fair   Problem Solving: Fair   Judgement/Safety: Impaired    Functional Assessment:   Initial Eval Status  Date: 2021 Treatment Status  Date:  Short Term Goals   Feeding Setup  Independent   Grooming Min A  SBA  (seated/standing)   UB Dressing Min A  Setup   LB Dressing Max A to don socks. Min A - with use of AE, as needed/appropriate   Bathing Max A  Min A - with use of AE/DME, as needed/appropriate   Toileting Max A  Min A   Bed Mobility  Supine-to-Sit: Min A  Sit-to-Supine: Min A      Functional Transfers Sit-to-Stand: Mod Ax2   from EOB (without device)  SBA   Functional Mobility Patient unable to take side steps toward HOB.   Min A with functional mobility (with device, as needed/appropriate) in order to maximize independence with ADLs and other functional tasks. Balance Sitting: Fair  (at EOB)  Standing: Poor  (with handheld assistance)  Fair dynamic standing balance during completion of ADLs and other functional tasks. Activity Tolerance Limited  Patient will demonstrate Good understanding and consistent implementation of energy conservation techniques and work simplification techniques into ADL routines. Visual/  Perceptual WFL    N/A   B UE Strength 3+/5  Patient will demonstrate 4/5 B UE strength in order to maximize independence with ADLs and functional transfers. Long-Term Goal: Patient will increase functional independence to PLOF in order to allow patient to live in least restrictive environment. ROM: Additional Information:    R UE  WFL    L UE WFL      Hearing: WFL  Sensation: No complaints of numbness/tingling in B UEs. Tone: WFL  Edema: No    Comments: RN approved patient's participation in EOB/OOB activities. Upon arrival, patient supine in bed. At end of session, patient supine in bed (per patient preference) with call light and phone within reach, bed alarm activated, and all lines and tubes intact. Patient would benefit from continued skilled OT to increase safety and independence with completion of ADL/IADL tasks for functional independence and quality of life.      Assessment of Current Deficits:   Functional Mobility [x]  ADLs [x] Strength [x]  Cognition []  Functional Transfers  [x] IADLs [x] Safety Awareness [x]  Endurance [x]  Fine Motor Coordination [] Balance [x] Vision/Perception [] Sensation []   Gross Motor Coordination [] ROM [] Delirium []                  Motor Control []    Plan of Care: 2-5 days/week for 1-2 weeks PRN  [x]ADL retraining/adaptive techniques and AE recommendations to increase functional independence within precautions  [x]Energy conservation techniques to improve tolerance for ADLs/IADLs  [x]Functional transfer/mobility training/DME recommendations for increased independence, safety and fall prevention  [x]Patient/family education to increase safety and functional independence  [x]Environmental modifications for safe mobility and completion of ADLs/IADLs  []Cognitive retraining exercises to improve problem solving skills & safe participation in ADLs/IADLs   []Sensory re-education techniques to improve extremity awareness, maintain skin integrity and improve hand function   []Visual/Perceptual retraining  to improve body awareness and safety during transfers and ADLs   []Splinting/positioning needs to maintain joint/skin integrity and prevent contractures   [x]Therapeutic activity to improve functional performance during ADLs/IADLs  [x]Therapeutic exercise to improve tolerance and functional strength for ADLs/IADLs  [x]Balance retraining/tolerance tasks for facilitation of postural control with dynamic challenges during ADLs   [x]Neuromuscular re-education: facilitate righting/equilibrium reactions, midline orientation, scapular stability/mobility, normalize muscle tone, and facilitate active functional movement/attention  []Delirium prevention/treatment   []Positioning to improve functional independence and prevent skin breakdown   []Other:     Rehab Potential: Good for established goals. Patient / Family Goal: Patient noted that he wants to return home. Patient and/or family were instructed on functional diagnosis, prognosis/goals, and OT plan of care. Demonstrated Fair understanding.     Eval Complexity: Low    Time In: 1316  Time Out: 1326  Total Treatment Time: 10 minutes      Minutes Units   OT Eval Low 39303 10 1   OT Eval Medium 53010     OT Eval High 42376     OT Re-Eval [id number]     Therapeutic Ex 68900     Therapeutic Activities 99273     ADL/Self Care 53238     Orthotic Management 65158     Neuro Re-Ed 91556     Non-Billable Time N/A ---     Evaluation time includes thorough review of current medical information, gathering information on past medical history/social history and prior level of

## 2021-01-14 NOTE — PROGRESS NOTES
Physical Therapy    Facility/Department: [address] MED SURG      NAME: Andre Silva  : 1950  MRN: [id number]    Date of Service: 2021    Attempted to see pt for PT evaluation, pt off unit for dialysis.   Mily THOMPSON [id number]

## 2021-01-14 NOTE — PROGRESS NOTES
Received call from [facility]. They stated that they had called yesterday and reported the blood culture from 1/9 was also growing GNR, they said that that was inaccurate and that there are no GNR growing. Call placed to ID office to make them aware.     Electronically signed by Kal Mccoy RN on 1/14/2021 at 9:59 AM

## 2021-01-14 NOTE — PROGRESS NOTES
ENDOCRINOLOGY INITIAL CONSULTATION NOTE      Date of admission: 1/9/2021  Date of service: 1/13/2021  Admitting physician: Tan Tavera MD   Primary Care Physician: Hollie Boone DO  Consultant physician: Leodan Sterling MD     Reason for the consultation:  Uncontrolled DM    History of Present Illness: The history is provided from medical records. The patient has dementia and history is limited     Days HELADIO Sierra is a very pleasant 65 y.o. old male with PMH of DM type 2, HTN, HLD, ESRD on dialysis and other listed below admitted [facility] from nursing home because of agitation and pulled out his NG and right femoral Tessio, endocrine service was consulted for diabetes management.     Subjective   Seen and examined, no acute issues overnight, BS improving     Inpatient diet:   Carb Restricted diet     Point of care glucose monitoring (Independently reviewed)   Recent Labs     01/11/21  1718 01/11/21  2058 01/12/21  0539 01/12/21  1122 01/12/21  2239 01/13/21  0759 01/13/21  1116 01/13/21  1659   GLUMET 293* 341* 87 167* 209* 173* 210* 116*     Scheduled Meds:   heparin (porcine)  5,000 Units Subcutaneous Q8H    cefepime  2 g Intravenous Once    epoetin joann-epbx  3,000 Units Subcutaneous Once per day on Mon Wed Fri    insulin glargine  20 Units Subcutaneous Nightly    chlorhexidine  15 mL Mouth/Throat 4x Daily    insulin lispro  0-12 Units Subcutaneous Q6H    Calcium Acetate (Phos Binder)  1,334 mg Oral BID WC    [Held by provider] insulin lispro  5 Units Subcutaneous TID WC    ascorbic acid  500 mg Oral Daily    metoprolol tartrate  25 mg Oral BID    pantoprazole  40 mg Oral Daily with breakfast    QUEtiapine  50 mg Oral BID    Vitamin D  2,000 Units Oral Daily    zinc sulfate  50 mg Oral Daily    sodium chloride flush  10 mL Intravenous 2 times per day    atorvastatin  10 mg Oral Nightly     PRN Meds:       senna, 1 tablet, BID PRN      sodium chloride flush, 10 mL, PRN     promethazine, 12.5 mg, Q6H PRN    Or      ondansetron, 4 mg, Q6H PRN      polyethylene glycol, 17 g, Daily PRN      acetaminophen, 650 mg, Q6H PRN    Or      acetaminophen, 650 mg, Q6H PRN      glucose, 15 g, PRN      dextrose, 12.5 g, PRN      glucagon (rDNA), 1 mg, PRN      dextrose, 100 mL/hr, PRN      Continuous Infusions:   dextrose         Review of Systems  Non-obtainable     OBJECTIVE    /86   Pulse 100   Temp 97.3 °F (36.3 °C) (Axillary)   Resp 18   Ht 6' (1.829 m)   Wt 170 lb (77.1 kg)   SpO2 100%   BMI 23.06 kg/m²     Intake/Output Summary (Last 24 hours) at 1/13/2021 1931  Last data filed at 1/13/2021 1723  Gross per 24 hour   Intake 0 ml   Output 850 ml   Net -850 ml       Physical examination:  General: sleepy   HEENT: normocephalic non-traumatic   Neck: supple,   Pulm: Clear equal air entry no added sounds, no wheezing or rhonchi    CVS: S1 + S2, no murmur, no heave  Abd: soft lax, no tenderness, no organomegaly, audible bowel sounds. Skin: warm, no lesions, no rash.     Feet: no wounds, no ulcers   Neuro: CN intact, muscle power normal  Psych: normal mood, and affect    Review of Laboratory Data:  I personally reviewed the following labs:   Recent Labs     01/11/21  0540 01/12/21  0540 01/13/21  0952   WBC 6.8 6.9 6.4   RBC 3.46* 3.85 3.41*   HGB 9.5* 10.8* 9.4*   HCT 30.5* 34.5* 29.8*   MCV 88.2 89.6 87.4   MCH 27.5 28.1 27.6   MCHC 31.1* 31.3* 31.5*   RDW 14.2 14.5 14.2    172 147   MPV 12.3* 12.4* 12.1*     Recent Labs     01/11/21  0540 01/12/21  0540 01/13/21  0952   * 150* 144   K 5.1* 4.6 4.4   * 116* 105   CO2 20* 21* 24   BUN 95* 92* 61*   CREATININE 4.6* 4.3* 3.8*   GLUCOSE 282* 100* 196*   CALCIUM 9.2 9.6 8.7   PROT 7.3 7.9 7.5   LABALBU 2.7* 3.0* 2.7*   BILITOT 0.4 0.4 0.4   ALKPHOS 119 126 122   AST 12 16 18   ALT 20 20 21     Beta-Hydroxybutyrate   Date Value Ref Range Status   12/23/2020 0.80 (H) 0.02 - 0.27 mmol/L Final     Lab Results Component Value Date    LABA1C 11.1 12/23/2020    LABA1C 6.3 09/27/2013     Lab Results   Component Value Date/Time    TSH 0.540 12/23/2020 03:00 AM     Lab Results   Component Value Date    LABA1C 11.1 12/23/2020    GLUCOSE 196 01/13/2021    LABCREA 17 12/29/2020     Lab Results   Component Value Date    TRIG 439 12/23/2020    HDL 14 12/23/2020    [address] - 12/23/2020    CHOL 160 12/23/2020       Blood culture   Lab Results   Component Value Date    [facility]  01/09/2021     24 Hours no growth  Gram stain performed from blood culture bottle media  Gram positive cocci in clusters      BC Sensitivity to follow 01/09/2021       Radiology:  MRI BRAIN WO CONTRAST   Final Result   1. No evidence of an acute infarct. 2. Cerebral parenchymal volume loss with mild chronic microvascular white   matter ischemic disease. XR CHEST PORTABLE   Final Result   Dialysis catheter proximal tip in the distal SVC and distal tip just above   the caval atrial junction. No pneumothorax. FLUORO FOR SURGICAL PROCEDURES   Final Result   Intraprocedural fluoroscopic spot images as above. See separate procedure   report for more information. Fluoroscopy modified barium swallow with video   Final Result   Penetration to nectar and honey consistencies of barium contrast.   Please see separate speech pathology report for full discussion of findings   and recommendations. CT HEAD WO CONTRAST   Final Result   No acute intracranial abnormality. XR CHEST PORTABLE   Final Result   Significantly decreased in the previously seen bilateral infiltrates with   minimal residual infiltrate in the right lung base.           Medical Records/Labs/Images review:   I personally reviewed and summarized previous records   All labs and imaging were reviewed independently     ASSESSMENT & PLAN   Days HELADIO Brown, a 65 y.o.-old male seen today for inpatient diabetes management     Diabetes Mellitus type 2   · Patient's diabetes is uncontrolled, A1c 11.1%    · Will change diabetes regimen to:  · Change Lantus to 20 units nightly   · Medium dose sliding scale with meals and at night   · To start small dose of Humalog appetite improve   · Continue glucose check with meals and at bedtime   · Will titrate insulin dose based on the blood glucose trend & insulin requirement    H/o COVID   · Dx 12/22, repeated this on this admission was negative   · Managed by primary team  · Will continue monitoring BS and adjust insulin as needed     ESRD   On dialysis    Patient is at risk for hypoglycemia while receiving insulin due to ESRD   Continue to monitor blood glucose closely    HLD   · On lipitor 10 mg daily     Thank you for allowing us to participate in the care of this patient. Please do not hesitate to contact us with any additional questions. Lavern Cali MD  Endocrinologist, [facility] Endocr[facility]   [address] [place] [id number]   Phone: [phone]  Fax: [phone]  --------------------------------------------  An electronic signature was used to authenticate this note.  Susan Johnson MD on 1/13/2021 at 7:31 PM

## 2021-01-14 NOTE — PROGRESS NOTES
[address] [place] [address] [facility]  AYLAIDA  Progress Note    SUBJECTIVE:  Chief Complaint   Patient presents with    Wound Check     pulled tessio out of the right fem      The patient says he wants to go home on his own. Denies any other complaints. He had his dialysis catheter placed. Review of systems:  As stated above in the chief complaint, otherwise negative. Medications:  Scheduled Meds:   insulin lispro  0-12 Units Subcutaneous 4x Daily AC & HS    [START ON 1/15/2021] midodrine  10 mg Oral Once in dialysis    [START ON 1/15/2021] albumin human  25 g Intravenous Once in dialysis    QUEtiapine  25 mg Oral BID    heparin (porcine)  5,000 Units Subcutaneous Q8H    epoetin joann-epbx  3,000 Units Subcutaneous Once per day on     insulin glargine  20 Units Subcutaneous Nightly    chlorhexidine  15 mL Mouth/Throat 4x Daily    Calcium Acetate (Phos Binder)  1,334 mg Oral BID WC    [Held by provider] insulin lispro  5 Units Subcutaneous TID WC    ascorbic acid  500 mg Oral Daily    metoprolol tartrate  25 mg Oral BID    pantoprazole  40 mg Oral Daily with breakfast    Vitamin D  2,000 Units Oral Daily    zinc sulfate  50 mg Oral Daily    sodium chloride flush  10 mL Intravenous 2 times per day    atorvastatin  10 mg Oral Nightly     Continuous Infusions:   dextrose       PRN Meds:senna, sodium chloride flush, promethazine **OR** ondansetron, polyethylene glycol, acetaminophen **OR** acetaminophen, glucose, dextrose, glucagon (rDNA), dextrose    OBJECTIVE:  /67   Pulse 94   Temp 96.5 °F (35.8 °C) (Axillary)   Resp 16   Ht 6' (1.829 m)   Wt 164 lb 3.9 oz (74.5 kg)   SpO2 96%   BMI 22.28 kg/m²   Temp  Av.6 °F (35.9 °C)  Min: 96 °F (35.6 °C)  Max: 97.6 °F (36.4 °C)  Constitutional: The patient is awake, alert, and partially oriented. Skin: Warm and dry. No rashes were noted. HEENT: Round and reactive pupils. Moist mucous membranes.   No ulcerations or thrush. Neck: Supple to movements. Chest: No use of accessory muscles to breathe. Symmetrical expansion. No wheezing, crackles or rhonchi. Right tunneled HD catheter. Cardiovascular: S1 and S2 are rhythmic and regular. No murmurs appreciated. Abdomen: Positive bowel sounds to auscultation. Benign to palpation. No masses felt. No hepatosplenomegaly. Extremities: No clubbing, no cyanosis, no edema.   Lines: peripheral  Jolly catheter with bloody urine    Laboratory and Tests Review:  Lab Results   Component Value Date    WBC 8.4 01/14/2021    WBC 6.4 01/13/2021    WBC 6.9 01/12/2021    HGB 9.2 (L) 01/14/2021    HCT 30.5 (L) 01/14/2021    MCV 89.4 01/14/2021     01/14/2021     Lab Results   Component Value Date    NEUTROABS 7.34 (H) 01/09/2021    NEUTROABS 6.13 01/01/2021    NEUTROABS 6.37 12/31/2020     No results found for: CRPHS  Lab Results   Component Value Date    ALT 22 01/14/2021    AST 19 01/14/2021    ALKPHOS 121 01/14/2021    BILITOT 0.4 01/14/2021     Lab Results   Component Value Date     01/14/2021    K 4.4 01/14/2021    K 6.4 01/09/2021     01/14/2021    CO2 23 01/14/2021    BUN 83 01/14/2021    CREATININE 4.7 01/14/2021    CREATININE 3.8 01/13/2021    CREATININE 4.3 01/12/2021    GFRAA 15 01/14/2021    LABGLOM 15 01/14/2021    GLUCOSE 155 01/14/2021    PROT 7.7 01/14/2021    LABALBU 2.8 01/14/2021    CALCIUM 9.5 01/14/2021    BILITOT 0.4 01/14/2021    ALKPHOS 121 01/14/2021    AST 19 01/14/2021    ALT 22 01/14/2021     Lab Results   Component Value Date    CRP 7.9 (H) 01/12/2021    CRP 5.1 (H) 01/01/2021    CRP 5.5 (H) 12/31/2020     Lab Results   Component Value Date    SEDRATE 55 (H) 01/01/2021    SEDRATE 55 (H) 12/31/2020    SEDRATE 65 (H) 12/30/2020     Radiology:      Microbiology:   Blood cultures 1/9/2021 MSSA, GNR (microbiology corrected report and said that this was placed erroneously)  Urine culture 1/9/2021 >100 K nonalbicans yeast    Recent Labs     01/12/21  1700 PROCAL 0.52*       ASSESSMENT:  · Probable CLABSI secondary to infected temporary HD catheter. The patient removed it on his own and was sent to the hospital.  He had positive blood cultures on the day that he was admitted.   He has been line free for at least 3 days and is currently afebrile  · Polymicrobial bacteremia associated to the above with GNR and MSSA, most likely transient  · Candiduria  · Confusion    PLAN:  · The patient will need no more Cefepime  · Redose Vancomycin today  · Monitor labs    Sandy Jolly  1:00 PM  1/14/2021

## 2021-01-14 NOTE — PROGRESS NOTES
Physical Therapy    Facility/Department: [facility] MED SURG  Initial Assessment    NAME: Andre Watkins  : 1950  MRN: [id number]    Date of Service: 2021      Patient Diagnosis(es): The primary encounter diagnosis was Acute cystitis with hematuria. Diagnoses of ESRD on dialysis [facility]) and Agitation were also pertinent to this visit. has a past medical history of Cancer ([facility] [address] Diabetes mellitus ([facility] [address] Hyperlipidemia, Hypertension, and Prostate cancer ([facility] [address] has a past surgical history that includes Colon surgery; Dilatation, esophagus; Leg Surgery (Left, 6 years ago); and hc dialysis catheter (N/A, 2021). Referring Provider:  Katie Pak MD      Evaluating Therapist: Diamond Sepulveda PT      Room #:519  DIAGNOSIS: Complicated UTI  Additional Pertinent History:DM, history of Covid, ESRD on dialysis, metabolic encephalopathy  PRECAUTIONS: falls, O2, alarm    Social:  Per chart pt admitted from [facility], prior to December pt lived with girlfriend in a 2 floor plan and was independnet     Initial Evaluation  Date: 21 Treatment      Short Term/ Long Term   Goals   Was pt agreeable to Eval/treatment? With encouragement     Does pt have pain? No c/o pain     Bed Mobility  Rolling: min assist  Supine to sit: min asssit  Sit to supine: min assist  Scooting: min assist  SBA   Transfers Sit to stand: mod assist  Stand to sit: mod assist  Stand pivot: NT  Min assist   Ambulation    Attempted, pt unable to move LE's  10 feet with ww with min assist   Stair Negotiation  Ascended and descended  NT   N/A   LE strength     3+/5    4-/5   balance      Fair sitting poor standing     AM-PAC Raw score                        Pt is alert and grossly oriented to person, place and month/year but confusion noted  LE ROM: WFL  Edema: none  Endurance: poor     ASSESSMENT  Pt displays functional ability as noted in the objective portion of this evaluation.       Patient education  Pt educated on PT objectives    Patient response to education:   Pt verbalized understanding Pt demonstrated skill Pt requires further education in this area   no         ASSESSMENT:    Comments:  Pt required encouragement to participate. Knees and hips flexed in standing. Poor standing tolerance      Pt's/ family goals   1. To go home    Patient and or family understand(s) diagnosis, prognosis, and plan of care. no      PLAN OF CARE:    Current Treatment Recommendations     [x] Strengthening     [] ROM   [x] Balance Training   [x] Endurance Training   [x] Transfer Training   [x] Gait Training   [] Stair Training   [] Positioning   [x] Safety and Education Training   [x] Patient/Caregiver Education   [] HEP  [] Other     Frequency of treatments: 2-5x/week x 5-7. days    Time in  115  Time out  130        Evaluation Time includes thorough review of current medical information, gathering information on past medical history/social history and prior level of function, completion of standardized testing/informal observation of tasks, assessment of data and education on plan of care and goals.     CPT codes:  [x] Low Complexity PT evaluation 87282  [] Moderate Complexity PT evaluation 98177  [] High Complexity PT evaluation 53409  [] PT Re-evaluation 71506  [] Gait training 81427 minutes  [] Manual therapy 04352 minutes  [] Therapeutic activities 39107 minutes  [] Therapeutic exercises 03063 minutes  [] Neuromuscular reeducation 07182 minutes     Mily PT [id number]

## 2021-01-14 NOTE — CARE COORDINATION
Message sent to therapy that patient needs to be seen this afternoon- Await maty. 1536: Spoke to Dr. Chano Kumar- updated him on discharge plan.

## 2021-01-14 NOTE — PLAN OF CARE
Problem: Injury - Risk of, Physical Injury:  Goal: Will remain free from falls  Description: Will remain free from falls  1/14/2021 1000 by Eleno Gutiérrez RN  Outcome: Met This Shift  1/14/2021 0330 by Laura No RN  Outcome: Met This Shift  Goal: Absence of physical injury  Description: Absence of physical injury  1/14/2021 1000 by Eleno Gutiérrez RN  Outcome: Met This Shift  1/14/2021 0330 by Laura No RN  Outcome: Met This Shift     Problem: Mood - Altered:  Goal: Mood stable  Description: Mood stable  1/14/2021 1000 by Eleno Gutiérrez RN  Outcome: Met This Shift  1/14/2021 0330 by Laura No RN  Outcome: Met This Shift  Goal: Absence of abusive behavior  Description: Absence of abusive behavior  1/14/2021 1000 by Eleno Gutiérrez RN  Outcome: Met This Shift  1/14/2021 0330 by Laura No RN  Outcome: Met This Shift  Goal: Verbalizations of feeling emotionally comfortable while being cared for will increase  Description: Verbalizations of feeling emotionally comfortable while being cared for will increase  1/14/2021 1000 by Eleno Gutiérrez RN  Outcome: Met This Shift  1/14/2021 0330 by Laura No RN  Outcome: Met This Shift     Problem: Psychomotor Activity - Altered:  Goal: Absence of psychomotor disturbance signs and symptoms  Description: Absence of psychomotor disturbance signs and symptoms  1/14/2021 1000 by Eleno Gutiérrez RN  Outcome: Met This Shift  1/14/2021 0330 by Laura No RN  Outcome: Met This Shift     Problem: Sensory Perception - Impaired:  Goal: Demonstrations of improved sensory functioning will increase  Description: Demonstrations of improved sensory functioning will increase  1/14/2021 1000 by Eleno Gutiérrez RN  Outcome: Met This Shift  1/14/2021 0330 by Laura No RN  Outcome: Met This Shift  Goal: Decrease in sensory misperception frequency  Description: Decrease in sensory misperception frequency  1/14/2021 1000 by Eleno Gutiérrez RN  Outcome: Met This Shift  1/14/2021 0330 by Kelly Begum RN  Outcome: Met This Shift  Goal: Able to refrain from responding to false sensory perceptions  Description: Able to refrain from responding to false sensory perceptions  1/14/2021 1000 by Jose Leung RN  Outcome: Met This Shift  1/14/2021 0330 by Kelly Begum RN  Outcome: Met This Shift  Goal: Demonstrates accurate environmental perceptions  Description: Demonstrates accurate environmental perceptions  1/14/2021 1000 by Jose Leung RN  Outcome: Met This Shift  1/14/2021 0330 by Kelly Begum RN  Outcome: Met This Shift  Goal: Able to distinguish between reality-based and nonreality-based thinking  Description: Able to distinguish between reality-based and nonreality-based thinking  1/14/2021 1000 by Jose Leung RN  Outcome: Met This Shift  1/14/2021 0330 by Kelly Begum RN  Outcome: Met This Shift  Goal: Able to interrupt nonreality-based thinking  Description: Able to interrupt nonreality-based thinking  1/14/2021 1000 by Jose Leung RN  Outcome: Met This Shift  1/14/2021 0330 by Kelly Begum RN  Outcome: Met This Shift     Problem: Sleep Pattern Disturbance:  Goal: Appears well-rested  Description: Appears well-rested  1/14/2021 1000 by Jose Leung RN  Outcome: Met This Shift  1/14/2021 0330 by Kelly Begum RN  Outcome: Met This Shift

## 2021-01-14 NOTE — PROGRESS NOTES
joann-epbx  3,000 Units Subcutaneous Once per day on Mon Wed Fri    insulin glargine  20 Units Subcutaneous Nightly    chlorhexidine  15 mL Mouth/Throat 4x Daily    Calcium Acetate (Phos Binder)  1,334 mg Oral BID     [Held by provider] insulin lispro  5 Units Subcutaneous TID     ascorbic acid  500 mg Oral Daily    metoprolol tartrate  25 mg Oral BID    pantoprazole  40 mg Oral Daily with breakfast    QUEtiapine  50 mg Oral BID    Vitamin D  2,000 Units Oral Daily    zinc sulfate  50 mg Oral Daily    sodium chloride flush  10 mL Intravenous 2 times per day    atorvastatin  10 mg Oral Nightly         senna, 1 tablet, BID PRN      sodium chloride flush, 10 mL, PRN      promethazine, 12.5 mg, Q6H PRN    Or      ondansetron, 4 mg, Q6H PRN      polyethylene glycol, 17 g, Daily PRN      acetaminophen, 650 mg, Q6H PRN    Or      acetaminophen, 650 mg, Q6H PRN      glucose, 15 g, PRN      dextrose, 12.5 g, PRN      glucagon (rDNA), 1 mg, PRN      dextrose, 100 mL/hr, PRN         Objective:    /88   Pulse 78   Temp 96 °F (35.6 °C) (Oral)   Resp 16   Ht 6' (1.829 m)   Wt 166 lb (75.3 kg)   SpO2 96%   BMI 22.51 kg/m²   General Appearance: alert and oriented to person, place and time, well-developed and well-nourished, in no acute distress  Skin: warm and dry, no rash or erythema  Head: normocephalic and atraumatic  Eyes: pupils equal, round, and reactive to light, extraocular eye movements intact, conjunctivae normal  ENT: tympanic membrane, external ear and ear canal normal bilaterally, oropharynx clear and moist with normal mucous membranes  Neck: neck supple and non tender without mass, no thyromegaly or thyroid nodules, no cervical lymphadenopathy   Pulmonary/Chest: clear to auscultation bilaterally- no wheezes, rales or rhonchi, normal air movement, no respiratory distress  Cardiovascular: normal rate, normal S1 and S2, no gallops, intact distal pulses and no carotid bruits  Abdomen: soft, non-tender, non-distended, normal bowel sounds, no masses or organomegaly  Extremities, distally, on colder side. Recent Labs     01/12/21  0540 01/13/21  0952   * 144   K 4.6 4.4   * 105   CO2 21* 24   BUN 92* 61*   CREATININE 4.3* 3.8*   GLUCOSE 100* 196*   CALCIUM 9.6 8.7       Recent Labs     01/12/21  0540 01/13/21  0952   WBC 6.9 6.4   RBC 3.85 3.41*   HGB 10.8* 9.4*   HCT 34.5* 29.8*   MCV 89.6 87.4   MCH 28.1 27.6   MCHC 31.3* 31.5*   RDW 14.5 14.2    147   MPV 12.4* 12.1*     Urine culture from ninth with yeast  Blood culture from ninth with gram-negative rods  WBC 8.4, hemoglobin 9.2-today  Sodium 148, creatinine 4.7  Accu-Cheks, well controlled. Radiology:   MRI BRAIN WO CONTRAST   Final Result   1. No evidence of an acute infarct. 2. Cerebral parenchymal volume loss with mild chronic microvascular white   matter ischemic disease. XR CHEST PORTABLE   Final Result   Dialysis catheter proximal tip in the distal SVC and distal tip just above   the caval atrial junction. No pneumothorax. FLUORO FOR SURGICAL PROCEDURES   Final Result   Intraprocedural fluoroscopic spot images as above. See separate procedure   report for more information. Fluoroscopy modified barium swallow with video   Final Result   Penetration to nectar and honey consistencies of barium contrast.   Please see separate speech pathology report for full discussion of findings   and recommendations. CT HEAD WO CONTRAST   Final Result   No acute intracranial abnormality. XR CHEST PORTABLE   Final Result   Significantly decreased in the previously seen bilateral infiltrates with   minimal residual infiltrate in the right lung base. Assessment:    Active Problems:    Complicated UTI (urinary tract infection)    Acute cystitis with hematuria  Resolved Problems:    * No resolved hospital problems. *      Plan:  1. Agitation/encephalopathy, has improved.   Still on Seroquel. 2. Nutrition, patient who had pulled out NG tube, has been seen by speech, at this time stable on mechanical soft diet. 3. Hypoxic respiratory failure, recent Covid infection, Covid negative on 11th, patient is stable on nasal cannula. 4. ESRD, patient with access for HD, unfortunately was not able to tolerate today, plan for tomorrow. 5. Hypotension, did improve after volume, on metoprolol 25 twice daily, same dose at home also. Will cut back on dose of Seroquel, should help him more wake up also. 6. Abnormal UA, evidence of yeast, ID on board. 7. Abnormal blood culture, revised twice by lab, initially gram-positive bacteremia, then gram-negative demetrice bacteremia, as per updated by nursing there is no bacteremia now. Patient has received initially Vanco and then cefepime, ID on board, will defer to ID. 8. Discharge planning, multiple social issues involved, difference of opinion amongst children as well as domestic partner, patient also says that he would like a domestic partner followed, however patient is awake and alert and does say he wants to go home and not to ECF. Will ask physical therapy to see the patient, likely will need home health care. DC planning could be in the next 2 days depending upon his improvement.         Electronically signed by Eleanor Noyola MD on 1/14/2021 at 8:53 AM

## 2021-01-14 NOTE — PROGRESS NOTES
Department of Internal Medicine  Nephrology Progress Note      Events Reviewed:      Subjective:  We are following Mr. Andre Oliver for ESRD on HD, I have seen him at bedside today, unable to tolerate HD this am as he was very hypotensive  His intake is very poor    Current Medications:    Current Facility-Administered Medications: insulin lispro (HUMALOG) injection vial 0-12 Units, 0-12 Units, Subcutaneous, 4x Daily AC & HS  [START ON 1/15/2021] midodrine (PROAMATINE) tablet 10 mg, 10 mg, Oral, Once in dialysis  [START ON 1/15/2021] albumin human 25 % IV solution 25 g, 25 g, Intravenous, Once in dialysis  QUEtiapine (SEROQUEL) tablet 25 mg, 25 mg, Oral, BID  vancomycin 1000 mg IVPB in 250 mL D5W addavial, 1,000 mg, Intravenous, Once  heparin (porcine) injection 5,000 Units, 5,000 Units, Subcutaneous, Q8H  epoetin joann-epbx (RETACRIT) injection 3,000 Units, 3,000 Units, Subcutaneous, Once per day on Mon Wed Fri  insulin glargine (LANTUS) injection vial 20 Units, 20 Units, Subcutaneous, Nightly  chlorhexidine (PERIDEX) 0.12 % solution 15 mL, 15 mL, Mouth/Throat, 4x Daily  Calcium Acetate (Phos Binder) CAPS 1,334 mg, 1,334 mg, Oral, BID WC  [Held by provider] insulin lispro (HUMALOG) injection vial 5 Units, 5 Units, Subcutaneous, TID WC  ascorbic acid (VITAMIN C) tablet 500 mg, 500 mg, Oral, Daily  metoprolol tartrate (LOPRESSOR) tablet 25 mg, 25 mg, Oral, BID  pantoprazole (PROTONIX) tablet 40 mg, 40 mg, Oral, Daily with breakfast  senna (SENOKOT) tablet 8.6 mg, 1 tablet, Oral, BID PRN  vitamin D (CHOLECALCIFEROL) tablet 2,000 Units, 2,000 Units, Oral, Daily  zinc sulfate (ZINCATE) capsule 50 mg, 50 mg, Oral, Daily  sodium chloride flush 0.9 % injection 10 mL, 10 mL, Intravenous, 2 times per day  sodium chloride flush 0.9 % injection 10 mL, 10 mL, Intravenous, PRN  promethazine (PHENERGAN) tablet 12.5 mg, 12.5 mg, Oral, Q6H PRN **OR** ondansetron (ZOFRAN) injection 4 mg, 4 mg, Intravenous, Q6H PRN  polyethylene glycol (GLYCOLAX) packet 17 g, 17 g, Oral, Daily PRN  acetaminophen (TYLENOL) tablet 650 mg, 650 mg, Oral, Q6H PRN **OR** acetaminophen (TYLENOL) suppository 650 mg, 650 mg, Rectal, Q6H PRN  atorvastatin (LIPITOR) tablet 10 mg, 10 mg, Oral, Nightly  glucose (GLUTOSE) 40 % oral gel 15 g, 15 g, Oral, PRN  dextrose 50 % IV solution, 12.5 g, Intravenous, PRN  glucagon (rDNA) injection 1 mg, 1 mg, Intramuscular, PRN  dextrose 5 % solution, 100 mL/hr, Intravenous, PRN  Allergies:  Patient has no known allergies. PHYSICAL EXAM:      Vitals:    VITALS:  /67   Pulse 94   Temp 96.5 °F (35.8 °C) (Axillary)   Resp 16   Ht 6' (1.829 m)   Wt 164 lb 3.9 oz (74.5 kg)   SpO2 96%   BMI 22.28 kg/m²   CURRENT PULSE OXIMETRY:  SpO2: 96 %  24HR INTAKE/OUTPUT:      Intake/Output Summary (Last 24 hours) at 1/14/2021 1416  Last data filed at 1/14/2021 0935  Gross per 24 hour   Intake 500 ml   Output 308 ml   Net 192 ml       Access: Right IJ TDC  Constitutional:  Alert, speech difficult to understand, thick garbled  HEENT:  Normocephalic, Atraumatic, mucous membranes dry  Respiratory:  2 L per nasal cannula 98% sat  Cardiovascular/Edema:  Irregular S1/S2 No edema  Gastrointestinal:  Abdomen soft non distended BS + x 4 quadrants, chery with evidence of gross hematuria  Musculoskeletal: weak but ALVES, no edema  Neurologic:  Alert, speech difficult to understand.   Other: Behavior is calm     DATA:    CBC with Differential:    Lab Results   Component Value Date    WBC 8.4 01/14/2021    RBC 3.41 01/14/2021    HGB 9.2 01/14/2021    HCT 30.5 01/14/2021     01/14/2021    MCV 89.4 01/14/2021    MCH 27.0 01/14/2021    MCHC 30.2 01/14/2021    RDW 14.3 01/14/2021    NRBC 0.9 12/26/2020    SEGSPCT 63 09/27/2013    LYMPHOPCT 8.2 01/09/2021    MONOPCT 7.4 01/09/2021    MYELOPCT 0.9 12/26/2020    BASOPCT 0.1 01/09/2021    MONOSABS 0.65 01/09/2021    LYMPHSABS 0.72 01/09/2021    EOSABS 0.03 01/09/2021    BASOSABS 0.01 01/09/2021 CMP:    Lab Results   Component Value Date     01/14/2021    K 4.4 01/14/2021    K 6.4 01/09/2021     01/14/2021    CO2 23 01/14/2021    BUN 83 01/14/2021    CREATININE 4.7 01/14/2021    GFRAA 15 01/14/2021    LABGLOM 15 01/14/2021    GLUCOSE 155 01/14/2021    PROT 7.7 01/14/2021    LABALBU 2.8 01/14/2021    CALCIUM 9.5 01/14/2021    BILITOT 0.4 01/14/2021    ALKPHOS 121 01/14/2021    AST 19 01/14/2021    ALT 22 01/14/2021     Hepatic Function Panel:    Lab Results   Component Value Date    ALKPHOS 121 01/14/2021    ALT 22 01/14/2021    AST 19 01/14/2021    PROT 7.7 01/14/2021    BILITOT 0.4 01/14/2021    BILIDIR 0.2 12/30/2020    IBILI 0.3 12/30/2020    LABALBU 2.8 01/14/2021     Albumin:    Lab Results   Component Value Date    LABALBU 2.8 01/14/2021     Calcium:    Lab Results   Component Value Date    CALCIUM 9.5 01/14/2021   Magnesium:    Lab Results   Component Value Date    MG 2.3 01/01/2021     Phosphorus:    Lab Results   Component Value Date    PHOS 6.5 01/11/2021     PT/INR:    Lab Results   Component Value Date    PROTIME 17.6 01/01/2021    INR 1.5 01/01/2021     Radiology Review:     Chest xray 1/9/2021   Significantly decreased in the previously seen bilateral infiltrates with minimal residual infiltrate in the right lung base.         CT head 1/10/21   No acute intracranial abnormality. IMPRESSION/RECOMMENDATIONS:      1. End-stage-renal-disease. Needs tunneled catheter placed. Vascular surgery to place in       am  2. Chronic respiratory failure on 2 L nasal cannula  3. Chronic Atrial fibrillation. Eliquis is on hold for procedure  4. Diabetes mellitus  On SS insulin, endocrinology consulted  5. Anemia (10.7/33.3)  6. 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 16. Start phos binder when no longer NPO  7. Malnutrition, hypoalbuminemia , Pulled NGT out, currently NPO  8. Agitation, Received 1 x dose Haldol, on Seroquel BID  9.  Hypernatremia secondary to free water deficit from poor intake    Plan:    · HD tomorrow, orders reviewed with the dialysis nurse, midodrine and SPA for intra dialytic hypotension  · Start 1/2 NS at 50 ml/hr for worsening hypernatremia  · Antibiotics dosed to his GFR per ID recommendation  · Retacrit 3000 units SQ three times/week

## 2021-01-14 NOTE — PROGRESS NOTES
Patients humalog SS is scheduled q6 hours. Perfect served Dr. Suzan Macahdo to ask if they want it changed to match the note.     Awaiting response

## 2021-01-14 NOTE — FLOWSHEET NOTE
01/14/21 0935   Vital Signs   /70   Temp 96.4 °F (35.8 °C)   Pulse 78   Resp 18   Weight 164 lb 3.9 oz (74.5 kg)   Weight Method Bed scale   Percent Weight Change 0.54   Pain Assessment   Pain Assessment 0-10   Pain Level 0   Patient's Stated Pain Goal No pain   Post-Hemodialysis Assessment   Post-Treatment Procedures Blood returned;Catheter capped, clamped and heparinized x 2 ports   Machine Disinfection Process Exterior Machine Disinfection   Rinseback Volume (ml) 300 ml   Total Liters Processed (l/min) 9.9 l/min   Dialyzer Clearance Lightly streaked   Duration of Treatment (minutes) 38 minutes   Hemodialysis Intake (ml) 500 ml   Hemodialysis Output (ml) 108 ml   NET Removed (ml) 408 ml   Tolerated Treatment Poor   Patient Response to Treatment dialysis discontinued r/t hypotension and tachycardia per Dr Jadiel Flores, pt blood reutrned, cath care given, both ports of cath flushed c 10 cc ns, heparin instilled to radha volume,caps applied, report called to Jorge De La Vega RN   Bilateral Breath Sounds Diminished

## 2021-01-14 NOTE — PROGRESS NOTES
ENDOCRINOLOGY PROGRESS NOTE      Date of admission: 1/9/2021  Date of service: 1/14/2021  Admitting physician: Kusum Person MD   Primary Care Physician: Wayne Moore DO  Consultant physician: Shawn Baca MD     Reason for the consultation:  Uncontrolled DM    History of Present Illness: The history is provided from medical records. The patient has dementia and history is limited     Days HELADIO Marion is a very pleasant 65 y.o. old male with PMH of DM type 2, HTN, HLD, ESRD on dialysis and other listed below admitted [facility]21 from nursing home because of agitation and pulled out his NG and right femoral Tessio, endocrine service was consulted for diabetes management.     Subjective   Seen and examined, no acute issues overnight, BS improving     Inpatient diet:   Carb Restricted diet     Point of care glucose monitoring (Independently reviewed)   Recent Labs     01/12/21  2239 01/13/21  0759 01/13/21  1116 01/13/21  1659 01/13/21  2131 01/14/21  0602 01/14/21  1101 01/14/21  1613   GLUMET 209* 173* 210* 116* 151* 193* 148* 114*     Scheduled Meds:   insulin lispro  0-12 Units Subcutaneous 4x Daily AC & HS    [START ON 1/15/2021] midodrine  10 mg Oral Once in dialysis    [START ON 1/15/2021] albumin human  25 g Intravenous Once in dialysis    QUEtiapine  25 mg Oral BID    vancomycin  1,000 mg Intravenous Once    heparin (porcine)  5,000 Units Subcutaneous Q8H    epoetin joann-epbx  3,000 Units Subcutaneous Once per day on Mon Wed Fri    insulin glargine  20 Units Subcutaneous Nightly    chlorhexidine  15 mL Mouth/Throat 4x Daily    Calcium Acetate (Phos Binder)  1,334 mg Oral BID WC    ascorbic acid  500 mg Oral Daily    metoprolol tartrate  25 mg Oral BID    pantoprazole  40 mg Oral Daily with breakfast    Vitamin D  2,000 Units Oral Daily    zinc sulfate  50 mg Oral Daily    sodium chloride flush  10 mL Intravenous 2 times per day    atorvastatin  10 mg Oral Nightly     PRN Meds:   senna, 1 tablet, BID PRN      sodium chloride flush, 10 mL, PRN      promethazine, 12.5 mg, Q6H PRN    Or      ondansetron, 4 mg, Q6H PRN      polyethylene glycol, 17 g, Daily PRN      acetaminophen, 650 mg, Q6H PRN    Or      acetaminophen, 650 mg, Q6H PRN      glucose, 15 g, PRN      dextrose, 12.5 g, PRN      glucagon (rDNA), 1 mg, PRN      dextrose, 100 mL/hr, PRN      Continuous Infusions:   dextrose         Review of Systems  Non-obtainable     OBJECTIVE    /67   Pulse 94   Temp 96.5 °F (35.8 °C) (Axillary)   Resp 16   Ht 6' (1.829 m)   Wt 164 lb 3.9 oz (74.5 kg)   SpO2 96%   BMI 22.28 kg/m²     Intake/Output Summary (Last 24 hours) at 1/14/2021 1628  Last data filed at 1/14/2021 0935  Gross per 24 hour   Intake 500 ml   Output 308 ml   Net 192 ml       Physical examination:  General: sleepy   HEENT: normocephalic non-traumatic   Neck: supple,   Pulm: Clear equal air entry no added sounds, no wheezing or rhonchi    CVS: S1 + S2, no murmur, no heave  Abd: soft lax, no tenderness, no organomegaly, audible bowel sounds. Skin: warm, no lesions, no rash.     Feet: no wounds, no ulcers   Neuro: CN intact, muscle power normal  Psych: normal mood, and affect    Review of Laboratory Data:  I personally reviewed the following labs:   Recent Labs     01/12/21  0540 01/13/21  0952 01/14/21  0850   WBC 6.9 6.4 8.4   RBC 3.85 3.41* 3.41*   HGB 10.8* 9.4* 9.2*   HCT 34.5* 29.8* 30.5*   MCV 89.6 87.4 89.4   MCH 28.1 27.6 27.0   MCHC 31.3* 31.5* 30.2*   RDW 14.5 14.2 14.3    147 167   MPV 12.4* 12.1* 12.2*     Recent Labs     01/12/21  0540 01/13/21  0952 01/14/21  0850   * 144 148*   K 4.6 4.4 4.4   * 105 109*   CO2 21* 24 23   BUN 92* 61* 83*   CREATININE 4.3* 3.8* 4.7*   GLUCOSE 100* 196* 155*   CALCIUM 9.6 8.7 9.5   PROT 7.9 7.5 7.7   LABALBU 3.0* 2.7* 2.8*   BILITOT 0.4 0.4 0.4   ALKPHOS 126 122 121   AST 16 18 19   ALT 20 21 22     Beta-Hydroxybutyrate   Date Value Ref Range Status   12/23/2020 0.80 (H) 0.02 - 0.27 mmol/L Final     Lab Results   Component Value Date    LABA1C 11.1 12/23/2020    LABA1C 6.3 09/27/2013     Lab Results   Component Value Date/Time    TSH 0.540 12/23/2020 03:00 AM     Lab Results   Component Value Date    LABA1C 11.1 12/23/2020    GLUCOSE 155 01/14/2021    LABCREA 17 12/29/2020     Lab Results   Component Value Date    TRIG 439 12/23/2020    HDL 14 12/23/2020    [address] - 12/23/2020    CHOL 160 12/23/2020       Blood culture   Lab Results   Component Value Date    [facility]  01/09/2021     24 Hours no growth  Gram stain performed from blood culture bottle media  Gram positive cocci in clusters      BC Sensitivity to follow 01/09/2021       Radiology:  MRI BRAIN WO CONTRAST   Final Result   1. No evidence of an acute infarct. 2. Cerebral parenchymal volume loss with mild chronic microvascular white   matter ischemic disease. XR CHEST PORTABLE   Final Result   Dialysis catheter proximal tip in the distal SVC and distal tip just above   the caval atrial junction. No pneumothorax. FLUORO FOR SURGICAL PROCEDURES   Final Result   Intraprocedural fluoroscopic spot images as above. See separate procedure   report for more information. Fluoroscopy modified barium swallow with video   Final Result   Penetration to nectar and honey consistencies of barium contrast.   Please see separate speech pathology report for full discussion of findings   and recommendations. CT HEAD WO CONTRAST   Final Result   No acute intracranial abnormality. XR CHEST PORTABLE   Final Result   Significantly decreased in the previously seen bilateral infiltrates with   minimal residual infiltrate in the right lung base.           Medical Records/Labs/Images review:   I personally reviewed and summarized previous records   All labs and imaging were reviewed independently     ASSESSMENT & PLAN   Andre Brown, a 65 y.o.-old male seen today for inpatient diabetes management     Diabetes Mellitus type 2   · Patient's diabetes is uncontrolled, A1c 11.1%    · Will change diabetes regimen to:  · Lantus to 18 units nightly   · Medium dose sliding scale with meals and at night   · To start small dose of Humalog once appetite improve   · Continue glucose check with meals and at bedtime   · Will titrate insulin dose based on the blood glucose trend & insulin requirement    H/o COVID   · Dx 12/22, repeated this on this admission was negative   · Managed by primary team  · Will continue monitoring BS and adjust insulin as needed     ESRD   On dialysis    Patient is at risk for hypoglycemia while receiving insulin due to ESRD   Continue to monitor blood glucose closely    HLD   · On lipitor 10 mg daily     Thank you for allowing us to participate in the care of this patient. Please do not hesitate to contact us with any additional questions. Dennis Hong MD  Endocrinologist, [facility] Endocrinology   [address] [address]   Phone: [phone]  Fax: [phone]  --------------------------------------------  An electronic signature was used to authenticate this note.  Kimberlyn Guzman MD on 1/14/2021 at 4:28 PM

## 2021-01-14 NOTE — CARE COORDINATION
Social Work discharge planning:   EMLIY noted pt's PT eval today with [facility]AC 12/24 and OT 15/24. Pt confused at times, so EMILY called his [facility] dtr Ronald Perry" [phone] in [place]. Amaya advised she works in healthcare as well and understands pt can NOT safely go home yet without rehab. Amaya explained that pt and his life partner Natty Lucio have been together for over 25 years, and that [facility] will be the ones formulating a discharge plan for pt. Amaya explained that pt's son Adi Vila passed away at [facility] many years ago, so she agrees with pt NOT to return to [facility]. EMILY discussed with her pt's need for IV Vanco with dialysis, new need for outpt hd, private pay for dialysis transport as pt's insurance does not cover that. Emily also explained to [facility] that we need SNF that can take a recent Covid + pt. Amaya understood all of these needs for pt and agreed he needs to go to SNF. Emily advised her that [facility] and [facility] have on site HD at their SNFs, and that [facility] also takes recent Covid patients locally. Amaya advised she will call Natty Lucio to formulate a plan for pt and call SW back asap. Natty Lucio said she will share with her aunts (pt's sisters) that pt is not strong enough to go home yet and that she and Natty Lucio will be the decision makers with pt. EMILY updated pt's RN Camacho Jasmine. PLAN: Await call back from pt's dtr Amaya for plan. Electronically signed by Froy Rae on 1/14/2021 at 2:50 PM       Addendum-    EMILY spoke to pt's daughter [facility] (Linda Da Silva Going is her nickname to her Hodgson Ream she said). Amaya said she spoke to pt over phone, which pt confirms he was just on phone with her. Plan is now RETURN TO [address].    Electronically signed by Froy Rae on 1/14/2021 at 3:25 PM

## 2021-01-15 LAB
BASOPHILS ABSOLUTE: 0.02 E9/L (ref 0–0.2)
BASOPHILS RELATIVE PERCENT: 0.2 % (ref 0–2)
CULTURE, BLOOD 2: ABNORMAL
CULTURE, BLOOD 2: ABNORMAL
EOSINOPHILS ABSOLUTE: 0.04 E9/L (ref 0.05–0.5)
EOSINOPHILS RELATIVE PERCENT: 0.5 % (ref 0–6)
HCT VFR BLD CALC: 28.5 % (ref 37–54)
HEMOGLOBIN: 9.2 G/DL (ref 12.5–16.5)
IMMATURE GRANULOCYTES #: 0.07 E9/L
IMMATURE GRANULOCYTES %: 0.9 % (ref 0–5)
LYMPHOCYTES ABSOLUTE: 0.92 E9/L (ref 1.5–4)
LYMPHOCYTES RELATIVE PERCENT: 11.3 % (ref 20–42)
MCH RBC QN AUTO: 28.3 PG (ref 26–35)
MCHC RBC AUTO-ENTMCNC: 32.3 % (ref 32–34.5)
MCV RBC AUTO: 87.7 FL (ref 80–99.9)
METER GLUCOSE: 119 MG/DL (ref 74–99)
METER GLUCOSE: 148 MG/DL (ref 74–99)
METER GLUCOSE: 199 MG/DL (ref 74–99)
MONOCYTES ABSOLUTE: 0.48 E9/L (ref 0.1–0.95)
MONOCYTES RELATIVE PERCENT: 5.9 % (ref 2–12)
NEUTROPHILS ABSOLUTE: 6.59 E9/L (ref 1.8–7.3)
NEUTROPHILS RELATIVE PERCENT: 81.2 % (ref 43–80)
ORGANISM: ABNORMAL
ORGANISM: ABNORMAL
PDW BLD-RTO: 14.2 FL (ref 11.5–15)
PLATELET # BLD: 182 E9/L (ref 130–450)
PMV BLD AUTO: 12.1 FL (ref 7–12)
RBC # BLD: 3.25 E12/L (ref 3.8–5.8)
WBC # BLD: 8.1 E9/L (ref 4.5–11.5)

## 2021-01-15 PROCEDURE — 6370000000 HC RX 637 (ALT 250 FOR IP): Performed by: SURGERY

## 2021-01-15 PROCEDURE — 6370000000 HC RX 637 (ALT 250 FOR IP): Performed by: INTERNAL MEDICINE

## 2021-01-15 PROCEDURE — 90935 HEMODIALYSIS ONE EVALUATION: CPT | Performed by: INTERNAL MEDICINE

## 2021-01-15 PROCEDURE — 82962 GLUCOSE BLOOD TEST: CPT

## 2021-01-15 PROCEDURE — 6360000002 HC RX W HCPCS: Performed by: SPECIALIST

## 2021-01-15 PROCEDURE — 99233 SBSQ HOSP IP/OBS HIGH 50: CPT | Performed by: INTERNAL MEDICINE

## 2021-01-15 PROCEDURE — 2580000003 HC RX 258: Performed by: SURGERY

## 2021-01-15 PROCEDURE — 36415 COLL VENOUS BLD VENIPUNCTURE: CPT

## 2021-01-15 PROCEDURE — 92526 ORAL FUNCTION THERAPY: CPT | Performed by: SPEECH-LANGUAGE PATHOLOGIST

## 2021-01-15 PROCEDURE — 97530 THERAPEUTIC ACTIVITIES: CPT

## 2021-01-15 PROCEDURE — 85025 COMPLETE CBC W/AUTO DIFF WBC: CPT

## 2021-01-15 PROCEDURE — 2580000003 HC RX 258: Performed by: SPECIALIST

## 2021-01-15 PROCEDURE — 2060000000 HC ICU INTERMEDIATE R&B

## 2021-01-15 PROCEDURE — 6360000002 HC RX W HCPCS: Performed by: INTERNAL MEDICINE

## 2021-01-15 PROCEDURE — 99231 SBSQ HOSP IP/OBS SF/LOW 25: CPT | Performed by: INTERNAL MEDICINE

## 2021-01-15 PROCEDURE — 2700000000 HC OXYGEN THERAPY PER DAY

## 2021-01-15 RX ORDER — ALBUMIN (HUMAN) 12.5 G/50ML
25 SOLUTION INTRAVENOUS
Status: DISPENSED | OUTPATIENT
Start: 2021-01-16 | End: 2021-01-16

## 2021-01-15 RX ORDER — MIDODRINE HYDROCHLORIDE 5 MG/1
10 TABLET ORAL DAILY PRN
Status: DISCONTINUED | OUTPATIENT
Start: 2021-01-16 | End: 2021-01-16 | Stop reason: HOSPADM

## 2021-01-15 RX ORDER — ALBUMIN (HUMAN) 12.5 G/50ML
SOLUTION INTRAVENOUS
Status: DISPENSED
Start: 2021-01-15 | End: 2021-01-16

## 2021-01-15 RX ADMIN — HEPARIN SODIUM 5000 UNITS: 10000 INJECTION INTRAVENOUS; SUBCUTANEOUS at 16:41

## 2021-01-15 RX ADMIN — HEPARIN SODIUM 5000 UNITS: 10000 INJECTION INTRAVENOUS; SUBCUTANEOUS at 11:04

## 2021-01-15 RX ADMIN — VANCOMYCIN HYDROCHLORIDE 1000 MG: 1 INJECTION, POWDER, LYOPHILIZED, FOR SOLUTION INTRAVENOUS at 17:16

## 2021-01-15 RX ADMIN — QUETIAPINE FUMARATE 25 MG: 25 TABLET ORAL at 11:04

## 2021-01-15 ASSESSMENT — PAIN SCALES - GENERAL
PAINLEVEL_OUTOF10: 0
PAINLEVEL_OUTOF10: 0

## 2021-01-15 NOTE — PROGRESS NOTES
[address] [place] [address] [facility]  AYLAWILLIAM  Progress Note    SUBJECTIVE:  Chief Complaint   Patient presents with    Wound Check     pulled tessio out of the right fem      The patient refused dialysis today  She still wants to go home. No pain. No fever. Review of systems:  As stated above in the chief complaint, otherwise negative. Medications:  Scheduled Meds:   insulin lispro  0-12 Units Subcutaneous 4x Daily AC & HS    midodrine  10 mg Oral Once in dialysis    albumin human  25 g Intravenous Once in dialysis    QUEtiapine  25 mg Oral BID    insulin glargine  18 Units Subcutaneous Nightly    heparin (porcine)  5,000 Units Subcutaneous Q8H    epoetin joann-epbx  3,000 Units Subcutaneous Once per day on     chlorhexidine  15 mL Mouth/Throat 4x Daily    Calcium Acetate (Phos Binder)  1,334 mg Oral BID WC    ascorbic acid  500 mg Oral Daily    metoprolol tartrate  25 mg Oral BID    pantoprazole  40 mg Oral Daily with breakfast    Vitamin D  2,000 Units Oral Daily    zinc sulfate  50 mg Oral Daily    sodium chloride flush  10 mL Intravenous 2 times per day    atorvastatin  10 mg Oral Nightly     Continuous Infusions:   sodium chloride 50 mL/hr at 21 1758    dextrose       PRN Meds:senna, sodium chloride flush, promethazine **OR** ondansetron, polyethylene glycol, acetaminophen **OR** acetaminophen, glucose, dextrose, glucagon (rDNA), dextrose    OBJECTIVE:  BP (!) 143/84   Pulse 103   Temp 97.8 °F (36.6 °C) (Oral)   Resp 16   Ht 6' (1.829 m)   Wt 171 lb (77.6 kg)   SpO2 97%   BMI 23.19 kg/m²   Temp  Av.8 °F (36.6 °C)  Min: 97.7 °F (36.5 °C)  Max: 97.8 °F (36.6 °C)  Constitutional: The patient is sitting up in bed. Asleep but arousable. No distress. Skin: Warm and dry. No rashes were noted. HEENT: Round and reactive pupils. Moist mucous membranes. No ulcerations or thrush. Neck: Supple to movements. Chest: No respiratory distress.   Right for at least 3 days and is currently afebrile  · Polymicrobial bacteremia associated to the above with GNR and MSSA, most likely transient  · Candiduria, asymptomatic  · Confusion    PLAN:  · We will give him another dose of Vancomycin today.   I do not think he will need IV antibiotics upon discharge since the bacteremia seemed to be transient and the repeat blood cultures are negative    Titus Otoole  12:24 PM  1/15/2021

## 2021-01-15 NOTE — PROGRESS NOTES
Patient is refusing dialysis and pre-medication for dialysis. RN explained the importance of receiving dialysis, and patient became very agitated and stated \"I am not going to dialysis. \" Dr. Cathy Royal notified via perfect serve message of patients refusal, and Radha Davila in dialysis notified.

## 2021-01-15 NOTE — PROGRESS NOTES
[facility]   Progress Note    Admitting Date and Time: 1/9/2021 10:39 PM  Admit Dx: Complicated UTI (urinary tract infection) [N39.0]    Subjective: Admitted on 10th with agitation, removing NG, also placement of Tessio. From [place], known A. fib on Eliquis, ESRD, hypertension, hyperlipidemia, CA prostate, suspected alcohol abuse, diabetes, recent Covid pneumonia, metabolic encephalopathy, on feeding via NG. Unable to provide any history. Seen by nephrology on 10th, tunneled hemodialysis catheter planned, seen by endocrine for uncontrolled diabetes, patient was recommended n.p.o. on 11th by speech, advanced to dysphagia 2 / mechanical soft. Seen by ID on 12th for bacteremia. Gram-positive cocci 1 in 4, initially suspected contaminant. Now likely secondary to infected temporary hemodialysis catheter. Patient was given Vanco preoperatively. Now treated with cefepime and Vanco.  Will repeat blood culture only if it is staph aureus. Vascular placed right IJ tunneled hemodialysis catheter on 12th. Plan for hemodialysis today-with support with midodrine as well as albumin. Covid - January 11. Vanco was redosed on 14th by ID. Seen by endocrine, Lantus changed to 18 with medium sliding scale. ID has been given Vanco dose today. Patient was admitted with Complicated UTI (urinary tract infection) [N39.0]. Patient feels comfortable, at this time awake, alert, does answer questions. Patient knows this is month of January and year is 2021. Came earlier to see when patient was in hemodialysis. Did here called for CODE BLUE which was later canceled, when he learned that this was pertaining to same patient spoke to dialysis nurse. According to them patient was out for about 30 seconds. Not in breathing when code was called. He was once again given fluids about 250 cc back and then when about to start CPR he once again started responding.     Per RN: Concern over current location Assessment:    Active Problems:    Complicated UTI (urinary tract infection)    Acute cystitis with hematuria  Resolved Problems:    * No resolved hospital problems. *      Plan:  1. Agitation/encephalopathy, has improved. Still on Seroquel. 2. Nutrition, patient who had pulled out NG tube, has been seen by speech, at this time stable on mechanical soft diet. 3. Hypoxic respiratory failure, recent Covid infection, Covid negative on 11th, patient is stable on nasal cannula. 4. ESRD, patient with access for HD, unfortunately was not able to tolerate today, plan for tomorrow. 5. Hypotension, did improve after volume, on metoprolol 25 twice daily, same dose at home also. Will cut back on dose of Seroquel, should help him more wake up also. 6. Abnormal UA, evidence of yeast, ID on board. 7. Abnormal blood culture, revised twice by lab, initially gram-positive bacteremia, then gram-negative demetrice bacteremia, as per updated by nursing there is no bacteremia now. Patient has received initially Vanco and then cefepime, ID on board, will defer to ID.   8. Did have discussion with case management, final decision is for patient to go to [facility], for now he better be monitored at least for 24 hours on a telemetry floor, also decision will also depend upon when okay with all subspecialties including ID, at this time patient still remains on IV Vanco.        Electronically signed by Abdiel Rodrigez MD on 1/15/2021 at 9:30 AM

## 2021-01-15 NOTE — PROGRESS NOTES
Comprehensive Nutrition Assessment    Type and Reason for Visit:  Reassess    Nutrition Recommendations/Plan: Continue Diet per SLP rec's. Will Start Boost High Bishop Pudding ONS TID. If intake remains minimal x next ~1-2 days, would then highly rec to consider EN support d/t minimal intake x ~5d now since adm. Please consult if EN to be pursued. TF Recommendations:  Renal (Nepro) @ 45 ml/hr (Goal) Continuous x 24 hrs/d plus 1 Protein Modular Supplement Daily to provide 1080 ml TV, 1944 kcals, 87 gm Pro, 783 ml water. (2048 kcals, 113 gm Pro w/ Pro Mod). Flush per Renal MGMT; if needed, 175 ml flush q 4 hrs to provide 1833 ml total water (TF+Flush). Protein Modular is to be ordered as an Oral Nutritional Supplement and administered via PEG tube. Please consult if any issues placing orders. Nutrition Assessment:  Pt declining from a nutritional stand-point AEB pt w/ little to no intake of most meals d/t poor appetite, dysphagia and AMS/agitation per EMR review. Pt also noted to be refusing meals at times as well. Remains at further risk d/t increased needs 2/2 ESRD w/ HD losses. Will Start ONS to aid in PO intake however may need to consider EN soon if no improvements made. Malnutrition Assessment:  Malnutrition Status: At risk for malnutrition (Comment)    Context:  Acute Illness     Findings of the 6 clinical characteristics of malnutrition:  Energy Intake:  Mild decrease in energy intake (Comment)  Weight Loss:  Unable to assess(large wt loss/fluctuations noted per EMR, however may be r/t fluid shifts w/ HD)     Body Fat Loss:  Unable to assess     Muscle Mass Loss:  Unable to assess    Fluid Accumulation:  No significant fluid accumulation     Strength:  Not Performed    Estimated Daily Nutrient Needs:  Energy (kcal):  7844-3639(MSJ x 1.2 SF per CBW); Weight Used for Energy Requirements:  Current     Protein (g):  100-115(1.3-1.5 gm/kg per CBW);  Weight Used for Protein Requirements:  Current        Fluid (ml/day):  Per Renal MGMT; Method Used for Fluid Requirements:  Standard Renal      Nutrition Related Findings:  A&Ox3, Confused/Agitated/Combative at times, poor dentition, Abd/BS WDL, +1 BUE edema, -I/O's(BSE and MBS 1/11; TDC placement 1/12; HD)      Wounds:  Surgical Incision(TDC site)       Current Nutrition Therapies:    DIET DYSPHAGIA MINCED AND MOIST; Dysphagia Minced and Moist; Extremely Thick (Spoon-Pudding)    Anthropometric Measures:  · Height: 6' (182.9 cm)  · Current Body Weight: 171 lb (77.6 kg)(bed 1/15)   · Admission Body Weight: 171 lb (77.6 kg)(bed 1/11)    · Usual Body Weight: 208 lb (94.3 kg)(12/23/20 actual per EMR)     · Ideal Body Weight: 178 lbs; % Ideal Body Weight 96.1 %   · BMI: 23.2  · Adjusted Body Weight:  ; No Adjustment   · Adjusted BMI:      · BMI Categories: Normal Weight (BMI 22.0 to 24.9) age over 72       Nutrition Diagnosis:   · Inadequate oral intake related to cognitive or neurological impairment as evidenced by intake 0-25%, poor intake prior to admission, swallow study results      Nutrition Interventions:   Food and/or Nutrient Delivery:  Continue Current Diet, Start Oral Nutrition Supplement(Continue Diet per SLP rec's. Will Start Boost High Bishop Pudding ONS TID. If intake remains minimal x next ~1-2 days, would then highly rec to consider EN support d/t minimal intake x ~5d now since adm. Please consult if EN to be pursued.)  Nutrition Education/Counseling:  No recommendation at this time   Coordination of Nutrition Care:  Continue to monitor while inpatient    Goals:  PO intake >50% of meals/ONS.        Nutrition Monitoring and Evaluation:   Behavioral-Environmental Outcomes:  None Identified   Food/Nutrient Intake Outcomes:  Diet Advancement/Tolerance, Food and Nutrient Intake, Supplement Intake  Physical Signs/Symptoms Outcomes:  Biochemical Data, Chewing or Swallowing, GI Status, Fluid Status or Edema, Meal Time Behavior, Nutrition Focused Physical Findings, Skin, Weight     Discharge Planning:     Too soon to determine     Electronically signed by Glenroy Patel RD, LD on 1/15/21 at 12:25 PM EST    Contact: ext [phone]

## 2021-01-15 NOTE — PROGRESS NOTES
[facility] Quality Flow/Interdisciplinary Rounds Progress Note        Quality Flow Rounds held on January 15, 2021    Disciplines Attending:  Bedside Nurse, ,  and Nursing Unit Leadership    Days [place] Neelam was admitted on 1/9/2021 10:39 PM    Anticipated Discharge Date:  Expected Discharge Date: 01/13/21    Disposition:    Demetrius Score:  Demetrius Scale Score: 20    Readmission Risk              Risk of Unplanned Readmission:        44           Discussed patient goal for the day, patient clinical progression, and barriers to discharge.   The following Goal(s) of the Day/Commitment(s) have been identified:  Diagnostics - Report Results and Labs - Report Results      [facility]  January 15, 2021

## 2021-01-15 NOTE — PLAN OF CARE
Problem: Inadequate oral food/beverage intake (NI-2.1)  Goal: Food and/or Nutrient Delivery  Continue Diet per SLP rec's. Will Start Boost High Bishop Pudding ONS TID. If intake remains minimal x next ~1-2 days, would then highly rec to consider EN support d/t minimal intake x ~5d now since adm. Please consult if EN to be pursued. Description: Individualized approach for food/nutrient provision.   Outcome: Met This Shift

## 2021-01-15 NOTE — PROGRESS NOTES
Occupational Therapy  OT BEDSIDE TREATMENT NOTE      Date:1/15/2021  Patient Name: Jessica Pemberton  MRN: [id number]  : 1950  Room: [address] [place] [address]-A     Referring Provider: Deon Gaytan DO  Evaluating OT: Parris Simons OTR/L - [id number]     AM-PAC Daily Activity Raw Score:      Recommended Adaptive Equipment: Continue to assess.      Diagnosis: Complicated UTI (urinary tract infection) [N39.0]   Pertinent Medical History: COVID-19 (2020), DM, HTN, cancer, prostate cancer   Precautions: falls, bed alarm     Home Living: Patient admitted from [facility]. Prior to 2020, patient lived with his significant other in a two-floor home, per review of patient's medical record.     Prior Level of Function (PLOF): Patient reported that he was needing assistance with ADLs recently. Patient was independent with ADLs, IADLs, and functional transfers/mobility prior to 2020, per review of patient's medical record.     Pain Level: No complaints of pain this session   Cognition: Patient alert and oriented grossly. Fair command follow demonstrated. Verbal encouragement given to maximize participation in EOB activities. Memory: Fair              Sequencing: Fair              Problem Solving: Fair              Judgement/Safety: Impaired     Functional Assessment:    Initial Eval Status  Date: 2021 Treatment Status  Date: 1/15/2021 Short Term Goals   Feeding Setup   Independent   Grooming Min A   SBA  (seated/standing)   UB Dressing Min A     Setup   LB Dressing Max A to don socks. Max a  Dependent don/doff socks   Min A - with use of AE, as needed/appropriate   Bathing Max A   Min A - with use of AE/DME, as needed/appropriate   Toileting Max A Dependent   Incontinent   Assist with hyg  Min A   Bed Mobility  Supine-to-Sit: Min A  Sit-to-Supine: Min A   Mod A   Supine to sit   Min A  Sit to supine     Functional Transfers Sit-to-Stand:  Mod Ax2   from EOB (without device)  Mod A  Sit-stand from elevated bed   x 2 reps     Standing next to bed - patient tendency to sit abruptly  SBA   Functional Mobility Patient unable to take side steps toward [facility].  Mod A,w/walker   Side steps     Overall decrease endurance , strength and safety  Min A with functional mobility (with device, as needed/appropriate) in order to maximize independence with ADLs and other functional tasks. Balance Sitting: Fair  (at EOB)  Standing: Poor  (with handheld assistance) Sitting - EOB - SBA  Standing - Mod A  Fair dynamic standing balance during completion of ADLs and other functional tasks. Activity Tolerance Limited  poor with light activity  Patient will demonstrate Good understanding and consistent implementation of energy conservation techniques and work simplification techniques into ADL routines. Visual/  Perceptual WFL      N/A   B UE Strength 3+/5 Able to use UEs during functional tasks   Weakness throughout   Patient will demonstrate 4/5 B UE strength in order to maximize independence with ADLs and functional transfers     Education:  Safety awareness, importance of OOB activity and overall hygiene     Comments: Upon arrival pt lying in bed . At end of session returned to bed  all lines and tubes intact, call light within reach. ALARM ON       · Pt has made poor + progress towards set goals.    · Continue with current plan of care  · Plan of Care:   · OT treatment to be provided 2-5x/week for 5-7 days PRN to address the following:  · [x] ADL Re-Training/AE Recommendations  · [x] Energy Conservation Techniques/Strategies                                 · [x] Neuromuscular Re-Education     · [x] Functional Transfer Training                · [x] Functional Mobility Training                  · [] Cognitive Re-Training               · [] Splinting/Positioning Needs                              · [x] Therapeutic Activity   · [x] Therapeutic Exercise  · [] Visual/Perceptual   · [] Delirium Prevention/Treatment · [x] Positioning to Improve Functional [place], Safety, and Skin Integrity   · [x] Patient and/or Family Education to Increase Safety and Functional [place]   · [] Other    Time in: 6525  Time out:1220                         Treatment Charges: Mins Units   Ther Ex  [id number]     Manual Therapy Darlene Jackson [id number]    ADL/Home Mgt [id number]     Neuro Re-ed [id number]     Group Therapy      Orthotic manage/training  [id number]     Non-Billable Time     Total Timed Treatment 22 2          Pushpa Oseguera OTR/L [id number]

## 2021-01-15 NOTE — PROGRESS NOTES
Department of Internal Medicine  Nephrology Progress Note      Events Reviewed:      Subjective:  We are following Mr. Andre Oliver for ESRD on HD, I have seen him at bedside today, unable to tolerate full HD this am as he went unresponsive on HD, a code blue was called, HD was stopped with 250 ml of fluid bolus, he did not need CPR, he started responding once again  HD was terminated after an hour and half    Current Medications:    Current Facility-Administered Medications: albumin human 25 % IV solution, , ,   vancomycin 1000 mg IVPB in 250 mL D5W addavial, 1,000 mg, Intravenous, Once  [START ON 1/16/2021] midodrine (PROAMATINE) tablet 10 mg, 10 mg, Oral, Daily PRN  [START ON 1/16/2021] albumin human 25 % IV solution 25 g, 25 g, Intravenous, Once in dialysis  insulin lispro (HUMALOG) injection vial 0-12 Units, 0-12 Units, Subcutaneous, 4x Daily AC & HS  midodrine (PROAMATINE) tablet 10 mg, 10 mg, Oral, Once in dialysis  albumin human 25 % IV solution 25 g, 25 g, Intravenous, Once in dialysis  QUEtiapine (SEROQUEL) tablet 25 mg, 25 mg, Oral, BID  insulin glargine (LANTUS) injection vial 18 Units, 18 Units, Subcutaneous, Nightly  0.45 % sodium chloride infusion, , Intravenous, Continuous  heparin (porcine) injection 5,000 Units, 5,000 Units, Subcutaneous, Q8H  epoetin joann-epbx (RETACRIT) injection 3,000 Units, 3,000 Units, Subcutaneous, Once per day on Mon Wed Fri  chlorhexidine (PERIDEX) 0.12 % solution 15 mL, 15 mL, Mouth/Throat, 4x Daily  Calcium Acetate (Phos Binder) CAPS 1,334 mg, 1,334 mg, Oral, BID WC  ascorbic acid (VITAMIN C) tablet 500 mg, 500 mg, Oral, Daily  metoprolol tartrate (LOPRESSOR) tablet 25 mg, 25 mg, Oral, BID  pantoprazole (PROTONIX) tablet 40 mg, 40 mg, Oral, Daily with breakfast  senna (SENOKOT) tablet 8.6 mg, 1 tablet, Oral, BID PRN  vitamin D (CHOLECALCIFEROL) tablet 2,000 Units, 2,000 Units, Oral, Daily  zinc sulfate (ZINCATE) capsule 50 mg, 50 mg, Oral, Daily  sodium chloride flush 0.9 % injection 10 mL, 10 mL, Intravenous, 2 times per day  sodium chloride flush 0.9 % injection 10 mL, 10 mL, Intravenous, PRN  promethazine (PHENERGAN) tablet 12.5 mg, 12.5 mg, Oral, Q6H PRN **OR** ondansetron (ZOFRAN) injection 4 mg, 4 mg, Intravenous, Q6H PRN  polyethylene glycol (GLYCOLAX) packet 17 g, 17 g, Oral, Daily PRN  acetaminophen (TYLENOL) tablet 650 mg, 650 mg, Oral, Q6H PRN **OR** acetaminophen (TYLENOL) suppository 650 mg, 650 mg, Rectal, Q6H PRN  atorvastatin (LIPITOR) tablet 10 mg, 10 mg, Oral, Nightly  glucose (GLUTOSE) 40 % oral gel 15 g, 15 g, Oral, PRN  dextrose 50 % IV solution, 12.5 g, Intravenous, PRN  glucagon (rDNA) injection 1 mg, 1 mg, Intramuscular, PRN  dextrose 5 % solution, 100 mL/hr, Intravenous, PRN  Allergies:  Patient has no known allergies. PHYSICAL EXAM:      Vitals:    VITALS:  /83   Pulse 89   Temp 98 °F (36.7 °C) (Oral)   Resp 18   Ht 6' (1.829 m)   Wt 165 lb 9.1 oz (75.1 kg)   SpO2 94%   BMI 22.45 kg/m²   CURRENT PULSE OXIMETRY:  SpO2: 94 %  24HR INTAKE/OUTPUT:      Intake/Output Summary (Last 24 hours) at 1/15/2021 1659  Last data filed at 1/15/2021 1455  Gross per 24 hour   Intake 350 ml   Output 461 ml   Net -111 ml       Access: Right IJ TDC  Constitutional:  Alert, speech difficult to understand, thick garbled  HEENT:  Normocephalic, Atraumatic, mucous membranes moist  Respiratory:  2 L per nasal cannula 98% sat  Cardiovascular/Edema:  Irregular S1/S2 No edema  Gastrointestinal:  Abdomen soft non distended BS + x 4 quadrants, chery with evidence of gross hematuria  Musculoskeletal: weak but ALVES, no edema  Neurologic:  Alert, speech difficult to understand.       DATA:    CBC with Differential:    Lab Results   Component Value Date    WBC 8.1 01/15/2021    RBC 3.25 01/15/2021    HGB 9.2 01/15/2021    HCT 28.5 01/15/2021     01/15/2021    MCV 87.7 01/15/2021    MCH 28.3 01/15/2021    MCHC 32.3 01/15/2021    RDW 14.2 01/15/2021    NRBC 0.9 12/26/2020    SEGSPCT 63 09/27/2013    LYMPHOPCT 11.3 01/15/2021    MONOPCT 5.9 01/15/2021    MYELOPCT 0.9 12/26/2020    BASOPCT 0.2 01/15/2021    MONOSABS 0.48 01/15/2021    LYMPHSABS 0.92 01/15/2021    EOSABS 0.04 01/15/2021    BASOSABS 0.02 01/15/2021     CMP:    Lab Results   Component Value Date     01/14/2021    K 4.4 01/14/2021    K 6.4 01/09/2021     01/14/2021    CO2 23 01/14/2021    BUN 83 01/14/2021    CREATININE 4.7 01/14/2021    GFRAA 15 01/14/2021    LABGLOM 15 01/14/2021    GLUCOSE 155 01/14/2021    PROT 7.7 01/14/2021    LABALBU 2.8 01/14/2021    CALCIUM 9.5 01/14/2021    BILITOT 0.4 01/14/2021    ALKPHOS 121 01/14/2021    AST 19 01/14/2021    ALT 22 01/14/2021     Hepatic Function Panel:    Lab Results   Component Value Date    ALKPHOS 121 01/14/2021    ALT 22 01/14/2021    AST 19 01/14/2021    PROT 7.7 01/14/2021    BILITOT 0.4 01/14/2021    BILIDIR 0.2 12/30/2020    IBILI 0.3 12/30/2020    LABALBU 2.8 01/14/2021     Albumin:    Lab Results   Component Value Date    LABALBU 2.8 01/14/2021     Calcium:    Lab Results   Component Value Date    CALCIUM 9.5 01/14/2021   Magnesium:    Lab Results   Component Value Date    MG 2.3 01/01/2021     Phosphorus:    Lab Results   Component Value Date    PHOS 6.5 01/11/2021     PT/INR:    Lab Results   Component Value Date    PROTIME 17.6 01/01/2021    INR 1.5 01/01/2021     Radiology Review:     Chest xray 1/9/2021   Significantly decreased in the previously seen bilateral infiltrates with minimal residual infiltrate in the right lung base.         CT head 1/10/21   No acute intracranial abnormality. IMPRESSION/RECOMMENDATIONS:      1. End-stage-renal-disease. Needs tunneled catheter placed. Vascular surgery to place in       am  2. Chronic respiratory failure on 2 L nasal cannula  3. Chronic Atrial fibrillation. Eliquis is on hold for procedure  4. Diabetes mellitus  On SS insulin, endocrinology consulted  5. Anemia (10.7/33.3)  6.  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 16. Start phos binder when no longer NPO  7. Malnutrition, hypoalbuminemia , Pulled NGT out, currently NPO  8. Agitation, Received 1 x dose Haldol, on Seroquel BID  9.  Hypernatremia secondary to free water deficit from poor intake    Plan:    · Code blue on HD as he became unresponsive, did not need CPR  · No labs today, obtain a stat BMP, Magnesium, Phos  · C/W 1/2 NS at 50 ml/hr

## 2021-01-15 NOTE — CARE COORDINATION
Social Work discharge planning   SW and CM spoke to pt's daughter Gui Wilson, who advised plan remains to return to [facility], and that pt is agreeable. Amaya advised she spoke to her aunt yesterday and told her that she (1. Gui Wilson) talked to 2. pt and pt's girlfriend of 28 years, 3. Marixa King, about plan and the 3 of them plan for pt to return to [place] [address] at discharge. N17 is started. Ambulance forms are in folder for discharge. Gui Wilson also advised that pt has a son Kb Nava that she has been trying to contact about pt without success thus far.    Electronically signed by Ayesha Maldonado on 1/15/2021 at 11:26 AM

## 2021-01-15 NOTE — FLOWSHEET NOTE
01/15/21 1455   Vital Signs   /84   Temp 97.7 °F (36.5 °C)   Pulse 78   Resp 20   Weight 165 lb 9.1 oz (75.1 kg)   Weight Method Bed scale   Percent Weight Change 0   Pain Assessment   Pain Assessment 0-10   Pain Level 0   Patient's Stated Pain Goal No pain   Post-Hemodialysis Assessment   Post-Treatment Procedures Blood returned;Catheter capped, clamped and heparinized x 2 ports   Machine Disinfection Process Exterior Machine Disinfection;Machine Absence of Bleach Machine   Rinseback Volume (ml) 300 ml   Total Liters Processed (l/min) 24.2 l/min   Dialyzer Clearance Lightly streaked   Duration of Treatment (minutes) 138 minutes   Hemodialysis Intake (ml) 300 ml   Hemodialysis Output (ml) 461 ml   NET Removed (ml) 161 ml   Tolerated Treatment Good   Patient Response to Treatment pt unresponsive no respirations noted,  code blue called,.pt blood returned,  additional 200 cc ns bolus given with taking Bp, respirations observed, cath care given, both ports of cath flushed c 10 cc ns, heparin instilled to fill volume, caps applied, report called to [facility]   Bilateral Breath Sounds Diminished   Edema None   Physician Notified?  Yes

## 2021-01-15 NOTE — PROGRESS NOTES
ENDOCRINOLOGY PROGRESS NOTE      Date of admission: 1/9/2021  Date of service: 1/15/2021  Admitting physician: Tan Tavera MD   Primary Care Physician: Hollie Boone DO  Consultant physician: Leodan Sterling MD     Reason for the consultation:  Uncontrolled DM    History of Present Illness: The history is provided from medical records. The patient has dementia and history is limited     Days HELADIO Sierra is a very pleasant 65 y.o. old male with PMH of DM type 2, HTN, HLD, ESRD on dialysis and other listed below admitted [facility] from nursing home because of agitation and pulled out his NG and right femoral Tessio, endocrine service was consulted for diabetes management. Subjective   Seen and examined, no acute issues overnight, BS improving.  Didn't receive lantus last night     Inpatient diet:   Carb Restricted diet     Point of care glucose monitoring (Independently reviewed)   Recent Labs     01/13/21  1659 01/13/21  2131 01/14/21  0602 01/14/21  1101 01/14/21  1613 01/14/21  2047 01/15/21  1103 01/15/21  1636   GLUMET 116* 151* 193* 148* 114* 166* 119* 148*     Scheduled Meds:   albumin human        [START ON 1/16/2021] albumin human  25 g Intravenous Once in dialysis    insulin lispro  0-12 Units Subcutaneous 4x Daily AC & HS    midodrine  10 mg Oral Once in dialysis    albumin human  25 g Intravenous Once in dialysis    QUEtiapine  25 mg Oral BID    [Held by provider] insulin glargine  18 Units Subcutaneous Nightly    heparin (porcine)  5,000 Units Subcutaneous Q8H    epoetin joann-epbx  3,000 Units Subcutaneous Once per day on Mon Wed Fri    chlorhexidine  15 mL Mouth/Throat 4x Daily    Calcium Acetate (Phos Binder)  1,334 mg Oral BID WC    ascorbic acid  500 mg Oral Daily    metoprolol tartrate  25 mg Oral BID    pantoprazole  40 mg Oral Daily with breakfast    Vitamin D  2,000 Units Oral Daily    zinc sulfate  50 mg Oral Daily    sodium chloride flush  10 mL Intravenous 2 times per day    atorvastatin  10 mg Oral Nightly     PRN Meds:       [START ON 1/16/2021] midodrine, 10 mg, Daily PRN      senna, 1 tablet, BID PRN      sodium chloride flush, 10 mL, PRN      promethazine, 12.5 mg, Q6H PRN    Or      ondansetron, 4 mg, Q6H PRN      polyethylene glycol, 17 g, Daily PRN      acetaminophen, 650 mg, Q6H PRN    Or      acetaminophen, 650 mg, Q6H PRN      glucose, 15 g, PRN      dextrose, 12.5 g, PRN      glucagon (rDNA), 1 mg, PRN      dextrose, 100 mL/hr, PRN      Continuous Infusions:   sodium chloride 50 mL/hr at 01/14/21 1758    dextrose         Review of Systems  Non-obtainable     OBJECTIVE    /85   Pulse 117   Temp 98.2 °F (36.8 °C) (Oral)   Resp 20   Ht 6' (1.829 m)   Wt 165 lb 9.1 oz (75.1 kg)   SpO2 99%   BMI 22.45 kg/m²     Intake/Output Summary (Last 24 hours) at 1/15/2021 1839  Last data filed at 1/15/2021 1455  Gross per 24 hour   Intake 350 ml   Output 461 ml   Net -111 ml       Physical examination:  General: sleepy   HEENT: normocephalic non-traumatic   Neck: supple,   Pulm: Clear equal air entry no added sounds, no wheezing or rhonchi    CVS: S1 + S2, no murmur, no heave  Abd: soft lax, no tenderness, no organomegaly, audible bowel sounds. Skin: warm, no lesions, no rash.     Feet: no wounds, no ulcers   Neuro: CN intact, muscle power normal  Psych: normal mood, and affect    Review of Laboratory Data:  I personally reviewed the following labs:   Recent Labs     01/13/21  0952 01/14/21  0850 01/15/21  1315   WBC 6.4 8.4 8.1   RBC 3.41* 3.41* 3.25*   HGB 9.4* 9.2* 9.2*   HCT 29.8* 30.5* 28.5*   MCV 87.4 89.4 87.7   MCH 27.6 27.0 28.3   MCHC 31.5* 30.2* 32.3   RDW 14.2 14.3 14.2    167 182   MPV 12.1* 12.2* 12.1*     Recent Labs     01/13/21  0952 01/14/21  0850    148*   K 4.4 4.4    109*   CO2 24 23   BUN 61* 83*   CREATININE 3.8* 4.7*   GLUCOSE 196* 155*   CALCIUM 8.7 9.5   PROT 7.5 7.7   LABALBU 2.7* 2.8*   BILITOT 0.4 Diabetes Mellitus type 2   · Patient's diabetes is uncontrolled, A1c 11.1%    · Will change diabetes regimen to:  · Continue Medium dose sliding scale with meals and at night   · Continue glucose check with meals and at bedtime   · Will titrate insulin dose based on the blood glucose trend & insulin requirement    H/o COVID   · Dx 12/22, repeated this on this admission was negative   · Managed by primary team  · Will continue monitoring BS and adjust insulin as needed     ESRD   On dialysis    Patient is at risk for hypoglycemia while receiving insulin due to ESRD   Continue to monitor blood glucose closely    HLD   · On lipitor 10 mg daily     Thank you for allowing us to participate in the care of this patient. Please do not hesitate to contact us with any additional questions. Mitzy Sheth MD  Endocrinologist, [facility] Diabetes Care and Endocrinology   [address] [place] [id number]   Phone: [phone]  Fax: [phone]  --------------------------------------------  An electronic signature was used to authenticate this note.  Amor Melgoza MD on 1/15/2021 at 6:39 PM

## 2021-01-15 NOTE — PROGRESS NOTES
SPEECH LANGUAGE PATHOLOGY  DAILY PROGRESS NOTE        PATIENT NAME:  Andre Brown      :  1950          TODAY'S DATE:  1/15/2021 ROOM:  [address] [place] [address]        SWALLOWING    Pt in bed, alert and verbalizing. Improved oral care noted. Noon meal present. Accepted approximately 25% with SLP present. No clinical indicators of dysphagia noted. Pt refused pudding thick liquids. RN reports poor oral intake continues due to refusal.      DYSPHAGIA DIAGNOSIS:  Moderate-marked pharyngeal dysphagia-pt at risk of aspiration of thin, NTL and HTL liquids at this time                            DIET RECOMMENDATIONS:  Dysphagia 2,  Mechanical Soft (Minced & Moist) solids with  pudding consistency (extremely thick) liquids as tolerated    Concerned about dehydration due to refusal of pudding thick liquids. Will assess for implementation of [place] Protocol with improved medical status.      If change in respiratory status occurs and pt requiring significant increase in supplemental oxygen please notify SLP to reassess toleration of current diet.                     FEEDING RECOMMENDATIONS:                             Assistance level:  Stand by assistance is needed during all oral intake                             Compensatory strategies recommended: Double swallow, Small bites/sips, No straw and Effortful swallow        Education- Pt educated on results and POC. Pt trained on compensatory strategies for safe swallow with fair outcome. Questions answered. Will continue SP intervention as per previously established POC. Recommend repeat video swallow eval with improved medical status to assess for possible diet/liquid upgrade. Klaudia ABDI CCC/SLP [id number]  Speech Pathologist              CPT code(s) 33998  dysphagia tx  Total minutes :  30 minutes

## 2021-01-16 VITALS
BODY MASS INDEX: 23.89 KG/M2 | RESPIRATION RATE: 16 BRPM | HEIGHT: 72 IN | WEIGHT: 176.38 LBS | HEART RATE: 111 BPM | DIASTOLIC BLOOD PRESSURE: 65 MMHG | OXYGEN SATURATION: 98 % | SYSTOLIC BLOOD PRESSURE: 126 MMHG | TEMPERATURE: 68 F

## 2021-01-16 PROCEDURE — 99239 HOSP IP/OBS DSCHRG MGMT >30: CPT | Performed by: INTERNAL MEDICINE

## 2021-01-16 PROCEDURE — 99232 SBSQ HOSP IP/OBS MODERATE 35: CPT | Performed by: INTERNAL MEDICINE

## 2021-01-16 RX ORDER — CALCIUM ACETATE 667 MG/1
1334 CAPSULE ORAL 2 TIMES DAILY WITH MEALS
Qty: 180 CAPSULE | Refills: 0 | Status: SHIPPED | OUTPATIENT
Start: 2021-01-16 | End: 2021-06-22

## 2021-01-16 RX ORDER — HYDROXYZINE PAMOATE 25 MG/1
25 CAPSULE ORAL
Status: DISCONTINUED | OUTPATIENT
Start: 2021-01-16 | End: 2021-01-16 | Stop reason: HOSPADM

## 2021-01-16 ASSESSMENT — PAIN SCALES - GENERAL: PAINLEVEL_OUTOF10: 0

## 2021-01-16 ASSESSMENT — PAIN SCALES - PAIN ASSESSMENT IN ADVANCED DEMENTIA (PAINAD)
BODYLANGUAGE: 0
NEGVOCALIZATION: 0

## 2021-01-16 NOTE — PROGRESS NOTES
Contacted phlebotomy to assist with STAT and daily blood draws. Patient refused, said will go over with doctor, that he is leaving in the AM. Will update PCP in the morning.

## 2021-01-16 NOTE — PROGRESS NOTES
Physician Progress Note      PATIENT:               Camryn Padron  CSN #:                  [id number]  :                       1950  ADMIT DATE:       2021 10:39 PM  [address] [place] DATE:        2021 2:15 PM  RESPONDING  PROVIDER #:        Mena Quiñonez MD          QUERY TEXT:    Noted documentation of acute-on-chronic hypoxic respiratory failure in 1/10   PN. In order to support the diagnosis of acute respiratory failure, please   include additional clinical indicators in your documentation. Or please   document if the diagnosis of acute respiratory failure has been ruled out   after further study. The medical record reflects the following:  Risk Factors: past COVID with intubation  Clinical Indicators: RR 16-20, 4L 100%, 2-3L %. H&P \"normal air   movement, no respiratory distress. \"  PN \"clear to auscultation bilaterally   without respiratory distress. Olga Lidia Savant [place] [facility] Hypoxic respiratory failure, acute vs   acute-on-chronic - had been intubated related to admission for COVID in   December, on [facility] here initially now down to [facility]. CXR showing significant   decrease in b/l infiltrates. \"  Treatment: wean O2    Acute Respiratory Failure Clinical Indicators per 3M MS-DRG Training Guide and   Quick Reference Guide:  pO2 < 60 mmHg or SpO2 (pulse oximetry) < 91% breathing room air  pCO2 > 50 and pH < 7.35  P/F ratio (pO2 / FIO2) < 300  pO2 decrease or pCO2 increase by 10 mmHg from baseline (if known)  Supplemental oxygen of 40% or more  Presence of respiratory distress, tachypnea, dyspnea, shortness of breath,   wheezing  Unable to speak in complete sentences  Use of accessory muscles to breathe  Extreme anxiety and feeling of impending doom  Tripod position  Confusion/altered mental status/obtunded    Thank you,  Monty Godwin RN, CCDS  Clinical Documentation Improvement Specialist  [email or web address]  [phone]  Options provided: -- Acute-on-chronic Hypoxic Respiratory Failure as evidenced by, Please   document evidence. -- Acute-on-chroinc hypoxic Respiratory Failure ruled out after study  -- Chronic Hypoxic Respiratory failure as evidenced by, Please document   evidence.   -- Other - I will add my own diagnosis  -- Disagree - Not applicable / Not valid  -- Disagree - Clinically unable to determine / Unknown  -- Refer to Clinical Documentation Reviewer    PROVIDER RESPONSE TEXT:    This patient is in chronic hypoxic respiratory failure as evidenced by Need   for supplemental oxygen following recent Covid infection    Query created by: Sukhwinder Cm on 1/13/2021 9:46 AM      Electronically signed by:  Moises Jacob MD 1/16/2021 6:30 PM

## 2021-01-16 NOTE — PROGRESS NOTES
[address] [place] [address] [facility]  Progress Note  Face to face encounter  CC: patient is refusing everything   SUBJECTIVE:  Chief Complaint   Patient presents with    Wound Check     pulled tessio out of the right fem      The patient is refusing everything   he still wants to go home. Pulled dressing off HD chest  No pain. No fever. Sitting up at bedside- not dressed  Very confrontational      Review of systems:  As stated above in the chief complaint, otherwise negative. Medications:  Scheduled Meds:   insulin lispro  0-12 Units Subcutaneous 4x Daily AC & HS    QUEtiapine  25 mg Oral BID    [Held by provider] insulin glargine  18 Units Subcutaneous Nightly    heparin (porcine)  5,000 Units Subcutaneous Q8H    epoetin jonan-epbx  3,000 Units Subcutaneous Once per day on     chlorhexidine  15 mL Mouth/Throat 4x Daily    Calcium Acetate (Phos Binder)  1,334 mg Oral BID WC    ascorbic acid  500 mg Oral Daily    metoprolol tartrate  25 mg Oral BID    pantoprazole  40 mg Oral Daily with breakfast    Vitamin D  2,000 Units Oral Daily    zinc sulfate  50 mg Oral Daily    sodium chloride flush  10 mL Intravenous 2 times per day    atorvastatin  10 mg Oral Nightly     Continuous Infusions:   sodium chloride 50 mL/hr at 21 1758    dextrose       PRN Meds:hydrOXYzine, midodrine, senna, sodium chloride flush, promethazine **OR** ondansetron, polyethylene glycol, acetaminophen **OR** acetaminophen, glucose, dextrose, glucagon (rDNA), dextrose    OBJECTIVE:  /65   Pulse 111   Temp (!) 68 °F (20 °C)   Resp 16   Ht 6' (1.829 m)   Wt 176 lb 6 oz (80 kg)   SpO2 98%   BMI 23.92 kg/m²   Temp  Av °F (33.9 °C)  Min: 68 °F (20 °C)  Max: 98.2 °F (36.8 °C)  Constitutional: The patient is sitting up in bed. Wants to go home- noncompliant No distress. Skin: Warm and dry. No rashes were noted. HEENT: Round and reactive pupils. Moist mucous membranes.   No ulcerations or thrush. Neck: Supple to movements. Chest: No respiratory distress. Right tunneled HD catheter. Cardiovascular: Heart sounds rhythmic and regular  Abdomen: Positive bowel sounds to auscultation. Benign to palpation. Extremities: No edema. Lines: peripheral  Right chest HD line- placed 1/12/2021 - no dressing- patient has removed it     Laboratory and Tests Review:  Lab Results   Component Value Date    WBC 8.1 01/15/2021    WBC 8.4 01/14/2021    WBC 6.4 01/13/2021    HGB 9.2 (L) 01/15/2021    HCT 28.5 (L) 01/15/2021    MCV 87.7 01/15/2021     01/15/2021     Lab Results   Component Value Date    NEUTROABS 6.59 01/15/2021    NEUTROABS 7.34 (H) 01/09/2021    NEUTROABS 6.13 01/01/2021     No results found for: [facility]  Lab Results   Component Value Date    ALT 22 01/14/2021    AST 19 01/14/2021    ALKPHOS 121 01/14/2021    BILITOT 0.4 01/14/2021     Lab Results   Component Value Date     01/14/2021    K 4.4 01/14/2021    K 6.4 01/09/2021     01/14/2021    CO2 23 01/14/2021    BUN 83 01/14/2021    CREATININE 4.7 01/14/2021    CREATININE 3.8 01/13/2021    CREATININE 4.3 01/12/2021    GFRAA 15 01/14/2021    LABGLOM 15 01/14/2021    GLUCOSE 155 01/14/2021    PROT 7.7 01/14/2021    LABALBU 2.8 01/14/2021    CALCIUM 9.5 01/14/2021    BILITOT 0.4 01/14/2021    ALKPHOS 121 01/14/2021    AST 19 01/14/2021    ALT 22 01/14/2021     Lab Results   Component Value Date    CRP 7.9 (H) 01/12/2021    CRP 5.1 (H) 01/01/2021    CRP 5.5 (H) 12/31/2020     Lab Results   Component Value Date    SEDRATE 55 (H) 01/01/2021    SEDRATE 55 (H) 12/31/2020    SEDRATE 65 (H) 12/30/2020     Radiology:  Reviewed     Microbiology:   Blood cultures 1/9/2021 MSSA, GNR reported erroneously  Blood cultures 1/13/2021: Negative so far  Urine culture 1/9/2021 >100 K nonalbicans yeast    ASSESSMENT:  · Probable CLABSI secondary to infected temporary HD catheter.   The patient removed it on his own and was sent to the [facility].  He had positive blood cultures on the day that he was admitted. He has been line free for at least 3 days and is currently afebrile  · Bacteremia associated with MSSA, most likely transient  · Candiduria, asymptomatic  · Confusion    PLAN:  · Has received vancomycin- last dose on 1/15/2021-  · he will probably not need IV antibiotics upon discharge since the bacteremia seemed to be transient and the repeat blood cultures are negative  · He is noncompliant and refusing medical care   · Labs have not been drawn due to noncompliance   · Patient wants to leave     [facility] [place]  12:16 PM  1/16/2021    Assessment and plan as outlined above and directed by me. Addition and corrections were done as deemed appropriate. My exam and plan include: Patient continues to be uncooperative, declining treatment and confrontational.  He has signed out ROSA Dubon  1/16/2021  2:46 PM

## 2021-01-16 NOTE — DISCHARGE SUMMARY
[facility] EMERGENCY SERVICE Physician Discharge Summary       [facility] of [facility] [place] [address], [address]        [place], [address]. [place] [address] [phone]    In 1 month        Activity level: As tolerated    Diet: DIET DYSPHAGIA MINCED AND MOIST; Dysphagia Minced and Moist; Extremely Thick (Spoon-Pudding)  Dietary Nutrition Supplements: Other Oral Supplement (see comment)    Labs:    Dispo: Home    Condition at discharge: Stable at this time    Continue supplemental oxygen via nasal canula @ 2 LPM round-the-clock. Continue CPAP / BiPAP during sleep as prior to admission. Patient ID:  Andre Montano  [id number]  51 y.o.  1950    Admit date: 1/9/2021    Discharge date and time:  1/16/2021  2:32 PM    Admission Diagnoses: Active Problems:    Complicated UTI (urinary tract infection)    Acute cystitis with hematuria  Resolved Problems:    * No resolved hospital problems. *      Discharge Diagnoses: Active Problems:    Complicated UTI (urinary tract infection)    Acute cystitis with hematuria  Resolved Problems:    * No resolved hospital problems. *      Consults:  IP CONSULT TO NEPHROLOGY  IP CONSULT TO VASCULAR SURGERY  IP CONSULT TO ENDOCRINOLOGY  IP CONSULT TO INFECTIOUS DISEASES  IP CONSULT TO IV TEAM    Procedures: Hemodialysis    Hospital Course: Patient was admitted with Complicated UTI (urinary tract infection) [N39.0]. Patient Admitted on 10th with agitation, removing NG, also placement of Tessio. From [place], known A. fib on Eliquis, ESRD, hypertension, hyperlipidemia, CA prostate, suspected alcohol abuse, diabetes, recent Covid pneumonia, metabolic encephalopathy, on feeding via NG. Unable to provide any history.   Seen by nephrology on 10th, tunneled hemodialysis catheter planned, seen by endocrine for uncontrolled diabetes, patient was recommended n.p.o. on 11th by speech, advanced to dysphagia 2 / mechanical soft. Seen by ID on 12th for bacteremia. Gram-positive cocci 1 in 4, initially suspected contaminant. Now likely secondary to infected temporary hemodialysis catheter. Patient was given Vanco preoperatively. Now treated with cefepime and Vanco.  Will repeat blood culture only if it is staph aureus. Vascular placed right IJ tunneled hemodialysis catheter on 12th. Plan for hemodialysis today-with support with midodrine as well as albumin. Covid - January 11. Vanco was redosed on 14th by ID. Seen by endocrine, Lantus changed to 18 with medium sliding scale. ID has been given Vanco dose today. Patient had an episode of unresponsive and not breathing okay about 30 seconds while in dialysis, required fluids given back and patient was transferred to telemetry floor, nephrology has requested stat labs as patient could not tolerate full hemodialysis. Informed by nursing patient has refused all care, family is on their way to pick him up as patient going AMA. Came on the floor, spoke to patient, does communicate well compared to yesterday, does know the month, does not know the year, says only one thing that he wants to go home and he has 3 doctors and he will follow with them. No official POA for medical decisions. He just wants AMA paper to sign and leave. Though not cleared by subspecialties, will continue with DC plans for him to be able to go home. He has been stressed to maintain his dialysis. As well as outpatient follow-up.     Discharge Exam:  Vitals:    01/15/21 2031 01/15/21 2316 01/16/21 0523 01/16/21 0613   BP: (!) 143/78 126/65     Pulse: 109 111     Resp: 16      Temp: 98.2 °F (36.8 °C)   (!) 68 °F (20 °C)   TempSrc: Oral      SpO2: 98%      Weight:   176 lb 6 oz (80 kg)    Height:           General Appearance: alert and oriented to person, place and time, well-developed and well-nourished, in no acute distress  Skin: warm and dry, no rash or erythema  Head: normocephalic and atraumatic  Eyes: pupils equal, round, and reactive to light, extraocular eye movements intact, conjunctivae normal  ENT: tympanic membrane, external ear and ear canal normal bilaterally, oropharynx clear and moist with normal mucous membranes  Neck: neck supple and non tender without mass, no thyromegaly or thyroid nodules, no cervical lymphadenopathy   Pulmonary/Chest: clear to auscultation bilaterally- no wheezes, rales or rhonchi, normal air movement, no respiratory distress  Cardiovascular: normal rate, normal S1 and S2, no gallops, intact distal pulses and no carotid bruits  Abdomen: soft, non-tender, non-distended, normal bowel sounds, no masses or organomegaly  I/O last 3 completed shifts: In: 2443 [I.V.:2143]  Out: 811 [Urine:350]  No intake/output data recorded. LABS:  Recent Labs     01/14/21  0850   *   K 4.4   *   CO2 23   BUN 83*   CREATININE 4.7*   GLUCOSE 155*   CALCIUM 9.5       Recent Labs     01/14/21  0850 01/15/21  1315   WBC 8.4 8.1   RBC 3.41* 3.25*   HGB 9.2* 9.2*   HCT 30.5* 28.5*   MCV 89.4 87.7   MCH 27.0 28.3   MCHC 30.2* 32.3   RDW 14.3 14.2    182   MPV 12.2* 12.1*       Imaging:   MRI BRAIN WO CONTRAST   Final Result   1. No evidence of an acute infarct. 2. Cerebral parenchymal volume loss with mild chronic microvascular white   matter ischemic disease. XR CHEST PORTABLE   Final Result   Dialysis catheter proximal tip in the distal SVC and distal tip just above   the caval atrial junction. No pneumothorax. FLUORO FOR SURGICAL PROCEDURES   Final Result   Intraprocedural fluoroscopic spot images as above. See separate procedure   report for more information. Fluoroscopy modified barium swallow with video   Final Result   Penetration to nectar and honey consistencies of barium contrast.   Please see separate speech pathology report for full discussion of findings   and recommendations.       CT HEAD WO CONTRAST Final Result   No acute intracranial abnormality. XR CHEST PORTABLE   Final Result   Significantly decreased in the previously seen bilateral infiltrates with   minimal residual infiltrate in the right lung base.           Patient Instructions:      Medication List      START taking these medications    Calcium Acetate (Phos Binder) 667 MG Caps  Take 2 capsules by mouth 2 times daily (with meals)        CHANGE how you take these medications    insulin glargine 100 UNIT/ML injection vial  Commonly known as: LANTUS  Inject 25 Units into the skin nightly  What changed: how much to take        CONTINUE taking these medications    amiodarone 200 MG tablet  Commonly known as: CORDARONE     apixaban 5 MG Tabs tablet  Commonly known as: ELIQUIS  Take 1 tablet by mouth 2 times daily     ascorbic acid 500 MG tablet  Commonly known as: VITAMIN C  Take 1 tablet by mouth daily     chlorhexidine 0.12 % solution  Commonly known as: PERIDEX     folic acid 1 MG tablet  Commonly known as: FOLVITE     metoprolol tartrate 25 MG tablet  Commonly known as: LOPRESSOR  Take 1 tablet by mouth 2 times daily     pantoprazole 40 MG tablet  Commonly known as: Protonix  Take 1 tablet by mouth daily (with breakfast)     polyethylene glycol 17 g packet  Commonly known as: GLYCOLAX  Take 17 g by mouth daily as needed for Constipation     sennosides 8.8 MG/5ML syrup  Commonly known as: SENOKOT  Take 5 mLs by mouth 2 times daily as needed (Constipation)     simvastatin 20 MG tablet  Commonly known as: ZOCOR     vitamin D 50 MCG (2000 UT) Tabs tablet  Commonly known as: CHOLECALCIFEROL  Take 1 tablet by mouth daily     zinc sulfate 220 (50 Zn) MG capsule  Commonly known as: ZINCATE  Take 1 capsule by mouth daily        STOP taking these medications    dexamethasone 4 MG/ML injection  Commonly known as: DECADRON     HumaLOG 100 UNIT/ML injection vial  Generic drug: insulin lispro     omeprazole 20 MG delayed release capsule  Commonly known as: PRILOSEC     QUEtiapine 50 MG tablet  Commonly known as: SEROQUEL           Where to Get Your Medications      These medications were sent to [facility], [address] [place] [address], [address] [address] [place] [address]    Phone: [phone]   · Calcium Acetate (Phos Binder) 667 MG Caps           Note that more than 30 minutes was spent in preparing discharge papers, discussing discharge with patient, medication review, etc.    Signed:  Electronically signed by Martine Eldridge MD on 1/16/2021 at 2:32 PM    NOTE: This report was transcribed using voice recognition software. Every effort was made to ensure accuracy; however, inadvertent computerized transcription errors may be present.

## 2021-01-16 NOTE — PROGRESS NOTES
Patient refused all medications today, including insulin. Asked why, so could address any concerns, patient just repeated that it is his right to refuse medications. Blood pressure is within a stable range, BS elevated at 199 (would have received 2 units of fast acting insulin).

## 2021-01-16 NOTE — PROGRESS NOTES
Department of Internal Medicine  Nephrology Progress Note      Events Reviewed:      Subjective: No new complaints.         Current Medications:    Current Facility-Administered Medications: hydrOXYzine (VISTARIL) capsule 25 mg, 25 mg, Oral, Once PRN  midodrine (PROAMATINE) tablet 10 mg, 10 mg, Oral, Daily PRN  albumin human 25 % IV solution 25 g, 25 g, Intravenous, Once in dialysis  insulin lispro (HUMALOG) injection vial 0-12 Units, 0-12 Units, Subcutaneous, 4x Daily AC & HS  QUEtiapine (SEROQUEL) tablet 25 mg, 25 mg, Oral, BID  [Held by provider] insulin glargine (LANTUS) injection vial 18 Units, 18 Units, Subcutaneous, Nightly  0.45 % sodium chloride infusion, , Intravenous, Continuous  heparin (porcine) injection 5,000 Units, 5,000 Units, Subcutaneous, Q8H  epoetin joann-epbx (RETACRIT) injection 3,000 Units, 3,000 Units, Subcutaneous, Once per day on Mon Wed Fri  chlorhexidine (PERIDEX) 0.12 % solution 15 mL, 15 mL, Mouth/Throat, 4x Daily  Calcium Acetate (Phos Binder) CAPS 1,334 mg, 1,334 mg, Oral, BID WC  ascorbic acid (VITAMIN C) tablet 500 mg, 500 mg, Oral, Daily  metoprolol tartrate (LOPRESSOR) tablet 25 mg, 25 mg, Oral, BID  pantoprazole (PROTONIX) tablet 40 mg, 40 mg, Oral, Daily with breakfast  senna (SENOKOT) tablet 8.6 mg, 1 tablet, Oral, BID PRN  vitamin D (CHOLECALCIFEROL) tablet 2,000 Units, 2,000 Units, Oral, Daily  zinc sulfate (ZINCATE) capsule 50 mg, 50 mg, Oral, Daily  sodium chloride flush 0.9 % injection 10 mL, 10 mL, Intravenous, 2 times per day  sodium chloride flush 0.9 % injection 10 mL, 10 mL, Intravenous, PRN  promethazine (PHENERGAN) tablet 12.5 mg, 12.5 mg, Oral, Q6H PRN **OR** ondansetron (ZOFRAN) injection 4 mg, 4 mg, Intravenous, Q6H PRN  polyethylene glycol (GLYCOLAX) packet 17 g, 17 g, Oral, Daily PRN  acetaminophen (TYLENOL) tablet 650 mg, 650 mg, Oral, Q6H PRN **OR** acetaminophen (TYLENOL) suppository 650 mg, 650 mg, Rectal, Q6H PRN  atorvastatin (LIPITOR) tablet 10 mg, 10 mg, Oral, Nightly  glucose (GLUTOSE) 40 % oral gel 15 g, 15 g, Oral, PRN  dextrose 50 % IV solution, 12.5 g, Intravenous, PRN  glucagon (rDNA) injection 1 mg, 1 mg, Intramuscular, PRN  dextrose 5 % solution, 100 mL/hr, Intravenous, PRN  Allergies:  Patient has no known allergies. PHYSICAL EXAM:      Vitals:    VITALS:  /65   Pulse 111   Temp (!) 68 °F (20 °C)   Resp 16   Ht 6' (1.829 m)   Wt 176 lb 6 oz (80 kg)   SpO2 98%   BMI 23.92 kg/m²   CURRENT PULSE OXIMETRY:  SpO2: 98 %  24HR INTAKE/OUTPUT:      Intake/Output Summary (Last 24 hours) at 1/16/2021 1054  Last data filed at 1/16/2021 5919  Gross per 24 hour   Intake 2443 ml   Output 811 ml   Net 1632 ml       Access: Right IJ TDC  Constitutional:  Alert, speech difficult to understand, thick garbled  HEENT:  Normocephalic, Atraumatic, mucous membranes moist  Respiratory:  2 L per nasal cannula 98% sat  Cardiovascular/Edema:  Irregular S1/S2 No edema  Gastrointestinal:  Abdomen soft non distended BS + x 4 quadrants, chery with evidence of gross hematuria  Musculoskeletal: weak but ALVES, no edema  Neurologic:  Alert, speech difficult to understand.       DATA:    CBC with Differential:    Lab Results   Component Value Date    WBC 8.1 01/15/2021    RBC 3.25 01/15/2021    HGB 9.2 01/15/2021    HCT 28.5 01/15/2021     01/15/2021    MCV 87.7 01/15/2021    MCH 28.3 01/15/2021    MCHC 32.3 01/15/2021    RDW 14.2 01/15/2021    NRBC 0.9 12/26/2020    SEGSPCT 63 09/27/2013    LYMPHOPCT 11.3 01/15/2021    MONOPCT 5.9 01/15/2021    MYELOPCT 0.9 12/26/2020    BASOPCT 0.2 01/15/2021    MONOSABS 0.48 01/15/2021    LYMPHSABS 0.92 01/15/2021    EOSABS 0.04 01/15/2021    BASOSABS 0.02 01/15/2021     CMP:    Lab Results   Component Value Date     01/14/2021    K 4.4 01/14/2021    K 6.4 01/09/2021     01/14/2021    CO2 23 01/14/2021    BUN 83 01/14/2021    CREATININE 4.7 01/14/2021    GFRAA 15 01/14/2021    LABGLOM 15 01/14/2021    GLUCOSE 155 01/14/2021 PROT 7.7 01/14/2021    LABALBU 2.8 01/14/2021    CALCIUM 9.5 01/14/2021    BILITOT 0.4 01/14/2021    ALKPHOS 121 01/14/2021    AST 19 01/14/2021    ALT 22 01/14/2021     Hepatic Function Panel:    Lab Results   Component Value Date    ALKPHOS 121 01/14/2021    ALT 22 01/14/2021    AST 19 01/14/2021    PROT 7.7 01/14/2021    BILITOT 0.4 01/14/2021    BILIDIR 0.2 12/30/2020    IBILI 0.3 12/30/2020    LABALBU 2.8 01/14/2021     Albumin:    Lab Results   Component Value Date    LABALBU 2.8 01/14/2021     Calcium:    Lab Results   Component Value Date    CALCIUM 9.5 01/14/2021   Magnesium:    Lab Results   Component Value Date    MG 2.3 01/01/2021     Phosphorus:    Lab Results   Component Value Date    PHOS 6.5 01/11/2021     PT/INR:    Lab Results   Component Value Date    PROTIME 17.6 01/01/2021    INR 1.5 01/01/2021     Radiology Review:     Chest xray 1/9/2021   Significantly decreased in the previously seen bilateral infiltrates with minimal residual infiltrate in the right lung base.         CT head 1/10/21   No acute intracranial abnormality. IMPRESSION/RECOMMENDATIONS:      1. End-stage-renal-disease. Needs tunneled catheter placed. Vascular surgery to place in       am  2. Chronic respiratory failure on 2 L nasal cannula  3. Chronic Atrial fibrillation. Eliquis is on hold for procedure  4. Diabetes mellitus  On SS insulin, endocrinology consulted  5. Anemia (10.7/33.3)  6. 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 16. Start phos binder when no longer NPO  7. Malnutrition, hypoalbuminemia , Pulled NGT out, currently NPO  8. Agitation, Received 1 x dose Haldol, on Seroquel BID  9. Hypernatremia secondary to free water deficit from poor intake    Plan:      · No labs today, obtain a stat BMP, Magnesium, Phos  · C/W 1/2 NS at 50 ml/hr  · Blood draw on dialysis.

## 2021-01-16 NOTE — PROGRESS NOTES
Asked  Daisha Jj if he is comfortable taking his blood pressure mediation, as his heart rate has been elevated. He declined, asked if there are any concerns I can assist with, he said he feels good now, and doesn't want to take anything.

## 2021-01-16 NOTE — PROGRESS NOTES
Called Dr. Yuri Triana regarding patient is trying to get out of bed on own, and pull out lines, as says going home now. Is oriented to person/place, seems confused on own abilities. He ordere Adorex 25 PO if patient will take.

## 2021-01-16 NOTE — CONSULTS
Painful Elongated Toenails - Onychomycosis   - Patient was examined and evaluated. - Reviewed patient's recent lab results and pertinent diagnostic imaging.  - Reviewed ancillary service notes. - Debrided nails 1-5 in length and thickness using nail nippers  - Anti-fungal options discussed. - Discussed supportive shoes, and proper foot hygiene, checking feet daily for blisters, cuts, or calluses. - Educated patient on neuropathy, and problems with decreased sensation in their feet   - Patient was informed to wear soft, loose cotton socks and padded shoes.   - Smoking Cessation given  - Recommend follow-up with podiatric physician for proper diabetic foot care upon discharge. No wounds are noted to bilateral lower extremities. Will sign off on the patient. If any other pedal problems occur please contact us. Thank you for the opportunity to take part in the patient's care. Zainab Ray.  Bryanna Son III, DPM  Fellow- Foot and Ankle Surgery  [phone]

## 2021-01-16 NOTE — PROGRESS NOTES
Notified Dr. Uriel Pal via [employer] text of pt's refusal of all care, meds & dialysis. Family called here earlier & wants pt to leave AMA & they will pick him up. They are supposed to call back to arrange time. Dr. Prachi Carlson notified as well.

## 2021-01-16 NOTE — PROGRESS NOTES
Patient took off his telemetry, gown, and attempted to remove IV site and leave bed. Bed alarm is on, patient was able to answer oriented questions, as patient refused all blood work and sugar checks, we do know status of labs. Have bed alarm on at this time, and will work with patient, his family, and physicians for next steps.

## 2021-01-16 NOTE — PROGRESS NOTES
Pt refusing to go to dialysis. González Relimarla in dialysis notified. Will also notify Dr. Leighton Lara.

## 2021-01-17 NOTE — PROGRESS NOTES
ENDOCRINOLOGY PROGRESS NOTE      Date of admission: 1/9/2021  Date of service: 1/17/2021  Admitting physician: Chuy Mark MD   Primary Care Physician: Sidra Huerta DO  Consultant physician: Sai Ellis MD     Reason for the consultation:  Uncontrolled DM    History of Present Illness: The history is provided from medical records. The patient has dementia and history is limited     Days HELADIO Vincent Adjutant is a very pleasant 65 y.o. old male with PMH of DM type 2, HTN, HLD, ESRD on dialysis and other listed below admitted [facility] 1[facility] from nursing home because of agitation and pulled out his NG and right femoral Tessio, endocrine service was consulted for diabetes management. Subjective   Seen and examined, no acute issues overnight, BS improving.  Didn't receive lantus last night     Inpatient diet:   Carb Restricted diet     Point of care glucose monitoring (Independently reviewed)   Recent Labs     01/14/21  2047 01/15/21  1103 01/15/21  1636 01/15/21  2319   GLUMET 166* 119* 148* 199*     Scheduled Meds:  Scheduled Medications[]Expand by Default    insulin lispro  0-12 Units Subcutaneous 4x Daily AC & HS    QUEtiapine  25 mg Oral BID    [Held by provider] insulin glargine  18 Units Subcutaneous Nightly    heparin (porcine)  5,000 Units Subcutaneous Q8H    epoetin joann-epbx  3,000 Units Subcutaneous Once per day on Mon Wed Fri    chlorhexidine  15 mL Mouth/Throat 4x Daily    Calcium Acetate (Phos Binder)  1,334 mg Oral BID WC    ascorbic acid  500 mg Oral Daily    metoprolol tartrate  25 mg Oral BID    pantoprazole  40 mg Oral Daily with breakfast    Vitamin D  2,000 Units Oral Daily    zinc sulfate  50 mg Oral Daily    sodium chloride flush  10 mL Intravenous 2 times per day    atorvastatin  10 mg Oral Nightly         Continuous Infusions:  Infusions Meds[]Expand by Default    sodium chloride 50 mL/hr at 01/14/21 1758    dextrose           PRN Meds:  PRN Medications[]Expand by Default hydrOXYzine, midodrine, senna, sodium chloride flush, promethazine **OR** ondansetron, polyethylene glycol, acetaminophen **OR** acetaminophen, glucose, dextrose, glucagon (rDNA), dextrose       Review of Systems  Denied CP, SOB, fever or chills     OBJECTIVE    /65   Pulse 111   Temp (!) 68 °F (20 °C)   Resp 16   Ht 6' (1.829 m)   Wt 176 lb 6 oz (80 kg)   SpO2 98%   BMI 23.92 kg/m²   No intake or output data in the 24 hours ending 01/17/21 1828    Physical examination:  General: sleepy   HEENT: normocephalic non-traumatic   Neck: supple,   Pulm: Clear equal air entry no added sounds, no wheezing or rhonchi    CVS: S1 + S2, no murmur, no heave  Abd: soft lax, no tenderness, no organomegaly, audible bowel sounds. Skin: warm, no lesions, no rash.     Feet: no wounds, no ulcers   Neuro: CN intact, muscle power normal  Psych: normal mood, and affect    Review of Laboratory Data:  I personally reviewed the following labs:   Recent Labs     01/15/21  1315   WBC 8.1   RBC 3.25*   HGB 9.2*   HCT 28.5*   MCV 87.7   MCH 28.3   MCHC 32.3   RDW 14.2      MPV 12.1*     Lab Results   Component Value Date     ALT 22 01/14/2021     AST 19 01/14/2021     ALKPHOS 121 01/14/2021     BILITOT 0.4 01/14/2021            Lab Results   Component Value Date      01/14/2021     K 4.4 01/14/2021     K 6.4 01/09/2021      01/14/2021     CO2 23 01/14/2021     BUN 83 01/14/2021     CREATININE 4.7 01/14/2021     CREATININE 3.8 01/13/2021     CREATININE 4.3 01/12/2021     GFRAA 15 01/14/2021     LABGLOM 15 01/14/2021     GLUCOSE 155 01/14/2021     PROT 7.7 01/14/2021     LABALBU 2.8 01/14/2021     CALCIUM 9.5 01/14/2021     BILITOT 0.4 01/14/2021     ALKPHOS 121 01/14/2021     AST 19 01/14/2021     ALT 22 01/14/2021      Beta-Hydroxybutyrate   Date Value Ref Range Status   12/23/2020 0.80 (H) 0.02 - 0.27 mmol/L Final     Lab Results   Component Value Date    LABA1C 11.1 12/23/2020    LABA1C 6.3 09/27/2013     Lab Results   Component Value Date/Time    TSH 0.540 12/23/2020 03:00 AM     Lab Results   Component Value Date    LABA1C 11.1 12/23/2020    GLUCOSE 155 01/14/2021    LABCREA 17 12/29/2020     Lab Results   Component Value Date    TRIG 439 12/23/2020    HDL 14 12/23/2020    LDLCALC - 12/23/2020    CHOL 160 12/23/2020       Blood culture   Lab Results   Component Value Date    BC 24 Hours no growth 01/13/2021    BC 5 Days no growth 12/22/2020       Radiology:  MRI BRAIN WO CONTRAST   Final Result   1. No evidence of an acute infarct. 2. Cerebral parenchymal volume loss with mild chronic microvascular white   matter ischemic disease. XR CHEST PORTABLE   Final Result   Dialysis catheter proximal tip in the distal SVC and distal tip just above   the caval atrial junction. No pneumothorax. FLUORO FOR SURGICAL PROCEDURES   Final Result   Intraprocedural fluoroscopic spot images as above. See separate procedure   report for more information. Fluoroscopy modified barium swallow with video   Final Result   Penetration to nectar and honey consistencies of barium contrast.   Please see separate speech pathology report for full discussion of findings   and recommendations. CT HEAD WO CONTRAST   Final Result   No acute intracranial abnormality. XR CHEST PORTABLE   Final Result   Significantly decreased in the previously seen bilateral infiltrates with   minimal residual infiltrate in the right lung base.           Medical Records/Labs/Images review:   I personally reviewed and summarized previous records   All labs and imaging were reviewed independently     ASSESSMENT & PLAN   Andre Avilesef, a 65 y.o.-old male seen today for inpatient diabetes management     Diabetes Mellitus type 2   · Patient's diabetes is uncontrolled, A1c 11.1%    · Continue Medium dose sliding scale with meals and at night   · Continue glucose check with meals and at bedtime   · Will titrate insulin dose based on the blood glucose trend & insulin requirement    H/o COVID   · Dx 12/22, repeated this on this admission was negative   · Managed by primary team  · Will continue monitoring BS and adjust insulin as needed     ESRD   On dialysis    Patient is at risk for hypoglycemia while receiving insulin due to ESRD   Continue to monitor blood glucose closely    HLD   · On lipitor 10 mg daily     Thank you for allowing us to participate in the care of this patient. Please do not hesitate to contact us with any additional questions. Dennis Hong MD  Endocrinologist, [facility]   [address] [place] [id number]   Phone: [phone]  Fax: [phone]  --------------------------------------------  An electronic signature was used to authenticate this note.  Kimberlyn Guzman MD on 1/17/2021 at 6:28 PM

## 2021-01-18 LAB
BLOOD CULTURE, ROUTINE: NORMAL
CULTURE, BLOOD 2: NORMAL

## 2021-01-25 ENCOUNTER — CARE COORDINATION (OUTPATIENT)
Dept: CASE MANAGEMENT | Age: 71
End: 2021-01-25

## 2021-01-25 NOTE — CARE COORDINATION
Amy [facility] Transitions Follow Up Call    2021    Patient: Andre Rodriguez  Patient : 1950   MRN: <[id number]>  Reason for Admission:   Discharge Date: 21 RARS: Readmission Risk Score: 44    Follow Up:  2021 Final  for admission 20-21. Patient no longer eligible for BPCI bundle due to excluded DRG.     2021 Final  for admission 21-21. Patient no longer eligible for BPCI bundle due to excluded DRG. No future appointments.     Jeannette Finch RN

## 2021-02-27 ENCOUNTER — APPOINTMENT (OUTPATIENT)
Dept: MRI IMAGING | Age: 71
DRG: 812 | End: 2021-02-27
Payer: MEDICARE

## 2021-02-27 ENCOUNTER — APPOINTMENT (OUTPATIENT)
Dept: CT IMAGING | Age: 71
DRG: 812 | End: 2021-02-27
Payer: MEDICARE

## 2021-02-27 ENCOUNTER — HOSPITAL ENCOUNTER (INPATIENT)
Age: 71
LOS: 2 days | Discharge: HOME | DRG: 812 | End: 2021-03-01
Attending: EMERGENCY MEDICINE | Admitting: INTERNAL MEDICINE
Payer: MEDICARE

## 2021-02-27 DIAGNOSIS — D64.9 SEVERE ANEMIA: ICD-10-CM

## 2021-02-27 DIAGNOSIS — R31.0 FRANK HEMATURIA: ICD-10-CM

## 2021-02-27 DIAGNOSIS — K86.89 PANCREATIC MASS: Primary | ICD-10-CM

## 2021-02-27 LAB
ABO/RH: NORMAL
ALBUMIN SERPL-MCNC: 2.1 G/DL (ref 3.5–5.2)
ALP BLD-CCNC: 90 U/L (ref 40–129)
ALT SERPL-CCNC: 11 U/L (ref 0–40)
ANION GAP SERPL CALCULATED.3IONS-SCNC: 12 MMOL/L (ref 7–16)
ANTIBODY SCREEN: NORMAL
APTT: 32.8 SEC (ref 24.5–35.1)
AST SERPL-CCNC: 12 U/L (ref 0–39)
BACTERIA: ABNORMAL /HPF
BASOPHILS ABSOLUTE: 0.05 E9/L (ref 0–0.2)
BASOPHILS RELATIVE PERCENT: 1.2 % (ref 0–2)
BILIRUB SERPL-MCNC: 0.5 MG/DL (ref 0–1.2)
BILIRUBIN DIRECT: <0.2 MG/DL (ref 0–0.3)
BILIRUBIN URINE: NEGATIVE
BILIRUBIN, INDIRECT: ABNORMAL MG/DL (ref 0–1)
BLOOD BANK DISPENSE STATUS: NORMAL
BLOOD BANK PRODUCT CODE: NORMAL
BLOOD, URINE: ABNORMAL
BPU ID: NORMAL
BUN BLDV-MCNC: 14 MG/DL (ref 8–23)
CALCIUM SERPL-MCNC: 8.6 MG/DL (ref 8.6–10.2)
CHLORIDE BLD-SCNC: 106 MMOL/L (ref 98–107)
CLARITY: CLEAR
CO2: 14 MMOL/L (ref 22–29)
COLOR: YELLOW
CREAT SERPL-MCNC: 1.6 MG/DL (ref 0.7–1.2)
DESCRIPTION BLOOD BANK: NORMAL
EKG ATRIAL RATE: 182 BPM
EKG Q-T INTERVAL: 350 MS
EKG QRS DURATION: 78 MS
EKG QTC CALCULATION (BAZETT): 416 MS
EKG R AXIS: 17 DEGREES
EKG T AXIS: 37 DEGREES
EKG VENTRICULAR RATE: 85 BPM
EOSINOPHILS ABSOLUTE: 0.11 E9/L (ref 0.05–0.5)
EOSINOPHILS RELATIVE PERCENT: 2.6 % (ref 0–6)
FERRITIN: 926 NG/ML
GFR AFRICAN AMERICAN: 52
GFR NON-AFRICAN AMERICAN: 52 ML/MIN/1.73
GLUCOSE BLD-MCNC: 242 MG/DL (ref 74–99)
GLUCOSE URINE: NEGATIVE MG/DL
HCT VFR BLD CALC: 21.7 % (ref 37–54)
HCT VFR BLD CALC: 30.4 % (ref 37–54)
HEMOGLOBIN: 7.2 G/DL (ref 12.5–16.5)
HEMOGLOBIN: 9.1 G/DL (ref 12.5–16.5)
IMMATURE GRANULOCYTES #: 0.02 E9/L
IMMATURE GRANULOCYTES %: 0.5 % (ref 0–5)
INR BLD: 1.2
IRON SATURATION: 56 % (ref 20–55)
IRON: 92 MCG/DL (ref 59–158)
KETONES, URINE: NEGATIVE MG/DL
LACTIC ACID: 1.1 MMOL/L (ref 0.5–2.2)
LEUKOCYTE ESTERASE, URINE: NEGATIVE
LIPASE: 12 U/L (ref 13–60)
LYMPHOCYTES ABSOLUTE: 1.29 E9/L (ref 1.5–4)
LYMPHOCYTES RELATIVE PERCENT: 30.4 % (ref 20–42)
MCH RBC QN AUTO: 25.6 PG (ref 26–35)
MCH RBC QN AUTO: 26 PG (ref 26–35)
MCHC RBC AUTO-ENTMCNC: 29.9 % (ref 32–34.5)
MCHC RBC AUTO-ENTMCNC: 33.2 % (ref 32–34.5)
MCV RBC AUTO: 77.2 FL (ref 80–99.9)
MCV RBC AUTO: 86.9 FL (ref 80–99.9)
METER GLUCOSE: 298 MG/DL (ref 74–99)
MONOCYTES ABSOLUTE: 0.39 E9/L (ref 0.1–0.95)
MONOCYTES RELATIVE PERCENT: 9.2 % (ref 2–12)
NEUTROPHILS ABSOLUTE: 2.39 E9/L (ref 1.8–7.3)
NEUTROPHILS RELATIVE PERCENT: 56.1 % (ref 43–80)
NITRITE, URINE: NEGATIVE
PDW BLD-RTO: 16.5 FL (ref 11.5–15)
PDW BLD-RTO: 17.2 FL (ref 11.5–15)
PH UA: 6 (ref 5–9)
PHOSPHORUS: 2.9 MG/DL (ref 2.5–4.5)
PLATELET # BLD: 278 E9/L (ref 130–450)
PLATELET # BLD: 280 E9/L (ref 130–450)
PMV BLD AUTO: 10.3 FL (ref 7–12)
PMV BLD AUTO: 9.9 FL (ref 7–12)
POTASSIUM SERPL-SCNC: 3.6 MMOL/L (ref 3.5–5)
PROTEIN UA: 30 MG/DL
PROTHROMBIN TIME: 13 SEC (ref 9.3–12.4)
RBC # BLD: 2.81 E12/L (ref 3.8–5.8)
RBC # BLD: 3.5 E12/L (ref 3.8–5.8)
RBC UA: >20 /HPF (ref 0–2)
SODIUM BLD-SCNC: 132 MMOL/L (ref 132–146)
SPECIFIC GRAVITY UA: 1.01 (ref 1–1.03)
TOTAL IRON BINDING CAPACITY: 163 MCG/DL (ref 250–450)
TOTAL PROTEIN: 6.1 G/DL (ref 6.4–8.3)
TRANSFERRIN: 128 MG/DL (ref 200–360)
UROBILINOGEN, URINE: 0.2 E.U./DL
WBC # BLD: 4 E9/L (ref 4.5–11.5)
WBC # BLD: 4.3 E9/L (ref 4.5–11.5)
WBC UA: ABNORMAL /HPF (ref 0–5)
YEAST: PRESENT /HPF

## 2021-02-27 PROCEDURE — 96374 THER/PROPH/DIAG INJ IV PUSH: CPT

## 2021-02-27 PROCEDURE — 85730 THROMBOPLASTIN TIME PARTIAL: CPT

## 2021-02-27 PROCEDURE — 83605 ASSAY OF LACTIC ACID: CPT

## 2021-02-27 PROCEDURE — 6360000004 HC RX CONTRAST MEDICATION: Performed by: RADIOLOGY

## 2021-02-27 PROCEDURE — 99222 1ST HOSP IP/OBS MODERATE 55: CPT | Performed by: INTERNAL MEDICINE

## 2021-02-27 PROCEDURE — 74177 CT ABD & PELVIS W/CONTRAST: CPT

## 2021-02-27 PROCEDURE — 86850 RBC ANTIBODY SCREEN: CPT

## 2021-02-27 PROCEDURE — 86923 COMPATIBILITY TEST ELECTRIC: CPT

## 2021-02-27 PROCEDURE — 2580000003 HC RX 258: Performed by: RADIOLOGY

## 2021-02-27 PROCEDURE — 83690 ASSAY OF LIPASE: CPT

## 2021-02-27 PROCEDURE — 81001 URINALYSIS AUTO W/SCOPE: CPT

## 2021-02-27 PROCEDURE — 83540 ASSAY OF IRON: CPT

## 2021-02-27 PROCEDURE — 84466 ASSAY OF TRANSFERRIN: CPT

## 2021-02-27 PROCEDURE — 82728 ASSAY OF FERRITIN: CPT

## 2021-02-27 PROCEDURE — 99284 EMERGENCY DEPT VISIT MOD MDM: CPT

## 2021-02-27 PROCEDURE — 2060000000 HC ICU INTERMEDIATE R&B

## 2021-02-27 PROCEDURE — 80076 HEPATIC FUNCTION PANEL: CPT

## 2021-02-27 PROCEDURE — 85025 COMPLETE CBC W/AUTO DIFF WBC: CPT

## 2021-02-27 PROCEDURE — 85610 PROTHROMBIN TIME: CPT

## 2021-02-27 PROCEDURE — 93010 ELECTROCARDIOGRAM REPORT: CPT | Performed by: INTERNAL MEDICINE

## 2021-02-27 PROCEDURE — 84100 ASSAY OF PHOSPHORUS: CPT

## 2021-02-27 PROCEDURE — 83550 IRON BINDING TEST: CPT

## 2021-02-27 PROCEDURE — 36430 TRANSFUSION BLD/BLD COMPNT: CPT

## 2021-02-27 PROCEDURE — A9579 GAD-BASE MR CONTRAST NOS,1ML: HCPCS | Performed by: RADIOLOGY

## 2021-02-27 PROCEDURE — 80048 BASIC METABOLIC PNL TOTAL CA: CPT

## 2021-02-27 PROCEDURE — 2580000003 HC RX 258: Performed by: INTERNAL MEDICINE

## 2021-02-27 PROCEDURE — P9016 RBC LEUKOCYTES REDUCED: HCPCS

## 2021-02-27 PROCEDURE — 86901 BLOOD TYPING SEROLOGIC RH(D): CPT

## 2021-02-27 PROCEDURE — 82962 GLUCOSE BLOOD TEST: CPT

## 2021-02-27 PROCEDURE — 86900 BLOOD TYPING SEROLOGIC ABO: CPT

## 2021-02-27 PROCEDURE — 6370000000 HC RX 637 (ALT 250 FOR IP): Performed by: INTERNAL MEDICINE

## 2021-02-27 PROCEDURE — 93005 ELECTROCARDIOGRAM TRACING: CPT | Performed by: EMERGENCY MEDICINE

## 2021-02-27 PROCEDURE — 36415 COLL VENOUS BLD VENIPUNCTURE: CPT

## 2021-02-27 PROCEDURE — 74183 MRI ABD W/O CNTR FLWD CNTR: CPT

## 2021-02-27 PROCEDURE — 85027 COMPLETE CBC AUTOMATED: CPT

## 2021-02-27 PROCEDURE — 88112 CYTOPATH CELL ENHANCE TECH: CPT

## 2021-02-27 RX ORDER — CHOLECALCIFEROL (VITAMIN D3) 50 MCG
2000 TABLET ORAL DAILY
Status: DISCONTINUED | OUTPATIENT
Start: 2021-02-28 | End: 2021-03-01 | Stop reason: HOSPADM

## 2021-02-27 RX ORDER — NICOTINE POLACRILEX 4 MG
15 LOZENGE BUCCAL PRN
Status: DISCONTINUED | OUTPATIENT
Start: 2021-02-27 | End: 2021-03-01 | Stop reason: HOSPADM

## 2021-02-27 RX ORDER — DEXTROSE MONOHYDRATE 25 G/50ML
12.5 INJECTION, SOLUTION INTRAVENOUS PRN
Status: DISCONTINUED | OUTPATIENT
Start: 2021-02-27 | End: 2021-03-01 | Stop reason: HOSPADM

## 2021-02-27 RX ORDER — FOLIC ACID 1 MG/1
1 TABLET ORAL DAILY
Status: DISCONTINUED | OUTPATIENT
Start: 2021-02-28 | End: 2021-03-01 | Stop reason: HOSPADM

## 2021-02-27 RX ORDER — SODIUM CHLORIDE 0.9 % (FLUSH) 0.9 %
10 SYRINGE (ML) INJECTION EVERY 12 HOURS SCHEDULED
Status: DISCONTINUED | OUTPATIENT
Start: 2021-02-27 | End: 2021-03-01 | Stop reason: HOSPADM

## 2021-02-27 RX ORDER — ASCORBIC ACID 500 MG
500 TABLET ORAL DAILY
Status: DISCONTINUED | OUTPATIENT
Start: 2021-02-28 | End: 2021-03-01 | Stop reason: HOSPADM

## 2021-02-27 RX ORDER — CALCIUM ACETATE 667 MG/1
1334 CAPSULE ORAL 2 TIMES DAILY WITH MEALS
Status: DISCONTINUED | OUTPATIENT
Start: 2021-02-28 | End: 2021-03-01 | Stop reason: HOSPADM

## 2021-02-27 RX ORDER — AMIODARONE HYDROCHLORIDE 200 MG/1
200 TABLET ORAL 2 TIMES DAILY
Status: DISCONTINUED | OUTPATIENT
Start: 2021-02-27 | End: 2021-03-01 | Stop reason: HOSPADM

## 2021-02-27 RX ORDER — SODIUM CHLORIDE 0.9 % (FLUSH) 0.9 %
10 SYRINGE (ML) INJECTION
Status: COMPLETED | OUTPATIENT
Start: 2021-02-27 | End: 2021-02-27

## 2021-02-27 RX ORDER — DEXTROSE MONOHYDRATE 50 MG/ML
100 INJECTION, SOLUTION INTRAVENOUS PRN
Status: DISCONTINUED | OUTPATIENT
Start: 2021-02-27 | End: 2021-03-01 | Stop reason: HOSPADM

## 2021-02-27 RX ORDER — ONDANSETRON 2 MG/ML
4 INJECTION INTRAMUSCULAR; INTRAVENOUS EVERY 6 HOURS PRN
Status: DISCONTINUED | OUTPATIENT
Start: 2021-02-27 | End: 2021-03-01 | Stop reason: HOSPADM

## 2021-02-27 RX ORDER — INSULIN GLARGINE 100 [IU]/ML
14 INJECTION, SOLUTION SUBCUTANEOUS NIGHTLY
Status: DISCONTINUED | OUTPATIENT
Start: 2021-02-27 | End: 2021-03-01 | Stop reason: HOSPADM

## 2021-02-27 RX ORDER — POLYETHYLENE GLYCOL 3350 17 G/17G
17 POWDER, FOR SOLUTION ORAL DAILY PRN
Status: DISCONTINUED | OUTPATIENT
Start: 2021-02-27 | End: 2021-03-01 | Stop reason: HOSPADM

## 2021-02-27 RX ORDER — SODIUM CHLORIDE 0.9 % (FLUSH) 0.9 %
10 SYRINGE (ML) INJECTION PRN
Status: DISCONTINUED | OUTPATIENT
Start: 2021-02-27 | End: 2021-03-01 | Stop reason: HOSPADM

## 2021-02-27 RX ORDER — PROMETHAZINE HYDROCHLORIDE 25 MG/1
12.5 TABLET ORAL EVERY 6 HOURS PRN
Status: DISCONTINUED | OUTPATIENT
Start: 2021-02-27 | End: 2021-03-01 | Stop reason: HOSPADM

## 2021-02-27 RX ORDER — SODIUM CHLORIDE 9 MG/ML
INJECTION, SOLUTION INTRAVENOUS PRN
Status: DISCONTINUED | OUTPATIENT
Start: 2021-02-27 | End: 2021-03-01 | Stop reason: HOSPADM

## 2021-02-27 RX ORDER — ACETAMINOPHEN 650 MG/1
650 SUPPOSITORY RECTAL EVERY 6 HOURS PRN
Status: DISCONTINUED | OUTPATIENT
Start: 2021-02-27 | End: 2021-03-01 | Stop reason: HOSPADM

## 2021-02-27 RX ORDER — ATORVASTATIN CALCIUM 20 MG/1
20 TABLET, FILM COATED ORAL NIGHTLY
Status: DISCONTINUED | OUTPATIENT
Start: 2021-02-27 | End: 2021-03-01 | Stop reason: HOSPADM

## 2021-02-27 RX ORDER — PANTOPRAZOLE SODIUM 40 MG/1
40 TABLET, DELAYED RELEASE ORAL
Status: DISCONTINUED | OUTPATIENT
Start: 2021-02-28 | End: 2021-03-01 | Stop reason: HOSPADM

## 2021-02-27 RX ORDER — ACETAMINOPHEN 325 MG/1
650 TABLET ORAL EVERY 6 HOURS PRN
Status: DISCONTINUED | OUTPATIENT
Start: 2021-02-27 | End: 2021-03-01 | Stop reason: HOSPADM

## 2021-02-27 RX ADMIN — ATORVASTATIN CALCIUM 20 MG: 40 TABLET, FILM COATED ORAL at 22:04

## 2021-02-27 RX ADMIN — IOPAMIDOL 90 ML: 755 INJECTION, SOLUTION INTRAVENOUS at 11:10

## 2021-02-27 RX ADMIN — SODIUM CHLORIDE, PRESERVATIVE FREE 10 ML: 5 INJECTION INTRAVENOUS at 22:05

## 2021-02-27 RX ADMIN — INSULIN GLARGINE 14 UNITS: 100 INJECTION, SOLUTION SUBCUTANEOUS at 22:05

## 2021-02-27 RX ADMIN — METOPROLOL TARTRATE 25 MG: 25 TABLET, FILM COATED ORAL at 22:04

## 2021-02-27 RX ADMIN — GADOTERIDOL 15 ML: 279.3 INJECTION, SOLUTION INTRAVENOUS at 16:00

## 2021-02-27 RX ADMIN — Medication 10 ML: at 11:10

## 2021-02-27 RX ADMIN — AMIODARONE HYDROCHLORIDE 200 MG: 200 TABLET ORAL at 22:04

## 2021-02-27 ASSESSMENT — ENCOUNTER SYMPTOMS
EYES NEGATIVE: 1
ALLERGIC/IMMUNOLOGIC NEGATIVE: 1
RESPIRATORY NEGATIVE: 1
GASTROINTESTINAL NEGATIVE: 1

## 2021-02-27 ASSESSMENT — PAIN DESCRIPTION - PAIN TYPE: TYPE: CHRONIC PAIN

## 2021-02-27 NOTE — CONSULTS
Hepatobiliary and Pancreatic Surgery Resident History and Physical    Patient's Name/Date of Birth: Andre Shelby /1950 (65 y.o.)    Date: February 27, 2021     CC: Painless Hematuria    HPI:  Pt is a pleasant 64 y/o M that presents to the ED with painless hematuria. He has a PMHx of prostate CA s/p radiation in remission and PAF on Eliquis. He states that he has had no abdominal pain, nausea, vomiting, GERD, fevers, chills, flank pain, back pain, or unintentional weight loss. He denies any constipation or diarrhea, melena, or hematochezia. He does report smoking in the past but quit 2 years ago. He also reports EtOH abuse in the past, but quit 3 months ago. He denies ever having any episodes of acute pancreatitis. He denies any family hx of colon CA or pancreatic CA.     Past Medical History:   Diagnosis Date    [facility]     Diabetes mellitus ([facility]     Hyperlipidemia     Hypertension     Prostate cancer ([facility]        Past Surgical History:   Procedure Laterality Date    COLON SURGERY      DILATATION, ESOPHAGUS      HC DIALYSIS CATHETER N/A 1/12/2021    TEMPORARY HEMODIALYSIS CATHETER INSERTION performed by Tata Ash MD at [address] Left 6 years ago       Current Facility-Administered Medications   Medication Dose Route Frequency Provider Last Rate Last Admin    0.9 % sodium chloride infusion   Intravenous PRN Avinash Stewart,          Current Outpatient Medications   Medication Sig Dispense Refill    Calcium Acetate, Phos Binder, 667 MG CAPS Take 2 capsules by mouth 2 times daily (with meals) 180 capsule 0    amiodarone (CORDARONE) 200 MG tablet 200 mg by Per NG tube route 2 times daily      folic acid (FOLVITE) 1 MG tablet 1 mg by Per NG tube route daily      chlorhexidine (PERIDEX) 0.12 % solution Take 15 mLs by mouth 2 times daily      vitamin D (CHOLECALCIFEROL) 50 MCG (2000 UT) TABS tablet Take 1 tablet by mouth daily 30 tablet 0    ascorbic acid (VITAMIN C) 500 MG tablet Take 1 tablet by mouth daily 30 tablet 3    zinc sulfate (ZINCATE) 220 (50 Zn) MG capsule Take 1 capsule by mouth daily 30 capsule 3    metoprolol tartrate (LOPRESSOR) 25 MG tablet Take 1 tablet by mouth 2 times daily 60 tablet 3    insulin glargine (LANTUS) 100 UNIT/ML injection vial Inject 25 Units into the skin nightly (Patient taking differently: Inject 14 Units into the skin nightly ) 1 vial 3    apixaban (ELIQUIS) 5 MG TABS tablet Take 1 tablet by mouth 2 times daily 60 tablet 0    pantoprazole (PROTONIX) 40 MG tablet Take 1 tablet by mouth daily (with breakfast) 30 tablet 3    simvastatin (ZOCOR) 20 MG tablet Take 20 mg by mouth nightly. No Known Allergies    History reviewed. No pertinent family history.     Social History     Socioeconomic History    Marital status: Single     Spouse name: Not on file    Number of children: Not on file    Years of education: Not on file    Highest education level: Not on file   Occupational History    Not on file   Social Needs    Financial resource strain: Not on file    Food insecurity     Worry: Not on file     Inability: Not on file    Transportation needs     Medical: Not on file     Non-medical: Not on file   Tobacco Use    Smoking status: Former Smoker     Packs/day: 1.00     Types: Cigarettes    Smokeless tobacco: Never Used   Substance and Sexual Activity    Alcohol use: Yes     Comment: \"daily shots\"    Drug use: No    Sexual activity: Not on file   Lifestyle    Physical activity     Days per week: Not on file     Minutes per session: Not on file    Stress: Not on file   Relationships    Social connections     Talks on phone: Not on file     Gets together: Not on file     Attends Voodoo service: Not on file     Active member of club or organization: Not on file     Attends meetings of clubs or organizations: Not on file     Relationship status: Not on file    Intimate partner violence     Fear of current or ex partner: Not on file     Emotionally abused: Not on file     Physically abused: Not on file     Forced sexual activity: Not on file   Other Topics Concern    Not on file   Social History Narrative    Not on file       ROS:   Review of Systems   Constitutional: Negative. HENT: Negative. Eyes: Negative. Respiratory: Negative. Cardiovascular: Negative. Gastrointestinal: Negative. Endocrine: Negative. Genitourinary: Positive for hematuria. Musculoskeletal: Negative. Skin: Negative. Allergic/Immunologic: Negative. Neurological: Negative. Hematological: Negative. Psychiatric/Behavioral: Negative. Physical Exam:  Vitals:    02/27/21 1329   BP: (!) 155/108   Pulse: 83   Resp: 15   Temp: 97.4 °F (36.3 °C)   SpO2: 100%       PSYCH: mood and affect normal, alert and oriented x 3: No apparent distress, comfortable  EYES: Sclera white, pupils equal round and reactive to light  ENMT:  Hearing normal, trachea midline, ears externally intact  LYMPH: no obvious lympadenopathy in neck. RESP: Respiratory effort was normal with no retractions or use of accessory muscles.   CV:  No pedal edema  GI/ Abdomen: Soft, nondistended, nontender, no guarding, no peritoneal signs  MSK: no clubbing/ no cyanosis/ gaitnormal       Assessment/Plan:  Pt is a 64 y/o M with Hx of EtOH abuse found to have a cystic mass in the body of the pancreas measuring 3.5 x 8cm     - Okay for diet from [facility] surgical POV  - Mass seems more so like a pseudocyst  - Will obtain MRCP to further evaluate  - Overall care per primary  - Discussed with Dr. Isabela Mahmood    Electronically signed by Vicky Betancur MD on 2/27/2021 at 2:48 PM

## 2021-02-27 NOTE — ED PROVIDER NOTES
HPI:  2/27/21,   Time: 8:56 AM EST         Days Kyara Hicks is a 65 y.o. male presenting to the ED for Painless hematuria, beginning several hours ago. The complaint has been persistent, moderate in severity, and worsened by nothing. Patient does states he started having painless hematuria state he is passing clots. And urine. He does have a history of prostate CA he has had radiation therapy no surgery to his prostate. He has no back pain or lower abdominal pain no fevers chills reported no nausea vomiting no chest pain or shortness of breath. No Covid exposures. Denies weight loss. He does see a physician at the [place] in [place]. He also has appointment with nephrology has not seen Dr. Vanessa Haywood. Patient denies melanotic, hematochezia or hematemesis. He has no nausea. ROS:   Pertinent positives and negatives are stated within HPI, all other systems reviewed and are negative.  --------------------------------------------- PAST HISTORY ---------------------------------------------  Past Medical History:  has a past medical history of Cancer ([facility] Diabetes mellitus ([facility] Hyperlipidemia, Hypertension, and Prostate cancer ([facility] Past Surgical History:  has a past surgical history that includes Colon surgery; Dilatation, esophagus; Leg Surgery (Left, 6 years ago); and hc dialysis catheter (N/A, 1/12/2021). Social History:  reports that he has quit smoking. His smoking use included cigarettes. He smoked 1.00 pack per day. He has never used smokeless tobacco. He reports current alcohol use. He reports that he does not use drugs. Family History: family history is not on file. The patients home medications have been reviewed. Allergies: Patient has no known allergies.     -------------------------------------------------- RESULTS -------------------------------------------------  All laboratory and radiology results have been personally reviewed by myself   LABS:  Results for orders placed or performed during the hospital encounter of 02/27/21   CBC   Result Value Ref Range    WBC 4.0 (L) 4.5 - 11.5 E9/L    RBC 2.81 (L) 3.80 - 5.80 E12/L    Hemoglobin 7.2 (L) 12.5 - 16.5 g/dL    Hematocrit 21.7 (L) 37.0 - 54.0 %    MCV 77.2 (L) 80.0 - 99.9 fL    MCH 25.6 (L) 26.0 - 35.0 pg    MCHC 33.2 32.0 - 34.5 %    RDW 16.5 (H) 11.5 - 15.0 fL    Platelets 191 555 - 806 E9/L    MPV 9.9 7.0 - 12.0 fL   Basic Metabolic Panel   Result Value Ref Range    Sodium 132 132 - 146 mmol/L    Potassium 3.6 3.5 - 5.0 mmol/L    Chloride 106 98 - 107 mmol/L    CO2 14 (L) 22 - 29 mmol/L    Anion Gap 12 7 - 16 mmol/L    Glucose 242 (H) 74 - 99 mg/dL    BUN 14 8 - 23 mg/dL    CREATININE 1.6 (H) 0.7 - 1.2 mg/dL    GFR Non-African American 52 >=60 mL/min/1.73    GFR African American 52     Calcium 8.6 8.6 - 10.2 mg/dL   Lactic Acid, Plasma   Result Value Ref Range    Lactic Acid 1.1 0.5 - 2.2 mmol/L   APTT   Result Value Ref Range    aPTT 32.8 24.5 - 35.1 sec   Protime-INR   Result Value Ref Range    Protime 13.0 (H) 9.3 - 12.4 sec    INR 1.2    Urinalysis   Result Value Ref Range    Color, UA Yellow Straw/Yellow    Clarity, UA Clear Clear    Glucose, Ur Negative Negative mg/dL    Bilirubin Urine Negative Negative    Ketones, Urine Negative Negative mg/dL    Specific Gravity, UA 1.010 1.005 - 1.030    Blood, Urine LARGE (A) Negative    pH, UA 6.0 5.0 - 9.0    Protein, UA 30 (A) Negative mg/dL    Urobilinogen, Urine 0.2 <2.0 E.U./dL    Nitrite, Urine Negative Negative    Leukocyte Esterase, Urine Negative Negative   Phosphorus   Result Value Ref Range    Phosphorus 2.9 2.5 - 4.5 mg/dL   Microscopic Urinalysis   Result Value Ref Range    WBC, UA 1-3 0 - 5 /HPF    RBC, UA >20 0 - 2 /HPF    Bacteria, UA MODERATE (A) None Seen /HPF    Yeast, UA Present (A) None Seen /HPF   Lipase   Result Value Ref Range    Lipase 12 (L) 13 - 60 U/L   EKG 12 Lead   Result Value Ref Range    Ventricular Rate 85 BPM    Atrial Rate 182 BPM    QRS Duration 78 ms Q-T Interval 350 ms    QTc Calculation (Bazett) 416 ms    R Axis 17 degrees    T Axis 37 degrees   TYPE AND SCREEN   Result Value Ref Range    ABO/Rh A POS     Antibody Screen NEG    PREPARE RBC (CROSSMATCH), 1 Units   Result Value Ref Range    Product Code Blood Bank [id number]     Description Blood Bank Red Blood Cells, Apheresis, Leuko-reduced     Unit Number [id number]     Dispense Status Blood Bank issued        RADIOLOGY:  Interpreted by Radiologist.  CT ABDOMEN PELVIS W IV CONTRAST Additional Contrast? None   Final Result   1. New cystic pancreatic mass could indicate severely distended pancreatic   duct versus pseudocyst.   2.  Hazy appearance of fat surrounding pancreas could indicate acute   pancreatitis. MRCP may be helpful for further evaluation. 3.  Patchy areas of hypoattenuation associated with the liver could indicate   focal  fatty infiltration versus phase of contrast enhancement. Continued   follow-up could be helpful for further evaluation. 4.  Small perihepatic ascites and small ascites located in the pelvis. 5.  Bilateral perinephric fat stranding could indicate chronic medical renal   disease with appropriate clinical history. MRI ABDOMEN W WO CONTRAST MRCP    (Results Pending)       ------------------------- NURSING NOTES AND VITALS REVIEWED ---------------------------   The nursing notes within the ED encounter and vital signs as below have been reviewed.    BP (!) 136/95   Pulse 85   Temp 97.5 °F (36.4 °C) (Oral)   Resp 15   Ht 6' 2\" (1.88 m)   Wt 170 lb (77.1 kg)   SpO2 100%   BMI 21.83 kg/m²   Oxygen Saturation Interpretation: Normal      ---------------------------------------------------PHYSICAL EXAM--------------------------------------      Constitutional/General: Alert and oriented x3, well appearing, non toxic in NAD  Head: NC/AT  Eyes: PERRL, EOMI  Mouth: Oropharynx clear, handling secretions, no trismus  Neck: Supple, full ROM, no meningeal signs  Pulmonary: Lungs clear to auscultation bilaterally, no wheezes, rales, or rhonchi. Not in respiratory distress  Cardiovascular:  Regular rate and rhythm, no murmurs, gallops, or rubs. 2+ distal pulses  Abdomen: Soft, non tender, non distended,   Extremities: Moves all extremities x 4. Warm and well perfused  Skin: warm and dry without rash  Neurologic: GCS 15,  Psych: Normal Affect      ------------------------------ ED COURSE/MEDICAL DECISION MAKING----------------------  Medications   0.9 % sodium chloride infusion (has no administration in time range)   gadoteridol (PROHANCE) injection 15 mL (has no administration in time range)   sodium chloride flush 0.9 % injection 10 mL (10 mLs Intravenous Given 2/27/21 1110)   iopamidol (ISOVUE-370) 76 % injection 90 mL (90 mLs Intravenous Given 2/27/21 1110)         Medical Decision Making:    Patient presents with maria m hematuria he is on pixel band for A. fib. Also he is he has less appetite does not report any weight loss. CT scan of the abdomen and pelvis with IV contrast concerning for pancreatic masses. There is no bladder wall thickening or mass noted. Patient is found to be anemic with hemoglobin 7.2 mg/dL. 1 unit packed RBCs was ordered. Consultation was made with urology Dr. Tiago Andres will see the patient in consult. Consultation was made with Dr. Win Chavez he also evaluate the patient in consult. I did speak with medicine team for admission for maria m hematuria pancreatic mass and severe anemia. Consultations pending with general surgery regarding pancreatic findings      CRITICAL CARE:   28 MINUTES. Please note that the withdrawal or failure to initiate urgent interventions for this patient would likely result in a life threatening deterioration or permanent disability. Accordingly this patient received the above mentioned time, excluding separately billable procedures. Counseling:    The emergency provider has spoken with the patient and spouse/SO and discussed todays results, in addition to providing specific details for the plan of care and counseling regarding the diagnosis and prognosis. Questions are answered at this time and they are agreeable with the plan.      --------------------------------- IMPRESSION AND DISPOSITION ---------------------------------    IMPRESSION  1. Pancreatic mass    2. Tyler hematuria    3.  Severe anemia        DISPOSITION  Disposition: Admit to telemetry  Patient condition is stable                Rosa Winter DO  02/27/21 [id number]

## 2021-02-27 NOTE — CONSULTS
Department of Internal Medicine  Nephrology Consult Note      Reason for Consult:  CKD  Requesting Physician:  Dr. Kaylynn Boyd:  Hematuria    History Obtained From:  patient, electronic medical record    HISTORY OF PRESENT ILLNESS:  Briefly, Mr. Kandy Senior is a 65year old AA man with PMH of CKD stage IIIa, baseline creatinine 1.4 mg/dL, with recent admission on December last year with DKA and acute kidney injury consistent with ATN for which he required transient renal replacement therapy, unfortunately patient signed AMA, which he left the hospital his creatinine was 4.7 mg/dL, HTN, type 2 DM, hyperlipidemia, prostate cancer s/p radiation therapy, COVID-19 infection, and PAF on apixaban, who was admitted on  February 27, 2021 after presenting to the ER with gross ainless hematuria. His labs were significant for hemoglobin 7.2,  BUN 14 mg/dL and creatinine 1.6 mg/dL which is the reason for this consult. He was supposed to have a follow-up with Dr. Melburn Alpers but has not seen him yet. He denies ever having kidney stones or frequent urination. Past Medical History:        Diagnosis Date    [facility]     Diabetes mellitus ([facility]     Hyperlipidemia     Hypertension     Prostate cancer [facility])      Past Surgical History:        Procedure Laterality Date    COLON SURGERY      DILATATION, ESOPHAGUS      HC DIALYSIS CATHETER N/A 1/12/2021    TEMPORARY HEMODIALYSIS CATHETER INSERTION performed by Tata Ash MD at [address] Left 6 years ago     Current Medications:    No current facility-administered medications for this encounter. Allergies:  Patient has no known allergies. Social History:    TOBACCO:   reports that he has quit smoking. His smoking use included cigarettes. He smoked 1.00 pack per day. He has never used smokeless tobacco.  ETOH:   reports current alcohol use. Family History:   History reviewed. No pertinent family history.   REVIEW OF SYSTEMS:    Review of systems negative unless otherwise mentioned in HPI. PHYSICAL EXAM:      Vitals:    VITALS:  BP (!) 132/97   Pulse 90   Temp 96.8 °F (36 °C)   Resp 15   Ht 6' 2\" (1.88 m)   Wt 170 lb (77.1 kg)   SpO2 99%   BMI 21.83 kg/m²   24HR INTAKE/OUTPUT:  No intake or output data in the 24 hours ending 02/27/21 1156    Constitutional:  Alert and oriented  HEENT:  Normocephalic  Respiratory:  CTA bilaterally  Cardiovascular/Edema:  RRR, S1/S2  Gastrointestinal:  Soft, rounded, nontender, nondistended  Neurologic:  Nonfocal, ALVES  Skin:  Warm, dry, no lesions  Other:  No edema    DATA:    CBC:   Lab Results   Component Value Date    WBC 4.0 02/27/2021    RBC 2.81 02/27/2021    HGB 7.2 02/27/2021    HCT 21.7 02/27/2021    MCV 77.2 02/27/2021    MCH 25.6 02/27/2021    MCHC 33.2 02/27/2021    RDW 16.5 02/27/2021     02/27/2021    MPV 9.9 02/27/2021     CMP:    Lab Results   Component Value Date     02/27/2021    K 3.6 02/27/2021    K 6.4 01/09/2021     02/27/2021    CO2 14 02/27/2021    BUN 14 02/27/2021    CREATININE 1.6 02/27/2021    GFRAA 52 02/27/2021    LABGLOM 52 02/27/2021    GLUCOSE 242 02/27/2021    PROT 7.7 01/14/2021    LABALBU 2.8 01/14/2021    CALCIUM 8.6 02/27/2021    BILITOT 0.4 01/14/2021    ALKPHOS 121 01/14/2021    AST 19 01/14/2021    ALT 22 01/14/2021     Magnesium:    Lab Results   Component Value Date    MG 2.3 01/01/2021     Phosphorus:    Lab Results   Component Value Date    PHOS 6.5 01/11/2021     Radiology Review:      CT of abdomen and pelvis with IV contrast 2/2   1.  New cystic pancreatic mass could indicate severely distended pancreatic   duct versus pseudocyst.   2.  Hazy appearance of fat surrounding pancreas could indicate acute   pancreatitis.  MRCP may be helpful for further evaluation.    3.  Patchy areas of hypoattenuation associated with the liver could indicate   focal  fatty infiltration versus phase of contrast enhancement.  Continued   follow-up could be helpful for further evaluation. 4.  Small perihepatic ascites and small ascites located in the pelvis. 5.  Bilateral perinephric fat stranding could indicate chronic medical renal   disease with appropriate clinical history. IMPRESSION/RECOMMENDATIONS:     Briefly, Mr. Delbert Melchor is a 65year old AA man with PMH of CKD stage IIIa, baseline creatinine 1.4 mg/dL, with recent admission on December last year with DKA and acute kidney injury consistent with ATN for which he required transient renal replacement therapy, unfortunately patient signed AMA, which he left the hospital his creatinine was 4.7 mg/dL, HTN, type 2 DM, hyperlipidemia, prostate cancer s/p radiation therapy, COVID-19 infection, and PAF on apixaban, who was admitted on  February 27, 2021 after presenting to the ER with gross ainless hematuria. His labs were significant for hemoglobin 7.2,  BUN 14 mg/dL and creatinine 1.6 mg/dL which is the reason for this consult. He was supposed to have a follow-up with Dr. Josue Clement but has not seen him yet. He denies ever having kidney stones or frequent urination. 1. CKD stage IIIa, baseline creatinine approximately 1.4 mg/dL, with proteinuria, secondary to diabetic nephropathy. 2. Painless hematuria, probably due to prostate cancer  3. HTN, on metoprolol at home  4. History of prostate cancer status post radiation therapy  5. PAF, on metoprolol and apixaban at home  6. Microcytic anemia, multifactorial  7. Vitamin D deficiency, on cholecalciferol at home  8. MBD of CKD, on calcium acetate at home, will obtain phosphorus level    Plan:    · Obtain phosphorus level  · Monitor H&H  · Monitor renal function  · Urology consult  · Strict I&Os    Thank you very much Dr. Dilip Gandhi for allowing us to participate in the care of Mr. Sean Nuñez.

## 2021-02-27 NOTE — H&P
[facility]  Internal Medicine Residency Program  History and Physical    Patient:  Andre Gonzalez 65 y.o. male MRN: [id number]     Date of Service: 2/27/2021    Hospital Day: 1      Chief complaint: had concerns including Hematuria (starting this morning). History Obtained From:  patient, electronic medical record    Date of Admission: 2/27/2021  History of Present Illness   Andre Gonzalez is a 65 y.o. male who came with CC of hematuria since morning. Patient was apparently normal till today morning when he noticed that he had hematuria. He initially passed blood clots at the beginning of the stream followed by bright red blood in the middle of the stream which turned yellow towards the end. No fever, abd pain, lower urinary tract symptoms such as hesitancy, urgency , sense of incomplete voiding. No bora, hematemesis, hematochezia. Review of other symptoms negative. He has a PMHx of anxiety,depression, T2Dm with neuropathy, HLD,HTN, hx of prostate cancer (exposure to Agent orange in [facility]) [facility] ) , recent COVID illness in [address], hx of alcohol abuse + hx of withdrawal, Vit Deficiency, BLAIR on CKD stage 3 , atrial fibrillation, hx of acute pancreatitis. He then came to the ED for further evaluation. /95 mm Hg, afebrile, pulse 85bpm, saturating 100% on room air. Initial labs showed Cr 1.6 ( baseline 1.4), Bicarb 14, AG 12, Hb 7.2 ( baseline 9-10 ) . He was transfused 1 unit pRBC. . Initial imaging CT abd showed large 3.5x8cm mass concerning for possible pancreatic pseudocyst. Surgery, urology as well as internal medicine were consulted and patient was admitted for further evaluation.           Past Medical History:       Diagnosis Date    [facility]     Diabetes mellitus ([facility]     Hyperlipidemia     Hypertension     Prostate cancer [facility])        Past Surgical History:        Procedure Laterality Date  COLON SURGERY      DILATATION, ESOPHAGUS      HC DIALYSIS CATHETER N/A 1/12/2021    TEMPORARY HEMODIALYSIS CATHETER INSERTION performed by Yuliet Santana MD at [address] Left 6 years ago       Medications Prior to Admission:    Prior to Admission medications    Medication Sig Start Date End Date Taking? Authorizing Provider   Calcium Acetate, Phos Binder, 667 MG CAPS Take 2 capsules by mouth 2 times daily (with meals) 1/16/21   Avery Oppenheim, MD   amiodarone (CORDARONE) 200 MG tablet 200 mg by Per NG tube route 2 times daily    Historical Provider, MD   folic acid (FOLVITE) 1 MG tablet 1 mg by Per NG tube route daily    Historical Provider, MD   chlorhexidine (PERIDEX) 0.12 % solution Take 15 mLs by mouth 2 times daily    Historical Provider, MD   vitamin D (CHOLECALCIFEROL) 50 MCG (2000 UT) TABS tablet Take 1 tablet by mouth daily 1/2/21   Chapito Night, MD   ascorbic acid (VITAMIN C) 500 MG tablet Take 1 tablet by mouth daily 1/2/21 Sean Night, MD   zinc sulfate (ZINCATE) 220 (50 Zn) MG capsule Take 1 capsule by mouth daily 1/2/21   Chapito Night, MD   metoprolol tartrate (LOPRESSOR) 25 MG tablet Take 1 tablet by mouth 2 times daily 1/1/21   Chapito Night, MD   insulin glargine (LANTUS) 100 UNIT/ML injection vial Inject 25 Units into the skin nightly  Patient taking differently: Inject 14 Units into the skin nightly  1/1/21   Chapito Ballesteros, MD   apixaban (ELIQUIS) 5 MG TABS tablet Take 1 tablet by mouth 2 times daily 1/1/21   Chapito Night, MD   pantoprazole (PROTONIX) 40 MG tablet Take 1 tablet by mouth daily (with breakfast) 1/1/21   Chapito Night, MD   simvastatin (ZOCOR) 20 MG tablet Take 20 mg by mouth nightly. Historical Provider, MD       Allergies:  Patient has no known allergies. Social History:   TOBACCO:   reports that he has quit smoking. His smoking use included cigarettes. He smoked 1.00 pack per day.  He has never used smokeless tobacco.  ETOH:   reports Imaging Studies:     Ct Abdomen Pelvis W Iv Contrast Additional Contrast? None    Result Date: 2/27/2021  EXAMINATION: CT OF THE ABDOMEN AND PELVIS WITH CONTRAST 2/27/2021 11:10 am TECHNIQUE: CT of the abdomen and pelvis was performed with the administration of intravenous contrast. Multiplanar reformatted images are provided for review. Dose modulation, iterative reconstruction, and/or weight based adjustment of the mA/kV was utilized to reduce the radiation dose to as low as reasonably achievable. COMPARISON: December 22, 2020 HISTORY: ORDERING SYSTEM PROVIDED HISTORY: painless hematuria TECHNOLOGIST PROVIDED HISTORY: Additional Contrast?->None Reason for exam:->painless hematuria Decision Support Exception->Emergency Medical Condition (MA) What reading provider will be dictating this exam?->CRC FINDINGS: New cystic mass associated with the pancreas measures 3.5 by 8 centimetres which could indicate dilated pancreatic duct versus pancreatic pseudocyst. There is hazy appearance of fat surrounding pancreas within the lesser sac. There is small perihepatic ascites. Small ascites present within the mid pelvis. No intrahepatic bile duct dilatation. Common bile duct is not visualized. There are peripheral areas of hypoattenuation associated with right hepatic lobe. Gallbladder is unremarkable. Spleen is nonenlarged. Adrenal glands are unremarkable. There is perinephric fat stranding. No bowel obstruction or free air. Urinary bladder is normal in contour. Postsurgical changes are present in the right lower quadrant related to bowel resection. Irregular opacities are present in peripheral aspect of left lung base. 1.  New cystic pancreatic mass could indicate severely distended pancreatic duct versus pseudocyst. 2.  Hazy appearance of fat surrounding pancreas could indicate acute pancreatitis. MRCP may be helpful for further evaluation.  3.  Patchy areas of hypoattenuation associated with the liver could indicate focal  fatty infiltration versus phase of contrast enhancement. Continued follow-up could be helpful for further evaluation. 4.  Small perihepatic ascites and small ascites located in the pelvis. 5.  Bilateral perinephric fat stranding could indicate chronic medical renal disease with appropriate clinical history. Mri Abdomen W Wo Contrast Mrcp    Result Date: 2/27/2021  EXAMINATION: MRI OF THE ABDOMEN WITH AND WITHOUT CONTRAST AND MRCP 2/27/2021 3:40 pm TECHNIQUE: Multiplanar multisequence MRI of the abdomen was performed with and without the administration of intravenous contrast.  After initial T2 axial and coronal images, thick slab, thin slab and 3D coronal MRCP sequences were obtained without the administration of intravenous contrast.  MIP images are provided for review. COMPARISON: CT abdomen/pelvis performed February 27, 2021, and December 22, 2020. HISTORY: ORDERING SYSTEM PROVIDED HISTORY: evaluate pancreatic pseudocyst TECHNOLOGIST PROVIDED HISTORY: Body radiologist to read Reason for exam:->evaluate pancreatic pseudocyst Decision Support Exception->Emergency Medical Condition (MA) What reading provider will be dictating this exam?->CRC FINDINGS: Examination is limited by motion artifact. Pancreas: There is a large T2 hyperintense cystic focus with atrophy of the surrounding pancreatic parenchyma which is grossly stable in size currently measuring 8.0 x 3.6 cm, previously 8.0 x 3.5 cm. There is heterogeneous debris noted throughout this cystic focus. The main pancreatic duct within the residual pancreatic parenchyma of the pancreatic tail appears grossly unremarkable and is not definitively visualized to be continuous with the cystic focus. No definite abnormal enhancement or signal abnormality is noted in the region of the pancreatic head upstream from the cystic focus to suggest a pancreatic lesion. Gallbladder: The gallbladder is partially distended but otherwise unremarkable. Bile Ducts: The biliary tree is nondilated. Other: There is geographic steatosis, predominantly in the right hepatic lobe. The spleen, adrenal glands, and kidneys are grossly unremarkable. A few bilateral simple renal cysts are noted. No hydronephrosis. The visualized bowel is nondilated. No significant lymphadenopathy. The abdominal aorta is normal in course and caliber. Marrow signal is within normal limits. Large T2 hyperintense cystic focus with surrounding atrophy of the pancreatic parenchyma grossly stable from prior examination again measuring up to 8.0 cm. There is heterogeneous debris noted throughout the cystic focus. This is favored to represent a large pseudocyst and no definite continuity with the main pancreatic duct is identified. EKG: atrial fibrillation, rate 182.     Resident's Assessment and Plan       Days Araseli Rodriguez is a 65 y.o. male with PMHx of anxiety,depression, T2M with neuropathy, HTN, HLD, Hx of prostate neoplasm ([facility]) s/p radiation therapy at [facility] last Rx 2 years ago , COVID +ve in Dec 2020, alcohol abuse+ hx of withdrawal, Vit D deficiency, hx of acute pancreatitis, CKD stage 3a,atrial fibrillation in Dec 2020    Came with CC: Painless hematuria x 1 day    Code Status:Full    Currently being managed for : (Active Problems)    Painless hematuria - concerned for radiation cystitis vs bladder neoplasm vs recurrence of prostate neoplasm  - Hx of radiation therapy at [facility] - last 2 years ago   - Jolly placed, urology consult,   - Urine for cytology sent   - discontinue home apixaban   - monitor h&h     Microcytic Anemia   Baseline Hb 9-10 , 7.2 on admission  - 1 unit pRBC given  - iron panel ordered to differentiate between WILLIAM and ACD   - will follow   - Monitor H&H     Pancreatic pseudocyst  Large 8x3.5 cm on CT , concern for mass initially but MRCP confirms this to be pseudocyst  - Awaiting surgery recommendations  - previous hx of acute pancreatitis in Dec 2020 - treated     Inactive Problems :   CKD Stage 3a- Baseline Cr 1.4 , 1.6 on admission, Nephro on board  HTN- Continue metoprolol  HLD- Continue home med  Afib- [id number] Score: 3. Continue metoprolol, hold eliquis in view of anemia  Vit D deficiency- Continue home meds   Alcohol abuse- has 2 shots of bourbon per day, last drink yesterday, watch for withdrawal           PT/OT evaluation: -   DVT prophylaxis/ GI prophylaxis: -/ Protonix   Discharge Plan: To be determined     Final Thoughts: Working up painless hematuria and microcytic anemia. Concern for prostate vs bladder vs radiation cystitis. Await surgery recs for pseudocyst .Other problems stable.      Martine Orr MD, PGY-1   Attending physician: Dr. Sammy Spann

## 2021-02-27 NOTE — ED NOTES
Bed: 11  Expected date:   Expected time:   Means of arrival:   Comments:  Triage      Hermelinda Owens RN  02/27/21 0384

## 2021-02-28 LAB
ANION GAP SERPL CALCULATED.3IONS-SCNC: 9 MMOL/L (ref 7–16)
BUN BLDV-MCNC: 13 MG/DL (ref 8–23)
CALCIUM SERPL-MCNC: 8.5 MG/DL (ref 8.6–10.2)
CHLORIDE BLD-SCNC: 108 MMOL/L (ref 98–107)
CO2: 19 MMOL/L (ref 22–29)
CREAT SERPL-MCNC: 1.7 MG/DL (ref 0.7–1.2)
GFR AFRICAN AMERICAN: 48
GFR NON-AFRICAN AMERICAN: 48 ML/MIN/1.73
GLUCOSE BLD-MCNC: 149 MG/DL (ref 74–99)
HCT VFR BLD CALC: 25.9 % (ref 37–54)
HEMOGLOBIN: 8.3 G/DL (ref 12.5–16.5)
MAGNESIUM: 1.6 MG/DL (ref 1.6–2.6)
MCH RBC QN AUTO: 25.7 PG (ref 26–35)
MCHC RBC AUTO-ENTMCNC: 32 % (ref 32–34.5)
MCV RBC AUTO: 80.2 FL (ref 80–99.9)
METER GLUCOSE: 136 MG/DL (ref 74–99)
METER GLUCOSE: 147 MG/DL (ref 74–99)
METER GLUCOSE: 162 MG/DL (ref 74–99)
METER GLUCOSE: 171 MG/DL (ref 74–99)
PDW BLD-RTO: 16.7 FL (ref 11.5–15)
PHOSPHORUS: 3.5 MG/DL (ref 2.5–4.5)
PLATELET # BLD: 288 E9/L (ref 130–450)
PMV BLD AUTO: 10.3 FL (ref 7–12)
POTASSIUM REFLEX MAGNESIUM: 3.6 MMOL/L (ref 3.5–5)
POTASSIUM SERPL-SCNC: 3.6 MMOL/L (ref 3.5–5)
RBC # BLD: 3.23 E12/L (ref 3.8–5.8)
SODIUM BLD-SCNC: 136 MMOL/L (ref 132–146)
WBC # BLD: 4.2 E9/L (ref 4.5–11.5)

## 2021-02-28 PROCEDURE — 83735 ASSAY OF MAGNESIUM: CPT

## 2021-02-28 PROCEDURE — P9047 ALBUMIN (HUMAN), 25%, 50ML: HCPCS | Performed by: INTERNAL MEDICINE

## 2021-02-28 PROCEDURE — 6360000002 HC RX W HCPCS: Performed by: INTERNAL MEDICINE

## 2021-02-28 PROCEDURE — 6370000000 HC RX 637 (ALT 250 FOR IP): Performed by: STUDENT IN AN ORGANIZED HEALTH CARE EDUCATION/TRAINING PROGRAM

## 2021-02-28 PROCEDURE — 84100 ASSAY OF PHOSPHORUS: CPT

## 2021-02-28 PROCEDURE — 99222 1ST HOSP IP/OBS MODERATE 55: CPT | Performed by: SURGERY

## 2021-02-28 PROCEDURE — 2500000003 HC RX 250 WO HCPCS: Performed by: INTERNAL MEDICINE

## 2021-02-28 PROCEDURE — 87088 URINE BACTERIA CULTURE: CPT

## 2021-02-28 PROCEDURE — 6370000000 HC RX 637 (ALT 250 FOR IP): Performed by: INTERNAL MEDICINE

## 2021-02-28 PROCEDURE — 6360000002 HC RX W HCPCS: Performed by: NURSE PRACTITIONER

## 2021-02-28 PROCEDURE — 99232 SBSQ HOSP IP/OBS MODERATE 35: CPT | Performed by: INTERNAL MEDICINE

## 2021-02-28 PROCEDURE — 2060000000 HC ICU INTERMEDIATE R&B

## 2021-02-28 PROCEDURE — 36415 COLL VENOUS BLD VENIPUNCTURE: CPT

## 2021-02-28 PROCEDURE — 82962 GLUCOSE BLOOD TEST: CPT

## 2021-02-28 PROCEDURE — 85027 COMPLETE CBC AUTOMATED: CPT

## 2021-02-28 PROCEDURE — 2580000003 HC RX 258: Performed by: INTERNAL MEDICINE

## 2021-02-28 PROCEDURE — 80048 BASIC METABOLIC PNL TOTAL CA: CPT

## 2021-02-28 RX ORDER — AMLODIPINE BESYLATE 5 MG/1
5 TABLET ORAL DAILY
Status: CANCELLED | OUTPATIENT
Start: 2021-02-28

## 2021-02-28 RX ORDER — ALBUMIN (HUMAN) 12.5 G/50ML
25 SOLUTION INTRAVENOUS EVERY 8 HOURS
Status: DISCONTINUED | OUTPATIENT
Start: 2021-02-28 | End: 2021-03-01

## 2021-02-28 RX ORDER — MAGNESIUM SULFATE IN WATER 40 MG/ML
2000 INJECTION, SOLUTION INTRAVENOUS ONCE
Status: COMPLETED | OUTPATIENT
Start: 2021-02-28 | End: 2021-02-28

## 2021-02-28 RX ORDER — SODIUM BICARBONATE 650 MG/1
1300 TABLET ORAL 3 TIMES DAILY
Status: DISCONTINUED | OUTPATIENT
Start: 2021-02-28 | End: 2021-03-01 | Stop reason: HOSPADM

## 2021-02-28 RX ADMIN — AMIODARONE HYDROCHLORIDE 200 MG: 200 TABLET ORAL at 08:27

## 2021-02-28 RX ADMIN — PANCRELIPASE 36000 UNITS: 60000; 12000; 38000 CAPSULE, DELAYED RELEASE PELLETS ORAL at 16:53

## 2021-02-28 RX ADMIN — PANCRELIPASE 36000 UNITS: 60000; 12000; 38000 CAPSULE, DELAYED RELEASE PELLETS ORAL at 13:48

## 2021-02-28 RX ADMIN — INSULIN LISPRO 1 UNITS: 100 INJECTION, SOLUTION INTRAVENOUS; SUBCUTANEOUS at 21:15

## 2021-02-28 RX ADMIN — INSULIN LISPRO 1 UNITS: 100 INJECTION, SOLUTION INTRAVENOUS; SUBCUTANEOUS at 17:19

## 2021-02-28 RX ADMIN — INSULIN GLARGINE 14 UNITS: 100 INJECTION, SOLUTION SUBCUTANEOUS at 21:14

## 2021-02-28 RX ADMIN — ALBUMIN (HUMAN) 25 G: 0.25 INJECTION, SOLUTION INTRAVENOUS at 21:15

## 2021-02-28 RX ADMIN — SODIUM BICARBONATE 1300 MG: 650 TABLET ORAL at 21:15

## 2021-02-28 RX ADMIN — SODIUM CHLORIDE, PRESERVATIVE FREE 10 ML: 5 INJECTION INTRAVENOUS at 08:22

## 2021-02-28 RX ADMIN — OXYCODONE HYDROCHLORIDE AND ACETAMINOPHEN 500 MG: 500 TABLET ORAL at 08:21

## 2021-02-28 RX ADMIN — PANTOPRAZOLE SODIUM 40 MG: 40 TABLET, DELAYED RELEASE ORAL at 08:21

## 2021-02-28 RX ADMIN — SODIUM BICARBONATE 1300 MG: 650 TABLET ORAL at 16:52

## 2021-02-28 RX ADMIN — FOLIC ACID 1 MG: 1 TABLET ORAL at 08:21

## 2021-02-28 RX ADMIN — AMIODARONE HYDROCHLORIDE 200 MG: 200 TABLET ORAL at 21:15

## 2021-02-28 RX ADMIN — CALCIUM ACETATE 1334 MG: 667 CAPSULE ORAL at 08:22

## 2021-02-28 RX ADMIN — CALCIUM ACETATE 1334 MG: 667 CAPSULE ORAL at 16:53

## 2021-02-28 RX ADMIN — MAGNESIUM SULFATE HEPTAHYDRATE 2000 MG: 40 INJECTION, SOLUTION INTRAVENOUS at 15:24

## 2021-02-28 RX ADMIN — INSULIN LISPRO 1 UNITS: 100 INJECTION, SOLUTION INTRAVENOUS; SUBCUTANEOUS at 12:44

## 2021-02-28 RX ADMIN — ALBUMIN (HUMAN) 25 G: 0.25 INJECTION, SOLUTION INTRAVENOUS at 14:21

## 2021-02-28 RX ADMIN — SODIUM CHLORIDE, PRESERVATIVE FREE 10 ML: 5 INJECTION INTRAVENOUS at 21:17

## 2021-02-28 RX ADMIN — METOPROLOL TARTRATE 25 MG: 25 TABLET, FILM COATED ORAL at 08:21

## 2021-02-28 RX ADMIN — METOPROLOL TARTRATE 25 MG: 25 TABLET, FILM COATED ORAL at 21:15

## 2021-02-28 RX ADMIN — Medication 2000 UNITS: at 08:20

## 2021-02-28 RX ADMIN — SODIUM CHLORIDE, PRESERVATIVE FREE 10 ML: 5 INJECTION INTRAVENOUS at 17:20

## 2021-02-28 RX ADMIN — ATORVASTATIN CALCIUM 20 MG: 40 TABLET, FILM COATED ORAL at 21:15

## 2021-02-28 ASSESSMENT — ENCOUNTER SYMPTOMS
ALLERGIC/IMMUNOLOGIC NEGATIVE: 1
EYES NEGATIVE: 1
RESPIRATORY NEGATIVE: 1
GASTROINTESTINAL NEGATIVE: 1

## 2021-02-28 ASSESSMENT — PAIN SCALES - GENERAL: PAINLEVEL_OUTOF10: 5

## 2021-02-28 ASSESSMENT — PAIN DESCRIPTION - ORIENTATION: ORIENTATION: RIGHT;LEFT

## 2021-02-28 ASSESSMENT — PAIN DESCRIPTION - LOCATION: LOCATION: FOOT

## 2021-02-28 ASSESSMENT — PAIN DESCRIPTION - PAIN TYPE
TYPE: CHRONIC PAIN
TYPE: CHRONIC PAIN

## 2021-02-28 NOTE — PROGRESS NOTES
[facility]  Internal Medicine Residency Program  Progress Note - House Team 1    Patient:  Andre Guardado 65 y.o. male MRN: [id number]     Date of Service: 2/28/2021     CC: Hematuria    Overnight events: None    Days since admission: 2    Subjective     Seen and examined this morning by the bedside, patient is hemodynamically stable, afebrile, no active complaints. He received 1 unit of blood PRBC yesterday hemoglobin this morning is 8.3. He no longer has bleeding in the urine, this morning Jolly still in urine output is good color is yellow, await urology for Jolly and cystoscopy, the management recommendations. This morning patient has no tremors, no clinical sign of alcohol withdrawal.    Objective     Physical Exam:  · Vitals: /83   Pulse 83   Temp 97.3 °F (36.3 °C) (Oral)   Resp 18   Ht 6' 2\" (1.88 m)   Wt 170 lb (77.1 kg)   SpO2 100%   BMI 21.83 kg/m²     I & O - 24hr:     Intake/Output Summary (Last 24 hours) at 2/28/2021 1024  Last data filed at 2/28/2021 8456  Gross per 24 hour   Intake 700 ml   Output 1225 ml   Net -525 ml   ·    · General Appearance: alert, appears stated age and cooperative  · HEENT:  Head: Normocephalic, no lesions, without obvious abnormality. · Neck: no adenopathy, no carotid bruit, no JVD, supple, symmetrical, trachea midline and thyroid not enlarged, symmetric, no tenderness/mass/nodules  · Lung: clear to auscultation bilaterally  · Heart: regular rate and rhythm, S1, S2 normal, no murmur, click, rub or gallop  · Abdomen: soft, non-tender; bowel sounds normal; no masses,  no organomegaly  · Extremities:  extremities normal, atraumatic, no cyanosis or edema  · Musculokeletal: No joint swelling, no muscle tenderness. ROM normal in all joints of extremities.    · Neurologic: Mental status: Alert, oriented, thought content appropriate  Subject  Pertinent Information & Imaging Studies, Consults   franny  CBC with Differential:    Lab Results Component Value Date    WBC 4.2 02/28/2021    RBC 3.23 02/28/2021    HGB 8.3 02/28/2021    HCT 25.9 02/28/2021     02/28/2021    MCV 80.2 02/28/2021    MCH 25.7 02/28/2021    MCHC 32.0 02/28/2021    RDW 16.7 02/28/2021    NRBC 0.9 12/26/2020    SEGSPCT 63 09/27/2013    LYMPHOPCT 30.4 02/27/2021    MONOPCT 9.2 02/27/2021    MYELOPCT 0.9 12/26/2020    BASOPCT 1.2 02/27/2021    MONOSABS 0.39 02/27/2021    LYMPHSABS 1.29 02/27/2021    EOSABS 0.11 02/27/2021    BASOSABS 0.05 02/27/2021     Hemoglobin/Hematocrit:    Lab Results   Component Value Date    HGB 8.3 02/28/2021    HCT 25.9 02/28/2021     CMP:    Lab Results   Component Value Date     02/28/2021    K 3.6 02/28/2021    K 3.6 02/28/2021     02/28/2021    CO2 19 02/28/2021    BUN 13 02/28/2021    CREATININE 1.7 02/28/2021    GFRAA 48 02/28/2021    LABGLOM 48 02/28/2021    GLUCOSE 149 02/28/2021    PROT 6.1 02/27/2021    LABALBU 2.1 02/27/2021    CALCIUM 8.5 02/28/2021    BILITOT 0.5 02/27/2021    ALKPHOS 90 02/27/2021    AST 12 02/27/2021    ALT 11 02/27/2021       IMAGING:   MRCP : Large T2 hyperintense cystic focus with surrounding atrophy of the pancreatic   parenchyma grossly stable from prior examination again measuring up to 8.0   cm.  There is heterogeneous debris noted throughout the cystic focus.  This   is favored to represent a large pseudocyst and no definite continuity with   the main pancreatic duct is identified. PROCEDURES: None    FLUIDS: No maintenance fluid    ANTIBIOTICS: None    OXYGENATION: Room air    DIET: Low-sodium diet    CONSULTS:   General surgery  Nephrology  Urology  Wound care    Resident's Assessment and Plan     Days Annalee Juárez is a 65 y.o. male with  has a past medical history of Cancer ([facility] [address] Diabetes mellitus ([facility] [address] Hyperlipidemia, Hypertension, and Prostate cancer ([facility] [address]   came here with CC   Chief Complaint   Patient presents with    Hematuria     starting this morning            Code Status: Full    Currently being managed for : (Active Problems)    Painless hematuria - concerned for radiation cystitis vs bladder neoplasm vs recurrence of prostate neoplasm  - Hx of radiation therapy at [facility] - last 2 years ago   - Jolly placed, urology consult,   - Urine for cytology sent   - continue to hold home apixaban   - monitor h&h   -Await neurologic recommendations, appreciated, hemoglobin after 1 unit transfusion is 8.3,.  Jolly in place , with urine output, yellow and clear, no visible blood, await urology recommendation, appreciated       Microcytic Anemia   Baseline Hb 9-10 , 7.2 on admission  - 1 unit pRBC given  - Monitor H&H   -Panel reviewed, consistent with anemia of chronic disease, treat underlying disease    Pancreatic pseudocyst  Large 8x3.5 cm on CT , concern for mass initially but MRCP confirms this to be pseudocyst  - Awaiting surgery recommendations  - previous hx of acute pancreatitis in Dec 2020 - treated   -MRCP showed large pseudocyst, measuring up into 8 cm,  -Await general surgery recommendation     Alcohol abuse- has 2 shots of bourbon per day, last drink yesterday  -Patient has no clinical signs of withdrawal  - continue to Monitor for withdrawal    CKD Stage 3a- Baseline Cr 1.4 , 1.6 on admission, Nephro on board  HTN- Continue metoprolol  HLD- Continue home med  Afib- [id number] Score: 3.  Continue metoprolol, hold eliquis in view of anemia  Vit D deficiency- Continue home meds       PT/OT evaluation:   DVT prophylaxis/ GI prophylaxis:   Discharge plan: in patient management    Final Note :     Luly Rizzo MD, PGY-1  Attending physician: Dr. Sharlene White

## 2021-02-28 NOTE — CONSULTS
2/27/2021 8:08 PM  Service: Urology  Group: [facility] urology (Dustin/Dandre/Sheryl)    Andre Damian  [id number]     Chief Complaint:    Hematuria, Hx of Prostate Cancer and radiation      History of Present Illness: The patient is a 65 y.o. male patient who presented to the hospital one day ago with complaints of an episode of gross hematuria. CT abdomen pelvis showed pancreatic mass and he was admitted for further evaluation. General Surgery is following and planning on MRCP. He He has a history of prostate cancer s/p radiation about 10 years ago. He follows with Urology at the [place] and states that his PSA's have been stable. He currently has a Jolly catheter draining clear yellow urine. He denies any pain or discomfort.         Past Medical History:   Diagnosis Date    [facility] ([facility] [address] [facility]     Diabetes mellitus ([facility]     Hyperlipidemia     Hypertension     Prostate cancer ([facility] [address]          Past Surgical History:   Procedure Laterality Date    COLON SURGERY      DILATATION, ESOPHAGUS      HC DIALYSIS CATHETER N/A 1/12/2021    TEMPORARY HEMODIALYSIS CATHETER INSERTION performed by Niurka Marie MD at  [facility] 6 years ago       Medications Prior to Admission:    Medications Prior to Admission: Calcium Acetate, Phos Binder, 667 MG CAPS, Take 2 capsules by mouth 2 times daily (with meals)  amiodarone (CORDARONE) 200 MG tablet, 200 mg by Per NG tube route 2 times daily  folic acid (FOLVITE) 1 MG tablet, 1 mg by Per NG tube route daily  chlorhexidine (PERIDEX) 0.12 % solution, Take 15 mLs by mouth 2 times daily  vitamin D (CHOLECALCIFEROL) 50 MCG (2000 UT) TABS tablet, Take 1 tablet by mouth daily  ascorbic acid (VITAMIN C) 500 MG tablet, Take 1 tablet by mouth daily  zinc sulfate (ZINCATE) 220 (50 Zn) MG capsule, Take 1 capsule by mouth daily  metoprolol tartrate (LOPRESSOR) 25 MG tablet, Take 1 tablet by mouth 2 times daily  insulin glargine (LANTUS) 100 UNIT/ML injection vial, Inject 25 Units into the skin nightly (Patient taking differently: Inject 14 Units into the skin nightly )  apixaban (ELIQUIS) 5 MG TABS tablet, Take 1 tablet by mouth 2 times daily  pantoprazole (PROTONIX) 40 MG tablet, Take 1 tablet by mouth daily (with breakfast)  simvastatin (ZOCOR) 20 MG tablet, Take 20 mg by mouth nightly. Allergies:    Patient has no known allergies. Social History:    reports that he has quit smoking. His smoking use included cigarettes. He smoked 1.00 pack per day. He has never used smokeless tobacco. He reports current alcohol use. He reports that he does not use drugs. Family History:   Non-contributory to this Urological problem  family history is not on file. Review of Systems:  Constitutional: No fever or chills   Respiratory: negative for cough and hemoptysis  Cardiovascular: negative for chest pain and dyspnea  Gastrointestinal: negative for abdominal pain, diarrhea, nausea and vomiting   Derm: negative for rash and skin lesion(s)  Neurological: negative for seizures and tremors  Musculoskeletal: Negative    Psychiatric: Negative   : As above in the HPI, otherwise negative  All other reviews are negative    Physical Exam:     Vitals:  BP (!) 152/80   Pulse 84   Temp (P) 97.9 °F (36.6 °C) (Oral)   Resp 15   Ht 6' 2\" (1.88 m)   Wt 170 lb (77.1 kg)   SpO2 100%   BMI 21.83 kg/m²     General:  Awake, alert, oriented X 3. No apparent distress. HEENT:  Normocephalic, atraumatic. Lungs:  Respirations symmetric and non-labored. Abdomen:  soft, nontender, no masses  Extremities:  No clubbing, cyanosis, or edema  Skin:  Warm and dry, no open lesions or rashes  Neuro: There are no motor or sensory deficits in the 4 quadrant extremities   Rectal: deferred  Genitourinary:  Jolly draining clear yellow urine    Labs:     Recent Labs     02/27/21  0907   WBC 4.0*   RBC 2.81*   HGB 7.2*   HCT 21.7*   MCV 77.2*   MCH 25.6*   MCHC 33.2   RDW 16.5*      MPV 9.9         Recent Labs 02/27/21  0907   CREATININE 1.6*       Imaging:   Narrative   EXAMINATION:   CT OF THE ABDOMEN AND PELVIS WITH CONTRAST 2/27/2021 11:10 am   TECHNIQUE:   CT of the abdomen and pelvis was performed with the administration of   intravenous contrast. Multiplanar reformatted images are provided for review. Dose modulation, iterative reconstruction, and/or weight based adjustment of   the mA/kV was utilized to reduce the radiation dose to as low as reasonably   achievable. COMPARISON:   December 22, 2020   HISTORY:   ORDERING SYSTEM PROVIDED HISTORY: painless hematuria   TECHNOLOGIST PROVIDED HISTORY:   Additional Contrast?->None   Reason for exam:->painless hematuria   Decision Support Exception->Emergency Medical Condition (MA)   What reading provider will be dictating this exam?->CRC   FINDINGS:   New cystic mass associated with the pancreas measures 3.5 by 8 centimetres   which could indicate dilated pancreatic duct versus pancreatic pseudocyst.   There is hazy appearance of fat surrounding pancreas within the lesser sac. There is small perihepatic ascites.  Small ascites present within the mid   pelvis.  No intrahepatic bile duct dilatation.  Common bile duct is not   visualized.  There are peripheral areas of hypoattenuation associated with   right hepatic lobe.  Gallbladder is unremarkable.  Spleen is nonenlarged. Adrenal glands are unremarkable. [place] is perinephric fat stranding.  No   bowel obstruction or free air.  Urinary bladder is normal in contour. Postsurgical changes are present in the right lower quadrant related to bowel   resection.  Irregular opacities are present in peripheral aspect of left lung   base.       Impression   1.  New cystic pancreatic mass could indicate severely distended pancreatic   duct versus pseudocyst.   2.  Hazy appearance of fat surrounding pancreas could indicate acute   pancreatitis.  MRCP may be helpful for further evaluation.    3.  Patchy areas of hypoattenuation associated with the liver could indicate   focal  fatty infiltration versus phase of contrast enhancement.  Continued   follow-up could be helpful for further evaluation. 4.  Small perihepatic ascites and small ascites located in the pelvis. 5.  Bilateral perinephric fat stranding could indicate chronic medical renal   disease with appropriate clinical history. Assessment/plan:  Gross Hematuria, resolved   Hx of PCA s/p [facility]+ [facility]       Continue to watch the creatinine  Hemoglobin stable  UA reviewed  Urine culture ordered  Antibiotics per medical team  CTAP reviewed, no obstructive uropathy  Urine cytology ordered  His hematuria has resolved at this time  His Eliquis is on hold  Will remove catheter today for voiding trial  It was splinted him that he will need to follow-up with the [place] [facility] outpatient cystoscopy done  No further  interventions are planned at this time  Please call with any further questions or concerns  Thank you for allowing us to participate in his care               Electronically signed by GURPREET Holman CNP on 2/27/2021 at 8:08 PM     I agree with the nurse practitioner assessment and plan. I personally evaluated the patient and made any changes to reflect my impression and plan. Patient's urine is yellow at this time.   Discussed with the patient void trial  We will follow-up at the [place] for further urologic care for his prostate cancer  Sign off    Delmer Patton MD

## 2021-02-28 NOTE — PROGRESS NOTES
Patient had his family deliver a meal from [facility]. I informed the patient that that meal is contradictory of his diet order. He is ordered low fat and low salt diet.

## 2021-02-28 NOTE — PLAN OF CARE
Andre Benitez is a 65 y.o. male who came with CC of hematuria since morning. He has a PMHx of anxiety,depression, T2Dm with neuropathy, HLD,HTN, hx of prostate cancer (exposure to Agent orange in [facility]) s/p radiation therapy at [facility] ( lat received 2 years ago ) , recent COVID illness in Dce 2020, hx of alcohol abuse + hx of withdrawal, Vit Deficiency, BLAIR on CKD stage 3 , atrial fibrillation, hx of acute pancreatitis.      Patient was apparently normal till today morning when he noticed that he had hematuria. He initially passed blood clots at the beginning of the stream followed by bright red blood in the middle of the stream which turned yellow towards the end.      No fever, abd pain, lower urinary tract symptoms such as hesitancy, urgency , sense of incomplete voiding. No bora, hematemesis, hematochezia.     He then came to the ED for further evaluation. /95 mm Hg, afebrile, pulse 85bpm, saturating 100% on room air. Initial labs showed Cr 1.6 ( baseline 1.4), Bicarb 14, AG 12, Hb 7.2 ( baseline 9-10 ) . He was transfused 1 unit pRBC. . Initial imaging CT abd showed large 3.5x8cm mass concerning for possible pancreatic pseudocyst. Surgery, urology as well as internal medicine were consulted and patient was admitted for further evaluation. Thus , patient received 1 unit of blood transfusion, hemoglobin is 8.3 this morning, Jolly in place await urology recs, urine output is good yellow and clear, no visible blood. Regards to his pseudocyst of the pancreas, MRCP showed the size is about 8 cm, with general surgery recommendations.     She has history of alcohol abuse, last drink is yesterday before the admission, typically drinks 2 shots of bourbon per day, thus far no signs of alcohol withdrawal, monitor for withdrawal.

## 2021-02-28 NOTE — PROGRESS NOTES
HEPATOBILIARY AND PANCREATIC SURGERY  DAILY PROGRESS NOTE  2/28/2021    Chief Complaint   Patient presents with    Hematuria     starting this morning       Subjective:  Feels well. States that nausea and abdominal pain have resolved. No emesis or BM yesterday     Objective:  /83   Pulse 83   Temp 97.3 °F (36.3 °C) (Oral)   Resp 18   Ht 6' 2\" (1.88 m)   Wt 170 lb (77.1 kg)   SpO2 100%   BMI 21.83 kg/m²     GENERAL:  Laying in bed, awake, alert, cooperative, no apparent distress  LUNGS:  No increased work of breathing  CARDIOVASCULAR:  RR  ABDOMEN:  Soft, non-tender, non-distended  EXTREMITIES: No edema or swelling  SKIN: Warm and dry    EXAMINATION:   MRI OF THE ABDOMEN WITH AND WITHOUT CONTRAST AND MRCP 2/27/2021 3:40 pm   TECHNIQUE:   Multiplanar multisequence MRI of the abdomen was performed with and without   the administration of intravenous contrast.  After initial T2 axial and   coronal images, thick slab, thin slab and 3D coronal MRCP sequences were   obtained without the administration of intravenous contrast.  MIP images are   provided for review. COMPARISON:   CT abdomen/pelvis performed February 27, 2021, and December 22, 2020. HISTORY:   ORDERING SYSTEM PROVIDED HISTORY: evaluate pancreatic pseudocyst   TECHNOLOGIST PROVIDED HISTORY:   Body radiologist to read   Reason for exam:->evaluate pancreatic pseudocyst   Decision Support Exception->Emergency Medical Condition (MA)   What reading provider will be dictating this exam?->CRC   FINDINGS:   Examination is limited by motion artifact. Pancreas:  There is a large T2 hyperintense cystic focus with atrophy of the   surrounding pancreatic parenchyma which is grossly stable in size currently   measuring 8.0 x 3.6 cm, previously 8.0 x 3.5 cm.  There is heterogeneous   debris noted throughout this cystic focus.  The main pancreatic duct within   the residual pancreatic parenchyma of the pancreatic tail appears grossly   unremarkable and is not definitively visualized to be continuous with the   cystic focus. No definite abnormal enhancement or signal abnormality is noted   in the region of the pancreatic head upstream from the cystic focus to   suggest a pancreatic lesion. Gallbladder: The gallbladder is partially distended but otherwise   unremarkable. Bile Ducts: The biliary tree is nondilated. Other: Lawerance Reichmann is geographic steatosis, predominantly in the right hepatic   lobe.  The spleen, adrenal glands, and kidneys are grossly unremarkable.  A   few bilateral simple renal cysts are noted.  No hydronephrosis.  The   visualized bowel is nondilated.  No significant lymphadenopathy.  The   abdominal aorta is normal in course and caliber.  Marrow signal is within   normal limits.       Impression   Large T2 hyperintense cystic focus with surrounding atrophy of the pancreatic   parenchyma grossly stable from prior examination again measuring up to 8.0   cm.  There is heterogeneous debris noted throughout the cystic focus.  This   is favored to represent a large pseudocyst and no definite continuity with   the main pancreatic duct is identified.          Assessment/Plan:  65 y.o. male admitted with nausea and abdominal pain, MRCP revealing pancreatic pseudocyst with necrosis    Low fat diet  Start creon  Pancreatic pseudocyst with pancreatic necrosis that is stable in comparison with previous imaging   Educated patient on alcohol cessation    Electronically signed by Juanjo Carbone DO on 2/28/2021 at 10:21 AM

## 2021-02-28 NOTE — PROGRESS NOTES
[facility]  Internal Medicine Residency / [address]      Attending Physician Statement  I have discussed the case, including pertinent history and exam findings with the resident and the team.  I have seen and examined the patient and the key elements of the encounter have been performed by me. I agree with the assessment, plan and orders as documented by the resident. Case Discussed During AM Rounds   Admitted 2/27 with complaints of hematuria and abnormal CT of abdomen    Urology/Hepatobiliary/Nephrology following    BP is elevated yesterday but at goal today     No acute complaints   Hematuria is resolved    Likely DC chery today    Await urology recs     Hematuria- ? Etiology- appears resolved; urine is clear this am    ON anticoagulation- currently on hold    Hx of xrt for prostate CA   ?  Cystoscopy- await Urology evaluation   Monitor H/H- improved after 1 unit pRBCs above 8    Catheter remains in place    Urine cytology pending     Acute on Chronic Anemia   Likely multifactorial    Iron studies consistent with anemia of chronic disease likely in the setting of acute bleeding from hematuria    H/H monitoring ongoing   S/p 1 unit pRBCs-- hgb above 8    Monitor for recurrent bleeding     Likely pancreatic pseudocyst   Hepatobiliary surgery following   MRCP reviewed after rounds- findings likely compatible with pseudocyst   Await further recommendations- likely given no acute pain complaints- monitoring for resolution without intervention     Atrial Fib   Monitor HR- continue tele monitoring   Hold anticoagulation due to above     CKD Stage IIIa    HTN- if persistently consider adding CCB     Recent COVID infection- oxygen saturation stable   Denies any new symptoms     Alcohol use    Monitor closely for any signs of withdrawal- currently no signs of withdrawal    Continue thiamine use     Disposition- inpatient admission     Remainder of medical problems as per resident note.      Doyle Mane MD, [address]   Internal Medicine Residency Faculty

## 2021-02-28 NOTE — CONSULTS
Hepatobiliary and Pancreatic Surgery Resident History and Physical    Patient's Name/Date of Birth: Andre Vargas /1950 (65 y.o.)    Date: February 28, 2021     Consulted by Deandre Valencia Staff  CC: Painless Hematuria    HPI:  Pt is a pleasant 66 y/o M that presents to the ED with painless hematuria. He has a PMHx of prostate CA s/p radiation in remission and PAF on Eliquis. He states that he has had no abdominal pain, nausea, vomiting, GERD, fevers, chills, flank pain, back pain, or unintentional weight loss. He denies any constipation or diarrhea, melena, or hematochezia. He does report smoking in the past but quit 2 years ago. He also reports EtOH abuse in the past, but quit 3 months ago. He denies ever having any episodes of acute pancreatitis. He denies any family hx of colon CA or pancreatic CA.     Past Medical History:   Diagnosis Date    [facility]     Diabetes mellitus [facility] [facility]     Hyperlipidemia     Hypertension     Prostate cancer ([facility] [address] [facility]        Past Surgical History:   Procedure Laterality Date    COLON SURGERY      DILATATION, ESOPHAGUS      HC DIALYSIS CATHETER N/A 1/12/2021    TEMPORARY HEMODIALYSIS CATHETER INSERTION performed by Luna Reynoso MD at [address] Left 6 years ago       Current Facility-Administered Medications   Medication Dose Route Frequency Provider Last Rate Last Admin    insulin lispro (HUMALOG) injection vial 0-6 Units  0-6 Units Subcutaneous TID  Luiza Ruelas MD        insulin lispro (HUMALOG) injection vial 0-3 Units  0-3 Units Subcutaneous Nightly Luiza Ruelas MD        lipase-protease-amylase (CREON) delayed release capsule 36,000 Units  36,000 Units Oral TID  Hiram Locket, DO        0.9 % sodium chloride infusion   Intravenous PRN Moraima Guess, DO        amiodarone (CORDARONE) tablet 200 mg  200 mg Per NG tube BID Madelon Callander, MD   200 mg at 02/28/21 0827    ascorbic acid (VITAMIN C) tablet 500 mg  500 mg Oral Daily Madelon Callander, MD 500 mg at 02/28/21 8195    Calcium Acetate (Phos Binder) CAPS 1,334 mg  1,334 mg Oral BID WC Anais Jurado MD   1,334 mg at 85/26/13 7130    folic acid (FOLVITE) tablet 1 mg  1 mg Per NG tube Daily Anais Jurado MD   1 mg at 02/28/21 5104    insulin glargine (LANTUS) injection vial 14 Units  14 Units Subcutaneous Nightly Anais Jurado MD   14 Units at 02/27/21 2205    metoprolol tartrate (LOPRESSOR) tablet 25 mg  25 mg Oral BID Anais Jurado MD   25 mg at 02/28/21 2330    pantoprazole (PROTONIX) tablet 40 mg  40 mg Oral Daily with breakfast Anais Jurado MD   40 mg at 02/28/21 9280    atorvastatin (LIPITOR) tablet 20 mg  20 mg Oral Nightly Anais Jurado MD   20 mg at 02/27/21 2204    vitamin D (CHOLECALCIFEROL) tablet 2,000 Units  2,000 Units Oral Daily Anais Jurado MD   2,000 Units at 02/28/21 0820    sodium chloride flush 0.9 % injection 10 mL  10 mL Intravenous 2 times per day Anais Jurado MD   10 mL at 02/28/21 4867    sodium chloride flush 0.9 % injection 10 mL  10 mL Intravenous PRN Anais Jurado MD        promethazine (PHENERGAN) tablet 12.5 mg  12.5 mg Oral Q6H PRN Anais Jurado MD        Or    ondansetron (ZOFRAN) injection 4 mg  4 mg Intravenous Q6H PRN Anais Jurado MD        polyethylene glycol (GLYCOLAX) packet 17 g  17 g Oral Daily PRN Anais Jurado MD        acetaminophen (TYLENOL) tablet 650 mg  650 mg Oral Q6H PRN Anais Jurado MD        Or    acetaminophen (TYLENOL) suppository 650 mg  650 mg Rectal Q6H PRN Anais Jurado MD        glucose (GLUTOSE) 40 % oral gel 15 g  15 g Oral PRN Anais Jurado MD        dextrose 50 % IV solution  12.5 g Intravenous PRN Anais Jurado MD        glucagon (rDNA) injection 1 mg  1 mg Intramuscular PRN Anais Jurado MD        dextrose 5 % solution  100 mL/hr Intravenous PRN Anais Jurado MD           No Known Allergies    History reviewed. No pertinent family history.     Social History     Socioeconomic History    Marital status: Single     Spouse name: Not on file  Number of children: Not on file    Years of education: Not on file    Highest education level: Not on file   Occupational History    Not on file   Social Needs    Financial resource strain: Not on file    Food insecurity     Worry: Not on file     Inability: Not on file    Transportation needs     Medical: Not on file     Non-medical: Not on file   Tobacco Use    Smoking status: Former Smoker     Packs/day: 1.00     Types: Cigarettes    Smokeless tobacco: Never Used   Substance and Sexual Activity    Alcohol use: Yes     Comment: \"daily shots\", \"none in three months\"    Drug use: No    Sexual activity: Not on file   Lifestyle    Physical activity     Days per week: Not on file     Minutes per session: Not on file    Stress: Not on file   Relationships    Social connections     Talks on phone: Not on file     Gets together: Not on file     Attends [employer] service: Not on file     Active member of club or organization: Not on file     Attends meetings of clubs or organizations: Not on file     Relationship status: Not on file    Intimate partner violence     Fear of current or ex partner: Not on file     Emotionally abused: Not on file     Physically abused: Not on file     Forced sexual activity: Not on file   Other Topics Concern    Not on file   Social History Narrative    Not on file       ROS:   Review of Systems   Constitutional: Negative. HENT: Negative. Eyes: Negative. Respiratory: Negative. Cardiovascular: Negative. Gastrointestinal: Negative. Endocrine: Negative. Genitourinary: Positive for hematuria. Musculoskeletal: Negative. Skin: Negative. Allergic/Immunologic: Negative. Neurological: Negative. Hematological: Negative. Psychiatric/Behavioral: Negative.         Physical Exam:  Vitals:    02/28/21 1131   BP: (!) 137/90   Pulse: 70   Resp: 18   Temp: 97.7 °F (36.5 °C)   SpO2: 99%       PSYCH: mood and affect normal, alert and oriented x 3: No apparent distress, comfortable  EYES: Sclera white, pupils equal round and reactive to light  ENMT:  Hearing normal, trachea midline, ears externally intact  LYMPH: no obvious lympadenopathy in neck. RESP: Respiratory effort was normal with no retractions or use of accessory muscles. CV:  No pedal edema  GI/ Abdomen: Soft, nondistended, nontender, no guarding, no peritoneal signs  MSK: no clubbing/ no cyanosis/ gaitnormal       Assessment/Plan:  Pt is a 66 y/o M with Hx of EtOH abuse found to have a cystic mass in the body of the pancreas measuring 3.5 x 8cm     - Okay for diet from [facility] surgical POV  - Mass seems more so like a pseudocyst  - Will obtain MRCP to further evaluate  - Overall care per primary  - Discussed with Dr. Elmer Glass      I saw and examined the patient. I reviewed the above resident's note. I agree with the assessment and plan as outlined.     Ania Rendon MD  General Surgery

## 2021-02-28 NOTE — PROGRESS NOTES
10.3 02/28/2021     CMP:    Lab Results   Component Value Date     02/28/2021    K 3.6 02/28/2021    K 3.6 02/28/2021     02/28/2021    CO2 19 02/28/2021    BUN 13 02/28/2021    CREATININE 1.7 02/28/2021    GFRAA 48 02/28/2021    LABGLOM 48 02/28/2021    GLUCOSE 149 02/28/2021    PROT 6.1 02/27/2021    LABALBU 2.1 02/27/2021    CALCIUM 8.5 02/28/2021    BILITOT 0.5 02/27/2021    ALKPHOS 90 02/27/2021    AST 12 02/27/2021    ALT 11 02/27/2021     Magnesium:    Lab Results   Component Value Date    MG 1.6 02/28/2021     Phosphorus:    Lab Results   Component Value Date    PHOS 3.5 02/28/2021     Ferritin: 926  TSAT: 56%  Iron: 92      Radiology Review:      CT of abdomen and pelvis with IV contrast 2/2   1.  New cystic pancreatic mass could indicate severely distended pancreatic   duct versus pseudocyst.   2.  Hazy appearance of fat surrounding pancreas could indicate acute   pancreatitis.  MRCP may be helpful for further evaluation. 3.  Patchy areas of hypoattenuation associated with the liver could indicate   focal  fatty infiltration versus phase of contrast enhancement.  Continued   follow-up could be helpful for further evaluation. 4.  Small perihepatic ascites and small ascites located in the pelvis. 5.  Bilateral perinephric fat stranding could indicate chronic medical renal   disease with appropriate clinical history. MRCP 2/27/2021   Large T2 hyperintense cystic focus with surrounding atrophy of the pancreatic   parenchyma grossly stable from prior examination again measuring up to 8.0   cm.  There is heterogeneous debris noted throughout the cystic focus.  This   is favored to represent a large pseudocyst and no definite continuity with   the main pancreatic duct is identified.        BRIEF SUMMARY OF INITIAL CONSULT:    Briefly, Mr. Marla Osborne is a 65year old AA man with PMH of CKD stage IIIa, baseline creatinine 1.4 mg/dL, with recent admission on December last year with DKA and acute kidney injury consistent with ATN for which he required transient renal replacement therapy, unfortunately patient signed AMA, which he left the hospital his creatinine was 4.7 mg/dL, HTN, type 2 DM, hyperlipidemia, prostate cancer s/p radiation therapy, COVID-19 infection, and PAF on apixaban, who was admitted on  February 27, 2021 after presenting to the ER with gross ainless hematuria. His labs were significant for hemoglobin 7.2,  BUN 14 mg/dL and creatinine 1.6 mg/dL which is the reason for this consult. He was supposed to have a follow-up with Dr. Cecy Pichardo but has not seen him yet. He denies ever having kidney stones or frequent urination. IMPRESSION/RECOMMENDATIONS:     1. CKD stage IIIa, baseline creatinine approximately 1.4 mg/dL, with proteinuria, secondary to diabetic nephropathy. Probably new baseline about 1.6-1.7 mg deciliter. 2. Painless hematuria, probably due to prostate cancer, resolved. Urine cytology pending. 3. HTN, on metoprolol   4. Hypomagnesemia, magnesium 1.6, probably due to poor intake and GI losses  5. Low bicarbonate levels with hyperchloremia, consistent with hyperchloremic metabolic acidosis, most likely due to either chronic GI losses (pancreatic insufficiency?)  Versus RTA?  ----------------------------------------  6. History of prostate cancer status post radiation therapy  7. 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 PAF, on metoprolol, apixaban on hold  9. Microcytic anemia, multifactorial, with component of chronic inflammation, hemoglobin relatively stable  10. Vitamin D deficiency, on cholecalciferol   11.  MBD of CKD, on calcium acetate and vitamin D supplementation    Plan:    · Epoetin alpha 3000 units 3 times a week  · Sodium bicarbonate 650 mg 2 tablets 3 times daily  · Replace magnesium  · Continue calcium acetate 1,334 mg PO TID with meals  · Continue to monitor H&H  · Continue to monitor renal function  · Albumin 25 g every 8 hours x 6 doses  · Strict I&Os      Electronically signed by GURPREET Brown CNP on 2/28/2021 at 1:16 PM

## 2021-02-28 NOTE — PROGRESS NOTES
Jolly catheter removed per order. Patient instructed that a urine specimen will need to be collected.  Patient due to void between 16:00-19:00

## 2021-03-01 VITALS
BODY MASS INDEX: 21.82 KG/M2 | DIASTOLIC BLOOD PRESSURE: 87 MMHG | SYSTOLIC BLOOD PRESSURE: 138 MMHG | HEIGHT: 74 IN | RESPIRATION RATE: 16 BRPM | TEMPERATURE: 98 F | OXYGEN SATURATION: 100 % | WEIGHT: 170 LBS | HEART RATE: 71 BPM

## 2021-03-01 PROBLEM — E44.0 MODERATE MALNUTRITION (HCC): Status: ACTIVE | Noted: 2021-03-01

## 2021-03-01 LAB
ANION GAP SERPL CALCULATED.3IONS-SCNC: 10 MMOL/L (ref 7–16)
BASOPHILS ABSOLUTE: 0.03 E9/L (ref 0–0.2)
BASOPHILS RELATIVE PERCENT: 0.7 % (ref 0–2)
BUN BLDV-MCNC: 13 MG/DL (ref 8–23)
CALCIUM SERPL-MCNC: 8.8 MG/DL (ref 8.6–10.2)
CHLORIDE BLD-SCNC: 105 MMOL/L (ref 98–107)
CO2: 20 MMOL/L (ref 22–29)
CREAT SERPL-MCNC: 1.7 MG/DL (ref 0.7–1.2)
EOSINOPHILS ABSOLUTE: 0.11 E9/L (ref 0.05–0.5)
EOSINOPHILS RELATIVE PERCENT: 2.6 % (ref 0–6)
GFR AFRICAN AMERICAN: 48
GFR NON-AFRICAN AMERICAN: 48 ML/MIN/1.73
GLUCOSE BLD-MCNC: 65 MG/DL (ref 74–99)
HCT VFR BLD CALC: 25 % (ref 37–54)
HEMOGLOBIN: 8.2 G/DL (ref 12.5–16.5)
IMMATURE GRANULOCYTES #: 0.02 E9/L
IMMATURE GRANULOCYTES %: 0.5 % (ref 0–5)
LYMPHOCYTES ABSOLUTE: 1.52 E9/L (ref 1.5–4)
LYMPHOCYTES RELATIVE PERCENT: 35.6 % (ref 20–42)
MAGNESIUM: 2 MG/DL (ref 1.6–2.6)
MCH RBC QN AUTO: 26.2 PG (ref 26–35)
MCHC RBC AUTO-ENTMCNC: 32.8 % (ref 32–34.5)
MCV RBC AUTO: 79.9 FL (ref 80–99.9)
METER GLUCOSE: 135 MG/DL (ref 74–99)
METER GLUCOSE: 62 MG/DL (ref 74–99)
METER GLUCOSE: 98 MG/DL (ref 74–99)
MONOCYTES ABSOLUTE: 0.36 E9/L (ref 0.1–0.95)
MONOCYTES RELATIVE PERCENT: 8.4 % (ref 2–12)
NEUTROPHILS ABSOLUTE: 2.23 E9/L (ref 1.8–7.3)
NEUTROPHILS RELATIVE PERCENT: 52.2 % (ref 43–80)
PATHOLOGIST REVIEW: NORMAL
PDW BLD-RTO: 16.7 FL (ref 11.5–15)
PHOSPHORUS: 3.3 MG/DL (ref 2.5–4.5)
PLATELET # BLD: 269 E9/L (ref 130–450)
PMV BLD AUTO: 10.4 FL (ref 7–12)
POTASSIUM SERPL-SCNC: 3.4 MMOL/L (ref 3.5–5)
RBC # BLD: 3.13 E12/L (ref 3.8–5.8)
SODIUM BLD-SCNC: 135 MMOL/L (ref 132–146)
WBC # BLD: 4.3 E9/L (ref 4.5–11.5)

## 2021-03-01 PROCEDURE — 99238 HOSP IP/OBS DSCHRG MGMT 30/<: CPT | Performed by: INTERNAL MEDICINE

## 2021-03-01 PROCEDURE — 97535 SELF CARE MNGMENT TRAINING: CPT

## 2021-03-01 PROCEDURE — P9047 ALBUMIN (HUMAN), 25%, 50ML: HCPCS | Performed by: INTERNAL MEDICINE

## 2021-03-01 PROCEDURE — 83735 ASSAY OF MAGNESIUM: CPT

## 2021-03-01 PROCEDURE — 97165 OT EVAL LOW COMPLEX 30 MIN: CPT

## 2021-03-01 PROCEDURE — 6370000000 HC RX 637 (ALT 250 FOR IP): Performed by: INTERNAL MEDICINE

## 2021-03-01 PROCEDURE — 80048 BASIC METABOLIC PNL TOTAL CA: CPT

## 2021-03-01 PROCEDURE — 99232 SBSQ HOSP IP/OBS MODERATE 35: CPT | Performed by: TRANSPLANT SURGERY

## 2021-03-01 PROCEDURE — 84100 ASSAY OF PHOSPHORUS: CPT

## 2021-03-01 PROCEDURE — 6360000002 HC RX W HCPCS: Performed by: INTERNAL MEDICINE

## 2021-03-01 PROCEDURE — 85025 COMPLETE CBC W/AUTO DIFF WBC: CPT

## 2021-03-01 PROCEDURE — 2500000003 HC RX 250 WO HCPCS: Performed by: INTERNAL MEDICINE

## 2021-03-01 PROCEDURE — 2580000003 HC RX 258: Performed by: INTERNAL MEDICINE

## 2021-03-01 PROCEDURE — 6370000000 HC RX 637 (ALT 250 FOR IP): Performed by: STUDENT IN AN ORGANIZED HEALTH CARE EDUCATION/TRAINING PROGRAM

## 2021-03-01 PROCEDURE — 82962 GLUCOSE BLOOD TEST: CPT

## 2021-03-01 PROCEDURE — 36415 COLL VENOUS BLD VENIPUNCTURE: CPT

## 2021-03-01 RX ORDER — PANCRELIPASE 36000; 180000; 114000 [USP'U]/1; [USP'U]/1; [USP'U]/1
1 CAPSULE, DELAYED RELEASE PELLETS ORAL
Qty: 90 CAPSULE | Refills: 1 | Status: SHIPPED | OUTPATIENT
Start: 2021-03-01 | End: 2021-06-22

## 2021-03-01 RX ORDER — FOLIC ACID 1 MG/1
1 TABLET ORAL DAILY
Qty: 30 TABLET | Refills: 3 | Status: SHIPPED | OUTPATIENT
Start: 2021-03-01

## 2021-03-01 RX ORDER — POTASSIUM CHLORIDE 20 MEQ/1
40 TABLET, EXTENDED RELEASE ORAL ONCE
Status: COMPLETED | OUTPATIENT
Start: 2021-03-01 | End: 2021-03-01

## 2021-03-01 RX ORDER — GAUZE BANDAGE 2" X 2"
100 BANDAGE TOPICAL DAILY
Status: DISCONTINUED | OUTPATIENT
Start: 2021-03-01 | End: 2021-03-01 | Stop reason: HOSPADM

## 2021-03-01 RX ORDER — SODIUM BICARBONATE 650 MG/1
1300 TABLET ORAL 3 TIMES DAILY
Qty: 30 TABLET | Refills: 1 | Status: SHIPPED | OUTPATIENT
Start: 2021-03-01 | End: 2021-06-22

## 2021-03-01 RX ORDER — AMIODARONE HYDROCHLORIDE 200 MG/1
200 TABLET ORAL 2 TIMES DAILY
Qty: 30 TABLET | Refills: 1 | Status: SHIPPED | OUTPATIENT
Start: 2021-03-01 | End: 2021-06-22

## 2021-03-01 RX ORDER — GAUZE BANDAGE 2" X 2"
100 BANDAGE TOPICAL DAILY
Qty: 30 TABLET | Refills: 1 | Status: SHIPPED | OUTPATIENT
Start: 2021-03-02 | End: 2021-06-22

## 2021-03-01 RX ADMIN — SODIUM CHLORIDE, PRESERVATIVE FREE 10 ML: 5 INJECTION INTRAVENOUS at 08:05

## 2021-03-01 RX ADMIN — METOPROLOL TARTRATE 25 MG: 25 TABLET, FILM COATED ORAL at 08:05

## 2021-03-01 RX ADMIN — CALCIUM ACETATE 1334 MG: 667 CAPSULE ORAL at 08:06

## 2021-03-01 RX ADMIN — POTASSIUM CHLORIDE 40 MEQ: 1500 TABLET, EXTENDED RELEASE ORAL at 08:05

## 2021-03-01 RX ADMIN — AMIODARONE HYDROCHLORIDE 200 MG: 200 TABLET ORAL at 08:05

## 2021-03-01 RX ADMIN — Medication 2000 UNITS: at 08:06

## 2021-03-01 RX ADMIN — Medication 15 G: at 08:04

## 2021-03-01 RX ADMIN — PANCRELIPASE 36000 UNITS: 60000; 12000; 38000 CAPSULE, DELAYED RELEASE PELLETS ORAL at 08:06

## 2021-03-01 RX ADMIN — OXYCODONE HYDROCHLORIDE AND ACETAMINOPHEN 500 MG: 500 TABLET ORAL at 08:06

## 2021-03-01 RX ADMIN — ALBUMIN (HUMAN) 25 G: 0.25 INJECTION, SOLUTION INTRAVENOUS at 05:45

## 2021-03-01 RX ADMIN — Medication 100 MG: at 09:07

## 2021-03-01 RX ADMIN — PANCRELIPASE 36000 UNITS: 60000; 12000; 38000 CAPSULE, DELAYED RELEASE PELLETS ORAL at 14:03

## 2021-03-01 RX ADMIN — PANTOPRAZOLE SODIUM 40 MG: 40 TABLET, DELAYED RELEASE ORAL at 08:05

## 2021-03-01 RX ADMIN — SODIUM BICARBONATE 1300 MG: 650 TABLET ORAL at 08:05

## 2021-03-01 RX ADMIN — EPOETIN ALFA-EPBX 3000 UNITS: 3000 INJECTION, SOLUTION INTRAVENOUS; SUBCUTANEOUS at 09:07

## 2021-03-01 RX ADMIN — ACETAMINOPHEN 650 MG: 325 TABLET ORAL at 02:47

## 2021-03-01 RX ADMIN — FOLIC ACID 1 MG: 1 TABLET ORAL at 08:05

## 2021-03-01 ASSESSMENT — PAIN DESCRIPTION - LOCATION: LOCATION: FOOT

## 2021-03-01 ASSESSMENT — PAIN SCALES - GENERAL: PAINLEVEL_OUTOF10: 3

## 2021-03-01 ASSESSMENT — PAIN DESCRIPTION - DESCRIPTORS: DESCRIPTORS: ACHING;DISCOMFORT;OTHER (COMMENT)

## 2021-03-01 ASSESSMENT — PAIN DESCRIPTION - ORIENTATION: ORIENTATION: RIGHT;LEFT

## 2021-03-01 NOTE — PROGRESS NOTES
[facility]  Internal Medicine Residency / [address]      Attending Physician Statement  I have discussed the case, including pertinent history and exam findings with the resident and the team.  I have seen and examined the patient and the key elements of the encounter have been performed by me. I agree with the assessment, plan and orders as documented by the resident. Case Discussed During AM Rounds   Desires DC to home   Appreciate Urology/Hepatobiliary Recs   Ok to DC per Hepatobiliary with surveillance of pseudocyst    Nephrology following and wanted to continue albumin and sodium bicarb   Per Urology- discontinued chery and hematuria remains resolved with good voiding trial   Denies any complaints and desires DC to home   Coordinate with Urology regarding ?  Resumption of eliquis vs. Hold prior to follow-up   Coordinate with Nephrology regarding albumin needs   [facility]     Hematuria- ? Etiology- appears resolved; urine is clear this am    ON anticoagulation- currently on hold- discuss with Urology for potential resuming vs. Hold with follow-up    Hx of xrt for prostate CA   Outpatient cystoscopy likely- no inpatient intervention per Urology    Monitor H/H- improved after 1 unit pRBCs above 8- remains stable    Catheter discontinued and voiding trial successful    Urine cytology pending     Hypoglycemia- self-limited    Monitor medications as discussed   Encouraged improved diet    States he doesn't like the food     Acute on Chronic Anemia   Likely multifactorial    Iron studies consistent with anemia of chronic disease likely in the setting of acute bleeding from hematuria    H/H monitoring remains stable    S/p 1 unit pRBCs-- hgb above 8 after initial admission and hematuria remains resolved    Monitor for recurrent bleeding     Likely pancreatic pseudocyst   Hepatobiliary surgery following   MRCP reviewed after rounds- findings likely compatible with pseudocyst   No intervention planned   Hepatobiliary notes ok for DC     Atrial Fib- stable    Monitor HR- continue tele monitoring   Hold anticoagulation due to above     CKD Stage IIIa    HTN- stable   Continue current management     Recent COVID infection- oxygen saturation stable   Denies any new symptoms     Alcohol use    Monitor closely for any signs of withdrawal- currently no signs of withdrawal    Continue thiamine use     Disposition- possible DC today     Remainder of medical problems as per resident note.       Tin Castano MD, [address]   Internal Medicine Residency Faculty

## 2021-03-01 NOTE — CONSULTS
BEENA;1.4x1591=2227;22-2300kcal; Weight Used for Energy Requirements:  Current     Protein (g):   g; Weight Used for Protein Requirements:  Current(1.2-1.4 g/kg (Note Pt w/ CKD III))        Fluid (ml/day):  Per renal; Method Used for Fluid Requirements:         Nutrition Related Findings:  A/Ox4, missing teeth, abd wdl, +BS, no noted edema,-I/O's, Triglycerides (12/23) 439 mg/dl, A1C 11.1 (12/23), glucose 62mg/dl(3/1), potassium 3.4 mmol (3/1). Wounds:  (Unspecified wound right plantar per flow)       Current Nutrition Therapies:    DIET LOW SODIUM 2 GM; Low Fat    Anthropometric Measures:  · Height: 6' 2\" (188 cm)  · Current Body Weight: 170 lb (77.1 kg)   · Admission Body Weight:      · Usual Body Weight: 249 lb 1.9 oz (113 kg)(12/2020 Per EMR)     · Ideal Body Weight: 190 lbs; % Ideal Body Weight 89.5 %   · BMI: 21.8  · BMI Categories: Underweight (BMI less than 22) age over 72       Nutrition Diagnosis:   · Moderate malnutrition, In context of acute illness or injury related to inadequate protein-energy intake as evidenced by intake 26-50%, poor intake prior to admission, weight loss greater than or equal to 5% in 1 month(31.8% wt loss x 2mo)      Nutrition Interventions:   Food and/or Nutrient Delivery:  Modify Current Diet, Start Oral Nutrition Supplement  Nutrition Education/Counseling:  Education not indicated   Coordination of Nutrition Care:  Continue to monitor while inpatient    Goals:  Pt to consume > 50% of all meals/ONS       Nutrition Monitoring and Evaluation:   Behavioral-Environmental Outcomes:  None Identified   Food/Nutrient Intake Outcomes:  Food and Nutrient Intake, Supplement Intake  Physical Signs/Symptoms Outcomes:  Biochemical Data, Hemodynamic Status, Nutrition Focused Physical Findings, GI Status, Skin, Weight, Fluid Status or Edema     Discharge Planning:     Too soon to determine     Electronically signed by Jesenia Cullen RD, LD on 3/1/21 at 11:32 AM EST    Contact: [phone]

## 2021-03-01 NOTE — CARE COORDINATION
Spoke with patient. He lives at home with significant other. He has a walker and hospital bed at home. PCP is Dr. Igor Guzman at [facility]. He does fill new meds at [facility] when he is unable to fill meds at the [facility]. Patient was active with [facility] prior to admission, he does want them to continue. Resume orders on the chart they will follow up with patient at home. Spouse to transport home today. For questions I can be reached at [phone].  Nilson Ricci [place]

## 2021-03-01 NOTE — PROGRESS NOTES
Department of Internal Medicine  Nephrology Progress Note  Events reviewed. SUBJECTIVE:  We are following Mr. Brown for CKD. Reports no complaints.     PHYSICAL EXAM:      Vitals:    VITALS:  /87   Pulse 71   Temp 98 °F (36.7 °C) (Temporal)   Resp 16   Ht 6' 2\" (1.88 m)   Wt 170 lb (77.1 kg)   SpO2 100%   BMI 21.83 kg/m²   24HR INTAKE/OUTPUT:      Intake/Output Summary (Last 24 hours) at 3/1/2021 1121  Last data filed at 3/1/2021 5418  Gross per 24 hour   Intake 10 ml   Output 750 ml   Net -740 ml       Constitutional:  Alert and oriented  HEENT:  Normocephalic  Respiratory:  CTA bilaterally  Cardiovascular/Edema:  RRR, S1/S2  Gastrointestinal:  Soft, rounded, nontender, nondistended  Neurologic:  Nonfocal, ALVES  Skin:  Warm, dry, no lesions  Other:  No edema, chery in place    Scheduled Meds:   thiamine mononitrate  100 mg Oral Daily    insulin lispro  0-6 Units Subcutaneous TID WC    insulin lispro  0-3 Units Subcutaneous Nightly    lipase-protease-amylase  36,000 Units Oral TID WC    sodium bicarbonate  1,300 mg Oral TID    amiodarone  200 mg Per NG tube BID    ascorbic acid  500 mg Oral Daily    Calcium Acetate (Phos Binder)  1,334 mg Oral BID WC    folic acid  1 mg Per NG tube Daily    insulin glargine  14 Units Subcutaneous Nightly    metoprolol tartrate  25 mg Oral BID    pantoprazole  40 mg Oral Daily with breakfast    atorvastatin  20 mg Oral Nightly    vitamin D  2,000 Units Oral Daily    sodium chloride flush  10 mL Intravenous 2 times per day     Continuous Infusions:   sodium chloride      dextrose       PRN Meds:.sodium chloride, sodium chloride flush, promethazine **OR** ondansetron, polyethylene glycol, acetaminophen **OR** acetaminophen, glucose, dextrose, glucagon (rDNA), dextrose    DATA:    CBC:   Lab Results   Component Value Date    WBC 4.3 03/01/2021    RBC 3.13 03/01/2021    HGB 8.2 03/01/2021    HCT 25.0 03/01/2021    MCV 79.9 03/01/2021    MCH 26.2 03/01/2021    MCHC 32.8 03/01/2021    RDW 16.7 03/01/2021     03/01/2021    MPV 10.4 03/01/2021     CMP:    Lab Results   Component Value Date     03/01/2021    K 3.4 03/01/2021    K 3.6 02/28/2021     03/01/2021    CO2 20 03/01/2021    BUN 13 03/01/2021    CREATININE 1.7 03/01/2021    GFRAA 48 03/01/2021    LABGLOM 48 03/01/2021    GLUCOSE 65 03/01/2021    PROT 6.1 02/27/2021    LABALBU 2.1 02/27/2021    CALCIUM 8.8 03/01/2021    BILITOT 0.5 02/27/2021    ALKPHOS 90 02/27/2021    AST 12 02/27/2021    ALT 11 02/27/2021     Magnesium:    Lab Results   Component Value Date    MG 2.0 03/01/2021     Phosphorus:    Lab Results   Component Value Date    PHOS 3.3 03/01/2021     Ferritin: 926  TSAT: 56%  Iron: 92      Radiology Review:      CT of abdomen and pelvis with IV contrast 2/2   1.  New cystic pancreatic mass could indicate severely distended pancreatic   duct versus pseudocyst.   2.  Hazy appearance of fat surrounding pancreas could indicate acute   pancreatitis.  MRCP may be helpful for further evaluation. 3.  Patchy areas of hypoattenuation associated with the liver could indicate   focal  fatty infiltration versus phase of contrast enhancement.  Continued   follow-up could be helpful for further evaluation. 4.  Small perihepatic ascites and small ascites located in the pelvis. 5.  Bilateral perinephric fat stranding could indicate chronic medical renal   disease with appropriate clinical history. MRCP 2/27/2021   Large T2 hyperintense cystic focus with surrounding atrophy of the pancreatic   parenchyma grossly stable from prior examination again measuring up to 8.0   cm.  There is heterogeneous debris noted throughout the cystic focus.  This   is favored to represent a large pseudocyst and no definite continuity with   the main pancreatic duct is identified.        BRIEF SUMMARY OF INITIAL CONSULT:    Briefly, Mr. Kandy Senior is a 65year old AA man with PMH of CKD stage IIIa, baseline creatinine 1.4 mg/dL, with recent admission on December last year with DKA and acute kidney injury consistent with ATN for which he required transient renal replacement therapy, unfortunately patient signed AMA, which he left the hospital his creatinine was 4.7 mg/dL, HTN, type 2 DM, hyperlipidemia, prostate cancer s/p radiation therapy, COVID-19 infection, and PAF on apixaban, who was admitted on  February 27, 2021 after presenting to the ER with gross ainless hematuria. His labs were significant for hemoglobin 7.2,  BUN 14 mg/dL and creatinine 1.6 mg/dL which is the reason for this consult. He was supposed to have a follow-up with Dr. Batsheva Whatley but has not seen him yet. He denies ever having kidney stones or frequent urination. IMPRESSION/RECOMMENDATIONS:     1. CKD stage IIIa, baseline creatinine approximately 1.4 mg/dL, new basline now. 2. Painless hematuria, probably due to prostate cancer, resolved. Urine cytology pending. 3. HTN, on metoprolol. Controlled. 4. Hypomagnesemia, magnesium 1.6, probably due to poor intake and GI losses  5. Low bicarbonate levels with hyperchloremia, consistent with hyperchloremic metabolic acidosis, most likely due to either chronic GI losses (pancreatic insufficiency?)  Versus RTA?  ----------------------------------------  6. History of prostate cancer status post radiation therapy  7. 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 PAF, on metoprolol, apixaban on hold  9. Microcytic anemia, multifactorial, with component of chronic inflammation, hemoglobin relatively stable. Bloss from hematuria. 10. Vitamin D deficiency, on cholecalciferol   11. MBD of CKD, on calcium acetate and vitamin D supplementation    Plan:    · Stop MARILYN. Consider IV iron. Since patient has microcytic anemia. IV iron as per hemato- oncology. Oncology on case.   · Sodium bicarbonate 650 mg 2 tablets 3 times daily  · Replace magnesium  · Continue calcium acetate 1,334 mg PO TID with meals  · Wonderig if patient should have Gi consult for blood loss as out patient. · Stop IV albumin and increase protein in diet for nutrition. · Renal stand point OK to discharge when OK with other Mds.       Electronically signed by Jd Long MD on 3/1/2021 at 11:21 AM

## 2021-03-01 NOTE — PROGRESS NOTES
[facility]  Internal Medicine Residency Program  Progress Note - House Team 1    Patient:  Andre Watkins 65 y.o. male MRN: [id number]     Date of Service: 2/28/2021     CC: Painless hematuria  Overnight events: None  Hospital day #3    Subjective     Patient seen and examined. No new complaints or concerns. Patient is eager to go home. Hemoglobin stable, hematuria resolved. Potassium 3.4, replaced. Passed voiding trial, making good urine, 750 cc out over last 24 hrs, net -1.2 L since admission. Patient was stable for discharge-nephrology, surgery, urology signed off. [facility] and will need urology referral for outpatient follow-up. Patient tolerating diet, denies nausea, vomiting, abdominal pain. Plan for discharge home today. Objective     Physical Exam:  · Vitals: /84   Pulse 72   Temp 97.7 °F (36.5 °C) (Oral)   Resp 18   Ht 6' 2\" (1.88 m)   Wt 170 lb (77.1 kg)   SpO2 99%   BMI 21.83 kg/m²     · I & O - 24hr: I/O this shift:  · In: 10 [I.V.:10]  · Out: 200 [Urine:200]   · General Appearance: alert, appears stated age and cooperative  · HEENT:  Head: Normocephalic, no lesions, without obvious abnormality. · Neck: no JVD and supple, symmetrical, trachea midline  · Lung: clear to auscultation bilaterally  · Heart: regular rate and rhythm, S1, S2 normal, no murmur, click, rub or gallop  · Abdomen: soft, non-tender; bowel sounds normal; no masses,  no organomegaly  · Extremities:  extremities normal, atraumatic, no cyanosis or edema  · Musculokeletal: No joint swelling, no muscle tenderness. ROM normal in all joints of extremities.    · Neurologic: Mental status: Alert, oriented, thought content appropriate  Subject  Pertinent Labs & Imaging Studies   franny  CBC:   Lab Results   Component Value Date    WBC 4.3 03/01/2021    RBC 3.13 03/01/2021    HGB 8.2 03/01/2021    HCT 25.0 03/01/2021    MCV 79.9 03/01/2021    MCH 26.2 03/01/2021    MCHC 32.8 03/01/2021    RDW 16.7 03/01/2021     03/01/2021    MPV 10.4 03/01/2021     BMP:    Lab Results   Component Value Date     03/01/2021    K 3.4 03/01/2021    K 3.6 02/28/2021     03/01/2021    CO2 20 03/01/2021    BUN 13 03/01/2021    LABALBU 2.1 02/27/2021    CREATININE 1.7 03/01/2021    CALCIUM 8.8 03/01/2021    GFRAA 48 03/01/2021    LABGLOM 48 03/01/2021    GLUCOSE 65 03/01/2021       CT abd/pelvis with contrast 2/27/2021    MRI abd/pelvis w contrast 2/27/2021    Resident's Assessment and Plan     Days Araseli Rodriguez is a 65 y.o. male with past medical history of anxiety, depression, type 2 diabetes with neuropathy, hyperlipidemia, hypertension, prostate cancer status post radiation therapy, COVID-19 December 2020, alcohol abuse, vitamin D deficiency, CKD stage III, A. Fib (on Eliquis). Patient presented with chief complaint of painless hematuria. 1.  Painless hematuria, likely 2/2 radiation cystitis versus bladder neoplasm versus recurrence of prostate neoplasm. History of radiation therapy at [facility], last treatment 2 years ago. No unintentional weight loss. Smoking history, quit 2 years ago. -Making good urine output, chery discontinued yesterday, 750 cc out over last 24 hrs, net -1.2L since admission.    -Follow-up urine culture   -Hemoglobin stable, hematuria resolved. -Patient will follow up at the [facility] for further urologic care for his prostate cancer, per urology    2. Acute on chronic anemia, likely multifactorial 2/2 ACD vs acute blood loss, baseline hemoglobin 9-10, hemoglobin 7.2 on admission s/p 1 unit PRBC transfusion. Iron studies and ferritin consistent with anemia of chronic disease.   -Hemoglobin stable, no gross hematuria    -Continue Epoetin alpha 3000 units 3 times a week   -Continue to monitor CBC daily     3. Large Pancreatic Pseudocyst-stable. No family history of colon or pancreatic cancer.   CT abdomen/pelvis with IV contrast 2/27/2021 with new cystic pancreatic mass suggesting pseudocyst.  MRI abdomen/pelvis with IV contrast 2/27/2021 with large T2 hyperintense cystic focus with surrounding atrophy of the pancreatic parenchyma grossly stable from prior examination, measuring up to 8.0 cm, heterogeneous debris noted throughout the cystic focus. MRCP revealing pancreatic pseudocyst with necrosis. -General surgery and HBP surgery following   -Continue Creon 36,000 units SQ TID   -Counseled about Alcohol cessation    4. Hypoglycemia, likely self-limited. Patient has reduced oral intake while inpatient due to disliking the food. 5. CKD stage IIIa, likely 2/2 IV contrast-induced BLAIR. Baseline Cr 1.4 mg/dL. BUN/creatinine on admission 14/1.6 mg/dL. -Creatinine stable at 1.7 mg/dL, may be a new baseline    -Nephrology following    6. Hypomagnesemia, likely secondary to poor oral intake.    -Continue to monitor and replace as needed. 7.  Hyperchloremic metabolic acidosis, likely secondary to pancreatic insufficiency versus RTA?   -Continue sodium bicarbonate 650 mg 2 tablets 3 times daily    8. History of alcohol abuse, last drink 2/27/2021. Drinks 2 shots of bourbon a bed, denies withdrawal symptoms in the past.   -Continue to monitor for alcohol withdrawal   -Continue thiamine 846 mg OD and folic acid 1 mg OD    9. History of hypertension   -Continue metoprolol 25 mg oral twice daily   -Continue amiodarone 200 mg twice daily    10. History of paroxysmal atrial fibrillation, CHADSVASc score 3   -Continue metoprolol 25 mg BID oral daily    -Holding Eliquis due to anemia/hematuria   -Patient states he was only instructed to take Eliquis for 2 weeks. Advised the patient to follow the medication instructions and to follow-up with PCP on Wednesday. 11.  History of type 2 diabetes, with neuropathy.    -Continue Lantus 14 units subcu nightly   -Continue LDSS AC/HS    12.  MBD of CKD, continuing vitamin D supplementation with 2,000 units daily and calcium acetate. 13.  History of recent COVID-19 infection, December 2020. Saturating well on room air.       PT/OT evaluation: Consulted  DVT prophylaxis/ GI prophylaxis: -/protonix   Disposition: Plan for discharge home today    Stephany Porter MD, PGY-1  Attending physician: Dr. Luana Pang

## 2021-03-01 NOTE — PROGRESS NOTES
[facility] SURGERY  DAILY PROGRESS NOTE  3/1/2021    CC: abdominal pain    Subjective:  Patient feeling better this morning. Tolerated diet, no pain, nausea or vomiting.  Wants to go home    Objective:  BP (!) 128/100   Pulse 73   Temp 98 °F (36.7 °C) (Temporal)   Resp 16   Ht 6' 2\" (1.88 m)   Wt 170 lb (77.1 kg)   SpO2 100%   BMI 21.83 kg/m²     GENERAL:  Laying in bed, awake, alert, cooperative, no apparent distress  HEAD: Normocephalic, atraumatic  EYES: No sclera icterus, pupils equal  LUNGS:  No increased work of breathing  CARDIOVASCULAR:  RR  ABDOMEN:  Soft, non-tender, non-distended  EXTREMITIES: No edema or swelling  SKIN: Warm and dry    Assessment/Plan:  65 y.o. male with Hx of EtOH abuse found to have a cystic mass in the body of the pancreas measuring 3.5 x 8cm, found to be large pseudocyst    - continue diet  - continue creon  - ok to discharge from surgery stabdpoint    Electronically signed by Yamila Wheeler MD on 3/1/2021 at 6:58 AM    Patient doing well  Will need to be discharged on creon  Will need elective EUS, will arrange for this as an outpatient    Electronically signed by Josie Porter MD on 3/1/2021 at 12:26 PM

## 2021-03-01 NOTE — PLAN OF CARE
I spoke with the [facility] and set up a hospital follow-up appointment for Mr. Brown with his PCP (Dr. Sanchze Linda). The patient has a hospital follow-up phone call visit this Wednesday 3/3/2021 at 4:00 PM with Dr. Sanchez Linda. The patient will need a Urology follow-up as outpatient through [facility] and the referral will need to be made via Dr. Sanchez Linda.  I relayed to the  to make a note in his chart for that referral.     Electronically signed by Saint Bye, MD on 3/1/2021 at 1:27 PM

## 2021-03-01 NOTE — DISCHARGE SUMMARY
[address]  Discharge Summary    PCP: Marvin Reese DO    Admit Date:2/27/2021  Discharge Date: 3/1/2021    Admission Diagnosis:   1. Painless hematuria  2. Acute on chronic anemia  3. Pancreatic mass  4. Large pancreatic pseudocyst-stable    Discharge Diagnosis:  1. Painless hematuria-resolved  2. Acute on chronic anemia-stable  3. Large pancreatic pseudocyst-stable  4. Hypoglycemia-stable  5. Hypokalemia-stable  6. Right plantar foot wound-stable  7. History of prostate cancer s/p radiation therapy-stable  8. CKD stage III-stable  9. Hypomagnesemia-stable  10. Hyperchloremic metabolic acidosis-stable  11. History of alcohol abuse-stable  12. History of hyperlipidemia-stable  13. History of hypertension-stable  14. History of paroxysmal atrial fibrillation-stable  15. History of type 2 diabetes-stable  16. History of neuropathy-stable  17. MBD of CKD-stable  18. History of recent COVID-19 infection-stable  19. History of vitamin D deficiency-stable  20. History of anxiety-stable  21. History of depression-stable    Hospital Course:   Patient is a 68-year-old male with past medical history of prostate cancer status post radiation therapy, hypertension, hyperlipidemia, type 2 diabetes, A. fib, alcohol abuse, recent Covid infection in December, history of acute pancreatitis, CKD stage III. Patient presented to the ED from home with chief complaint of painless hematuria starting the morning of presentation. Patient states he initially passed blood clots at the beginning of the stream, followed by bright red blood in the middle which resolved at the end. In the ED, patient's creatinine was 1.6 mg/dL (baseline 1.4 mg/dL), bicarb 14, anion gap 12, hemoglobin 7.2 (baseline 9-10) and was transfused 1 unit packed red blood cell.   Initial imaging of the CT abdomen and pelvis showed a large 3.5 cm x 8 cm mass concerning for possible pancreatic pseudocyst.      Further evaluation with MRCP confirmed the pseudocyst and surgery was consulted without any acute intervention at this time due to its stable nature; patient was started on Creon to be continued as outpatient; patient will follow-up with Dr. Ryanne Samuel outpatient in 2 weeks. The patient does not follow with a urologist through the [facility], and will need to follow-up with his PCP (Dr. Singh Doll) this Wednesday, 3/3/2021, and will need a referral for a urologist outpatient follow-up in [facility] for evaluation of his prostate cancer. Patient's hematuria resolved and hemoglobin stable. Patient only received 1 unit blood transfusion. He was started on EPO, that was discontinued before discharge per nephrology due to likely cause of anemia from GI bleed. Patient had positive fecal occult blood test in the past in 2018, with history of tubular adenoma and next colonoscopy screening will be 2022. Iron studies suggested anemia of chronic disease. Peripheral blood smear showed normocytic normochromic anemia. Eliquis was held during hospitalization, and patient states that he was only instructed to take Eliquis for 2 weeks. Patient was advised to follow the Eliquis medication instructions as outpatient and follow-up with PCP. Patient presented with hyperchloremic metabolic acidosis, likely secondary to pancreatic insufficiency, and started on sodium bicarbonate 650 mg 2 tablets 3 times daily, with improvement of bicarb. Patient did not display any alcohol withdrawal symptoms during hospitalization, and started on thiamine supplementation to be continued as outpatient. Patient's renal function remained stable at 1.7 mg/dL upon discharge, likely a new creatinine baseline. The patient was hypoglycemic during hospitalization, and states that he had a poor appetite and not interested in the hospital food. The patient states that he does not use insulin at home, and only takes glipizide and Metformin.   Patient was instructed to continue home medications as instructed by PCP with the [place]. Significant findings (history and exam, laboratory, radiological, pathology, other tests):   · General Appearance: alert, appears stated age and cooperative  · HEENT:  Head: Normocephalic, no lesions, without obvious abnormality. · Neck: no JVD and supple, symmetrical, trachea midline  · Lung: clear to auscultation bilaterally  · Heart: regular rate and rhythm, S1, S2 normal, no murmur, click, rub or gallop  · Abdomen: soft, non-tender; bowel sounds normal; no masses,  no organomegaly  · Extremities:  extremities normal, atraumatic, no cyanosis or edema  · Musculokeletal: No joint swelling, no muscle tenderness. ROM normal in all joints of extremities. · Neurologic: Mental status: Alert, oriented, thought content appropriate    Pending test results: Urine cytology    Consults:  1. Urology  2. HPB Surgery  3. Nephrology  4. Wound care    Procedures:  1.  MRCP    Condition at discharge: Stable    Disposition: home    Discharge Medications:  Current Discharge Medication List      START taking these medications    Details   lipase-protease-amylase (CREON) 37096 units CPEP delayed release capsule Take 1 capsule by mouth 3 times daily (with meals)  Qty: 90 capsule, Refills: 1      sodium bicarbonate 650 MG tablet Take 2 tablets by mouth 3 times daily  Qty: 30 tablet, Refills: 1      thiamine mononitrate 100 MG tablet Take 1 tablet by mouth daily  Qty: 30 tablet, Refills: 1         CONTINUE these medications which have NOT CHANGED    Details   Calcium Acetate, Phos Binder, 667 MG CAPS Take 2 capsules by mouth 2 times daily (with meals)  Qty: 180 capsule, Refills: 0      amiodarone (CORDARONE) 200 MG tablet 200 mg by Per NG tube route 2 times daily      folic acid (FOLVITE) 1 MG tablet 1 mg by Per NG tube route daily      chlorhexidine (PERIDEX) 0.12 % solution Take 15 mLs by mouth 2 times daily      vitamin D (CHOLECALCIFEROL) 50 MCG (2000 UT) TABS tablet Take 1 tablet by mouth daily  Qty: 30 tablet, Refills: 0      ascorbic acid (VITAMIN C) 500 MG tablet Take 1 tablet by mouth daily  Qty: 30 tablet, Refills: 3      zinc sulfate (ZINCATE) 220 (50 Zn) MG capsule Take 1 capsule by mouth daily  Qty: 30 capsule, Refills: 3      metoprolol tartrate (LOPRESSOR) 25 MG tablet Take 1 tablet by mouth 2 times daily  Qty: 60 tablet, Refills: 3      insulin glargine (LANTUS) 100 UNIT/ML injection vial Inject 25 Units into the skin nightly  Qty: 1 vial, Refills: 3      apixaban (ELIQUIS) 5 MG TABS tablet Take 1 tablet by mouth 2 times daily  Qty: 60 tablet, Refills: 0      pantoprazole (PROTONIX) 40 MG tablet Take 1 tablet by mouth daily (with breakfast)  Qty: 30 tablet, Refills: 3      simvastatin (ZOCOR) 20 MG tablet Take 20 mg by mouth nightly. Activity: activity as tolerated  Diet: diabetic diet    Follow-up appointments: Follow-up With Details Why 1700 Neymar Victor III, MD Call in 2 weeks 7201 DO Miguel On 3/3/2021 [facility] follow up at 4pm on 3/3/2021 2031 [place] [id number] No. [facility]  [phone]      Patient Instructions:   Discharge to: Home   Diet: carb control   Activity: activity as tolerated   Exercise: As tolerated   Be compliant with medication. Start taking Creon and thiamine supplementation. Follow up: Follow-up With Details Why 1700 Neymar Victor III, MD Call in 2 weeks 7201 DO Miguel On 3/3/2021 hospital follow up at 4pm on 3/3/2021 2031 [address] [address]  [phone]   Blood in the Urine: Care Instructions   Your Care Instructions     Blood in the urine, or hematuria, may make the urine look red, brown, or pink. There may be blood every time you urinate or just from time to time.  You cannot always see blood in the urine, but it will show up in a urine test. Blood in the urine may be serious. It should always be checked by a doctor. Your doctor may recommend more tests, including an X-ray, a CT scan, or a cystoscopy (which lets a doctor look inside the urethra and bladder). Blood in the urine can be a sign of another problem. Common causes are bladder infections and kidney stones. An injury to your groin or your genital area can also cause bleeding in the urinary tract. Very hard exercise--such as running a marathon--can cause blood in the urine. Blood in the urine can also be a sign of kidney disease or cancer in the bladder or kidney. Many cases of blood in the urine are caused by a harmless condition that runs in families. This is called benign familial hematuria. It does not need any treatment. Sometimes your urine may look red or brown even though it does not contain blood. For example, not getting enough fluids (dehydration), taking certain medicines, or having a liver problem can change the color of your urine. Eating foods such as beets, rhubarb, or blackberries or foods with red food coloring can make your urine look red or pink. Follow-up care is a key part of your treatment and safety. Be sure to make and go to all appointments, and call your doctor if you are having problems. It's also a good idea to know your test results and keep a list of the medicines you take. When should you call for help? Call your doctor now or seek immediate medical care if:    You have symptoms of a urinary infection. For example: You have pus in your urine. You have pain in your back just below your rib cage. This is called flank pain. You have a fever, chills, or body aches. It hurts to urinate. You have groin or belly pain. You have more blood in your urine. Watch closely for changes in your health, and be sure to contact your doctor if:    You have new urination problems. You do not get better as expected. Where can you learn more?    Go to [email or web address] and sign in to your [employer] account. Enter P082 in the [facility] box to learn more about \"Blood in the Urine: Care Instructions. \"     If you do not have an account, please click on the \"Sign Up Now\" link. Current as of: June 29, 2020 Content Version: 12.6   © 7318-6189 [employer]. Care instructions adapted under license by [facility] ([facility]). If you have questions about a medical condition or this instruction, always ask your healthcare professional. [place] [address] any warranty or liability for your use of this information.        Myah Sunshine MD  PGY 1   1:48 PM 3/1/2021

## 2021-03-01 NOTE — PROGRESS NOTES
Occupational Therapy  OCCUPATIONAL THERAPY INITIAL EVALUATION        Date:3/1/2021  Patient Name: Sarah Kay  MRN: [id number]  : 1950  Room: [address] [place] [address]    Referring Physician:  Mic Simms MD    Evaluating OT:  Tan Lester, SANDRA, OTR/L #[id number]    AM-PAC Daily Activity Raw Score:  1624  Recommended Adaptive Equipment:  TBD as pt progresses - Adaptive equipment    Reason for Admission:  Pt was admitted w/ Hematuria    Diagnosis:     1. Pancreatic mass    2. [place] hematuria    3. Severe anemia      Procedures this admission:  None     Pertinent Medical History:  CA, DM, HTN, Temporary Dialysis Catheter Insertion 21      Precautions:  Falls  Low Na Low Fat Diet  Right Foot Wound    Home Living: Pt lives with his S.O. and Mitzy Noun in a 2-story house. Bed/bath on the 2nd floor. Hospital Bed and Half Bath on 1st floor. Does not use Basement. Bathroom setup:  Tub-Shower, Standard Commode   Equipment owned:  [employer], [employer], Raised Commode Seat    Available Family Assist:  S.O. and Grandson as well as extended family members can assist PRN    Prior Level of Function:  Pt reported being IND with ADLs/Sponge Bathing on 1st floor, Transfers and Mobility using [employer] for household ambulation.    Driving:  No  Occupation:  None reported,     Pain Level:  Denies pain    Additional Complaints:  None    Vitals/Lab Values:  WFL Room Air      Cognition: A & O x 4   Able to Follow Multi-Step Commands INDly   Memory:  fair (+)   Sequencing:  fair (+)   Problem solving:  fair (+)   Judgement/safety:  fair (+)  Additional Comments:  Pt was pleasant, cooperative, eager for return home       Functional Assessment:   Initial Eval Status  Date: 3-1-21 Treatment Status  Date: Short Term/Long Term Goals  Treatment frequency: PRN 1-3 x/week  1 week   Feeding IND      NA   Grooming SUP    Able to complete tasks after set up w/ SUP For safety standing at the sink     Mod I  Standing At [employer] A    Min A to don robe after set up seated EOB    Mod I   LB Dressing Mod A    SUP to don socks, Randy Shoes seated EOB  Min A for standing balance + Min A for clothing Adjustment at EOB  Pt ed for safety, adaptive techs/equip    Mod I   Bathing NT      SUP for safety   Toileting NT    Pt declined  Mod I   Bed Mobility  Rolling:  IND  Repositioning:  IND   Supine to Sit:  IND    Sit to Supine:  IND        NA   Functional Transfers Sit to stand:  SUP  Stand to sit:  SUP      EOB, declined chair  Pt ed re: safety/hand placement    Mod I   Functional Mobility SUP    Close SUP w/ [employer]  Mod VCs for walker safety, short distance at b/s, in bathroom, Fair Safety    Mod I   Balance Sitting:      Static:  Remote SUP EOB    Dynamic:  Remote SUP w/ functional ax EOB    Standing:      Static:  Close SUP w/ [employer]    Dynamic:  Min A w/ functional ax/mobility w/ [employer]       Activity Tolerance Fair(+)       Visual/  Perceptual WFL  Glasses:  Reading      Hearing WFL  Hearing Aids  No       Hand dominance: Right    UE ROM: RUE:  WFL      LUE:  WFL    Strength: RUE: grossly 4/5     LUE: grossly 4/5     Strength:  WFL Randy UEs    Fine Motor Coordination:  WFL Randy UEs    Sensation:  Denies numbness or tingling Randy UEs  Tone:  WFL Randy UEs  Edema:  None Noted                            Comments: Upon arrival, patient was found in semi-supine. He was agreeable to participate in therapeutic ax. No Family present during session. Received permission from RN prior to engaging pt in OT services. At the end of the session, patient was properly positioned in Semi-Supine. Call light and phone within reach, all lines and tubes intact. Oriented pt to call bell. Made all appropriate Environmental Modifications to facilitate pt's level of IND and safety. All needs met. Bed Alarm activated. Overall patient demonstrated decreased independence and safety during completion of ADL/functional transfer/mobility tasks.   Pt would benefit from continued skilled OT to increase safety and independence with completion of ADL/IADL tasks for functional independence and quality of life. Treatment:      Provided Skilled SUP/Assist w/ Pt safety, Proper Positioning, ADLs, Functional Transfers and Functional Mobility as noted above, as well as set up and clean up for session. Skilled monitoring of Vitals and pts response to treatment. Consulted RN    Education:      Provided Pt/Family ed re: Purpose of OT services;  OT Plan of Care;     ADL-  Instruction/training on use of DME/AD/Adaptive equip/techs to improve safety/IND with Functional Ax    Mobility-  Instruction/training on safety and improved independence with bed mobility, functional transfers, functional mobility, walker safety    Sitting EOB - to improve dynamic sitting balance and activity tolerance during ADLs as noted above   Activity tolerance - Instruction/training on energy conservation/work simplification, techs to increase endurance for completion of Functional Ax    Cognitive retraining -  Oriented pt to current Date, Place and Situation; Cues for safety during Functional Ax for safety, improved safety awareness, sequencing, problem solving   Skilled positioning/alignment for Pain Mgmt, Skin Integrity, Edema Control, to maximize Pt's ability to [facility] interact w/ his/her environment, maximize respiratory status   Skilled monitoring of Vitals during session and pt's response to tx ax   Techs for improved Safety/Safety Awareness w/ Functional Activity/Mobility   Energy Conservation Techs/Pursed-Lip Breathing (PLB)   Recommendations for Continued Participation in OT services during Hospitalization and at D/C - [facility] OT     Made all appropriate Environmental Modifications to facilitate pt's level of IND and safety. Pt and/or Family verbalized/demonstrated a Good understanding of education provided. Will Review PRN.        Assessment of current deficits   Functional mobility [x]  ADLs [x] Strength [x]  Cognition []  Functional transfers  [x] IADLs [x] Safety Awareness [x]  Endurance [x]  Fine Motor Coordination [] Balance [x] Vision/perception [] Sensation []   Gross Motor Coordination [] ROM [] Delirium []                  Motor Control []      Plan of Care: OT 1-3 x/week for 1-2 weeks PRN   [x] ADL retraining/AE, Equipment Needs/Recommendations   [x] Energy Conservation Techniques/Strategies      [] Neuromuscular Re-Education      [x] Functional Transfer Training         [x] Functional Mobility Training          [] Cognitive Re-Training          [] Splinting/Positioning Needs           [x] Therapeutic Activity   [x]Therapeutic Exercise   [] Visual/Perceptual   [] Delirium Prevention/Treatment   [x] Positioning to Improve Functional [place], Safety, and Skin Integrity   [x] Patient and/or Family Education to Increase Safety and Functional [place]   [x] Environmental Modifications  [x] Compensatory techniques for ADLs   [x] Other:       Pt would benefit from continued skilled OT services to increase safety and independence with completion of ADL/IADL tasks for functional independence and quality of life. Pt/Family actively participated in the establishment of goals. Rehab Potential:  Good for established goals    Patient / Family Goal:  Return Home ASAP     Patient and/or Family were instructed on Functional Diagnosis, Prognosis/Goals and OT Plan of Care. Demonstrated Good understanding. Evaluation Time includes thorough review of current medical information, gathering information on past medical history/social history and prior level of function, completion of standardized testing/informal observation of tasks, assessment of data and education on plan of care and goals.      Eval Complexity: Low  Profile and History - Mod  Assessment of Occupational Performance and Identification of Deficits - Low  Clinical Decision Making - Low     Time In:  1024              Time Out:

## 2021-03-01 NOTE — PLAN OF CARE
Problem: Malnutrition  (NI-5.2)  Goal: Food and/or Nutrient Delivery  Description: Individualized approach for food/nutrient provision. Intervention: MEAL AND SNACKS (ND-1)  Flowsheets (Taken 3/1/2021 1142)  Meal and Snacks: Modify distribution, type, or amount of food and nutrients within meals or at specified time  Note: 1. Recommend modifying diet order to; 2 gm sodium, 5 choice carb control. ->Note lipase levels at 12 U/L & pt feels no abdominal pain, N/V,D after meals. ->Carb control will help with uncontrolled diabetes: A1C 11.1 (12/23)  2. Recommend obtaining updated triglycerides as last labs were from December at 439 mg/dl. Pt may need just a general cardiac diet. 3.Will order ONS to promote optimal po intake: Ensure HP vanilla BID.  4. 20 gm low fat education inappropriate, & w/ pt being uninterested in education. Will provide future edu per consult.

## 2021-03-02 LAB — URINE CULTURE, ROUTINE: NORMAL

## 2021-04-12 ENCOUNTER — TELEPHONE (OUTPATIENT)
Dept: HEMATOLOGY | Age: 71
End: 2021-04-12

## 2021-04-23 ENCOUNTER — OFFICE VISIT (OUTPATIENT)
Dept: HEMATOLOGY | Age: 71
End: 2021-04-23
Payer: MEDICARE

## 2021-04-23 VITALS
WEIGHT: 164.3 LBS | BODY MASS INDEX: 21.09 KG/M2 | SYSTOLIC BLOOD PRESSURE: 139 MMHG | HEIGHT: 74 IN | RESPIRATION RATE: 16 BRPM | OXYGEN SATURATION: 95 % | TEMPERATURE: 97.4 F | HEART RATE: 68 BPM | DIASTOLIC BLOOD PRESSURE: 89 MMHG

## 2021-04-23 DIAGNOSIS — K85.81 OTHER ACUTE PANCREATITIS WITH UNINFECTED NECROSIS: Primary | ICD-10-CM

## 2021-04-23 DIAGNOSIS — K86.3 PANCREATIC PSEUDOCYST: ICD-10-CM

## 2021-04-23 PROCEDURE — 99212 OFFICE O/P EST SF 10 MIN: CPT | Performed by: TRANSPLANT SURGERY

## 2021-04-23 PROCEDURE — 4040F PNEUMOC VAC/ADMIN/RCVD: CPT | Performed by: TRANSPLANT SURGERY

## 2021-04-23 PROCEDURE — 1036F TOBACCO NON-USER: CPT | Performed by: TRANSPLANT SURGERY

## 2021-04-23 PROCEDURE — 1123F ACP DISCUSS/DSCN MKR DOCD: CPT | Performed by: TRANSPLANT SURGERY

## 2021-04-23 PROCEDURE — G8420 CALC BMI NORM PARAMETERS: HCPCS | Performed by: TRANSPLANT SURGERY

## 2021-04-23 PROCEDURE — G8427 DOCREV CUR MEDS BY ELIG CLIN: HCPCS | Performed by: TRANSPLANT SURGERY

## 2021-04-23 PROCEDURE — 3017F COLORECTAL CA SCREEN DOC REV: CPT | Performed by: TRANSPLANT SURGERY

## 2021-04-23 PROCEDURE — 99213 OFFICE O/P EST LOW 20 MIN: CPT | Performed by: TRANSPLANT SURGERY

## 2021-04-23 SDOH — HEALTH STABILITY: MENTAL HEALTH: HOW OFTEN DO YOU HAVE A DRINK CONTAINING ALCOHOL?: NOT ASKED

## 2021-04-23 SDOH — HEALTH STABILITY: MENTAL HEALTH: HOW MANY STANDARD DRINKS CONTAINING ALCOHOL DO YOU HAVE ON A TYPICAL DAY?: NOT ASKED

## 2021-04-23 NOTE — PROGRESS NOTES
Hepatobiliary and Pancreatic Surgery Progress Note    CC: Pancreatic necrosis    Subjective: Pt is a pleasant 66 y/o M with a past history of smoking but quit 2 years ago. He also reports EtOH abuse in the past, but quit in December. He denies ever having any episodes of acute pancreatitis. He denies any family hx of colon CA or pancreatic CA. He denies any diarrhea, abdominal pain, early satiety or weight loss. He was found to have an 8cm acute necrotic collection back in March 2021. OBJECTIVE      Physical    /89 (Site: Right Upper Arm, Position: Sitting, Cuff Size: Medium Adult)   Pulse 68   Temp 97.4 °F (36.3 °C) (Temporal)   Resp 16   Ht 6' 2\" (1.88 m)   Wt 164 lb 4.8 oz (74.5 kg)   SpO2 95%   BMI 21.09 kg/m²       General appearance: appears in no acute distress  Lungs:respiratory effort normal without accessory numbers  Heart: no pedal edema  Abdomen: soft, nondistended, nontympanic, no guarding, no peritoneal signs, normoactive bowel sounds  Extremities: ROM normal    ASSESSMENT: Acute necrotic collections secondary to pancreatitis     PLAN:    - I reviewed his images prior to his office visit and we also reviewed them together today  - continue creon  - CT abdomen to evaluate pancreatic collections    20 Minutes of which greater than 50% was spent counseling or coordinating his care. Thank you for the consultation and allowing me to take part in Mr. Brown's care.     Please send a copy of my note to Dr. Jaxon Mari with the [facility] 4/23/2021 9:46 AM

## 2021-04-26 ENCOUNTER — TELEPHONE (OUTPATIENT)
Dept: HEMATOLOGY | Age: 71
End: 2021-04-26

## 2021-04-26 NOTE — TELEPHONE ENCOUNTER
No prior Ganesh Gaytan is needed for cpt code . The patient is scheduled for [facility] on 05/05/2021. Pt to arrive at 10:45 am and scan to start at 11:00am. Pt to be NPO 4 hours prior. I called the patient and gave him the above info. I told the patient to enter through the [place] [facility] entrance to registration and from there they will let him know where he has to go. The patient will also need a set of labs drawn prior, when I was speaking to the patient he stated he just had labs drawn at the [place]. I faxed a request to the [place] to have those results faxed to us. I also made the patient aware that he is to hold his metformin 05/05/21, 05/06/21 and 05/07/21, which I repeated and he also repeated back to me.  The patient was given his follow up appt info while I was on the phone with him and was made aware that he comes through the doors under [facility] and he confirmed  Electronically signed by Carla Cook MA on 4/26/2021 at 10:32 AM

## 2021-05-05 ENCOUNTER — HOSPITAL ENCOUNTER (OUTPATIENT)
Dept: CT IMAGING | Age: 71
Discharge: HOME | End: 2021-05-07
Payer: MEDICARE

## 2021-05-05 DIAGNOSIS — K86.3 PANCREATIC PSEUDOCYST: ICD-10-CM

## 2021-05-05 PROCEDURE — 74160 CT ABDOMEN W/CONTRAST: CPT

## 2021-05-05 PROCEDURE — 6360000004 HC RX CONTRAST MEDICATION: Performed by: RADIOLOGY

## 2021-05-05 PROCEDURE — 2580000003 HC RX 258: Performed by: RADIOLOGY

## 2021-05-05 RX ORDER — SODIUM CHLORIDE 0.9 % (FLUSH) 0.9 %
10 SYRINGE (ML) INJECTION PRN
Status: DISCONTINUED | OUTPATIENT
Start: 2021-05-05 | End: 2021-05-08 | Stop reason: HOSPADM

## 2021-05-05 RX ADMIN — IOPAMIDOL 90 ML: 755 INJECTION, SOLUTION INTRAVENOUS at 12:59

## 2021-05-05 RX ADMIN — Medication 10 ML: at 12:59

## 2021-05-13 ENCOUNTER — OFFICE VISIT (OUTPATIENT)
Dept: HEMATOLOGY | Age: 71
End: 2021-05-13
Payer: MEDICARE

## 2021-05-13 VITALS
HEIGHT: 74 IN | OXYGEN SATURATION: 96 % | BODY MASS INDEX: 21.24 KG/M2 | DIASTOLIC BLOOD PRESSURE: 82 MMHG | SYSTOLIC BLOOD PRESSURE: 135 MMHG | TEMPERATURE: 96.8 F | WEIGHT: 165.5 LBS | RESPIRATION RATE: 18 BRPM | HEART RATE: 70 BPM

## 2021-05-13 DIAGNOSIS — K86.89 PANCREATIC NECROSIS: Primary | ICD-10-CM

## 2021-05-13 DIAGNOSIS — K86.0 ALCOHOL-INDUCED CHRONIC PANCREATITIS (HCC): ICD-10-CM

## 2021-05-13 PROCEDURE — G8427 DOCREV CUR MEDS BY ELIG CLIN: HCPCS | Performed by: TRANSPLANT SURGERY

## 2021-05-13 PROCEDURE — G8420 CALC BMI NORM PARAMETERS: HCPCS | Performed by: TRANSPLANT SURGERY

## 2021-05-13 PROCEDURE — 1036F TOBACCO NON-USER: CPT | Performed by: TRANSPLANT SURGERY

## 2021-05-13 PROCEDURE — 1123F ACP DISCUSS/DSCN MKR DOCD: CPT | Performed by: TRANSPLANT SURGERY

## 2021-05-13 PROCEDURE — 3017F COLORECTAL CA SCREEN DOC REV: CPT | Performed by: TRANSPLANT SURGERY

## 2021-05-13 PROCEDURE — 4040F PNEUMOC VAC/ADMIN/RCVD: CPT | Performed by: TRANSPLANT SURGERY

## 2021-05-13 PROCEDURE — 99212 OFFICE O/P EST SF 10 MIN: CPT | Performed by: TRANSPLANT SURGERY

## 2021-05-13 PROCEDURE — 99213 OFFICE O/P EST LOW 20 MIN: CPT | Performed by: TRANSPLANT SURGERY

## 2021-06-01 ENCOUNTER — TELEPHONE (OUTPATIENT)
Dept: SURGERY | Age: 71
End: 2021-06-01

## 2021-06-01 ENCOUNTER — OFFICE VISIT (OUTPATIENT)
Dept: SURGERY | Age: 71
End: 2021-06-01
Payer: MEDICARE

## 2021-06-01 VITALS
HEIGHT: 74 IN | SYSTOLIC BLOOD PRESSURE: 155 MMHG | BODY MASS INDEX: 21.17 KG/M2 | TEMPERATURE: 98.2 F | HEART RATE: 68 BPM | DIASTOLIC BLOOD PRESSURE: 89 MMHG | WEIGHT: 165 LBS

## 2021-06-01 DIAGNOSIS — K86.3 PANCREATIC PSEUDOCYST: Primary | ICD-10-CM

## 2021-06-01 PROCEDURE — G8427 DOCREV CUR MEDS BY ELIG CLIN: HCPCS | Performed by: SURGERY

## 2021-06-01 PROCEDURE — 1036F TOBACCO NON-USER: CPT | Performed by: SURGERY

## 2021-06-01 PROCEDURE — 99213 OFFICE O/P EST LOW 20 MIN: CPT | Performed by: SURGERY

## 2021-06-01 PROCEDURE — 4040F PNEUMOC VAC/ADMIN/RCVD: CPT | Performed by: SURGERY

## 2021-06-01 PROCEDURE — 1123F ACP DISCUSS/DSCN MKR DOCD: CPT | Performed by: SURGERY

## 2021-06-01 PROCEDURE — G8420 CALC BMI NORM PARAMETERS: HCPCS | Performed by: SURGERY

## 2021-06-01 PROCEDURE — 3017F COLORECTAL CA SCREEN DOC REV: CPT | Performed by: SURGERY

## 2021-06-01 RX ORDER — TADALAFIL 20 MG/1
TABLET ORAL
COMMUNITY
Start: 2021-04-20 | End: 2021-06-11

## 2021-06-01 RX ORDER — LANOLIN ALCOHOL/MO/W.PET/CERES
CREAM (GRAM) TOPICAL
COMMUNITY
Start: 2021-03-08 | End: 2021-06-01 | Stop reason: SDUPTHER

## 2021-06-01 NOTE — PROGRESS NOTES
General Surgery History and Physical  [facility]    Patient's Name/Date of Birth: Andre Arango / 1950    Date: June 1, 2021     Surgeon: Marie Ruvalcaba MD    PCP: Amanda Cabral DO     Chief Complaint: Pancreatic pseudocyst    HPI:   Andre Arango is a 66 y.o. male who presents for evaluation of pancreatic pseudocyst.  He had a history of prior alcohol abuse but quit 6 months ago. He had a CT abdomen pelvis done in February 2021 that revealed a 6.8 x 4 cm pancreatic fluid collection. Repeat CT abdomen pelvis done last month showed a 6.9 x 4.8 cm pancreatic collection pseudocyst versus pancreatic necrosis. He was seen by Dr. Wiley Serrato, and was referred for possible EUS with drainage. He takes a baby aspirin but denies any other blood thinners. He does report persistent epigastric and left upper quadrant pain but denies early satiety.       Patient Active Problem List   Diagnosis    Hyperlipidemia    [facility]    Hypertension    Diabetes mellitus ([facility]    Shoulder pain, bilateral    Abdominal pain, right lower quadrant    DKA, type 1, not at goal [facility])    Acute metabolic encephalopathy    Acute pancreatitis    Acute renal failure (ARF) (HCC)    High anion gap metabolic acidosis    Complicated UTI (urinary tract infection)    Acute cystitis with hematuria    Severe anemia    Moderate malnutrition (HCC)    Alcohol-induced chronic pancreatitis (HCC)    Pancreatic pseudocyst       Past Medical History:   Diagnosis Date    Cancer ([address]     Diabetes mellitus ([address]     Hyperlipidemia     Hypertension     Prostate cancer ([facility]        Past Surgical History:   Procedure Laterality Date    COLON SURGERY      DILATATION, ESOPHAGUS      HC DIALYSIS CATHETER N/A 1/12/2021    TEMPORARY HEMODIALYSIS CATHETER INSERTION performed by Zulema Castellano MD at [facility] Left 6 years ago       No Known Allergies    The patient has a family history that is negative for severe cardiovascular or respiratory issues, negative for reaction to anesthesia. Time spent reviewing past medical, surgical, social and family history, vitals, nursing assessment and images. No changes from above documented history. Social History     Socioeconomic History    Marital status: Single     Spouse name: Not on file    Number of children: Not on file    Years of education: Not on file    Highest education level: Not on file   Occupational History    Not on file   Tobacco Use    Smoking status: Former Smoker     Packs/day: 1.00     Types: Cigarettes    Smokeless tobacco: Never Used   Vaping Use    Vaping Use: Never used   Substance and Sexual Activity    Alcohol use: Not Currently     Comment: \"daily shots\", \"none in three months\"    Drug use: No    Sexual activity: Not on file   Other Topics Concern    Not on file   Social History Narrative    Not on file     Social Determinants of Health     Financial Resource Strain:     Difficulty of Paying Living Expenses:    Food Insecurity:     Worried About Running Out of Food in the Last Year:     [address] in the Last Year:    Transportation Needs:     Lack of Transportation (Medical):      Lack of Transportation (Non-Medical):    Physical Activity:     Days of Exercise per Week:     Minutes of Exercise per Session:    Stress:     Feeling of Stress :    Social Connections:     Frequency of Communication with Friends and Family:     Frequency of Social Gatherings with Friends and Family:     Attends Denominational Services:     Active Member of Clubs or Organizations:     Attends Club or Organization Meetings:     Marital Status:    Intimate Partner Violence:     Fear of Current or Ex-Partner:     Emotionally Abused:     Physically Abused:     Sexually Abused:        I have reviewed relevant labs from this admission and interpretation is included in my assessment and plan    Review of Systems    A complete 10 system review was performed and are otherwise negative unless mentioned in the above HPI. Specific negatives are listed below but may not include all those reviewed. General ROS: negative obtundation, AMS  ENT ROS: negative rhinorrhea, epistaxis  Allergy and Immunology ROS: negative itchy/watery eyes or nasal congestion  Hematological and Lymphatic ROS: negative spontaneous bleeding or bruising  Endocrine ROS: negative  lethargy, mood swings, palpitations or polydipsia/polyuria  Respiratory ROS: negative sputum changes, stridor, tachypnea or wheezing  Cardiovascular ROS: negative for - loss of consciousness, murmur or orthopnea  Gastrointestinal ROS: negative for - hematochezia or hematemesis  Genito-Urinary ROS: negative for -  genital discharge or hematuria  Musculoskeletal ROS: negative for - focal weakness, gangrene  Psych/Neuro ROS: negative for - visual or auditory hallucinations, suicidal ideation    Physical exam:   BP (!) 155/89   Pulse 68   Temp 98.2 °F (36.8 °C) (Temporal)   Ht 6' 2\" (1.88 m)   Wt 165 lb (74.8 kg)   BMI 21.18 kg/m²   General appearance:  NAD, appears stated age  Head: NCAT, PERRLA, EOMI, red conjunctiva  Neck: supple, no masses, trachea midline  Lungs: Equal chest rise bilateral, no retractions, no wheezing  Heart: Reg rate  Abdomen: soft, nontender, nondistended  Skin; warm and dry, no cyanosis  Gu: no cva tenderness  Extremities: atraumatic, no focal motor deficits, no open wounds  Psych: No tremor, visual hallucinations      Radiology: I reviewed relevant abdominal imaging from this admission and that available in the EMR including CT abd/pel from 5/5/21 showed pancreatic collection consistent with pancreatic necrosis.  My assessment is pancreatic fluid collection    Assessment:  Andre Ochoa is a 66 y.o. male with pancreatic pseudocyst versus walled off necrosis  Patient Active Problem List   Diagnosis    Hyperlipidemia    [facility]    Hypertension    Diabetes mellitus ([facility]    Shoulder pain, bilateral    Abdominal pain, right lower quadrant    DKA, type 1, not at goal [facility])    Acute metabolic encephalopathy    Acute pancreatitis    Acute renal failure (ARF) (HCC)    High anion gap metabolic acidosis    Complicated UTI (urinary tract infection)    Acute cystitis with hematuria    Severe anemia    Moderate malnutrition (HCC)    Alcohol-induced chronic pancreatitis (HCC)    Pancreatic pseudocyst         Plan:  We discussed the likelihood of this being a walled off necrosis versus pancreatic pseudocyst  The cyst is enlarging however is borderline for endoscopic drainage. I do recommend endoscopic ultrasound for further evaluation and possible biopsy versus drainage  I discussed the risks, benefits, alternatives of the procedure the patient and he agrees to proceed.         Navdeep Delgado MD  8:47 AM  6/1/2021

## 2021-06-01 NOTE — TELEPHONE ENCOUNTER
Prior Authorization Form:      DEMOGRAPHICS:                     Patient Name:  Andre Valladares  Patient :  1950            Insurance:  Payor: MEDICARE / Plan: MEDICARE PART A AND B / Product Type: *No Product type* /   Insurance ID Number:    Payor/Plan Subscr  Sex Relation Sub. Ins. ID Effective Group Num   1. MEDICARE - MEMarlyse Lango 1950 Male Self [id number] 1/1/15                                    [address] [address]   2.  [address] 1950 Male Self [id number] 1/1/15 [id number]                                   [address]         DIAGNOSIS & PROCEDURE:                       Procedure/Operation: EUS possible cyst gastrostomy          CPT Code: 79489    Diagnosis:  Pancreatic Pseudocyst    ICD10 Code: K86.3    Location:  [facility]    Surgeon:  Simone Patterson    [facility] INFORMATION:                          Date: 2021    Time: TBD              Anesthesia:  MAC/TIVA                                                       Status:  Outpatient        Special Comments:         Electronically signed by José Dye on 2021 at 10:24 AM

## 2021-06-11 NOTE — PROGRESS NOTES
[address] [facility]                                                                                                                    PRE OP INSTRUCTIONS FOR  Andre Ochoa        Date: 6/11/2021    Date of surgery: 06/14/2021 0800   Arrival Time: Hospital will call you between 5pm and 7pm with your final arrival time for surgery    1. Do not eat or drink anything after midnight prior to surgery. This includes no water, chewing gum, mints or ice chips. 2. Take the following medications with a small sip of water on the morning of Surgery: metoprolol-amiodarone    3. Diabetics may take evening dose of insulin but none after midnight. If you feel symptomatic or low blood sugar morning of surgery drink 1-2 ounces of apple juice only. 4. Aspirin, Ibuprofen, Advil, Naproxen, Vitamin E and other Anti-inflammatory products should be stopped  before surgery  as directed by your physician. Take Tylenol only unless instructed otherwise by your surgeon. 5. Check with your Doctor regarding stopping Plavix, Coumadin, Lovenox, Eliquis, Effient, or other blood thinners. 6. Do not smoke,use illicit drugs and do not drink any alcoholic beverages 24 hours prior to surgery. 7. You may brush your teeth the morning of surgery. DO NOT SWALLOW WATER    8. You MUST make arrangements for a responsible adult to take you home after your surgery. You will not be allowed to leave alone or drive yourself home. It is strongly suggested someone stay with you the first 24 hrs. Your surgery will be cancelled if you do not have a ride home. 9. PEDIATRIC PATIENTS ONLY:  A parent/legal guardian must accompany a child scheduled for surgery and plan to stay at the hospital until the child is discharged. Please do not bring other children with you.     10. Please wear simple, loose fitting clothing to the hospital.  Osmar Ai not bring valuables (money, credit cards, checkbooks, etc.) Do not wear any makeup (including no

## 2021-06-14 ENCOUNTER — ANESTHESIA EVENT (OUTPATIENT)
Dept: OPERATING ROOM | Age: 71
End: 2021-06-14
Payer: MEDICARE

## 2021-06-14 ENCOUNTER — HOSPITAL ENCOUNTER (OUTPATIENT)
Age: 71
Setting detail: OUTPATIENT SURGERY
Discharge: HOME | End: 2021-06-14
Attending: SURGERY | Admitting: SURGERY
Payer: MEDICARE

## 2021-06-14 ENCOUNTER — ANESTHESIA (OUTPATIENT)
Dept: OPERATING ROOM | Age: 71
End: 2021-06-14
Payer: MEDICARE

## 2021-06-14 VITALS
HEART RATE: 82 BPM | WEIGHT: 154 LBS | RESPIRATION RATE: 16 BRPM | TEMPERATURE: 97.4 F | HEIGHT: 74 IN | DIASTOLIC BLOOD PRESSURE: 85 MMHG | OXYGEN SATURATION: 99 % | SYSTOLIC BLOOD PRESSURE: 164 MMHG | BODY MASS INDEX: 19.76 KG/M2

## 2021-06-14 VITALS
DIASTOLIC BLOOD PRESSURE: 100 MMHG | OXYGEN SATURATION: 100 % | SYSTOLIC BLOOD PRESSURE: 183 MMHG | RESPIRATION RATE: 13 BRPM

## 2021-06-14 LAB
ANION GAP SERPL CALCULATED.3IONS-SCNC: 11 MMOL/L (ref 7–16)
BUN BLDV-MCNC: 21 MG/DL (ref 6–23)
CALCIUM SERPL-MCNC: 9.9 MG/DL (ref 8.6–10.2)
CHLORIDE BLD-SCNC: 103 MMOL/L (ref 98–107)
CO2: 24 MMOL/L (ref 22–29)
CREAT SERPL-MCNC: 2 MG/DL (ref 0.7–1.2)
GFR AFRICAN AMERICAN: 40
GFR NON-AFRICAN AMERICAN: 40 ML/MIN/1.73
GLUCOSE BLD-MCNC: 309 MG/DL (ref 74–99)
HCT VFR BLD CALC: 34.6 % (ref 37–54)
HEMOGLOBIN: 11.6 G/DL (ref 12.5–16.5)
MCH RBC QN AUTO: 27.2 PG (ref 26–35)
MCHC RBC AUTO-ENTMCNC: 33.5 % (ref 32–34.5)
MCV RBC AUTO: 81 FL (ref 80–99.9)
PDW BLD-RTO: 17.8 FL (ref 11.5–15)
PLATELET # BLD: 205 E9/L (ref 130–450)
PMV BLD AUTO: 11.9 FL (ref 7–12)
POTASSIUM REFLEX MAGNESIUM: 4.6 MMOL/L (ref 3.5–5)
RBC # BLD: 4.27 E12/L (ref 3.8–5.8)
SODIUM BLD-SCNC: 138 MMOL/L (ref 132–146)
WBC # BLD: 4.7 E9/L (ref 4.5–11.5)

## 2021-06-14 PROCEDURE — 3700000001 HC ADD 15 MINUTES (ANESTHESIA): Performed by: SURGERY

## 2021-06-14 PROCEDURE — 43240 EGD W/TRANSMURAL DRAIN CYST: CPT | Performed by: SURGERY

## 2021-06-14 PROCEDURE — 2500000003 HC RX 250 WO HCPCS: Performed by: NURSE ANESTHETIST, CERTIFIED REGISTERED

## 2021-06-14 PROCEDURE — 3600007513: Performed by: SURGERY

## 2021-06-14 PROCEDURE — 2500000003 HC RX 250 WO HCPCS: Performed by: ANESTHESIOLOGY

## 2021-06-14 PROCEDURE — 3700000000 HC ANESTHESIA ATTENDED CARE: Performed by: SURGERY

## 2021-06-14 PROCEDURE — 7100000010 HC PHASE II RECOVERY - FIRST 15 MIN: Performed by: SURGERY

## 2021-06-14 PROCEDURE — 2709999900 HC NON-CHARGEABLE SUPPLY: Performed by: SURGERY

## 2021-06-14 PROCEDURE — 7100000011 HC PHASE II RECOVERY - ADDTL 15 MIN: Performed by: SURGERY

## 2021-06-14 PROCEDURE — 2720000010 HC SURG SUPPLY STERILE: Performed by: SURGERY

## 2021-06-14 PROCEDURE — 6360000002 HC RX W HCPCS: Performed by: NURSE ANESTHETIST, CERTIFIED REGISTERED

## 2021-06-14 PROCEDURE — 85027 COMPLETE CBC AUTOMATED: CPT

## 2021-06-14 PROCEDURE — 7100000001 HC PACU RECOVERY - ADDTL 15 MIN: Performed by: SURGERY

## 2021-06-14 PROCEDURE — C1874 STENT, COATED/COV W/DEL SYS: HCPCS | Performed by: SURGERY

## 2021-06-14 PROCEDURE — 36415 COLL VENOUS BLD VENIPUNCTURE: CPT

## 2021-06-14 PROCEDURE — 7100000000 HC PACU RECOVERY - FIRST 15 MIN: Performed by: SURGERY

## 2021-06-14 PROCEDURE — 6360000002 HC RX W HCPCS: Performed by: ANESTHESIOLOGY

## 2021-06-14 PROCEDURE — 80048 BASIC METABOLIC PNL TOTAL CA: CPT

## 2021-06-14 PROCEDURE — 3600007503: Performed by: SURGERY

## 2021-06-14 PROCEDURE — 2580000003 HC RX 258: Performed by: NURSE ANESTHETIST, CERTIFIED REGISTERED

## 2021-06-14 DEVICE — STENT AND ELECTROCAUTERY - ENHANCED DELIVERY SYSTEM
Type: IMPLANTABLE DEVICE | Status: FUNCTIONAL
Brand: AXIOS™

## 2021-06-14 RX ORDER — HYDROCODONE BITARTRATE AND ACETAMINOPHEN 5; 325 MG/1; MG/1
1 TABLET ORAL PRN
Status: DISCONTINUED | OUTPATIENT
Start: 2021-06-14 | End: 2021-06-14 | Stop reason: HOSPADM

## 2021-06-14 RX ORDER — ROCURONIUM BROMIDE 10 MG/ML
INJECTION, SOLUTION INTRAVENOUS PRN
Status: DISCONTINUED | OUTPATIENT
Start: 2021-06-14 | End: 2021-06-14 | Stop reason: SDUPTHER

## 2021-06-14 RX ORDER — MIDAZOLAM HYDROCHLORIDE 1 MG/ML
INJECTION INTRAMUSCULAR; INTRAVENOUS PRN
Status: DISCONTINUED | OUTPATIENT
Start: 2021-06-14 | End: 2021-06-14 | Stop reason: SDUPTHER

## 2021-06-14 RX ORDER — HYDRALAZINE HYDROCHLORIDE 20 MG/ML
5 INJECTION INTRAMUSCULAR; INTRAVENOUS ONCE
Status: COMPLETED | OUTPATIENT
Start: 2021-06-14 | End: 2021-06-14

## 2021-06-14 RX ORDER — SODIUM CHLORIDE 0.9 % (FLUSH) 0.9 %
5-40 SYRINGE (ML) INJECTION PRN
Status: DISCONTINUED | OUTPATIENT
Start: 2021-06-14 | End: 2021-06-14 | Stop reason: HOSPADM

## 2021-06-14 RX ORDER — SODIUM CHLORIDE, SODIUM LACTATE, POTASSIUM CHLORIDE, CALCIUM CHLORIDE 600; 310; 30; 20 MG/100ML; MG/100ML; MG/100ML; MG/100ML
INJECTION, SOLUTION INTRAVENOUS CONTINUOUS
Status: DISCONTINUED | OUTPATIENT
Start: 2021-06-14 | End: 2021-06-14 | Stop reason: HOSPADM

## 2021-06-14 RX ORDER — SODIUM CHLORIDE 9 MG/ML
25 INJECTION, SOLUTION INTRAVENOUS PRN
Status: DISCONTINUED | OUTPATIENT
Start: 2021-06-14 | End: 2021-06-14 | Stop reason: HOSPADM

## 2021-06-14 RX ORDER — LABETALOL HYDROCHLORIDE 5 MG/ML
INJECTION, SOLUTION INTRAVENOUS PRN
Status: DISCONTINUED | OUTPATIENT
Start: 2021-06-14 | End: 2021-06-14 | Stop reason: SDUPTHER

## 2021-06-14 RX ORDER — PROMETHAZINE HYDROCHLORIDE 25 MG/ML
6.25 INJECTION, SOLUTION INTRAMUSCULAR; INTRAVENOUS
Status: DISCONTINUED | OUTPATIENT
Start: 2021-06-14 | End: 2021-06-14 | Stop reason: HOSPADM

## 2021-06-14 RX ORDER — FENTANYL CITRATE 50 UG/ML
25 INJECTION, SOLUTION INTRAMUSCULAR; INTRAVENOUS EVERY 5 MIN PRN
Status: DISCONTINUED | OUTPATIENT
Start: 2021-06-14 | End: 2021-06-14 | Stop reason: HOSPADM

## 2021-06-14 RX ORDER — LABETALOL HYDROCHLORIDE 5 MG/ML
10 INJECTION, SOLUTION INTRAVENOUS ONCE
Status: DISCONTINUED | OUTPATIENT
Start: 2021-06-14 | End: 2021-06-14

## 2021-06-14 RX ORDER — DEXAMETHASONE SODIUM PHOSPHATE 10 MG/ML
INJECTION, SOLUTION INTRAMUSCULAR; INTRAVENOUS PRN
Status: DISCONTINUED | OUTPATIENT
Start: 2021-06-14 | End: 2021-06-14 | Stop reason: SDUPTHER

## 2021-06-14 RX ORDER — LIDOCAINE HYDROCHLORIDE 20 MG/ML
INJECTION, SOLUTION INTRAVENOUS PRN
Status: DISCONTINUED | OUTPATIENT
Start: 2021-06-14 | End: 2021-06-14 | Stop reason: SDUPTHER

## 2021-06-14 RX ORDER — SODIUM CHLORIDE, SODIUM LACTATE, POTASSIUM CHLORIDE, CALCIUM CHLORIDE 600; 310; 30; 20 MG/100ML; MG/100ML; MG/100ML; MG/100ML
INJECTION, SOLUTION INTRAVENOUS CONTINUOUS PRN
Status: DISCONTINUED | OUTPATIENT
Start: 2021-06-14 | End: 2021-06-14 | Stop reason: SDUPTHER

## 2021-06-14 RX ORDER — LABETALOL HYDROCHLORIDE 5 MG/ML
5 INJECTION, SOLUTION INTRAVENOUS EVERY 5 MIN PRN
Status: DISCONTINUED | OUTPATIENT
Start: 2021-06-14 | End: 2021-06-14 | Stop reason: HOSPADM

## 2021-06-14 RX ORDER — HYDROCODONE BITARTRATE AND ACETAMINOPHEN 5; 325 MG/1; MG/1
2 TABLET ORAL PRN
Status: DISCONTINUED | OUTPATIENT
Start: 2021-06-14 | End: 2021-06-14 | Stop reason: HOSPADM

## 2021-06-14 RX ORDER — MEPERIDINE HYDROCHLORIDE 25 MG/ML
12.5 INJECTION INTRAMUSCULAR; INTRAVENOUS; SUBCUTANEOUS EVERY 5 MIN PRN
Status: DISCONTINUED | OUTPATIENT
Start: 2021-06-14 | End: 2021-06-14 | Stop reason: HOSPADM

## 2021-06-14 RX ORDER — PROPOFOL 10 MG/ML
INJECTION, EMULSION INTRAVENOUS PRN
Status: DISCONTINUED | OUTPATIENT
Start: 2021-06-14 | End: 2021-06-14 | Stop reason: SDUPTHER

## 2021-06-14 RX ORDER — ONDANSETRON 2 MG/ML
INJECTION INTRAMUSCULAR; INTRAVENOUS PRN
Status: DISCONTINUED | OUTPATIENT
Start: 2021-06-14 | End: 2021-06-14 | Stop reason: SDUPTHER

## 2021-06-14 RX ORDER — CIPROFLOXACIN 2 MG/ML
400 INJECTION, SOLUTION INTRAVENOUS ONCE
Status: DISCONTINUED | OUTPATIENT
Start: 2021-06-14 | End: 2021-06-14 | Stop reason: HOSPADM

## 2021-06-14 RX ORDER — MORPHINE SULFATE 2 MG/ML
2 INJECTION, SOLUTION INTRAMUSCULAR; INTRAVENOUS EVERY 5 MIN PRN
Status: DISCONTINUED | OUTPATIENT
Start: 2021-06-14 | End: 2021-06-14 | Stop reason: HOSPADM

## 2021-06-14 RX ORDER — FENTANYL CITRATE 50 UG/ML
INJECTION, SOLUTION INTRAMUSCULAR; INTRAVENOUS PRN
Status: DISCONTINUED | OUTPATIENT
Start: 2021-06-14 | End: 2021-06-14 | Stop reason: SDUPTHER

## 2021-06-14 RX ORDER — SODIUM CHLORIDE 0.9 % (FLUSH) 0.9 %
5-40 SYRINGE (ML) INJECTION EVERY 12 HOURS SCHEDULED
Status: DISCONTINUED | OUTPATIENT
Start: 2021-06-14 | End: 2021-06-14 | Stop reason: HOSPADM

## 2021-06-14 RX ADMIN — PROPOFOL 170 MG: 10 INJECTION, EMULSION INTRAVENOUS at 10:56

## 2021-06-14 RX ADMIN — HYDRALAZINE HYDROCHLORIDE 5 MG: 20 INJECTION INTRAMUSCULAR; INTRAVENOUS at 14:15

## 2021-06-14 RX ADMIN — SUGAMMADEX 280 MG: 100 INJECTION, SOLUTION INTRAVENOUS at 11:20

## 2021-06-14 RX ADMIN — FENTANYL CITRATE 100 MCG: 50 INJECTION, SOLUTION INTRAMUSCULAR; INTRAVENOUS at 10:56

## 2021-06-14 RX ADMIN — LABETALOL HYDROCHLORIDE 5 MG: 5 INJECTION INTRAVENOUS at 12:25

## 2021-06-14 RX ADMIN — LABETALOL HYDROCHLORIDE 5 MG: 5 INJECTION INTRAVENOUS at 12:38

## 2021-06-14 RX ADMIN — LIDOCAINE HYDROCHLORIDE 40 MG: 20 INJECTION, SOLUTION INTRAVENOUS at 10:56

## 2021-06-14 RX ADMIN — SODIUM CHLORIDE, POTASSIUM CHLORIDE, SODIUM LACTATE AND CALCIUM CHLORIDE: 600; 310; 30; 20 INJECTION, SOLUTION INTRAVENOUS at 10:49

## 2021-06-14 RX ADMIN — LABETALOL HYDROCHLORIDE 10 MG: 5 INJECTION INTRAVENOUS at 11:14

## 2021-06-14 RX ADMIN — ONDANSETRON 4 MG: 2 INJECTION INTRAMUSCULAR; INTRAVENOUS at 11:11

## 2021-06-14 RX ADMIN — HYDRALAZINE HYDROCHLORIDE 5 MG: 20 INJECTION INTRAMUSCULAR; INTRAVENOUS at 14:34

## 2021-06-14 RX ADMIN — DEXAMETHASONE SODIUM PHOSPHATE 10 MG: 10 INJECTION, SOLUTION INTRAMUSCULAR; INTRAVENOUS at 11:07

## 2021-06-14 RX ADMIN — MIDAZOLAM 1 MG: 1 INJECTION INTRAMUSCULAR; INTRAVENOUS at 10:49

## 2021-06-14 RX ADMIN — ROCURONIUM BROMIDE 35 MG: 10 SOLUTION INTRAVENOUS at 10:56

## 2021-06-14 RX ADMIN — SODIUM CHLORIDE, POTASSIUM CHLORIDE, SODIUM LACTATE AND CALCIUM CHLORIDE: 600; 310; 30; 20 INJECTION, SOLUTION INTRAVENOUS at 11:26

## 2021-06-14 ASSESSMENT — PULMONARY FUNCTION TESTS
PIF_VALUE: 13
PIF_VALUE: 0
PIF_VALUE: 0
PIF_VALUE: 11
PIF_VALUE: 12
PIF_VALUE: 3
PIF_VALUE: 1
PIF_VALUE: 13
PIF_VALUE: 41
PIF_VALUE: 20
PIF_VALUE: 13
PIF_VALUE: 11
PIF_VALUE: 13
PIF_VALUE: 0
PIF_VALUE: 2
PIF_VALUE: 0
PIF_VALUE: 13
PIF_VALUE: 15
PIF_VALUE: 11
PIF_VALUE: 18
PIF_VALUE: 13
PIF_VALUE: 3
PIF_VALUE: 0
PIF_VALUE: 13
PIF_VALUE: 13
PIF_VALUE: 15
PIF_VALUE: 0
PIF_VALUE: 28
PIF_VALUE: 0
PIF_VALUE: 16
PIF_VALUE: 26
PIF_VALUE: 14
PIF_VALUE: 6
PIF_VALUE: 1
PIF_VALUE: 13
PIF_VALUE: 12
PIF_VALUE: 11
PIF_VALUE: 15
PIF_VALUE: 13
PIF_VALUE: 3
PIF_VALUE: 13

## 2021-06-14 ASSESSMENT — PAIN SCALES - GENERAL
PAINLEVEL_OUTOF10: 6
PAINLEVEL_OUTOF10: 5
PAINLEVEL_OUTOF10: 0
PAINLEVEL_OUTOF10: 0

## 2021-06-14 ASSESSMENT — PAIN DESCRIPTION - DESCRIPTORS: DESCRIPTORS: SORE

## 2021-06-14 ASSESSMENT — PAIN DESCRIPTION - LOCATION: LOCATION: ABDOMEN;OTHER (COMMENT)

## 2021-06-14 ASSESSMENT — PAIN DESCRIPTION - PROGRESSION
CLINICAL_PROGRESSION: NOT CHANGED
CLINICAL_PROGRESSION: NOT CHANGED

## 2021-06-14 ASSESSMENT — ENCOUNTER SYMPTOMS
DYSPNEA ACTIVITY LEVEL: AFTER AMBULATING 1 FLIGHT OF STAIRS
SHORTNESS OF BREATH: 1

## 2021-06-14 ASSESSMENT — PAIN DESCRIPTION - ORIENTATION: ORIENTATION: MID

## 2021-06-14 ASSESSMENT — PAIN DESCRIPTION - FREQUENCY: FREQUENCY: CONTINUOUS

## 2021-06-14 ASSESSMENT — LIFESTYLE VARIABLES: SMOKING_STATUS: 0

## 2021-06-14 ASSESSMENT — PAIN DESCRIPTION - ONSET: ONSET: ON-GOING

## 2021-06-14 ASSESSMENT — PAIN DESCRIPTION - PAIN TYPE: TYPE: ACUTE PAIN

## 2021-06-14 ASSESSMENT — PAIN - FUNCTIONAL ASSESSMENT
PAIN_FUNCTIONAL_ASSESSMENT: 0-10
PAIN_FUNCTIONAL_ASSESSMENT: ACTIVITIES ARE NOT PREVENTED

## 2021-06-14 NOTE — H&P
is negative for severe cardiovascular or respiratory issues, negative for reaction to anesthesia. Time spent reviewing past medical, surgical, social and family history, vitals, nursing assessment and images. No changes from above documented history. Social History     Socioeconomic History    Marital status: Single     Spouse name: Not on file    Number of children: Not on file    Years of education: Not on file    Highest education level: Not on file   Occupational History    Not on file   Tobacco Use    Smoking status: Former Smoker     Packs/day: 1.00     Types: Cigarettes    Smokeless tobacco: Never Used   Vaping Use    Vaping Use: Never used   Substance and Sexual Activity    Alcohol use: Not Currently     Comment: \"daily shots\", \"none in three months\"    Drug use: No    Sexual activity: Not on file   Other Topics Concern    Not on file   Social History Narrative    Not on file     Social Determinants of Health     Financial Resource Strain:     Difficulty of Paying Living Expenses:    Food Insecurity:     Worried About Running Out of Food in the Last Year:     [address] in the Last Year:    Transportation Needs:     Lack of Transportation (Medical):      Lack of Transportation (Non-Medical):    Physical Activity:     Days of Exercise per Week:     Minutes of Exercise per Session:    Stress:     Feeling of Stress :    Social Connections:     Frequency of Communication with Friends and Family:     Frequency of Social Gatherings with Friends and Family:     Attends Protestant Services:     Active Member of Clubs or Organizations:     Attends Club or Organization Meetings:     Marital Status:    Intimate Partner Violence:     Fear of Current or Ex-Partner:     Emotionally Abused:     Physically Abused:     Sexually Abused:        I have reviewed relevant labs from this admission and interpretation is included in my assessment and plan    Review of Systems    A complete 10 quadrant    DKA, type 1, not at goal [facility])    Acute metabolic encephalopathy    Acute pancreatitis    Acute renal failure (ARF) (HCC)    High anion gap metabolic acidosis    Complicated UTI (urinary tract infection)    Acute cystitis with hematuria    Severe anemia    Moderate malnutrition (HCC)    Alcohol-induced chronic pancreatitis (HCC)    Pancreatic pseudocyst         Plan:  Proceed with endoscopic ultrasound for further evaluation and possible biopsy versus drainage  I discussed the risks, benefits, alternatives of the procedure the patient and he agrees to proceed.         Simone Patterson MD  7:43 AM  6/14/2021

## 2021-06-14 NOTE — OP NOTE
Endoscopic Ultrasound Procedure Note - Endoscopic Cyst gastrostomy    Date of Procedure: 6/14/2021    Pre-procedure Diagnosis: pancreatic pseudocyst    Post-procedure Diagnosis: Pancreatic pseudocyst    Physician: Osmar Dowell MD    Assistant: None    Estimated Blood Loss: None    Anesthesia: LMAC     Complications: None    Indications and History:  The patient is a 66 y.o. male. The risks, benefits, complications, treatment options and expected outcomes were discussed with the patient. The possibilities of reaction to medication, pulmonary aspiration, perforation of the gastrointestinal tract, bleeding requiring transfusion or operation, respiratory failure requiring placement on a ventilator and failure to diagnose a condition were discussed with the patient who freely signed the consent. Description of Procedure: The patient was taken to the endoscopy suite, identified as Andre Brown and the procedure verified as Endoscopic Ultrasound (EUS). A Time Out was held and the above information confirmed. The patient was positioned in the left lateral position with an oral bite block and anesthesia was provided for sedation and comfort. The echoendoscope was passed to the gastric body. EGD/EUS findings:   Esophagus: normal   Stomach: normal   Duodenum: normal   Pancreas: 55 x 45 mm cyst in the body of the pancreas with cystic and internal solid components consistent with a pseudocyst versus walled off necrosis. A long axis of about 6 cm was found in the system from the lesser curve of the stomach. This was confirmed on EUS and flow was confirmed to verify the absence of nearby major vessels. And axios lumen opposing stent 10 x 10 mm was chosen and passed through the scope. Electrocautery was used to gain access through the stomach wall into the cyst.  The mechanism of the axial stent was then followed in a stepwise fashion to deploy the distal followed by the proximal phalange.   The stent was in good position endosonographically and there was good drainage through the stent with about 250 cc of clear pancreatic fluid drained. There was no bleeding. There was no evidence of complications. The stomach was decompressed and the scope was removed. Specimens:  1. none    The Patient was taken to the Endoscopy Recovery area in satisfactory condition.       Electronically signed by Javed Collado MD on 6/14/2021 at 12:04 PM

## 2021-06-14 NOTE — ANESTHESIA PRE PROCEDURE
Department of Anesthesiology  Preprocedure Note       Name:  Andre Lloyd   Age:  66 y.o.  :  1950                                          MRN:  [id number]         Date:  2021      Surgeon: Anca Claudio):  Ewa Johnson MD    Procedure: Procedure(s):  ESOPHAGOGASTRODUODENOSCOPY ULTRASOUND, POSSIBLE CYST GASTROSTOMY    Medications prior to admission:   Prior to Admission medications    Medication Sig Start Date End Date Taking? Authorizing Provider   lipase-protease-amylase (CREON) 35752 units CPEP delayed release capsule Take 1 capsule by mouth 3 times daily (with meals)  Patient taking differently: Take 1 capsule by mouth 3 times daily (with meals) Patient takes this BID 3/1/21 6/11/21  Andrew Ortega MD   sodium bicarbonate 650 MG tablet Take 2 tablets by mouth 3 times daily 3/1/21   Del Pacheco MD   thiamine mononitrate 100 MG tablet Take 1 tablet by mouth daily 3/2/21   Del Pacheco MD   folic acid (FOLVITE) 1 MG tablet Take 1 tablet by mouth daily 3/1/21   Jeanette Dumas MD   amiodarone (CORDARONE) 200 MG tablet Take 1 tablet by mouth 2 times daily 3/1/21   Jeanette Dumas MD   Calcium Acetate, Phos Binder, 667 MG CAPS Take 2 capsules by mouth 2 times daily (with meals) 21   Tony Bullock MD   chlorhexidine (PERIDEX) 0.12 % solution Take 15 mLs by mouth 2 times daily    Historical Provider, MD   vitamin D (CHOLECALCIFEROL) 50 MCG (2000 UT) TABS tablet Take 1 tablet by mouth daily 21   Loreto Amato MD   ascorbic acid (VITAMIN C) 500 MG tablet Take 1 tablet by mouth daily 21   Loreto Amato MD   metoprolol tartrate (LOPRESSOR) 25 MG tablet Take 1 tablet by mouth 2 times daily 21   Loreto Amato MD   apixaban (ELIQUIS) 5 MG TABS tablet Take 1 tablet by mouth 2 times daily 21   Loreto Amato MD   simvastatin (ZOCOR) 20 MG tablet Take 20 mg by mouth nightly.     Historical Provider, MD       Current medications:    No current facility-administered medications for this visit. No current outpatient medications on file.      Facility-Administered Medications Ordered in Other Visits   Medication Dose Route Frequency Provider Last Rate Last Admin    sodium chloride flush 0.9 % injection 5-40 mL  5-40 mL Intravenous 2 times per day Jose Herrera MD        sodium chloride flush 0.9 % injection 5-40 mL  5-40 mL Intravenous PRN Jose Herrera MD        0.9 % sodium chloride infusion  25 mL Intravenous PRN Jose Herrera MD        ciprofloxacin (CIPRO) IVPB 400 mg  400 mg Intravenous Once Jose Herrera MD        lactated ringers infusion   Intravenous Continuous Agustín Arriola MD           Allergies:  No Known Allergies    Problem List:    Patient Active Problem List   Diagnosis Code    Hyperlipidemia E78.5    Prostate cancer ([facility] C61    Hypertension I10    Diabetes mellitus ([facility] E11.9    Shoulder pain, bilateral M25.511, M25.512    Abdominal pain, right lower quadrant R10.31    DKA, type 1, not at goal [facility]) E10.10    Acute metabolic encephalopathy A36.23    Acute pancreatitis K85.90    Acute renal failure (ARF) (HCC) N17.9    High anion gap metabolic acidosis A45.4    Complicated UTI (urinary tract infection) N39.0    Acute cystitis with hematuria N30.01    Severe anemia D64.9    Moderate malnutrition (HCC) E44.0    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    Pancreatic pseudocyst K86.3       Past Medical History:        Diagnosis Date    Cancer ([facility]     Diabetes mellitus ([facility]     Hyperlipidemia     Hypertension     Prostate cancer ([facility]        Past Surgical History:        Procedure Laterality Date    COLON SURGERY      DILATATION, ESOPHAGUS      HC DIALYSIS CATHETER N/A 1/12/2021    TEMPORARY HEMODIALYSIS CATHETER INSERTION performed by Arleth Hallman MD at [facility] LEG SURGERY Left 6 years ago       Social History:    Social History     Tobacco Use    Smoking status: Former Smoker     Packs/day: 1.00     Types: Cigarettes    Smokeless tobacco: Never Used   Substance Use Topics    Alcohol use: Not Currently     Comment: \"daily shots\", \"none in three months\"                                Counseling given: Not Answered      Vital Signs (Current): There were no vitals filed for this visit. BP Readings from Last 3 Encounters:   06/14/21 (!) 149/88   06/01/21 (!) 155/89   05/13/21 135/82       NPO Status:                                                                                 BMI:   Wt Readings from Last 3 Encounters:   06/14/21 154 lb (69.9 kg)   06/01/21 165 lb (74.8 kg)   05/13/21 165 lb 8 oz (75.1 kg)     There is no height or weight on file to calculate BMI.    CBC:   Lab Results   Component Value Date    WBC 4.3 03/01/2021    RBC 3.13 03/01/2021    HGB 8.2 03/01/2021    HCT 25.0 03/01/2021    MCV 79.9 03/01/2021    RDW 16.7 03/01/2021     03/01/2021       CMP:   Lab Results   Component Value Date     03/01/2021    K 3.4 03/01/2021    K 3.6 02/28/2021     03/01/2021    CO2 20 03/01/2021    BUN 13 03/01/2021    CREATININE 1.7 03/01/2021    GFRAA 48 03/01/2021    LABGLOM 48 03/01/2021    GLUCOSE 65 03/01/2021    PROT 6.1 02/27/2021    CALCIUM 8.8 03/01/2021    BILITOT 0.5 02/27/2021    ALKPHOS 90 02/27/2021    AST 12 02/27/2021    ALT 11 02/27/2021       POC Tests: No results for input(s): POCGLU, POCNA, POCK, POCCL, POCBUN, POCHEMO, POCHCT in the last 72 hours.     Coags:   Lab Results   Component Value Date    PROTIME 13.0 02/27/2021    INR 1.2 02/27/2021    APTT 32.8 02/27/2021       HCG (If Applicable): No results found for: PREGTESTUR, PREGSERUM, HCG, HCGQUANT     ABGs: No results found for: PHART, PO2ART, DIU2OKU, ZIZ4KTL, BEART, U0HDEERX     Type & Screen (If Applicable):  No results found for: LABABO, LABRH    Drug/Infectious Status (If Applicable):  No results found for: HIV, HEPCAB    COVID-19 Screening (If Applicable):   Lab Results   Component Value Date    COVID19 Not Detected 01/11/2021         Anesthesia Evaluation  Patient summary reviewed and Nursing notes reviewed no history of anesthetic complications:   Airway: Mallampati: III  TM distance: >3 FB   Neck ROM: limited  Mouth opening: > = 3 FB Dental:          Pulmonary:   (+) shortness of breath (With conversation):  rhonchi,  decreased breath sounds,      (-) not a current smoker                           Cardiovascular:  Exercise tolerance: poor (<4 METS),   (+) hypertension: moderate, dysrhythmias: atrial fibrillation, HAZEL: after ambulating 1 flight of stairs, murmur, hyperlipidemia      ECG reviewed  Rhythm: irregular  Rate: normal           Beta Blocker:  Dose within 24 Hrs      ROS comment: Atrial fibrillation  Minimal voltage criteria for LVH, may be normal variant  No previous ECGs available  Confirmed by Quinn Bone ([id number]) on 1/10/2021 8:28:30 AM     Neuro/Psych:   Negative Neuro/Psych ROS              GI/Hepatic/Renal:   (+) GERD:, renal disease (Creatinine 4.3 & GFR 16): ARF,          ROS comment: Pancreatic pseudocyst.   Endo/Other:    (+) DiabetesType II DM, poorly controlled, using insulin, blood dyscrasia: anemia and anticoagulation therapy, arthritis: OA., malignancy/cancer (Prostate cancer). Pt had no PAT visit       Abdominal:         (-) obese     Vascular: negative vascular ROS. Anesthesia Plan      general     ASA 3       Induction: intravenous. MIPS: Postoperative opioids intended and Prophylactic antiemetics administered. Anesthetic plan and risks discussed with patient and spouse. Plan discussed with CRNA. DOS STAFF ADDENDUM:    Pt seen and examined, chart reviewed (including anesthesia, drug and allergy history). Anesthetic plan, risks, benefits, alternatives, and personnel involved discussed with patient. Patient verbalized an understanding and agrees to proceed.   Plan discussed with care team members and agreed upon.     Ruddy Bailey MD  Staff Anesthesiologist  9:52 AM    Ruddy Bailey MD   6/14/2021

## 2021-06-14 NOTE — ANESTHESIA POSTPROCEDURE EVALUATION
Department of Anesthesiology  Postprocedure Note    Patient: Andre Judd  MRN: [id number]  YOB: 1950  Date of evaluation: 6/14/2021  Time:  4:20 PM     Procedure Summary     Date: 06/14/21 Room / Location: [address] [place] [address] 644 / 4199 [facility]    Anesthesia Start: 3376 Anesthesia Stop: 1143    Procedures:       PANCREATIC PSEUDOCYST EXCISION DRAINAGE (N/A )      ENDOSCOPIC ULTRASOUND Diagnosis: (PANCREATIC PSUEDOCYST)    Surgeons: Javed Collado MD Responsible Provider: Alycia Taylor MD    Anesthesia Type: general ASA Status: 3          Anesthesia Type: general    Blade Phase I: Blade Score: 10    Blade Phase II: Blade Score: 9    Last vitals: Reviewed and per EMR flowsheets.        Anesthesia Post Evaluation    Patient location during evaluation: PACU  Patient participation: complete - patient participated  Level of consciousness: awake  Airway patency: patent  Nausea & Vomiting: no nausea and no vomiting  Complications: no  Cardiovascular status: hemodynamically stable  Respiratory status: acceptable  Hydration status: euvolemic

## 2021-06-15 ENCOUNTER — TELEPHONE (OUTPATIENT)
Dept: HEMATOLOGY | Age: 71
End: 2021-06-15

## 2021-06-15 NOTE — TELEPHONE ENCOUNTER
I called patient to make his EUS follow up appt. Appt made on 6/25/21 at 10:15am at [facility]. He confirmed this appt.     Electronically signed by Omar Rodriguez RN on 6/15/2021 at 8:58 AM

## 2021-06-17 ENCOUNTER — HOSPITAL ENCOUNTER (EMERGENCY)
Age: 71
Discharge: HOME | End: 2021-06-18
Attending: EMERGENCY MEDICINE
Payer: MEDICARE

## 2021-06-17 ENCOUNTER — APPOINTMENT (OUTPATIENT)
Dept: CT IMAGING | Age: 71
End: 2021-06-17
Payer: MEDICARE

## 2021-06-17 DIAGNOSIS — R73.9 HYPERGLYCEMIA: ICD-10-CM

## 2021-06-17 DIAGNOSIS — R51.9 NONINTRACTABLE HEADACHE, UNSPECIFIED CHRONICITY PATTERN, UNSPECIFIED HEADACHE TYPE: Primary | ICD-10-CM

## 2021-06-17 LAB
ALBUMIN SERPL-MCNC: 3.6 G/DL (ref 3.5–5.2)
ALP BLD-CCNC: 95 U/L (ref 40–129)
ALT SERPL-CCNC: 7 U/L (ref 0–40)
ANION GAP SERPL CALCULATED.3IONS-SCNC: 12 MMOL/L (ref 7–16)
AST SERPL-CCNC: 10 U/L (ref 0–39)
BASOPHILS ABSOLUTE: 0.02 E9/L (ref 0–0.2)
BASOPHILS RELATIVE PERCENT: 0.3 % (ref 0–2)
BETA-HYDROXYBUTYRATE: 0.27 MMOL/L (ref 0.02–0.27)
BILIRUB SERPL-MCNC: 0.7 MG/DL (ref 0–1.2)
BUN BLDV-MCNC: 26 MG/DL (ref 6–23)
CALCIUM SERPL-MCNC: 9.4 MG/DL (ref 8.6–10.2)
CHLORIDE BLD-SCNC: 93 MMOL/L (ref 98–107)
CO2: 22 MMOL/L (ref 22–29)
CREAT SERPL-MCNC: 2.1 MG/DL (ref 0.7–1.2)
EOSINOPHILS ABSOLUTE: 0.01 E9/L (ref 0.05–0.5)
EOSINOPHILS RELATIVE PERCENT: 0.1 % (ref 0–6)
GFR AFRICAN AMERICAN: 38
GFR NON-AFRICAN AMERICAN: 38 ML/MIN/1.73
GLUCOSE BLD-MCNC: 449 MG/DL (ref 74–99)
HCT VFR BLD CALC: 32.8 % (ref 37–54)
HEMOGLOBIN: 11.1 G/DL (ref 12.5–16.5)
IMMATURE GRANULOCYTES #: 0.02 E9/L
IMMATURE GRANULOCYTES %: 0.3 % (ref 0–5)
LACTIC ACID: 1.9 MMOL/L (ref 0.5–2.2)
LIPASE: 7 U/L (ref 13–60)
LYMPHOCYTES ABSOLUTE: 1.04 E9/L (ref 1.5–4)
LYMPHOCYTES RELATIVE PERCENT: 15 % (ref 20–42)
MCH RBC QN AUTO: 27.1 PG (ref 26–35)
MCHC RBC AUTO-ENTMCNC: 33.8 % (ref 32–34.5)
MCV RBC AUTO: 80.2 FL (ref 80–99.9)
METER GLUCOSE: 319 MG/DL (ref 74–99)
MONOCYTES ABSOLUTE: 0.58 E9/L (ref 0.1–0.95)
MONOCYTES RELATIVE PERCENT: 8.4 % (ref 2–12)
NEUTROPHILS ABSOLUTE: 5.27 E9/L (ref 1.8–7.3)
NEUTROPHILS RELATIVE PERCENT: 75.9 % (ref 43–80)
PDW BLD-RTO: 17.4 FL (ref 11.5–15)
PLATELET # BLD: 195 E9/L (ref 130–450)
PMV BLD AUTO: 11.4 FL (ref 7–12)
POTASSIUM REFLEX MAGNESIUM: 4.3 MMOL/L (ref 3.5–5)
RBC # BLD: 4.09 E12/L (ref 3.8–5.8)
SODIUM BLD-SCNC: 127 MMOL/L (ref 132–146)
TOTAL PROTEIN: 6.7 G/DL (ref 6.4–8.3)
TROPONIN, HIGH SENSITIVITY: 25 NG/L (ref 0–11)
WBC # BLD: 6.9 E9/L (ref 4.5–11.5)

## 2021-06-17 PROCEDURE — 85025 COMPLETE CBC W/AUTO DIFF WBC: CPT

## 2021-06-17 PROCEDURE — 96374 THER/PROPH/DIAG INJ IV PUSH: CPT

## 2021-06-17 PROCEDURE — 80053 COMPREHEN METABOLIC PANEL: CPT

## 2021-06-17 PROCEDURE — 6370000000 HC RX 637 (ALT 250 FOR IP): Performed by: STUDENT IN AN ORGANIZED HEALTH CARE EDUCATION/TRAINING PROGRAM

## 2021-06-17 PROCEDURE — 93005 ELECTROCARDIOGRAM TRACING: CPT | Performed by: PHYSICIAN ASSISTANT

## 2021-06-17 PROCEDURE — 99283 EMERGENCY DEPT VISIT LOW MDM: CPT

## 2021-06-17 PROCEDURE — 82962 GLUCOSE BLOOD TEST: CPT

## 2021-06-17 PROCEDURE — 2580000003 HC RX 258: Performed by: STUDENT IN AN ORGANIZED HEALTH CARE EDUCATION/TRAINING PROGRAM

## 2021-06-17 PROCEDURE — 83605 ASSAY OF LACTIC ACID: CPT

## 2021-06-17 PROCEDURE — 83690 ASSAY OF LIPASE: CPT

## 2021-06-17 PROCEDURE — 84484 ASSAY OF TROPONIN QUANT: CPT

## 2021-06-17 PROCEDURE — 70450 CT HEAD/BRAIN W/O DYE: CPT

## 2021-06-17 PROCEDURE — 82010 KETONE BODYS QUAN: CPT

## 2021-06-17 RX ORDER — PANTOPRAZOLE SODIUM 40 MG/1
40 TABLET, DELAYED RELEASE ORAL DAILY
COMMUNITY
End: 2021-06-22

## 2021-06-17 RX ORDER — 0.9 % SODIUM CHLORIDE 0.9 %
1000 INTRAVENOUS SOLUTION INTRAVENOUS ONCE
Status: COMPLETED | OUTPATIENT
Start: 2021-06-17 | End: 2021-06-18

## 2021-06-17 RX ORDER — ACETAMINOPHEN 325 MG/1
650 TABLET ORAL ONCE
Status: COMPLETED | OUTPATIENT
Start: 2021-06-17 | End: 2021-06-17

## 2021-06-17 RX ORDER — ZINC SULFATE 50(220)MG
50 CAPSULE ORAL DAILY
COMMUNITY
End: 2021-06-22

## 2021-06-17 RX ORDER — TADALAFIL 20 MG/1
20 TABLET ORAL PRN
Status: ON HOLD | COMMUNITY
End: 2022-01-31 | Stop reason: HOSPADM

## 2021-06-17 RX ADMIN — SODIUM CHLORIDE 1000 ML: 9 INJECTION, SOLUTION INTRAVENOUS at 22:49

## 2021-06-17 RX ADMIN — ACETAMINOPHEN 650 MG: 325 TABLET ORAL at 22:41

## 2021-06-17 ASSESSMENT — PAIN DESCRIPTION - LOCATION: LOCATION: CHEST

## 2021-06-17 ASSESSMENT — ENCOUNTER SYMPTOMS
SHORTNESS OF BREATH: 0
PHOTOPHOBIA: 0
VOMITING: 0
ABDOMINAL PAIN: 0
VOICE CHANGE: 0
CONSTIPATION: 0
COUGH: 0
DIARRHEA: 0
NAUSEA: 0
TROUBLE SWALLOWING: 0
RHINORRHEA: 0

## 2021-06-17 ASSESSMENT — PAIN SCALES - GENERAL
PAINLEVEL_OUTOF10: 8
PAINLEVEL_OUTOF10: 8

## 2021-06-17 ASSESSMENT — PAIN DESCRIPTION - PAIN TYPE: TYPE: ACUTE PAIN

## 2021-06-17 NOTE — ED NOTES
FIRST PROVIDER CONTACT ASSESSMENT NOTE      Department of Emergency Medicine   Admit Date: No admission date for patient encounter. Chief Complaint: Hyperglycemia (sugar was great than 500 this morning, last time he checked it was 480lb) and Headache      History of Present Illness:    Ander Yanes is a 66 y.o. male who presents to the ED for hyperglycemia.  Glucose has been over 500 at home and patient also reports severe HA.         -----------------END OF FIRST PROVIDER CONTACT ASSESSMENT NOTE--------------  Electronically signed by Ivette Luke PA-C   DD: 6/17/21               Ivette Luke PA-C  06/17/21 2363

## 2021-06-18 VITALS
OXYGEN SATURATION: 99 % | TEMPERATURE: 98.6 F | HEART RATE: 73 BPM | WEIGHT: 170 LBS | HEIGHT: 73 IN | BODY MASS INDEX: 22.53 KG/M2 | RESPIRATION RATE: 19 BRPM | SYSTOLIC BLOOD PRESSURE: 163 MMHG | DIASTOLIC BLOOD PRESSURE: 87 MMHG

## 2021-06-18 LAB
CHP ED QC CHECK: NORMAL
EKG ATRIAL RATE: 288 BPM
EKG Q-T INTERVAL: 352 MS
EKG QRS DURATION: 74 MS
EKG QTC CALCULATION (BAZETT): 440 MS
EKG R AXIS: 58 DEGREES
EKG T AXIS: 3 DEGREES
EKG VENTRICULAR RATE: 94 BPM
GLUCOSE BLD-MCNC: 304 MG/DL
METER GLUCOSE: 304 MG/DL (ref 74–99)

## 2021-06-18 PROCEDURE — 82962 GLUCOSE BLOOD TEST: CPT

## 2021-06-18 PROCEDURE — 93010 ELECTROCARDIOGRAM REPORT: CPT | Performed by: INTERNAL MEDICINE

## 2021-06-18 NOTE — ED PROVIDER NOTES
Is a 70-year-old male with a history of type 2 diabetes on Metformin, pancreatic pseudocyst, hypertension, hyperlipidemia, prostate cancer presents to the emergency department for headache. Patient states that this is been ongoing for couple days, and he has been checking his sugars at home which have been running in the 400-500s. Patient states that he has been checking and they normally run \"high\". Patient is followed through the [place] for his diabetes care as well as his primary care physician. He states that he is on Metformin 2000 mg/day but today because the sugars were high he took an extra Metformin 1000 mg..  Patient denies any nausea or vomiting, blurred vision or double vision. He states that he has a global headache, nonradiating, nothing makes it better, nothing makes it worse, moderate intensity, constant x2 days. Patient denies any falls, trauma, striking his head. Patient is not on anticoagulation. Patient states he has been compliant with his medications. Denies any dizziness, urinary or GI symptoms. Review of Systems   Constitutional: Negative for chills, fatigue and fever. HENT: Negative for congestion, rhinorrhea, trouble swallowing and voice change. Eyes: Negative for photophobia and visual disturbance. Respiratory: Negative for cough and shortness of breath. Cardiovascular: Negative for chest pain, palpitations and leg swelling. Gastrointestinal: Negative for abdominal pain, constipation, diarrhea, nausea and vomiting. Endocrine: Positive for polydipsia and polyuria. Genitourinary: Negative for dysuria, frequency, hematuria and urgency. Musculoskeletal: Negative for arthralgias and myalgias. Skin: Negative for rash and wound. Allergic/Immunologic: Negative for immunocompromised state. Neurological: Positive for headaches. Negative for dizziness, syncope, weakness, light-headedness and numbness. Psychiatric/Behavioral: Negative for confusion.  The patient Refilled Rx.   AGUILA Espinal.................11/23/2020 12:12 PM      is not nervous/anxious. Physical Exam  Vitals and nursing note reviewed. Constitutional:       General: He is awake. He is not in acute distress. Appearance: He is underweight. He is not ill-appearing or toxic-appearing. HENT:      Head: Normocephalic and atraumatic. Nose: Nose normal.      Mouth/Throat:      Mouth: Mucous membranes are moist.      Pharynx: Oropharynx is clear. Eyes:      Extraocular Movements: Extraocular movements intact. Conjunctiva/sclera: Conjunctivae normal.      Pupils: Pupils are equal, round, and reactive to light. Cardiovascular:      Rate and Rhythm: Normal rate. Pulses: Normal pulses. Radial pulses are 2+ on the right side and 2+ on the left side. Heart sounds: Normal heart sounds. Pulmonary:      Effort: Pulmonary effort is normal.      Breath sounds: Normal breath sounds. Abdominal:      General: There is no distension. Palpations: Abdomen is soft. Tenderness: There is no abdominal tenderness. Musculoskeletal:         General: Normal range of motion. Cervical back: Normal range of motion and neck supple. Right lower leg: No edema. Left lower leg: No edema. Skin:     General: Skin is warm and dry. Capillary Refill: Capillary refill takes less than 2 seconds. Neurological:      General: No focal deficit present. Mental Status: He is alert and oriented to person, place, and time. GCS: GCS eye subscore is 4. GCS verbal subscore is 5. GCS motor subscore is 6. Cranial Nerves: Cranial nerves are intact. Sensory: Sensation is intact. Motor: Motor function is intact. Psychiatric:         Behavior: Behavior is cooperative.         MDM  Number of Diagnoses or Management Options  Hyperglycemia  Nonintractable headache, unspecified chronicity pattern, unspecified headache type  Diagnosis management comments: Patient is a 66-year-old male with a history of uncontrolled diabetes who presents to the emergency department for headache and high blood sugars. Patient has been monitoring his blood sugars at home in the 400-500 range, last A1c was measured at 11 in December 2020. Patient is followed by endocrinology at the [place], and by his primary care physician. Patient takes Metformin. Patient presents awake, alert, ill-appearing, headache. Vital signs all stable and normal with mild hypertension. Physical exam is unremarkable, neurovascularly intact. Work-up including labs shows hyperglycemia, no leukocytosis or anemia. Labs not consistent with the DKA. Troponin is mildly elevated, no signs of ACS. Lipase is neck negative as well as lactic acid. EKG shows a flutter without ectopy. Patient repeat blood sugar shows 319. Patient was treated with IV fluid, insulin was considered but held due to blood sugar in the 300s. Recheck shows sugar of 304. Patient will follow up with endocrinology and primary care in the morning. Patient given return instructions. All questions were answered at the bedside. Patient was monitored in the emergency department no further change and discharged in stable condition. Amount and/or Complexity of Data Reviewed  Clinical lab tests: ordered and reviewed  Tests in the radiology section of CPT®: ordered and reviewed         ED Course as of Jun 18 0106 Thu Jun 17, 2021   2303 Nurse reporting last blood sugar was 393. Will hold on insulin for the moment. Continue to give IV fluids. [QC]      ED Course User Index  [QC] Alvarez Torres MD       ED Course as of Jun 18 0106 Thu Jun 17, 2021   2303 Nurse reporting last blood sugar was 393. Will hold on insulin for the moment. Continue to give IV fluids.     [QC]      ED Course User Index  [QC] Alvarez Torres MD       --------------------------------------------- PAST HISTORY ---------------------------------------------  Past Medical History:  has a past medical history of Cancer [facility]), Diabetes mellitus ([facility] [address] Hyperlipidemia, Hypertension, and Prostate cancer ([facility] [address] Past Surgical History:  has a past surgical history that includes Colon surgery; Dilatation, esophagus; Leg Surgery (Left, 6 years ago); hc dialysis catheter (N/A, 1/12/2021); Pancreas Biopsy (N/A, 6/14/2021); and Upper gastrointestinal endoscopy (6/14/2021). Social History:  reports that he has quit smoking. His smoking use included cigarettes. He smoked 1.00 pack per day. He has never used smokeless tobacco. He reports previous alcohol use. He reports that he does not use drugs. Family History: family history is not on file. The patients home medications have been reviewed. Allergies: Patient has no known allergies.     -------------------------------------------------- RESULTS -------------------------------------------------  Labs:  Results for orders placed or performed during the hospital encounter of 06/17/21   CBC Auto Differential   Result Value Ref Range    WBC 6.9 4.5 - 11.5 E9/L    RBC 4.09 3.80 - 5.80 E12/L    Hemoglobin 11.1 (L) 12.5 - 16.5 g/dL    Hematocrit 32.8 (L) 37.0 - 54.0 %    MCV 80.2 80.0 - 99.9 fL    MCH 27.1 26.0 - 35.0 pg    MCHC 33.8 32.0 - 34.5 %    RDW 17.4 (H) 11.5 - 15.0 fL    Platelets 510 391 - 053 E9/L    MPV 11.4 7.0 - 12.0 fL    Neutrophils % 75.9 43.0 - 80.0 %    Immature Granulocytes % 0.3 0.0 - 5.0 %    Lymphocytes % 15.0 (L) 20.0 - 42.0 %    Monocytes % 8.4 2.0 - 12.0 %    Eosinophils % 0.1 0.0 - 6.0 %    Basophils % 0.3 0.0 - 2.0 %    Neutrophils Absolute 5.27 1.80 - 7.30 E9/L    Immature Granulocytes # 0.02 E9/L    Lymphocytes Absolute 1.04 (L) 1.50 - 4.00 E9/L    Monocytes Absolute 0.58 0.10 - 0.95 E9/L    Eosinophils Absolute 0.01 (L) 0.05 - 0.50 E9/L    Basophils Absolute 0.02 0.00 - 0.20 E9/L   Comprehensive Metabolic Panel w/ Reflex to MG   Result Value Ref Range    Sodium 127 (L) 132 - 146 mmol/L    Potassium reflex Magnesium 4.3 3.5 - 5.0 mmol/L    Chloride 93 (L) 98 - 107 mmol/L CO2 22 22 - 29 mmol/L    Anion Gap 12 7 - 16 mmol/L    Glucose 449 (H) 74 - 99 mg/dL    BUN 26 (H) 6 - 23 mg/dL    CREATININE 2.1 (H) 0.7 - 1.2 mg/dL    GFR Non-African American 38 >=60 mL/min/1.73    GFR African American 38     Calcium 9.4 8.6 - 10.2 mg/dL    Total Protein 6.7 6.4 - 8.3 g/dL    Albumin 3.6 3.5 - 5.2 g/dL    Total Bilirubin 0.7 0.0 - 1.2 mg/dL    Alkaline Phosphatase 95 40 - 129 U/L    ALT 7 0 - 40 U/L    AST 10 0 - 39 U/L   Lipase   Result Value Ref Range    Lipase 7 (L) 13 - 60 U/L   Troponin   Result Value Ref Range    Troponin, High Sensitivity 25 (H) 0 - 11 ng/L   Lactic Acid, Plasma   Result Value Ref Range    Lactic Acid 1.9 0.5 - 2.2 mmol/L   Beta-Hydroxybutyrate   Result Value Ref Range    Beta-Hydroxybutyrate 0.27 0.02 - 0.27 mmol/L   POCT Glucose   Result Value Ref Range    Glucose 304 mg/dL    QC OK? y    POCT Glucose   Result Value Ref Range    Meter Glucose 319 (H) 74 - 99 mg/dL   EKG 12 Lead   Result Value Ref Range    Ventricular Rate 94 BPM    Atrial Rate 288 BPM    QRS Duration 74 ms    Q-T Interval 352 ms    QTc Calculation (Bazett) 440 ms    R Axis 58 degrees    T Axis 3 degrees       Radiology:  CT HEAD WO CONTRAST   Final Result   No acute intracranial abnormality. EKG: This EKG is signed and interpreted by me. Rate: 94  Rhythm: Atrial Flutter  Interpretation:A flutter, no acute ST or T wave changes  Comparison: stable as compared to patient's most recent EKG and no previous EKG available     ------------------------- NURSING NOTES AND VITALS REVIEWED ---------------------------  Date / Time Roomed:  6/17/2021 10:05 PM  ED Bed Assignment:  18A/18A-18    The nursing notes within the ED encounter and vital signs as below have been reviewed.    BP (!) 163/87   Pulse 73   Temp 98.6 °F (37 °C)   Resp 19   Ht 6' 1\" (1.854 m)   Wt 170 lb (77.1 kg)   SpO2 99%   BMI 22.43 kg/m²   Oxygen Saturation Interpretation: Normal      ------------------------------------------ PROGRESS NOTES ------------------------------------------  1:06 AM EDT  I have spoken with the patient and discussed todays results, in addition to providing specific details for the plan of care and counseling regarding the diagnosis and prognosis. Their questions are answered at this time and they are agreeable with the plan. I discussed at length with them reasons for immediate return here for re evaluation. They will followup with their Endocrinologist and primary care physician by calling their office tomorrow. --------------------------------- ADDITIONAL PROVIDER NOTES ---------------------------------  At this time the patient is without objective evidence of an acute process requiring hospitalization or inpatient management. They have remained hemodynamically stable throughout their entire ED visit and are stable for discharge with outpatient follow-up. The plan has been discussed in detail and they are aware of the specific conditions for emergent return, as well as the importance of follow-up. Discharge Medication List as of 6/18/2021 12:40 AM        Diagnosis:  1. Nonintractable headache, unspecified chronicity pattern, unspecified headache type    2. Hyperglycemia        Disposition:  Patient's disposition: Discharge to home  Patient's condition is stable. 6/18/21, 1:06 AM EDT.     This note is prepared by Lou Hoffman MD -PGY-2         Lou Hoffman MD  Resident  06/18/21 7973

## 2021-06-22 ENCOUNTER — ANESTHESIA EVENT (OUTPATIENT)
Dept: OPERATING ROOM | Age: 71
DRG: 405 | End: 2021-06-22
Payer: MEDICARE

## 2021-06-22 ENCOUNTER — APPOINTMENT (OUTPATIENT)
Dept: GENERAL RADIOLOGY | Age: 71
DRG: 405 | End: 2021-06-22
Payer: MEDICARE

## 2021-06-22 ENCOUNTER — APPOINTMENT (OUTPATIENT)
Dept: CT IMAGING | Age: 71
DRG: 405 | End: 2021-06-22
Payer: MEDICARE

## 2021-06-22 ENCOUNTER — TELEPHONE (OUTPATIENT)
Dept: HEMATOLOGY | Age: 71
End: 2021-06-22

## 2021-06-22 ENCOUNTER — HOSPITAL ENCOUNTER (INPATIENT)
Age: 71
LOS: 6 days | Discharge: HOME HEALTH | DRG: 405 | End: 2021-06-28
Attending: EMERGENCY MEDICINE | Admitting: INTERNAL MEDICINE
Payer: MEDICARE

## 2021-06-22 DIAGNOSIS — R07.9 CHEST PAIN, UNSPECIFIED TYPE: Primary | ICD-10-CM

## 2021-06-22 DIAGNOSIS — K85.90 ACUTE PANCREATITIS, UNSPECIFIED COMPLICATION STATUS, UNSPECIFIED PANCREATITIS TYPE: ICD-10-CM

## 2021-06-22 DIAGNOSIS — R10.9 ABDOMINAL PAIN, UNSPECIFIED ABDOMINAL LOCATION: ICD-10-CM

## 2021-06-22 DIAGNOSIS — G89.18 POSTOPERATIVE PAIN: ICD-10-CM

## 2021-06-22 PROBLEM — K86.1 ACUTE ON CHRONIC PANCREATITIS (HCC): Status: ACTIVE | Noted: 2021-06-22

## 2021-06-22 LAB
ABO/RH: NORMAL
ACETAMINOPHEN LEVEL: <5 MCG/ML (ref 10–30)
ALBUMIN SERPL-MCNC: 3.5 G/DL (ref 3.5–5.2)
ALP BLD-CCNC: 152 U/L (ref 40–129)
ALT SERPL-CCNC: 8 U/L (ref 0–40)
AMPHETAMINE SCREEN, URINE: NOT DETECTED
ANION GAP SERPL CALCULATED.3IONS-SCNC: 18 MMOL/L (ref 7–16)
ANISOCYTOSIS: ABNORMAL
ANTIBODY SCREEN: NORMAL
AST SERPL-CCNC: 14 U/L (ref 0–39)
BARBITURATE SCREEN URINE: NOT DETECTED
BASOPHILS ABSOLUTE: 0.01 E9/L (ref 0–0.2)
BASOPHILS RELATIVE PERCENT: 0.1 % (ref 0–2)
BENZODIAZEPINE SCREEN, URINE: NOT DETECTED
BILIRUB SERPL-MCNC: 0.7 MG/DL (ref 0–1.2)
BUN BLDV-MCNC: 30 MG/DL (ref 6–23)
BURR CELLS: ABNORMAL
CALCIUM SERPL-MCNC: 9.6 MG/DL (ref 8.6–10.2)
CANNABINOID SCREEN URINE: NOT DETECTED
CHLORIDE BLD-SCNC: 92 MMOL/L (ref 98–107)
CO2: 21 MMOL/L (ref 22–29)
COCAINE METABOLITE SCREEN URINE: NOT DETECTED
CREAT SERPL-MCNC: 2 MG/DL (ref 0.7–1.2)
EKG ATRIAL RATE: 340 BPM
EKG Q-T INTERVAL: 346 MS
EKG QRS DURATION: 82 MS
EKG QTC CALCULATION (BAZETT): 414 MS
EKG R AXIS: 16 DEGREES
EKG T AXIS: 44 DEGREES
EKG VENTRICULAR RATE: 86 BPM
EOSINOPHILS ABSOLUTE: 0 E9/L (ref 0.05–0.5)
EOSINOPHILS RELATIVE PERCENT: 0 % (ref 0–6)
ETHANOL: <10 MG/DL (ref 0–0.08)
FENTANYL SCREEN, URINE: NOT DETECTED
GFR AFRICAN AMERICAN: 40
GFR NON-AFRICAN AMERICAN: 40 ML/MIN/1.73
GLUCOSE BLD-MCNC: 206 MG/DL (ref 74–99)
HCT VFR BLD CALC: 33.4 % (ref 37–54)
HEMOGLOBIN: 11.3 G/DL (ref 12.5–16.5)
HYPOCHROMIA: ABNORMAL
IMMATURE GRANULOCYTES #: 0.04 E9/L
IMMATURE GRANULOCYTES %: 0.5 % (ref 0–5)
LACTIC ACID: 2.4 MMOL/L (ref 0.5–2.2)
LIPASE: 17 U/L (ref 13–60)
LYMPHOCYTES ABSOLUTE: 0.49 E9/L (ref 1.5–4)
LYMPHOCYTES RELATIVE PERCENT: 6.6 % (ref 20–42)
Lab: NORMAL
MCH RBC QN AUTO: 26.8 PG (ref 26–35)
MCHC RBC AUTO-ENTMCNC: 33.8 % (ref 32–34.5)
MCV RBC AUTO: 79.3 FL (ref 80–99.9)
METHADONE SCREEN, URINE: NOT DETECTED
MONOCYTES ABSOLUTE: 0.71 E9/L (ref 0.1–0.95)
MONOCYTES RELATIVE PERCENT: 9.5 % (ref 2–12)
NEUTROPHILS ABSOLUTE: 6.2 E9/L (ref 1.8–7.3)
NEUTROPHILS RELATIVE PERCENT: 83.3 % (ref 43–80)
OPIATE SCREEN URINE: NOT DETECTED
OVALOCYTES: ABNORMAL
OXYCODONE URINE: NOT DETECTED
PDW BLD-RTO: 17.2 FL (ref 11.5–15)
PHENCYCLIDINE SCREEN URINE: NOT DETECTED
PLATELET # BLD: 184 E9/L (ref 130–450)
PMV BLD AUTO: 11 FL (ref 7–12)
POIKILOCYTES: ABNORMAL
POLYCHROMASIA: ABNORMAL
POTASSIUM SERPL-SCNC: 4.2 MMOL/L (ref 3.5–5)
RBC # BLD: 4.21 E12/L (ref 3.8–5.8)
SALICYLATE, SERUM: 2.2 MG/DL (ref 0–30)
SODIUM BLD-SCNC: 131 MMOL/L (ref 132–146)
TOTAL PROTEIN: 7.2 G/DL (ref 6.4–8.3)
TRICYCLIC ANTIDEPRESSANTS SCREEN SERUM: NEGATIVE NG/ML
TROPONIN, HIGH SENSITIVITY: 29 NG/L (ref 0–11)
WBC # BLD: 7.5 E9/L (ref 4.5–11.5)

## 2021-06-22 PROCEDURE — 86900 BLOOD TYPING SEROLOGIC ABO: CPT

## 2021-06-22 PROCEDURE — 2580000003 HC RX 258: Performed by: STUDENT IN AN ORGANIZED HEALTH CARE EDUCATION/TRAINING PROGRAM

## 2021-06-22 PROCEDURE — 99285 EMERGENCY DEPT VISIT HI MDM: CPT | Performed by: TRANSPLANT SURGERY

## 2021-06-22 PROCEDURE — 74176 CT ABD & PELVIS W/O CONTRAST: CPT

## 2021-06-22 PROCEDURE — 2580000003 HC RX 258: Performed by: EMERGENCY MEDICINE

## 2021-06-22 PROCEDURE — 6360000002 HC RX W HCPCS: Performed by: HOSPITALIST

## 2021-06-22 PROCEDURE — 2580000003 HC RX 258: Performed by: FAMILY MEDICINE

## 2021-06-22 PROCEDURE — 83690 ASSAY OF LIPASE: CPT

## 2021-06-22 PROCEDURE — 71045 X-RAY EXAM CHEST 1 VIEW: CPT

## 2021-06-22 PROCEDURE — 6360000002 HC RX W HCPCS: Performed by: EMERGENCY MEDICINE

## 2021-06-22 PROCEDURE — 80143 DRUG ASSAY ACETAMINOPHEN: CPT

## 2021-06-22 PROCEDURE — 76937 US GUIDE VASCULAR ACCESS: CPT

## 2021-06-22 PROCEDURE — 80179 DRUG ASSAY SALICYLATE: CPT

## 2021-06-22 PROCEDURE — 84484 ASSAY OF TROPONIN QUANT: CPT

## 2021-06-22 PROCEDURE — 93005 ELECTROCARDIOGRAM TRACING: CPT | Performed by: EMERGENCY MEDICINE

## 2021-06-22 PROCEDURE — 99284 EMERGENCY DEPT VISIT MOD MDM: CPT

## 2021-06-22 PROCEDURE — 86901 BLOOD TYPING SEROLOGIC RH(D): CPT

## 2021-06-22 PROCEDURE — 80053 COMPREHEN METABOLIC PANEL: CPT

## 2021-06-22 PROCEDURE — C1751 CATH, INF, PER/CENT/MIDLINE: HCPCS

## 2021-06-22 PROCEDURE — 1200000000 HC SEMI PRIVATE

## 2021-06-22 PROCEDURE — 86850 RBC ANTIBODY SCREEN: CPT

## 2021-06-22 PROCEDURE — 02HV33Z INSERTION OF INFUSION DEVICE INTO SUPERIOR VENA CAVA, PERCUTANEOUS APPROACH: ICD-10-PCS | Performed by: SPECIALIST

## 2021-06-22 PROCEDURE — 80307 DRUG TEST PRSMV CHEM ANLYZR: CPT

## 2021-06-22 PROCEDURE — 2500000003 HC RX 250 WO HCPCS: Performed by: STUDENT IN AN ORGANIZED HEALTH CARE EDUCATION/TRAINING PROGRAM

## 2021-06-22 PROCEDURE — 86923 COMPATIBILITY TEST ELECTRIC: CPT

## 2021-06-22 PROCEDURE — 99221 1ST HOSP IP/OBS SF/LOW 40: CPT | Performed by: SURGERY

## 2021-06-22 PROCEDURE — 36415 COLL VENOUS BLD VENIPUNCTURE: CPT

## 2021-06-22 PROCEDURE — 85025 COMPLETE CBC W/AUTO DIFF WBC: CPT

## 2021-06-22 PROCEDURE — 82077 ASSAY SPEC XCP UR&BREATH IA: CPT

## 2021-06-22 PROCEDURE — 93010 ELECTROCARDIOGRAM REPORT: CPT | Performed by: INTERNAL MEDICINE

## 2021-06-22 PROCEDURE — 83605 ASSAY OF LACTIC ACID: CPT

## 2021-06-22 PROCEDURE — 36569 INSJ PICC 5 YR+ W/O IMAGING: CPT

## 2021-06-22 RX ORDER — INSULIN ASPART 100 [IU]/ML
0-5 INJECTION, SOLUTION INTRAVENOUS; SUBCUTANEOUS
Status: ON HOLD | COMMUNITY
End: 2022-01-31 | Stop reason: SDUPTHER

## 2021-06-22 RX ORDER — LIDOCAINE HYDROCHLORIDE 10 MG/ML
5 INJECTION, SOLUTION EPIDURAL; INFILTRATION; INTRACAUDAL; PERINEURAL ONCE
Status: DISCONTINUED | OUTPATIENT
Start: 2021-06-22 | End: 2021-06-28 | Stop reason: HOSPADM

## 2021-06-22 RX ORDER — ATORVASTATIN CALCIUM 20 MG/1
10 TABLET, FILM COATED ORAL NIGHTLY
COMMUNITY
End: 2021-06-22

## 2021-06-22 RX ORDER — SODIUM CHLORIDE 0.9 % (FLUSH) 0.9 %
5-40 SYRINGE (ML) INJECTION EVERY 12 HOURS SCHEDULED
Status: DISCONTINUED | OUTPATIENT
Start: 2021-06-22 | End: 2021-06-28 | Stop reason: HOSPADM

## 2021-06-22 RX ORDER — FLUCONAZOLE 2 MG/ML
200 INJECTION, SOLUTION INTRAVENOUS
Status: COMPLETED | OUTPATIENT
Start: 2021-06-23 | End: 2021-06-23

## 2021-06-22 RX ORDER — VITAMIN B COMPLEX
1 CAPSULE ORAL DAILY
COMMUNITY
End: 2021-06-22

## 2021-06-22 RX ORDER — HEPARIN SODIUM (PORCINE) LOCK FLUSH IV SOLN 100 UNIT/ML 100 UNIT/ML
3 SOLUTION INTRAVENOUS EVERY 12 HOURS SCHEDULED
Status: DISCONTINUED | OUTPATIENT
Start: 2021-06-22 | End: 2021-06-28 | Stop reason: HOSPADM

## 2021-06-22 RX ORDER — SODIUM CHLORIDE, SODIUM LACTATE, POTASSIUM CHLORIDE, CALCIUM CHLORIDE 600; 310; 30; 20 MG/100ML; MG/100ML; MG/100ML; MG/100ML
INJECTION, SOLUTION INTRAVENOUS CONTINUOUS
Status: DISCONTINUED | OUTPATIENT
Start: 2021-06-22 | End: 2021-06-23

## 2021-06-22 RX ORDER — HEPARIN SODIUM (PORCINE) LOCK FLUSH IV SOLN 100 UNIT/ML 100 UNIT/ML
3 SOLUTION INTRAVENOUS PRN
Status: DISCONTINUED | OUTPATIENT
Start: 2021-06-22 | End: 2021-06-28 | Stop reason: HOSPADM

## 2021-06-22 RX ORDER — ONDANSETRON 2 MG/ML
4 INJECTION INTRAMUSCULAR; INTRAVENOUS EVERY 6 HOURS PRN
Status: DISCONTINUED | OUTPATIENT
Start: 2021-06-22 | End: 2021-06-28 | Stop reason: HOSPADM

## 2021-06-22 RX ORDER — MORPHINE SULFATE 2 MG/ML
2 INJECTION, SOLUTION INTRAMUSCULAR; INTRAVENOUS ONCE
Status: COMPLETED | OUTPATIENT
Start: 2021-06-22 | End: 2021-06-22

## 2021-06-22 RX ORDER — SODIUM CHLORIDE 0.9 % (FLUSH) 0.9 %
5-40 SYRINGE (ML) INJECTION PRN
Status: DISCONTINUED | OUTPATIENT
Start: 2021-06-22 | End: 2021-06-28 | Stop reason: HOSPADM

## 2021-06-22 RX ORDER — LISINOPRIL 20 MG/1
20 TABLET ORAL 2 TIMES DAILY
Status: ON HOLD | COMMUNITY
End: 2022-01-31 | Stop reason: HOSPADM

## 2021-06-22 RX ORDER — ACETAMINOPHEN 160 MG
2000 TABLET,DISINTEGRATING ORAL DAILY
COMMUNITY
End: 2021-06-22

## 2021-06-22 RX ORDER — SODIUM CHLORIDE 9 MG/ML
25 INJECTION, SOLUTION INTRAVENOUS PRN
Status: DISCONTINUED | OUTPATIENT
Start: 2021-06-22 | End: 2021-06-28 | Stop reason: HOSPADM

## 2021-06-22 RX ORDER — PETROLATUM 430 MG/G
OINTMENT TOPICAL DAILY PRN
COMMUNITY

## 2021-06-22 RX ORDER — MORPHINE SULFATE 2 MG/ML
2 INJECTION, SOLUTION INTRAMUSCULAR; INTRAVENOUS EVERY 4 HOURS PRN
Status: DISCONTINUED | OUTPATIENT
Start: 2021-06-22 | End: 2021-06-23

## 2021-06-22 RX ORDER — PANCRELIPASE 36000; 180000; 114000 [USP'U]/1; [USP'U]/1; [USP'U]/1
1 CAPSULE, DELAYED RELEASE PELLETS ORAL
COMMUNITY

## 2021-06-22 RX ADMIN — MORPHINE SULFATE 2 MG: 2 INJECTION, SOLUTION INTRAMUSCULAR; INTRAVENOUS at 14:10

## 2021-06-22 RX ADMIN — MORPHINE SULFATE 2 MG: 2 INJECTION, SOLUTION INTRAMUSCULAR; INTRAVENOUS at 06:44

## 2021-06-22 RX ADMIN — Medication 10 ML: at 09:22

## 2021-06-22 RX ADMIN — PIPERACILLIN AND TAZOBACTAM 3375 MG: 3; .375 INJECTION, POWDER, FOR SOLUTION INTRAVENOUS at 06:43

## 2021-06-22 RX ADMIN — Medication 10 ML: at 21:42

## 2021-06-22 RX ADMIN — ONDANSETRON 4 MG: 2 INJECTION INTRAMUSCULAR; INTRAVENOUS at 14:10

## 2021-06-22 RX ADMIN — MORPHINE SULFATE 2 MG: 2 INJECTION, SOLUTION INTRAMUSCULAR; INTRAVENOUS at 18:54

## 2021-06-22 RX ADMIN — CALCIUM GLUCONATE: 98 INJECTION, SOLUTION INTRAVENOUS at 21:35

## 2021-06-22 RX ADMIN — SODIUM CHLORIDE, POTASSIUM CHLORIDE, SODIUM LACTATE AND CALCIUM CHLORIDE: 600; 310; 30; 20 INJECTION, SOLUTION INTRAVENOUS at 09:23

## 2021-06-22 RX ADMIN — ONDANSETRON 4 MG: 2 INJECTION INTRAMUSCULAR; INTRAVENOUS at 06:44

## 2021-06-22 ASSESSMENT — PAIN SCALES - GENERAL
PAINLEVEL_OUTOF10: 7
PAINLEVEL_OUTOF10: 8
PAINLEVEL_OUTOF10: 3
PAINLEVEL_OUTOF10: 8
PAINLEVEL_OUTOF10: 7
PAINLEVEL_OUTOF10: 10
PAINLEVEL_OUTOF10: 8

## 2021-06-22 ASSESSMENT — ENCOUNTER SYMPTOMS
CONSTIPATION: 1
EYE PAIN: 0
COLOR CHANGE: 0
SHORTNESS OF BREATH: 0
EYE ITCHING: 0
ABDOMINAL DISTENTION: 1
ABDOMINAL PAIN: 1

## 2021-06-22 ASSESSMENT — PAIN DESCRIPTION - PAIN TYPE
TYPE: ACUTE PAIN

## 2021-06-22 ASSESSMENT — PAIN DESCRIPTION - DESCRIPTORS
DESCRIPTORS: ACHING;CONSTANT;DISCOMFORT
DESCRIPTORS: STABBING
DESCRIPTORS: ACHING;CONSTANT;DISCOMFORT
DESCRIPTORS: CONSTANT;CRAMPING

## 2021-06-22 ASSESSMENT — PAIN DESCRIPTION - LOCATION
LOCATION: CHEST
LOCATION: ABDOMEN
LOCATION: ABDOMEN;CHEST
LOCATION: ABDOMEN

## 2021-06-22 ASSESSMENT — PAIN DESCRIPTION - ORIENTATION
ORIENTATION: LEFT
ORIENTATION: RIGHT;LEFT

## 2021-06-22 ASSESSMENT — PAIN DESCRIPTION - FREQUENCY
FREQUENCY: CONTINUOUS
FREQUENCY: CONTINUOUS

## 2021-06-22 ASSESSMENT — PAIN DESCRIPTION - ONSET
ONSET: ON-GOING
ONSET: ON-GOING

## 2021-06-22 ASSESSMENT — PAIN DESCRIPTION - PROGRESSION
CLINICAL_PROGRESSION: NOT CHANGED
CLINICAL_PROGRESSION: NOT CHANGED

## 2021-06-22 NOTE — CONSULTS
[address] [place] [address] [facility]    Consultation Note     Admit Date: 6/22/2021  1:46 AM    Reason for Consult:   Infected pseudocyst    Attending Physician:  Mariah Strickland MD     Chief Complaint: Abdominal pain    HISTORY OF PRESENT ILLNESS:   The patient is a 66 y.o. black man.known to the Infectious Diseases service. The patient is admitted through the emergency room with complaints of abdominal pain that has evolved over the 24-hour. On 6/14/2021 he had a pseudocyst was surgerized endoscopically and cystogastrostomy performed. Patient is losing weight and p.o. intake is poor. In the emergency room he been afebrile his BUN and creatinine thirty and 2.0 and his white count is 7.5 and hemoglobin 11.3. Tox screen was negative. He had been evaluated today by  The hepatobiliary and pancreatic surgery unit and he is scheduled for necrosectomy with cyst gastrostomy.   ID was asked to follow along and suggest perioperative antibiotic        Microbiology:  1/9/2021 + for staph aureus MSSA  1/9/2021 yeast Candida nonalbicans urine          Past Medical History:        Diagnosis Date    Cancer ([facility] [address]     Diabetes mellitus ([facility] [address]     Hyperlipidemia     Hypertension     Prostate cancer ([facility] [address]      Past Surgical History:        Procedure Laterality Date    COLON SURGERY      DILATATION, ESOPHAGUS      HC DIALYSIS CATHETER N/A 1/12/2021    TEMPORARY HEMODIALYSIS CATHETER INSERTION performed by Tsering Shaikh MD at [place] [address]. Left 6 years ago    PANCREAS BIOPSY N/A 6/14/2021    PANCREATIC PSEUDOCYST EXCISION DRAINAGE performed by Sandra Schwartz MD at [place] [address]  6/14/2021    ENDOSCOPIC ULTRASOUND performed by Sandra Schwartz MD at [address] [place] [address] [address]     Current Medications:   Scheduled Meds:   lidocaine PF  5 mL Intradermal Once    sodium chloride flush  5-40 mL Intravenous 2 times per day    heparin flush  3 mL Intravenous 2 times per day   Alcides Samson ON 6/23/2021] ceFAZolin  2,000 mg Intravenous On Call to OR     Continuous Infusions:   lactated ringers 125 mL/hr at 06/22/21 3033    sodium chloride      PN-Adult  3 IN 1 Central Line (Standard)       PRN Meds:ondansetron, sodium chloride flush, sodium chloride, heparin flush, morphine    Allergies:  Patient has no known allergies. Social History:   Social History     Socioeconomic History    Marital status: Single     Spouse name: None    Number of children: None    Years of education: None    Highest education level: None   Occupational History    None   Tobacco Use    Smoking status: Former Smoker     Packs/day: 1.00     Types: Cigarettes    Smokeless tobacco: Never Used   Vaping Use    Vaping Use: Never used   Substance and Sexual Activity    Alcohol use: Not Currently     Comment: \"daily shots\", \"none in three months\"    Drug use: No    Sexual activity: None   Other Topics Concern    None   Social History Narrative    None     Social Determinants of Health     Financial Resource Strain:     Difficulty of Paying Living Expenses:    Food Insecurity:     Worried About Running Out of Food in the Last Year:     Ran Out of Food in the Last Year:    Transportation Needs:     Lack of Transportation (Medical):  Lack of Transportation (Non-Medical):    Physical Activity:     Days of Exercise per Week:     Minutes of Exercise per Session:    Stress:     Feeling of Stress :    Social Connections:     Frequency of Communication with Friends and Family:     Frequency of Social Gatherings with Friends and Family:     Attends Mandaen Services:     Active Member of Clubs or Organizations:     Attends Club or Organization Meetings:     Marital Status:    Intimate Partner Violence:     Fear of Current or Ex-Partner:     Emotionally Abused:     Physically Abused:     Sexually Abused:      Family History:   History reviewed. No pertinent family history. . Otherwise non-pertinent to the chief complaint. REVIEW OF SYSTEMS:    CONSTITUTIONAL:  No chills, fevers or night sweats. No loss of weight. EYES:  No double vision or drainage from eyes, ears or throat. HEENT:  No neck stiffness. No dysphagia. No drainage from eyes, ears or throat  RESPIRATORY:  No cough, productive sputum or hemoptysis. CARDIOVASCULAR:  No chest pain, palpitations, orthopnea or dyspnea on exertion. GASTROINTESTINAL: see history of present illness  GENITOURINARY:  No frequency burning dysuria or hematuria. INTEGUMENT/BREAST:  No rash or breast masses. HEMATOLOGIC/LYMPHATIC:  No lymphadenopathy or blood dyscrasics. ALLERGIC/IMMUNOLOGIC:  No anaphylaxis. ENDOCRINE:  No polyuria or polydipsia or temperature intolerance. MUSCULOSKELETAL:  No myalgia or arthralgia. Full ROM. NEUROLOGICAL:  No focal motor sensory deficit. BEHAVIOR/PSYCH:  No psychosis. PHYSICAL EXAM:    Vitals:    BP (!) 181/96   Pulse 108   Temp 97.6 °F (36.4 °C)   Resp 16   Ht 6' 2\" (1.88 m)   Wt 170 lb (77.1 kg)   SpO2 98%   BMI 21.83 kg/m²   Constitutional: The patient is awake, alert, and oriented. Skin: Warm and dry. No rashes were noted. No jaundice. HEENT: Eyes show round, and reactive pupils. Moist mucous membranes, no ulcerations, no thrush. Neck: Supple to movements. No lymphadenopathy. Chest: No use of accessory muscles to breathe. Symmetrical expansion. Auscultation reveals no wheezing, crackles, or rhonchi. Cardiovascular: S1 and S2 are rhythmic and regular. No murmurs appreciated. Abdomen: Positive bowel sounds to auscultation. Benign to palpation. No masses felt. No hepatosplenomegaly. left upper quad pain on palpation  Genitourinary: male  Extremities: No clubbing, no cyanosis, no edema.   Musculoskeletal: E/S  Neurological: no focal  Lines: peripheral  PICC LEFT UEXT    CBC+dif:  Recent Labs     06/22/21  0200   WBC 7.5   HGB 11.3*   HCT 33.4*   MCV 79.3*      NEUTROABS 6.20     Lab Results   Component Value Date    CRP 7.9 (H) 01/12/2021    CRP 5.1 (H) 01/01/2021    CRP 5.5 (H) 12/31/2020     No results found for: CRPHS  Lab Results   Component Value Date    SEDRATE 55 (H) 01/01/2021    SEDRATE 55 (H) 12/31/2020    SEDRATE 65 (H) 12/30/2020     Lab Results   Component Value Date    ALT 8 06/22/2021    AST 14 06/22/2021    ALKPHOS 152 (H) 06/22/2021    BILITOT 0.7 06/22/2021     Lab Results   Component Value Date     06/22/2021    K 4.2 06/22/2021    K 4.3 06/17/2021    CL 92 06/22/2021    CO2 21 06/22/2021    BUN 30 06/22/2021    CREATININE 2.0 06/22/2021    GFRAA 40 06/22/2021    LABGLOM 40 06/22/2021    GLUCOSE 206 06/22/2021    PROT 7.2 06/22/2021    LABALBU 3.5 06/22/2021    CALCIUM 9.6 06/22/2021    BILITOT 0.7 06/22/2021    ALKPHOS 152 06/22/2021    AST 14 06/22/2021    ALT 8 06/22/2021       Lab Results   Component Value Date    PROTIME 13.0 02/27/2021    INR 1.2 02/27/2021       Lab Results   Component Value Date    TSH 0.540 12/23/2020       Lab Results   Component Value Date    COLORU Yellow 02/27/2021    PHUR 6.0 02/27/2021    WBCUA 1-3 02/27/2021    RBCUA >20 02/27/2021    YEAST Present 02/27/2021    BACTERIA MODERATE 02/27/2021    CLARITYU Clear 02/27/2021    SPECGRAV 1.010 02/27/2021    LEUKOCYTESUR Negative 02/27/2021    UROBILINOGEN 0.2 02/27/2021    BILIRUBINUR Negative 02/27/2021    BLOODU LARGE 02/27/2021    GLUCOSEU Negative 02/27/2021    AMORPHOUS FEW 12/22/2020       No results found for: CEM2YWE, BEART, I2RIIWSC, PHART, THGBART, AGM4VYQ, PO2ART, PXU5ZHT  Radiology:  CT ABDOMEN PELVIS WO CONTRAST Additional Contrast? None   Final Result   There is worsened inflammation involving entire pancreas which is compatible   with acute pancreatitis. Patient appears to have undergone a   cystogastrostomy internal drainage procedure of the previously seen   pancreatic pseudocyst.  Unfortunately, that pseudocyst is now larger   measuring 9.1 x 5.8 cm.   It also contains gas which is highly

## 2021-06-22 NOTE — PROGRESS NOTES
PICC Placement 6/22/2021    Product number: [id number]   Lot Number: [id number]      Ultrasound: yes   Right Brachial vein:                Upper Arm Circumference: 27cm    Size: 5.5f    Exposed Length: 2cm    Internal Length: 39cm   Cut: 14cm   Vein Measurement: .60cm    Raquel Cruz RN  6/22/2021  3:18 PM                       XRAY REQUIRED

## 2021-06-22 NOTE — PROGRESS NOTES
Received phone message from pharmacy regarding initiation of TPN. Unable to complete thorough nutrition assessment at this time given pt's current location in ED. Full nutrition assessment will be completed once pt admitted. Of note, pt previously diagnosed w/ moderate malnutrition, suspect nutritional status has worsened since last RD assessment 2020. Pt likely at risk for refeeding syndrome, please monitor/replace electrolytes as needed.      Due to critical national TPN shortage, all new orders for parenteral nutrition will be reviewed by clinical dietitian and clinical pharmacist to determine if the order meets the indication guidelines as follows:      Check box if present Approved indication/criteria for total parenteral nutrition   []  patient   [x] Malnourished adult patient without nutrition for 3 - 5 days without ability to take enteral nutrition (examples below table)   [] Previously well-nourished adult without nutrition for 7 days without ability to take enteral nutrition (examples below in table)     Example Disease states Requiring Parenteral Nutrition    Disease Category Examples   Impaired absorption Short bowel syndrome, NEC, meconium ileus, trauma   Mechanical bowel obstruction Intrinsic or extrinsic blockage of intestinal lumen (stenosis, strictures, inflammatory disease)   Need to restrict PO or enteral intake Ischemic bowel, severe pancreatitis, chylous fistula, preoperative status   Motility disorders Prolonged ileus, pseudo-obstruction, visceral organ myopathy   Inability to achieve or maintain enteral access Varies with clinical circumstances     Jermaine Aguilar MS, RD, LD 2021. 1:13 PM

## 2021-06-22 NOTE — H&P
Hospital Medicine History & Physical      PCP: Edith Molina DO    Date of Admission: 6/22/2021    Date of Service: Pt seen/examined on 06/22/2021 and Admitted to inpatient status    Hx taken from patient    Chief Complaint: Admitted for acute on chronic abdominal pain due to pancreatitis and pseudocyst x6 days  History Of Present Illness: Rebecka Goodpasture is a 66 y.o. male who presented to the emergency department approximately 6 days ago was complaining of chest pain and also abdominal pain with radiation up and down his abdomen also complained of a headache. Patient has a past medical history of prostatic cancer, diabetes mellitus, hyperlipidemia, hypertension. Patient also will be admitted to [facility] service with general surgery and oncology in consultation for evaluation and treatment of acute on chronic pancreatitis with pancreatic pseudocyst.    Past Medical History:        Diagnosis Date    [facility]     Diabetes mellitus ([facility]     Hyperlipidemia     Hypertension     Prostate cancer ([facility]        Past Surgical History:        Procedure Laterality Date    COLON SURGERY      DILATATION, ESOPHAGUS      HC DIALYSIS CATHETER N/A 1/12/2021    TEMPORARY HEMODIALYSIS CATHETER INSERTION performed by Froylan Paulino MD at [address] [address] Left 6 years ago   [facility] N/A 6/14/2021    PANCREATIC PSEUDOCYST EXCISION DRAINAGE performed by Jimmie Nuno MD at [address] [address]  6/14/2021    ENDOSCOPIC ULTRASOUND performed by Jimime Nuno MD at [address] [place], [place] [address]d to Admission:    Prior to Admission medications    Medication Sig Start Date End Date Taking? Authorizing Provider   Skin Protectants, Misc.  (ALOE VESTA PROTECTIVE) OINT ointment Apply topically daily as needed (apply to dry skin on feet and legs)   Yes Historical Provider, MD   HYDROPHILIC EX Apply topically daily as needed (apply to dry skin on torso)   Yes reactive to light. Cardiovascular:      Rate and Rhythm: Normal rate and regular rhythm. Pulses: Normal pulses. Heart sounds: Normal heart sounds. Pulmonary:      Effort: Pulmonary effort is normal.      Breath sounds: Normal breath sounds. Abdominal:      General: There is distension. Palpations: Abdomen is soft. Tenderness: There is abdominal tenderness. Musculoskeletal:         General: Normal range of motion. Cervical back: Normal range of motion and neck supple. Skin:     General: Skin is warm and dry. Neurological:      Mental Status: He is alert and oriented to person, place, and time. Psychiatric:         Behavior: Behavior normal.           Imaging:    CT ABDOMEN PELVIS WO CONTRAST Additional Contrast? None    Result Date: 6/22/2021  EXAMINATION: CT OF THE ABDOMEN AND PELVIS WITHOUT CONTRAST 6/22/2021 2:50 am TECHNIQUE: CT of the abdomen and pelvis was performed without the administration of intravenous contrast. Multiplanar reformatted images are provided for review. Dose modulation, iterative reconstruction, and/or weight based adjustment of the mA/kV was utilized to reduce the radiation dose to as low as reasonably achievable. COMPARISON: 05/05/2021 which HISTORY: ORDERING SYSTEM PROVIDED HISTORY: abdominal pain TECHNOLOGIST PROVIDED HISTORY: Reason for exam:->abdominal pain Additional Contrast?->None Decision Support Exception - unselect if not a suspected or confirmed emergency medical condition->Emergency Medical Condition (MA) What reading provider will be dictating this exam?->CRC FINDINGS: Lower Chest: The visualized portions of the lung bases are clear. Abdomen: The patient has likely undergone cysto gastrostomy internal drainage procedure of the previously seen pancreatic pseudocyst.  Pseudocyst is now larger and contains gas, and is a large amount of inflammation around the pseudocyst which is now larger measuring roughly 9.1 x 5.8 cm.   Appearance is concerning for interval development of super infection/abscess of the pseudocyst.  Appearance also is suggestive of worsening acute pancreatitis. There is cholelithiasis. Within limitations of a noncontrast exam the visualized liver,  adrenal glands and kidneys demonstrate no significant abnormality. There is unchanged splenomegaly. There are aortoiliac atherosclerotic calcification. There is no abdominal aortic aneurysm. There are no findings of intestinal obstruction. The appendix is not visualized. Pelvis:  Bladder is unremarkable in appearance. There is no abnormal pelvic mass or fluid collection seen. Bones/Soft Tissues: No acute abnormality identified. There is worsened inflammation involving entire pancreas which is compatible with acute pancreatitis. Patient appears to have undergone a cystogastrostomy internal drainage procedure of the previously seen pancreatic pseudocyst.  Unfortunately, that pseudocyst is now larger measuring 9.1 x 5.8 cm. It also contains gas which is highly suggestive of superinfection/abscess. CT HEAD WO CONTRAST    Result Date: 6/17/2021  EXAMINATION: CT OF THE HEAD WITHOUT CONTRAST  6/17/2021 8:53 pm TECHNIQUE: CT of the head was performed without the administration of intravenous contrast. Dose modulation, iterative reconstruction, and/or weight based adjustment of the mA/kV was utilized to reduce the radiation dose to as low as reasonably achievable. COMPARISON: January 10, 2021 HISTORY: ORDERING SYSTEM PROVIDED HISTORY: headache TECHNOLOGIST PROVIDED HISTORY: Has a \"code stroke\" or \"stroke alert\" been called? ->No Reason for exam:->headache Decision Support Exception - unselect if not a suspected or confirmed emergency medical condition->Emergency Medical Condition (MA) What reading provider will be dictating this exam?->CRC FINDINGS: BRAIN/VENTRICLES: There is no acute intracranial hemorrhage, mass effect or midline shift. No abnormal extra-axial fluid collection. CERTIFICATION    I certify that Andre Brown is expected to be hospitalized for > 2  midnights based on the following assessment and plan:    ASSESSMENT/PLAN:  1. Acute on chronic pancreatitis with pseudocyst-general surgery and oncology is following since patient has a history of pancreatic cancer, pain is controlled with medication, Creon when eating  2. Hypertension-lisinopril, metoprolol, patient is n.p.o. at this time may have to add an IV as needed antihypertensive to control his blood pressure we will continue to monitor  3. Non-insulin-dependent diabetes mellitus-May need a sliding scale, monitor blood glucose, diabetic diet if indicated  4. Coronary artery disease-amiodarone, will hold at this time since patient is n.p.o. after procedure we will continue  5.  Chronic kidney disease-patient's creatinine is 2.0 we will continue to monitor and avoid nephrotoxic agents      DVT Prophylaxis: heparin  Diet: Diet NPO  PN-Adult  3-in-1 Central Line (Standard)  Diet NPO  Code Status: full code       Nelly Padron MD  [facility] Hospitalist

## 2021-06-22 NOTE — TELEPHONE ENCOUNTER
I called [facility] surgery scheduling and scheduled patient for 6/23/21 at 7:30am for surgery per Dr. Silvia Pyle request.  Patient is currently admitted and will be for the surgery.       Electronically signed by Modesta Zamora RN on 6/22/2021 at 11:06 AM

## 2021-06-22 NOTE — CONSULTS
Hepatobiliary and Pancreatic Surgery Attending History and Physical    Patient's Name/Date of Birth: Andre Hansen /1950 (66 y.o.)    Date: June 22, 2021     CC: Abdominal pain    HPI:  Patient is a 75-year-old male w/ PMH diabetes, HTN HLD who presents for evaluation of pancreatic pseudocyst. He has had this known pseudocyst and underwent an endoscopic cystogastrostomy on 6/14. He was doing well and was tolerating a diet well without issues. He started having pain yesterday and worsened overnight. He came into the ER and had a CT which showed a enlarging pseudocyst. He has lost weight and his PO intake has been poor.  He also is having constipation issues.        Past Medical History:   Diagnosis Date    [facility]     Diabetes mellitus ([facility]     Hyperlipidemia     Hypertension     Prostate cancer ([facility]        Past Surgical History:   Procedure Laterality Date    COLON SURGERY      DILATATION, ESOPHAGUS      HC DIALYSIS CATHETER N/A 1/12/2021    TEMPORARY HEMODIALYSIS CATHETER INSERTION performed by Mai Beltran MD at [address] [facility] [address] Left 6 years ago    PANCREAS BIOPSY N/A 6/14/2021    PANCREATIC PSEUDOCYST EXCISION DRAINAGE performed by Daryle Krabbe, MD at [address] [facility]  6/14/2021    ENDOSCOPIC ULTRASOUND performed by Daryle Krabbe, MD at [address]       Current Facility-Administered Medications   Medication Dose Route Frequency Provider Last Rate Last Admin    ondansetron (ZOFRAN) injection 4 mg  4 mg Intravenous Q6H PRN Chichi Hercules MD   4 mg at 06/22/21 0644    lactated ringers infusion   Intravenous Continuous Radha Mejia MD         Current Outpatient Medications   Medication Sig Dispense Refill    pantoprazole (PROTONIX) 40 MG tablet Take 40 mg by mouth daily      tadalafil (CIALIS) 20 MG tablet Take 20 mg by mouth as needed for Erectile Dysfunction      zinc sulfate (ZINCATE) 220 (50 Zn) MG capsule Take 50 mg by mouth daily  sodium bicarbonate 650 MG tablet Take 2 tablets by mouth 3 times daily 30 tablet 1    thiamine mononitrate 100 MG tablet Take 1 tablet by mouth daily 30 tablet 1    folic acid (FOLVITE) 1 MG tablet Take 1 tablet by mouth daily 30 tablet 3    vitamin D (CHOLECALCIFEROL) 50 MCG (2000 UT) TABS tablet Take 1 tablet by mouth daily 30 tablet 0    ascorbic acid (VITAMIN C) 500 MG tablet Take 1 tablet by mouth daily 30 tablet 3    metoprolol tartrate (LOPRESSOR) 25 MG tablet Take 1 tablet by mouth 2 times daily 60 tablet 3    apixaban (ELIQUIS) 5 MG TABS tablet Take 1 tablet by mouth 2 times daily 60 tablet 0    simvastatin (ZOCOR) 20 MG tablet Take 20 mg by mouth nightly. No Known Allergies    History reviewed. No pertinent family history. Social History     Socioeconomic History    Marital status: Single     Spouse name: Not on file    Number of children: Not on file    Years of education: Not on file    Highest education level: Not on file   Occupational History    Not on file   Tobacco Use    Smoking status: Former Smoker     Packs/day: 1.00     Types: Cigarettes    Smokeless tobacco: Never Used   Vaping Use    Vaping Use: Never used   Substance and Sexual Activity    Alcohol use: Not Currently     Comment: \"daily shots\", \"none in three months\"    Drug use: No    Sexual activity: Not on file   Other Topics Concern    Not on file   Social History Narrative    Not on file     Social Determinants of Health     Financial Resource Strain:     Difficulty of Paying Living Expenses:    Food Insecurity:     Worried About Running Out of Food in the Last Year:     [address] in the Last Year:    Transportation Needs:     Lack of Transportation (Medical):      Lack of Transportation (Non-Medical):    Physical Activity:     Days of Exercise per Week:     Minutes of Exercise per Session:    Stress:     Feeling of Stress :    Social Connections:     Frequency of Communication with Friends and Family:     Frequency of Social Gatherings with Friends and Family:     Attends Worship Services:     Active Member of Clubs or Organizations:     Attends Club or Organization Meetings:     Marital Status:    Intimate Partner Violence:     Fear of Current or Ex-Partner:     Emotionally Abused:     Physically Abused:     Sexually Abused:        ROS:   Review of Systems   Constitutional: Positive for appetite change, chills and fever. HENT: Negative for ear discharge and ear pain. Eyes: Negative for pain and itching. Respiratory: Negative for shortness of breath. Gastrointestinal: Positive for abdominal distention and constipation. Genitourinary: Negative for difficulty urinating and dysuria. Musculoskeletal: Negative for gait problem and joint swelling. Skin: Negative for color change and wound. Physical Exam:  Vitals:    06/22/21 0631   BP: (!) 151/97   Pulse: 90   Resp: 21   Temp:    SpO2: 99%       PSYCH: mood and affect normal, alert and oriented x 3: No apparent distress, comfortable  EYES: Sclera white, pupils equal round and reactive to light  ENMT:  Hearing normal, trachea midline, ears externally intact  LYMPH: no obvious lympadenopathy in neck. RESP: Respiratory effort was normal with no retractions or use of accessory muscles. CV:  No pedal edema  GI/ Abdomen: Soft, nondistended, mild to moderate TTP in epigastrium, no guarding, no peritoneal signs  MSK: no clubbing/ no cyanosis       Assessment/Plan: 66 y.o. male with pancreatic pseudocyst s/p endoscopic cystogastrostomy,  Now with enlarging 9cm acute necrotic collection and possible abscess formation    -Keep NPO  -PICC  -TPN  - IVF  - Patient and family were made aware of the risks, benefits, alternatives, and complications of a laparoscopic robotic pancreatic necrosectomy with cyst gastrostomy and intraoperative ultrasound and wish to proceed with surgery.       Electronically signed by Corona Lopez

## 2021-06-22 NOTE — ED NOTES
Bed: 19  Expected date:   Expected time:   Means of arrival:   Comments:  ems     Portia Brandon RN  06/22/21 2891

## 2021-06-22 NOTE — ED PROVIDER NOTES
HPI:  6/22/21, Time: 1:52 AM EDT         Days Deshawn Tucker is a 66 y.o. male presenting to the ED for chest pain, beginning 6 days ago. The complaint has been persistent, moderate in severity, and worsened by nothing. Patient reporting abdominal pain and chest pain lasting for 6 days. Patient reporting no radiation of pain. Patient reports patient is pre much persistent. Patient reporting no vomiting or diarrhea reports no cough he reports no headache reports no fever chills he reports history of hyperlipidemia and hypertension. Patient reporting no fever. Patient reporting no weakness. ROS:   Pertinent positives and negatives are stated within HPI, all other systems reviewed and are negative.  --------------------------------------------- PAST HISTORY ---------------------------------------------  Past Medical History:  has a past medical history of [facility] Diabetes mellitus ([facility] Hyperlipidemia, Hypertension, and Prostate cancer ([facility] Past Surgical History:  has a past surgical history that includes Colon surgery; Dilatation, esophagus; Leg Surgery (Left, 6 years ago); hc dialysis catheter (N/A, 1/12/2021); Pancreas Biopsy (N/A, 6/14/2021); and Upper gastrointestinal endoscopy (6/14/2021). Social History:  reports that he has quit smoking. His smoking use included cigarettes. He smoked 1.00 pack per day. He has never used smokeless tobacco. He reports previous alcohol use. He reports that he does not use drugs. Family History: family history is not on file. The patients home medications have been reviewed. Allergies: Patient has no known allergies.     ---------------------------------------------------PHYSICAL EXAM--------------------------------------    Constitutional/General: Alert and oriented x3, well appearing, non toxic in NAD  Head: Normocephalic and atraumatic  Eyes: PERRL, EOMI  Mouth: Oropharynx clear, handling secretions, no trismus  Neck: Supple, full ROM, non tender Anisocytosis 1+     Polychromasia 2+     Hypochromia 1+     Poikilocytes 2+     Cruzito Cells 2+     Ovalocytes 1+    Comprehensive Metabolic Panel   Result Value Ref Range    Sodium 131 (L) 132 - 146 mmol/L    Potassium 4.2 3.5 - 5.0 mmol/L    Chloride 92 (L) 98 - 107 mmol/L    CO2 21 (L) 22 - 29 mmol/L    Anion Gap 18 (H) 7 - 16 mmol/L    Glucose 206 (H) 74 - 99 mg/dL    BUN 30 (H) 6 - 23 mg/dL    CREATININE 2.0 (H) 0.7 - 1.2 mg/dL    GFR Non-African American 40 >=60 mL/min/1.73    GFR African American 40     Calcium 9.6 8.6 - 10.2 mg/dL    Total Protein 7.2 6.4 - 8.3 g/dL    Albumin 3.5 3.5 - 5.2 g/dL    Total Bilirubin 0.7 0.0 - 1.2 mg/dL    Alkaline Phosphatase 152 (H) 40 - 129 U/L    ALT 8 0 - 40 U/L    AST 14 0 - 39 U/L   Troponin   Result Value Ref Range    Troponin, High Sensitivity 29 (H) 0 - 11 ng/L   Serum Drug Screen   Result Value Ref Range    Ethanol Lvl <10 mg/dL    Acetaminophen Level <5.0 (L) 10.0 - 92.2 mcg/mL    Salicylate, Serum 2.2 0.0 - 30.0 mg/dL    TCA Scrn NEGATIVE Cutoff:300 ng/mL   Urine Drug Screen   Result Value Ref Range    Amphetamine Screen, Urine NOT DETECTED Negative <1000 ng/mL    Barbiturate Screen, Ur NOT DETECTED Negative < 200 ng/mL    Benzodiazepine Screen, Urine NOT DETECTED Negative < 200 ng/mL    Cannabinoid Scrn, Ur NOT DETECTED Negative < 50ng/mL    Cocaine Metabolite Screen, Urine NOT DETECTED Negative < 300 ng/mL    Opiate Scrn, Ur NOT DETECTED Negative < 300ng/mL    PCP Screen, Urine NOT DETECTED Negative < 25 ng/mL    Methadone Screen, Urine NOT DETECTED Negative <300 ng/mL    Oxycodone Urine NOT DETECTED Negative <100 ng/mL    FENTANYL SCREEN, URINE NOT DETECTED Negative <1 ng/mL    Drug Screen Comment: see below    Lipase   Result Value Ref Range    Lipase 17 13 - 60 U/L   Lactic Acid, Plasma   Result Value Ref Range    Lactic Acid 2.4 (H) 0.5 - 2.2 mmol/L   EKG 12 Lead   Result Value Ref Range    Ventricular Rate 86 BPM    Atrial Rate 340 BPM    QRS Duration 82 ms Q-T Interval 346 ms    QTc Calculation (Bazett) 414 ms    R Axis 16 degrees    T Axis 44 degrees       RADIOLOGY:  Interpreted by Radiologist.  CT ABDOMEN PELVIS WO CONTRAST Additional Contrast? None   Final Result   There is worsened inflammation involving entire pancreas which is compatible   with acute pancreatitis. Patient appears to have undergone a   cystogastrostomy internal drainage procedure of the previously seen   pancreatic pseudocyst.  Unfortunately, that pseudocyst is now larger   measuring 9.1 x 5.8 cm. It also contains gas which is highly suggestive of   superinfection/abscess. XR CHEST PORTABLE   Final Result   No acute abnormality identified. EKG:  This EKG is signed and interpreted by me. Rate: 86  Rhythm: Atrial fibrillation  Interpretation: non-specific EKG  Comparison: stable as compared to patient's most recent EKG        ------------------------- NURSING NOTES AND VITALS REVIEWED ---------------------------   The nursing notes within the ED encounter and vital signs as below have been reviewed by myself. BP (!) 153/91   Pulse 88   Temp 97.9 °F (36.6 °C)   Resp 16   Ht 6' 2\" (1.88 m)   Wt 170 lb (77.1 kg)   SpO2 99%   BMI 21.83 kg/m²   Oxygen Saturation Interpretation: Normal    The patients available past medical records and past encounters were reviewed. ------------------------------ ED COURSE/MEDICAL DECISION MAKING----------------------  Medications   morphine (PF) injection 2 mg (has no administration in time range)   ondansetron (ZOFRAN) injection 4 mg (has no administration in time range)   piperacillin-tazobactam (ZOSYN) 3,375 mg in dextrose 5 % 100 mL IVPB (mini-bag) (has no administration in time range)             Medical Decision Making:      Presenting because of ongoing abdominal pain as well as chest pain last 6 days.   Patient reporting no vomiting or diarrhea reports no fever chills patient reports continued pain in his chest he is on Eliquis for history of A. fib. Patient labs noted reviewed as well as EKG. Patient CT does show concern for infected pseudocyst.  Patient plan will be to admit to monitored bed. Re-Evaluations:             Re-evaluation. Patients symptoms show no change  Patient reevaluated unchanged patient made aware of findings and plan. Consultations:             Internal medicine consulted    Critical Care: This patient's ED course included: a personal history and physicial eaxmination    This patient has been closely monitored during their ED course. Counseling: The emergency provider has spoken with the patient and discussed todays results, in addition to providing specific details for the plan of care and counseling regarding the diagnosis and prognosis. Questions are answered at this time and they are agreeable with the plan.       --------------------------------- IMPRESSION AND DISPOSITION ---------------------------------    IMPRESSION  1. Chest pain, unspecified type    2. Abdominal pain, unspecified abdominal location    3. Acute pancreatitis, unspecified complication status, unspecified pancreatitis type        DISPOSITION  Disposition: Admit to monitored bed  Patient condition is stable        NOTE: This report was transcribed using voice recognition software.  Every effort was made to ensure accuracy; however, inadvertent computerized transcription errors may be present          Warner Barreto MD  06/22/21 0190

## 2021-06-22 NOTE — CONSULTS
GENERAL SURGERY  CONSULT NOTE  6/22/2021    Physician Consulted: Dr. Isaak Zhong  Reason for Consult: pancreatic pseudocyst   Referring Physician: Dr. Lucas Fam    HPI  Days Emilee Dixon is a 66 y.o. male w/ PMH diabetes, HTN HLD who presents for evaluation of pancreatic pseudocyst. He has had this known pseudocyst and underwent an endoscopic cystogastrostomy on 6/14. He was doing well and was tolerating a diet well without issues. He started having pain yesterday and worsened overnight. He came into the ER and had a CT which showed a enlarging pseudocyst. He has lost weight and his PO intake has been poor. He also is having constipation issues. Past Medical History:   Diagnosis Date    [facility] ([facility]     Diabetes mellitus ([facility]     Hyperlipidemia     Hypertension     Prostate cancer [facility])        Past Surgical History:   Procedure Laterality Date    COLON SURGERY      DILATATION, ESOPHAGUS      HC DIALYSIS CATHETER N/A 1/12/2021    TEMPORARY HEMODIALYSIS CATHETER INSERTION performed by Lori Lloyd MD at [address] Left 6 years ago   [address] [facility] N/A 6/14/2021    PANCREATIC PSEUDOCYST EXCISION DRAINAGE performed by Cyndie Mccollum MD at [facility].  6/14/2021    ENDOSCOPIC ULTRASOUND performed by Cyndie Mccollum MD at [address]       Medications Prior to Admission:    Prior to Admission medications    Medication Sig Start Date End Date Taking?  Authorizing Provider   pantoprazole (PROTONIX) 40 MG tablet Take 40 mg by mouth daily    Historical Provider, MD   tadalafil (CIALIS) 20 MG tablet Take 20 mg by mouth as needed for Erectile Dysfunction    Historical Provider, MD   zinc sulfate (ZINCATE) 220 (50 Zn) MG capsule Take 50 mg by mouth daily    Historical Provider, MD   lipase-protease-amylase (CREON) 61407 units CPEP delayed release capsule Take 1 capsule by mouth 3 times daily (with meals)  Patient taking differently: Take 1 capsule by mouth 3 times daily (with meals) Patient takes this BID 3/1/21 6/17/21  Fidel Salvador MD   sodium bicarbonate 650 MG tablet Take 2 tablets by mouth 3 times daily 3/1/21   Lisa Rosa MD   thiamine mononitrate 100 MG tablet Take 1 tablet by mouth daily 3/2/21   Lisa Rosa MD   folic acid (FOLVITE) 1 MG tablet Take 1 tablet by mouth daily 3/1/21   Tai Isaac MD   amiodarone (CORDARONE) 200 MG tablet Take 1 tablet by mouth 2 times daily 3/1/21   Tai Isaac MD   Calcium Acetate, Phos Binder, 667 MG CAPS Take 2 capsules by mouth 2 times daily (with meals) 1/16/21   Tony Mukherjee MD   chlorhexidine (PERIDEX) 0.12 % solution Take 15 mLs by mouth 2 times daily    Historical Provider, MD   vitamin D (CHOLECALCIFEROL) 50 MCG (2000 UT) TABS tablet Take 1 tablet by mouth daily 1/2/21   Gisele Hussein MD   ascorbic acid (VITAMIN C) 500 MG tablet Take 1 tablet by mouth daily 1/2/21   Gisele Hussein MD   metoprolol tartrate (LOPRESSOR) 25 MG tablet Take 1 tablet by mouth 2 times daily 1/1/21   Gisele Hussein MD   apixaban (ELIQUIS) 5 MG TABS tablet Take 1 tablet by mouth 2 times daily 1/1/21   Gisele Hussein MD   simvastatin (ZOCOR) 20 MG tablet Take 20 mg by mouth nightly. Historical Provider, MD       No Known Allergies    History reviewed. No pertinent family history.     Social History     Tobacco Use    Smoking status: Former Smoker     Packs/day: 1.00     Types: Cigarettes    Smokeless tobacco: Never Used   Vaping Use    Vaping Use: Never used   Substance Use Topics    Alcohol use: Not Currently     Comment: \"daily shots\", \"none in three months\"    Drug use: No         Review of Systems   General ROS: negative obtundation, AMS  ENT ROS: negative rhinorrhea, epistaxis  Allergy and Immunology ROS: negative itchy/watery eyes or nasal congestion  Hematological and Lymphatic ROS: negative spontaneous bleeding or bruising  Endocrine ROS: negative  lethargy, mood swings, palpitations or polydipsia/polyuria  Respiratory ROS: negative sputum changes, stridor, tachypnea or wheezing  Cardiovascular ROS: negative for - loss of consciousness, murmur or orthopnea  Gastrointestinal ROS: negative for - hematochezia or hematemesis  Genito-Urinary ROS: negative for -  genital discharge or hematuria  Musculoskeletal ROS: negative for - focal weakness, gangrene  Psych/Neuro ROS: negative for - visual or auditory hallucinations, suicidal ideation      PHYSICAL EXAM:    Vitals:    06/22/21 0631   BP: (!) 151/97   Pulse: 90   Resp: 21   Temp:    SpO2: 99%       General appearance:  cachetic   Head: NCAT, PERRLA, EOMI, red conjunctiva  Neck: supple, no masses, trachea midline  Lungs: Equal chest rise bilateral, no retractions, no wheezing  Heart: Reg rate  Abdomen: soft, moderate epigastric tenderness  Skin; warm and dry, no cyanosis  Gu: no cva tenderness  Extremities: atraumatic, no focal motor deficits, no open wounds  Psych: No tremor, visual hallucinations    LABS:    CBC  Recent Labs     06/22/21  0200   WBC 7.5   HGB 11.3*   HCT 33.4*        BMP  Recent Labs     06/22/21  0200   *   K 4.2   CL 92*   CO2 21*   BUN 30*   CREATININE 2.0*   CALCIUM 9.6     Liver Function  Recent Labs     06/22/21  0200   LIPASE 17   BILITOT 0.7   AST 14   ALT 8   ALKPHOS 152*   PROT 7.2   LABALBU 3.5     No results for input(s): LACTATE in the last 72 hours. No results for input(s): INR, PTT in the last 72 hours. Invalid input(s): PT    RADIOLOGY    CT ABDOMEN PELVIS WO CONTRAST Additional Contrast? None    Result Date: 6/22/2021  EXAMINATION: CT OF THE ABDOMEN AND PELVIS WITHOUT CONTRAST 6/22/2021 2:50 am TECHNIQUE: CT of the abdomen and pelvis was performed without the administration of intravenous contrast. Multiplanar reformatted images are provided for review.  Dose modulation, iterative reconstruction, and/or weight based adjustment of the mA/kV was utilized to reduce the radiation dose to as low as

## 2021-06-23 ENCOUNTER — APPOINTMENT (OUTPATIENT)
Dept: GENERAL RADIOLOGY | Age: 71
DRG: 405 | End: 2021-06-23
Payer: MEDICARE

## 2021-06-23 ENCOUNTER — ANESTHESIA (OUTPATIENT)
Dept: OPERATING ROOM | Age: 71
DRG: 405 | End: 2021-06-23
Payer: MEDICARE

## 2021-06-23 VITALS — TEMPERATURE: 98.6 F | SYSTOLIC BLOOD PRESSURE: 117 MMHG | OXYGEN SATURATION: 100 % | DIASTOLIC BLOOD PRESSURE: 77 MMHG

## 2021-06-23 PROBLEM — E43 SEVERE PROTEIN-CALORIE MALNUTRITION (HCC): Chronic | Status: ACTIVE | Noted: 2021-06-23

## 2021-06-23 PROBLEM — E11.22 CHRONIC KIDNEY DISEASE DUE TO TYPE 2 DIABETES MELLITUS (HCC): Status: ACTIVE | Noted: 2021-06-23

## 2021-06-23 LAB
ALBUMIN SERPL-MCNC: 2.9 G/DL (ref 3.5–5.2)
ALP BLD-CCNC: 129 U/L (ref 40–129)
ALT SERPL-CCNC: 7 U/L (ref 0–40)
ANION GAP SERPL CALCULATED.3IONS-SCNC: 11 MMOL/L (ref 7–16)
ANION GAP SERPL CALCULATED.3IONS-SCNC: 12 MMOL/L (ref 7–16)
AST SERPL-CCNC: 8 U/L (ref 0–39)
B.E.: -5.4 MMOL/L (ref -3–0)
B.E.: -5.5 MMOL/L (ref -3–0)
B.E.: -6.3 MMOL/L (ref -3–0)
B.E.: -9.3 MMOL/L (ref -3–0)
BILIRUB SERPL-MCNC: 0.5 MG/DL (ref 0–1.2)
BUN BLDV-MCNC: 26 MG/DL (ref 6–23)
BUN BLDV-MCNC: 26 MG/DL (ref 6–23)
CALCIUM SERPL-MCNC: 8.7 MG/DL (ref 8.6–10.2)
CALCIUM SERPL-MCNC: 9.2 MG/DL (ref 8.6–10.2)
CARDIOPULMONARY BYPASS: NO
CHLORIDE BLD-SCNC: 100 MMOL/L (ref 98–107)
CHLORIDE BLD-SCNC: 97 MMOL/L (ref 98–107)
CO2: 21 MMOL/L (ref 22–29)
CO2: 22 MMOL/L (ref 22–29)
CREAT SERPL-MCNC: 1.6 MG/DL (ref 0.7–1.2)
CREAT SERPL-MCNC: 1.7 MG/DL (ref 0.7–1.2)
DEVICE: ABNORMAL
GFR AFRICAN AMERICAN: 48
GFR AFRICAN AMERICAN: 51
GFR NON-AFRICAN AMERICAN: 48 ML/MIN/1.73
GFR NON-AFRICAN AMERICAN: 51 ML/MIN/1.73
GLUCOSE BLD-MCNC: 302 MG/DL (ref 74–99)
GLUCOSE BLD-MCNC: 405 MG/DL (ref 74–99)
HCO3 ARTERIAL: 17.5 MMOL/L (ref 22–26)
HCO3 ARTERIAL: 19.2 MMOL/L (ref 22–26)
HCO3 ARTERIAL: 19.8 MMOL/L (ref 22–26)
HCO3 ARTERIAL: 20.4 MMOL/L (ref 22–26)
HCT (EST): 28 % (ref 37–54)
HCT (EST): 30 % (ref 37–54)
HCT VFR BLD CALC: 30.6 % (ref 37–54)
HEMOGLOBIN: 10.6 G/DL (ref 12.5–16.5)
HGB, (EST): 10.1 G/DL (ref 12.5–15.5)
HGB, (EST): 9.4 G/DL (ref 12.5–15.5)
HGB, (EST): 9.5 G/DL (ref 12.5–15.5)
HGB, (EST): 9.6 G/DL (ref 12.5–15.5)
MAGNESIUM: 2 MG/DL (ref 1.6–2.6)
MCH RBC QN AUTO: 26.9 PG (ref 26–35)
MCHC RBC AUTO-ENTMCNC: 34.6 % (ref 32–34.5)
MCV RBC AUTO: 77.7 FL (ref 80–99.9)
METER GLUCOSE: 262 MG/DL (ref 74–99)
METER GLUCOSE: 265 MG/DL (ref 74–99)
METER GLUCOSE: 285 MG/DL (ref 74–99)
METER GLUCOSE: 286 MG/DL (ref 74–99)
METER GLUCOSE: 290 MG/DL (ref 74–99)
METER GLUCOSE: 318 MG/DL (ref 74–99)
METER GLUCOSE: 367 MG/DL (ref 74–99)
METER GLUCOSE: 415 MG/DL (ref 74–99)
METER GLUCOSE: 427 MG/DL (ref 74–99)
O2 SATURATION: 93.2 % (ref 92–98.5)
O2 SATURATION: 98.8 % (ref 92–98.5)
O2 SATURATION: 99.3 % (ref 92–98.5)
O2 SATURATION: 99.6 % (ref 92–98.5)
OPERATOR ID: ABNORMAL
PCO2 ARTERIAL: 36.6 MMHG (ref 35–45)
PCO2 ARTERIAL: 37.1 MMHG (ref 35–45)
PCO2 ARTERIAL: 40.3 MMHG (ref 35–45)
PCO2 ARTERIAL: 40.7 MMHG (ref 35–45)
PDW BLD-RTO: 16.9 FL (ref 11.5–15)
PH BLOOD GAS: 7.24 (ref 7.35–7.45)
PH BLOOD GAS: 7.31 (ref 7.35–7.45)
PH BLOOD GAS: 7.32 (ref 7.35–7.45)
PH BLOOD GAS: 7.34 (ref 7.35–7.45)
PHOSPHORUS: 3 MG/DL (ref 2.5–4.5)
PLATELET # BLD: 204 E9/L (ref 130–450)
PMV BLD AUTO: 10.8 FL (ref 7–12)
PO2 ARTERIAL: 133.9 MMHG (ref 60–80)
PO2 ARTERIAL: 156.2 MMHG (ref 60–80)
PO2 ARTERIAL: 197 MMHG (ref 60–80)
PO2 ARTERIAL: 78.8 MMHG (ref 60–80)
POTASSIUM SERPL-SCNC: 3.7 MMOL/L (ref 3.5–5.5)
POTASSIUM SERPL-SCNC: 4 MMOL/L (ref 3.5–5)
POTASSIUM SERPL-SCNC: 4 MMOL/L (ref 3.5–5.5)
POTASSIUM SERPL-SCNC: 4.1 MMOL/L (ref 3.5–5.5)
POTASSIUM SERPL-SCNC: 4.2 MMOL/L (ref 3.5–5)
POTASSIUM SERPL-SCNC: 4.2 MMOL/L (ref 3.5–5.5)
RBC # BLD: 3.94 E12/L (ref 3.8–5.8)
SODIUM BLD-SCNC: 130 MMOL/L (ref 132–146)
SODIUM BLD-SCNC: 133 MMOL/L (ref 132–146)
SOURCE, BLOOD GAS: ABNORMAL
TOTAL PROTEIN: 6.4 G/DL (ref 6.4–8.3)
TRIGL SERPL-MCNC: 103 MG/DL (ref 0–149)
WBC # BLD: 9.8 E9/L (ref 4.5–11.5)

## 2021-06-23 PROCEDURE — 36415 COLL VENOUS BLD VENIPUNCTURE: CPT

## 2021-06-23 PROCEDURE — 6360000002 HC RX W HCPCS: Performed by: HOSPITALIST

## 2021-06-23 PROCEDURE — 88305 TISSUE EXAM BY PATHOLOGIST: CPT

## 2021-06-23 PROCEDURE — 87186 SC STD MICRODIL/AGAR DIL: CPT

## 2021-06-23 PROCEDURE — 6370000000 HC RX 637 (ALT 250 FOR IP): Performed by: SURGERY

## 2021-06-23 PROCEDURE — 82962 GLUCOSE BLOOD TEST: CPT

## 2021-06-23 PROCEDURE — 87070 CULTURE OTHR SPECIMN AEROBIC: CPT

## 2021-06-23 PROCEDURE — 87205 SMEAR GRAM STAIN: CPT

## 2021-06-23 PROCEDURE — 6370000000 HC RX 637 (ALT 250 FOR IP): Performed by: STUDENT IN AN ORGANIZED HEALTH CARE EDUCATION/TRAINING PROGRAM

## 2021-06-23 PROCEDURE — 6360000002 HC RX W HCPCS: Performed by: SPECIALIST

## 2021-06-23 PROCEDURE — 84478 ASSAY OF TRIGLYCERIDES: CPT

## 2021-06-23 PROCEDURE — 87077 CULTURE AEROBIC IDENTIFY: CPT

## 2021-06-23 PROCEDURE — 8E0W4CZ ROBOTIC ASSISTED PROCEDURE OF TRUNK REGION, PERCUTANEOUS ENDOSCOPIC APPROACH: ICD-10-PCS | Performed by: TRANSPLANT SURGERY

## 2021-06-23 PROCEDURE — 2140000000 HC CCU INTERMEDIATE R&B

## 2021-06-23 PROCEDURE — 80048 BASIC METABOLIC PNL TOTAL CA: CPT

## 2021-06-23 PROCEDURE — 84100 ASSAY OF PHOSPHORUS: CPT

## 2021-06-23 PROCEDURE — 87102 FUNGUS ISOLATION CULTURE: CPT

## 2021-06-23 PROCEDURE — 2580000003 HC RX 258: Performed by: STUDENT IN AN ORGANIZED HEALTH CARE EDUCATION/TRAINING PROGRAM

## 2021-06-23 PROCEDURE — 6360000002 HC RX W HCPCS: Performed by: ANESTHESIOLOGY

## 2021-06-23 PROCEDURE — 6370000000 HC RX 637 (ALT 250 FOR IP): Performed by: NURSE ANESTHETIST, CERTIFIED REGISTERED

## 2021-06-23 PROCEDURE — P9041 ALBUMIN (HUMAN),5%, 50ML: HCPCS

## 2021-06-23 PROCEDURE — 2500000003 HC RX 250 WO HCPCS

## 2021-06-23 PROCEDURE — 87075 CULTR BACTERIA EXCEPT BLOOD: CPT

## 2021-06-23 PROCEDURE — 2580000003 HC RX 258: Performed by: SPECIALIST

## 2021-06-23 PROCEDURE — 0DNU4ZZ RELEASE OMENTUM, PERCUTANEOUS ENDOSCOPIC APPROACH: ICD-10-PCS | Performed by: TRANSPLANT SURGERY

## 2021-06-23 PROCEDURE — 48999 UNLISTED PROCEDURE PANCREAS: CPT | Performed by: TRANSPLANT SURGERY

## 2021-06-23 PROCEDURE — 76998 US GUIDE INTRAOP: CPT | Performed by: TRANSPLANT SURGERY

## 2021-06-23 PROCEDURE — 7100000001 HC PACU RECOVERY - ADDTL 15 MIN: Performed by: TRANSPLANT SURGERY

## 2021-06-23 PROCEDURE — 71045 X-RAY EXAM CHEST 1 VIEW: CPT

## 2021-06-23 PROCEDURE — 0F1D4Z4 BYPASS PANCREATIC DUCT TO STOMACH, PERCUTANEOUS ENDOSCOPIC APPROACH: ICD-10-PCS | Performed by: TRANSPLANT SURGERY

## 2021-06-23 PROCEDURE — 6360000002 HC RX W HCPCS: Performed by: NURSE ANESTHETIST, CERTIFIED REGISTERED

## 2021-06-23 PROCEDURE — 3600000009 HC SURGERY ROBOT BASE: Performed by: TRANSPLANT SURGERY

## 2021-06-23 PROCEDURE — 2709999900 HC NON-CHARGEABLE SUPPLY: Performed by: TRANSPLANT SURGERY

## 2021-06-23 PROCEDURE — S2900 ROBOTIC SURGICAL SYSTEM: HCPCS | Performed by: TRANSPLANT SURGERY

## 2021-06-23 PROCEDURE — 6360000002 HC RX W HCPCS

## 2021-06-23 PROCEDURE — 2720000010 HC SURG SUPPLY STERILE: Performed by: TRANSPLANT SURGERY

## 2021-06-23 PROCEDURE — 3700000000 HC ANESTHESIA ATTENDED CARE: Performed by: TRANSPLANT SURGERY

## 2021-06-23 PROCEDURE — 85027 COMPLETE CBC AUTOMATED: CPT

## 2021-06-23 PROCEDURE — 80053 COMPREHEN METABOLIC PANEL: CPT

## 2021-06-23 PROCEDURE — 2500000003 HC RX 250 WO HCPCS: Performed by: STUDENT IN AN ORGANIZED HEALTH CARE EDUCATION/TRAINING PROGRAM

## 2021-06-23 PROCEDURE — 83735 ASSAY OF MAGNESIUM: CPT

## 2021-06-23 PROCEDURE — 2580000003 HC RX 258

## 2021-06-23 PROCEDURE — 2500000003 HC RX 250 WO HCPCS: Performed by: TRANSPLANT SURGERY

## 2021-06-23 PROCEDURE — 7100000000 HC PACU RECOVERY - FIRST 15 MIN: Performed by: TRANSPLANT SURGERY

## 2021-06-23 PROCEDURE — 6360000002 HC RX W HCPCS: Performed by: TRANSPLANT SURGERY

## 2021-06-23 PROCEDURE — 3600000019 HC SURGERY ROBOT ADDTL 15MIN: Performed by: TRANSPLANT SURGERY

## 2021-06-23 PROCEDURE — 2500000003 HC RX 250 WO HCPCS: Performed by: NURSE ANESTHETIST, CERTIFIED REGISTERED

## 2021-06-23 PROCEDURE — 3700000001 HC ADD 15 MINUTES (ANESTHESIA): Performed by: TRANSPLANT SURGERY

## 2021-06-23 PROCEDURE — 6360000002 HC RX W HCPCS: Performed by: STUDENT IN AN ORGANIZED HEALTH CARE EDUCATION/TRAINING PROGRAM

## 2021-06-23 PROCEDURE — 87176 TISSUE HOMOGENIZATION CULTR: CPT

## 2021-06-23 PROCEDURE — 87106 FUNGI IDENTIFICATION YEAST: CPT

## 2021-06-23 PROCEDURE — 82803 BLOOD GASES ANY COMBINATION: CPT

## 2021-06-23 PROCEDURE — 2500000003 HC RX 250 WO HCPCS: Performed by: ANESTHESIOLOGY

## 2021-06-23 RX ORDER — DEXTROSE MONOHYDRATE 25 G/50ML
12.5 INJECTION, SOLUTION INTRAVENOUS PRN
Status: DISCONTINUED | OUTPATIENT
Start: 2021-06-23 | End: 2021-06-28 | Stop reason: HOSPADM

## 2021-06-23 RX ORDER — ONDANSETRON 2 MG/ML
4 INJECTION INTRAMUSCULAR; INTRAVENOUS
Status: DISCONTINUED | OUTPATIENT
Start: 2021-06-23 | End: 2021-06-23 | Stop reason: HOSPADM

## 2021-06-23 RX ORDER — NICOTINE POLACRILEX 4 MG
15 LOZENGE BUCCAL PRN
Status: DISCONTINUED | OUTPATIENT
Start: 2021-06-23 | End: 2021-06-28 | Stop reason: HOSPADM

## 2021-06-23 RX ORDER — MEPERIDINE HYDROCHLORIDE 25 MG/ML
12.5 INJECTION INTRAMUSCULAR; INTRAVENOUS; SUBCUTANEOUS EVERY 5 MIN PRN
Status: DISCONTINUED | OUTPATIENT
Start: 2021-06-23 | End: 2021-06-23 | Stop reason: HOSPADM

## 2021-06-23 RX ORDER — SODIUM CHLORIDE 9 MG/ML
INJECTION, SOLUTION INTRAVENOUS CONTINUOUS PRN
Status: DISCONTINUED | OUTPATIENT
Start: 2021-06-23 | End: 2021-06-23 | Stop reason: SDUPTHER

## 2021-06-23 RX ORDER — SODIUM CHLORIDE 9 MG/ML
INJECTION, SOLUTION INTRAVENOUS PRN
Status: DISCONTINUED | OUTPATIENT
Start: 2021-06-23 | End: 2021-06-28 | Stop reason: HOSPADM

## 2021-06-23 RX ORDER — SUCCINYLCHOLINE CHLORIDE 20 MG/ML
INJECTION INTRAMUSCULAR; INTRAVENOUS PRN
Status: DISCONTINUED | OUTPATIENT
Start: 2021-06-23 | End: 2021-06-23 | Stop reason: SDUPTHER

## 2021-06-23 RX ORDER — PROPOFOL 10 MG/ML
INJECTION, EMULSION INTRAVENOUS PRN
Status: DISCONTINUED | OUTPATIENT
Start: 2021-06-23 | End: 2021-06-23 | Stop reason: SDUPTHER

## 2021-06-23 RX ORDER — LABETALOL HYDROCHLORIDE 5 MG/ML
20 INJECTION, SOLUTION INTRAVENOUS EVERY 4 HOURS PRN
Status: DISCONTINUED | OUTPATIENT
Start: 2021-06-23 | End: 2021-06-28 | Stop reason: HOSPADM

## 2021-06-23 RX ORDER — OXYCODONE HYDROCHLORIDE 10 MG/1
10 TABLET ORAL EVERY 4 HOURS PRN
Status: DISCONTINUED | OUTPATIENT
Start: 2021-06-23 | End: 2021-06-28 | Stop reason: HOSPADM

## 2021-06-23 RX ORDER — LIDOCAINE HYDROCHLORIDE 20 MG/ML
INJECTION, SOLUTION INTRAVENOUS PRN
Status: DISCONTINUED | OUTPATIENT
Start: 2021-06-23 | End: 2021-06-23 | Stop reason: SDUPTHER

## 2021-06-23 RX ORDER — NEOSTIGMINE METHYLSULFATE 1 MG/ML
INJECTION, SOLUTION INTRAVENOUS PRN
Status: DISCONTINUED | OUTPATIENT
Start: 2021-06-23 | End: 2021-06-23 | Stop reason: SDUPTHER

## 2021-06-23 RX ORDER — MORPHINE SULFATE 2 MG/ML
1 INJECTION, SOLUTION INTRAMUSCULAR; INTRAVENOUS EVERY 5 MIN PRN
Status: DISCONTINUED | OUTPATIENT
Start: 2021-06-23 | End: 2021-06-23 | Stop reason: HOSPADM

## 2021-06-23 RX ORDER — SODIUM CHLORIDE 9 MG/ML
INJECTION, SOLUTION INTRAVENOUS CONTINUOUS
Status: DISCONTINUED | OUTPATIENT
Start: 2021-06-23 | End: 2021-06-25

## 2021-06-23 RX ORDER — LISINOPRIL 20 MG/1
20 TABLET ORAL 2 TIMES DAILY
Status: DISCONTINUED | OUTPATIENT
Start: 2021-06-23 | End: 2021-06-28 | Stop reason: HOSPADM

## 2021-06-23 RX ORDER — OXYCODONE HYDROCHLORIDE 5 MG/1
5 TABLET ORAL EVERY 4 HOURS PRN
Status: DISCONTINUED | OUTPATIENT
Start: 2021-06-23 | End: 2021-06-28 | Stop reason: HOSPADM

## 2021-06-23 RX ORDER — DEXAMETHASONE SODIUM PHOSPHATE 10 MG/ML
INJECTION INTRAMUSCULAR; INTRAVENOUS PRN
Status: DISCONTINUED | OUTPATIENT
Start: 2021-06-23 | End: 2021-06-23 | Stop reason: SDUPTHER

## 2021-06-23 RX ORDER — MORPHINE SULFATE 2 MG/ML
2 INJECTION, SOLUTION INTRAMUSCULAR; INTRAVENOUS EVERY 5 MIN PRN
Status: DISCONTINUED | OUTPATIENT
Start: 2021-06-23 | End: 2021-06-23 | Stop reason: HOSPADM

## 2021-06-23 RX ORDER — LABETALOL HYDROCHLORIDE 5 MG/ML
INJECTION, SOLUTION INTRAVENOUS PRN
Status: DISCONTINUED | OUTPATIENT
Start: 2021-06-23 | End: 2021-06-23 | Stop reason: SDUPTHER

## 2021-06-23 RX ORDER — DEXTROSE MONOHYDRATE 50 MG/ML
100 INJECTION, SOLUTION INTRAVENOUS PRN
Status: DISCONTINUED | OUTPATIENT
Start: 2021-06-23 | End: 2021-06-28 | Stop reason: HOSPADM

## 2021-06-23 RX ORDER — HYDRALAZINE HYDROCHLORIDE 20 MG/ML
5 INJECTION INTRAMUSCULAR; INTRAVENOUS
Status: DISCONTINUED | OUTPATIENT
Start: 2021-06-23 | End: 2021-06-23 | Stop reason: HOSPADM

## 2021-06-23 RX ORDER — ONDANSETRON 2 MG/ML
INJECTION INTRAMUSCULAR; INTRAVENOUS PRN
Status: DISCONTINUED | OUTPATIENT
Start: 2021-06-23 | End: 2021-06-23 | Stop reason: SDUPTHER

## 2021-06-23 RX ORDER — ACETAMINOPHEN 325 MG/1
650 TABLET ORAL EVERY 6 HOURS
Status: DISCONTINUED | OUTPATIENT
Start: 2021-06-23 | End: 2021-06-28 | Stop reason: HOSPADM

## 2021-06-23 RX ORDER — FLUCONAZOLE 2 MG/ML
400 INJECTION, SOLUTION INTRAVENOUS EVERY 24 HOURS
Status: DISCONTINUED | OUTPATIENT
Start: 2021-06-24 | End: 2021-06-28 | Stop reason: HOSPADM

## 2021-06-23 RX ORDER — FENTANYL CITRATE 50 UG/ML
INJECTION, SOLUTION INTRAMUSCULAR; INTRAVENOUS PRN
Status: DISCONTINUED | OUTPATIENT
Start: 2021-06-23 | End: 2021-06-23 | Stop reason: SDUPTHER

## 2021-06-23 RX ORDER — ALBUMIN, HUMAN INJ 5% 5 %
SOLUTION INTRAVENOUS PRN
Status: DISCONTINUED | OUTPATIENT
Start: 2021-06-23 | End: 2021-06-23 | Stop reason: SDUPTHER

## 2021-06-23 RX ORDER — LABETALOL HYDROCHLORIDE 5 MG/ML
5 INJECTION, SOLUTION INTRAVENOUS EVERY 10 MIN PRN
Status: DISCONTINUED | OUTPATIENT
Start: 2021-06-23 | End: 2021-06-23 | Stop reason: HOSPADM

## 2021-06-23 RX ORDER — LABETALOL HYDROCHLORIDE 5 MG/ML
2.5 INJECTION, SOLUTION INTRAVENOUS EVERY 10 MIN PRN
Status: DISCONTINUED | OUTPATIENT
Start: 2021-06-23 | End: 2021-06-23

## 2021-06-23 RX ORDER — PROMETHAZINE HYDROCHLORIDE 25 MG/ML
6.25 INJECTION, SOLUTION INTRAMUSCULAR; INTRAVENOUS
Status: DISCONTINUED | OUTPATIENT
Start: 2021-06-23 | End: 2021-06-23 | Stop reason: HOSPADM

## 2021-06-23 RX ORDER — ROCURONIUM BROMIDE 10 MG/ML
INJECTION, SOLUTION INTRAVENOUS PRN
Status: DISCONTINUED | OUTPATIENT
Start: 2021-06-23 | End: 2021-06-23 | Stop reason: SDUPTHER

## 2021-06-23 RX ORDER — BUPIVACAINE HYDROCHLORIDE 5 MG/ML
INJECTION, SOLUTION EPIDURAL; INTRACAUDAL PRN
Status: DISCONTINUED | OUTPATIENT
Start: 2021-06-23 | End: 2021-06-23 | Stop reason: HOSPADM

## 2021-06-23 RX ORDER — GLYCOPYRROLATE 1 MG/5 ML
SYRINGE (ML) INTRAVENOUS PRN
Status: DISCONTINUED | OUTPATIENT
Start: 2021-06-23 | End: 2021-06-23 | Stop reason: SDUPTHER

## 2021-06-23 RX ADMIN — ACETAMINOPHEN 650 MG: 325 TABLET ORAL at 15:31

## 2021-06-23 RX ADMIN — Medication 2 G: at 07:40

## 2021-06-23 RX ADMIN — FENTANYL CITRATE 100 MCG: 50 INJECTION, SOLUTION INTRAMUSCULAR; INTRAVENOUS at 07:35

## 2021-06-23 RX ADMIN — LABETALOL HYDROCHLORIDE 5 MG: 5 INJECTION INTRAVENOUS at 14:01

## 2021-06-23 RX ADMIN — LABETALOL HYDROCHLORIDE 2.5 MG: 5 INJECTION INTRAVENOUS at 13:04

## 2021-06-23 RX ADMIN — ONDANSETRON HYDROCHLORIDE 4 MG: 2 INJECTION, SOLUTION INTRAMUSCULAR; INTRAVENOUS at 11:13

## 2021-06-23 RX ADMIN — HYDROMORPHONE HYDROCHLORIDE 0.5 MG: 1 INJECTION, SOLUTION INTRAMUSCULAR; INTRAVENOUS; SUBCUTANEOUS at 23:34

## 2021-06-23 RX ADMIN — DEXAMETHASONE SODIUM PHOSPHATE 10 MG: 10 INJECTION INTRAMUSCULAR; INTRAVENOUS at 07:51

## 2021-06-23 RX ADMIN — MORPHINE SULFATE 1 MG: 2 INJECTION, SOLUTION INTRAMUSCULAR; INTRAVENOUS at 13:28

## 2021-06-23 RX ADMIN — ACETAMINOPHEN 650 MG: 325 TABLET ORAL at 21:31

## 2021-06-23 RX ADMIN — LABETALOL HYDROCHLORIDE 5 MG: 5 INJECTION INTRAVENOUS at 11:08

## 2021-06-23 RX ADMIN — PHENYLEPHRINE HYDROCHLORIDE 100 MCG: 10 INJECTION INTRAVENOUS at 07:54

## 2021-06-23 RX ADMIN — LABETALOL HYDROCHLORIDE 5 MG: 5 INJECTION INTRAVENOUS at 08:25

## 2021-06-23 RX ADMIN — MORPHINE SULFATE 2 MG: 2 INJECTION, SOLUTION INTRAMUSCULAR; INTRAVENOUS at 04:48

## 2021-06-23 RX ADMIN — FENTANYL CITRATE 50 MCG: 50 INJECTION, SOLUTION INTRAMUSCULAR; INTRAVENOUS at 09:42

## 2021-06-23 RX ADMIN — MEROPENEM 1000 MG: 1 INJECTION, POWDER, FOR SOLUTION INTRAVENOUS at 23:34

## 2021-06-23 RX ADMIN — HYDROMORPHONE HYDROCHLORIDE 0.5 MG: 1 INJECTION, SOLUTION INTRAMUSCULAR; INTRAVENOUS; SUBCUTANEOUS at 18:45

## 2021-06-23 RX ADMIN — ROCURONIUM BROMIDE 10 MG: 10 INJECTION, SOLUTION INTRAVENOUS at 07:35

## 2021-06-23 RX ADMIN — ALBUMIN (HUMAN) 25 G: 12.5 INJECTION, SOLUTION INTRAVENOUS at 08:22

## 2021-06-23 RX ADMIN — ROCURONIUM BROMIDE 20 MG: 10 INJECTION, SOLUTION INTRAVENOUS at 09:48

## 2021-06-23 RX ADMIN — PHENYLEPHRINE HYDROCHLORIDE 100 MCG: 10 INJECTION INTRAVENOUS at 10:02

## 2021-06-23 RX ADMIN — PHENYLEPHRINE HYDROCHLORIDE 100 MCG: 10 INJECTION INTRAVENOUS at 09:50

## 2021-06-23 RX ADMIN — INSULIN HUMAN 8 UNITS: 100 INJECTION, SOLUTION PARENTERAL at 09:45

## 2021-06-23 RX ADMIN — SUCCINYLCHOLINE CHLORIDE 160 MG: 20 INJECTION, SOLUTION INTRAMUSCULAR; INTRAVENOUS at 07:35

## 2021-06-23 RX ADMIN — SODIUM CHLORIDE: 9 INJECTION, SOLUTION INTRAVENOUS at 08:12

## 2021-06-23 RX ADMIN — FENTANYL CITRATE 50 MCG: 50 INJECTION, SOLUTION INTRAMUSCULAR; INTRAVENOUS at 08:13

## 2021-06-23 RX ADMIN — HYDRALAZINE HYDROCHLORIDE 5 MG: 20 INJECTION INTRAMUSCULAR; INTRAVENOUS at 14:11

## 2021-06-23 RX ADMIN — PROPOFOL 150 MG: 10 INJECTION, EMULSION INTRAVENOUS at 07:35

## 2021-06-23 RX ADMIN — ACETAMINOPHEN 650 MG: 325 TABLET ORAL at 23:22

## 2021-06-23 RX ADMIN — INSULIN LISPRO 18 UNITS: 100 INJECTION, SOLUTION INTRAVENOUS; SUBCUTANEOUS at 21:32

## 2021-06-23 RX ADMIN — Medication 0.4 MG: at 11:34

## 2021-06-23 RX ADMIN — MEROPENEM 1000 MG: 1 INJECTION, POWDER, FOR SOLUTION INTRAVENOUS at 16:06

## 2021-06-23 RX ADMIN — METOPROLOL TARTRATE 25 MG: 25 TABLET, FILM COATED ORAL at 21:31

## 2021-06-23 RX ADMIN — MORPHINE SULFATE 2 MG: 2 INJECTION, SOLUTION INTRAMUSCULAR; INTRAVENOUS at 00:36

## 2021-06-23 RX ADMIN — SODIUM CHLORIDE: 9 INJECTION, SOLUTION INTRAVENOUS at 14:27

## 2021-06-23 RX ADMIN — Medication 10 ML: at 21:31

## 2021-06-23 RX ADMIN — SODIUM CHLORIDE: 9 INJECTION, SOLUTION INTRAVENOUS at 09:56

## 2021-06-23 RX ADMIN — LIDOCAINE HYDROCHLORIDE 60 MG: 20 INJECTION, SOLUTION INTRAVENOUS at 07:35

## 2021-06-23 RX ADMIN — Medication 2 G: at 10:33

## 2021-06-23 RX ADMIN — Medication 0.6 MG: at 11:27

## 2021-06-23 RX ADMIN — SODIUM BICARBONATE 50 MEQ: 84 INJECTION, SOLUTION INTRAVENOUS at 11:19

## 2021-06-23 RX ADMIN — SODIUM CHLORIDE: 9 INJECTION, SOLUTION INTRAVENOUS at 18:12

## 2021-06-23 RX ADMIN — ROCURONIUM BROMIDE 40 MG: 10 INJECTION, SOLUTION INTRAVENOUS at 07:49

## 2021-06-23 RX ADMIN — PHENYLEPHRINE HYDROCHLORIDE 100 MCG: 10 INJECTION INTRAVENOUS at 07:58

## 2021-06-23 RX ADMIN — SODIUM CHLORIDE: 9 INJECTION, SOLUTION INTRAVENOUS at 07:24

## 2021-06-23 RX ADMIN — MORPHINE SULFATE 1 MG: 2 INJECTION, SOLUTION INTRAMUSCULAR; INTRAVENOUS at 14:08

## 2021-06-23 RX ADMIN — SODIUM CHLORIDE, PRESERVATIVE FREE 10 ML: 5 INJECTION INTRAVENOUS at 14:12

## 2021-06-23 RX ADMIN — INSULIN LISPRO 18 UNITS: 100 INJECTION, SOLUTION INTRAVENOUS; SUBCUTANEOUS at 23:22

## 2021-06-23 RX ADMIN — HYDROMORPHONE HYDROCHLORIDE 0.5 MG: 1 INJECTION, SOLUTION INTRAMUSCULAR; INTRAVENOUS; SUBCUTANEOUS at 15:08

## 2021-06-23 RX ADMIN — LISINOPRIL 20 MG: 20 TABLET ORAL at 21:38

## 2021-06-23 RX ADMIN — FLUCONAZOLE 200 MG: 200 INJECTION, SOLUTION INTRAVENOUS at 08:09

## 2021-06-23 RX ADMIN — INSULIN LISPRO 12 UNITS: 100 INJECTION, SOLUTION INTRAVENOUS; SUBCUTANEOUS at 17:05

## 2021-06-23 RX ADMIN — Medication 3 MG: at 11:27

## 2021-06-23 RX ADMIN — SUGAMMADEX 145 MG: 100 INJECTION, SOLUTION INTRAVENOUS at 11:41

## 2021-06-23 RX ADMIN — Medication 2 MG: at 11:34

## 2021-06-23 RX ADMIN — LABETALOL HYDROCHLORIDE 2.5 MG: 5 INJECTION INTRAVENOUS at 13:34

## 2021-06-23 RX ADMIN — HEPARIN 300 UNITS: 100 SYRINGE at 21:31

## 2021-06-23 RX ADMIN — CALCIUM GLUCONATE: 98 INJECTION, SOLUTION INTRAVENOUS at 18:45

## 2021-06-23 RX ADMIN — PHENYLEPHRINE HYDROCHLORIDE 100 MCG: 10 INJECTION INTRAVENOUS at 07:50

## 2021-06-23 RX ADMIN — INSULIN LISPRO 15 UNITS: 100 INJECTION, SOLUTION INTRAVENOUS; SUBCUTANEOUS at 03:21

## 2021-06-23 RX ADMIN — FENTANYL CITRATE 50 MCG: 50 INJECTION, SOLUTION INTRAMUSCULAR; INTRAVENOUS at 09:21

## 2021-06-23 ASSESSMENT — PULMONARY FUNCTION TESTS
PIF_VALUE: 21
PIF_VALUE: 29
PIF_VALUE: 14
PIF_VALUE: 20
PIF_VALUE: 20
PIF_VALUE: 17
PIF_VALUE: 20
PIF_VALUE: 19
PIF_VALUE: 28
PIF_VALUE: 15
PIF_VALUE: 5
PIF_VALUE: 21
PIF_VALUE: 11
PIF_VALUE: 19
PIF_VALUE: 20
PIF_VALUE: 26
PIF_VALUE: 19
PIF_VALUE: 15
PIF_VALUE: 26
PIF_VALUE: 3
PIF_VALUE: 15
PIF_VALUE: 21
PIF_VALUE: 26
PIF_VALUE: 25
PIF_VALUE: 28
PIF_VALUE: 21
PIF_VALUE: 21
PIF_VALUE: 20
PIF_VALUE: 20
PIF_VALUE: 23
PIF_VALUE: 28
PIF_VALUE: 14
PIF_VALUE: 21
PIF_VALUE: 14
PIF_VALUE: 21
PIF_VALUE: 26
PIF_VALUE: 19
PIF_VALUE: 21
PIF_VALUE: 2
PIF_VALUE: 28
PIF_VALUE: 16
PIF_VALUE: 34
PIF_VALUE: 21
PIF_VALUE: 14
PIF_VALUE: 21
PIF_VALUE: 14
PIF_VALUE: 20
PIF_VALUE: 20
PIF_VALUE: 19
PIF_VALUE: 19
PIF_VALUE: 27
PIF_VALUE: 21
PIF_VALUE: 28
PIF_VALUE: 20
PIF_VALUE: 28
PIF_VALUE: 28
PIF_VALUE: 26
PIF_VALUE: 21
PIF_VALUE: 27
PIF_VALUE: 27
PIF_VALUE: 28
PIF_VALUE: 14
PIF_VALUE: 26
PIF_VALUE: 21
PIF_VALUE: 20
PIF_VALUE: 21
PIF_VALUE: 2
PIF_VALUE: 21
PIF_VALUE: 26
PIF_VALUE: 28
PIF_VALUE: 20
PIF_VALUE: 3
PIF_VALUE: 18
PIF_VALUE: 14
PIF_VALUE: 21
PIF_VALUE: 21
PIF_VALUE: 20
PIF_VALUE: 15
PIF_VALUE: 21
PIF_VALUE: 20
PIF_VALUE: 21
PIF_VALUE: 28
PIF_VALUE: 3
PIF_VALUE: 21
PIF_VALUE: 26
PIF_VALUE: 21
PIF_VALUE: 14
PIF_VALUE: 28
PIF_VALUE: 27
PIF_VALUE: 14
PIF_VALUE: 20
PIF_VALUE: 14
PIF_VALUE: 28
PIF_VALUE: 20
PIF_VALUE: 21
PIF_VALUE: 28
PIF_VALUE: 21
PIF_VALUE: 28
PIF_VALUE: 21
PIF_VALUE: 27
PIF_VALUE: 15
PIF_VALUE: 11
PIF_VALUE: 22
PIF_VALUE: 28
PIF_VALUE: 21
PIF_VALUE: 26
PIF_VALUE: 21
PIF_VALUE: 27
PIF_VALUE: 20
PIF_VALUE: 21
PIF_VALUE: 20
PIF_VALUE: 28
PIF_VALUE: 21
PIF_VALUE: 28
PIF_VALUE: 22
PIF_VALUE: 15
PIF_VALUE: 20
PIF_VALUE: 28
PIF_VALUE: 26
PIF_VALUE: 21
PIF_VALUE: 21
PIF_VALUE: 14
PIF_VALUE: 19
PIF_VALUE: 27
PIF_VALUE: 19
PIF_VALUE: 19
PIF_VALUE: 21
PIF_VALUE: 26
PIF_VALUE: 19
PIF_VALUE: 27
PIF_VALUE: 20
PIF_VALUE: 21
PIF_VALUE: 20
PIF_VALUE: 11
PIF_VALUE: 27
PIF_VALUE: 16
PIF_VALUE: 21
PIF_VALUE: 15
PIF_VALUE: 28
PIF_VALUE: 1
PIF_VALUE: 20
PIF_VALUE: 14
PIF_VALUE: 17
PIF_VALUE: 21
PIF_VALUE: 21
PIF_VALUE: 3
PIF_VALUE: 21
PIF_VALUE: 21
PIF_VALUE: 27
PIF_VALUE: 15
PIF_VALUE: 19
PIF_VALUE: 28
PIF_VALUE: 2
PIF_VALUE: 27
PIF_VALUE: 15
PIF_VALUE: 3
PIF_VALUE: 14
PIF_VALUE: 2
PIF_VALUE: 27
PIF_VALUE: 20
PIF_VALUE: 21
PIF_VALUE: 27
PIF_VALUE: 15
PIF_VALUE: 20
PIF_VALUE: 14
PIF_VALUE: 20
PIF_VALUE: 6
PIF_VALUE: 16
PIF_VALUE: 20
PIF_VALUE: 21
PIF_VALUE: 14
PIF_VALUE: 20
PIF_VALUE: 21
PIF_VALUE: 20
PIF_VALUE: 3
PIF_VALUE: 21
PIF_VALUE: 26
PIF_VALUE: 28
PIF_VALUE: 14
PIF_VALUE: 20
PIF_VALUE: 27
PIF_VALUE: 20
PIF_VALUE: 21
PIF_VALUE: 21
PIF_VALUE: 14
PIF_VALUE: 21
PIF_VALUE: 20
PIF_VALUE: 19
PIF_VALUE: 19
PIF_VALUE: 21
PIF_VALUE: 15
PIF_VALUE: 21
PIF_VALUE: 27
PIF_VALUE: 21
PIF_VALUE: 21
PIF_VALUE: 1
PIF_VALUE: 19
PIF_VALUE: 20
PIF_VALUE: 15
PIF_VALUE: 19
PIF_VALUE: 21
PIF_VALUE: 20
PIF_VALUE: 19
PIF_VALUE: 19
PIF_VALUE: 20
PIF_VALUE: 28
PIF_VALUE: 14
PIF_VALUE: 15
PIF_VALUE: 14
PIF_VALUE: 18
PIF_VALUE: 20
PIF_VALUE: 14
PIF_VALUE: 14
PIF_VALUE: 19
PIF_VALUE: 26
PIF_VALUE: 21
PIF_VALUE: 26
PIF_VALUE: 28
PIF_VALUE: 21
PIF_VALUE: 20
PIF_VALUE: 20
PIF_VALUE: 21
PIF_VALUE: 20
PIF_VALUE: 15
PIF_VALUE: 14

## 2021-06-23 ASSESSMENT — PAIN DESCRIPTION - DESCRIPTORS
DESCRIPTORS: ACHING;DISCOMFORT;PRESSURE
DESCRIPTORS: DISCOMFORT
DESCRIPTORS: DISCOMFORT

## 2021-06-23 ASSESSMENT — PAIN SCALES - GENERAL
PAINLEVEL_OUTOF10: 8
PAINLEVEL_OUTOF10: 8
PAINLEVEL_OUTOF10: 5
PAINLEVEL_OUTOF10: 8
PAINLEVEL_OUTOF10: 0
PAINLEVEL_OUTOF10: 4
PAINLEVEL_OUTOF10: 0
PAINLEVEL_OUTOF10: 8
PAINLEVEL_OUTOF10: 8
PAINLEVEL_OUTOF10: 5
PAINLEVEL_OUTOF10: 0
PAINLEVEL_OUTOF10: 7
PAINLEVEL_OUTOF10: 1
PAINLEVEL_OUTOF10: 0
PAINLEVEL_OUTOF10: 0
PAINLEVEL_OUTOF10: 6
PAINLEVEL_OUTOF10: 0
PAINLEVEL_OUTOF10: 4
PAINLEVEL_OUTOF10: 2
PAINLEVEL_OUTOF10: 5

## 2021-06-23 ASSESSMENT — PAIN - FUNCTIONAL ASSESSMENT: PAIN_FUNCTIONAL_ASSESSMENT: ACTIVITIES ARE NOT PREVENTED

## 2021-06-23 ASSESSMENT — PAIN DESCRIPTION - FREQUENCY
FREQUENCY: INTERMITTENT
FREQUENCY: INTERMITTENT
FREQUENCY: CONTINUOUS

## 2021-06-23 ASSESSMENT — PAIN DESCRIPTION - ORIENTATION
ORIENTATION: MID
ORIENTATION: INNER;LOWER;MID
ORIENTATION: MID;INNER

## 2021-06-23 ASSESSMENT — PAIN DESCRIPTION - ONSET
ONSET: GRADUAL
ONSET: GRADUAL
ONSET: AWAKENED FROM SLEEP

## 2021-06-23 ASSESSMENT — ENCOUNTER SYMPTOMS: DYSPNEA ACTIVITY LEVEL: AFTER AMBULATING 1 FLIGHT OF STAIRS

## 2021-06-23 ASSESSMENT — PAIN DESCRIPTION - PAIN TYPE
TYPE: SURGICAL PAIN;ACUTE PAIN
TYPE: SURGICAL PAIN
TYPE: SURGICAL PAIN
TYPE: SURGICAL PAIN;ACUTE PAIN

## 2021-06-23 ASSESSMENT — PAIN DESCRIPTION - PROGRESSION
CLINICAL_PROGRESSION: NOT CHANGED
CLINICAL_PROGRESSION: GRADUALLY IMPROVING
CLINICAL_PROGRESSION: GRADUALLY IMPROVING

## 2021-06-23 ASSESSMENT — PAIN DESCRIPTION - LOCATION
LOCATION: CHEST;ABDOMEN
LOCATION: ABDOMEN
LOCATION: ABDOMEN;CHEST

## 2021-06-23 ASSESSMENT — LIFESTYLE VARIABLES: SMOKING_STATUS: 0

## 2021-06-23 NOTE — PROGRESS NOTES
HOSPITALIST PROGRESS NOTE  Date: 6/23/2021   Name: Andre Gaines   MRN: [id number]   YOB: 1950        Hospital Course:   Mr Jorden Brandon 66 YM who presented to the emergency department approximately 6 days ago was complaining of chest pain and also abdominal pain with radiation up and down his abdomen also complained of a headache. Patient has a past medical history of prostatic cancer, diabetes mellitus, hyperlipidemia, hypertension.   [facility] and oncology in consultation for evaluation and treatment of acute on chronic pancreatitis with pancreatic pseudocyst.    Subjective/Interval Hx:   Patient just retuened from surgery; stable extubated w/ NG to be flushed per nursing; states that pain is controlled with mediciation    Objective:   Physical Exam:   BP (!) 155/92   Pulse 118   Temp 97.3 °F (36.3 °C) (Temporal)   Resp 18   Ht 6' 2\" (1.88 m)   Wt 159 lb 9.6 oz (72.4 kg)   SpO2 100%   BMI 20.49 kg/m²   General: no acute distress, well nourished and well hydrated  HEENT: NCAT  Heart: S1S2 RRR  Lungs: Clear to ascultation bilaterally, respiratory effort normal  Abdomen: soft, NT/ND, positive bowel sounds  Extremities: no pitting edema, nontender   Neuro: patient is awake, alert and orientated times 3, no gross deficits  Skin: no rashes or ecchymosis        Meds:   Meds:    insulin lispro  0-18 Units Subcutaneous Q4H    lisinopril  20 mg Oral BID    metoprolol tartrate  25 mg Oral BID    acetaminophen  650 mg Oral Q6H    meropenem  1,000 mg Intravenous Q8H    [START ON 6/24/2021] fluconazole  400 mg Intravenous Q24H    lidocaine PF  5 mL Intradermal Once    sodium chloride flush  5-40 mL Intravenous 2 times per day    heparin flush  3 mL Intravenous 2 times per day      Infusions:    dextrose      sodium chloride      sodium chloride      sodium chloride 95 mL/hr at 06/23/21 1545    PN-Adult  3 IN 1 [facility] (Custom)      lactated ringers 125 mL/hr at 06/22/21 0923    sodium chloride      PN-Adult  3 IN 1 Central Line (Standard) 37.5 mL/hr at 06/23/21 1427     PRN Meds: glucose, 15 g, PRN  dextrose, 12.5 g, PRN  glucagon (rDNA), 1 mg, PRN  dextrose, 100 mL/hr, PRN  sodium chloride, , PRN  sodium chloride, , PRN  HYDROmorphone, 0.25 mg, Q3H PRN   Or  HYDROmorphone, 0.5 mg, Q3H PRN  oxyCODONE, 5 mg, Q4H PRN   Or  oxyCODONE, 10 mg, Q4H PRN  labetalol, 20 mg, Q4H PRN  ondansetron, 4 mg, Q6H PRN  sodium chloride flush, 5-40 mL, PRN  sodium chloride, 25 mL, PRN  heparin flush, 3 mL, PRN        Data/Labs:     Recent Labs     06/22/21  0200 06/23/21  0420   WBC 7.5 9.8   HGB 11.3* 10.6*   HCT 33.4* 30.6*    204      Recent Labs     06/22/21  0200 06/23/21  0420 06/23/21  0855 06/23/21  0938 06/23/21  1105 06/23/21  1208   * 130* 133  --   --   --    K 4.2 4.0 4.2 4.2 4.0 4.1   CL 92* 97* 100  --   --   --    CO2 21* 22 21*  --   --   --    PHOS  --  3.0  --   --   --   --    BUN 30* 26* 26*  --   --   --    CREATININE 2.0* 1.7* 1.6*  --   --   --      Recent Labs     06/22/21  0200 06/23/21  0420   AST 14 8   ALT 8 7   BILITOT 0.7 0.5   ALKPHOS 152* 129     No results for input(s): INR in the last 72 hours. No results for input(s): CKTOTAL, CKMB, CKMBINDEX, TROPONINT in the last 72 hours. I/O last 3 completed shifts: In: 1060 [I.V.:1000; NG/GT:60]  Out: 1775 [Urine:1725; Blood:50]    Intake/Output Summary (Last 24 hours) at 6/23/2021 1552  Last data filed at 6/23/2021 1427  Gross per 24 hour   Intake 1060 ml   Output 1775 ml   Net -715 ml        Assessment/Plan:   1. Acute on chronic pancreatitis with pseudocyst-general surgery and oncology is following since patient has a history of pancreatic cancer, pain is controlled with medication, Creon when eating  06/23/2021- patient just returned from endoscopy by robotic s/p cyst gastrostomy, MATEO and pancreatic necrosectomy  2.  Hypertension-lisinopril, metoprolol, patient is n.p.o. at this time may have to add an IV as needed antihypertensive to control his blood pressure we will continue to monitor  3. Non-insulin-dependent diabetes mellitus-May need a sliding scale, monitor blood glucose, diabetic diet if indicated  4. Coronary artery disease-amiodarone, will hold at this time since patient is n.p.o. after procedure we will continue  5.  Chronic kidney disease-patient's creatinine is 2.0 we will continue to monitor and avoid nephrotoxic agents      DVT Prophylaxis: scd  Diet: PN-Adult  3-in-1 Central Line (Standard)  Diet NPO Exceptions are: Ice Chips, Sips of Water with Meds  PN-Adult  3-in-1 [employer] (Custom)  Code Status: Prior    Dispo: when stable     Electronically signed by Maria Teresa Kendrick MD on 6/23/2021 at [address]

## 2021-06-23 NOTE — ANESTHESIA PRE PROCEDURE
Department of Anesthesiology  Preprocedure Note       Name:  Andre Sanchez   Age:  66 y.o.  :  1950                                          MRN:  [id number]         Date:  2021      Surgeon: Enrique Amaral):  Gerard Dallas MD    Procedure: Procedure(s):  LAPAROSCOPIC ROBOTIC XI PANCREATIC NECROSECTOMY WITH CYST GASTROSTOMY , INTRAOPERATIVE  ULTRASOUND    Medications prior to admission:   Prior to Admission medications    Medication Sig Start Date End Date Taking? Authorizing Provider   Skin Protectants, Misc. (ALOE VESTA PROTECTIVE) OINT ointment Apply topically daily as needed (apply to dry skin on feet and legs)    Historical Provider, MD   HYDROPHILIC EX Apply topically daily as needed (apply to dry skin on torso)    Historical Provider, MD   insulin aspart (NOVOLOG FLEXPEN) 100 UNIT/ML injection pen Inject 0-5 Units into the skin 3 times daily (before meals) *Per Sliding Scale*    Historical Provider, MD   lisinopril (PRINIVIL;ZESTRIL) 20 MG tablet Take 20 mg by mouth 2 times daily    Historical Provider, MD   lipase-protease-amylase (CREON) 60856-586973 units CPEP delayed release capsule Take 1 capsule by mouth 3 times daily (with meals)    Historical Provider, MD   tadalafil (CIALIS) 20 MG tablet Take 20 mg by mouth as needed for Erectile Dysfunction    Historical Provider, MD   folic acid (FOLVITE) 1 MG tablet Take 1 tablet by mouth daily 3/1/21   Breanne Walsh MD   metoprolol tartrate (LOPRESSOR) 25 MG tablet Take 1 tablet by mouth 2 times daily 21   Hernandez Miller MD   apixaban (ELIQUIS) 5 MG TABS tablet Take 1 tablet by mouth 2 times daily 21   Hernandez Miller MD       Current medications:    No current facility-administered medications for this visit. No current outpatient medications on file.      Facility-Administered Medications Ordered in Other Visits   Medication Dose Route Frequency Provider Last Rate Last Admin    insulin lispro (HUMALOG) injection vial 0-18 Units 0-18 Units Subcutaneous Q4H Jimena E Kaercher, DO   15 Units at 06/23/21 0321    glucose (GLUTOSE) 40 % oral gel 15 g  15 g Oral PRN Jimena E Kaercher, DO        dextrose 50 % IV solution  12.5 g Intravenous PRN Jimena E Kaercher, DO        glucagon (rDNA) injection 1 mg  1 mg Intramuscular PRN Jimena E Kaercher, DO        dextrose 5 % solution  100 mL/hr Intravenous PRN Jimena E Kaercher, DO        lisinopril (PRINIVIL;ZESTRIL) tablet 20 mg  20 mg Oral BID Alfred Villela, DO        metoprolol tartrate (LOPRESSOR) tablet 25 mg  25 mg Oral BID Alfred Villela, DO        PN-Adult  3-in-1 Central Line (Standard)   Intravenous Continuous TPN Sunny Berry, DO        ondansetron (ZOFRAN) injection 4 mg  4 mg Intravenous Q6H PRN Reginald Kemp MD   4 mg at 06/22/21 1410    lactated ringers infusion   Intravenous Continuous Marc Ha  mL/hr at 06/22/21 0923 New Bag at 06/22/21 0923    lidocaine PF 1 % injection 5 mL  5 mL Intradermal Once Sunny Berry DO        sodium chloride flush 0.9 % injection 5-40 mL  5-40 mL Intravenous 2 times per day Sunny Berry DO   10 mL at 06/22/21 2142    sodium chloride flush 0.9 % injection 5-40 mL  5-40 mL Intravenous PRN Sunny Berry DO        0.9 % sodium chloride infusion  25 mL Intravenous PRN Sunny Berry DO        heparin flush 100 UNIT/ML injection 300 Units  3 mL Intravenous 2 times per day Sunny Berry DO        heparin flush 100 UNIT/ML injection 300 Units  3 mL Intracatheter PRN Jamarcus Berry, DO        PN-Adult  3-in-1 [employer] (Standard)   Intravenous Continuous TPN Sunny Berry DO 75 mL/hr at 06/22/21 2135 New Bag at 06/22/21 2135    ceFAZolin (ANCEF) 2000 mg in sterile water 20 mL IV syringe  2,000 mg Intravenous On Call to OR Sunny Berry DO        morphine (PF) injection 2 mg  2 mg Intravenous Q4H PRN Wally Villeda MD   2 mg at 06/23/21 0448    fluconazole (DIFLUCAN) in 0.9 % sodium chloride IVPB 200 mg  200 mg Intravenous On Call to [address] Enid Bloom MD           Allergies:  No Known Allergies    Problem List:    Patient Active Problem List   Diagnosis Code    Hyperlipidemia E78.5    Prostate cancer ([facility] [address] [facility] C61    Hypertension I10    Diabetes mellitus ([facility] E11.9    Shoulder pain, bilateral M25.511, M25.512    Abdominal pain, right lower quadrant R10.31    DKA, type 1, not at goal [facility]) E10.10    Acute metabolic encephalopathy X01.78    Acute pancreatitis K85.90    Acute renal failure (ARF) (HCC) N17.9    High anion gap metabolic acidosis H42.0    Complicated UTI (urinary tract infection) N39.0    Acute cystitis with hematuria N30.01    Severe anemia D64.9    Moderate malnutrition (HCC) E44.0    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    Pancreatic pseudocyst K86.3    Acute on chronic pancreatitis (HCC) K85.90, K86.1    Abdominal pain R10.9       Past Medical History:        Diagnosis Date    Cancer ([facility]     Diabetes mellitus ([facility]     Hyperlipidemia     Hypertension     Prostate cancer ([facility]        Past Surgical History:        Procedure Laterality Date    COLON SURGERY      DILATATION, ESOPHAGUS      HC DIALYSIS CATHETER N/A 1/12/2021    TEMPORARY HEMODIALYSIS CATHETER INSERTION performed [facility] 6 years ago    PANCREAS BIOPSY N/A 6/14/2021    PANCREATIC PSEUDOCYST EXCISION DRAINAGE performed by Zoë King MD at [place] [address]  6/14/2021    ENDOSCOPIC ULTRASOUND performed by Zoë King MD at [address] [place] [address] History:    Social History     Tobacco Use    Smoking status: Former Smoker     Packs/day: 1.00     Types: Cigarettes    Smokeless tobacco: Never Used   Substance Use Topics    Alcohol use: Not Currently     Comment: \"daily shots\", \"none in three months\"                                Counseling given: Not Answered      Vital Signs (Current):    There were no vitals filed for this visit. BP Readings from Last 3 Encounters:   06/23/21 (!) 161/103   06/18/21 (!) 163/87   06/14/21 (!) 164/85       NPO Status:                                                                                 BMI:   Wt Readings from Last 3 Encounters:   06/23/21 159 lb 9.6 oz (72.4 kg)   06/17/21 170 lb (77.1 kg)   06/14/21 154 lb (69.9 kg)     There is no height or weight on file to calculate BMI.    CBC:   Lab Results   Component Value Date    WBC 9.8 06/23/2021    RBC 3.94 06/23/2021    HGB 10.6 06/23/2021    HCT 30.6 06/23/2021    MCV 77.7 06/23/2021    RDW 16.9 06/23/2021     06/23/2021       CMP:   Lab Results   Component Value Date     06/23/2021    K 4.0 06/23/2021    K 4.3 06/17/2021    CL 97 06/23/2021    CO2 22 06/23/2021    BUN 26 06/23/2021    CREATININE 1.7 06/23/2021    GFRAA 48 06/23/2021    LABGLOM 48 06/23/2021    GLUCOSE 405 06/23/2021    PROT 6.4 06/23/2021    CALCIUM 9.2 06/23/2021    BILITOT 0.5 06/23/2021    ALKPHOS 129 06/23/2021    AST 8 06/23/2021    ALT 7 06/23/2021       POC Tests: No results for input(s): POCGLU, POCNA, POCK, POCCL, POCBUN, POCHEMO, POCHCT in the last 72 hours.     Coags:   Lab Results   Component Value Date    PROTIME 13.0 02/27/2021    INR 1.2 02/27/2021    APTT 32.8 02/27/2021       HCG (If Applicable): No results found for: PREGTESTUR, PREGSERUM, HCG, HCGQUANT     ABGs: No results found for: PHART, PO2ART, QCD0GMC, CVV4BON, BEART, [id number]     Type & Screen (If Applicable):  No results found for: LABABO, LABRH    Drug/Infectious Status (If Applicable):  No results found for: HIV, HEPCAB    COVID-19 Screening (If Applicable):   Lab Results   Component Value Date    COVID19 Not Detected 01/11/2021         Anesthesia Evaluation  Patient summary reviewed and Nursing notes reviewed no history of anesthetic complications:   Airway: Mallampati: III  TM distance: >3 FB   Neck ROM: limited  Mouth opening: > = 3 FB Dental:          Pulmonary: breath sounds clear to auscultation      (-) not a current smoker          Patient did not smoke on day of surgery. Cardiovascular:  Exercise tolerance: poor (<4 METS),   (+) hypertension: moderate, dysrhythmias: atrial fibrillation, HAZEL: after ambulating 1 flight of stairs, hyperlipidemia      ECG reviewed  Rhythm: irregular  Rate: normal           Beta Blocker:  Dose within 24 Hrs      ROS comment: Atrial fibrillation  Minimal voltage criteria for LVH, may be normal variant  No previous ECGs available  Confirmed by Bea Pascal ([id number]) on 1/10/2021 8:28:30 AM     Neuro/Psych:   Negative Neuro/Psych ROS              GI/Hepatic/Renal:   (+) GERD:, renal disease: CRI,          ROS comment: 66 y.o. male with pancreatic pseudocyst s/p endoscopic cystogastrostomy,  Now with enlarging 9cm acute necrotic collection and possible abscess formation. Endo/Other:    (+) DiabetesType II DM, poorly controlled, using insulin, blood dyscrasia: anemia and anticoagulation therapy, arthritis: OA., electrolyte abnormalities (Hyponatremia), malignancy/cancer (Prostate cancer). Pt had no PAT visit        ROS comment:  Abdominal:         (-) obese     Vascular: negative vascular ROS. Anesthesia Plan      general     ASA 4       Induction: intravenous. arterial line and central line  MIPS: Postoperative opioids intended and Prophylactic antiemetics administered. Anesthetic plan and risks discussed with patient. Use of blood products discussed with patient whom consented to blood products. Plan discussed with CRNA. EXAMINATION:   CT OF THE ABDOMEN AND PELVIS WITHOUT CONTRAST 6/22/2021 2:50 am       TECHNIQUE:   CT of the abdomen and pelvis was performed without the administration of   intravenous contrast. Multiplanar reformatted images are provided for review.    Dose modulation, iterative reconstruction, and/or weight based adjustment of   the mA/kV was utilized to reduce the radiation dose to as low as reasonably   achievable. COMPARISON:   05/05/2021 which       HISTORY:   ORDERING SYSTEM PROVIDED HISTORY: abdominal pain   TECHNOLOGIST PROVIDED HISTORY:   Reason for exam:->abdominal pain   Additional Contrast?->None   Decision Support Exception - unselect if not a suspected or confirmed   emergency medical condition->Emergency Medical Condition (MA)   What reading provider will be dictating this exam?->CRC       FINDINGS:   Lower Chest: The visualized portions of the lung bases are clear. Abdomen: The patient has likely undergone cysto gastrostomy internal drainage   procedure of the previously seen pancreatic pseudocyst.  Pseudocyst is now   larger and contains gas, and is a large amount of inflammation around the   pseudocyst which is now larger measuring roughly 9.1 x 5.8 cm. Appearance is   concerning for interval development of super infection/abscess of the   pseudocyst.  Appearance also is suggestive of worsening acute pancreatitis. There is cholelithiasis. Within limitations of a noncontrast exam the visualized liver,  adrenal   glands and kidneys demonstrate no significant abnormality. There is   unchanged splenomegaly. There are aortoiliac atherosclerotic calcification. There is no abdominal   aortic aneurysm. There are no findings of intestinal obstruction. The appendix is not   visualized. Pelvis:  Bladder is unremarkable in appearance. There is no abnormal pelvic   mass or fluid collection seen. Bones/Soft Tissues: No acute abnormality identified. Impression   There is worsened inflammation involving entire pancreas which is compatible   with acute pancreatitis.   Patient appears to have undergone a   cystogastrostomy internal drainage procedure of the previously seen   pancreatic pseudocyst.  Unfortunately, that pseudocyst

## 2021-06-23 NOTE — CONSULTS
Comprehensive Nutrition Assessment    Type and Reason for Visit:  Initial    Nutrition Recommendations/Plan: Continue NPO- Start TPN per MD    Rec Custom 3-in-1 (1800mL tv @75ml/hr) to provide 120 gm AA, 272 gm dextorse, 40 gm lipid & 2100 total kcals  *Start at half dose as pt at high risk for re-feeding syndrome w/ severe malnutrition  Electrolytes/TG WNL at this time, continue to monitor  Hyponatremia improving, can adjust volume as needed    Nutrition Assessment:  P adm w/ abd pain 2/2 chronic pancreatitis w/ pseudocyst s/p recent endoscopic cystogastrostomy 6/14. Pending repeat lap necrosectomy. Note h/o prostate CA w/ rxt/ ETOH abuse/DM. Noted severe malnutrition.  Provided PN rec when needed    Malnutrition Assessment:  Malnutrition Status:  Severe malnutrition    Context:  Chronic Illness     Findings of the 6 clinical characteristics of malnutrition:  Energy Intake:  7 - 75% or less estimated energy requirements for 1 month or longer  Weight Loss:  7 - Greater than 7.5% over 3 months (35.9% x 6 months)     Body Fat Loss:  Unable to assess (pt in OR)   Muscle Mass Loss:  Unable to assess (pt in OR)  Fluid Accumulation:  No significant fluid accumulation   Strength:  Not Performed    Estimated Daily Nutrient Needs:  Energy (kcal):  9322-8348; Weight Used for Energy Requirements:  Current     Protein (g):  110-130; Weight Used for Protein Requirements:  Current (1.5-1.8)        Fluid (ml/day):  4281-7285; Method Used for Fluid Requirements:  1 ml/kcal      Nutrition Related Findings:  A&Ox4, soft/tender abd, no edema, -I/Os, abd pain/constipation PTA    Wounds:  Surgical Incision (lap sites x 3)       Current Nutrition Therapies:    Current Parenteral Nutrition Orders:  · Type and Formula: 3-in-1 Standard   · Duration: Continuous (75mL/hr = 1800mL TV)  · Goal PN Orders Provides: 1800mL TV,1863 calories, 90g AA    Anthropometric Measures:  · Height: 6' 2\" (188 cm)  · Current Body Weight: 159 lb 9.6 oz (72.4 kg) (6/23 bed scale)   · Admission Body Weight: 159 lb 9.6 oz (72.4 kg) (6/23 bed scale, first taken)    · Usual Body Weight: 249 lb (112.9 kg) (12/22/20 x 6 months)     · Ideal Body Weight: 190 lbs; % Ideal Body Weight 84 %   · BMI: 20.5  · BMI Categories: Underweight (BMI less than 22) age over 72       Nutrition Diagnosis:   · Severe malnutrition, In context of chronic illness related to altered GI function (chronic pancreatitis w/ pseudocyst) as evidenced by intake 51-75%, poor intake prior to admission, weight loss greater than or equal to 10% in 6 months    Nutrition Interventions:   Nutrition Education/Counseling:  Education not indicated   Coordination of Nutrition Care:  Continue to monitor while inpatient    Goals:  PN meets estimated nutrient needs       Nutrition Monitoring and Evaluation:   Behavioral-Environmental Outcomes:  None Identified   Food/Nutrient Intake Outcomes:  Parenteral Nutrition Intake/Tolerance  Physical Signs/Symptoms Outcomes:  Biochemical Data, Nutrition Focused Physical Findings, Skin, Weight, GI Status, Fluid Status or Edema, Hemodynamic Status     Discharge Planning:     Too soon to determine     Electronically signed by Laurent Wheeler on 6/23/21 at 12:28 PM EDT    Contact: [phone]

## 2021-06-23 NOTE — OP NOTE
mercury. A 8.5mm port was inserted. An 8-mm incision in the left side of the abomen and a robotic port was inserted. An additional 8mm incision was made in the left midclavicular line and another trochar was inserted. Once this was done an additional 8mm trocar was inserted in the right mid quadrant. The abdomen was fully inspected, the gallbladder was not inflamed. An additional 12mm trocar was placed as an assistant port in the right lower quadrant. The patient presented with a pancreatic abscess and infection was present at the time of surgery. There were extensive intra-abdominal omental adhesions to the anterior abdominal wall. Lysis of adhesions was performed for a total of 45 minutes to gain access to the stomach. Intraoperative ultrasound was used and necrotic collection was identified through the stomach. It was 10cm in diameter. The axios stent was identified. The pancreatic anatomy was identified along with vasculature and all images were interpretted by me, printed, and saved to the patients chart. The middle of the cyst was identified using the ultrasound device and using monopolar scissors and an anterior gastrostomy was made. The vessel sealer device was used to open a 10cm gastrotomy. The axios stent was identified going through the posterior stomach. The vasculature was identified again with the ultrasound. Images were save, interpretted by me, and saved to the chart. The ultrasound was used again to identify the necrotic collection. Suction irrigation was used to remove the pancreatic fluid through the stent. Debridement was also began through the axios stent. Once necrotic debris was removed and the fluid was removed, the vessel sealer was used to open the posterior gastric wall and the pseudocyst.  Copious amounts of milky fluid was removed.   The cavity was suctioned clean and a significant amount of necrotic pancreatic tissue was removed and further debridement using bipolar graspers along with suction irrigation a pancreatic necrosectomy was performed. Necrotic tissue was sent to pathology and to microbiology. Once this was done using 2.0 absorbable v lock suture the apex's of posterior gastrotomy and pseudocyst was sutured together. The stomach and psuedocyst were sewn together on the posterior side using the 2.0 v lock suture in a running fashion. The NGT was then placed into the cavity creating the cyst gastrostomy. The anterior gastrotomy was closed in two running 3.0 v lock suture. The lap bag was then removed through the 12mm trocar site which contained the 4 x 4 and gallbladder. The abdomen was irrigated. The 12mm port site was closed with 2.0 vicryl suture using a mary kashmir needle. Then we used 4-0 Monocryl suture to reapproximate the skin. Surgical glue was placed over the skin. The patient was able to be extubated in the operating room. All instrument counts, lap counts, and needle counts were correct at the completion of the procedure. I was presented and directed and performed the entire operation.     Electronically signed by Sanchez Johnson MD on 6/23/2021 at 11:37 AM

## 2021-06-23 NOTE — PROGRESS NOTES
Dr. Duy Clements notified of post op chest xray, messaged back that air under diaphragm expected with the recent surgery.  Nathalie Peguero RN

## 2021-06-23 NOTE — ANESTHESIA POSTPROCEDURE EVALUATION
Department of Anesthesiology  Postprocedure Note    Patient: Andre Landrum  MRN: [id number]  YOB: 1950  Date of evaluation: 6/23/2021  Time:  1:34 PM     Procedure Summary     Date: 06/23/21 Room / Location: [facility] OR 05 / [facility]    Anesthesia Start: 7618 Anesthesia Stop: 9791    Procedure: LAPAROSCOPIC ROBOTIC XI PANCREATIC NECROSECTOMY WITH CYST GASTROSTOMY , INTRAOPERATIVE  ULTRASOUND (N/A Abdomen) Diagnosis: (CHRONIC PANCREATITIS)    Surgeons: Fabian Banks MD Responsible Provider: Adal Moss DO    Anesthesia Type: general ASA Status: 4          Anesthesia Type: general    Blade Phase I: Blade Score: 8    Blade Phase II:      Last vitals: Reviewed and per EMR flowsheets. Anesthesia Post Evaluation    Patient location during evaluation: PACU  Patient participation: complete - patient participated  Level of consciousness: awake and alert  Pain score: 1  Airway patency: patent  Nausea & Vomiting: no nausea and no vomiting  Complications: no  Cardiovascular status: hemodynamically stable  Respiratory status: acceptable  Hydration status: euvolemic  Comments: PACU RN removed Lt radial A-Line. Hematoma noted. They are holding pressure. Distal pulses noted. Also good pulse oximeter wave noted.

## 2021-06-24 LAB
ANION GAP SERPL CALCULATED.3IONS-SCNC: 11 MMOL/L (ref 7–16)
ANISOCYTOSIS: ABNORMAL
BASOPHILS ABSOLUTE: 0 E9/L (ref 0–0.2)
BASOPHILS RELATIVE PERCENT: 0.1 % (ref 0–2)
BUN BLDV-MCNC: 31 MG/DL (ref 6–23)
BURR CELLS: ABNORMAL
CALCIUM SERPL-MCNC: 9.2 MG/DL (ref 8.6–10.2)
CHLORIDE BLD-SCNC: 102 MMOL/L (ref 98–107)
CO2: 21 MMOL/L (ref 22–29)
CREAT SERPL-MCNC: 1.7 MG/DL (ref 0.7–1.2)
EOSINOPHILS ABSOLUTE: 0 E9/L (ref 0.05–0.5)
EOSINOPHILS RELATIVE PERCENT: 0 % (ref 0–6)
GFR AFRICAN AMERICAN: 48
GFR NON-AFRICAN AMERICAN: 48 ML/MIN/1.73
GLUCOSE BLD-MCNC: 309 MG/DL (ref 74–99)
GRAM STAIN ORDERABLE: NORMAL
HCT VFR BLD CALC: 26.9 % (ref 37–54)
HEMOGLOBIN: 9 G/DL (ref 12.5–16.5)
LYMPHOCYTES ABSOLUTE: 0.59 E9/L (ref 1.5–4)
LYMPHOCYTES RELATIVE PERCENT: 5.3 % (ref 20–42)
MAGNESIUM: 1.9 MG/DL (ref 1.6–2.6)
MCH RBC QN AUTO: 26.4 PG (ref 26–35)
MCHC RBC AUTO-ENTMCNC: 33.5 % (ref 32–34.5)
MCV RBC AUTO: 78.9 FL (ref 80–99.9)
METER GLUCOSE: 108 MG/DL (ref 74–99)
METER GLUCOSE: 185 MG/DL (ref 74–99)
METER GLUCOSE: 187 MG/DL (ref 74–99)
METER GLUCOSE: 211 MG/DL (ref 74–99)
METER GLUCOSE: 239 MG/DL (ref 74–99)
METER GLUCOSE: 242 MG/DL (ref 74–99)
MONOCYTES ABSOLUTE: 0.35 E9/L (ref 0.1–0.95)
MONOCYTES RELATIVE PERCENT: 2.6 % (ref 2–12)
NEUTROPHILS ABSOLUTE: 10.86 E9/L (ref 1.8–7.3)
NEUTROPHILS RELATIVE PERCENT: 92.1 % (ref 43–80)
OVALOCYTES: ABNORMAL
PDW BLD-RTO: 17.5 FL (ref 11.5–15)
PHOSPHORUS: 3 MG/DL (ref 2.5–4.5)
PLATELET # BLD: 246 E9/L (ref 130–450)
PMV BLD AUTO: 11.4 FL (ref 7–12)
POIKILOCYTES: ABNORMAL
POLYCHROMASIA: ABNORMAL
POTASSIUM SERPL-SCNC: 4.8 MMOL/L (ref 3.5–5)
RBC # BLD: 3.41 E12/L (ref 3.8–5.8)
SODIUM BLD-SCNC: 134 MMOL/L (ref 132–146)
TARGET CELLS: ABNORMAL
TEAR DROP CELLS: ABNORMAL
WBC # BLD: 11.8 E9/L (ref 4.5–11.5)

## 2021-06-24 PROCEDURE — 84100 ASSAY OF PHOSPHORUS: CPT

## 2021-06-24 PROCEDURE — 6360000002 HC RX W HCPCS: Performed by: SPECIALIST

## 2021-06-24 PROCEDURE — 2580000003 HC RX 258: Performed by: STUDENT IN AN ORGANIZED HEALTH CARE EDUCATION/TRAINING PROGRAM

## 2021-06-24 PROCEDURE — 82962 GLUCOSE BLOOD TEST: CPT

## 2021-06-24 PROCEDURE — 2140000000 HC CCU INTERMEDIATE R&B

## 2021-06-24 PROCEDURE — 2580000003 HC RX 258: Performed by: SPECIALIST

## 2021-06-24 PROCEDURE — 6360000002 HC RX W HCPCS: Performed by: STUDENT IN AN ORGANIZED HEALTH CARE EDUCATION/TRAINING PROGRAM

## 2021-06-24 PROCEDURE — 83735 ASSAY OF MAGNESIUM: CPT

## 2021-06-24 PROCEDURE — 6360000002 HC RX W HCPCS: Performed by: TRANSPLANT SURGERY

## 2021-06-24 PROCEDURE — 85025 COMPLETE CBC W/AUTO DIFF WBC: CPT

## 2021-06-24 PROCEDURE — 6370000000 HC RX 637 (ALT 250 FOR IP): Performed by: STUDENT IN AN ORGANIZED HEALTH CARE EDUCATION/TRAINING PROGRAM

## 2021-06-24 PROCEDURE — 36415 COLL VENOUS BLD VENIPUNCTURE: CPT

## 2021-06-24 PROCEDURE — 80048 BASIC METABOLIC PNL TOTAL CA: CPT

## 2021-06-24 PROCEDURE — 2500000003 HC RX 250 WO HCPCS: Performed by: SURGERY

## 2021-06-24 PROCEDURE — 2500000003 HC RX 250 WO HCPCS: Performed by: HOSPITALIST

## 2021-06-24 RX ADMIN — LISINOPRIL 20 MG: 20 TABLET ORAL at 08:15

## 2021-06-24 RX ADMIN — FLUCONAZOLE IN SODIUM CHLORIDE 400 MG: 2 INJECTION, SOLUTION INTRAVENOUS at 08:14

## 2021-06-24 RX ADMIN — METOPROLOL TARTRATE 25 MG: 25 TABLET, FILM COATED ORAL at 19:50

## 2021-06-24 RX ADMIN — INSULIN LISPRO 3 UNITS: 100 INJECTION, SOLUTION INTRAVENOUS; SUBCUTANEOUS at 04:04

## 2021-06-24 RX ADMIN — LABETALOL HYDROCHLORIDE 20 MG: 5 INJECTION INTRAVENOUS at 18:56

## 2021-06-24 RX ADMIN — METOPROLOL TARTRATE 25 MG: 25 TABLET, FILM COATED ORAL at 08:17

## 2021-06-24 RX ADMIN — INSULIN LISPRO 6 UNITS: 100 INJECTION, SOLUTION INTRAVENOUS; SUBCUTANEOUS at 23:46

## 2021-06-24 RX ADMIN — SODIUM CHLORIDE: 9 INJECTION, SOLUTION INTRAVENOUS at 14:53

## 2021-06-24 RX ADMIN — Medication 10 ML: at 08:18

## 2021-06-24 RX ADMIN — INSULIN LISPRO 6 UNITS: 100 INJECTION, SOLUTION INTRAVENOUS; SUBCUTANEOUS at 19:52

## 2021-06-24 RX ADMIN — SODIUM CHLORIDE, PRESERVATIVE FREE 10 ML: 5 INJECTION INTRAVENOUS at 16:20

## 2021-06-24 RX ADMIN — CALCIUM GLUCONATE: 98 INJECTION, SOLUTION INTRAVENOUS at 18:06

## 2021-06-24 RX ADMIN — INSULIN LISPRO 3 UNITS: 100 INJECTION, SOLUTION INTRAVENOUS; SUBCUTANEOUS at 11:30

## 2021-06-24 RX ADMIN — HYDROMORPHONE HYDROCHLORIDE 0.5 MG: 1 INJECTION, SOLUTION INTRAMUSCULAR; INTRAVENOUS; SUBCUTANEOUS at 10:57

## 2021-06-24 RX ADMIN — SODIUM CHLORIDE: 9 INJECTION, SOLUTION INTRAVENOUS at 04:11

## 2021-06-24 RX ADMIN — MEROPENEM 1000 MG: 1 INJECTION, POWDER, FOR SOLUTION INTRAVENOUS at 23:46

## 2021-06-24 RX ADMIN — LISINOPRIL 20 MG: 20 TABLET ORAL at 19:51

## 2021-06-24 RX ADMIN — ACETAMINOPHEN 650 MG: 325 TABLET ORAL at 19:50

## 2021-06-24 RX ADMIN — MEROPENEM 1000 MG: 1 INJECTION, POWDER, FOR SOLUTION INTRAVENOUS at 08:15

## 2021-06-24 RX ADMIN — Medication 10 ML: at 19:52

## 2021-06-24 RX ADMIN — MEROPENEM 1000 MG: 1 INJECTION, POWDER, FOR SOLUTION INTRAVENOUS at 16:20

## 2021-06-24 RX ADMIN — INSULIN LISPRO 6 UNITS: 100 INJECTION, SOLUTION INTRAVENOUS; SUBCUTANEOUS at 16:20

## 2021-06-24 RX ADMIN — ACETAMINOPHEN 650 MG: 325 TABLET ORAL at 08:16

## 2021-06-24 RX ADMIN — HYDROMORPHONE HYDROCHLORIDE 0.5 MG: 1 INJECTION, SOLUTION INTRAMUSCULAR; INTRAVENOUS; SUBCUTANEOUS at 04:10

## 2021-06-24 RX ADMIN — HEPARIN 300 UNITS: 100 SYRINGE at 19:51

## 2021-06-24 RX ADMIN — HEPARIN 300 UNITS: 100 SYRINGE at 08:16

## 2021-06-24 RX ADMIN — ACETAMINOPHEN 650 MG: 325 TABLET ORAL at 16:20

## 2021-06-24 RX ADMIN — OXYCODONE HYDROCHLORIDE 10 MG: 10 TABLET ORAL at 18:56

## 2021-06-24 ASSESSMENT — PAIN SCALES - GENERAL
PAINLEVEL_OUTOF10: 0
PAINLEVEL_OUTOF10: 6
PAINLEVEL_OUTOF10: 6
PAINLEVEL_OUTOF10: 7
PAINLEVEL_OUTOF10: 5
PAINLEVEL_OUTOF10: 0
PAINLEVEL_OUTOF10: 0
PAINLEVEL_OUTOF10: 6
PAINLEVEL_OUTOF10: 7

## 2021-06-24 ASSESSMENT — PAIN - FUNCTIONAL ASSESSMENT: PAIN_FUNCTIONAL_ASSESSMENT: ACTIVITIES ARE NOT PREVENTED

## 2021-06-24 ASSESSMENT — PAIN DESCRIPTION - ORIENTATION: ORIENTATION: MID

## 2021-06-24 ASSESSMENT — PAIN DESCRIPTION - FREQUENCY: FREQUENCY: CONTINUOUS

## 2021-06-24 ASSESSMENT — PAIN DESCRIPTION - ONSET: ONSET: AWAKENED FROM SLEEP

## 2021-06-24 ASSESSMENT — PAIN DESCRIPTION - LOCATION: LOCATION: ABDOMEN

## 2021-06-24 ASSESSMENT — PAIN DESCRIPTION - PAIN TYPE: TYPE: SURGICAL PAIN

## 2021-06-24 ASSESSMENT — PAIN DESCRIPTION - PROGRESSION: CLINICAL_PROGRESSION: NOT CHANGED

## 2021-06-24 ASSESSMENT — PAIN DESCRIPTION - DESCRIPTORS: DESCRIPTORS: ACHING;DULL;DISCOMFORT

## 2021-06-24 NOTE — PROGRESS NOTES
Physician Progress Note      Mateo Barros  [facility] #:                  [id number]  :                       1950  ADMIT DATE:       2021 1:46 AM  DISCH DATE:  RESPONDING  PROVIDER #:        Betsy Rico MD        QUERY TEXT:    Stage of Chronic Kidney Disease: Please provide further specificity, if known. Clinical indicators include: dialysis, hemodialysis, bun, creatinine, chronic   kidney disease  Options provided:  -- Chronic kidney disease stage 1  -- Chronic kidney disease stage 2  -- Chronic kidney disease stage 3  -- Chronic kidney disease stage 3a  -- Chronic kidney disease stage 3b  -- Chronic kidney disease stage 4  -- Chronic kidney disease stage 5  -- Chronic kidney disease stage 5, requiring dialysis  -- End stage renal disease  -- Other - I will add my own diagnosis  -- Disagree - Not applicable / Not valid  -- Disagree - Clinically Unable to determine / Unknown        PROVIDER RESPONSE TEXT:    The patient has chronic kidney disease stage 2.       Electronically signed by:  Betsy Rico MD 2021 5:28 PM

## 2021-06-24 NOTE — PROGRESS NOTES
[address] [place] [address] [facility]  Progress Note      Chief Complaint   Patient presents with    Chest Pain     left side, radiating into LUQ/LLQ. states abd pain has been ongoing since 6/15 but chest pain started around 1600 yesterday. denies n/v.  denies sob. denies diaphoresis. recent dx of afib       SUBJECTIVE:  Patient is tolerating medications. No reported adverse drug reactions. No nausea, vomiting, diarrhea. Up in chair. NG tube in place to section  Afebrile  Surgical note read  Abdominal pain related to surgery  Review of systems:  As stated above in the chief complaint, otherwise negative. Medications:  Scheduled Meds:   insulin lispro  0-18 Units Subcutaneous Q4H    lisinopril  20 mg Oral BID    metoprolol tartrate  25 mg Oral BID    acetaminophen  650 mg Oral Q6H    meropenem  1,000 mg Intravenous Q8H    fluconazole  400 mg Intravenous Q24H    lidocaine PF  5 mL Intradermal Once    sodium chloride flush  5-40 mL Intravenous 2 times per day    heparin flush  3 mL Intravenous 2 times per day     Continuous Infusions:   PN-Adult  3 IN 1 Central Line (Custom)      dextrose      sodium chloride      sodium chloride      sodium chloride 95 mL/hr at 21 0411    PN-Adult  3 IN 1 Central Line (Custom) 37.5 mL/hr at 21 1845    sodium chloride       PRN Meds:glucose, dextrose, glucagon (rDNA), dextrose, sodium chloride, sodium chloride, oxyCODONE **OR** oxyCODONE, labetalol, HYDROmorphone **OR** HYDROmorphone, ondansetron, sodium chloride flush, sodium chloride, heparin flush    OBJECTIVE:  /75   Pulse 91   Temp 97.9 °F (36.6 °C) (Oral)   Resp 18   Ht 6' 2\" (1.88 m)   Wt 160 lb (72.6 kg)   SpO2 96%   BMI 20.54 kg/m²   Temp  Av.5 °F (36.9 °C)  Min: 97.3 °F (36.3 °C)  Max: 98.7 °F (37.1 °C)  Constitutional: The patient is awake,   Skin: Warm and dry. No rashes were noted. HEENT: Round and reactive pupils. Moist mucous membranes.   No ulcerations or thrush. Neck: Supple to movements. Chest: No use of accessory muscles to breathe. Symmetrical expansion. No wheezing, crackles or rhonchi. Cardiovascular: S1 and S2 are rhythmic and regular. No murmurs appreciated. Abdomen: Positive bowel sounds to auscultation. tender to palpation. No masses felt. No hepatosplenomegaly. Laparoscopy sites noted, N/G to suction  Genitourinary: Male  Extremities: No clubbing, no cyanosis, no edema.   Lines: right picc 6/22/21 and  Right jugular TLC 6/23    Laboratory and Tests Review:  Lab Results   Component Value Date    WBC 9.8 06/23/2021    WBC 7.5 06/22/2021    WBC 6.9 06/17/2021    HGB 10.6 (L) 06/23/2021    HCT 30.6 (L) 06/23/2021    MCV 77.7 (L) 06/23/2021     06/23/2021     Lab Results   Component Value Date    NEUTROABS 6.20 06/22/2021    NEUTROABS 5.27 06/17/2021    NEUTROABS 2.23 03/01/2021     No results found for: [facility]  Lab Results   Component Value Date    ALT 7 06/23/2021    AST 8 06/23/2021    ALKPHOS 129 06/23/2021    BILITOT 0.5 06/23/2021     Lab Results   Component Value Date     06/24/2021    K 4.8 06/24/2021    K 4.3 06/17/2021     06/24/2021    CO2 21 06/24/2021    BUN 31 06/24/2021    CREATININE 1.7 06/24/2021    CREATININE 1.6 06/23/2021    CREATININE 1.7 06/23/2021    GFRAA 48 06/24/2021    LABGLOM 48 06/24/2021    GLUCOSE 309 06/24/2021    PROT 6.4 06/23/2021    LABALBU 2.9 06/23/2021    CALCIUM 9.2 06/24/2021    BILITOT 0.5 06/23/2021    ALKPHOS 129 06/23/2021    AST 8 06/23/2021    ALT 7 06/23/2021     Lab Results   Component Value Date    CRP 7.9 (H) 01/12/2021    CRP 5.1 (H) 01/01/2021    CRP 5.5 (H) 12/31/2020     Lab Results   Component Value Date    SEDRATE 55 (H) 01/01/2021    SEDRATE 55 (H) 12/31/2020    SEDRATE 65 (H) 12/30/2020     Radiology:  Reviewed    Microbiology:   Lab Results   Component Value Date    BC 5 Days no growth 01/13/2021    BC 5 Days no growth 12/22/2020    ORG Yeast, not C. albicans 01/09/2021    ORG Staphylococcus aureus 01/09/2021    ORG Staphylococcus aureus 01/09/2021       No results found for: WNDABS  No results found for: RESPSMEAR  No results found for: MPNEUMO, CLAMYDCU, LABLEGI, AFBCX, FUNGSM, LABFUNG  No results found for: CULTRESP  No results found for: CXCATHTIP  No results found for: BFCS  No results found for: CXSURG  Urine Culture, Routine   Date Value Ref Range Status   02/28/2021 <10,000 CFU/mL  Gram positive organism    Final   01/09/2021 >100,000 CFU/ml  Final   12/23/2020 Growth not present  Final     MRSA Culture Only   Date Value Ref Range Status   12/23/2020 Methicillin resistant Staph aureus not isolated  Final     6/23 surgical cx pending    ASSESSMENT:  · Pancreatic necrosis, pseudocyst with abscess  · S/p Laparoscopic robotic pancreatic necrosectomy, cyst gastrostomy, adhesion lysis 6/23/21    PLAN:  · Continue meropenem plus Diflucan  · Check final cultures and plan for home IV antibiotics  · PICC line in place-6/22/2021  · Monitor labs    GURPREET Whitaker - CNP  9:17 AM  6/24/2021   Pt seen and examined. Above discussed agree with advanced practice nurse. Labs, cultures, and radiographs reviewed. Face to Face encounter occurred. Changes made as necessary.      Edilia Sood MD

## 2021-06-24 NOTE — CARE COORDINATION
Transition of care: Laparoscopic robotic pancreatic necrosectomy, cyst gastrostomy, adhesion lysis 6/23/21. TPN, IV merrem 1000mg q 8 hrs, iv diflucan 400mg q 24 hrs. Met with pt in room. Pt lives with his girlfriend, Demi Reyes, in a 2 story home. His bedroom is on the 2nd level. Has 3 steps with hand rails on both sides through back entrance and 5 steps with hand rails through front entrance of home. Independent with ADLs and drives. Pt is a  and goes to [facility] on [address]. He sees Dr. Nikhil Najera there. DME- FWW, raised toilet seat, cane, BSC, shower chair and glucometer. Hx [facility] of [facility] and [facility] with [place]. We discussed discharge planning. Pt wants to return home. Voiced no needs at present for home. If iv antibiotics are needed pt was agreeable to have [facility]. Referral was made Ilana at [facility]. PCP is Dr. Nikhil Najera and pharmacy is the [place] [facility] on [address].  Sw/cm will follow       [address] [facility] accepted pt for home iv antibiotics if needed

## 2021-06-24 NOTE — PROGRESS NOTES
HOSPITALIST PROGRESS NOTE  Date: 6/24/2021   Name: Andre Traore   MRN: [id number]   YOB: 1950        Hospital Course:   Mr Parviz Martinez 66 YM who presented to the emergency department approximately 6 days ago was complaining of chest pain and also abdominal pain with radiation up and down his abdomen also complained of a headache. Patient has a past medical history of prostatic cancer, diabetes mellitus, hyperlipidemia, hypertension.   [facility] and oncology in consultation for evaluation and treatment of acute on chronic pancreatitis with pancreatic pseudocyst.    Subjective/Interval Hx:   Patient stated he is doing a lot better today he is able to have the NG out today talking freely we will continue to monitor    Objective:   Physical Exam:   BP (!) 148/97   Pulse 102   Temp 97.4 °F (36.3 °C) (Oral)   Resp 18   Ht 6' 2\" (1.88 m)   Wt 160 lb (72.6 kg)   SpO2 96%   BMI 20.54 kg/m²   General: no acute distress, well nourished and well hydrated  HEENT: NCAT  Heart: S1S2 RRR  Lungs: Clear to ascultation bilaterally, respiratory effort normal  Abdomen: soft, NT/ND, positive bowel sounds  Extremities: no pitting edema, nontender   Neuro: patient is awake, alert and orientated times 3, no gross deficits  Skin: no rashes or ecchymosis        Meds:   Meds:    insulin lispro  0-18 Units Subcutaneous Q4H    lisinopril  20 mg Oral BID    metoprolol tartrate  25 mg Oral BID    acetaminophen  650 mg Oral Q6H    meropenem  1,000 mg Intravenous Q8H    fluconazole  400 mg Intravenous Q24H    lidocaine PF  5 mL Intradermal Once    sodium chloride flush  5-40 mL Intravenous 2 times per day    heparin flush  3 mL Intravenous 2 times per day      Infusions:    PN-Adult  3 IN 1 Central Line (Custom)      dextrose      sodium chloride      sodium chloride      sodium chloride 95 mL/hr at 06/24/21 0411    PN-Adult  3 IN 1 Central Line (Custom) 37.5 mL/hr at 06/23/21 1847    sodium chloride       PRN Meds: glucose, 15 g, PRN  dextrose, 12.5 g, PRN  glucagon (rDNA), 1 mg, PRN  dextrose, 100 mL/hr, PRN  sodium chloride, , PRN  sodium chloride, , PRN  oxyCODONE, 5 mg, Q4H PRN   Or  oxyCODONE, 10 mg, Q4H PRN  labetalol, 20 mg, Q4H PRN  HYDROmorphone, 0.5 mg, Q3H PRN   Or  HYDROmorphone, 1 mg, Q3H PRN  ondansetron, 4 mg, Q6H PRN  sodium chloride flush, 5-40 mL, PRN  sodium chloride, 25 mL, PRN  heparin flush, 3 mL, PRN        Data/Labs:     Recent Labs     06/22/21  0200 06/23/21  0420 06/24/21  1101   WBC 7.5 9.8 11.8*   HGB 11.3* 10.6* 9.0*   HCT 33.4* 30.6* 26.9*    204 246      Recent Labs     06/23/21  0420 06/23/21  0855 06/23/21  1105 06/23/21  1208 06/24/21  0510   * 133  --   --  134   K 4.0 4.2 4.0 4.1 4.8   CL 97* 100  --   --  102   CO2 22 21*  --   --  21*   PHOS 3.0  --   --   --  3.0   BUN 26* 26*  --   --  31*   CREATININE 1.7* 1.6*  --   --  1.7*     Recent Labs     06/22/21  0200 06/23/21  0420   AST 14 8   ALT 8 7   BILITOT 0.7 0.5   ALKPHOS 152* 129     No results for input(s): INR in the last 72 hours. No results for input(s): CKTOTAL, CKMB, CKMBINDEX, TROPONINT in the last 72 hours. I/O last 3 completed shifts: In: 3435.6 [I.V.:2310.1; NG/GT:420; IV Piggyback:202.1]  Out: 1650 [Urine:1400; Emesis/NG output:200; Blood:50]    Intake/Output Summary (Last 24 hours) at 6/24/2021 1216  Last data filed at 6/24/2021 1051  Gross per 24 hour   Intake 3468.53 ml   Output 2000 ml   Net 1468.53 ml        Assessment/Plan:   1. Acute on chronic pancreatitis with pseudocyst-general surgery and oncology is following since patient has a history of pancreatic cancer, pain is controlled with medication, Creon when eating  06/23/2021- patient just returned from endoscopy by robotic s/p cyst gastrostomy, MATEO and pancreatic necrosectomy  2.  Hypertension-lisinopril, metoprolol, patient is n.p.o. at this time may have to add an IV

## 2021-06-24 NOTE — PROGRESS NOTES
[facility] SURG[facility]  DAILY PROGRESS NOTE    Date:2021       XHJU:[id number]  Patient Name:Andre Sharp     YOB: 1950     Age:71 y.o. Chief Complaint:  Chief Complaint   Patient presents with    Chest Pain     left side, radiating into LUQ/LLQ. states abd pain has been ongoing since 6/15 but chest pain started around 1600 yesterday. denies n/v.  denies sob. denies diaphoresis. recent dx of afib        Subjective:  Pain much improved. Mild left upper quadrant pain. No nausea or vomiting. No flatus or stool yet. Tachycardia resolved, hypertension and hyperglycemia much improved    Objective:  /79   Pulse 79   Temp 97.5 °F (36.4 °C) (Oral)   Resp 18   Ht 6' 2\" (1.88 m)   Wt 160 lb (72.6 kg)   SpO2 96%   BMI 20.54 kg/m²   Temp (24hrs), Av.5 °F (36.9 °C), Min:97.3 °F (36.3 °C), Max:98.8 °F (37.1 °C)      I/O (24Hr):  I/O last 3 completed shifts: In: 1908 [I.V.:1418; NG/GT:240; IV Piggyback:100]  Out: 2500 [Urine:2450; Blood:50]     GENERAL:  No acute distress. Alert and interactive. LUNGS:  No cough. Nonlabored breathing on room air. CARDIOVASC:  Normal rate, no cyanosis. ABDOMEN:  Soft, non-distended, non-tender objectively (subjective LUQ). No guarding / rigidity / rebound. EXTREMITIES:  No edema, no deformities.     Assessment:  66 y.o. male with recurrent pancreatitis in the setting of uncontrolled DM II, pancreatic necrosis with abscess and pseudocyst with prior endoscopic CG and ex lap, now status post robotic pancreatic necrosectomy with CG & MAETO on     Plan:  -Continue as needed Dilaudid, will change to p.o. meds after NG removed  -SMI  -1/2 TPN until reliable p.o. intake/bowel function  -Continue NG flushes every 4 hours, LIWS, will remove at 6 PM, then start CLD  -Currently on meropenem and Diflucan, appreciate ID assistance  -Remove Jolly, continue to monitor UOP/BUN/Cr  -Ambulate    Electronically signed by Felecia Rivera MD on 2021 at 6:37 AM    Growing gram negative rods from surgical culture  Doing well  Agree with above  Start clears tonight after NGT removal    Electronically signed by Dennis Magallanes MD on 6/24/2021 at 12:37 PM

## 2021-06-24 NOTE — PATIENT CARE CONFERENCE
[facility] Quality Flow/Interdisciplinary Rounds Progress Note        Quality Flow Rounds held on June 24, 2021    Disciplines Attending:  Bedside Nurse, ,  and Nursing Unit Leadership    Days Viji Zamora was admitted on 6/22/2021  1:46 AM    Anticipated Discharge Date:  Expected Discharge Date: 06/26/21    Disposition:    Demetrius Score:  Demetrius Scale Score: 21    Readmission Risk              Risk of Unplanned Readmission:  39           Discussed patient goal for the day, patient clinical progression, and barriers to discharge. The following Goal(s) of the Day/Commitment(s) have been identified:    To have pain managed by davi Zapata RN  June 24, 2021

## 2021-06-25 LAB
ANION GAP SERPL CALCULATED.3IONS-SCNC: 12 MMOL/L (ref 7–16)
ANISOCYTOSIS: ABNORMAL
BASOPHILS ABSOLUTE: 0 E9/L (ref 0–0.2)
BASOPHILS RELATIVE PERCENT: 0.2 % (ref 0–2)
BUN BLDV-MCNC: 37 MG/DL (ref 6–23)
BURR CELLS: ABNORMAL
CALCIUM SERPL-MCNC: 9.6 MG/DL (ref 8.6–10.2)
CHLORIDE BLD-SCNC: 104 MMOL/L (ref 98–107)
CO2: 20 MMOL/L (ref 22–29)
CREAT SERPL-MCNC: 1.7 MG/DL (ref 0.7–1.2)
EOSINOPHILS ABSOLUTE: 0 E9/L (ref 0.05–0.5)
EOSINOPHILS RELATIVE PERCENT: 0 % (ref 0–6)
GFR AFRICAN AMERICAN: 48
GFR NON-AFRICAN AMERICAN: 48 ML/MIN/1.73
GLUCOSE BLD-MCNC: 167 MG/DL (ref 74–99)
HCT VFR BLD CALC: 25.4 % (ref 37–54)
HEMOGLOBIN: 8.8 G/DL (ref 12.5–16.5)
HYPOCHROMIA: ABNORMAL
LYMPHOCYTES ABSOLUTE: 0.58 E9/L (ref 1.5–4)
LYMPHOCYTES RELATIVE PERCENT: 5.3 % (ref 20–42)
MAGNESIUM: 2 MG/DL (ref 1.6–2.6)
MCH RBC QN AUTO: 27.3 PG (ref 26–35)
MCHC RBC AUTO-ENTMCNC: 34.6 % (ref 32–34.5)
MCV RBC AUTO: 78.9 FL (ref 80–99.9)
METER GLUCOSE: 127 MG/DL (ref 74–99)
METER GLUCOSE: 142 MG/DL (ref 74–99)
METER GLUCOSE: 180 MG/DL (ref 74–99)
METER GLUCOSE: 190 MG/DL (ref 74–99)
METER GLUCOSE: 199 MG/DL (ref 74–99)
METER GLUCOSE: 223 MG/DL (ref 74–99)
MONOCYTES ABSOLUTE: 0.12 E9/L (ref 0.1–0.95)
MONOCYTES RELATIVE PERCENT: 0.9 % (ref 2–12)
NEUTROPHILS ABSOLUTE: 10.81 E9/L (ref 1.8–7.3)
NEUTROPHILS RELATIVE PERCENT: 93.8 % (ref 43–80)
PDW BLD-RTO: 17.7 FL (ref 11.5–15)
PHOSPHORUS: 3.2 MG/DL (ref 2.5–4.5)
PLATELET # BLD: 242 E9/L (ref 130–450)
PMV BLD AUTO: 10.9 FL (ref 7–12)
POIKILOCYTES: ABNORMAL
POLYCHROMASIA: ABNORMAL
POTASSIUM SERPL-SCNC: 4.2 MMOL/L (ref 3.5–5)
RBC # BLD: 3.22 E12/L (ref 3.8–5.8)
SCHISTOCYTES: ABNORMAL
SODIUM BLD-SCNC: 136 MMOL/L (ref 132–146)
TARGET CELLS: ABNORMAL
TEAR DROP CELLS: ABNORMAL
WBC # BLD: 11.5 E9/L (ref 4.5–11.5)

## 2021-06-25 PROCEDURE — 6360000002 HC RX W HCPCS: Performed by: NURSE PRACTITIONER

## 2021-06-25 PROCEDURE — 84100 ASSAY OF PHOSPHORUS: CPT

## 2021-06-25 PROCEDURE — 82962 GLUCOSE BLOOD TEST: CPT

## 2021-06-25 PROCEDURE — 6370000000 HC RX 637 (ALT 250 FOR IP): Performed by: NURSE PRACTITIONER

## 2021-06-25 PROCEDURE — 6360000002 HC RX W HCPCS: Performed by: SPECIALIST

## 2021-06-25 PROCEDURE — 83735 ASSAY OF MAGNESIUM: CPT

## 2021-06-25 PROCEDURE — 6370000000 HC RX 637 (ALT 250 FOR IP): Performed by: STUDENT IN AN ORGANIZED HEALTH CARE EDUCATION/TRAINING PROGRAM

## 2021-06-25 PROCEDURE — C9113 INJ PANTOPRAZOLE SODIUM, VIA: HCPCS | Performed by: SURGERY

## 2021-06-25 PROCEDURE — 6360000002 HC RX W HCPCS: Performed by: STUDENT IN AN ORGANIZED HEALTH CARE EDUCATION/TRAINING PROGRAM

## 2021-06-25 PROCEDURE — 2580000003 HC RX 258: Performed by: STUDENT IN AN ORGANIZED HEALTH CARE EDUCATION/TRAINING PROGRAM

## 2021-06-25 PROCEDURE — 2500000003 HC RX 250 WO HCPCS: Performed by: HOSPITALIST

## 2021-06-25 PROCEDURE — 80048 BASIC METABOLIC PNL TOTAL CA: CPT

## 2021-06-25 PROCEDURE — 6360000002 HC RX W HCPCS: Performed by: SURGERY

## 2021-06-25 PROCEDURE — 2580000003 HC RX 258: Performed by: NURSE PRACTITIONER

## 2021-06-25 PROCEDURE — 2140000000 HC CCU INTERMEDIATE R&B

## 2021-06-25 PROCEDURE — 2580000003 HC RX 258: Performed by: SPECIALIST

## 2021-06-25 PROCEDURE — 85025 COMPLETE CBC W/AUTO DIFF WBC: CPT

## 2021-06-25 PROCEDURE — 2580000003 HC RX 258: Performed by: SURGERY

## 2021-06-25 PROCEDURE — 36415 COLL VENOUS BLD VENIPUNCTURE: CPT

## 2021-06-25 PROCEDURE — 6370000000 HC RX 637 (ALT 250 FOR IP): Performed by: SURGERY

## 2021-06-25 RX ORDER — SENNA AND DOCUSATE SODIUM 50; 8.6 MG/1; MG/1
2 TABLET, FILM COATED ORAL DAILY
Qty: 30 TABLET | Refills: 5 | Status: SHIPPED | OUTPATIENT
Start: 2021-06-25 | End: 2021-09-23 | Stop reason: ALTCHOICE

## 2021-06-25 RX ORDER — POLYETHYLENE GLYCOL 3350 17 G/17G
17 POWDER, FOR SOLUTION ORAL DAILY
Qty: 1530 G | Refills: 5 | Status: SHIPPED | OUTPATIENT
Start: 2021-06-25 | End: 2021-07-25

## 2021-06-25 RX ORDER — BISACODYL 10 MG
10 SUPPOSITORY, RECTAL RECTAL DAILY
Status: DISCONTINUED | OUTPATIENT
Start: 2021-06-25 | End: 2021-06-28 | Stop reason: HOSPADM

## 2021-06-25 RX ORDER — LACTOBACILLUS RHAMNOSUS GG 10B CELL
1 CAPSULE ORAL DAILY
Status: DISCONTINUED | OUTPATIENT
Start: 2021-06-25 | End: 2021-06-28 | Stop reason: HOSPADM

## 2021-06-25 RX ORDER — SODIUM CHLORIDE, SODIUM LACTATE, POTASSIUM CHLORIDE, CALCIUM CHLORIDE 600; 310; 30; 20 MG/100ML; MG/100ML; MG/100ML; MG/100ML
INJECTION, SOLUTION INTRAVENOUS CONTINUOUS
Status: DISCONTINUED | OUTPATIENT
Start: 2021-06-25 | End: 2021-06-28 | Stop reason: HOSPADM

## 2021-06-25 RX ORDER — OXYCODONE HYDROCHLORIDE 5 MG/1
5 TABLET ORAL EVERY 6 HOURS PRN
Qty: 28 TABLET | Refills: 0 | Status: SHIPPED | OUTPATIENT
Start: 2021-06-25 | End: 2021-07-02

## 2021-06-25 RX ORDER — PANTOPRAZOLE SODIUM 40 MG/10ML
40 INJECTION, POWDER, LYOPHILIZED, FOR SOLUTION INTRAVENOUS 2 TIMES DAILY
Status: DISCONTINUED | OUTPATIENT
Start: 2021-06-25 | End: 2021-06-28 | Stop reason: HOSPADM

## 2021-06-25 RX ORDER — SODIUM CHLORIDE 9 MG/ML
10 INJECTION INTRAVENOUS 2 TIMES DAILY
Status: DISCONTINUED | OUTPATIENT
Start: 2021-06-25 | End: 2021-06-28 | Stop reason: HOSPADM

## 2021-06-25 RX ORDER — PANTOPRAZOLE SODIUM 40 MG/1
40 TABLET, DELAYED RELEASE ORAL DAILY
Qty: 30 TABLET | Refills: 0 | Status: SHIPPED | OUTPATIENT
Start: 2021-06-25 | End: 2021-06-25

## 2021-06-25 RX ORDER — PANTOPRAZOLE SODIUM 40 MG/1
40 TABLET, DELAYED RELEASE ORAL DAILY
Qty: 30 TABLET | Refills: 3 | Status: ON HOLD | OUTPATIENT
Start: 2021-06-25 | End: 2021-10-18 | Stop reason: SDUPTHER

## 2021-06-25 RX ORDER — FLUCONAZOLE 2 MG/ML
400 INJECTION, SOLUTION INTRAVENOUS EVERY 24 HOURS
Qty: 4200 ML | Refills: 0 | Status: SHIPPED | OUTPATIENT
Start: 2021-06-26 | End: 2021-07-17

## 2021-06-25 RX ORDER — L. ACIDOPHILUS/L.BULGARICUS 1MM CELL
2 TABLET ORAL 3 TIMES DAILY
Status: DISCONTINUED | OUTPATIENT
Start: 2021-06-25 | End: 2021-06-25 | Stop reason: CLARIF

## 2021-06-25 RX ORDER — L. ACIDOPHILUS/L.BULGARICUS 1MM CELL
2 TABLET ORAL 3 TIMES DAILY
Qty: 126 TABLET | Refills: 0 | Status: SHIPPED | OUTPATIENT
Start: 2021-06-25 | End: 2021-07-16

## 2021-06-25 RX ADMIN — OXYCODONE HYDROCHLORIDE 10 MG: 10 TABLET ORAL at 15:24

## 2021-06-25 RX ADMIN — HEPARIN 300 UNITS: 100 SYRINGE at 10:32

## 2021-06-25 RX ADMIN — INSULIN LISPRO 3 UNITS: 100 INJECTION, SOLUTION INTRAVENOUS; SUBCUTANEOUS at 20:21

## 2021-06-25 RX ADMIN — ENOXAPARIN SODIUM 40 MG: 40 INJECTION SUBCUTANEOUS at 19:02

## 2021-06-25 RX ADMIN — SODIUM CHLORIDE, POTASSIUM CHLORIDE, SODIUM LACTATE AND CALCIUM CHLORIDE: 600; 310; 30; 20 INJECTION, SOLUTION INTRAVENOUS at 10:41

## 2021-06-25 RX ADMIN — ACETAMINOPHEN 650 MG: 325 TABLET ORAL at 04:44

## 2021-06-25 RX ADMIN — PANCRELIPASE 36000 UNITS: 60000; 12000; 38000 CAPSULE, DELAYED RELEASE PELLETS ORAL at 10:31

## 2021-06-25 RX ADMIN — ACETAMINOPHEN 650 MG: 325 TABLET ORAL at 15:24

## 2021-06-25 RX ADMIN — SODIUM CHLORIDE, PRESERVATIVE FREE 10 ML: 5 INJECTION INTRAVENOUS at 20:20

## 2021-06-25 RX ADMIN — Medication 10 ML: at 20:19

## 2021-06-25 RX ADMIN — LISINOPRIL 20 MG: 20 TABLET ORAL at 10:31

## 2021-06-25 RX ADMIN — PIPERACILLIN AND TAZOBACTAM 3375 MG: 3; .375 INJECTION, POWDER, LYOPHILIZED, FOR SOLUTION INTRAVENOUS at 15:24

## 2021-06-25 RX ADMIN — OXYCODONE HYDROCHLORIDE 10 MG: 10 TABLET ORAL at 10:33

## 2021-06-25 RX ADMIN — ACETAMINOPHEN 650 MG: 325 TABLET ORAL at 20:21

## 2021-06-25 RX ADMIN — METOPROLOL TARTRATE 25 MG: 25 TABLET, FILM COATED ORAL at 10:32

## 2021-06-25 RX ADMIN — SODIUM CHLORIDE: 9 INJECTION, SOLUTION INTRAVENOUS at 19:00

## 2021-06-25 RX ADMIN — INSULIN LISPRO 6 UNITS: 100 INJECTION, SOLUTION INTRAVENOUS; SUBCUTANEOUS at 04:44

## 2021-06-25 RX ADMIN — PANCRELIPASE 36000 UNITS: 60000; 12000; 38000 CAPSULE, DELAYED RELEASE PELLETS ORAL at 17:23

## 2021-06-25 RX ADMIN — OXYCODONE HYDROCHLORIDE 10 MG: 10 TABLET ORAL at 20:18

## 2021-06-25 RX ADMIN — HEPARIN 300 UNITS: 100 SYRINGE at 20:19

## 2021-06-25 RX ADMIN — PANTOPRAZOLE SODIUM 40 MG: 40 INJECTION, POWDER, LYOPHILIZED, FOR SOLUTION INTRAVENOUS at 20:19

## 2021-06-25 RX ADMIN — SODIUM CHLORIDE, PRESERVATIVE FREE 10 ML: 5 INJECTION INTRAVENOUS at 10:20

## 2021-06-25 RX ADMIN — LISINOPRIL 20 MG: 20 TABLET ORAL at 20:20

## 2021-06-25 RX ADMIN — BISACODYL 10 MG: 10 SUPPOSITORY RECTAL at 10:31

## 2021-06-25 RX ADMIN — MEROPENEM 1000 MG: 1 INJECTION, POWDER, FOR SOLUTION INTRAVENOUS at 10:21

## 2021-06-25 RX ADMIN — SODIUM CHLORIDE: 9 INJECTION, SOLUTION INTRAVENOUS at 01:20

## 2021-06-25 RX ADMIN — INSULIN LISPRO 3 UNITS: 100 INJECTION, SOLUTION INTRAVENOUS; SUBCUTANEOUS at 10:20

## 2021-06-25 RX ADMIN — Medication 1 CAPSULE: at 15:24

## 2021-06-25 RX ADMIN — PANTOPRAZOLE SODIUM 40 MG: 40 INJECTION, POWDER, LYOPHILIZED, FOR SOLUTION INTRAVENOUS at 10:32

## 2021-06-25 RX ADMIN — FLUCONAZOLE IN SODIUM CHLORIDE 400 MG: 2 INJECTION, SOLUTION INTRAVENOUS at 10:20

## 2021-06-25 RX ADMIN — OXYCODONE HYDROCHLORIDE 10 MG: 10 TABLET ORAL at 00:25

## 2021-06-25 RX ADMIN — Medication 10 ML: at 09:11

## 2021-06-25 RX ADMIN — INSULIN LISPRO 3 UNITS: 100 INJECTION, SOLUTION INTRAVENOUS; SUBCUTANEOUS at 13:02

## 2021-06-25 RX ADMIN — PIPERACILLIN AND TAZOBACTAM 3375 MG: 3; .375 INJECTION, POWDER, LYOPHILIZED, FOR SOLUTION INTRAVENOUS at 21:57

## 2021-06-25 RX ADMIN — METOPROLOL TARTRATE 25 MG: 25 TABLET, FILM COATED ORAL at 20:20

## 2021-06-25 RX ADMIN — ACETAMINOPHEN 650 MG: 325 TABLET ORAL at 10:32

## 2021-06-25 RX ADMIN — LABETALOL HYDROCHLORIDE 20 MG: 5 INJECTION INTRAVENOUS at 05:04

## 2021-06-25 ASSESSMENT — PAIN SCALES - GENERAL
PAINLEVEL_OUTOF10: 7
PAINLEVEL_OUTOF10: 7
PAINLEVEL_OUTOF10: 0
PAINLEVEL_OUTOF10: 7
PAINLEVEL_OUTOF10: 2
PAINLEVEL_OUTOF10: 7
PAINLEVEL_OUTOF10: 6
PAINLEVEL_OUTOF10: 5
PAINLEVEL_OUTOF10: 0

## 2021-06-25 ASSESSMENT — PAIN DESCRIPTION - PAIN TYPE
TYPE: SURGICAL PAIN
TYPE: SURGICAL PAIN;ACUTE PAIN
TYPE: SURGICAL PAIN
TYPE: SURGICAL PAIN

## 2021-06-25 ASSESSMENT — PAIN DESCRIPTION - LOCATION
LOCATION: ABDOMEN

## 2021-06-25 ASSESSMENT — PAIN DESCRIPTION - DESCRIPTORS
DESCRIPTORS: ACHING;DISCOMFORT
DESCRIPTORS: SORE;ACHING;DISCOMFORT
DESCRIPTORS: ACHING;DISCOMFORT;SORE

## 2021-06-25 ASSESSMENT — PAIN DESCRIPTION - ORIENTATION: ORIENTATION: MID

## 2021-06-25 NOTE — PROGRESS NOTES
[facility] SURGERY  DAILY PROGRESS NOTE    Date:2021       DWFR:[id number]  Patient Name:Andre Narvaez     YOB: 1950     Age:71 y.o. Chief Complaint:  Chief Complaint   Patient presents with    Chest Pain     left side, radiating into LUQ/LLQ. states abd pain has been ongoing since 6/15 but chest pain started around 1600 yesterday. denies n/v.  denies sob. denies diaphoresis. recent dx of afib        Subjective:  Pain well controlled, NG out, walked x3+ yesterday. Tried a little bit of clears and created some nausea. But no vomiting. No flatus or stool yet. No pain at rest.     Objective:  BP (!) 180/128   Pulse 87   Temp 97.5 °F (36.4 °C) (Axillary)   Resp 20   Ht 6' 2\" (1.88 m)   Wt 160 lb (72.6 kg)   SpO2 93%   BMI 20.54 kg/m²   Temp (24hrs), Av.1 °F (36.2 °C), Min:95.9 °F (35.5 °C), Max:97.9 °F (36.6 °C)      I/O (24Hr):  I/O last 3 completed shifts: In: 3856.5 [P.O.:240; I.V.:2214.4; NG/GT:330; IV Piggyback:218.8]  Out: 2450 [Urine:750; Emesis/NG output:1700]     GENERAL:  No acute distress. Alert and interactive. LUNGS:  No cough. Nonlabored breathing on room air. SMI 1000  CARDIOVASC:  Normal rate, no cyanosis. ABDOMEN:  Soft, non-distended, non-tender objectively. No guarding / rigidity / rebound. EXTREMITIES:  No edema, no deformities.     Assessment:  66 y.o. male with recurrent pancreatitis in the setting of uncontrolled DM II, pancreatic necrosis with abscess and pseudocyst with prior endoscopic CG and ex lap, now status post robotic pancreatic necrosectomy with CG & MATEO on     Plan:  -tylenol, oxy, stop dilaudid  -SMI  -Monitor postop anemia  -PPI BID  -limited CLD, creon, glucerna, 1/2 TPN ends today, low IVF until reliable PO  -added dulcolax suppository  -Currently on meropenem and Diflucan, SCx GNR, appreciate ID assistance  -Monitor UOP/BUN/Cr  -Ambulate    Electronically signed by Nancy Balderas MD on 2021 at 6:37 AM      Doing very well  Agree with above  Hopefully home tomorrow - will be ready to go from a surgical standpoint    Electronically signed by Miriam Saldivar MD on 6/25/2021 at 8:10 AM

## 2021-06-25 NOTE — PROGRESS NOTES
HOSPITALIST PROGRESS NOTE  Date: 6/25/2021   Name: Tish Trinh   MRN: [id number]   YOB: 1950        Hospital Course:   Mr [place] 66 YM who presented to the emergency department approximately 6 days ago was complaining of chest pain and also abdominal pain with radiation up and down his abdomen also complained of a headache. Patient has a past medical history of prostatic cancer, diabetes mellitus, hyperlipidemia, hypertension.   [facility] in consultation for evaluation and treatment of acute on chronic pancreatitis with pancreatic pseudocyst.    Subjective/Interval Hx:   Patient has the NG tube out and he is eating popsicles other items denies any pain or distress can await to have his triple-lumen removed in his neck plans are when stable Home with home care    Objective:   Physical Exam:   BP (!) 158/98   Pulse 81   Temp 98.9 °F (37.2 °C)   Resp 18   Ht 6' 2\" (1.88 m)   Wt 163 lb (73.9 kg)   SpO2 96%   BMI 20.93 kg/m²   General: no acute distress, well nourished and well hydrated  HEENT: NCAT  Heart: S1S2 RRR  Lungs: Clear to ascultation bilaterally, respiratory effort normal  Abdomen: soft, NT/ND, positive bowel sounds  Extremities: no pitting edema, nontender   Neuro: patient is awake, alert and orientated times 3, no gross deficits  Skin: no rashes or ecchymosis        Meds:   Meds:    bisacodyl  10 mg Rectal Daily    pantoprazole  40 mg Intravenous BID    And    sodium chloride (PF)  10 mL Intravenous BID    lipase-protease-amylase  36,000 Units Oral TID WC    piperacillin-tazobactam  3,375 mg Intravenous Q8H    And    sodium chloride   Intravenous Q8H    insulin lispro  0-18 Units Subcutaneous Q4H    lisinopril  20 mg Oral BID    metoprolol tartrate  25 mg Oral BID    acetaminophen  650 mg Oral Q6H    fluconazole  400 mg Intravenous Q24H    lidocaine PF  5 mL Intradermal Once    sodium chloride flush  5-40 mL Intravenous 2 times per day    heparin flush  3 mL Intravenous 2 times per day      Infusions:    lactated ringers 50 mL/hr at 06/25/21 1041    PN-Adult  3 IN 1 Central Line (Custom) 37.5 mL/hr at 06/24/21 1806    dextrose      sodium chloride      sodium chloride      sodium chloride       PRN Meds: glucose, 15 g, PRN  dextrose, 12.5 g, PRN  glucagon (rDNA), 1 mg, PRN  dextrose, 100 mL/hr, PRN  sodium chloride, , PRN  sodium chloride, , PRN  oxyCODONE, 5 mg, Q4H PRN   Or  oxyCODONE, 10 mg, Q4H PRN  labetalol, 20 mg, Q4H PRN  ondansetron, 4 mg, Q6H PRN  sodium chloride flush, 5-40 mL, PRN  sodium chloride, 25 mL, PRN  heparin flush, 3 mL, PRN        Data/Labs:     Recent Labs     06/23/21  0420 06/24/21  1101 06/25/21  0529   WBC 9.8 11.8* 11.5   HGB 10.6* 9.0* 8.8*   HCT 30.6* 26.9* 25.4*    246 242      Recent Labs     06/23/21  0420 06/23/21  0855 06/23/21  1208 06/24/21  0510 06/25/21  0810   * 133  --  134 136   K 4.0 4.2 4.1 4.8 4.2   CL 97* 100  --  102 104   CO2 22 21*  --  21* 20*   PHOS 3.0  --   --  3.0 3.2   BUN 26* 26*  --  31* 37*   CREATININE 1.7* 1.6*  --  1.7* 1.7*     Recent Labs     06/23/21  0420   AST 8   ALT 7   BILITOT 0.5   ALKPHOS 129     No results for input(s): INR in the last 72 hours. No results for input(s): CKTOTAL, CKMB, CKMBINDEX, TROPONINT in the last 72 hours. I/O last 3 completed shifts: In: 3923.5 [P.O.:340; I.V.:2316. 3; NG/GT:150; IV Piggyback:215.9]  Out: 2325 [Urine:825; Emesis/NG output:1500]    Intake/Output Summary (Last 24 hours) at 6/25/2021 1313  Last data filed at 6/25/2021 1216  Gross per 24 hour   Intake 3430.6 ml   Output 2075 ml   Net 1355.6 ml        Assessment/Plan:   1.   Acute on chronic pancreatitis with pseudocyst-general surgery and oncology is following since patient has a history of pancreatic cancer, pain is controlled with medication, Creon when eating  06/23/2021- patient just returned from endoscopy by robotic s/p cyst gastrostomy, MATEO and pancreatic necrosectomy  06/25/2021-patient has had his NG removed; he is tolerating popsicles and other items we will continue to monitor  2. Hypertension-lisinopril, metoprolol, patient is n.p.o. at this time may have to add an IV as needed antihypertensive to control his blood pressure we will continue to monitor  3. Non-insulin-dependent diabetes mellitus-May need a sliding scale, monitor blood glucose, diabetic diet if indicated  4. Coronary artery disease-amiodarone, will hold at this time since patient is n.p.o. after procedure we will continue  5. Chronic kidney disease-patient's creatinine is 2.0 we will continue to monitor and avoid nephrotoxic agents      DVT Prophylaxis: scd  Diet: PN-Adult  3-in-1 Central Line (Custom)  ADULT DIET; Clear Liquid; 3 carb choices (45 gm/meal);  Post Gastrectomy; 1200 ml; No Carbonated Beverages  Adult Oral Nutrition Supplement; Diabetic Oral Supplement  Code Status: Prior    Dispo: when stable     Electronically signed by Mary Baer MD on 6/25/2021 at 1:13 PM  [facility]

## 2021-06-25 NOTE — PROGRESS NOTES
membranes. No ulcerations or thrush. Neck: Supple to movements. Chest: No use of accessory muscles to breathe. Symmetrical expansion. No wheezing, crackles or rhonchi. Cardiovascular: S1 and S2 are rhythmic and regular. No murmurs appreciated. Abdomen: Positive bowel sounds to auscultation. tender to palpation. No masses felt. No hepatosplenomegaly. Laparoscopy sites noted,   Genitourinary: Male  Extremities: No clubbing, no cyanosis, no edema.   Lines: right picc 6/22/21 and  Right jugular TLC 6/23    Laboratory and Tests Review:  Lab Results   Component Value Date    WBC 11.5 06/25/2021    WBC 11.8 (H) 06/24/2021    WBC 9.8 06/23/2021    HGB 8.8 (L) 06/25/2021    HCT 25.4 (L) 06/25/2021    MCV 78.9 (L) 06/25/2021     06/25/2021     Lab Results   Component Value Date    NEUTROABS 10.81 (H) 06/25/2021    NEUTROABS 10.86 (H) 06/24/2021    NEUTROABS 6.20 06/22/2021     No results found for: [facility]  Lab Results   Component Value Date    ALT 7 06/23/2021    AST 8 06/23/2021    ALKPHOS 129 06/23/2021    BILITOT 0.5 06/23/2021     Lab Results   Component Value Date     06/25/2021    K 4.2 06/25/2021    K 4.3 06/17/2021     06/25/2021    CO2 20 06/25/2021    BUN 37 06/25/2021    CREATININE 1.7 06/25/2021    CREATININE 1.7 06/24/2021    CREATININE 1.6 06/23/2021    GFRAA 48 06/25/2021    LABGLOM 48 06/25/2021    GLUCOSE 167 06/25/2021    PROT 6.4 06/23/2021    LABALBU 2.9 06/23/2021    CALCIUM 9.6 06/25/2021    BILITOT 0.5 06/23/2021    ALKPHOS 129 06/23/2021    AST 8 06/23/2021    ALT 7 06/23/2021     Lab Results   Component Value Date    CRP 7.9 (H) 01/12/2021    CRP 5.1 (H) 01/01/2021    CRP 5.5 (H) 12/31/2020     Lab Results   Component Value Date    SEDRATE 55 (H) 01/01/2021    SEDRATE 55 (H) 12/31/2020    SEDRATE 65 (H) 12/30/2020     Radiology:  Reviewed    Microbiology:   Lab Results   Component Value Date    BC 5 Days no growth 01/13/2021    BC 5 Days no growth 12/22/2020    ORG Klebsiella pneumoniae ssp pneumoniae 06/23/2021    ORG Enterococcus species 06/23/2021    ORG Yeast 06/23/2021       No results found for: WNDABS  No results found for: RESPSMEAR  No results found for: MPNEUMO, CLAMYDCU, LABLEGI, AFBCX, FUNGSM, LABFUNG  No results found for: CULTRESP  No results found for: CXCATHTIP  No results found for: BFCS  Culture Surgical   Date Value Ref Range Status   06/23/2021 (A)  Preliminary    Growth present, evaluating for:  Nonhemolytic Strep species  Yeast  Gram negative rods     06/23/2021   Preliminary    Heavy growth  Identification and sensitivity to follow     06/23/2021   Preliminary    Moderate growth  Identification and sensitivity to follow     06/23/2021 Moderate growth  Identification to follow    Preliminary     Urine Culture, Routine   Date Value Ref Range Status   02/28/2021 <10,000 CFU/mL  Gram positive organism    Final   01/09/2021 >100,000 CFU/ml  Final   12/23/2020 Growth not present  Final     MRSA Culture Only   Date Value Ref Range Status   12/23/2020 Methicillin resistant Staph aureus not isolated  Final     6/23 surgical cx yeast, klebsiella, enterococcus species    ASSESSMENT:  Pancreatic necrosis, pseudocyst with abscess  S/p Laparoscopic robotic pancreatic necrosectomy, cyst gastrostomy, adhesion lysis 6/23/21    PLAN:  Continue meropenem switch to zosyn plus Diflucan  Check final cultures and   plan for home IV antibiotics times 3 weeks min  PICC line in place-6/22/2021  Monitor labs    GURPREET Roper CNP  10:17 AM  6/25/2021   Pt seen and examined. Above discussed agree with advanced practice nurse. Labs, cultures, and radiographs reviewed. Face to Face encounter occurred. Changes made as necessary.      Patti Bardales MD

## 2021-06-25 NOTE — PLAN OF CARE
Problem: Falls - Risk of:  Goal: Will remain free from falls  Description: Will remain free from falls  6/25/2021 0314 by Jana Silver RN  Outcome: Met This Shift     Problem: Falls - Risk of:  Goal: Absence of physical injury  Description: Absence of physical injury  6/25/2021 0314 by Jana Silver RN  Outcome: Met This Shift     Problem: Skin Integrity:  Goal: Will show no infection signs and symptoms  Description: Will show no infection signs and symptoms  6/25/2021 0314 by Jana Silver RN  Outcome: Met This Shift     Problem: Skin Integrity:  Goal: Absence of new skin breakdown  Description: Absence of new skin breakdown  Outcome: Met This Shift     Problem: Pain:  Goal: Pain level will decrease  Description: Pain level will decrease  Outcome: Met This Shift     Problem: Pain:  Goal: Control of acute pain  Description: Control of acute pain  Outcome: Met This Shift     Problem: Pain:  Goal: Control of chronic pain  Description: Control of chronic pain  Outcome: Met This Shift

## 2021-06-26 LAB
ANAEROBIC CULTURE: ABNORMAL
ANION GAP SERPL CALCULATED.3IONS-SCNC: 7 MMOL/L (ref 7–16)
BASOPHILS ABSOLUTE: 0 E9/L (ref 0–0.2)
BASOPHILS RELATIVE PERCENT: 0.1 % (ref 0–2)
BLOOD BANK DISPENSE STATUS: NORMAL
BLOOD BANK DISPENSE STATUS: NORMAL
BLOOD BANK PRODUCT CODE: NORMAL
BLOOD BANK PRODUCT CODE: NORMAL
BPU ID: NORMAL
BPU ID: NORMAL
BUN BLDV-MCNC: 41 MG/DL (ref 6–23)
BURR CELLS: ABNORMAL
CALCIUM SERPL-MCNC: 9.4 MG/DL (ref 8.6–10.2)
CHLORIDE BLD-SCNC: 106 MMOL/L (ref 98–107)
CO2: 24 MMOL/L (ref 22–29)
CREAT SERPL-MCNC: 1.8 MG/DL (ref 0.7–1.2)
CULTURE SURGICAL: ABNORMAL
DESCRIPTION BLOOD BANK: NORMAL
DESCRIPTION BLOOD BANK: NORMAL
EOSINOPHILS ABSOLUTE: 0 E9/L (ref 0.05–0.5)
EOSINOPHILS RELATIVE PERCENT: 0.7 % (ref 0–6)
GFR AFRICAN AMERICAN: 45
GFR NON-AFRICAN AMERICAN: 45 ML/MIN/1.73
GLUCOSE BLD-MCNC: 75 MG/DL (ref 74–99)
HCT VFR BLD CALC: 26 % (ref 37–54)
HEMOGLOBIN: 8.9 G/DL (ref 12.5–16.5)
HYPOCHROMIA: ABNORMAL
LYMPHOCYTES ABSOLUTE: 1.1 E9/L (ref 1.5–4)
LYMPHOCYTES RELATIVE PERCENT: 12.2 % (ref 20–42)
MAGNESIUM: 1.9 MG/DL (ref 1.6–2.6)
MCH RBC QN AUTO: 27.3 PG (ref 26–35)
MCHC RBC AUTO-ENTMCNC: 34.2 % (ref 32–34.5)
MCV RBC AUTO: 79.8 FL (ref 80–99.9)
METER GLUCOSE: 123 MG/DL (ref 74–99)
METER GLUCOSE: 135 MG/DL (ref 74–99)
METER GLUCOSE: 221 MG/DL (ref 74–99)
METER GLUCOSE: 56 MG/DL (ref 74–99)
METER GLUCOSE: 78 MG/DL (ref 74–99)
METER GLUCOSE: 83 MG/DL (ref 74–99)
MONOCYTES ABSOLUTE: 0.74 E9/L (ref 0.1–0.95)
MONOCYTES RELATIVE PERCENT: 7.8 % (ref 2–12)
NEUTROPHILS ABSOLUTE: 7.36 E9/L (ref 1.8–7.3)
NEUTROPHILS RELATIVE PERCENT: 80 % (ref 43–80)
ORGANISM: ABNORMAL
OVALOCYTES: ABNORMAL
PDW BLD-RTO: 18.4 FL (ref 11.5–15)
PHOSPHORUS: 3 MG/DL (ref 2.5–4.5)
PLATELET # BLD: 251 E9/L (ref 130–450)
PMV BLD AUTO: 10.8 FL (ref 7–12)
POIKILOCYTES: ABNORMAL
POLYCHROMASIA: ABNORMAL
POTASSIUM SERPL-SCNC: 4.1 MMOL/L (ref 3.5–5)
RBC # BLD: 3.26 E12/L (ref 3.8–5.8)
SMUDGE CELLS: ABNORMAL
SODIUM BLD-SCNC: 137 MMOL/L (ref 132–146)
TARGET CELLS: ABNORMAL
WBC # BLD: 9.2 E9/L (ref 4.5–11.5)

## 2021-06-26 PROCEDURE — 2580000003 HC RX 258: Performed by: NURSE PRACTITIONER

## 2021-06-26 PROCEDURE — 82962 GLUCOSE BLOOD TEST: CPT

## 2021-06-26 PROCEDURE — 36415 COLL VENOUS BLD VENIPUNCTURE: CPT

## 2021-06-26 PROCEDURE — 2140000000 HC CCU INTERMEDIATE R&B

## 2021-06-26 PROCEDURE — 6370000000 HC RX 637 (ALT 250 FOR IP): Performed by: SURGERY

## 2021-06-26 PROCEDURE — 36592 COLLECT BLOOD FROM PICC: CPT

## 2021-06-26 PROCEDURE — 6360000002 HC RX W HCPCS: Performed by: SPECIALIST

## 2021-06-26 PROCEDURE — 6360000002 HC RX W HCPCS: Performed by: NURSE PRACTITIONER

## 2021-06-26 PROCEDURE — 2580000003 HC RX 258: Performed by: SURGERY

## 2021-06-26 PROCEDURE — 84100 ASSAY OF PHOSPHORUS: CPT

## 2021-06-26 PROCEDURE — 6370000000 HC RX 637 (ALT 250 FOR IP): Performed by: NURSE PRACTITIONER

## 2021-06-26 PROCEDURE — 6360000002 HC RX W HCPCS: Performed by: STUDENT IN AN ORGANIZED HEALTH CARE EDUCATION/TRAINING PROGRAM

## 2021-06-26 PROCEDURE — 80048 BASIC METABOLIC PNL TOTAL CA: CPT

## 2021-06-26 PROCEDURE — C9113 INJ PANTOPRAZOLE SODIUM, VIA: HCPCS | Performed by: SURGERY

## 2021-06-26 PROCEDURE — 6370000000 HC RX 637 (ALT 250 FOR IP): Performed by: STUDENT IN AN ORGANIZED HEALTH CARE EDUCATION/TRAINING PROGRAM

## 2021-06-26 PROCEDURE — 6360000002 HC RX W HCPCS: Performed by: SURGERY

## 2021-06-26 PROCEDURE — 85025 COMPLETE CBC W/AUTO DIFF WBC: CPT

## 2021-06-26 PROCEDURE — 83735 ASSAY OF MAGNESIUM: CPT

## 2021-06-26 PROCEDURE — 2580000003 HC RX 258: Performed by: STUDENT IN AN ORGANIZED HEALTH CARE EDUCATION/TRAINING PROGRAM

## 2021-06-26 RX ADMIN — ACETAMINOPHEN 650 MG: 325 TABLET ORAL at 21:34

## 2021-06-26 RX ADMIN — LISINOPRIL 20 MG: 20 TABLET ORAL at 21:33

## 2021-06-26 RX ADMIN — OXYCODONE HYDROCHLORIDE 10 MG: 10 TABLET ORAL at 06:05

## 2021-06-26 RX ADMIN — PIPERACILLIN AND TAZOBACTAM 3375 MG: 3; .375 INJECTION, POWDER, LYOPHILIZED, FOR SOLUTION INTRAVENOUS at 21:33

## 2021-06-26 RX ADMIN — Medication 10 ML: at 08:45

## 2021-06-26 RX ADMIN — OXYCODONE HYDROCHLORIDE 10 MG: 10 TABLET ORAL at 21:34

## 2021-06-26 RX ADMIN — PIPERACILLIN AND TAZOBACTAM 3375 MG: 3; .375 INJECTION, POWDER, LYOPHILIZED, FOR SOLUTION INTRAVENOUS at 14:07

## 2021-06-26 RX ADMIN — SODIUM CHLORIDE: 9 INJECTION, SOLUTION INTRAVENOUS at 19:00

## 2021-06-26 RX ADMIN — SODIUM CHLORIDE, PRESERVATIVE FREE 10 ML: 5 INJECTION INTRAVENOUS at 21:33

## 2021-06-26 RX ADMIN — ACETAMINOPHEN 650 MG: 325 TABLET ORAL at 04:09

## 2021-06-26 RX ADMIN — Medication 1 CAPSULE: at 08:45

## 2021-06-26 RX ADMIN — ENOXAPARIN SODIUM 40 MG: 40 INJECTION SUBCUTANEOUS at 08:46

## 2021-06-26 RX ADMIN — METOPROLOL TARTRATE 25 MG: 25 TABLET, FILM COATED ORAL at 21:34

## 2021-06-26 RX ADMIN — SODIUM CHLORIDE, PRESERVATIVE FREE 10 ML: 5 INJECTION INTRAVENOUS at 06:04

## 2021-06-26 RX ADMIN — HEPARIN 300 UNITS: 100 SYRINGE at 06:05

## 2021-06-26 RX ADMIN — Medication 10 ML: at 20:22

## 2021-06-26 RX ADMIN — PANCRELIPASE 36000 UNITS: 60000; 12000; 38000 CAPSULE, DELAYED RELEASE PELLETS ORAL at 16:54

## 2021-06-26 RX ADMIN — HEPARIN 300 UNITS: 100 SYRINGE at 08:46

## 2021-06-26 RX ADMIN — SODIUM CHLORIDE, PRESERVATIVE FREE 10 ML: 5 INJECTION INTRAVENOUS at 06:05

## 2021-06-26 RX ADMIN — SODIUM CHLORIDE: 9 INJECTION, SOLUTION INTRAVENOUS at 02:00

## 2021-06-26 RX ADMIN — INSULIN LISPRO 6 UNITS: 100 INJECTION, SOLUTION INTRAVENOUS; SUBCUTANEOUS at 20:20

## 2021-06-26 RX ADMIN — PIPERACILLIN AND TAZOBACTAM 3375 MG: 3; .375 INJECTION, POWDER, LYOPHILIZED, FOR SOLUTION INTRAVENOUS at 06:04

## 2021-06-26 RX ADMIN — PANTOPRAZOLE SODIUM 40 MG: 40 INJECTION, POWDER, LYOPHILIZED, FOR SOLUTION INTRAVENOUS at 08:45

## 2021-06-26 RX ADMIN — PANCRELIPASE 36000 UNITS: 60000; 12000; 38000 CAPSULE, DELAYED RELEASE PELLETS ORAL at 08:45

## 2021-06-26 RX ADMIN — OXYCODONE HYDROCHLORIDE 10 MG: 10 TABLET ORAL at 14:06

## 2021-06-26 RX ADMIN — FLUCONAZOLE IN SODIUM CHLORIDE 400 MG: 2 INJECTION, SOLUTION INTRAVENOUS at 08:45

## 2021-06-26 RX ADMIN — LISINOPRIL 20 MG: 20 TABLET ORAL at 08:45

## 2021-06-26 RX ADMIN — HEPARIN 300 UNITS: 100 SYRINGE at 21:33

## 2021-06-26 RX ADMIN — PANCRELIPASE 36000 UNITS: 60000; 12000; 38000 CAPSULE, DELAYED RELEASE PELLETS ORAL at 11:55

## 2021-06-26 RX ADMIN — PANTOPRAZOLE SODIUM 40 MG: 40 INJECTION, POWDER, LYOPHILIZED, FOR SOLUTION INTRAVENOUS at 21:33

## 2021-06-26 RX ADMIN — ACETAMINOPHEN 650 MG: 325 TABLET ORAL at 08:45

## 2021-06-26 RX ADMIN — METOPROLOL TARTRATE 25 MG: 25 TABLET, FILM COATED ORAL at 08:45

## 2021-06-26 RX ADMIN — OXYCODONE HYDROCHLORIDE 10 MG: 10 TABLET ORAL at 00:24

## 2021-06-26 ASSESSMENT — PAIN DESCRIPTION - PROGRESSION
CLINICAL_PROGRESSION: GRADUALLY WORSENING
CLINICAL_PROGRESSION: GRADUALLY WORSENING

## 2021-06-26 ASSESSMENT — PAIN DESCRIPTION - FREQUENCY
FREQUENCY: CONTINUOUS
FREQUENCY: CONTINUOUS

## 2021-06-26 ASSESSMENT — PAIN SCALES - GENERAL
PAINLEVEL_OUTOF10: 0
PAINLEVEL_OUTOF10: 0
PAINLEVEL_OUTOF10: 6
PAINLEVEL_OUTOF10: 0
PAINLEVEL_OUTOF10: 0
PAINLEVEL_OUTOF10: 6
PAINLEVEL_OUTOF10: 3
PAINLEVEL_OUTOF10: 0
PAINLEVEL_OUTOF10: 9
PAINLEVEL_OUTOF10: 0
PAINLEVEL_OUTOF10: 7
PAINLEVEL_OUTOF10: 8
PAINLEVEL_OUTOF10: 7

## 2021-06-26 ASSESSMENT — PAIN DESCRIPTION - ORIENTATION
ORIENTATION: RIGHT;LEFT;MID;LOWER
ORIENTATION: RIGHT;LEFT

## 2021-06-26 ASSESSMENT — PAIN DESCRIPTION - LOCATION
LOCATION: ABDOMEN
LOCATION: ABDOMEN

## 2021-06-26 ASSESSMENT — PAIN DESCRIPTION - PAIN TYPE
TYPE: SURGICAL PAIN
TYPE: SURGICAL PAIN

## 2021-06-26 ASSESSMENT — PAIN - FUNCTIONAL ASSESSMENT
PAIN_FUNCTIONAL_ASSESSMENT: PREVENTS OR INTERFERES SOME ACTIVE ACTIVITIES AND ADLS
PAIN_FUNCTIONAL_ASSESSMENT: PREVENTS OR INTERFERES SOME ACTIVE ACTIVITIES AND ADLS

## 2021-06-26 ASSESSMENT — PAIN DESCRIPTION - ONSET
ONSET: ON-GOING
ONSET: ON-GOING

## 2021-06-26 ASSESSMENT — PAIN DESCRIPTION - DESCRIPTORS
DESCRIPTORS: ACHING;DISCOMFORT;DULL
DESCRIPTORS: ACHING;DISCOMFORT;DULL

## 2021-06-26 NOTE — PLAN OF CARE
Problem: Falls - Risk of:  Goal: Will remain free from falls  Description: Will remain free from falls  Outcome: Met This Shift     Problem: Skin Integrity:  Goal: Will show no infection signs and symptoms  Description: Will show no infection signs and symptoms  Outcome: Met This Shift     Problem: Pain:  Goal: Pain level will decrease  Description: Pain level will decrease  Outcome: Met This Shift     Problem: Infection - Surgical Site:  Goal: Will show no infection signs and symptoms  Description: Will show no infection signs and symptoms  Outcome: Met This Shift     Problem: Cardiac:  Goal: Hemodynamic stability will improve  Description: Hemodynamic stability will improve  Outcome: Met This Shift

## 2021-06-26 NOTE — PROGRESS NOTES
[address] [place] [address] [facility]  Progress Note      C/C : Intra abdomen abscess   Denies fever and chills  Mild abdomen pain   Afebrile       Review of systems:  As stated above in the chief complaint, otherwise negative. Medications:  Scheduled Meds:   bisacodyl  10 mg Rectal Daily    pantoprazole  40 mg Intravenous BID    And    sodium chloride (PF)  10 mL Intravenous BID    lipase-protease-amylase  36,000 Units Oral TID WC    piperacillin-tazobactam  3,375 mg Intravenous Q8H    And    sodium chloride   Intravenous Q8H    lactobacillus  1 capsule Oral Daily    enoxaparin  40 mg Subcutaneous Daily    insulin lispro  0-18 Units Subcutaneous Q4H    lisinopril  20 mg Oral BID    metoprolol tartrate  25 mg Oral BID    acetaminophen  650 mg Oral Q6H    fluconazole  400 mg Intravenous Q24H    lidocaine PF  5 mL Intradermal Once    sodium chloride flush  5-40 mL Intravenous 2 times per day    heparin flush  3 mL Intravenous 2 times per day     Continuous Infusions:   lactated ringers 50 mL/hr at 21 1041    dextrose      sodium chloride      sodium chloride      sodium chloride       PRN Meds:glucose, dextrose, glucagon (rDNA), dextrose, sodium chloride, sodium chloride, oxyCODONE **OR** oxyCODONE, labetalol, ondansetron, sodium chloride flush, sodium chloride, heparin flush    OBJECTIVE:  BP (!) 156/104   Pulse 84   Temp 97.3 °F (36.3 °C) (Temporal)   Resp 18   Ht 6' 2\" (1.88 m)   Wt 163 lb 3.2 oz (74 kg)   SpO2 98%   BMI 20.95 kg/m²   Temp  Av.8 °F (36.6 °C)  Min: 97 °F (36.1 °C)  Max: 98.7 °F (37.1 °C)  Constitutional: awake,and alert, no distress   Skin: Warm and dry. No rashes were noted. HEENT: Round and reactive pupils. Moist mucous membranes. No ulcerations or thrush. Neck: Supple to movements. Chest: Clear bilaterally   Cardiovascular: S1 and S2 are rhythmic and regular. No murmurs appreciated. Abdomen: Positive bowel sounds to auscultation. tender to palpation. No masses felt. No hepatosplenomegaly. Laparoscopy sites noted,   Genitourinary: Male  Extremities: No clubbing, no cyanosis, no edema.   Lines: right picc 6/22/21       Laboratory and Tests Review:  Lab Results   Component Value Date    WBC 9.2 06/26/2021    WBC 11.5 06/25/2021    WBC 11.8 (H) 06/24/2021    HGB 8.9 (L) 06/26/2021    HCT 26.0 (L) 06/26/2021    MCV 79.8 (L) 06/26/2021     06/26/2021     Lab Results   Component Value Date    NEUTROABS 7.36 (H) 06/26/2021    NEUTROABS 10.81 (H) 06/25/2021    NEUTROABS 10.86 (H) 06/24/2021     No results found for: [facility] [[email or web address]]  Lab Results   Component Value Date    ALT 7 06/23/2021    AST 8 06/23/2021    ALKPHOS 129 06/23/2021    BILITOT 0.5 06/23/2021     Lab Results   Component Value Date     06/26/2021    K 4.1 06/26/2021    K 4.3 06/17/2021     06/26/2021    CO2 24 06/26/2021    BUN 41 06/26/2021    CREATININE 1.8 06/26/2021    CREATININE 1.7 06/25/2021    CREATININE 1.7 06/24/2021    GFRAA 45 06/26/2021    LABGLOM 45 06/26/2021    GLUCOSE 75 06/26/2021    PROT 6.4 06/23/2021    LABALBU 2.9 06/23/2021    CALCIUM 9.4 06/26/2021    BILITOT 0.5 06/23/2021    ALKPHOS 129 06/23/2021    AST 8 06/23/2021    ALT 7 06/23/2021     Lab Results   Component Value Date    CRP 7.9 (H) 01/12/2021    CRP 5.1 (H) 01/01/2021    CRP 5.5 (H) 12/31/2020     Lab Results   Component Value Date    SEDRATE 55 (H) 01/01/2021    SEDRATE 55 (H) 12/31/2020    SEDRATE 65 (H) 12/30/2020     Radiology:  Reviewed    Microbiology:   Lab Results   Component Value Date    BC 5 Days no growth 01/13/2021    BC 5 Days no growth 12/22/2020    ORG Klebsiella pneumoniae ssp pneumoniae 06/23/2021    ORG Enterococcus faecalis 06/23/2021    ORG Jane albicans 06/23/2021       No results found for: WNDABS  No results found for: RESPSMEAR      Component Value Date/Time    FUNGSM No Fungal elements seen 06/23/2021 0913     No results found for: CULTRESP  No results found for:

## 2021-06-26 NOTE — PROGRESS NOTES
[facility] SURGERY  DAILY PROGRESS NOTE    Date:2021       FCWI:[id number]  Patient Name:Andre Hansen     YOB: 1950     Age:71 y.o. Chief Complaint:  Chief Complaint   Patient presents with    Chest Pain     left side, radiating into LUQ/LLQ. states abd pain has been ongoing since 6/15 but chest pain started around 1600 yesterday. denies n/v.  denies sob. denies diaphoresis. recent dx of afib        Subjective:  Pain well controlled, NG out, walked x3+ yesterday. Feeling great this morning. Requesting more to eat      Objective:  BP (!) 156/104   Pulse 84   Temp 97.3 °F (36.3 °C) (Temporal)   Resp 18   Ht 6' 2\" (1.88 m)   Wt 163 lb 3.2 oz (74 kg)   SpO2 98%   BMI 20.95 kg/m²   Temp (24hrs), Av °F (36.7 °C), Min:97 °F (36.1 °C), Max:98.9 °F (37.2 °C)      I/O (24Hr):  I/O last 3 completed shifts: In: 3227.4 [P.O.:1560; I.V.:977.6; IV Piggyback:379]  Out: 1900 [Urine:1900]     GENERAL:  No acute distress. Alert and interactive. LUNGS:  No cough. Nonlabored breathing on room air. SMI 1000  CARDIOVASC:  Normal rate, no cyanosis. ABDOMEN:  Soft, non-distended, non-tender objectively. No guarding / rigidity / rebound. EXTREMITIES:  No edema, no deformities.     Assessment:  66 y.o. male with recurrent pancreatitis in the setting of uncontrolled DM II, pancreatic necrosis with abscess and pseudocyst with prior endoscopic CG and ex lap, now status post robotic pancreatic necrosectomy with CG & MATEO on     Plan:  -tylenol, oxy, stop dilaudid  -SMI  -Monitor postop anemia  -PPI BID  -increase diet today, creon, glucerna  -stop TPN   -added dulcolax suppository  -Currently on meropenem and Diflucan, SCx GNR, appreciate ID assistance  -Monitor UOP/BUN/Cr  -Ambulate  -possible home today vs tomorrow     Electronically signed by Jacques Rico DO on 2021 at 8:12 AM    Doing well  Low fat diet  Home today    Electronically signed by Lillian Pierce MD on 2021 at 10:26 AM

## 2021-06-27 LAB
ANION GAP SERPL CALCULATED.3IONS-SCNC: 11 MMOL/L (ref 7–16)
BASOPHILS ABSOLUTE: 0 E9/L (ref 0–0.2)
BASOPHILS RELATIVE PERCENT: 0.2 % (ref 0–2)
BUN BLDV-MCNC: 37 MG/DL (ref 6–23)
CALCIUM SERPL-MCNC: 9 MG/DL (ref 8.6–10.2)
CHLORIDE BLD-SCNC: 104 MMOL/L (ref 98–107)
CO2: 20 MMOL/L (ref 22–29)
CREAT SERPL-MCNC: 2 MG/DL (ref 0.7–1.2)
EOSINOPHILS ABSOLUTE: 0.14 E9/L (ref 0.05–0.5)
EOSINOPHILS RELATIVE PERCENT: 1.7 % (ref 0–6)
GFR AFRICAN AMERICAN: 40
GFR NON-AFRICAN AMERICAN: 40 ML/MIN/1.73
GLUCOSE BLD-MCNC: 122 MG/DL (ref 74–99)
HCT VFR BLD CALC: 26.3 % (ref 37–54)
HEMOGLOBIN: 8.8 G/DL (ref 12.5–16.5)
LYMPHOCYTES ABSOLUTE: 1.02 E9/L (ref 1.5–4)
LYMPHOCYTES RELATIVE PERCENT: 12.2 % (ref 20–42)
MAGNESIUM: 1.9 MG/DL (ref 1.6–2.6)
MCH RBC QN AUTO: 26.7 PG (ref 26–35)
MCHC RBC AUTO-ENTMCNC: 33.5 % (ref 32–34.5)
MCV RBC AUTO: 79.7 FL (ref 80–99.9)
METER GLUCOSE: 113 MG/DL (ref 74–99)
METER GLUCOSE: 114 MG/DL (ref 74–99)
METER GLUCOSE: 115 MG/DL (ref 74–99)
METER GLUCOSE: 125 MG/DL (ref 74–99)
METER GLUCOSE: 235 MG/DL (ref 74–99)
MONOCYTES ABSOLUTE: 0.42 E9/L (ref 0.1–0.95)
MONOCYTES RELATIVE PERCENT: 5.2 % (ref 2–12)
NEUTROPHILS ABSOLUTE: 6.89 E9/L (ref 1.8–7.3)
NEUTROPHILS RELATIVE PERCENT: 80.9 % (ref 43–80)
PDW BLD-RTO: 18.4 FL (ref 11.5–15)
PHOSPHORUS: 3.6 MG/DL (ref 2.5–4.5)
PLATELET # BLD: 268 E9/L (ref 130–450)
PMV BLD AUTO: 10.9 FL (ref 7–12)
POIKILOCYTES: ABNORMAL
POLYCHROMASIA: ABNORMAL
POTASSIUM SERPL-SCNC: 4.2 MMOL/L (ref 3.5–5)
RBC # BLD: 3.3 E12/L (ref 3.8–5.8)
SODIUM BLD-SCNC: 135 MMOL/L (ref 132–146)
TARGET CELLS: ABNORMAL
WBC # BLD: 8.5 E9/L (ref 4.5–11.5)

## 2021-06-27 PROCEDURE — 6360000002 HC RX W HCPCS: Performed by: SURGERY

## 2021-06-27 PROCEDURE — 2580000003 HC RX 258: Performed by: STUDENT IN AN ORGANIZED HEALTH CARE EDUCATION/TRAINING PROGRAM

## 2021-06-27 PROCEDURE — 6370000000 HC RX 637 (ALT 250 FOR IP): Performed by: NURSE PRACTITIONER

## 2021-06-27 PROCEDURE — 2140000000 HC CCU INTERMEDIATE R&B

## 2021-06-27 PROCEDURE — 36415 COLL VENOUS BLD VENIPUNCTURE: CPT

## 2021-06-27 PROCEDURE — 2580000003 HC RX 258: Performed by: NURSE PRACTITIONER

## 2021-06-27 PROCEDURE — 80048 BASIC METABOLIC PNL TOTAL CA: CPT

## 2021-06-27 PROCEDURE — C9113 INJ PANTOPRAZOLE SODIUM, VIA: HCPCS | Performed by: SURGERY

## 2021-06-27 PROCEDURE — 36592 COLLECT BLOOD FROM PICC: CPT

## 2021-06-27 PROCEDURE — 6370000000 HC RX 637 (ALT 250 FOR IP): Performed by: STUDENT IN AN ORGANIZED HEALTH CARE EDUCATION/TRAINING PROGRAM

## 2021-06-27 PROCEDURE — 6360000002 HC RX W HCPCS: Performed by: SPECIALIST

## 2021-06-27 PROCEDURE — 6360000002 HC RX W HCPCS: Performed by: STUDENT IN AN ORGANIZED HEALTH CARE EDUCATION/TRAINING PROGRAM

## 2021-06-27 PROCEDURE — 82962 GLUCOSE BLOOD TEST: CPT

## 2021-06-27 PROCEDURE — 2500000003 HC RX 250 WO HCPCS: Performed by: HOSPITALIST

## 2021-06-27 PROCEDURE — 6370000000 HC RX 637 (ALT 250 FOR IP): Performed by: SURGERY

## 2021-06-27 PROCEDURE — 85025 COMPLETE CBC W/AUTO DIFF WBC: CPT

## 2021-06-27 PROCEDURE — 83735 ASSAY OF MAGNESIUM: CPT

## 2021-06-27 PROCEDURE — 6360000002 HC RX W HCPCS: Performed by: NURSE PRACTITIONER

## 2021-06-27 PROCEDURE — 84100 ASSAY OF PHOSPHORUS: CPT

## 2021-06-27 PROCEDURE — 2580000003 HC RX 258: Performed by: SURGERY

## 2021-06-27 RX ADMIN — SODIUM CHLORIDE, POTASSIUM CHLORIDE, SODIUM LACTATE AND CALCIUM CHLORIDE: 600; 310; 30; 20 INJECTION, SOLUTION INTRAVENOUS at 05:48

## 2021-06-27 RX ADMIN — PIPERACILLIN AND TAZOBACTAM 3375 MG: 3; .375 INJECTION, POWDER, LYOPHILIZED, FOR SOLUTION INTRAVENOUS at 22:30

## 2021-06-27 RX ADMIN — OXYCODONE HYDROCHLORIDE 10 MG: 10 TABLET ORAL at 20:39

## 2021-06-27 RX ADMIN — LISINOPRIL 20 MG: 20 TABLET ORAL at 20:39

## 2021-06-27 RX ADMIN — PANCRELIPASE 36000 UNITS: 60000; 12000; 38000 CAPSULE, DELAYED RELEASE PELLETS ORAL at 13:09

## 2021-06-27 RX ADMIN — FLUCONAZOLE IN SODIUM CHLORIDE 400 MG: 2 INJECTION, SOLUTION INTRAVENOUS at 08:56

## 2021-06-27 RX ADMIN — SODIUM CHLORIDE, PRESERVATIVE FREE 10 ML: 5 INJECTION INTRAVENOUS at 03:33

## 2021-06-27 RX ADMIN — LABETALOL HYDROCHLORIDE 20 MG: 5 INJECTION INTRAVENOUS at 03:30

## 2021-06-27 RX ADMIN — PANTOPRAZOLE SODIUM 40 MG: 40 INJECTION, POWDER, LYOPHILIZED, FOR SOLUTION INTRAVENOUS at 20:38

## 2021-06-27 RX ADMIN — Medication 10 ML: at 08:57

## 2021-06-27 RX ADMIN — PANCRELIPASE 36000 UNITS: 60000; 12000; 38000 CAPSULE, DELAYED RELEASE PELLETS ORAL at 08:56

## 2021-06-27 RX ADMIN — HEPARIN 300 UNITS: 100 SYRINGE at 08:57

## 2021-06-27 RX ADMIN — ACETAMINOPHEN 650 MG: 325 TABLET ORAL at 08:57

## 2021-06-27 RX ADMIN — METOPROLOL TARTRATE 25 MG: 25 TABLET, FILM COATED ORAL at 08:57

## 2021-06-27 RX ADMIN — OXYCODONE HYDROCHLORIDE 10 MG: 10 TABLET ORAL at 03:33

## 2021-06-27 RX ADMIN — SODIUM CHLORIDE: 9 INJECTION, SOLUTION INTRAVENOUS at 02:00

## 2021-06-27 RX ADMIN — INSULIN LISPRO 6 UNITS: 100 INJECTION, SOLUTION INTRAVENOUS; SUBCUTANEOUS at 13:09

## 2021-06-27 RX ADMIN — PIPERACILLIN AND TAZOBACTAM 3375 MG: 3; .375 INJECTION, POWDER, LYOPHILIZED, FOR SOLUTION INTRAVENOUS at 05:36

## 2021-06-27 RX ADMIN — HEPARIN 300 UNITS: 100 SYRINGE at 20:38

## 2021-06-27 RX ADMIN — ENOXAPARIN SODIUM 40 MG: 40 INJECTION SUBCUTANEOUS at 08:57

## 2021-06-27 RX ADMIN — ACETAMINOPHEN 650 MG: 325 TABLET ORAL at 03:33

## 2021-06-27 RX ADMIN — ACETAMINOPHEN 650 MG: 325 TABLET ORAL at 20:42

## 2021-06-27 RX ADMIN — Medication 1 CAPSULE: at 08:57

## 2021-06-27 RX ADMIN — PIPERACILLIN AND TAZOBACTAM 3375 MG: 3; .375 INJECTION, POWDER, LYOPHILIZED, FOR SOLUTION INTRAVENOUS at 14:30

## 2021-06-27 RX ADMIN — PANCRELIPASE 36000 UNITS: 60000; 12000; 38000 CAPSULE, DELAYED RELEASE PELLETS ORAL at 17:19

## 2021-06-27 RX ADMIN — SODIUM CHLORIDE, PRESERVATIVE FREE 10 ML: 5 INJECTION INTRAVENOUS at 20:39

## 2021-06-27 RX ADMIN — PANTOPRAZOLE SODIUM 40 MG: 40 INJECTION, POWDER, LYOPHILIZED, FOR SOLUTION INTRAVENOUS at 08:57

## 2021-06-27 RX ADMIN — SODIUM CHLORIDE, PRESERVATIVE FREE 10 ML: 5 INJECTION INTRAVENOUS at 05:36

## 2021-06-27 RX ADMIN — Medication 10 ML: at 20:38

## 2021-06-27 RX ADMIN — HEPARIN 300 UNITS: 100 SYRINGE at 03:33

## 2021-06-27 RX ADMIN — LISINOPRIL 20 MG: 20 TABLET ORAL at 08:56

## 2021-06-27 RX ADMIN — METOPROLOL TARTRATE 25 MG: 25 TABLET, FILM COATED ORAL at 20:38

## 2021-06-27 ASSESSMENT — PAIN DESCRIPTION - PROGRESSION
CLINICAL_PROGRESSION: GRADUALLY WORSENING
CLINICAL_PROGRESSION: NOT CHANGED

## 2021-06-27 ASSESSMENT — PAIN SCALES - GENERAL
PAINLEVEL_OUTOF10: 5
PAINLEVEL_OUTOF10: 0
PAINLEVEL_OUTOF10: 0
PAINLEVEL_OUTOF10: 3
PAINLEVEL_OUTOF10: 10
PAINLEVEL_OUTOF10: 0
PAINLEVEL_OUTOF10: 4
PAINLEVEL_OUTOF10: 10

## 2021-06-27 ASSESSMENT — PAIN DESCRIPTION - ONSET
ONSET: ON-GOING
ONSET: ON-GOING

## 2021-06-27 ASSESSMENT — PAIN DESCRIPTION - LOCATION
LOCATION: ABDOMEN
LOCATION: ABDOMEN

## 2021-06-27 ASSESSMENT — PAIN DESCRIPTION - FREQUENCY
FREQUENCY: CONTINUOUS
FREQUENCY: CONTINUOUS

## 2021-06-27 ASSESSMENT — PAIN DESCRIPTION - PAIN TYPE
TYPE: SURGICAL PAIN
TYPE: SURGICAL PAIN

## 2021-06-27 ASSESSMENT — PAIN DESCRIPTION - DESCRIPTORS
DESCRIPTORS: ACHING;DISCOMFORT;DULL
DESCRIPTORS: ACHING;DISCOMFORT;DULL

## 2021-06-27 ASSESSMENT — PAIN DESCRIPTION - ORIENTATION
ORIENTATION: RIGHT;LEFT
ORIENTATION: RIGHT;LEFT

## 2021-06-27 NOTE — PROGRESS NOTES
HOSPITALIST PROGRESS NOTE  Date: 6/27/2021   Name: Andre Forrester Found   MRN: [id number]   YOB: 1950        Hospital Course:   Mr Letitia Morales 66 YM who presented to the emergency department approximately 6 days ago was complaining of chest pain and also abdominal pain with radiation up and down his abdomen also complained of a headache. Patient has a past medical history of prostatic cancer, diabetes mellitus, hyperlipidemia, hypertension.   [facility] and [facility] in consultation for evaluation and treatment of acute on chronic pancreatitis with pancreatic pseudocyst.    Subjective/Interval Hx:   Patient has the NG tube out and he is eating popsicles other items denies any pain or distress can await to have his triple-lumen removed in his neck plans are when stable Home with home care    Objective:   Physical Exam:   BP (!) 158/95   Pulse 86   Temp 98.6 °F (37 °C) (Temporal)   Resp 20   Ht 6' 2\" (1.88 m)   Wt 167 lb (75.8 kg)   SpO2 98%   BMI 21.44 kg/m²   General: no acute distress, well nourished and well hydrated  HEENT: NCAT  Heart: S1S2 RRR  Lungs: Clear to ascultation bilaterally, respiratory effort normal  Abdomen: soft, NT/ND, positive bowel sounds  Extremities: no pitting edema, nontender   Neuro: patient is awake, alert and orientated times 3, no gross deficits  Skin: no rashes or ecchymosis        Meds:   Meds:    bisacodyl  10 mg Rectal Daily    pantoprazole  40 mg Intravenous BID    And    sodium chloride (PF)  10 mL Intravenous BID    lipase-protease-amylase  36,000 Units Oral TID WC    piperacillin-tazobactam  3,375 mg Intravenous Q8H    And    sodium chloride   Intravenous Q8H    lactobacillus  1 capsule Oral Daily    enoxaparin  40 mg Subcutaneous Daily    insulin lispro  0-18 Units Subcutaneous Q4H    lisinopril  20 mg Oral BID    metoprolol tartrate  25 mg Oral BID    acetaminophen  650 mg Oral Q6H  fluconazole  400 mg Intravenous Q24H    lidocaine PF  5 mL Intradermal Once    sodium chloride flush  5-40 mL Intravenous 2 times per day    heparin flush  3 mL Intravenous 2 times per day      Infusions:    lactated ringers 50 mL/hr at 06/27/21 0548    dextrose      sodium chloride      sodium chloride      sodium chloride       PRN Meds: glucose, 15 g, PRN  dextrose, 12.5 g, PRN  glucagon (rDNA), 1 mg, PRN  dextrose, 100 mL/hr, PRN  sodium chloride, , PRN  sodium chloride, , PRN  oxyCODONE, 5 mg, Q4H PRN   Or  oxyCODONE, 10 mg, Q4H PRN  labetalol, 20 mg, Q4H PRN  ondansetron, 4 mg, Q6H PRN  sodium chloride flush, 5-40 mL, PRN  sodium chloride, 25 mL, PRN  heparin flush, 3 mL, PRN        Data/Labs:     Recent Labs     06/25/21  0529 06/26/21  0600 06/27/21  0600   WBC 11.5 9.2 8.5   HGB 8.8* 8.9* 8.8*   HCT 25.4* 26.0* 26.3*    251 268      Recent Labs     06/25/21  0810 06/26/21  0600 06/27/21  0600    137 135   K 4.2 4.1 4.2    106 104   CO2 20* 24 20*   PHOS 3.2 3.0 3.6   BUN 37* 41* 37*   CREATININE 1.7* 1.8* 2.0*     No results for input(s): AST, ALT, ALB, BILIDIR, BILITOT, ALKPHOS in the last 72 hours. No results for input(s): INR in the last 72 hours. No results for input(s): CKTOTAL, CKMB, CKMBINDEX, TROPONINT in the last 72 hours. I/O last 3 completed shifts: In: 1987.3 [P.O.:600; I.V.:1187.3; IV Piggyback:200]  Out: 2025 [Urine:2025]    Intake/Output Summary (Last 24 hours) at 6/27/2021 1030  Last data filed at 6/27/2021 1012  Gross per 24 hour   Intake 1987.3 ml   Output 1950 ml   Net 37.3 ml        Assessment/Plan:   1.   Acute on chronic pancreatitis with pseudocyst-general surgery and oncology is following since patient has a history of pancreatic cancer, pain is controlled with medication, Creon when eating  06/23/2021- patient just returned from endoscopy by robotic s/p cyst gastrostomy, MATEO and pancreatic necrosectomy  06/25/2021-patient has had his NG removed; he is tolerating popsicles and other items we will continue to monitor  2. Hypertension-lisinopril, metoprolol, patient is n.p.o. at this time may have to add an IV as needed antihypertensive to control his blood pressure we will continue to monitor  3. Non-insulin-dependent diabetes mellitus-May need a sliding scale, monitor blood glucose, diabetic diet if indicated  4. Coronary artery disease-amiodarone, will hold at this time since patient is n.p.o. after procedure we will continue  5. Chronic kidney disease-patient's creatinine is 2.0 we will continue to monitor and avoid nephrotoxic agents      DVT Prophylaxis: scd  Diet: Adult Oral Nutrition Supplement; Diabetic Oral Supplement  ADULT DIET;  Regular; Low Fat/Low Chol/High Fiber/DARIA  Code Status: Prior    Dispo: when stable     Electronically signed by Sridhar Phillips MD on 6/27/2021 at 10:30 AM  [address] [place] [address]

## 2021-06-27 NOTE — PROGRESS NOTES
[address] [place] [address] [facility]  Progress Note      C/C : Intra abdomen abscess   Denies fever and chills  Mild abdomen pain   Afebrile       Review of systems:  As stated above in the chief complaint, otherwise negative. Medications:  Scheduled Meds:   bisacodyl  10 mg Rectal Daily    pantoprazole  40 mg Intravenous BID    And    sodium chloride (PF)  10 mL Intravenous BID    lipase-protease-amylase  36,000 Units Oral TID WC    piperacillin-tazobactam  3,375 mg Intravenous Q8H    And    sodium chloride   Intravenous Q8H    lactobacillus  1 capsule Oral Daily    enoxaparin  40 mg Subcutaneous Daily    insulin lispro  0-18 Units Subcutaneous Q4H    lisinopril  20 mg Oral BID    metoprolol tartrate  25 mg Oral BID    acetaminophen  650 mg Oral Q6H    fluconazole  400 mg Intravenous Q24H    lidocaine PF  5 mL Intradermal Once    sodium chloride flush  5-40 mL Intravenous 2 times per day    heparin flush  3 mL Intravenous 2 times per day     Continuous Infusions:   lactated ringers 50 mL/hr at 21 0548    dextrose      sodium chloride      sodium chloride      sodium chloride       PRN Meds:glucose, dextrose, glucagon (rDNA), dextrose, sodium chloride, sodium chloride, oxyCODONE **OR** oxyCODONE, labetalol, ondansetron, sodium chloride flush, sodium chloride, heparin flush    OBJECTIVE:  BP (!) 148/97   Pulse 93   Temp 97.5 °F (36.4 °C) (Temporal)   Resp 18   Ht 6' 2\" (1.88 m)   Wt 167 lb (75.8 kg)   SpO2 98%   BMI 21.44 kg/m²   Temp  Av.3 °F (36.8 °C)  Min: 97.5 °F (36.4 °C)  Max: 99.7 °F (37.6 °C)  Constitutional: awake,and alert, no distress   Skin: Warm and dry. No rashes were noted. HEENT: Round and reactive pupils. Moist mucous membranes. No ulcerations or thrush. Neck: Supple to movements. Chest: Clear bilaterally   Cardiovascular: S1 and S2 are rhythmic and regular. No murmurs appreciated. Abdomen: Positive bowel sounds to auscultation. tender to palpation. No masses felt. No hepatosplenomegaly. Laparoscopy sites noted,   Genitourinary: Male  Extremities: No clubbing, no cyanosis, no edema.   Lines: right picc 6/22/21       Laboratory and Tests Review:  Lab Results   Component Value Date    WBC 8.5 06/27/2021    WBC 9.2 06/26/2021    WBC 11.5 06/25/2021    HGB 8.8 (L) 06/27/2021    HCT 26.3 (L) 06/27/2021    MCV 79.7 (L) 06/27/2021     06/27/2021     Lab Results   Component Value Date    NEUTROABS 6.89 06/27/2021    NEUTROABS 7.36 (H) 06/26/2021    NEUTROABS 10.81 (H) 06/25/2021     No results found for: CRP  Lab Results   Component Value Date    ALT 7 06/23/2021    AST 8 06/23/2021    ALKPHOS 129 06/23/2021    BILITOT 0.5 06/23/2021     Lab Results   Component Value Date     06/27/2021    K 4.2 06/27/2021    K 4.3 06/17/2021     06/27/2021    CO2 20 06/27/2021    BUN 37 06/27/2021    CREATININE 2.0 06/27/2021    CREATININE 1.8 06/26/2021    CREATININE 1.7 06/25/2021    GFRAA 40 06/27/2021    LABGLOM 40 06/27/2021    GLUCOSE 122 06/27/2021    PROT 6.4 06/23/2021    LABALBU 2.9 06/23/2021    CALCIUM 9.0 06/27/2021    BILITOT 0.5 06/23/2021    ALKPHOS 129 06/23/2021    AST 8 06/23/2021    ALT 7 06/23/2021     Lab Results   Component Value Date    CRP 7.9 (H) 01/12/2021    CRP 5.1 (H) 01/01/2021    CRP 5.5 (H) 12/31/2020     Lab Results   Component Value Date    SEDRATE 55 (H) 01/01/2021    SEDRATE 55 (H) 12/31/2020    SEDRATE 65 (H) 12/30/2020     Radiology:  Reviewed    Microbiology:   Lab Results   Component Value Date    BC 5 Days no growth 01/13/2021    BC 5 Days no growth 12/22/2020    ORG Anaerobic gram positive cocci 06/23/2021    ORG Klebsiella pneumoniae ssp pneumoniae 06/23/2021    ORG Enterococcus faecalis 06/23/2021    ORG Jane albicans 06/23/2021       No results found for: WNDABS  No results found for: RESPSMEAR      Component Value Date/Time    FUNGSM No Fungal elements seen 06/23/2021 0913     No results found for:  CULTRESP  No results found for: CXCATHTIP  No results found for: BFCS  Culture Surgical   Date Value Ref Range Status   06/23/2021 Heavy growth  Final   06/23/2021 Moderate growth  Final   06/23/2021 Moderate growth  Final     Urine Culture, Routine   Date Value Ref Range Status   02/28/2021 <10,000 CFU/mL  Gram positive organism    Final   01/09/2021 >100,000 CFU/ml  Final   12/23/2020 Growth not present  Final     MRSA Culture Only   Date Value Ref Range Status   12/23/2020 Methicillin resistant Staph aureus not isolated  Final     6/23 surgical cx yeast, klebsiella, enterococcus species    ASSESSMENT:  · Pancreatic necrosis, pseudocyst with abscess  · S/p Laparoscopic robotic pancreatic necrosectomy, cyst gastrostomy, adhesion lysis 6/23/21    PLAN:  · Zosyn 3.375 grams IV q 8 hrs  plus Diflucan 400 mg IV q 24 hrs at least 3 weeks   · Monitor labs  · ID follow up in 7-10 days     Ailyn Ambriz MD  1:02 PM  6/27/2021

## 2021-06-27 NOTE — Clinical Note
6/27/2021 - received call from unit taht pt was going to be discharged home today with [facility] and IV antibiotics.  Message sent to Ilana from [facility] - the IV antibiotics have not been set up yet

## 2021-06-27 NOTE — PROGRESS NOTES
Dr. Jones Late notified via perfect serve of patient unable to discharge today due to [address].

## 2021-06-27 NOTE — PROGRESS NOTES
[facility] SURGERY  DAILY PROGRESS NOTE    Date:2021       DDNA:[id number]  Patient Name:Andre Hansen     YOB: 1950     Age:71 y.o. Chief Complaint:  Chief Complaint   Patient presents with    Chest Pain     left side, radiating into LUQ/LLQ. states abd pain has been ongoing since 6/15 but chest pain started around 1600 yesterday. denies n/v.  denies sob. denies diaphoresis. recent dx of afib        Subjective:  Pt states he is doing okay. States he is tolerating a diet and having BMs. Wants to go home. Objective:  BP (!) 203/129   Pulse 95   Temp 99.7 °F (37.6 °C) (Temporal)   Resp 20   Ht 6' 2\" (1.88 m)   Wt 167 lb (75.8 kg)   SpO2 96%   BMI 21.44 kg/m²   Temp (24hrs), Av.1 °F (36.7 °C), Min:97.3 °F (36.3 °C), Max:99.7 °F (37.6 °C)      I/O (24Hr):  I/O last 3 completed shifts: In: 1414.1 [P.O.:420; I.V.:794.1; IV Piggyback:200]  Out: 1350 [Urine:1350]     GENERAL:  No acute distress. Alert and interactive. LUNGS:  No cough. Nonlabored breathing on room air. CARDIOVASC: RR and hypertensive  ABDOMEN:  Soft, non-distended, non-tender. No guarding / rigidity / rebound. EXTREMITIES:  No edema, no deformities.     Assessment:  66 y.o. male with recurrent pancreatitis in the setting of uncontrolled DM II, pancreatic necrosis with abscess and pseudocyst with prior endoscopic CG and ex lap, now status post robotic pancreatic necrosectomy with CG & MATEO on     Plan:  - Pain control PRN- tylenol, oxy  - Encouraged SMI  - Monitor postop anemia  - PPI BID  - Continue diet with creon, glucerna  - Stop TPN   - Added dulcolax suppository  - Currently on meropenem and Diflucan, SCx GNR, appreciate ID assistance  - Monitor UOP/BUN/Cr  - Ambulate  - Okay to DC home     Electronically signed by Samantha Oliver MD on 2021 at 6:39 AM

## 2021-06-27 NOTE — DISCHARGE SUMMARY
Discharge Summary    Patient ID   Days Cynthia Arboleda   1950  [id number]    Primary Care:   Kylie De La Vega DO    Admit date: 6/22/2021   Discharge date: 6/27/2021    Medical Record number: [id number]   Admitting Physician: Tashi Teague DO   Discharge Physician: Maria Teresa Kendrick MD    Consultants: ID, hematology/oncology and general surgery    Procedures: Recurrent pancreatitis w/ necrosis and abscess formation s/p endoscopic gastrotomy, exploratory lap w/ lysis of adhesions. Discharge Diagnoses:      Patient Active Problem List   Diagnosis Code    Hyperlipidemia E78.5    Prostate cancer ([facility] [address] C61    Hypertension I10    Diabetes [facility] E11.9    Shoulder pain, bilateral M25.511, M25.512    Abdominal pain, right lower quadrant R10.31    DKA, type 1, not at goal [facility]) E10.10    Acute metabolic encephalopathy V05.53    Acute pancreatitis K85.90    Acute renal failure (ARF) ([facility]) N17.9    High anion gap metabolic acidosis V88.3    Complicated UTI (urinary tract infection) N39.0    Acute cystitis with hematuria N30.01    Severe anemia D64.9    Moderate malnutrition (HCC) E44.0    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  Acute on chronic pancreatitis with pseudocyst-general surgery and oncology is following since patient has a history of pancreatic cancer, pain is controlled with medication, Creon when eating  06/23/2021- patient just returned from endoscopy by robotic s/p cyst gastrostomy, MATEO and pancreatic necrosectomy  06/25/2021-patient has had his NG removed; he is tolerating popsicles and other items we will continue to monitor  2. Hypertension-lisinopril, metoprolol, patient is n.p.o. at this time may have to add an IV as needed antihypertensive to control his blood pressure we will continue to monitor  3. Non-insulin-dependent diabetes mellitus-May need a sliding scale, monitor blood glucose, diabetic diet if indicated  4. Coronary artery disease-amiodarone, will hold at this time since patient is n.p.o. after procedure we will continue  5. Chronic kidney disease-patient's creatinine is 2.0 we will continue to monitor and avoid nephrotoxic agents    Patient improved with surgical and medical management and is stable per consultants and primary to be discharged to home with homecare for continued IV atbx x 3 weeks       Physical Exam:   BP (!) 158/95   Pulse 86   Temp 98.6 °F (37 °C) (Temporal)   Resp 20   Ht 6' 2\" (1.88 m)   Wt 167 lb (75.8 kg)   SpO2 98%   BMI 21.44 kg/m²   Neck: no JVD  Lungs: equal BS, clear   CV: RRR, normal S1S2, no significant murmur  Abdomen: soft, nontender, normally active BS, no masses or tenderness  Extremities: no edema or cords  Neurologic: alert, oriented, no focal CN or motor deficit        Medications: see computerized discharge medication list     Medication List      START taking these medications    fluconazole 400MG/200ML in 0.9 % sodium chloride IVPB  Commonly known as: DIFLUCAN  Infuse 200 mLs intravenously every 24 hours for 21 days     lactobacillus acidophilus  Take 2 tablets by mouth 3 times daily for 21 days     oxyCODONE 5 MG immediate release tablet  Commonly known as: ROXICODONE  Take 1 tablet by mouth every 6 hours as needed for Pain for up to 7 days. piperacillin-tazobactam  infusion  Commonly known as: ZOSYN  Infuse 3.375 g intravenously every 8 hours for 21 days Compound per protocol.      polyethylene glycol 17 GM/SCOOP powder  Commonly known as: GLYCOLAX  Take 17 g by mouth daily     sennosides-docusate sodium 8.6-50 MG tablet  Commonly known as: SENOKOT-S  Take 2 tablets by mouth daily        CONTINUE taking these medications    Aloe [place] Protective Oint ointment     apixaban 5 MG Tabs tablet  Commonly known as: ELIQUIS  Take 1 tablet by mouth 2 times daily     Creon 11953-419293 units Cpep delayed release capsule  Generic drug: lipase-protease-amylase     folic acid 1 MG tablet  Commonly known as: FOLVITE  Take 1 tablet by mouth daily     HYDROPHILIC EX     lisinopril 20 MG tablet  Commonly known as: PRINIVIL;ZESTRIL     metoprolol tartrate 25 MG tablet  Commonly known as: LOPRESSOR  Take 1 tablet by mouth 2 times daily     NovoLOG FlexPen 100 UNIT/ML injection pen  Generic drug: insulin aspart     pantoprazole 40 MG tablet  Commonly known as: PROTONIX  Take 1 tablet by mouth daily     tadalafil 20 MG tablet  Commonly known as: CIALIS           Where to Get Your Medications      [address]  [place] [phone] - F [phone]  [facility] [address]    Phone: [phone]   · oxyCODONE 5 MG immediate release tablet  · pantoprazole 40 MG tablet  · polyethylene glycol 17 GM/SCOOP powder  · sennosides-docusate sodium 8.6-50 MG tablet     You can get these medications from any pharmacy    Bring a paper prescription for each of these medications  · fluconazole 400MG/200ML in 0.9 % sodium chloride IVPB  · lactobacillus acidophilus  · piperacillin-tazobactam  infusion       Patient Instructions: resume home medications and any changes while in the hospital.      Discharged Condition: good  Disposition: home  Activity: activity as tolerated  Diet: diabetic diet  Wound Care: keep wound clean and dry    Follow-up: Dr. Nicol Cuenca in 1-2 weeks         Electronically signed by Kusum Stein MD on 6/27/2021 at 10:30 AM  [facility]   Time spent on discharge 45 minutes

## 2021-06-28 VITALS
WEIGHT: 167 LBS | BODY MASS INDEX: 21.43 KG/M2 | HEIGHT: 74 IN | SYSTOLIC BLOOD PRESSURE: 178 MMHG | HEART RATE: 84 BPM | TEMPERATURE: 97.5 F | DIASTOLIC BLOOD PRESSURE: 92 MMHG | RESPIRATION RATE: 17 BRPM | OXYGEN SATURATION: 99 %

## 2021-06-28 LAB
ANION GAP SERPL CALCULATED.3IONS-SCNC: 12 MMOL/L (ref 7–16)
BASOPHILS ABSOLUTE: 0.01 E9/L (ref 0–0.2)
BASOPHILS RELATIVE PERCENT: 0.1 % (ref 0–2)
BUN BLDV-MCNC: 34 MG/DL (ref 6–23)
CALCIUM SERPL-MCNC: 8.8 MG/DL (ref 8.6–10.2)
CHLORIDE BLD-SCNC: 103 MMOL/L (ref 98–107)
CO2: 21 MMOL/L (ref 22–29)
CREAT SERPL-MCNC: 2 MG/DL (ref 0.7–1.2)
EOSINOPHILS ABSOLUTE: 0.19 E9/L (ref 0.05–0.5)
EOSINOPHILS RELATIVE PERCENT: 2.3 % (ref 0–6)
GFR AFRICAN AMERICAN: 40
GFR NON-AFRICAN AMERICAN: 40 ML/MIN/1.73
GLUCOSE BLD-MCNC: 178 MG/DL (ref 74–99)
HCT VFR BLD CALC: 26 % (ref 37–54)
HEMOGLOBIN: 8.8 G/DL (ref 12.5–16.5)
IMMATURE GRANULOCYTES #: 0.11 E9/L
IMMATURE GRANULOCYTES %: 1.3 % (ref 0–5)
LYMPHOCYTES ABSOLUTE: 1.35 E9/L (ref 1.5–4)
LYMPHOCYTES RELATIVE PERCENT: 16.5 % (ref 20–42)
MAGNESIUM: 1.7 MG/DL (ref 1.6–2.6)
MCH RBC QN AUTO: 26.7 PG (ref 26–35)
MCHC RBC AUTO-ENTMCNC: 33.8 % (ref 32–34.5)
MCV RBC AUTO: 79 FL (ref 80–99.9)
METER GLUCOSE: 190 MG/DL (ref 74–99)
METER GLUCOSE: 69 MG/DL (ref 74–99)
MONOCYTES ABSOLUTE: 0.65 E9/L (ref 0.1–0.95)
MONOCYTES RELATIVE PERCENT: 8 % (ref 2–12)
NEUTROPHILS ABSOLUTE: 5.86 E9/L (ref 1.8–7.3)
NEUTROPHILS RELATIVE PERCENT: 71.8 % (ref 43–80)
PDW BLD-RTO: 18.4 FL (ref 11.5–15)
PHOSPHORUS: 3.4 MG/DL (ref 2.5–4.5)
PLATELET # BLD: 272 E9/L (ref 130–450)
PMV BLD AUTO: 10.7 FL (ref 7–12)
POTASSIUM SERPL-SCNC: 4.2 MMOL/L (ref 3.5–5)
RBC # BLD: 3.29 E12/L (ref 3.8–5.8)
SODIUM BLD-SCNC: 136 MMOL/L (ref 132–146)
WBC # BLD: 8.2 E9/L (ref 4.5–11.5)

## 2021-06-28 PROCEDURE — 82962 GLUCOSE BLOOD TEST: CPT

## 2021-06-28 PROCEDURE — 6370000000 HC RX 637 (ALT 250 FOR IP): Performed by: SURGERY

## 2021-06-28 PROCEDURE — 6370000000 HC RX 637 (ALT 250 FOR IP): Performed by: STUDENT IN AN ORGANIZED HEALTH CARE EDUCATION/TRAINING PROGRAM

## 2021-06-28 PROCEDURE — 6360000002 HC RX W HCPCS: Performed by: SPECIALIST

## 2021-06-28 PROCEDURE — 85025 COMPLETE CBC W/AUTO DIFF WBC: CPT

## 2021-06-28 PROCEDURE — 80048 BASIC METABOLIC PNL TOTAL CA: CPT

## 2021-06-28 PROCEDURE — C9113 INJ PANTOPRAZOLE SODIUM, VIA: HCPCS | Performed by: SURGERY

## 2021-06-28 PROCEDURE — 6360000002 HC RX W HCPCS: Performed by: NURSE PRACTITIONER

## 2021-06-28 PROCEDURE — 83735 ASSAY OF MAGNESIUM: CPT

## 2021-06-28 PROCEDURE — 2500000003 HC RX 250 WO HCPCS: Performed by: HOSPITALIST

## 2021-06-28 PROCEDURE — 6370000000 HC RX 637 (ALT 250 FOR IP): Performed by: NURSE PRACTITIONER

## 2021-06-28 PROCEDURE — 2580000003 HC RX 258: Performed by: STUDENT IN AN ORGANIZED HEALTH CARE EDUCATION/TRAINING PROGRAM

## 2021-06-28 PROCEDURE — 36415 COLL VENOUS BLD VENIPUNCTURE: CPT

## 2021-06-28 PROCEDURE — 6370000000 HC RX 637 (ALT 250 FOR IP): Performed by: TRANSPLANT SURGERY

## 2021-06-28 PROCEDURE — 2580000003 HC RX 258: Performed by: SURGERY

## 2021-06-28 PROCEDURE — 6360000002 HC RX W HCPCS: Performed by: SURGERY

## 2021-06-28 PROCEDURE — 36592 COLLECT BLOOD FROM PICC: CPT

## 2021-06-28 PROCEDURE — 84100 ASSAY OF PHOSPHORUS: CPT

## 2021-06-28 PROCEDURE — 2580000003 HC RX 258: Performed by: NURSE PRACTITIONER

## 2021-06-28 PROCEDURE — 6360000002 HC RX W HCPCS: Performed by: STUDENT IN AN ORGANIZED HEALTH CARE EDUCATION/TRAINING PROGRAM

## 2021-06-28 RX ADMIN — LABETALOL HYDROCHLORIDE 20 MG: 5 INJECTION INTRAVENOUS at 10:42

## 2021-06-28 RX ADMIN — ENOXAPARIN SODIUM 40 MG: 40 INJECTION SUBCUTANEOUS at 08:24

## 2021-06-28 RX ADMIN — SODIUM CHLORIDE: 9 INJECTION, SOLUTION INTRAVENOUS at 01:19

## 2021-06-28 RX ADMIN — SODIUM CHLORIDE, PRESERVATIVE FREE 10 ML: 5 INJECTION INTRAVENOUS at 08:35

## 2021-06-28 RX ADMIN — HEPARIN 300 UNITS: 100 SYRINGE at 01:19

## 2021-06-28 RX ADMIN — LISINOPRIL 20 MG: 20 TABLET ORAL at 08:24

## 2021-06-28 RX ADMIN — OXYCODONE HYDROCHLORIDE 10 MG: 10 TABLET ORAL at 03:01

## 2021-06-28 RX ADMIN — METOPROLOL TARTRATE 25 MG: 25 TABLET, FILM COATED ORAL at 08:25

## 2021-06-28 RX ADMIN — Medication 1 CAPSULE: at 08:25

## 2021-06-28 RX ADMIN — INSULIN LISPRO 3 UNITS: 100 INJECTION, SOLUTION INTRAVENOUS; SUBCUTANEOUS at 08:26

## 2021-06-28 RX ADMIN — Medication 10 ML: at 08:21

## 2021-06-28 RX ADMIN — PIPERACILLIN AND TAZOBACTAM 3375 MG: 3; .375 INJECTION, POWDER, LYOPHILIZED, FOR SOLUTION INTRAVENOUS at 05:58

## 2021-06-28 RX ADMIN — SODIUM CHLORIDE, PRESERVATIVE FREE 10 ML: 5 INJECTION INTRAVENOUS at 05:58

## 2021-06-28 RX ADMIN — ACETAMINOPHEN 650 MG: 325 TABLET ORAL at 08:25

## 2021-06-28 RX ADMIN — SODIUM CHLORIDE: 9 INJECTION, SOLUTION INTRAVENOUS at 09:59

## 2021-06-28 RX ADMIN — PANCRELIPASE 36000 UNITS: 60000; 12000; 38000 CAPSULE, DELAYED RELEASE PELLETS ORAL at 08:24

## 2021-06-28 RX ADMIN — FLUCONAZOLE IN SODIUM CHLORIDE 400 MG: 2 INJECTION, SOLUTION INTRAVENOUS at 08:52

## 2021-06-28 RX ADMIN — SODIUM CHLORIDE, PRESERVATIVE FREE 10 ML: 5 INJECTION INTRAVENOUS at 05:56

## 2021-06-28 RX ADMIN — SODIUM CHLORIDE, POTASSIUM CHLORIDE, SODIUM LACTATE AND CALCIUM CHLORIDE: 600; 310; 30; 20 INJECTION, SOLUTION INTRAVENOUS at 01:18

## 2021-06-28 RX ADMIN — PANTOPRAZOLE SODIUM 40 MG: 40 INJECTION, POWDER, LYOPHILIZED, FOR SOLUTION INTRAVENOUS at 08:22

## 2021-06-28 RX ADMIN — HEPARIN 300 UNITS: 100 SYRINGE at 08:21

## 2021-06-28 ASSESSMENT — PAIN SCALES - GENERAL
PAINLEVEL_OUTOF10: 0
PAINLEVEL_OUTOF10: 8
PAINLEVEL_OUTOF10: 0

## 2021-06-28 NOTE — PROGRESS NOTES
[facility]  DAILY PROGRESS NOTE    Date:2021       ODDT:[id number]  Patient Name:Andre Gaines     YOB: 1950     Age:71 y.o. Chief Complaint:  Chief Complaint   Patient presents with    Chest Pain     left side, radiating into LUQ/LLQ. states abd pain has been ongoing since 6/15 but chest pain started around 1600 yesterday. denies n/v.  denies sob. denies diaphoresis. recent dx of afib        Subjective:  Pt doing well this morning. Tolerating diet. Feeling much better than on admission. Objective:  BP (!) 148/97   Pulse 81   Temp 97.3 °F (36.3 °C) (Temporal)   Resp 16   Ht 6' 2\" (1.88 m)   Wt 167 lb (75.8 kg)   SpO2 95%   BMI 21.44 kg/m²   Temp (24hrs), Av.6 °F (36.4 °C), Min:97 °F (36.1 °C), Max:98.6 °F (37 °C)      I/O (24Hr):  I/O last 3 completed shifts: In: 2149 [P.O.:540; I.V.:1409; IV Piggyback:200]  Out: 1311 [Urine:1775]     GENERAL:  No acute distress. Alert and interactive. LUNGS:  No cough. Nonlabored breathing on room air. CARDIOVASC: RR and hypertensive  ABDOMEN:  Soft, non-distended, non-tender. No guarding / rigidity / rebound. EXTREMITIES:  No edema, no deformities.     Assessment:  66 y.o. male with recurrent pancreatitis in the setting of uncontrolled DM II, pancreatic necrosis with abscess and pseudocyst with prior endoscopic CG and ex lap, now status post robotic pancreatic necrosectomy with CG & MATEO on     Plan:  - Pain control PRN- tylenol, oxy  - Encouraged SMI  - PPI BID  - Continue diet with creon, glucerna  - Monitor UOP/BUN/Cr  - Ambulate  -Cruciate infectious disease management of antibiotics, planning for Zosyn and Diflucan for at least 3 weeks  -Okay for discharge once home health care IV antibiotics set up    Electronically signed by John Andersen DO on 2021 at 6:05 AM

## 2021-06-28 NOTE — PROGRESS NOTES
Discharge instructions and medications reviewed with patient and his significant other and all questions answered. Patient is to be discharged home with PICC line and home health care is to see him this afternoon.

## 2021-06-28 NOTE — DISCHARGE SUMMARY
Discharge Summary    Patient ID   Days Kenan Jurado   1950  [id number]    Primary Care:   Olivia Bo DO    Admit date: 6/22/2021   Discharge date: 6/28/2021    Medical Record number: [id number]   Admitting Physician: Kimberly Morris DO   Discharge Physician: Arnold Kimball MD    Consultants: ID, hematology/oncology and general surgery    Procedures: Recurrent pancreatitis w/ necrosis and abscess formation s/p endoscopic gastrotomy, exploratory lap w/ lysis of adhesions. Discharge Diagnoses:      Patient Active Problem List   Diagnosis Code    Hyperlipidemia E78.5    Prostate cancer ([facility] [address] C61    Hypertension I10    [facility] E11.9    Shoulder pain, bilateral M25.511, M25.512    Abdominal pain, right lower quadrant R10.31    DKA, type 1, not at goal [facility]) E10.10    Acute metabolic encephalopathy I22.49    Acute pancreatitis K85.90    Acute renal failure (ARF) ([facility]) N17.9    High anion gap metabolic acidosis P46.5    Complicated UTI (urinary tract infection) N39.0    Acute cystitis with hematuria N30.01    Severe anemia D64.9    Moderate malnutrition (HCC) E44.0    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  Acute on chronic pancreatitis with pseudocyst-general surgery and oncology is following since patient has a history of pancreatic cancer, pain is controlled with medication, Creon when eating  06/23/2021- patient just returned from endoscopy by robotic s/p cyst gastrostomy, MATEO and pancreatic necrosectomy  06/25/2021-patient has had his NG removed; he is tolerating popsicles and other items we will continue to monitor  2. Hypertension-lisinopril, metoprolol, patient is n.p.o. at this time may have to add an IV as needed antihypertensive to control his blood pressure we will continue to monitor  3. Non-insulin-dependent diabetes mellitus-May need a sliding scale, monitor blood glucose, diabetic diet if indicated  4. Coronary artery disease-amiodarone, will hold at this time since patient is n.p.o. after procedure we will continue  5. Chronic kidney disease-patient's creatinine is 2.0 we will continue to monitor and avoid nephrotoxic agents    Patient improved with surgical and medical management and is stable per consultants and primary to be discharged to home with homecare for continued IV atbx x 3 weeks       Physical Exam:   BP (!) 168/96 Comment: manual  Pulse 101   Temp 97.2 °F (36.2 °C) (Temporal)   Resp 18   Ht 6' 2\" (1.88 m)   Wt 167 lb (75.8 kg)   SpO2 99%   BMI 21.44 kg/m²   Neck: no JVD  Lungs: equal BS, clear   CV: RRR, normal S1S2, no significant murmur  Abdomen: soft, nontender, normally active BS, no masses or tenderness  Extremities: no edema or cords  Neurologic: alert, oriented, no focal CN or motor deficit        Medications: see computerized discharge medication list     Medication List      START taking these medications    fluconazole 400MG/200ML in 0.9 % sodium chloride IVPB  Commonly known as: DIFLUCAN  Infuse 200 mLs intravenously every 24 hours for 21 days     lactobacillus acidophilus  Take 2 tablets by mouth 3 times daily for 21 days     oxyCODONE 5 MG immediate release tablet  Commonly known as: ROXICODONE  Take 1 tablet by mouth every 6 hours as needed for Pain for up to 7 days. piperacillin-tazobactam  infusion  Commonly known as: ZOSYN  Infuse 3.375 g intravenously every 8 hours for 21 days Compound per protocol.      polyethylene glycol 17 GM/SCOOP powder  Commonly known as: GLYCOLAX  Take 17 g by mouth daily     sennosides-docusate sodium 8.6-50 MG tablet  Commonly known as: SENOKOT-S  Take 2 tablets by mouth daily        CONTINUE taking these medications    Aloe [place] Protective Oint ointment     apixaban 5 MG Tabs tablet  Commonly known as: ELIQUIS  Take 1 tablet by mouth 2 times daily     Creon [id number] units Cpep delayed release capsule  Generic drug: lipase-protease-amylase     folic acid 1 MG tablet  Commonly known as: FOLVITE  Take 1 tablet by mouth daily     HYDROPHILIC EX     lisinopril 20 MG tablet  Commonly known as: PRINIVIL;ZESTRIL     metoprolol tartrate 25 MG tablet  Commonly known as: LOPRESSOR  Take 1 tablet by mouth 2 times daily     NovoLOG FlexPen 100 UNIT/ML injection pen  Generic drug: insulin aspart     pantoprazole 40 MG tablet  Commonly known as: PROTONIX  Take 1 tablet by mouth daily     tadalafil 20 MG tablet  Commonly known as: CIALIS           Where to Get Your Medications      [place] [address] [address]  [place] [phone] - F [phone]  [facility], [address]    Phone: [phone]   · oxyCODONE 5 MG immediate release tablet  · pantoprazole 40 MG tablet  · polyethylene glycol 17 GM/SCOOP powder  · sennosides-docusate sodium 8.6-50 MG tablet     You can get these medications from any pharmacy    Bring a paper prescription for each of these medications  · fluconazole 400MG/200ML in 0.9 % sodium chloride IVPB  · lactobacillus acidophilus  · piperacillin-tazobactam  infusion       Patient Instructions: resume home medications and any changes while in the hospital.      Discharged Condition: good  Disposition: home  Activity: activity as tolerated  Diet: diabetic diet  Wound Care: keep wound clean and dry    Follow-up: Dr. María Ramirez in 1-2 weeks         Electronically signed by Fuad Armenta MD on 6/28/2021 at 10:14 AM  [facility]   Time spent on discharge 45 minutes

## 2021-06-28 NOTE — CARE COORDINATION
Scripts for iv zosyn and iv diflucan was faxed to Thomas Isabel at [facility]. Cost of meds will be $106.60 per week until he meets $800 out of pocket. He has met $541.22 so far. Pt agreeable with cost. Per Ilana with [facility], they will be out to give 2pm dose iv zosyn. Pt and pt's nurse aware.

## 2021-07-02 ENCOUNTER — HOSPITAL ENCOUNTER (EMERGENCY)
Age: 71
Discharge: HOME | End: 2021-07-02
Attending: EMERGENCY MEDICINE
Payer: MEDICARE

## 2021-07-02 VITALS
TEMPERATURE: 98.2 F | HEART RATE: 91 BPM | DIASTOLIC BLOOD PRESSURE: 130 MMHG | OXYGEN SATURATION: 95 % | RESPIRATION RATE: 18 BRPM | SYSTOLIC BLOOD PRESSURE: 172 MMHG

## 2021-07-02 DIAGNOSIS — D64.9 ANEMIA, UNSPECIFIED TYPE: Primary | ICD-10-CM

## 2021-07-02 LAB
ALBUMIN SERPL-MCNC: 3 G/DL (ref 3.5–5.2)
ALP BLD-CCNC: 139 U/L (ref 40–129)
ALT SERPL-CCNC: 5 U/L (ref 0–40)
ANION GAP SERPL CALCULATED.3IONS-SCNC: 9 MMOL/L (ref 7–16)
AST SERPL-CCNC: 11 U/L (ref 0–39)
BASOPHILS ABSOLUTE: 0.02 E9/L (ref 0–0.2)
BASOPHILS RELATIVE PERCENT: 0.2 % (ref 0–2)
BILIRUB SERPL-MCNC: 0.5 MG/DL (ref 0–1.2)
BUN BLDV-MCNC: 11 MG/DL (ref 6–23)
CALCIUM SERPL-MCNC: 8.5 MG/DL (ref 8.6–10.2)
CHLORIDE BLD-SCNC: 102 MMOL/L (ref 98–107)
CO2: 25 MMOL/L (ref 22–29)
CREAT SERPL-MCNC: 1.7 MG/DL (ref 0.7–1.2)
EOSINOPHILS ABSOLUTE: 0.24 E9/L (ref 0.05–0.5)
EOSINOPHILS RELATIVE PERCENT: 2.4 % (ref 0–6)
GFR AFRICAN AMERICAN: 48
GFR NON-AFRICAN AMERICAN: 48 ML/MIN/1.73
GLUCOSE BLD-MCNC: 231 MG/DL (ref 74–99)
HCT VFR BLD CALC: 24.8 % (ref 37–54)
HEMOGLOBIN: 8.4 G/DL (ref 12.5–16.5)
IMMATURE GRANULOCYTES #: 0.09 E9/L
IMMATURE GRANULOCYTES %: 0.9 % (ref 0–5)
LYMPHOCYTES ABSOLUTE: 1.6 E9/L (ref 1.5–4)
LYMPHOCYTES RELATIVE PERCENT: 15.7 % (ref 20–42)
MCH RBC QN AUTO: 27.1 PG (ref 26–35)
MCHC RBC AUTO-ENTMCNC: 33.9 % (ref 32–34.5)
MCV RBC AUTO: 80 FL (ref 80–99.9)
MONOCYTES ABSOLUTE: 0.54 E9/L (ref 0.1–0.95)
MONOCYTES RELATIVE PERCENT: 5.3 % (ref 2–12)
NEUTROPHILS ABSOLUTE: 7.68 E9/L (ref 1.8–7.3)
NEUTROPHILS RELATIVE PERCENT: 75.5 % (ref 43–80)
PDW BLD-RTO: 18.4 FL (ref 11.5–15)
PLATELET # BLD: 281 E9/L (ref 130–450)
PMV BLD AUTO: 10.5 FL (ref 7–12)
POTASSIUM SERPL-SCNC: 4.1 MMOL/L (ref 3.5–5)
RBC # BLD: 3.1 E12/L (ref 3.8–5.8)
SODIUM BLD-SCNC: 136 MMOL/L (ref 132–146)
TOTAL PROTEIN: 6.4 G/DL (ref 6.4–8.3)
WBC # BLD: 10.2 E9/L (ref 4.5–11.5)

## 2021-07-02 PROCEDURE — 85025 COMPLETE CBC W/AUTO DIFF WBC: CPT

## 2021-07-02 PROCEDURE — 99283 EMERGENCY DEPT VISIT LOW MDM: CPT

## 2021-07-02 PROCEDURE — 80053 COMPREHEN METABOLIC PANEL: CPT

## 2021-07-02 NOTE — ED TRIAGE NOTES
FIRST PROVIDER CONTACT ASSESSMENT NOTE      Department of Emergency Medicine   7/2/21  12:56 PM EDT    Chief Complaint: Abnormal Lab ([facility] to come in for Hgb of 8.1)      History of Present Illness:    Andre Gonzalez is a 66 y.o. male who presents to the ED by private car for hemoglobin of 8.1. On eliquis. Recent abdominal surgery. Denies melena or bloody stool    Focused Screening Exam:  Constitutional:  Alert, appears stated age and is in no distress. *ALLERGIES*     Patient has no known allergies.      ED Triage Vitals [07/02/21 1250]   BP Temp Temp src Pulse Resp SpO2 Height Weight   -- 97.5 °F (36.4 °C) -- 93 -- 94 % -- --        Initial Plan of Care:  Initiate Treatment-Testing, Proceed toTreatment Area When Bed Available for ED Attending/MLP to Continue Care    -----------------END OF FIRST PROVIDER CONTACT ASSESSMENT NOTE--------------  Electronically signed by OTILIA Perea   DD: 7/2/21

## 2021-07-02 NOTE — ED PROVIDER NOTES
HPI:  7/2/21, Time: 3:32 PM EDT         Days Anna Kwong is a 66 y.o. male presenting to the ED for abnormal lab. Came on suddenly, nothing makes it better or worse, no associated pain. Patient came in as he was told by the [place] his hemoglobin is 8.1. He has no symptoms or complaints. Denies fever, chills, nausea, vomit, vision, cough, sputum, paresthesias, change in bowel or bladder, or any other symptoms or complaints. Review of Systems:   A complete review of systems was performed and pertinent positives and negatives are stated within HPI, all other systems reviewed and are negative.          --------------------------------------------- PAST HISTORY ---------------------------------------------  Past Medical History:  has a past medical history of Cancer ([facility] Diabetes mellitus ([facility] Hyperlipidemia, Hypertension, and [facility] Past Surgical History:  has a past surgical history that includes Colon surgery; Dilatation, esophagus; Leg Surgery (Left, 6 years ago); hc dialysis catheter (N/A, 1/12/2021); Pancreas Biopsy (N/A, 6/14/2021); Upper gastrointestinal endoscopy (6/14/2021); and Pancreatectomy (N/A, 6/23/2021). Social History:  reports that he has quit smoking. His smoking use included cigarettes. He smoked 1.00 pack per day. He has never used smokeless tobacco. He reports previous alcohol use. He reports that he does not use drugs. Family History: family history is not on file. The patients home medications have been reviewed. Allergies: Patient has no known allergies.     -------------------------------------------------- RESULTS -------------------------------------------------  All laboratory and radiology results have been personally reviewed by myself   LABS:  Results for orders placed or performed during the hospital encounter of 07/02/21   CBC Auto Differential   Result Value Ref Range    WBC 10.2 4.5 - 11.5 E9/L    RBC 3.10 (L) 3.80 - 5.80 E12/L    Hemoglobin 8.4 (L) 12.5 - 16.5 g/dL    Hematocrit 24.8 (L) 37.0 - 54.0 %    MCV 80.0 80.0 - 99.9 fL    MCH 27.1 26.0 - 35.0 pg    MCHC 33.9 32.0 - 34.5 %    RDW 18.4 (H) 11.5 - 15.0 fL    Platelets 359 446 - 205 E9/L    MPV 10.5 7.0 - 12.0 fL    Neutrophils % 75.5 43.0 - 80.0 %    Immature Granulocytes % 0.9 0.0 - 5.0 %    Lymphocytes % 15.7 (L) 20.0 - 42.0 %    Monocytes % 5.3 2.0 - 12.0 %    Eosinophils % 2.4 0.0 - 6.0 %    Basophils % 0.2 0.0 - 2.0 %    Neutrophils Absolute 7.68 (H) 1.80 - 7.30 E9/L    Immature Granulocytes # 0.09 E9/L    Lymphocytes Absolute 1.60 1.50 - 4.00 E9/L    Monocytes Absolute 0.54 0.10 - 0.95 E9/L    Eosinophils Absolute 0.24 0.05 - 0.50 E9/L    Basophils Absolute 0.02 0.00 - 0.20 E9/L   Comprehensive Metabolic Panel   Result Value Ref Range    Sodium 136 132 - 146 mmol/L    Potassium 4.1 3.5 - 5.0 mmol/L    Chloride 102 98 - 107 mmol/L    CO2 25 22 - 29 mmol/L    Anion Gap 9 7 - 16 mmol/L    Glucose 231 (H) 74 - 99 mg/dL    BUN 11 6 - 23 mg/dL    CREATININE 1.7 (H) 0.7 - 1.2 mg/dL    GFR Non-African American 48 >=60 mL/min/1.73    GFR African American 48     Calcium 8.5 (L) 8.6 - 10.2 mg/dL    Total Protein 6.4 6.4 - 8.3 g/dL    Albumin 3.0 (L) 3.5 - 5.2 g/dL    Total Bilirubin 0.5 0.0 - 1.2 mg/dL    Alkaline Phosphatase 139 (H) 40 - 129 U/L    ALT 5 0 - 40 U/L    AST 11 0 - 39 U/L       RADIOLOGY:  Interpreted by Radiologist.  No orders to display       ------------------------- NURSING NOTES AND VITALS REVIEWED ---------------------------   The nursing notes within the ED encounter and vital signs as below have been reviewed.    BP (!) 172/130   Pulse 91   Temp 98.2 °F (36.8 °C)   Resp 18   SpO2 95%   Oxygen Saturation Interpretation: Normal      ---------------------------------------------------PHYSICAL EXAM--------------------------------------      Constitutional/General: Alert and oriented x3, well appearing, non toxic in NAD  Head: Normocephalic and atraumatic  Eyes: PERRL, EOMI  Mouth: Oropharynx clear, handling secretions, no trismus  Neck: Supple, full ROM,   Pulmonary: Lungs clear to auscultation bilaterally, no wheezes, rales, or rhonchi. Not in respiratory distress  Cardiovascular:  Regular rate and rhythm, no murmurs, gallops, or rubs. 2+ distal pulses  Abdomen: Soft, non tender, non distended,   Extremities: Moves all extremities x 4. Warm and well perfused  Skin: warm and dry without rash  Neurologic: GCS 15,  Psych: Normal Affect      ------------------------------ ED COURSE/MEDICAL DECISION MAKING----------------------  Medications - No data to display      ED COURSE:       Medical Decision Making:    Labs reviewed. 8.4 here, consistent with previous studies, patient has no signs of active bleeding, he has no symptoms or complaints, he was discharged. Counseling: The emergency provider has spoken with the patient and discussed todays results, in addition to providing specific details for the plan of care and counseling regarding the diagnosis and prognosis. Questions are answered at this time and they are agreeable with the plan.      --------------------------------- IMPRESSION AND DISPOSITION ---------------------------------    IMPRESSION  1. Anemia, unspecified type        DISPOSITION  Disposition: Discharge to home  Patient condition is stable      NOTE: This report was transcribed using voice recognition software.  Every effort was made to ensure accuracy; however, inadvertent computerized transcription errors may be present        Lisa Wolf MD  07/02/21 2029

## 2021-07-16 DIAGNOSIS — K85.91 ACUTE NECROTIZING PANCREATITIS: ICD-10-CM

## 2021-07-16 DIAGNOSIS — K86.0 ALCOHOL-INDUCED CHRONIC PANCREATITIS (HCC): Primary | ICD-10-CM

## 2021-07-16 DIAGNOSIS — R10.13 EPIGASTRIC PAIN: ICD-10-CM

## 2021-07-16 RX ORDER — OXYCODONE HYDROCHLORIDE 5 MG/1
5 TABLET ORAL EVERY 6 HOURS PRN
Qty: 28 TABLET | Refills: 0 | Status: SHIPPED | OUTPATIENT
Start: 2021-07-16 | End: 2021-07-23

## 2021-07-22 ENCOUNTER — OFFICE VISIT (OUTPATIENT)
Dept: HEMATOLOGY | Age: 71
End: 2021-07-22
Payer: MEDICARE

## 2021-07-22 VITALS
RESPIRATION RATE: 15 BRPM | HEIGHT: 74 IN | OXYGEN SATURATION: 100 % | WEIGHT: 144.7 LBS | TEMPERATURE: 97.5 F | BODY MASS INDEX: 18.57 KG/M2 | SYSTOLIC BLOOD PRESSURE: 154 MMHG | DIASTOLIC BLOOD PRESSURE: 76 MMHG | HEART RATE: 68 BPM

## 2021-07-22 DIAGNOSIS — K85.91 NECROTIZING PANCREATITIS: Primary | ICD-10-CM

## 2021-07-22 DIAGNOSIS — Z09 POSTOP CHECK: ICD-10-CM

## 2021-07-22 PROCEDURE — 99024 POSTOP FOLLOW-UP VISIT: CPT | Performed by: TRANSPLANT SURGERY

## 2021-07-22 PROCEDURE — 99212 OFFICE O/P EST SF 10 MIN: CPT | Performed by: TRANSPLANT SURGERY

## 2021-07-22 NOTE — PROGRESS NOTES
Hepatobiliary and Pancreatic Surgery Progress Note    CC: Pancreatic necrosis    Subjective: Pt is a pleasant 66 y/o M with a past history of necrotizing pancreatitis with abscess formation. He underwent a necrosectomy on 6/21. Cultures grew Candida and Klebsiella. He is on Zosyn and Diflucan. He is having 2 shakes a day. Food does not taste good. His upper abdominal pain has completely resolved. He is having lower abdominal pain, but is moving his bowels daily. When eating he is not having diarrhea and is taking his creon. OBJECTIVE      Physical    BP (!) 154/76 (Site: Left Upper Arm, Position: Sitting, Cuff Size: Medium Adult)   Pulse 68   Temp 97.5 °F (36.4 °C) (Temporal)   Resp 15   Ht 6' 2\" (1.88 m)   Wt 144 lb 11.2 oz (65.6 kg)   SpO2 100%   BMI 18.58 kg/m²       General appearance: appears in no acute distress  Lungs:respiratory effort normal without accessory numbers  Heart: no pedal edema  Abdomen: soft, nondistended, nontympanic, no guarding, no peritoneal signs, normoactive bowel sounds  Extremities: ROM normal    ASSESSMENT: Necrotizing pancreatitis with pancreatic abscess s/p lap robotic pancreatic necrosectomy with cyst gastrostomy 6/21    PLAN:    - will plan to repeat CT scan to evaluate necrotic collections  - nepro script for supplements  - follow up with me after CT    20 Minutes of which greater than 50% was spent counseling or coordinating his care. Thank you for the consultation and allowing me to take part in Mr. Brown's care.     Please send a copy of my note, operative note, pathology and microbiology results to Dr. Klever Brandt with the [facility]    Electronically signed by Dae Peralta MD on 7/22/2021 at 7:20 PM

## 2021-07-25 LAB
FUNGUS (MYCOLOGY) CULTURE: ABNORMAL
FUNGUS (MYCOLOGY) CULTURE: ABNORMAL
FUNGUS STAIN: ABNORMAL
ORGANISM: ABNORMAL
ORGANISM: ABNORMAL

## 2021-07-26 ENCOUNTER — TELEPHONE (OUTPATIENT)
Dept: HEMATOLOGY | Age: 71
End: 2021-07-26

## 2021-08-06 ENCOUNTER — HOSPITAL ENCOUNTER (OUTPATIENT)
Dept: CT IMAGING | Age: 71
Discharge: HOME | End: 2021-08-08
Payer: MEDICARE

## 2021-08-06 DIAGNOSIS — K85.91 NECROTIZING PANCREATITIS: ICD-10-CM

## 2021-08-06 DIAGNOSIS — K85.91 NECROTIZING PANCREATITIS: Primary | ICD-10-CM

## 2021-08-06 PROCEDURE — 74150 CT ABDOMEN W/O CONTRAST: CPT

## 2021-08-06 NOTE — PROGRESS NOTES
Called office because pt's GFR was 36 on 7/26/21. Pt is not dialysis and a waiver would be needed.   Per Harwood Prader do an abd w/o contrast.

## 2021-08-11 ENCOUNTER — OFFICE VISIT (OUTPATIENT)
Dept: HEMATOLOGY | Age: 71
End: 2021-08-11
Payer: MEDICARE

## 2021-08-11 VITALS
TEMPERATURE: 98.1 F | HEART RATE: 66 BPM | SYSTOLIC BLOOD PRESSURE: 138 MMHG | OXYGEN SATURATION: 99 % | DIASTOLIC BLOOD PRESSURE: 79 MMHG | HEIGHT: 74 IN | BODY MASS INDEX: 18.38 KG/M2 | RESPIRATION RATE: 18 BRPM | WEIGHT: 143.2 LBS

## 2021-08-11 DIAGNOSIS — K85.91 NECROTIZING PANCREATITIS: ICD-10-CM

## 2021-08-11 PROCEDURE — 99212 OFFICE O/P EST SF 10 MIN: CPT | Performed by: TRANSPLANT SURGERY

## 2021-08-11 PROCEDURE — 99024 POSTOP FOLLOW-UP VISIT: CPT | Performed by: TRANSPLANT SURGERY

## 2021-08-11 NOTE — PROGRESS NOTES
Hepatobiliary and Pancreatic Surgery Progress Note    CC: Pancreatic necrosis    Subjective: Pt is a pleasant 66 y/o M with a past history of necrotizing pancreatitis with abscess formation. He underwent a necrosectomy on 6/21. Cultures grew Candida and Klebsiella. He is on Zosyn and Diflucan. His upper abdominal pain has completely resolved. Still taking creon and denies any diarrhea. He just states that he has lack of taste. He had a repeat CT scan and is seeing me in follow up. OBJECTIVE      Physical    /79 (Site: Right Upper Arm, Position: Sitting, Cuff Size: Medium Adult)   Pulse 66   Temp 98.1 °F (36.7 °C) (Temporal)   Resp 18   Ht 6' 2\" (1.88 m)   Wt 143 lb 3.2 oz (65 kg)   SpO2 99%   BMI 18.39 kg/m²       General appearance: appears in no acute distress  Lungs:respiratory effort normal without accessory numbers  Heart: no pedal edema  Abdomen: soft, nondistended, nontympanic, no guarding, no peritoneal signs, normoactive bowel sounds  Extremities: ROM normal    ASSESSMENT: Necrotizing pancreatitis with pancreatic abscess s/p lap robotic pancreatic necrosectomy with cyst gastrostomy 6/21    PLAN:    - reviewed images  - eat and drink shakes  - he has had resolution of his pancreatic abscess  - follow up with me in 1 month    Thank you for the consultation and allowing me to take part in Mr. Brown's care.     Please send a copy of my note to Dr. Aaron Melo with the [facility]    Electronically signed by Tatyana De La O MD on 7/22/2021 at 12:50 PM

## 2021-09-09 ENCOUNTER — OFFICE VISIT (OUTPATIENT)
Dept: HEMATOLOGY | Age: 71
End: 2021-09-09
Payer: MEDICARE

## 2021-09-09 VITALS
BODY MASS INDEX: 17.98 KG/M2 | DIASTOLIC BLOOD PRESSURE: 95 MMHG | HEART RATE: 64 BPM | RESPIRATION RATE: 18 BRPM | WEIGHT: 140.1 LBS | SYSTOLIC BLOOD PRESSURE: 169 MMHG | HEIGHT: 74 IN | OXYGEN SATURATION: 99 % | TEMPERATURE: 97.2 F

## 2021-09-09 DIAGNOSIS — R19.4 ENCOUNTER FOR DIAGNOSTIC COLONOSCOPY DUE TO CHANGE IN BOWEL HABITS: Primary | ICD-10-CM

## 2021-09-09 DIAGNOSIS — K64.1 GRADE II HEMORRHOIDS: ICD-10-CM

## 2021-09-09 DIAGNOSIS — K85.91 NECROTIZING PANCREATITIS: ICD-10-CM

## 2021-09-09 PROCEDURE — 99212 OFFICE O/P EST SF 10 MIN: CPT | Performed by: TRANSPLANT SURGERY

## 2021-09-09 PROCEDURE — 99024 POSTOP FOLLOW-UP VISIT: CPT | Performed by: TRANSPLANT SURGERY

## 2021-09-09 RX ORDER — PRAMOXINE HYDROCHLORIDE 10 MG/G
AEROSOL, FOAM TOPICAL
Qty: 15 G | Refills: 2 | Status: SHIPPED | OUTPATIENT
Start: 2021-09-09

## 2021-09-23 ENCOUNTER — OFFICE VISIT (OUTPATIENT)
Dept: SURGERY | Age: 71
End: 2021-09-23
Payer: MEDICARE

## 2021-09-23 VITALS
RESPIRATION RATE: 16 BRPM | WEIGHT: 138 LBS | TEMPERATURE: 97.3 F | SYSTOLIC BLOOD PRESSURE: 134 MMHG | HEART RATE: 92 BPM | DIASTOLIC BLOOD PRESSURE: 87 MMHG | HEIGHT: 74 IN | BODY MASS INDEX: 17.71 KG/M2

## 2021-09-23 DIAGNOSIS — R19.7 DIARRHEA, UNSPECIFIED TYPE: ICD-10-CM

## 2021-09-23 DIAGNOSIS — R10.13 EPIGASTRIC PAIN: Primary | ICD-10-CM

## 2021-09-23 PROCEDURE — 4040F PNEUMOC VAC/ADMIN/RCVD: CPT | Performed by: SURGERY

## 2021-09-23 PROCEDURE — 1123F ACP DISCUSS/DSCN MKR DOCD: CPT | Performed by: SURGERY

## 2021-09-23 PROCEDURE — G8419 CALC BMI OUT NRM PARAM NOF/U: HCPCS | Performed by: SURGERY

## 2021-09-23 PROCEDURE — G8427 DOCREV CUR MEDS BY ELIG CLIN: HCPCS | Performed by: SURGERY

## 2021-09-23 PROCEDURE — 1036F TOBACCO NON-USER: CPT | Performed by: SURGERY

## 2021-09-23 PROCEDURE — 3017F COLORECTAL CA SCREEN DOC REV: CPT | Performed by: SURGERY

## 2021-09-23 PROCEDURE — 99213 OFFICE O/P EST LOW 20 MIN: CPT | Performed by: SURGERY

## 2021-09-23 NOTE — PROGRESS NOTES
Progress Note - Follow up    Patient's Name/Date of Birth: Andre Bucio / 1950    Date: 9/23/2021    PCP: Kavita Paul DO    Referring Physician:   Nadira Garcia*  [phone]    Chief Complaint   Patient presents with    New Patient    Colonoscopy     change in bowel habits     Weight Loss       HPI:    The patient said he has been losing weight ever since he had a bout of necrotizing pancreatitis and had to undergo a robotic pancreatic necrosectomy with cyst gastrostomy on 6/21 by Ayesha Valencia. He said he has been losing weight and not eating well. He said he gets dizzy sometimes. No nausea or vomiting. He said he feels something protruding from his rectum. The patient said he is weak and tired. He has diarrhea, no constipation. Patient's medications, allergies, past medical, surgical, social and family histories were reviewed and updated as appropriate. Review of Systems  Constitutional: + malaise  Respiratory: negative  Cardiovascular: negative  Gastrointestinal: as in hpi  Genitourinary:negative  Integument/breast: negative    Physical Exam:  /87   Pulse 92   Temp 97.3 °F (36.3 °C) (Temporal)   Resp 16   Ht 6' 2\" (1.88 m)   Wt 138 lb (62.6 kg)   BMI 17.72 kg/m²   General appearance: alert, cooperative and in no acute distress. Lungs: normal work of breathing  Heart: regular rate  Abdomen: moderate epigastric tenderness, soft, nondistended  Musculoskeletal: symmetrical without edema. Skin: normal       Impression/Plan:  66y.o. year old male with epigastric pain, weight loss, diarrhea    I will set the patient up for an EGD and colonoscopy, possible biopsy, possible polypectomy. I explained the risks including but not limited to bleeding, perforation leading to possible surgery, or infection.  The benefits, alternatives, and potential complications associated with the above procedure to be performed and transfusions when applicable with the patient/responsible person prior to the procedure. I discussed the risk of bowel peroration, postoperative bleeding, post-polypectomy syndrome, as well as the possibility of needing emergency surgery or another colonoscopy. All of the patient's questions were answered. The patient understands and agrees to the procedure.        Electronically by Roxi Gordon MD, General Surgery  on 9/23/2021 at 11:18 AM      Send copy of H&P to PCP, Nehemiah Evans DO and referring physician, Iván rAmstrong*      9/23/2021

## 2021-09-24 ENCOUNTER — TELEPHONE (OUTPATIENT)
Dept: SURGERY | Age: 71
End: 2021-09-24

## 2021-09-24 NOTE — TELEPHONE ENCOUNTER
Prior Authorization Form:      DEMOGRAPHICS:                     Patient Name:  Andre Leon  Patient :  1950            Insurance:  Payor: MEDICARE / Plan: MEDICARE PART A AND B / Product Type: *No Product type* /   Insurance ID Number:    Payor/Plan Subscr  Sex Relation Sub. Ins. ID Effective Group Num   1. MEDICARE - MEPamela Amen 1950 Male Self [id number] 1/1/15                                    [address] [address]   2.  [address] 1950 Male Self [id number] 1/1/15 [id number]                                   [address]         DIAGNOSIS & PROCEDURE:                       Procedure/Operation: EGD   COLONOSCOPY          CPT Code: 95982   42812    Diagnosis:  EPIGASTRIC PAIN  WEIGHT LOSS  DIARRHEA    ICD10 Code: R10.13   R 63.4   R 19.7    Location:  [facility]    Surgeon:  Satya Hamilton INFORMATION:                          Date: 10-    Time: 10:30              Anesthesia:  MAC/TIVA                                                       Status:  Outpatient        Special Comments:         Electronically signed by Dwain Schaumann, MA on 2021 at 7:35 AM

## 2021-10-15 NOTE — PROGRESS NOTES
[facility] PRE-ADMISSION TESTING INSTRUCTIONS    The Preadmission Testing patient is instructed accordingly using the following criteria (check applicable):    ARRIVAL INSTRUCTIONS:  [x] Parking the day of Surgery is located in the Main Entrance lot. Upon entering the door, make an immediate right to the surgery reception desk    [x] Bring photo ID and insurance card    [] Bring in a copy of Living will or Durable Power of  papers. [x] Please be sure to arrange for responsible adult to provide transportation to and from the hospital    [x] Please arrange for responsible adult to be with you for the 24 hour period post procedure due to having anesthesia      GENERAL INSTRUCTIONS:    [x] Nothing by mouth after midnight, including gum, candy, mints or water    [x] You may brush your teeth, but do not swallow any water    [x] Take medications as instructed with 1-2 oz of water    [x] Stop herbal supplements and vitamins 5 days prior to procedure    [x] Follow preop dosing of blood thinners per physician instructions    [] Take 1/2 dose of evening insulin, but no insulin after midnight    [] No oral diabetic medications after midnight    [] If diabetic and have low blood sugar or feel symptomatic, take 1-2oz apple juice only    [] Bring inhalers day of surgery    [] Bring C-PAP/ Bi-Pap day of surgery    [] Bring urine specimen day of surgery    [x] Shower or bath with soap, lather and rinse well, AM of Surgery, no lotion, powders or creams to surgical site    [x] Follow bowel prep as instructed per surgeon    [x] No tobacco products within 24 hours of surgery     [x] No alcohol or illegal drug use within 24 hours of surgery.     [x] Jewelry, body piercing's, eyeglasses, contact lenses and dentures are not permitted into surgery (bring cases)      [] Please do not wear any nail polish, make up or hair products on the day of surgery    [x] You can expect a call the business day prior to procedure to notify you if your arrival time changes    [x] If you receive a survey after surgery we would greatly appreciate your comments    [] Parent/guardian of a minor must accompany their child and remain on the premises  the entire time they are under our care     [] Pediatric patients may bring favorite toy, blanket or comfort item with them    [] A caregiver or family member must remain with the patient during their stay if they are mentally handicapped, have dementia, disoriented or unable to use a call light or would be a safety concern if left unattended    [x] Please notify surgeon if you develop any illness between now and time of surgery (cold, cough, sore throat, fever, nausea, vomiting) or any signs of infections  including skin, wounds, and dental.    [x]  The Outpatient Pharmacy is available to fill your prescription here on your day of surgery, ask your preop nurse for details    [x] Other instructions: wear comfortable clothing    EDUCATIONAL MATERIALS PROVIDED:    [] PAT Preoperative Education Packet/Booklet     [] Medication List    [] Transfusion bracelet applied with instructions    [] Shower with soap, lather and rinse well, and use CHG wipes provided the evening before surgery as instructed    [] Incentive spirometer with instructions        Have you been tested for COVID  No           Have you been told you were positive for COVID No  Have you had any known exposure to someone that is positive for COVID No  Do you have a cough                   No              Do you have shortness of breath No                 Do you have a sore throat            No                Are you having chills                    No                Are you having muscle aches. No                    Please come to the hospital wearing a mask and have your significant other wear a mask as well.   Both of you should check your temperature before leaving to come here,  if it is 100 or higher please call [phone] for instruction.

## 2021-10-18 ENCOUNTER — HOSPITAL ENCOUNTER (OUTPATIENT)
Age: 71
Setting detail: OUTPATIENT SURGERY
Discharge: HOME | End: 2021-10-18
Attending: SURGERY | Admitting: SURGERY
Payer: MEDICARE

## 2021-10-18 ENCOUNTER — ANESTHESIA EVENT (OUTPATIENT)
Dept: ENDOSCOPY | Age: 71
End: 2021-10-18
Payer: MEDICARE

## 2021-10-18 ENCOUNTER — ANESTHESIA (OUTPATIENT)
Dept: ENDOSCOPY | Age: 71
End: 2021-10-18
Payer: MEDICARE

## 2021-10-18 VITALS
HEIGHT: 73 IN | BODY MASS INDEX: 21.2 KG/M2 | RESPIRATION RATE: 20 BRPM | HEART RATE: 60 BPM | DIASTOLIC BLOOD PRESSURE: 86 MMHG | WEIGHT: 160 LBS | SYSTOLIC BLOOD PRESSURE: 122 MMHG | OXYGEN SATURATION: 100 %

## 2021-10-18 VITALS — SYSTOLIC BLOOD PRESSURE: 106 MMHG | DIASTOLIC BLOOD PRESSURE: 70 MMHG | OXYGEN SATURATION: 100 %

## 2021-10-18 LAB — METER GLUCOSE: 256 MG/DL (ref 74–99)

## 2021-10-18 PROCEDURE — 3700000001 HC ADD 15 MINUTES (ANESTHESIA): Performed by: SURGERY

## 2021-10-18 PROCEDURE — 2709999900 HC NON-CHARGEABLE SUPPLY: Performed by: SURGERY

## 2021-10-18 PROCEDURE — 88342 IMHCHEM/IMCYTCHM 1ST ANTB: CPT

## 2021-10-18 PROCEDURE — 7100000010 HC PHASE II RECOVERY - FIRST 15 MIN: Performed by: SURGERY

## 2021-10-18 PROCEDURE — 45380 COLONOSCOPY AND BIOPSY: CPT | Performed by: SURGERY

## 2021-10-18 PROCEDURE — 3700000000 HC ANESTHESIA ATTENDED CARE: Performed by: SURGERY

## 2021-10-18 PROCEDURE — 88305 TISSUE EXAM BY PATHOLOGIST: CPT

## 2021-10-18 PROCEDURE — 82962 GLUCOSE BLOOD TEST: CPT

## 2021-10-18 PROCEDURE — 43239 EGD BIOPSY SINGLE/MULTIPLE: CPT | Performed by: SURGERY

## 2021-10-18 PROCEDURE — 3609010300 HC COLONOSCOPY W/BIOPSY SINGLE/MULTIPLE: Performed by: SURGERY

## 2021-10-18 PROCEDURE — 7100000011 HC PHASE II RECOVERY - ADDTL 15 MIN: Performed by: SURGERY

## 2021-10-18 PROCEDURE — 6360000002 HC RX W HCPCS: Performed by: NURSE ANESTHETIST, CERTIFIED REGISTERED

## 2021-10-18 PROCEDURE — 2580000003 HC RX 258

## 2021-10-18 PROCEDURE — 3609012400 HC EGD TRANSORAL BIOPSY SINGLE/MULTIPLE: Performed by: SURGERY

## 2021-10-18 RX ORDER — PANTOPRAZOLE SODIUM 40 MG/1
40 TABLET, DELAYED RELEASE ORAL 2 TIMES DAILY
Qty: 180 TABLET | Refills: 2 | Status: ON HOLD | OUTPATIENT
Start: 2021-10-18 | End: 2022-04-06 | Stop reason: HOSPADM

## 2021-10-18 RX ORDER — SODIUM CHLORIDE 9 MG/ML
INJECTION, SOLUTION INTRAVENOUS CONTINUOUS PRN
Status: DISCONTINUED | OUTPATIENT
Start: 2021-10-18 | End: 2021-10-18 | Stop reason: SDUPTHER

## 2021-10-18 RX ORDER — SUCRALFATE 1 G/1
1 TABLET ORAL 4 TIMES DAILY
Qty: 120 TABLET | Refills: 3 | Status: SHIPPED | OUTPATIENT
Start: 2021-10-18 | End: 2022-04-04

## 2021-10-18 RX ORDER — PROPOFOL 10 MG/ML
INJECTION, EMULSION INTRAVENOUS PRN
Status: DISCONTINUED | OUTPATIENT
Start: 2021-10-18 | End: 2021-10-18 | Stop reason: SDUPTHER

## 2021-10-18 RX ADMIN — PROPOFOL 400 MG: 10 INJECTION, EMULSION INTRAVENOUS at 10:27

## 2021-10-18 RX ADMIN — PROPOFOL 400 MG: 10 INJECTION, EMULSION INTRAVENOUS at 10:51

## 2021-10-18 RX ADMIN — SODIUM CHLORIDE: 9 INJECTION, SOLUTION INTRAVENOUS at 10:23

## 2021-10-18 ASSESSMENT — PAIN SCALES - GENERAL
PAINLEVEL_OUTOF10: 0

## 2021-10-18 ASSESSMENT — LIFESTYLE VARIABLES: SMOKING_STATUS: 0

## 2021-10-18 ASSESSMENT — PAIN - FUNCTIONAL ASSESSMENT: PAIN_FUNCTIONAL_ASSESSMENT: 0-10

## 2021-10-18 NOTE — ANESTHESIA POSTPROCEDURE EVALUATION
Department of Anesthesiology  Postprocedure Note    Patient: Days Lasandra Klinefelter  MRN: [id number]  YOB: 1950  Date of evaluation: 10/18/2021  Time:  12:17 PM     Procedure Summary     Date: 10/18/21 Room / Location: [address] [place] [address]    Anesthesia Start: 5703 Anesthesia Stop: 1059    Procedures:       EGD BIOPSY (N/A )      COLONOSCOPY WITH BIOPSY (N/A ) Diagnosis: (EPIGASTRIC PAIN WEIGHT LOSS DIARRHEA)    Surgeons: Paul Berg MD Responsible Provider: Kannan Wood MD    Anesthesia Type: MAC ASA Status: 3          Anesthesia Type: MAC    Blade Phase I: Blade Score: 10    Blade Phase II: Blade Score: 10    Last vitals: Reviewed and per EMR flowsheets.        Anesthesia Post Evaluation    Patient location during evaluation: PACU  Patient participation: complete - patient participated  Level of consciousness: awake and alert  Airway patency: patent  Nausea & Vomiting: no vomiting and no nausea  Complications: no  Cardiovascular status: blood pressure returned to baseline  Respiratory status: acceptable  Hydration status: euvolemic

## 2021-10-18 NOTE — ANESTHESIA PRE PROCEDURE
Department of Anesthesiology  Preprocedure Note       Name:  Andre Merino   Age:  66 y.o.  :  1950                                          MRN:  [id number]         Date:  10/18/2021      Surgeon: Harley Carlton):  Fahad Gonzalez MD    Procedure: Procedure(s):  EGD ESOPHAGOGASTRODUODENOSCOPY  COLONOSCOPY DIAGNOSTIC    Medications prior to admission:   Prior to Admission medications    Medication Sig Start Date End Date Taking? Authorizing Provider   metFORMIN (GLUCOPHAGE) 1000 MG tablet Take 500 mg by mouth daily (with breakfast)   Yes Historical Provider, MD   Nutritional Supplements (GLUCERNA ADVANCE SHAKE) LIQD Take 1 Bottle by mouth 2 times daily   Yes Historical Provider, MD   pramoxine HCl 1 % foam Apply to rectum three times daily or after a bowel movement 21  Yes Gualberto Lyons MD   Skin Protectants, Misc.  (ALOE VESTA PROTECTIVE) OINT ointment Apply topically daily as needed (apply to dry skin on feet and legs)   Yes Historical Provider, MD   HYDROPHILIC EX Apply topically daily as needed (apply to dry skin on torso)   Yes Historical Provider, MD   insulin aspart (NOVOLOG FLEXPEN) 100 UNIT/ML injection pen Inject 0-5 Units into the skin 3 times daily (before meals) *Per Sliding Scale*    Yes Historical Provider, MD   lisinopril (PRINIVIL;ZESTRIL) 20 MG tablet Take 20 mg by mouth 2 times daily   Yes Historical Provider, MD   lipase-protease-amylase (CREON) 95241-065128 units CPEP delayed release capsule Take 1 capsule by mouth 3 times daily (with meals)   Yes Historical Provider, MD   tadalafil (CIALIS) 20 MG tablet Take 20 mg by mouth as needed for Erectile Dysfunction    Yes Historical Provider, MD   folic acid (FOLVITE) 1 MG tablet Take 1 tablet by mouth daily 3/1/21  Yes Maritza Fregoso MD   metoprolol tartrate (LOPRESSOR) 25 MG tablet Take 1 tablet by mouth 2 times daily  Patient taking differently: Take 25 mg by mouth daily Instructed to take morning of surgery with a sip of water 1/1/21  Yes Laura Robins MD   pantoprazole (PROTONIX) 40 MG tablet Take 1 tablet by mouth daily 6/25/21   Adiel Murphy MD       Current medications:    No current facility-administered medications for this encounter.        Allergies:  No Known Allergies    Problem List:    Patient Active Problem List   Diagnosis Code    Hyperlipidemia E78.5    Prostate cancer ([facility] C61    Hypertension I10    Diabetes mellitus ([facility] E11.9    Shoulder pain, bilateral M25.511, M25.512    Abdominal pain, right lower quadrant R10.31    DKA, type 1, not at goal [facility]) E10.10    Acute metabolic encephalopathy I75.10    Acute pancreatitis K85.90    Acute renal failure (ARF) (HCC) N17.9    High anion gap metabolic acidosis B18.2    Complicated UTI (urinary tract infection) N39.0    Acute cystitis with hematuria N30.01    Severe anemia D64.9    Moderate malnutrition (HCC) E44.0    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    Pancreatic pseudocyst K86.3    Acute on chronic pancreatitis (HCC) K85.90, K86.1    Abdominal pain R10.9    Pancreatic abscess K85.90    Necrotizing pancreatitis 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.91    Uncontrolled type 2 diabetes mellitus with hyperglycemia (HCC) E11.65    H/O exploratory laparotomy Z98.890    Chronic kidney disease due to type 2 diabetes mellitus ([facility] E11.22    Severe protein-calorie malnutrition (HCC) E43       Past Medical History:        Diagnosis Date    Cancer ([facility]     Diabetes mellitus ([facility]     Hyperlipidemia     Hypertension     Prostate cancer ([facility]        Past Surgical History:        Procedure Laterality Date    COLON SURGERY      DILATATION, ESOPHAGUS      HC DIALYSIS CATHETER N/A 1/12/2021    TEMPORARY HEMODIALYSIS CATHETER INSERTION performed by Julio Ramos MD at [facility] 22 LEG SURGERY Left 6 years ago    OTHER SURGICAL HISTORY      removal of HD catheter    PANCREAS BIOPSY N/A 6/14/2021    PANCREATIC PSEUDOCYST EXCISION DRAINAGE performed by Augustina Russell MD at [address] [facility] N/A 6/23/2021    LAPAROSCOPIC ROBOTIC XI PANCREATIC NECROSECTOMY WITH CYST GASTROSTOMY , INTRAOPERATIVE  ULTRASOUND performed by Vallarie Babinski, MD at [facility] ENDOSCOPY  6/14/2021    ENDOSCOPIC ULTRASOUND performed by Kary Bills MD at [address] History:    Social History     Tobacco Use    Smoking status: Former Smoker     Packs/day: 1.00     Types: Cigarettes    Smokeless tobacco: Never Used   Substance Use Topics    Alcohol use: Not Currently     Comment: \"daily shots\", \"none in six months\"                                Counseling given: Not Answered      Vital Signs (Current):   Vitals:    10/18/21 0942   BP: (!) 161/92   Pulse: 79   Resp: 16   SpO2: 98%   Weight: 160 lb (72.6 kg)   Height: 6' 1\" (1.854 m)                                              BP Readings from Last 3 Encounters:   10/18/21 (!) 161/92   09/23/21 134/87   09/09/21 (!) 169/95       NPO Status: Time of last liquid consumption: 0800                        Time of last solid consumption: 0800                        Date of last liquid consumption: 10/17/21                        Date of last solid food consumption: 10/17/21    BMI:   Wt Readings from Last 3 Encounters:   10/18/21 160 lb (72.6 kg)   09/23/21 138 lb (62.6 kg)   09/09/21 140 lb 1.6 oz (63.5 kg)     Body mass index is 21.11 kg/m².     CBC:   Lab Results   Component Value Date    WBC 5.5 07/26/2021    RBC 3.76 07/26/2021    HGB 10.3 07/26/2021    HCT 30.3 07/26/2021    MCV 80.6 07/26/2021    RDW 18.7 07/26/2021     07/26/2021       CMP:   Lab Results   Component Value Date     07/19/2021    K 4.0 07/19/2021    K 4.3 06/17/2021     07/19/2021    CO2 20 07/19/2021    BUN 17 07/26/2021    CREATININE 2.2 07/26/2021    GFRAA 36 07/26/2021    LABGLOM 36 07/26/2021    GLUCOSE 207 07/19/2021    PROT 6.8 07/26/2021    CALCIUM 9.0 07/19/2021    BILITOT 0.5 07/26/2021    ALKPHOS 63 07/26/2021    AST 12 07/26/2021    ALT 7 07/26/2021       POC Tests: No results for input(s): POCGLU, POCNA, POCK, POCCL, POCBUN, POCHEMO, POCHCT in the last 72 hours. Coags:   Lab Results   Component Value Date    PROTIME 13.0 02/27/2021    INR 1.2 02/27/2021    APTT 32.8 02/27/2021       HCG (If Applicable): No results found for: PREGTESTUR, PREGSERUM, HCG, HCGQUANT     ABGs:   Lab Results   Component Value Date    PO2ART 133.9 06/23/2021    STJ2QYZ 40.3 06/23/2021    HSZ7DKP 20.4 06/23/2021        Type & Screen (If Applicable):  No results found for: LABABO, LABRH    Drug/Infectious Status (If Applicable):  No results found for: HIV, HEPCAB    COVID-19 Screening (If Applicable):   Lab Results   Component Value Date    COVID19 Not Detected 01/11/2021       CT Abd 10/18/21  Impression   Interval resolution of the large pancreatic fluid collection seen on the   prior exam.  Evaluation for small residual fluid collections is limited by   lack of IV contrast.       EKG 6/22/21  Atrial fibrillation  Septal infarct , age undetermined  Abnormal ECG  When compared with ECG of 17-JUN-2021 17:13,  Significant changes have occurred  Confirmed by Simran Riley ([id number]) on 6/22/2021 10:55:27 AM    Cardiac Stress Test 9/28/2013  Impression-    1.  No reversible ischemia and no defect or infarction. 2. Ejection fraction is 63%. 3. No significant wall motion abnormality. Anesthesia Evaluation  Patient summary reviewed and Nursing notes reviewed no history of anesthetic complications:   Airway: Mallampati: II  TM distance: >3 FB   Neck ROM: full  Mouth opening: > = 3 FB Dental:      Comment: Denies chipped or loose teeth    Pulmonary:Negative Pulmonary ROS breath sounds clear to auscultation      (-) not a current smoker          Patient did not smoke on day of surgery.                  Cardiovascular:    (+) hypertension:, dysrhythmias ( hx of afib):, hyperlipidemia        Rhythm: regular  Rate: normal Neuro/Psych:   Negative Neuro/Psych ROS              GI/Hepatic/Renal:   (+) renal disease ( hx of UTI, AMBIKA):,          ROS comment: Pancreatic abscess; hx of Necrotizing pancreatitis. Endo/Other:    (+) DiabetesType II DM, using insulin, blood dyscrasia: anemia:., malignancy/cancer ( Prostate cancer). Abdominal:             Vascular: negative vascular ROS. Other Findings:           Anesthesia Plan      MAC     ASA 3       Induction: intravenous. Anesthetic plan and risks discussed with patient. Use of blood products discussed with patient whom consented to blood products. Plan discussed with CRNA and attending.                   Curtis Remy RN   10/18/2021

## 2021-10-18 NOTE — OP NOTE
Operative Note: EGD and Colonoscopy    Days J Kevin     DATE OF PROCEDURE: 10/18/2021  SURGEON: Dr. Nithin Diallo MD, M.D. PREOPERATIVE DIAGNOSES:   Weight loss  Diarrhea  Alcoholism    POSTOPERATIVE DIAGNOSES:   Linear antral ulcer  Large gastric body ulcer  GERD    Normal appearing colon    OPERATION:    EGD esophagogastroduodenoscopy With antral ulcer, gastric body ulcer, duodenal biopsies                   Colonoscopy to the ileocolic anastomosis with random colon biopsies    SPECIMENS:  ID Type Source Tests Collected by Time Destination   A : duodenal bx Tissue Stomach SURGICAL PATHOLOGY Fahad Gonzalez MD 10/18/2021 1030    B : bx antral ulcer Tissue Stomach SURGICAL PATHOLOGY Fahad Gonzalez MD 10/18/2021 1032    C : gastric body ulcer bx Tissue Stomach SURGICAL PATHOLOGY Fahad Gonzalez MD 10/18/2021 1033    D : bx ileal colonic anastamosis Tissue Colon SURGICAL PATHOLOGY Fahad Gonzalez MD 10/18/2021 1048    E : random colon bxs Tissue Colon SURGICAL PATHOLOGY Fahad Gonzalez MD 10/18/2021 1050        BLOOD LOSS: Minimal    ANESTHESIA: LMAC    CONSENT AND INDICATIONS:  This is a 66y.o. year old male who is having the above issues. I have discussed with the patient and/or the patient representative the indication, alternatives, and the possible risks and/or complications of the planned procedure and the anesthesia methods. The patient and/or patient representative appear to understand and agree to proceed. OPERATIONS: The patient was placed on the table and sedated via LMAC. Bite block was placed. A lubricated scope was easily passed into the upper esophagus which looked normal. The distal esophagus looked abnormal: GERD and small varices throughout the esophagus. The scope was passed into the stomach and retroflexed. There was no hiatal hernia. The scope was passed down toward the pylorus.  The antral mucosa all looked abnormal: hemorrhagic gastritis, linear antral ulcer which was biopsied. There was also a large ulcer in the gastric body along the lesser curvature which was biopsied as well. The scope was then passed through the pylorus into the duodenal bulb which looked normal, then around to the distal duodenum which looked normal and biopsies were taken, and the scope was then withdrawn. The patient was then placed in left lateral decubitus position. A rectal exam was done and no masses were felt. A lubricated scope was passed into the rectum which looked normal.  The scope was passed all the way around to the ileocolic anastomosis. No abnormality was found. The bowel prep was moderate. The scope was then slowly withdrawn, each area was examined again on the way out. Random colon biopsies were taken. The scope was retroflexed in the rectum and it was normal. The patient tolerated the procedure well. PLAN: follow up biopsies. PPI and Carafate  Alcohol cessation    Repeat colonoscopy in 10 years. Physician Signature: Electronically signed by Dr. Carleen Herrera copy of H&P to PCP, Rosie Aguillon DO and referring physician, No ref.  provider found

## 2021-10-18 NOTE — PROGRESS NOTES
CLINICAL PHARMACY NOTE: MEDS TO BEDS    Total # of Prescriptions Filled: 2   The following medications were delivered to the patient:  · Pantoprazole 40 mg  · Sucralfate 1 gm    Additional Documentation:

## 2021-10-18 NOTE — H&P
Patient's office history and physical was reviewed. Patient examined. There has been no change in the patient's history and physical.      Physician Signature: Electronically signed by Dr. Law Cortes  Patient's Name/Date of Birth: Andre Brown / 1950    Date: 9/23/2021    PCP: Suresh Jiang DO    Referring Physician:   No ref. provider found  N/A    No chief complaint on file. HPI:    The patient said he has been losing weight ever since he had a bout of necrotizing pancreatitis and had to undergo a robotic pancreatic necrosectomy with cyst gastrostomy on 6/21 by Olena Valencia. He said he has been losing weight and not eating well. He said he gets dizzy sometimes. No nausea or vomiting. He said he feels something protruding from his rectum. The patient said he is weak and tired. He has diarrhea, no constipation. Patient's medications, allergies, past medical, surgical, social and family histories were reviewed and updated as appropriate. Review of Systems  Constitutional: + malaise  Respiratory: negative  Cardiovascular: negative  Gastrointestinal: as in hpi  Genitourinary:negative  Integument/breast: negative    Physical Exam:  BP (!) 161/92   Pulse 79   Resp 16   Ht 6' 1\" (1.854 m)   Wt 160 lb (72.6 kg)   SpO2 98%   BMI 21.11 kg/m²   General appearance: alert, cooperative and in no acute distress. Lungs: normal work of breathing  Heart: regular rate  Abdomen: moderate epigastric tenderness, soft, nondistended  Musculoskeletal: symmetrical without edema. Skin: normal       Impression/Plan:  66y.o. year old male with epigastric pain, weight loss, diarrhea    I will set the patient up for an EGD and colonoscopy, possible biopsy, possible polypectomy. I explained the risks including but not limited to bleeding, perforation leading to possible surgery, or infection.  The benefits, alternatives, and potential complications associated with the above procedure to be performed and transfusions when applicable with the patient/responsible person prior to the procedure. I discussed the risk of bowel peroration, postoperative bleeding, post-polypectomy syndrome, as well as the possibility of needing emergency surgery or another colonoscopy. All of the patient's questions were answered. The patient understands and agrees to the procedure. Electronically by [place] Brown MD, General Surgery  on 10/18/2021 at 10:19 AM      Send copy of H&P to PCP, Christian Light DO and referring physician, No ref.  provider found      9/23/2021

## 2021-11-01 ENCOUNTER — OFFICE VISIT (OUTPATIENT)
Dept: ORTHOPEDIC SURGERY | Age: 71
End: 2021-11-01
Payer: MEDICARE

## 2021-11-01 VITALS — WEIGHT: 160 LBS | BODY MASS INDEX: 20.53 KG/M2 | HEIGHT: 74 IN

## 2021-11-01 DIAGNOSIS — M25.562 LEFT KNEE PAIN, UNSPECIFIED CHRONICITY: Primary | ICD-10-CM

## 2021-11-01 PROCEDURE — 3017F COLORECTAL CA SCREEN DOC REV: CPT | Performed by: ORTHOPAEDIC SURGERY

## 2021-11-01 PROCEDURE — 99213 OFFICE O/P EST LOW 20 MIN: CPT | Performed by: ORTHOPAEDIC SURGERY

## 2021-11-01 PROCEDURE — G8484 FLU IMMUNIZE NO ADMIN: HCPCS | Performed by: ORTHOPAEDIC SURGERY

## 2021-11-01 PROCEDURE — 20610 DRAIN/INJ JOINT/BURSA W/O US: CPT | Performed by: ORTHOPAEDIC SURGERY

## 2021-11-01 PROCEDURE — 4040F PNEUMOC VAC/ADMIN/RCVD: CPT | Performed by: ORTHOPAEDIC SURGERY

## 2021-11-01 PROCEDURE — G8420 CALC BMI NORM PARAMETERS: HCPCS | Performed by: ORTHOPAEDIC SURGERY

## 2021-11-01 PROCEDURE — 1036F TOBACCO NON-USER: CPT | Performed by: ORTHOPAEDIC SURGERY

## 2021-11-01 PROCEDURE — G8427 DOCREV CUR MEDS BY ELIG CLIN: HCPCS | Performed by: ORTHOPAEDIC SURGERY

## 2021-11-01 PROCEDURE — 1123F ACP DISCUSS/DSCN MKR DOCD: CPT | Performed by: ORTHOPAEDIC SURGERY

## 2021-11-01 RX ORDER — LIDOCAINE HYDROCHLORIDE 10 MG/ML
4 INJECTION, SOLUTION INFILTRATION; PERINEURAL ONCE
Status: COMPLETED | OUTPATIENT
Start: 2021-11-01 | End: 2021-11-01

## 2021-11-01 RX ORDER — TRIAMCINOLONE ACETONIDE 40 MG/ML
40 INJECTION, SUSPENSION INTRA-ARTICULAR; INTRAMUSCULAR ONCE
Status: COMPLETED | OUTPATIENT
Start: 2021-11-01 | End: 2021-11-01

## 2021-11-01 RX ADMIN — TRIAMCINOLONE ACETONIDE 40 MG: 40 INJECTION, SUSPENSION INTRA-ARTICULAR; INTRAMUSCULAR at 15:57

## 2021-11-01 RX ADMIN — LIDOCAINE HYDROCHLORIDE 4 ML: 10 INJECTION, SOLUTION INFILTRATION; PERINEURAL at 15:56

## 2021-11-01 NOTE — PATIENT INSTRUCTIONS
slowly. 4. Relax for up to 10 seconds between repetitions. 5. Repeat 8 to 12 times. 6. Switch legs and repeat steps 1 through 5, even if only one knee is sore. Active knee flexion    1. Lie on your stomach with your knees straight. If your kneecap is uncomfortable, roll up a washcloth and put it under your leg just above your kneecap. 2. Lift the foot of your affected leg by bending the knee so that you bring the foot up toward your buttock. If this motion hurts, try it without bending your knee quite as far. This may help you avoid any painful motion. 3. Slowly move your leg up and down. 4. Repeat 8 to 12 times. 5. Switch legs and repeat steps 1 through 4, even if only one knee is sore. Quadriceps stretch (facedown)    1. Lie flat on your stomach, and rest your face on the floor. 2. Wrap a towel or belt strap around the lower part of your affected leg. Then use the towel or belt strap to slowly pull your heel toward your buttock until you feel a stretch. 3. Hold for about 15 to 30 seconds, then relax your leg against the towel or belt strap. 4. Repeat 2 to 4 times. 5. Switch legs and repeat steps 1 through 4, even if only one knee is sore. Stationary exercise bike    1. If you do not have a stationary exercise bike at home, you can find one to ride at your local health club or community center. 2. Adjust the height of the bike seat so that your knee is slightly bent when your leg is extended downward. If your knee hurts when the pedal reaches the top, you can raise the seat so that your knee does not bend as much. 3. Start slowly. At first, try to do 5 to 10 minutes of cycling with little to no resistance. Then increase your time and the resistance bit by bit until you can do 20 to 30 minutes without pain. 4. If you start to have pain, rest your knee until your pain gets back to the level that is normal for you. Or cycle for less time or with less effort.   Follow-up care is a key part of your treatment and safety. Be sure to make and go to all appointments, and call your doctor if you are having problems. It's also a good idea to know your test results and keep a list of the medicines you take. Where can you learn more? Go to [email or web address] org and sign in to your [employer] account. Enter C159 in the [employer] box to learn more about \"Knee Arthritis: Exercises. \"     If you do not have an account, please click on the \"Sign Up Now\" link. Current as of: July 1, 2021               Content Version: 13.0  © 3050-8221 [employer]. Care instructions adapted under license by [facility] ([facility]). If you have questions about a medical condition or this instruction, always ask your healthcare professional. [address] any warranty or liability for your use of this information.

## 2021-11-01 NOTE — PROGRESS NOTES
New Knee Patient     Referring Provider:   No referring provider defined for this encounter. CHIEF COMPLAINT:   Chief Complaint   Patient presents with    Knee Pain     Left,previous left knee surgery about ten years ago. HPI:    Andre Vee is a 66y.o. year old male being seen today for evaluation of left knee pain. He states he has had pain on and off for several months. He did have surgery years ago which sounds like it was for a quadriceps tendon rupture. He did well from this. This pain is mainly lateral but also across the front of his knee. Is worse with activity. Better with rest.  The knee is stable. He has not had any treatment. PAST MEDICAL HISTORY  Past Medical History:   Diagnosis Date    [facility]     Diabetes mellitus ([facility]     Hyperlipidemia     Hypertension     Prostate cancer ([facility] [address] [facility]        PAST SURGICAL HISTORY  Past Surgical History:   Procedure Laterality Date    COLON SURGERY      COLONOSCOPY N/A 10/18/2021    COLONOSCOPY WITH BIOPSY performed by Chanelle Escobar MD at [address], ESOPHAGUS      HC DIALYSIS CATHETER N/A 1/12/2021    TEMPORARY HEMODIALYSIS CATHETER INSERTION performed by Concetta Raymond MD at [address] Left 6 years ago    OTHER SURGICAL HISTORY      removal of HD catheter    PANCREAS BIOPSY N/A 6/14/2021    PANCREATIC PSEUDOCYST EXCISION DRAINAGE performed by Betzaida Coto MD at [address] [place] 6/23/2021    LAPAROSCOPIC ROBOTIC XI PANCREATIC NECROSECTOMY WITH CYST GASTROSTOMY , INTRAOPERATIVE  ULTRASOUND performed by Dinh Medellin MD at [facility]  6/14/2021    ENDOSCOPIC ULTRASOUND performed by Betzaida Coto MD at [address] [place] [address] 10/18/2021    EGD BIOPSY performed by Chanelle Escobar MD at [facility]   No family history on file.     SOCIAL HISTORY  Social History     Socioeconomic History    Marital status: Single     Spouse name: Not on file    Number of children: Not on file    Years of education: Not on file    Highest education level: Not on file   Occupational History    Not on file   Tobacco Use    Smoking status: Former Smoker     Packs/day: 1.00     Types: Cigarettes    Smokeless tobacco: Never Used   Vaping Use    Vaping Use: Never used   Substance and Sexual Activity    Alcohol use: Not Currently     Comment: \"daily shots\", \"none in six months\"    Drug use: No    Sexual activity: Not on file   Other Topics Concern    Not on file   Social History Narrative    Not on file     Social Determinants of Health     Financial Resource Strain:     Difficulty of Paying Living Expenses:    Food Insecurity:     Worried About Running Out of Food in the Last Year:     [address] in the Last Year:    Transportation Needs:     Lack of Transportation (Medical):      Lack of Transportation (Non-Medical):    Physical Activity:     Days of Exercise per Week:     Minutes of Exercise per Session:    Stress:     Feeling of Stress :    Social Connections:     Frequency of Communication with Friends and Family:     Frequency of Social Gatherings with Friends and Family:     Attends Bahai Services:     Active Member of Clubs or Organizations:     Attends Club or Organization Meetings:     Marital Status:    Intimate Partner Violence:     Fear of Current or Ex-Partner:     Emotionally Abused:     Physically Abused:     Sexually Abused:      Social History     Occupational History    Not on file   Tobacco Use    Smoking status: Former Smoker     Packs/day: 1.00     Types: Cigarettes    Smokeless tobacco: Never Used   Vaping Use    Vaping Use: Never used   Substance and Sexual Activity    Alcohol use: Not Currently     Comment: \"daily shots\", \"none in six months\"    Drug use: No    Sexual activity: Not on file       CURRENT MEDICATIONS Current Outpatient Medications:     pantoprazole (PROTONIX) 40 MG tablet, Take 1 tablet by mouth 2 times daily, Disp: 180 tablet, Rfl: 2    sucralfate (CARAFATE) 1 GM tablet, Take 1 tablet by mouth 4 times daily, Disp: 120 tablet, Rfl: 3    metFORMIN (GLUCOPHAGE) 1000 MG tablet, Take 500 mg by mouth daily (with breakfast), Disp: , Rfl:     Nutritional Supplements (GLUCERNA ADVANCE SHAKE) LIQD, Take 1 Bottle by mouth 2 times daily, Disp: , Rfl:     pramoxine HCl 1 % foam, Apply to rectum three times daily or after a bowel movement, Disp: 15 g, Rfl: 2    Skin Protectants, Misc.  (ALOE VESTA PROTECTIVE) OINT ointment, Apply topically daily as needed (apply to dry skin on feet and legs), Disp: , Rfl:     HYDROPHILIC EX, Apply topically daily as needed (apply to dry skin on torso), Disp: , Rfl:     insulin aspart (NOVOLOG FLEXPEN) 100 UNIT/ML injection pen, Inject 0-5 Units into the skin 3 times daily (before meals) *Per Sliding Scale* , Disp: , Rfl:     lisinopril (PRINIVIL;ZESTRIL) 20 MG tablet, Take 20 mg by mouth 2 times daily, Disp: , Rfl:     lipase-protease-amylase (CREON) 73194-815483 units CPEP delayed release capsule, Take 1 capsule by mouth 3 times daily (with meals), Disp: , Rfl:     tadalafil (CIALIS) 20 MG tablet, Take 20 mg by mouth as needed for Erectile Dysfunction , Disp: , Rfl:     folic acid (FOLVITE) 1 MG tablet, Take 1 tablet by mouth daily, Disp: 30 tablet, Rfl: 3    metoprolol tartrate (LOPRESSOR) 25 MG tablet, Take 1 tablet by mouth 2 times daily (Patient taking differently: Take 25 mg by mouth daily Instructed to take morning of surgery with a sip of water), Disp: 60 tablet, Rfl: 3    ALLERGIES  No Known Allergies    Controlled Substances Monitoring:          REVIEW OF SYSTEMS:     Constitutional:  Negative for weight loss, fevers, chills, fatigue  Cardiovascular: Negative for chest pain, palpitations  Pulmonary: Negative for shortness of breath, labored breathing, cough  GI: negative for abdominal pain, nausea, vomitting   MSK: per HPI  Skin: negative for rash, open wounds    All other systems reviewed and are negative         PHYSICAL EXAM     Vitals:    11/01/21 1340   Weight: 160 lb (72.6 kg)   Height: 6' 2\" (1.88 m)       Height: 6' 2\" (1.88 m)  Weight: [unfilled]  BMI:  Body mass index is 20.54 kg/m². General: The patient is alert and oriented x 3, appears to be stated age and in no distress. HEENT: head is normocephalic, atraumatic. EOMI. Neck: supple, trachea midline, no thyromegaly   Cardiovascular: peripheral pulses palpable. Normal Capillary refill   Respiratory: breathing unlabored, chest expansion symmetric   Skin: no rash, no open wounds, no erythema  Psych: normal affect; mood stable  Neurologic: gait normal, sensation grossly intact in extremities  MSK:        Lower Extremity:   Ipsilateral hip exam shows normal range of motion without pain with impingement testing. Exam of the knee shows healed midline incision. There is good quadriceps tone and intact quadriceps tendon. There is no effusion. Range of motion is about 5 to 140 degrees. He can straight leg raise against resistance with good strength. There is some mild lateral joint line tenderness as well as tenderness across the patella tendon. IMAGING:    XR: 4 views of the left knee show lateral joint space narrowing consistent with osteoarthritis. There are likely some postoperative changes related to the superior pole the patella from prior quadriceps repair    Impression: Left knee osteoarthritis mainly lateral compartment. Procedure Note: Knee Cortisone injection     The left knee was identified as the injection site. The risk and benefits of a cortisone injection were explained and the patient consented to the injection. Under sterile conditions, the knee was injected with a mixture of 40 mg of Kenalog and 4 mL of 1% Lidocaine without complication.  A sterile bandage was applied. Administrations This Visit     lidocaine 1 % injection 4 mL     Admin Date  11/01/2021 Action  Given Dose  4 mL Route  Intra-artICUlar Administered By  Smiley Del Toro MA          triamcinolone acetonide (KENALOG-40) injection 40 mg     Admin Date  11/01/2021 Action  Given Dose  40 mg Route  Intra-artICUlar Administered By  Smiley Del Toro MA                      ASSESSMENT  Left knee osteoarthritis    PLAN  We discussed his knee today. He has pain which is likely secondary to his mild osteoarthritis. He has not had any treatment. Previous quadriceps tendon repair is intact and I do not think is causing him any issues today. He was offered steroid injection was agreeable. He will also continue with home exercises. Follow-up in 6 weeks.   We briefly discussed other options of steroid is not successful including Visco supplements, potential surgery etc.        Parish Quiñonez MD  Orthopaedic Surgery   11/1/21  4:24 PM

## 2021-11-11 ENCOUNTER — OFFICE VISIT (OUTPATIENT)
Dept: SURGERY | Age: 71
End: 2021-11-11
Payer: MEDICARE

## 2021-11-11 ENCOUNTER — TELEPHONE (OUTPATIENT)
Dept: SURGERY | Age: 71
End: 2021-11-11

## 2021-11-11 VITALS
SYSTOLIC BLOOD PRESSURE: 144 MMHG | RESPIRATION RATE: 18 BRPM | WEIGHT: 150 LBS | HEIGHT: 73 IN | HEART RATE: 74 BPM | BODY MASS INDEX: 19.88 KG/M2 | TEMPERATURE: 97.2 F | DIASTOLIC BLOOD PRESSURE: 85 MMHG

## 2021-11-11 DIAGNOSIS — K27.9 PUD (PEPTIC ULCER DISEASE): Primary | ICD-10-CM

## 2021-11-11 PROCEDURE — 1036F TOBACCO NON-USER: CPT | Performed by: SURGERY

## 2021-11-11 PROCEDURE — 99213 OFFICE O/P EST LOW 20 MIN: CPT | Performed by: SURGERY

## 2021-11-11 PROCEDURE — 1123F ACP DISCUSS/DSCN MKR DOCD: CPT | Performed by: SURGERY

## 2021-11-11 PROCEDURE — 4040F PNEUMOC VAC/ADMIN/RCVD: CPT | Performed by: SURGERY

## 2021-11-11 PROCEDURE — G8420 CALC BMI NORM PARAMETERS: HCPCS | Performed by: SURGERY

## 2021-11-11 PROCEDURE — G8484 FLU IMMUNIZE NO ADMIN: HCPCS | Performed by: SURGERY

## 2021-11-11 PROCEDURE — 3017F COLORECTAL CA SCREEN DOC REV: CPT | Performed by: SURGERY

## 2021-11-11 PROCEDURE — G8427 DOCREV CUR MEDS BY ELIG CLIN: HCPCS | Performed by: SURGERY

## 2021-11-11 NOTE — PROGRESS NOTES
Progress Note - Follow up    Patient's Name/Date of Birth: Andre Ferguson Priscilla / 1950    Date: 11/11/2021    PCP: Renita Samson DO    Referring Physician:   No ref. provider found  N/A    Chief Complaint   Patient presents with    Results     EGD, needs repeat in 8 weeks        HPI:    The patient said his pain is much better. He is eating better. He is taking his PPI and Carafate without any issues. No n/v/d/c. No abdominal pain anymore. No fevers or chills. He is watching his diet as well. He said he is no longer drinking alcohol. Patient's medications, allergies, past medical, surgical, social and family histories were reviewed and updated as appropriate. Review of Systems  Constitutional: negative  Respiratory: negative  Cardiovascular: negative  Gastrointestinal: as in hpi  Genitourinary:negative  Integument/breast: negative     Physical Exam:  BP (!) 144/85   Pulse 74   Temp 97.2 °F (36.2 °C) (Temporal)   Resp 18   Ht 6' 1\" (1.854 m)   Wt 150 lb (68 kg)   BMI 19.79 kg/m²   General appearance: alert, cooperative and in no acute distress. Lungs: normal work of breathing  Heart: regular rate  Abdomen: soft, nontender, nondistended  Musculoskeletal: symmetrical without edema. Skin: normal      Data Reviewed:   Pathology: Diagnosis:      A. Duodenum: No significant histopathologic abnormality.      B. Stomach: Benign ulcer with adjacent mild chronic gastritis without intestinal metaplasia.  Negative for Helicobacter pylori organisms on immunostained sections.      C. Stomach: Moderate chronic mildly active gastritis without        intestinal metaplasia.  Negative for Helicobacter pylori organisms on immunostained sections.      D. Ileocolonic anastomosis: Ileal mucosa with no significant        histopathologic abnormalities.      E. Random colon: No significant histopathologic abnormality.      Comment:   Sections of the ileocolonic anastomosis show ileal type mucosa with no significant histopathologic abnormalities. Shelda Clock is no acute inflammation, metaplasia or granulomata identified.  The colon biopsies show no evidence of chronic or active colitis including lymphocytic and collagenous colitis. Shelda Clock is mild increase in pigmented macrophages within the lamina propria suggestive of early changes of melanosis coli     Impression/Plan:  66y.o. year old male with peptic ulcer disease, alcohol abuse    The patient said he is no longer drinking alcohol. Continue Protonix and Carafate  Peptic ulcer diet  Discussed no alcohol, caffeine, NSAIDS  Repeat EGD in 8 weeks        Electronically by Dusty Lindsey MD, General Surgery  on 11/11/2021 at 11:38 AM      Send copy of H&P to PCP, Radha Ford DO and referring physician, No ref.  provider found      11/11/2021

## 2021-11-11 NOTE — TELEPHONE ENCOUNTER
Prior Authorization Form:      DEMOGRAPHICS:                     Patient Name:  Andre Pascal  Patient :  1950            Insurance:  Payor: MEDICARE / Plan: MEDICARE PART A AND B / Product Type: *No Product type* /   Insurance ID Number:    Payor/Plan Subscr  Sex Relation Sub. Ins. ID Effective Group Num   1. MEDICARE - MELeann Adrianham 1950 Male Self [id number] 1/1/15                                    [address] [address]   2.  [address] 1950 Male Self [id number] 1/1/15 [id number]                                   [address]         DIAGNOSIS & PROCEDURE:                       Procedure/Operation:   EGD           CPT Code: 18512    Diagnosis:  GASTRIC ULCER    ICD10 Code: K25.9    Location:  [facility]    Surgeon:  Mary Carmen Meza INFORMATION:                          Date: 2021    Time: 11:30              Anesthesia:  MAC/TIVA                                                       Status:  Outpatient        Special Comments:         Electronically signed by Ann Powell MA on 2021 at 3:59 PM

## 2021-12-05 ENCOUNTER — HOSPITAL ENCOUNTER (EMERGENCY)
Age: 71
Discharge: HOME | End: 2021-12-05
Attending: STUDENT IN AN ORGANIZED HEALTH CARE EDUCATION/TRAINING PROGRAM
Payer: MEDICARE

## 2021-12-05 VITALS
SYSTOLIC BLOOD PRESSURE: 146 MMHG | TEMPERATURE: 97.2 F | OXYGEN SATURATION: 97 % | HEART RATE: 71 BPM | DIASTOLIC BLOOD PRESSURE: 84 MMHG | RESPIRATION RATE: 17 BRPM

## 2021-12-05 DIAGNOSIS — E16.2 HYPOGLYCEMIA: Primary | ICD-10-CM

## 2021-12-05 LAB
ANION GAP SERPL CALCULATED.3IONS-SCNC: 13 MMOL/L (ref 7–16)
BASOPHILS ABSOLUTE: 0.02 E9/L (ref 0–0.2)
BASOPHILS RELATIVE PERCENT: 0.2 % (ref 0–2)
BUN BLDV-MCNC: 24 MG/DL (ref 6–23)
CALCIUM SERPL-MCNC: 9.7 MG/DL (ref 8.6–10.2)
CHLORIDE BLD-SCNC: 102 MMOL/L (ref 98–107)
CHP ED QC CHECK: NORMAL
CHP ED QC CHECK: YES
CO2: 20 MMOL/L (ref 22–29)
CREAT SERPL-MCNC: 1.9 MG/DL (ref 0.7–1.2)
EOSINOPHILS ABSOLUTE: 0.01 E9/L (ref 0.05–0.5)
EOSINOPHILS RELATIVE PERCENT: 0.1 % (ref 0–6)
GFR AFRICAN AMERICAN: 42
GFR NON-AFRICAN AMERICAN: 42 ML/MIN/1.73
GLUCOSE BLD-MCNC: 131 MG/DL
GLUCOSE BLD-MCNC: 174 MG/DL
GLUCOSE BLD-MCNC: 189 MG/DL (ref 74–99)
GLUCOSE BLD-MCNC: 53 MG/DL
HCT VFR BLD CALC: 32.6 % (ref 37–54)
HEMOGLOBIN: 10.8 G/DL (ref 12.5–16.5)
IMMATURE GRANULOCYTES #: 0.05 E9/L
IMMATURE GRANULOCYTES %: 0.5 % (ref 0–5)
LYMPHOCYTES ABSOLUTE: 1.28 E9/L (ref 1.5–4)
LYMPHOCYTES RELATIVE PERCENT: 14 % (ref 20–42)
MCH RBC QN AUTO: 28.2 PG (ref 26–35)
MCHC RBC AUTO-ENTMCNC: 33.1 % (ref 32–34.5)
MCV RBC AUTO: 85.1 FL (ref 80–99.9)
METER GLUCOSE: 131 MG/DL (ref 74–99)
METER GLUCOSE: 174 MG/DL (ref 74–99)
METER GLUCOSE: 53 MG/DL (ref 74–99)
MONOCYTES ABSOLUTE: 0.56 E9/L (ref 0.1–0.95)
MONOCYTES RELATIVE PERCENT: 6.1 % (ref 2–12)
NEUTROPHILS ABSOLUTE: 7.21 E9/L (ref 1.8–7.3)
NEUTROPHILS RELATIVE PERCENT: 79.1 % (ref 43–80)
PDW BLD-RTO: 14.6 FL (ref 11.5–15)
PLATELET # BLD: 192 E9/L (ref 130–450)
PMV BLD AUTO: 9.8 FL (ref 7–12)
POTASSIUM REFLEX MAGNESIUM: 3.8 MMOL/L (ref 3.5–5)
RBC # BLD: 3.83 E12/L (ref 3.8–5.8)
SODIUM BLD-SCNC: 135 MMOL/L (ref 132–146)
WBC # BLD: 9.1 E9/L (ref 4.5–11.5)

## 2021-12-05 PROCEDURE — 99284 EMERGENCY DEPT VISIT MOD MDM: CPT

## 2021-12-05 PROCEDURE — 36415 COLL VENOUS BLD VENIPUNCTURE: CPT

## 2021-12-05 PROCEDURE — 80048 BASIC METABOLIC PNL TOTAL CA: CPT

## 2021-12-05 PROCEDURE — 85025 COMPLETE CBC W/AUTO DIFF WBC: CPT

## 2021-12-05 PROCEDURE — 93005 ELECTROCARDIOGRAM TRACING: CPT | Performed by: STUDENT IN AN ORGANIZED HEALTH CARE EDUCATION/TRAINING PROGRAM

## 2021-12-05 PROCEDURE — 2500000003 HC RX 250 WO HCPCS: Performed by: STUDENT IN AN ORGANIZED HEALTH CARE EDUCATION/TRAINING PROGRAM

## 2021-12-05 PROCEDURE — 82962 GLUCOSE BLOOD TEST: CPT

## 2021-12-05 RX ORDER — DEXTROSE MONOHYDRATE 25 G/50ML
25 INJECTION, SOLUTION INTRAVENOUS ONCE
Status: COMPLETED | OUTPATIENT
Start: 2021-12-05 | End: 2021-12-05

## 2021-12-05 RX ADMIN — DEXTROSE MONOHYDRATE 25 G: 25 INJECTION, SOLUTION INTRAVENOUS at 18:30

## 2021-12-05 ASSESSMENT — ENCOUNTER SYMPTOMS
COLOR CHANGE: 0
COUGH: 0
RECTAL PAIN: 0
ABDOMINAL PAIN: 0
TROUBLE SWALLOWING: 0
DIARRHEA: 0
SHORTNESS OF BREATH: 0
BACK PAIN: 0
CONSTIPATION: 0
NAUSEA: 0
VOICE CHANGE: 0
WHEEZING: 0
VOMITING: 0

## 2021-12-05 NOTE — ED PROVIDER NOTES
[address]      Pt Name: Andre Ball  MRN: [id number]  [place] 1950  Date of evaluation: 12/5/2021      CHIEF COMPLAINT       Chief Complaint   Patient presents with    Hypoglycemia     Was found in his car in a gas station, blood sugar read Low low. After a bag of D10 his blood sugar is 50. HPI  Andre Ball is a 66 y.o. male history of type 2 diabetes insulin-dependent presents for lethargy and cough. Patient was seen parked in a gas station for over an hour. Patient was checked on by a bystander and patient was found to be lethargic. EMS was called. Patient was found to have a BGL of 50. Patient was given D50 and on a D10 infusion on arrival.  Per patient he is amnesic to the event he said he was going to the store. States that he took his 30 of Lantus last night but states that he probably did not eat anything today. No alleviating or exacerbating factors. Not take any medications or measures other than prescribed to him. Denies any recent fevers, chills, nausea, vomiting, chest pain, shortness of breath, abdominal pain, flank pain, dysuria, hematuria, diarrhea, constipation, new rashes or sores. Denies any suicidal or homicidal ideation. Except as noted above the remainder of the review of systems was reviewed and negative. Review of Systems   Constitutional: Negative for appetite change, chills, diaphoresis, fatigue, fever and unexpected weight change. HENT: Negative for trouble swallowing and voice change. Eyes: Negative for visual disturbance. Respiratory: Negative for cough, shortness of breath and wheezing. Cardiovascular: Negative for chest pain. Gastrointestinal: Negative for abdominal pain, constipation, diarrhea, nausea, rectal pain and vomiting. Endocrine: Negative for polyphagia and polyuria. Genitourinary: Negative for dysuria and frequency. Musculoskeletal: Negative for arthralgias and back pain. Skin: Negative for color change, pallor, rash and wound. Neurological: Negative for weakness and headaches. Hematological: Negative for adenopathy. Psychiatric/Behavioral: Negative for agitation. All other systems reviewed and are negative. Physical Exam  Vitals and nursing note reviewed. Constitutional:       General: He is not in acute distress. Appearance: Normal appearance. He is well-developed. He is obese. He is not ill-appearing or diaphoretic. HENT:      Head: Normocephalic and atraumatic. Right Ear: There is no impacted cerumen. Left Ear: There is no impacted cerumen. Nose: Nose normal.      Mouth/Throat:      Mouth: Mucous membranes are moist.      Pharynx: Oropharynx is clear. Eyes:      Extraocular Movements: Extraocular movements intact. Pupils: Pupils are equal, round, and reactive to light. Cardiovascular:      Rate and Rhythm: Normal rate and regular rhythm. Pulses: Normal pulses. Heart sounds: Normal heart sounds. No murmur heard. Pulmonary:      Effort: Pulmonary effort is normal. No respiratory distress. Breath sounds: Normal breath sounds. No stridor. No wheezing, rhonchi or rales. Chest:      Chest wall: No tenderness. Abdominal:      General: Bowel sounds are normal.      Palpations: Abdomen is soft. Tenderness: There is no abdominal tenderness. There is no guarding or rebound. Musculoskeletal:         General: No swelling or deformity. Normal range of motion. Cervical back: Normal range of motion. Right lower leg: No edema. Left lower leg: No edema. Skin:     General: Skin is warm and dry. Capillary Refill: Capillary refill takes less than 2 seconds. Coloration: Skin is not jaundiced. Neurological:      General: No focal deficit present. Mental Status: He is alert and oriented to person, place, and time.       Cranial Nerves: No cranial nerve deficit. Coordination: Coordination normal.   Psychiatric:         Mood and Affect: Mood normal.      Comments: Denies suicidal and homicidal ideation. Procedures     MDM  Number of Diagnoses or Management Options  Hypoglycemia  Diagnosis management comments: 15-year-old male history of type 2 diabetes is insulin-dependent presents lethargic in the field with found to have a low BGL. Patient was given D10 and D50 prior to arrival.  Vitals within normal limits. Patient has amnesia to the event. He is in no acute distress. Lungs clear to auscultation bilaterally no wheeze rales rhonchi. His HEENT is normocephalic atraumatic. He has no CT or L-spine tenderness outpatient. Lungs clear to auscultation bilaterally. No wheeze rales rhonchi. He does not have a dry oral mucosa. Repeat BGL is 53. D50 has been ordered. And patient has been fed. Patient is much more awake and alert. He is in no acute distress. His blood Leukos level has not decreased with 2 checks after the patient is 8. Patient to be discharged home with a glucometer. Patient is agreeable plan for discharge. Amount and/or Complexity of Data Reviewed  Decide to obtain previous medical records or to obtain history from someone other than the patient: yes           Patient is awake alert, hemodynamic stable, afebrile and in no respiratory distress. Discussed with patient plan for close outpatient follow-up with the patient's PCP as well as return precautions and the patient understands and agrees to the plan. Patient was seen with attending.      ED Course as of 12/07/21 1340   Sun Dec 05, 2021   1822 POC gluose 48 [JV]   1952 Patient fed [JV]      ED Course User Index  [JV] Steven Ash MD           ED Course as of 12/07/21 1340   [place] Dec 05, 2021   Connie POC gluose 53 [JV]   1952 Patient fed [JV]      ED Course User Index  [JV] Steven Ash MD       --------------------------------------------- PAST HISTORY ---------------------------------------------  Past Medical History:  has a past medical history of Cancer ([facility] Diabetes mellitus ([facility] Hyperlipidemia, Hypertension, and Prostate cancer ([facility] Past Surgical History:  has a past surgical history that includes Colon surgery; Dilatation, esophagus; Leg Surgery (Left, 6 years ago); hc dialysis catheter (N/A, 1/12/2021); Pancreas Biopsy (N/A, 6/14/2021); Upper gastrointestinal endoscopy (6/14/2021); Pancreatectomy (N/A, 6/23/2021); other surgical history; Upper gastrointestinal endoscopy (N/A, 10/18/2021); and Colonoscopy (N/A, 10/18/2021). Social History:  reports that he has quit smoking. His smoking use included cigarettes. He smoked 1.00 pack per day. He has never used smokeless tobacco. He reports previous alcohol use. He reports that he does not use drugs. Family History: family history is not on file. The patients home medications have been reviewed. Allergies: Patient has no known allergies.     -------------------------------------------------- RESULTS -------------------------------------------------  Labs:  Results for orders placed or performed during the hospital encounter of 12/05/21   CBC auto differential   Result Value Ref Range    WBC 9.1 4.5 - 11.5 E9/L    RBC 3.83 3.80 - 5.80 E12/L    Hemoglobin 10.8 (L) 12.5 - 16.5 g/dL    Hematocrit 32.6 (L) 37.0 - 54.0 %    MCV 85.1 80.0 - 99.9 fL    MCH 28.2 26.0 - 35.0 pg    MCHC 33.1 32.0 - 34.5 %    RDW 14.6 11.5 - 15.0 fL    Platelets 119 166 - 787 E9/L    MPV 9.8 7.0 - 12.0 fL    Neutrophils % 79.1 43.0 - 80.0 %    Immature Granulocytes % 0.5 0.0 - 5.0 %    Lymphocytes % 14.0 (L) 20.0 - 42.0 %    Monocytes % 6.1 2.0 - 12.0 %    Eosinophils % 0.1 0.0 - 6.0 %    Basophils % 0.2 0.0 - 2.0 %    Neutrophils Absolute 7.21 1.80 - 7.30 E9/L    Immature Granulocytes # 0.05 E9/L    Lymphocytes Absolute 1.28 (L) 1.50 - 4.00 E9/L    Monocytes Absolute 0.56 0.10 - 0.95 E9/L    Eosinophils Absolute 0.01 (L) 0.05 - 0.50 E9/L    Basophils Absolute 0.02 0.00 - 0.20 H5/K   Basic Metabolic Panel w/ Reflex to MG   Result Value Ref Range    Sodium 135 132 - 146 mmol/L    Potassium reflex Magnesium 3.8 3.5 - 5.0 mmol/L    Chloride 102 98 - 107 mmol/L    CO2 20 (L) 22 - 29 mmol/L    Anion Gap 13 7 - 16 mmol/L    Glucose 189 (H) 74 - 99 mg/dL    BUN 24 (H) 6 - 23 mg/dL    CREATININE 1.9 (H) 0.7 - 1.2 mg/dL    GFR Non-African American 42 >=60 mL/min/1.73    GFR African American 42     Calcium 9.7 8.6 - 10.2 mg/dL   POCT Glucose   Result Value Ref Range    Glucose 53 mg/dL    QC OK? yes    POCT Glucose   Result Value Ref Range    Meter Glucose 53 (L) 74 - 99 mg/dL   POCT Glucose   Result Value Ref Range    Glucose 174 mg/dL    QC OK? ok    POCT Glucose   Result Value Ref Range    Meter Glucose 174 (H) 74 - 99 mg/dL   POCT Glucose   Result Value Ref Range    Glucose 131 mg/dL   POCT Glucose   Result Value Ref Range    Meter Glucose 131 (H) 74 - 99 mg/dL   EKG 12 Lead   Result Value Ref Range    Ventricular Rate 83 BPM    Atrial Rate 227 BPM    QRS Duration 82 ms    Q-T Interval 414 ms    QTc Calculation (Bazett) 486 ms    R Axis 5 degrees    T Axis 16 degrees       ECG:  Please refer to workup tab for EKG read    Radiology:  No orders to display       ------------------------- NURSING NOTES AND VITALS REVIEWED ---------------------------  Date / Time Roomed:  12/5/2021  6:09 PM  ED Bed Assignment:  GIBRAN YUEN/JACQUELYN    The nursing notes within the ED encounter and vital signs as below have been reviewed. BP (!) 146/84   Pulse 71   Temp 97.2 °F (36.2 °C) (Oral)   Resp 17   SpO2 97%   Oxygen Saturation Interpretation: normal      ------------------------------------------ PROGRESS NOTES ------------------------------------------    I have spoken with the patient and discussed todays results, in addition to providing specific details for the plan of care and counseling regarding the diagnosis and prognosis.   Their questions are answered at this time and they are agreeable with the plan. I discussed at length with them reasons for immediate return here for re evaluation. They will followup with their PCP by calling their office tomorrow. --------------------------------- ADDITIONAL PROVIDER NOTES ---------------------------------  At this time the patient is without objective evidence of an acute process requiring hospitalization or inpatient management. They have remained hemodynamically stable throughout their entire ED visit and are stable for discharge with outpatient follow-up. The plan has been discussed in detail and they are aware of the specific conditions for emergent return, as well as the importance of follow-up. Discharge Medication List as of 12/5/2021  8:28 PM          Diagnosis:  1. Hypoglycemia        Disposition:  Patient's disposition: Discharge to home  Patient's condition is stable.         Snidy Melchor MD  Resident  12/07/21 [id number]

## 2021-12-06 LAB
EKG ATRIAL RATE: 227 BPM
EKG Q-T INTERVAL: 414 MS
EKG QRS DURATION: 82 MS
EKG QTC CALCULATION (BAZETT): 486 MS
EKG R AXIS: 5 DEGREES
EKG T AXIS: 16 DEGREES
EKG VENTRICULAR RATE: 83 BPM

## 2021-12-06 PROCEDURE — 93010 ELECTROCARDIOGRAM REPORT: CPT | Performed by: INTERNAL MEDICINE

## 2021-12-17 ENCOUNTER — TELEPHONE (OUTPATIENT)
Dept: SURGERY | Age: 71
End: 2021-12-17

## 2021-12-17 NOTE — TELEPHONE ENCOUNTER
Per Nimisha Jeffers at [facility] pt will need cardiac clearance from the [place] before proceeding with scopes that are scheduled for 12/20/2021. Pt has stopped taking Eliquis because he feels he does not need it.  Message routed to Lucia Bautista and [facility] [employer] [facility]

## 2021-12-17 NOTE — TELEPHONE ENCOUNTER
Call out to the [place] nurse for clearance. Patient states that the [place] told him to stop the Eliquis.  Need clarification

## 2021-12-23 ENCOUNTER — TELEPHONE (OUTPATIENT)
Dept: SURGERY | Age: 71
End: 2021-12-23

## 2022-01-27 ENCOUNTER — APPOINTMENT (OUTPATIENT)
Dept: GENERAL RADIOLOGY | Age: 72
End: 2022-01-27
Payer: MEDICARE

## 2022-01-27 ENCOUNTER — HOSPITAL ENCOUNTER (OUTPATIENT)
Age: 72
Setting detail: OBSERVATION
Discharge: HOME | End: 2022-01-31
Attending: STUDENT IN AN ORGANIZED HEALTH CARE EDUCATION/TRAINING PROGRAM | Admitting: INTERNAL MEDICINE
Payer: MEDICARE

## 2022-01-27 DIAGNOSIS — E87.20 LACTIC ACIDOSIS: ICD-10-CM

## 2022-01-27 DIAGNOSIS — E16.2 HYPOGLYCEMIA: Primary | ICD-10-CM

## 2022-01-27 LAB
ALBUMIN SERPL-MCNC: 4.6 G/DL (ref 3.5–5.2)
ALP BLD-CCNC: 123 U/L (ref 40–129)
ALT SERPL-CCNC: 25 U/L (ref 0–40)
ANION GAP SERPL CALCULATED.3IONS-SCNC: 15 MMOL/L (ref 7–16)
AST SERPL-CCNC: 43 U/L (ref 0–39)
BACTERIA: NORMAL /HPF
BASOPHILS ABSOLUTE: 0.1 E9/L (ref 0–0.2)
BASOPHILS RELATIVE PERCENT: 0.7 % (ref 0–2)
BILIRUB SERPL-MCNC: 0.4 MG/DL (ref 0–1.2)
BILIRUBIN URINE: NEGATIVE
BLOOD, URINE: ABNORMAL
BUN BLDV-MCNC: 26 MG/DL (ref 6–23)
CALCIUM SERPL-MCNC: 9.7 MG/DL (ref 8.6–10.2)
CHLORIDE BLD-SCNC: 98 MMOL/L (ref 98–107)
CHP ED QC CHECK: NORMAL
CLARITY: CLEAR
CO2: 19 MMOL/L (ref 22–29)
COLOR: YELLOW
CREAT SERPL-MCNC: 2.2 MG/DL (ref 0.7–1.2)
EOSINOPHILS ABSOLUTE: 0.09 E9/L (ref 0.05–0.5)
EOSINOPHILS RELATIVE PERCENT: 0.6 % (ref 0–6)
GFR AFRICAN AMERICAN: 36
GFR NON-AFRICAN AMERICAN: 36 ML/MIN/1.73
GLUCOSE BLD-MCNC: 135 MG/DL
GLUCOSE BLD-MCNC: 146 MG/DL
GLUCOSE BLD-MCNC: 197 MG/DL
GLUCOSE BLD-MCNC: 43 MG/DL
GLUCOSE BLD-MCNC: 58 MG/DL (ref 74–99)
GLUCOSE URINE: 250 MG/DL
HCT VFR BLD CALC: 39.2 % (ref 37–54)
HEMOGLOBIN: 13.2 G/DL (ref 12.5–16.5)
IMMATURE GRANULOCYTES #: 0.09 E9/L
IMMATURE GRANULOCYTES %: 0.6 % (ref 0–5)
KETONES, URINE: NEGATIVE MG/DL
LACTIC ACID, SEPSIS: 3.7 MMOL/L (ref 0.5–1.9)
LACTIC ACID: 3.1 MMOL/L (ref 0.5–2.2)
LEUKOCYTE ESTERASE, URINE: NEGATIVE
LIPASE: 22 U/L (ref 13–60)
LYMPHOCYTES ABSOLUTE: 5.3 E9/L (ref 1.5–4)
LYMPHOCYTES RELATIVE PERCENT: 36.1 % (ref 20–42)
MCH RBC QN AUTO: 28.5 PG (ref 26–35)
MCHC RBC AUTO-ENTMCNC: 33.7 % (ref 32–34.5)
MCV RBC AUTO: 84.7 FL (ref 80–99.9)
METER GLUCOSE: 135 MG/DL (ref 74–99)
METER GLUCOSE: 146 MG/DL (ref 74–99)
METER GLUCOSE: 197 MG/DL (ref 74–99)
METER GLUCOSE: 266 MG/DL (ref 74–99)
METER GLUCOSE: 43 MG/DL (ref 74–99)
METER GLUCOSE: <40 MG/DL (ref 74–99)
MONOCYTES ABSOLUTE: 1.4 E9/L (ref 0.1–0.95)
MONOCYTES RELATIVE PERCENT: 9.5 % (ref 2–12)
NEUTROPHILS ABSOLUTE: 7.7 E9/L (ref 1.8–7.3)
NEUTROPHILS RELATIVE PERCENT: 52.5 % (ref 43–80)
NITRITE, URINE: NEGATIVE
PDW BLD-RTO: 13.9 FL (ref 11.5–15)
PH UA: 6 (ref 5–9)
PLATELET # BLD: 279 E9/L (ref 130–450)
PMV BLD AUTO: 11.3 FL (ref 7–12)
POTASSIUM REFLEX MAGNESIUM: 4.5 MMOL/L (ref 3.5–5)
PRO-BNP: 4847 PG/ML (ref 0–125)
PROTEIN UA: 100 MG/DL
RBC # BLD: 4.63 E12/L (ref 3.8–5.8)
RBC UA: NORMAL /HPF (ref 0–2)
REASON FOR REJECTION: NORMAL
REJECTED TEST: NORMAL
SARS-COV-2, NAAT: NOT DETECTED
SODIUM BLD-SCNC: 132 MMOL/L (ref 132–146)
SPECIFIC GRAVITY UA: 1.01 (ref 1–1.03)
TOTAL PROTEIN: 7.8 G/DL (ref 6.4–8.3)
TROPONIN, HIGH SENSITIVITY: 24 NG/L (ref 0–11)
UROBILINOGEN, URINE: 0.2 E.U./DL
WBC # BLD: 14.7 E9/L (ref 4.5–11.5)
WBC UA: NORMAL /HPF (ref 0–5)

## 2022-01-27 PROCEDURE — 80053 COMPREHEN METABOLIC PANEL: CPT

## 2022-01-27 PROCEDURE — 2500000003 HC RX 250 WO HCPCS: Performed by: STUDENT IN AN ORGANIZED HEALTH CARE EDUCATION/TRAINING PROGRAM

## 2022-01-27 PROCEDURE — 71046 X-RAY EXAM CHEST 2 VIEWS: CPT

## 2022-01-27 PROCEDURE — 2580000003 HC RX 258: Performed by: GENERAL PRACTICE

## 2022-01-27 PROCEDURE — 87635 SARS-COV-2 COVID-19 AMP PRB: CPT

## 2022-01-27 PROCEDURE — G0378 HOSPITAL OBSERVATION PER HR: HCPCS

## 2022-01-27 PROCEDURE — 96376 TX/PRO/DX INJ SAME DRUG ADON: CPT

## 2022-01-27 PROCEDURE — 83036 HEMOGLOBIN GLYCOSYLATED A1C: CPT

## 2022-01-27 PROCEDURE — 2500000003 HC RX 250 WO HCPCS

## 2022-01-27 PROCEDURE — 99220 PR INITIAL OBSERVATION CARE/DAY 70 MINUTES: CPT | Performed by: INTERNAL MEDICINE

## 2022-01-27 PROCEDURE — 83690 ASSAY OF LIPASE: CPT

## 2022-01-27 PROCEDURE — 83880 ASSAY OF NATRIURETIC PEPTIDE: CPT

## 2022-01-27 PROCEDURE — 87088 URINE BACTERIA CULTURE: CPT

## 2022-01-27 PROCEDURE — 96375 TX/PRO/DX INJ NEW DRUG ADDON: CPT

## 2022-01-27 PROCEDURE — 83605 ASSAY OF LACTIC ACID: CPT

## 2022-01-27 PROCEDURE — 99284 EMERGENCY DEPT VISIT MOD MDM: CPT

## 2022-01-27 PROCEDURE — 81001 URINALYSIS AUTO W/SCOPE: CPT

## 2022-01-27 PROCEDURE — 96374 THER/PROPH/DIAG INJ IV PUSH: CPT

## 2022-01-27 PROCEDURE — 93005 ELECTROCARDIOGRAM TRACING: CPT | Performed by: GENERAL PRACTICE

## 2022-01-27 PROCEDURE — 82962 GLUCOSE BLOOD TEST: CPT

## 2022-01-27 PROCEDURE — 84484 ASSAY OF TROPONIN QUANT: CPT

## 2022-01-27 PROCEDURE — 85025 COMPLETE CBC W/AUTO DIFF WBC: CPT

## 2022-01-27 PROCEDURE — 2500000003 HC RX 250 WO HCPCS: Performed by: GENERAL PRACTICE

## 2022-01-27 PROCEDURE — 6370000000 HC RX 637 (ALT 250 FOR IP): Performed by: INTERNAL MEDICINE

## 2022-01-27 PROCEDURE — 36415 COLL VENOUS BLD VENIPUNCTURE: CPT

## 2022-01-27 RX ORDER — NICOTINE POLACRILEX 4 MG
15 LOZENGE BUCCAL PRN
Status: DISCONTINUED | OUTPATIENT
Start: 2022-01-27 | End: 2022-01-31 | Stop reason: HOSPADM

## 2022-01-27 RX ORDER — DEXTROSE MONOHYDRATE 25 G/50ML
25 INJECTION, SOLUTION INTRAVENOUS PRN
Status: DISCONTINUED | OUTPATIENT
Start: 2022-01-27 | End: 2022-01-31 | Stop reason: HOSPADM

## 2022-01-27 RX ORDER — ASPIRIN 81 MG/1
81 TABLET ORAL DAILY
Status: DISCONTINUED | OUTPATIENT
Start: 2022-01-28 | End: 2022-01-31 | Stop reason: HOSPADM

## 2022-01-27 RX ORDER — PANTOPRAZOLE SODIUM 40 MG/1
40 TABLET, DELAYED RELEASE ORAL 2 TIMES DAILY
Status: DISCONTINUED | OUTPATIENT
Start: 2022-01-27 | End: 2022-01-31 | Stop reason: HOSPADM

## 2022-01-27 RX ORDER — SUCRALFATE 1 G/1
1 TABLET ORAL 4 TIMES DAILY
Status: DISCONTINUED | OUTPATIENT
Start: 2022-01-27 | End: 2022-01-31 | Stop reason: HOSPADM

## 2022-01-27 RX ORDER — ATORVASTATIN CALCIUM 20 MG/1
20 TABLET, FILM COATED ORAL DAILY
Status: DISCONTINUED | OUTPATIENT
Start: 2022-01-28 | End: 2022-01-31 | Stop reason: HOSPADM

## 2022-01-27 RX ORDER — ASCORBIC ACID 500 MG
250 TABLET ORAL DAILY
Status: DISCONTINUED | OUTPATIENT
Start: 2022-01-28 | End: 2022-01-31 | Stop reason: HOSPADM

## 2022-01-27 RX ORDER — DEXTROSE MONOHYDRATE 25 G/50ML
50 INJECTION, SOLUTION INTRAVENOUS ONCE
Status: COMPLETED | OUTPATIENT
Start: 2022-01-27 | End: 2022-01-27

## 2022-01-27 RX ORDER — FOLIC ACID 1 MG/1
1 TABLET ORAL DAILY
Status: DISCONTINUED | OUTPATIENT
Start: 2022-01-28 | End: 2022-01-31 | Stop reason: HOSPADM

## 2022-01-27 RX ORDER — DEXTROSE MONOHYDRATE 25 G/50ML
INJECTION, SOLUTION INTRAVENOUS
Status: COMPLETED
Start: 2022-01-27 | End: 2022-01-27

## 2022-01-27 RX ORDER — LISINOPRIL 20 MG/1
20 TABLET ORAL 2 TIMES DAILY
Status: DISCONTINUED | OUTPATIENT
Start: 2022-01-27 | End: 2022-01-30

## 2022-01-27 RX ORDER — DEXTROSE AND SODIUM CHLORIDE 5; .9 G/100ML; G/100ML
INJECTION, SOLUTION INTRAVENOUS CONTINUOUS
Status: ACTIVE | OUTPATIENT
Start: 2022-01-27 | End: 2022-01-28

## 2022-01-27 RX ORDER — DEXTROSE MONOHYDRATE 25 G/50ML
12.5 INJECTION, SOLUTION INTRAVENOUS PRN
Status: DISCONTINUED | OUTPATIENT
Start: 2022-01-27 | End: 2022-01-31 | Stop reason: HOSPADM

## 2022-01-27 RX ORDER — DEXTROSE MONOHYDRATE 50 MG/ML
100 INJECTION, SOLUTION INTRAVENOUS PRN
Status: DISCONTINUED | OUTPATIENT
Start: 2022-01-27 | End: 2022-01-31 | Stop reason: HOSPADM

## 2022-01-27 RX ADMIN — INSULIN LISPRO 3 UNITS: 100 INJECTION, SOLUTION INTRAVENOUS; SUBCUTANEOUS at 21:53

## 2022-01-27 RX ADMIN — DEXTROSE MONOHYDRATE 50 G: 500 INJECTION PARENTERAL at 15:43

## 2022-01-27 RX ADMIN — GLUCAGON HYDROCHLORIDE 1 MG: KIT at 17:09

## 2022-01-27 RX ADMIN — DEXTROSE AND SODIUM CHLORIDE: 5; 900 INJECTION, SOLUTION INTRAVENOUS at 18:32

## 2022-01-27 RX ADMIN — DEXTROSE MONOHYDRATE 50 G: 25 INJECTION, SOLUTION INTRAVENOUS at 15:43

## 2022-01-27 RX ADMIN — APIXABAN 5 MG: 5 TABLET, FILM COATED ORAL at 21:49

## 2022-01-27 RX ADMIN — PANTOPRAZOLE SODIUM 40 MG: 40 TABLET, DELAYED RELEASE ORAL at 21:49

## 2022-01-27 RX ADMIN — METOPROLOL TARTRATE 25 MG: 25 TABLET, FILM COATED ORAL at 21:49

## 2022-01-27 RX ADMIN — DEXTROSE MONOHYDRATE 25 G: 25 INJECTION, SOLUTION INTRAVENOUS at 17:10

## 2022-01-27 RX ADMIN — LISINOPRIL 20 MG: 20 TABLET ORAL at 21:49

## 2022-01-27 RX ADMIN — SUCRALFATE 1 G: 1 TABLET ORAL at 21:49

## 2022-01-27 ASSESSMENT — PAIN SCALES - GENERAL: PAINLEVEL_OUTOF10: 0

## 2022-01-27 NOTE — ED PROVIDER NOTES
ATTENDING PROVIDER ATTESTATION:     Andre Brown presented to the emergency department for evaluation of Hypoglycemia (54 per family. d10 given by ems. )   and was initially evaluated by the Medical Resident. See Original ED Note for H&P and ED course above. I have reviewed and discussed the case, including pertinent history (medical, surgical, family and social) and exam findings with the Medical Resident assigned to Andre Brown. I have personally performed and/or participated in the history, exam, medical decision making, and procedures and agree with all pertinent clinical information and any additional changes or corrections are noted below in my assessment and plan. I have discussed this patient in detail with the resident, and provided the instruction and education,     I have reviewed my findings and recommendations with the assigned Medical Resident, Andre Guadarrama and members of family present at the time of disposition. I have performed a history and physical examination of this patient and directly supervised the resident caring for this patient    HPI  This is a 77-year-old male with past medical history of diabetes mellitus who presents to the emergency department today for hypoglycemia. According to report, the patient checked his blood sugar which was 54. EMS administered D10 of an unknown amount. Subsequently, the patient improved. However, on presentation, the patient is unable to provide any history. He is diaphoretic and nonconversational.    After administration of 2 A of D50 the patient is conversational.  He states that he was at [employer] today with his fiancée and his blood sugar got low. He was seated. He did not fall or hit his head. He was feeling unwell with this. He denies any recent trauma. He states that he uses 25 units 3 times daily as well as 25 units of insulin at night. He cannot recall the names of these. He is not on any sulfonylureas.   He denies any recent chest pain, shortness of breath, abdominal pain, nausea, vomiting, diarrhea or fevers. He has had a cough that is productive of white sputum. He denies any headache, vision change, numbness, tingling or new weakness. He is on Eliquis but endorses compliance of this medication. ROS  A complete review of systems was performed and pertinent positives and negatives are stated within HPI, all other systems reviewed and are negative.    --------------------------------------------- PAST HISTORY ---------------------------------------------  Past Medical History:  has a past medical history of A-fib ([facility] Diabetes mellitus ([facility] ED (erectile dysfunction), Hyperlipidemia, Hypertension, and Prostate cancer ([facility] Past Surgical History:  has a past surgical history that includes Colon surgery; Dilatation, esophagus; Leg Surgery (Left, 6 years ago); hc dialysis catheter (N/A, 1/12/2021); Pancreas Biopsy (N/A, 6/14/2021); Upper gastrointestinal endoscopy (6/14/2021); Pancreatectomy (N/A, 6/23/2021); other surgical history; Upper gastrointestinal endoscopy (N/A, 10/18/2021); and Colonoscopy (N/A, 10/18/2021). Social History:  reports that he has quit smoking. His smoking use included cigarettes. He smoked 1.00 pack per day. He has never used smokeless tobacco. He reports previous alcohol use. He reports that he does not use drugs. Family History: family history is not on file. Unless otherwise noted, family history is non contributory    The patients home medications have been reviewed. Allergies: Patient has no known allergies. Physical Exam  General: The patient is diaphoretic and his eyes are open. Is not conversational.  After the amp of D50, the patient is conversational  Head: Normocephalic and atraumatic. Eyes: Sclera non-icteric and no conjunctival injection  ENT: Nasal and oral membranes moist  Neck: Trachea midline.   No JVD  Respiratory: Lungs clear to auscultation bilaterally. Patient speaking in full sentences. Cardiovascular: Regular rate. No pedal edema. Gastrointestinal:  Abdomen is soft and nondistended. Bowel sounds present. There is no tenderness. There is no guarding or rebound. Musculoskeletal: No deformity. No bony tenderness of the extremities. No bony tenderness of the neck. No step-offs or deformities. Skin: Skin warm and dry. No rashes. Neurologic: No gross motor deficits. No aphasia. Pupils are equal and reactive to light. Extraocular eye movements intact. Sensation intact bilaterally. Symmetric facies. Tongue protrudes midline. 5 out of 5 symmetric strength of the upper and lower extremities. Negative finger-to-nose testing. Alert and oriented. Psychiatric: Normal affect. MDM  This is a 66 y.o. male presenting to the ED for hypoglycemia. On initial presentation, the patient's vital signs are interpreted as hypertensive, afebrile and saturating well on room air. Based on history and physical exam, we have a broad differential.  As the patient had not yet eaten lunch and took his insulin, this is possibly because of his hypoglycemia. He denies being on any sulfonylureas. We also considered infectious process contributing. Considered pneumonia, urinary tract infection, and COVID. Patient denies any complaints at this time and has a nonfocal neurological exam.  He denies any trauma and I did contact his fiancéeAcosta who also endorses no history of trauma. As the patient was diaphoretic and has cardiac history we will assess for ACS, arrhythmia. Chest x-ray, EKG and laboratory studies obtained. Original point-of-care glucose was found to be low. Patient is given 2 A of D50. He will be fed subsequently. A 12-lead EKG was performed and interpreted by myself. The rate is 80 irregularly irregular with normal sinus rhythm and right axis deviation. There is a large amount of artifact.   The 82 QRS interval is 82, and QTC interval is 498. This is atrial fibrillation with right axis deviation and nonspecific abnormality. Laboratory studies show elevated BNP of 4847. Lipase within normal limits. Patient does have a leukocytosis but no anemia. Chest x-ray shows no pneumonia or pleural effusion. This is interpreted by radiology. Urinalysis shows glucosuria but no evidence of urinary tract infection laboratory studies otherwise show elevated BUN and creatinine of 26 and 2.2. Review of the EMR show that this is minimally elevated from baseline. Patient does have acidosis of bicarbonate of 19 but no anion gap. Lactic acid elevated at 3.7. He is again hypoglycemic at 62. He will be given an amp of D50. AST mildly elevated. As the patient is having recurrent hypoglycemia will be given a dose of glucagon. Every hour glucose checks. Repeat shows 135. Patient has no complaints at this time. Troponin is 24. With the patient's recurrent hypoglycemia he will require glucose monitoring and admission to observation. The patient has been tolerating by mouth. Lactic acid minimally downtrending. Primary team contacted by the resident physician and they are agreeable to the admission. Upon my evaluation, this patient had a high probability of imminent or life-threatening deterioration due to hypoglycemia, which required my direct attention, intervention, and personal management. I have personally provided 38 minutes of critical care time excluding time spent on separately billable procedures. Time includes review of laboratory data, radiology results, discussion with consultants, and monitoring for potential decompensation. Interventions were performed as documented above.     I directly supervised any procedures performed by the resident and was present for the procedure including all critical portions of the procedure    The cardiac monitor revealed atrial fibrillation with a heart rate in the 80s as interpreted by me. The cardiac monitor was ordered secondary to the patient's altered mentation and to monitor the patient for dysrhythmia. CPT [id number]    I, Dr. Yvette Peter, am the primary provider of record    Impression  1. Hypoglycemia    2.  Lactic acidosis              Yvette Peter MD  01/28/22 0861

## 2022-01-27 NOTE — ED PROVIDER NOTES
ED  Provider Note  Admit Date/RoomTime: 1/27/2022  2:48 PM  ED Room: [facility] [id number]     HPI:   Days HELADIO Alvarenga is a 66 y.o. male presenting to the ED for low blood sugar, beginning hours ago. History comes primarily from the patient. Patient Active Problem List:     Hyperlipidemia     Prostate cancer ([facility]     Hypertension     Diabetes mellitus ([facility]     Shoulder pain, bilateral     Abdominal pain, right lower quadrant     DKA, type 1, not at goal [facility])     Acute metabolic encephalopathy     Acute pancreatitis     Acute renal failure (ARF) (HCC)     High anion gap metabolic acidosis     Complicated UTI (urinary tract infection)     Acute cystitis with hematuria     Severe anemia     Moderate malnutrition (HCC)     Alcohol-induced chronic pancreatitis (HCC)     Pancreatic pseudocyst     Acute on chronic pancreatitis (HCC)     Abdominal pain     Pancreatic abscess     Necrotizing pancreatitis     Uncontrolled type 2 diabetes mellitus with hyperglycemia ([facility]     H/O exploratory laparotomy     Chronic kidney disease due to type 2 diabetes mellitus ([facility]     Severe protein-calorie malnutrition ([facility]  . The complaint has been persistent, moderate in severity, improved by nothing and worsened by nothing. Associated symptoms include none. Kevin reportedly had a blood sugar in the 50s, when tested by family. EMS was called and he was given D10 in route. By the time of arrival, he was diaphoretic and unable to speak. Point-of-care glucose was read as very low. For this reason patient was given ampoules of glucose before reassessment      On arrival, the patient was assessed with history, physical exam, imaging studies, laboratory studies and ekg, vital signs. Vital signs were stable on arrival other than an initial blood pressure of 198/111 and the patient was afebrile. Review of Systems   Constitutional: Positive for activity change, diaphoresis and fatigue.  Negative for chills and fever. HENT: Negative for ear pain, sinus pressure and sore throat. Eyes: Negative for pain, discharge and redness. Respiratory: Negative for cough, shortness of breath and wheezing. Cardiovascular: Negative for chest pain. Gastrointestinal: Positive for nausea. Negative for abdominal pain, diarrhea and vomiting. Genitourinary: Negative for dysuria and frequency. Musculoskeletal: Negative for arthralgias and back pain. Skin: Negative for rash and wound. Neurological: Negative for weakness and headaches. Hematological: Negative for adenopathy. All other systems reviewed and are negative. Physical Exam  Vitals reviewed. Constitutional:       General: He is not in acute distress. Appearance: He is well-developed. He is ill-appearing and diaphoretic. Comments: On initial assessment, patient is diaphoretic and tremulous, is having a difficult time forming speech. Initial point-of-care blood sugar read is very low. Physical exam is improved after administration of 2 A of D50 patient is able to communicate with resolution of his tremulousness and diaphoresis at that time. HENT:      Head: Normocephalic and atraumatic. Mouth/Throat:      Dentition: Abnormal dentition. Eyes:      Pupils: Pupils are equal, round, and reactive to light. Neck:      Vascular: No JVD. Trachea: No tracheal deviation. Cardiovascular:      Rate and Rhythm: Regular rhythm. Heart sounds: No murmur heard. No friction rub. No gallop. Pulmonary:      Effort: Pulmonary effort is normal. No respiratory distress. Breath sounds: Normal breath sounds. No stridor. No wheezing or rales. Chest:      Chest wall: No tenderness. Abdominal:      General: Bowel sounds are normal. There is no distension. Palpations: Abdomen is soft. Tenderness: There is no abdominal tenderness. There is no guarding. Musculoskeletal:         General: Normal range of motion.       Cervical back: Normal range of motion. Skin:     General: Skin is warm. Capillary Refill: Capillary refill takes less than 2 seconds. Neurological:      General: No focal deficit present. Mental Status: He is alert. Cranial Nerves: No cranial nerve deficit. Psychiatric:         Behavior: Behavior normal.          Procedures     MDM  Number of Diagnoses or Management Options  Diagnosis management comments: Emergency department evaluation was notable for hypoglycemia that was refractory to multiple initial treatments and required infusion of D 5 normal saline. Patient will require close observation in the inpatient setting until such time as he is considered to have euglycemic stabilization     This information was relayed to the patient who understood this plan of care and was amenable to the plan. Patient was discussed with the admitting service (Dr. Silvia Tse) who concurred with the decision for admission, and have agreed to admit the patient to telemetry       ED Course as of 01/28/22 0942   Thu Jan 27, 2022 1619 Patient reassessed once his blood sugar has improved. He states that he did take his morning medications which does include insulin, and the plan was to get to [employer] for lunch. Patient was not able to be fed before he did start to become symptomatic. [RK]      ED Course User Index  [RK] Janneth  43., DO       ED Course as of 01/28/22 0942   Thu Jan 27, 2022   1619 Patient reassessed once his blood sugar has improved. He states that he did take his morning medications which does include insulin, and the plan was to get to [employer] for lunch. Patient was not able to be fed before he did start to become symptomatic.  [RK]      ED Course User Index  [RK] Keshawn Campos, DO       --------------------------------------------- PAST HISTORY ---------------------------------------------  Past Medical History:  has a past medical history of [facility] Cancer ([facility] Diabetes mellitus ([facility] ED (erectile dysfunction), Hyperlipidemia, Hypertension, and Prostate cancer ([facility] Past Surgical History:  has a past surgical history that includes Colon surgery; Dilatation, esophagus; Leg Surgery (Left, 6 years ago); hc dialysis catheter (N/A, 1/12/2021); Pancreas Biopsy (N/A, 6/14/2021); Upper gastrointestinal endoscopy (6/14/2021); Pancreatectomy (N/A, 6/23/2021); other surgical history; Upper gastrointestinal endoscopy (N/A, 10/18/2021); and Colonoscopy (N/A, 10/18/2021). Social History:  reports that he has quit smoking. His smoking use included cigarettes. He smoked 1.00 pack per day. He has never used smokeless tobacco. He reports previous alcohol use. He reports that he does not use drugs. Family History: family history is not on file. The patients home medications have been reviewed. Allergies: Patient has no known allergies.     -------------------------------------------------- RESULTS -------------------------------------------------  Labs:  Results for orders placed or performed during the hospital encounter of 01/27/22   COVID-19, Rapid    Specimen: Nasopharyngeal Swab   Result Value Ref Range    SARS-CoV-2, NAAT Not Detected Not Detected   CBC Auto Differential   Result Value Ref Range    WBC 14.7 (H) 4.5 - 11.5 E9/L    RBC 4.63 3.80 - 5.80 E12/L    Hemoglobin 13.2 12.5 - 16.5 g/dL    Hematocrit 39.2 37.0 - 54.0 %    MCV 84.7 80.0 - 99.9 fL    MCH 28.5 26.0 - 35.0 pg    MCHC 33.7 32.0 - 34.5 %    RDW 13.9 11.5 - 15.0 fL    Platelets 186 080 - 832 E9/L    MPV 11.3 7.0 - 12.0 fL    Neutrophils % 52.5 43.0 - 80.0 %    Immature Granulocytes % 0.6 0.0 - 5.0 %    Lymphocytes % 36.1 20.0 - 42.0 %    Monocytes % 9.5 2.0 - 12.0 %    Eosinophils % 0.6 0.0 - 6.0 %    Basophils % 0.7 0.0 - 2.0 %    Neutrophils Absolute 7.70 (H) 1.80 - 7.30 E9/L    Immature Granulocytes # 0.09 E9/L    Lymphocytes Absolute 5.30 (H) 1.50 - 4.00 E9/L    Monocytes Absolute 1.40 (H) 0.10 - 0.95 E9/L    Eosinophils Absolute 0.09 0.05 - 0.50 E9/L    Basophils Absolute 0.10 0.00 - 0.20 E9/L   Lipase   Result Value Ref Range    Lipase 22 13 - 60 U/L   Brain Natriuretic Peptide   Result Value Ref Range    Pro-BNP 4,847 (H) 0 - 125 pg/mL   Lactate, Sepsis   Result Value Ref Range    Lactic Acid, Sepsis 3.7 (HH) 0.5 - 1.9 mmol/L   Urinalysis, reflex to microscopic   Result Value Ref Range    Color, UA Yellow Straw/Yellow    Clarity, UA Clear Clear    Glucose, Ur 250 (A) Negative mg/dL    Bilirubin Urine Negative Negative    Ketones, Urine Negative Negative mg/dL    Specific Gravity, UA 1.015 1.005 - 1.030    Blood, Urine TRACE-INTACT Negative    pH, UA 6.0 5.0 - 9.0    Protein,  (A) Negative mg/dL    Urobilinogen, Urine 0.2 <2.0 E.U./dL    Nitrite, Urine Negative Negative    Leukocyte Esterase, Urine Negative Negative   Comprehensive Metabolic Panel w/ Reflex to MG   Result Value Ref Range    Sodium 132 132 - 146 mmol/L    Potassium reflex Magnesium 4.5 3.5 - 5.0 mmol/L    Chloride 98 98 - 107 mmol/L    CO2 19 (L) 22 - 29 mmol/L    Anion Gap 15 7 - 16 mmol/L    Glucose 58 (L) 74 - 99 mg/dL    BUN 26 (H) 6 - 23 mg/dL    CREATININE 2.2 (H) 0.7 - 1.2 mg/dL    GFR Non-African American 36 >=60 mL/min/1.73    GFR African American 36     Calcium 9.7 8.6 - 10.2 mg/dL    Total Protein 7.8 6.4 - 8.3 g/dL    Albumin 4.6 3.5 - 5.2 g/dL    Total Bilirubin 0.4 0.0 - 1.2 mg/dL    Alkaline Phosphatase 123 40 - 129 U/L    ALT 25 0 - 40 U/L    AST 43 (H) 0 - 39 U/L   SPECIMEN REJECTION   Result Value Ref Range    Rejected Test lacid     Reason for Rejection see below    SPECIMEN REJECTION   Result Value Ref Range    Rejected Test trp5,cmpx     Reason for Rejection see below    Microscopic Urinalysis   Result Value Ref Range    WBC, UA NONE 0 - 5 /HPF    RBC, UA 0-1 0 - 2 /HPF    Bacteria, UA NONE SEEN None Seen /HPF   SPECIMEN REJECTION   Result Value Ref Range    Rejected Test TROP     Reason for Rejection see below    Troponin   Result Value Ref Range    Troponin, High Sensitivity 24 (H) 0 - 11 ng/L   Lactic Acid, Plasma   Result Value Ref Range    Lactic Acid 3.1 (H) 0.5 - 2.2 mmol/L   Hemoglobin A1C   Result Value Ref Range    Hemoglobin A1C 11.7 (H) 4.0 - 5.6 %   POCT glucose   Result Value Ref Range    Glucose 43 mg/dL    QC OK? ok    POCT Glucose   Result Value Ref Range    Meter Glucose <40 (L) 74 - 99 mg/dL   POCT Glucose   Result Value Ref Range    Glucose 135 mg/dL    QC OK? ok    POCT Glucose   Result Value Ref Range    Glucose 146 mg/dL    QC OK? PASS    POCT Glucose   Result Value Ref Range    Glucose 197 mg/dL    QC OK?  PASS    POCT Glucose   Result Value Ref Range    Meter Glucose 43 (L) 74 - 99 mg/dL   POCT Glucose   Result Value Ref Range    Meter Glucose 135 (H) 74 - 99 mg/dL   POCT Glucose   Result Value Ref Range    Meter Glucose 146 (H) 74 - 99 mg/dL   POCT Glucose   Result Value Ref Range    Meter Glucose 197 (H) 74 - 99 mg/dL   POCT Glucose   Result Value Ref Range    Meter Glucose 266 (H) 74 - 99 mg/dL   POCT Glucose   Result Value Ref Range    Meter Glucose 217 (H) 74 - 99 mg/dL   POCT Glucose   Result Value Ref Range    Meter Glucose 229 (H) 74 - 99 mg/dL   POCT Glucose   Result Value Ref Range    Meter Glucose 240 (H) 74 - 99 mg/dL   POCT Glucose   Result Value Ref Range    Meter Glucose 229 (H) 74 - 99 mg/dL   POCT Glucose   Result Value Ref Range    Meter Glucose 239 (H) 74 - 99 mg/dL   EKG 12 Lead   Result Value Ref Range    Ventricular Rate 80 BPM    Atrial Rate 73 BPM    QRS Duration 82 ms    Q-T Interval 406 ms    QTc Calculation (Bazett) 468 ms    R Axis 100 degrees    T Axis 49 degrees       Radiology:  XR CHEST (2 VW)   Final Result   No pneumonia or pleural effusion.             ------------------------- NURSING NOTES AND VITALS REVIEWED ---------------------------  Date / Time Roomed:  1/27/2022  2:48 PM  ED Bed Assignment:  [id number]    The nursing notes within the ED encounter and vital signs as below have been reviewed. /82   Pulse 81   Temp 99 °F (37.2 °C) (Oral)   Resp 18   Ht 6' 2\" (1.88 m)   Wt 149 lb (67.6 kg)   SpO2 100%   BMI 19.13 kg/m²   Oxygen Saturation Interpretation: Normal      ------------------------------------------ PROGRESS NOTES ------------------------------------------  3:08 PM EST  I have spoken with the patient and discussed todays results, in addition to providing specific details for the plan of care and counseling regarding the diagnosis and prognosis. Their questions are answered at this time and they are agreeable with the plan. I discussed at length with them reasons for immediate return here for re evaluation. They will followup with their primary care physician by calling their office tomorrow. --------------------------------- ADDITIONAL PROVIDER NOTES ---------------------------------  At this time the patient is without objective evidence of an acute process requiring hospitalization or inpatient management. They have remained hemodynamically stable throughout their entire ED visit and are stable for discharge with outpatient follow-up. The plan has been discussed in detail and they are aware of the specific conditions for emergent return, as well as the importance of follow-up. Current Discharge Medication List          Diagnosis:  1. Hypoglycemia        Disposition:  Patient's disposition: Discharge to home  Patient's condition is stable.        Janneth Ordonez 43., DO  Resident  01/28/22 0498

## 2022-01-28 LAB
CHLORIDE URINE RANDOM: 41 MMOL/L
CREATININE URINE: 73 MG/DL (ref 40–278)
EKG ATRIAL RATE: 73 BPM
EKG Q-T INTERVAL: 406 MS
EKG QRS DURATION: 82 MS
EKG QTC CALCULATION (BAZETT): 468 MS
EKG R AXIS: 100 DEGREES
EKG T AXIS: 49 DEGREES
EKG VENTRICULAR RATE: 80 BPM
HBA1C MFR BLD: 11.7 % (ref 4–5.6)
METER GLUCOSE: 168 MG/DL (ref 74–99)
METER GLUCOSE: 192 MG/DL (ref 74–99)
METER GLUCOSE: 217 MG/DL (ref 74–99)
METER GLUCOSE: 229 MG/DL (ref 74–99)
METER GLUCOSE: 229 MG/DL (ref 74–99)
METER GLUCOSE: 239 MG/DL (ref 74–99)
METER GLUCOSE: 240 MG/DL (ref 74–99)
METER GLUCOSE: 335 MG/DL (ref 74–99)
OSMOLALITY URINE: 359 MOSM/KG (ref 300–900)
PROTEIN PROTEIN: 78 MG/DL (ref 0–12)
PROTEIN/CREAT RATIO: 1.1
PROTEIN/CREAT RATIO: 1.1 (ref 0–0.2)

## 2022-01-28 PROCEDURE — 6370000000 HC RX 637 (ALT 250 FOR IP): Performed by: INTERNAL MEDICINE

## 2022-01-28 PROCEDURE — 84156 ASSAY OF PROTEIN URINE: CPT

## 2022-01-28 PROCEDURE — 99222 1ST HOSP IP/OBS MODERATE 55: CPT | Performed by: INTERNAL MEDICINE

## 2022-01-28 PROCEDURE — 99225 PR SBSQ OBSERVATION CARE/DAY 25 MINUTES: CPT | Performed by: INTERNAL MEDICINE

## 2022-01-28 PROCEDURE — 83935 ASSAY OF URINE OSMOLALITY: CPT

## 2022-01-28 PROCEDURE — 82570 ASSAY OF URINE CREATININE: CPT

## 2022-01-28 PROCEDURE — 82436 ASSAY OF URINE CHLORIDE: CPT

## 2022-01-28 PROCEDURE — G0378 HOSPITAL OBSERVATION PER HR: HCPCS

## 2022-01-28 PROCEDURE — 82962 GLUCOSE BLOOD TEST: CPT

## 2022-01-28 RX ORDER — INSULIN GLARGINE 100 [IU]/ML
10 INJECTION, SOLUTION SUBCUTANEOUS NIGHTLY
Status: DISCONTINUED | OUTPATIENT
Start: 2022-01-28 | End: 2022-01-30

## 2022-01-28 RX ADMIN — PANCRELIPASE 36000 UNITS: 60000; 12000; 38000 CAPSULE, DELAYED RELEASE PELLETS ORAL at 12:18

## 2022-01-28 RX ADMIN — SUCRALFATE 1 G: 1 TABLET ORAL at 12:18

## 2022-01-28 RX ADMIN — INSULIN GLARGINE 10 UNITS: 100 INJECTION, SOLUTION SUBCUTANEOUS at 20:11

## 2022-01-28 RX ADMIN — ASPIRIN 81 MG: 81 TABLET, COATED ORAL at 08:08

## 2022-01-28 RX ADMIN — LISINOPRIL 20 MG: 20 TABLET ORAL at 20:11

## 2022-01-28 RX ADMIN — SUCRALFATE 1 G: 1 TABLET ORAL at 17:15

## 2022-01-28 RX ADMIN — SUCRALFATE 1 G: 1 TABLET ORAL at 20:11

## 2022-01-28 RX ADMIN — APIXABAN 5 MG: 5 TABLET, FILM COATED ORAL at 20:11

## 2022-01-28 RX ADMIN — INSULIN LISPRO 1 UNITS: 100 INJECTION, SOLUTION INTRAVENOUS; SUBCUTANEOUS at 17:14

## 2022-01-28 RX ADMIN — LISINOPRIL 20 MG: 20 TABLET ORAL at 08:07

## 2022-01-28 RX ADMIN — INSULIN LISPRO 4 UNITS: 100 INJECTION, SOLUTION INTRAVENOUS; SUBCUTANEOUS at 07:13

## 2022-01-28 RX ADMIN — FOLIC ACID 1 MG: 1 TABLET ORAL at 08:07

## 2022-01-28 RX ADMIN — SUCRALFATE 1 G: 1 TABLET ORAL at 08:07

## 2022-01-28 RX ADMIN — PANCRELIPASE 36000 UNITS: 60000; 12000; 38000 CAPSULE, DELAYED RELEASE PELLETS ORAL at 17:14

## 2022-01-28 RX ADMIN — INSULIN LISPRO 3 UNITS: 100 INJECTION, SOLUTION INTRAVENOUS; SUBCUTANEOUS at 17:13

## 2022-01-28 RX ADMIN — APIXABAN 5 MG: 5 TABLET, FILM COATED ORAL at 08:07

## 2022-01-28 RX ADMIN — METOPROLOL TARTRATE 25 MG: 25 TABLET, FILM COATED ORAL at 20:11

## 2022-01-28 RX ADMIN — PANCRELIPASE 36000 UNITS: 60000; 12000; 38000 CAPSULE, DELAYED RELEASE PELLETS ORAL at 09:28

## 2022-01-28 RX ADMIN — INSULIN LISPRO 4 UNITS: 100 INJECTION, SOLUTION INTRAVENOUS; SUBCUTANEOUS at 12:12

## 2022-01-28 RX ADMIN — OXYCODONE HYDROCHLORIDE AND ACETAMINOPHEN 250 MG: 500 TABLET ORAL at 08:08

## 2022-01-28 RX ADMIN — PANTOPRAZOLE SODIUM 40 MG: 40 TABLET, DELAYED RELEASE ORAL at 20:11

## 2022-01-28 RX ADMIN — METOPROLOL TARTRATE 25 MG: 25 TABLET, FILM COATED ORAL at 08:08

## 2022-01-28 RX ADMIN — PANTOPRAZOLE SODIUM 40 MG: 40 TABLET, DELAYED RELEASE ORAL at 08:07

## 2022-01-28 RX ADMIN — INSULIN LISPRO 3 UNITS: 100 INJECTION, SOLUTION INTRAVENOUS; SUBCUTANEOUS at 12:12

## 2022-01-28 RX ADMIN — ATORVASTATIN CALCIUM 20 MG: 20 TABLET, FILM COATED ORAL at 08:07

## 2022-01-28 RX ADMIN — INSULIN LISPRO 1 UNITS: 100 INJECTION, SOLUTION INTRAVENOUS; SUBCUTANEOUS at 20:11

## 2022-01-28 ASSESSMENT — PAIN DESCRIPTION - DESCRIPTORS: DESCRIPTORS: ACHING

## 2022-01-28 ASSESSMENT — ENCOUNTER SYMPTOMS
EYE REDNESS: 0
SHORTNESS OF BREATH: 0
EYE DISCHARGE: 0
BACK PAIN: 0
SORE THROAT: 0
ABDOMINAL PAIN: 0
EYE PAIN: 0
DIARRHEA: 0
WHEEZING: 0
COUGH: 0
VOMITING: 0
SINUS PRESSURE: 0
NAUSEA: 1

## 2022-01-28 ASSESSMENT — PAIN DESCRIPTION - PROGRESSION: CLINICAL_PROGRESSION: NOT CHANGED

## 2022-01-28 ASSESSMENT — PAIN DESCRIPTION - FREQUENCY: FREQUENCY: CONTINUOUS

## 2022-01-28 ASSESSMENT — PAIN SCALES - GENERAL
PAINLEVEL_OUTOF10: 0
PAINLEVEL_OUTOF10: 3

## 2022-01-28 ASSESSMENT — PAIN DESCRIPTION - ONSET: ONSET: ON-GOING

## 2022-01-28 ASSESSMENT — PAIN DESCRIPTION - LOCATION: LOCATION: ARM

## 2022-01-28 ASSESSMENT — PAIN DESCRIPTION - PAIN TYPE: TYPE: NEUROPATHIC PAIN

## 2022-01-28 ASSESSMENT — PAIN DESCRIPTION - ORIENTATION: ORIENTATION: RIGHT;LEFT

## 2022-01-28 NOTE — ED NOTES
Handoff report given to Akira Rainey RN. Care of patient relinquished.       Korin Dawson RN  01/27/22 4339

## 2022-01-28 NOTE — PROGRESS NOTES
[facility] Progress Note    Admitting Date and Time: 1/27/2022  2:48 PM  Admit Dx: Hypoglycemia [E16.2]    Subjective:  Patient is being followed for Hypoglycemia [E16.2]   Is comfortable at bed now, talked with him and his fiancée at bedside, he been diagnosed diabetic for 5 years but he is not following diabetic diet. ROS: denies fever, chills, cp, sob, n/v, HA unless stated above.       insulin glargine  10 Units SubCUTAneous Nightly    insulin lispro  3 Units SubCUTAneous TID WC    insulin lispro  0-6 Units SubCUTAneous TID WC    insulin lispro  0-3 Units SubCUTAneous Nightly    apixaban  5 mg Oral BID    vitamin C  250 mg Oral Daily    aspirin  81 mg Oral Daily    atorvastatin  20 mg Oral Daily    lipase-protease-amylase  36,000 Units Oral TID WC    lisinopril  20 mg Oral BID    metoprolol tartrate  25 mg Oral BID    folic acid  1 mg Oral Daily    pantoprazole  40 mg Oral BID    sucralfate  1 g Oral 4x Daily     dextrose, 25 g, PRN  glucose, 15 g, PRN  dextrose, 12.5 g, PRN  glucagon (rDNA), 1 mg, PRN  dextrose, 100 mL/hr, PRN         Objective:    /82   Pulse 81   Temp 99 °F (37.2 °C) (Oral)   Resp 18   Ht 6' 2\" (1.88 m)   Wt 149 lb (67.6 kg)   SpO2 100%   BMI 19.13 kg/m²     General Appearance: alert and oriented to person, place and time and in no acute distress  Skin: warm and dry  Head: normocephalic and atraumatic  Eyes: pupils equal, round, and reactive to light, extraocular eye movements intact, conjunctivae normal  Neck: neck supple and non tender without mass   Pulmonary/Chest: clear to auscultation bilaterally- no wheezes, rales or rhonchi, normal air movement, no respiratory distress  Cardiovascular: normal rate, normal S1 and S2 and no carotid bruits  Abdomen: soft, non-tender, non-distended, normal bowel sounds, no masses or organomegaly  Extremities: no cyanosis, no clubbing and no edema  Neurologic: no cranial nerve deficit and speech normal        Recent Labs     01/27/22  1637 01/27/22  1701 01/27/22  1818 01/27/22  1917 01/27/22  2043     --   --   --   --    K 4.5  --   --   --   --    CL 98  --   --   --   --    CO2 19*  --   --   --   --    BUN 26*  --   --   --   --    CREATININE 2.2*  --   --   --   --    GLUCOSE 58*   < > 135 146 197   CALCIUM 9.7  --   --   --   --     < > = values in this interval not displayed. Recent Labs     01/27/22  1526   WBC 14.7*   RBC 4.63   HGB 13.2   HCT 39.2   MCV 84.7   MCH 28.5   MCHC 33.7   RDW 13.9      MPV 11.3           Assessment:    Active Problems:    Hypoglycemia  Resolved Problems:    * No resolved hospital problems. *      Plan:  1. Hypoglycemia and hyperglycemia: Patient is diagnosed with diabetes for 5 years hemoglobin A1c is 11.7, not well controlled, his sugar is all over and he admitted sugar with 47, and his sugar found to be more than 300 several times. Endocrinologist consulted and appreciated. We will keep him overnight to monitor his sugar to tract disease insulin requirement. I discussed regarding his diet but he is not in any diabetic diet. Diabetic education consult. And nutrition consult. 2. leukocytosis: May be due to stress related follow-up CBC. 3.  CKD stage III: Continue monitoring and I will consult nephrologist while he is here. NOTE: This report was transcribed using voice recognition software. Every effort was made to ensure accuracy; however, inadvertent computerized transcription errors may be present.   Electronically signed by Miguel Tirado MD on 1/28/2022 at 1:44 PM

## 2022-01-28 NOTE — PROGRESS NOTES
Reason for consult: Doesnt understand how to dose his home insulin    A1C: 11.7%     [] Not available                 Patient states the following concerns/barriers to diabetes self-management:       [x] None       [] Medication cost   [] Food cost/availability          [] Reading  [] Hearing   [] Vision                  [] Work    [] Transportation  [] No insurance    [] Physical limitations    [] Other:              Diabetes survival packet provided to:   [x] Patient     [] Other:    Information reviewed:   Definition of diabetes   Target glucose ranges/A1C   Self-monitoring of blood glucose   Prevention/symptoms/treatment of hypo-/hyperglycemia   Medication adherence   The plate method/meal planning guidelines   The benefits of exercise and recommendations   Reducing the risk of chronic complications           Comment: Patient pleasant and receptive to education. Patient aware he is going home on 2 separate types of insulin. Discussed in detail the mechanism and differences between Lantus and Humalog and when to take them/how much. Patient understands that he must take Humalog directly before eating and can not wait more than 15 minutes to eat after injection. Discussed risks of hypoglycemia in detail. Patient comprehends and patient's family member was present briefly who stated she helps him with injecting insulin. Stressed the importance of taking the correct type of insulin at the correct times. Patient will call with questions and is interested in outpatient diabetes education classes.     Diabetes medications reviewed (use, purpose, action, side effects): Lantus, Humalog      Evaluation/Plan/Recommendations:   Patient's understanding of diabetes:   []Poor   [x]Fair    []Good   [] Excellent     Outpatient diabetes education is recommended:   [x] Yes  [] No   [] As needed  Patient is interested in outpatient diabetes education:   [x] Yes    [] No    [] Unsure    Recommended:     [] Consult to social work; patient has no insurance or financial hardship      [] Script for glucometer and supplies (per preference of patient's insurance)               [x] Script for outpatient diabetes education classes from PCP    [x] Carbohydrate-controlled diet    [] Patient prefers/educator recommends insulin pens instead of syringes     (if insulin ordered for home use)    Thank you for this consult.       Holly De La Rosa MS, RDN, LD

## 2022-01-28 NOTE — PROGRESS NOTES
Message sent via perfect serve to Dr. Susan Nuñez regarding consult.     Electronically signed by Doc Sen RN on 1/27/2022 at 10:03 PM

## 2022-01-28 NOTE — CONSULTS
ENDOCRINOLOGY INITIAL CONSULTATION NOTE      Date of admission: 1/27/2022  Date of service: 1/28/2022  Admitting physician: Lianne Latif MD   Primary Care Physician: Yamileth Coon DO  Consultant physician: Jaime Fishman MD     Reason for the consultation:  Uncontrolled DM    History of Present Illness: The history is provided by the patient. Accuracy of the patient data is excellent    Days HELADIO Dickey is a very pleasant 66 y.o. old male with PMH of Afib, HTN, HLD, DM type 2 and other listed below admitted to [facility] on 1/27/2022 because of Hypoglycemia, endocrine service was consulted for diabetes management. The patient was at [facility] with his fiancée and his blood sugar got low. EMS was called and found to have BG 54. IV dextrose was administered and transferred to the hospital      Prior to admission  The patient was diagnosed with DM along time ago. Prior to admission patient was on Lantus 25u daily, Novolog per sliding scale. Patien reports frequent hypoglycemic episodes. Patient has not been eating consistent carbohydrate meals, self-blood glucose monitoring has been highly variable prior to admission.  In addition, patient has neuropathy and CKD   Lab Results   Component Value Date    LABA1C 11.1 12/23/2020       Inpatient diet:   Carb Restricted diet     Point of care glucose monitoring   (Independently reviewed)   Recent Labs     01/27/22  1655 01/27/22  1759 01/27/22  1915 01/27/22  2039 01/27/22  2137 01/28/22  0000 01/28/22  0156 01/28/22  0405   GLUMET 43* 135* 146* 197* 266* 217* 229* 240*       Past medical history:   Past Medical History:   Diagnosis Date    A-fib ([address]     Cancer [facility]     Diabetes mellitus ([facility]     ED (erectile dysfunction)     Hyperlipidemia     Hypertension     Prostate cancer ([facility]        Past surgical history:  Past Surgical History:   Procedure Laterality Date    COLON SURGERY      COLONOSCOPY N/A 10/18/2021    COLONOSCOPY WITH BIOPSY performed by Suyapa Ross Kenrick Grimes MD at [address], ESOPHAGUS      HC DIALYSIS CATHETER N/A 1/12/2021    TEMPORARY HEMODIALYSIS CATHETER INSERTION performed by Arleth Hallman MD at [address] Left 6 years ago    OTHER SURGICAL HISTORY      removal of HD catheter    PANCREAS BIOPSY N/A 6/14/2021    PANCREATIC PSEUDOCYST EXCISION DRAINAGE performed by Jose Herrera MD at [address] 6/23/2021    LAPAROSCOPIC ROBOTIC XI PANCREATIC NECROSECTOMY WITH CYST GASTROSTOMY , INTRAOPERATIVE  ULTRASOUND performed by Olga Hidalgo MD at [address]  6/14/2021    ENDOSCOPIC ULTRASOUND performed by Jose Herrera MD at [address] N/A 10/18/2021    EGD BIOPSY performed by Elba Chavez MD at [facility] history:   Tobacco:   reports that he has quit smoking. His smoking use included cigarettes. He smoked 1.00 pack per day. He has never used smokeless tobacco.  Alcohol:   reports previous alcohol use. Drugs:   reports no history of drug use. Family history:    History reviewed. No pertinent family history.     Allergy and drug reactions:   No Known Allergies    Scheduled Meds:   apixaban  5 mg Oral BID    vitamin C  250 mg Oral Daily    aspirin  81 mg Oral Daily    atorvastatin  20 mg Oral Daily    lipase-protease-amylase  36,000 Units Oral TID WC    lisinopril  20 mg Oral BID    metoprolol tartrate  25 mg Oral BID    folic acid  1 mg Oral Daily    pantoprazole  40 mg Oral BID    sucralfate  1 g Oral 4x Daily    insulin lispro  0-12 Units SubCUTAneous TID WC    insulin lispro  0-6 Units SubCUTAneous Nightly       PRN Meds:   dextrose, 25 g, PRN  glucose, 15 g, PRN  dextrose, 12.5 g, PRN  glucagon (rDNA), 1 mg, PRN  dextrose, 100 mL/hr, PRN      Continuous Infusions:   dextrose 5 % and 0.9 % NaCl 50 mL/hr at 01/27/22 2148    dextrose         Review of Systems  All systems reviewed. All negative except for symptoms mentioned in HPI     OBJECTIVE    BP (!) 176/92   Pulse 82   Temp 98 °F (36.7 °C) (Oral)   Resp 18   Ht 6' 2\" (1.88 m)   Wt 149 lb (67.6 kg)   SpO2 100%   BMI 19.13 kg/m²   No intake or output data in the 24 hours ending 01/28/22 0530    Physical examination:  General: awake alert, oriented x3  HEENT: normocephalic non traumatic, no exophthalmos   Neck: supple, No thyroid tenderness,  Pulm: good equal air entry no added sounds  CVS: S1 + S2  Abd: soft lax, no tenderness  Skin: warm, no lesions, no rash. No open wounds, no ulcers   Neuro: CN intact, sensation decreased bilateral , muscle power normal  Psych: normal mood, and affect    Review of Laboratory Data:  I personally reviewed the following labs:   Recent Labs     01/27/22  1526   WBC 14.7*   RBC 4.63   HGB 13.2   HCT 39.2   MCV 84.7   MCH 28.5   MCHC 33.7   RDW 13.9      MPV 11.3     Recent Labs     01/27/22  1637 01/27/22  1701 01/27/22  1818 01/27/22  1917 01/27/22  2043     --   --   --   --    K 4.5  --   --   --   --    CL 98  --   --   --   --    CO2 19*  --   --   --   --    BUN 26*  --   --   --   --    CREATININE 2.2*  --   --   --   --    GLUCOSE 58*   < > 135 146 197   CALCIUM 9.7  --   --   --   --    PROT 7.8  --   --   --   --    LABALBU 4.6  --   --   --   --    BILITOT 0.4  --   --   --   --    ALKPHOS 123  --   --   --   --    AST 43*  --   --   --   --    ALT 25  --   --   --   --     < > = values in this interval not displayed.      Beta-Hydroxybutyrate   Date Value Ref Range Status   06/17/2021 0.27 0.02 - 0.27 mmol/L Final   12/23/2020 0.80 (H) 0.02 - 0.27 mmol/L Final     Lab Results   Component Value Date    LABA1C 11.1 12/23/2020    LABA1C 6.3 09/27/2013     Lab Results   Component Value Date/Time    TSH 0.540 12/23/2020 03:00 AM     Lab Results   Component Value Date    LABA1C 11.1 12/23/2020    GLUCOSE 197 01/27/2022    LABCREA 17 12/29/2020     Lab Results

## 2022-01-28 NOTE — PATIENT CARE CONFERENCE
[facility] Quality Flow/Interdisciplinary Rounds Progress Note        Quality Flow Rounds held on January 28, 2022    Disciplines Attending:  Bedside Nurse, ,  and Nursing Unit Leadership    Days Boris Argueta was admitted on 1/27/2022  2:48 PM    Anticipated Discharge Date:  Expected Discharge Date: 01/29/22    Disposition:    Demetrius Score:  Demetrius Scale Score: 22    Readmission Risk              Risk of Unplanned Readmission:  0           Discussed patient goal for the day, patient clinical progression, and barriers to discharge.   The following Goal(s) of the Day/Commitment(s) have been identified:  Diagnostics - Report Results      Adarsh Rodriguez RN  January 28, 2022

## 2022-01-28 NOTE — H&P
This is a 69-year-old male with past medical history of diabetes mellitus who presents to the emergency department today for hypoglycemia. According to report, the patient checked his blood sugar which was 54. EMS administered D10 of an unknown amount. Subsequently, the patient improved. However, on presentation, the patient is unable to provide any history. He is diaphoretic and nonconversational.     After administration of 2 A of D50 the patient is conversational.  He states that he was at [employer] today with his fiancée and his blood sugar got low. He was seated. He did not fall or hit his head. He was feeling unwell with this. He denies any recent trauma. He states that he uses 25 units 3 times daily as well as 25 units of insulin at night. He cannot recall the names of these. He is not on any sulfonylureas. He denies any recent chest pain, shortness of breath, abdominal pain, nausea, vomiting, diarrhea or fevers. He has had a cough that is productive of white sputum. He denies any headache, vision change, numbness, tingling or new weakness. He is on Eliquis but endorses compliance of this medication.     ROS  A complete review of systems was performed and pertinent positives and negatives are stated within HPI, all other systems reviewed and are negative. Past medical Hx: Afib, DM1, prostate cancer, HTN, HLD    Social History:  reports that he has quit smoking. His smoking use included cigarettes. He smoked 1.00 pack per day. He has never used smokeless tobacco. He reports previous alcohol use. He reports that he does not use drugs. Physical Exam  General: Frail appearing elderly male, atrophic muscles diffusely, AO3 but appears weak and slow to respond.  Overall pleasant without distress  Respiratory: [Lungs clear to auscultation bilaterally.] [Patient speaking in full sentences.], no wheezing crackles  Cardiovascular: [Regular rate.] [No pedal edema.]  Gastrointestinal:  Abdomen is soft and nondistended. Bowel sounds present. There is no tenderness. There is no guarding or rebound. Musculoskeletal: No deformity. No bony tenderness of the extremities. No bony tenderness of the neck. No step-offs or deformities. Skin: Skin warm and dry. [No rashes.]   Neurologic: [No gross motor deficits.] [No aphasia.]  Pupils are equal and reactive to light. Extraocular eye movements intact. Sensation intact bilaterally. Symmetric facies. Tongue protrudes midline. 5 out of 5 symmetric strength of the upper and lower extremities. Negative finger-to-nose testing. Alert and oriented. Psychiatric: Mood congruent       #Refractory Hypoglycemia  -Underlying DM1  -Presented with episode of confusion, diaphoresis, palpitation with glucose of 40s. Took his home insulin 1 hour before going to dinner but did not get a chance to eat. -Reports at least 1 episode hypoglycemia per week, reports his average morning glucose is ~300. He takes 20-22 units every morning of long acting. Says his average meal time glucose is btw 150-300 and uses sliding scale.  -Etiology of refractory hypoglycemia; inapropriate home dosing, taking it too early before meals, chronic pancreatitis, low glycogen reserves due to malnutrition.   -Has required musliple dextrose amples in ED and now on d5NS but still has sub par glucose levels.  Hypoglycemia protocol  -Diabetic diet, Q2 hour fingerstick's, correct home insulin dose  -Endocrine consult to assist in correcting home insulin dose and consider insulin pump []  -Diabetic counselor     #Chronic A. Fib  -Stable, at goal  -On Eliquis, metoprolol   -Tele    #Chronic Pancreatitis  -Hx of Etoh abuse, On creon  -Hx of Pancreatectomy (N/A, 6/23/2021)    #HTN  -On Lisinopril 20 mg at home      #Prostate Cancer  #Erectile Dysfunction

## 2022-01-28 NOTE — H&P
This is a 68-year-old male with past medical history of diabetes mellitus who presents to the emergency department today for hypoglycemia. According to report, the patient checked his blood sugar which was 54. EMS administered D10 of an unknown amount. Subsequently, the patient improved. However, on presentation, the patient is unable to provide any history. He is diaphoretic and nonconversational.     After administration of 2 A of D50 the patient is conversational.  He states that he was at [employer] today with his fiancée and his blood sugar got low. He was seated. He did not fall or hit his head. He was feeling unwell with this. He denies any recent trauma. He states that he uses 25 units 3 times daily as well as 25 units of insulin at night. He cannot recall the names of these. He is not on any sulfonylureas. He denies any recent chest pain, shortness of breath, abdominal pain, nausea, vomiting, diarrhea or fevers. He has had a cough that is productive of white sputum. He denies any headache, vision change, numbness, tingling or new weakness. He is on Eliquis but endorses compliance of this medication.     ROS  A complete review of systems was performed and pertinent positives and negatives are stated within HPI, all other systems reviewed and are negative. Past medical Hx: Afib, DM1, prostate cancer, HTN, HLD    Social History:  reports that he has quit smoking. His smoking use included cigarettes. He smoked 1.00 pack per day. He has never used smokeless tobacco. He reports previous alcohol use. He reports that he does not use drugs. Physical Exam  General: Frail appearing elderly male, atrophic muscles diffusely, AO3 but appears weak and slow to respond.  Overall pleasant without distress  Respiratory: [Lungs clear to auscultation bilaterally.] [Patient speaking in full sentences.], no wheezing crackles  Cardiovascular: [Regular rate.] [No pedal edema.]  Gastrointestinal:  Abdomen is

## 2022-01-28 NOTE — PLAN OF CARE
Problem:  Activity:  Goal: Risk for activity intolerance will decrease  Description: Risk for activity intolerance will decrease  1/28/2022 1010 by Brigitte Toledo RN  Outcome: Met This Shift  1/27/2022 2215 by Robles Berger RN  Outcome: Ongoing     Problem: Coping:  Goal: Ability to adjust to condition or change in health will improve  Description: Ability to adjust to condition or change in health will improve  1/28/2022 1010 by Brigitte Toledo RN  Outcome: Met This Shift  1/27/2022 2215 by Robles Berger RN  Outcome: Ongoing     Problem: Fluid Volume:  Goal: Ability to maintain a balanced intake and output will improve  Description: Ability to maintain a balanced intake and output will improve  1/28/2022 1010 by Brigitte Toledo RN  Outcome: Met This Shift  1/27/2022 2215 by Robles Berger RN  Outcome: Ongoing     Problem: Health Behavior:  Goal: Ability to identify and utilize available resources and services will improve  Description: Ability to identify and utilize available resources and services will improve  1/28/2022 1010 by Brigitte Toledo RN  Outcome: Met This Shift  1/27/2022 2215 by Robles Berger RN  Outcome: Ongoing  Goal: Ability to manage health-related needs will improve  Description: Ability to manage health-related needs will improve  1/28/2022 1010 by Brigitte Toledo RN  Outcome: Met This Shift  1/27/2022 2215 by Robles Berger RN  Outcome: Ongoing     Problem: Metabolic:  Goal: Ability to maintain appropriate glucose levels will improve  Description: Ability to maintain appropriate glucose levels will improve  1/28/2022 1010 by Brigitte Toledo RN  Outcome: Met This Shift  1/27/2022 2215 by Robles Berger RN  Outcome: Ongoing     Problem: Nutritional:  Goal: Maintenance of adequate nutrition will improve  Description: Maintenance of adequate nutrition will improve  1/28/2022 1010 by Brigitte Toledo RN  Outcome: Met This Shift  1/27/2022 2215 by Parisa Cooper RN  Outcome: Ongoing  Goal: Progress toward achieving an optimal weight will improve  Description: Progress toward achieving an optimal weight will improve  1/28/2022 1010 by Giovanna Kang RN  Outcome: Met This Shift  1/27/2022 2215 by Parisa Cooper RN  Outcome: Ongoing     Problem: Physical Regulation:  Goal: Complications related to the disease process, condition or treatment will be avoided or minimized  Description: Complications related to the disease process, condition or treatment will be avoided or minimized  1/28/2022 1010 by Giovanna Kang RN  Outcome: Met This Shift  1/27/2022 2215 by Parisa Cooper RN  Outcome: Ongoing  Goal: Diagnostic test results will improve  Description: Diagnostic test results will improve  1/28/2022 1010 by Giovanna Kang RN  Outcome: Met This Shift  1/27/2022 2215 by Parisa Cooper RN  Outcome: Ongoing     Problem: Skin Integrity:  Goal: Risk for impaired skin integrity will decrease  Description: Risk for impaired skin integrity will decrease  1/28/2022 1010 by Giovanna Kang RN  Outcome: Met This Shift  1/27/2022 2215 by Parisa Cooper RN  Outcome: Ongoing     Problem: Tissue Perfusion:  Goal: Adequacy of tissue perfusion will improve  Description: Adequacy of tissue perfusion will improve  1/28/2022 1010 by Giovanna Kang RN  Outcome: Met This Shift  1/27/2022 2215 by Parisa Cooper RN  Outcome: Ongoing     Problem: Pain:  Goal: Pain level will decrease  Description: Pain level will decrease  Outcome: Met This Shift  Goal: Control of acute pain  Description: Control of acute pain  Outcome: Met This Shift  Goal: Control of chronic pain  Description: Control of chronic pain  Outcome: Met This Shift

## 2022-01-28 NOTE — CARE COORDINATION
Introduced my self and provided explanation of CM role to patient. Patient is awake, alert, and aware of current diagnosis and treatment plan including glucose monitoring, endocrinology consult. Patient resides at home with significant other. He states he is independent with his adls. Patient is established with a pcp and denies any issue with retail pharmaceutical coverage. He uses both [facility] and retail pharmacy. He denies post discharge needs and is hopeful for discharge as soon as today. He will transport home via private car. Will follow along with  and assist with discharge planning as necessary. Kendal De La Cruz.  Johana, MSN, RN  [facility] Case Management  [phone]

## 2022-01-29 LAB
ANION GAP SERPL CALCULATED.3IONS-SCNC: 10 MMOL/L (ref 7–16)
BUN BLDV-MCNC: 27 MG/DL (ref 6–23)
CALCIUM SERPL-MCNC: 9.1 MG/DL (ref 8.6–10.2)
CHLORIDE BLD-SCNC: 99 MMOL/L (ref 98–107)
CO2: 21 MMOL/L (ref 22–29)
CREAT SERPL-MCNC: 2.4 MG/DL (ref 0.7–1.2)
GFR AFRICAN AMERICAN: 32
GFR NON-AFRICAN AMERICAN: 32 ML/MIN/1.73
GLUCOSE BLD-MCNC: 256 MG/DL (ref 74–99)
LACTIC ACID: 2.4 MMOL/L (ref 0.5–2.2)
METER GLUCOSE: 122 MG/DL (ref 74–99)
METER GLUCOSE: 250 MG/DL (ref 74–99)
METER GLUCOSE: 366 MG/DL (ref 74–99)
METER GLUCOSE: 96 MG/DL (ref 74–99)
POTASSIUM SERPL-SCNC: 4.7 MMOL/L (ref 3.5–5)
SODIUM BLD-SCNC: 130 MMOL/L (ref 132–146)

## 2022-01-29 PROCEDURE — G0378 HOSPITAL OBSERVATION PER HR: HCPCS

## 2022-01-29 PROCEDURE — 6370000000 HC RX 637 (ALT 250 FOR IP): Performed by: INTERNAL MEDICINE

## 2022-01-29 PROCEDURE — 82962 GLUCOSE BLOOD TEST: CPT

## 2022-01-29 PROCEDURE — 36415 COLL VENOUS BLD VENIPUNCTURE: CPT

## 2022-01-29 PROCEDURE — 83605 ASSAY OF LACTIC ACID: CPT

## 2022-01-29 PROCEDURE — 99225 PR SBSQ OBSERVATION CARE/DAY 25 MINUTES: CPT | Performed by: INTERNAL MEDICINE

## 2022-01-29 PROCEDURE — 80048 BASIC METABOLIC PNL TOTAL CA: CPT

## 2022-01-29 PROCEDURE — 99232 SBSQ HOSP IP/OBS MODERATE 35: CPT | Performed by: INTERNAL MEDICINE

## 2022-01-29 PROCEDURE — 2580000003 HC RX 258

## 2022-01-29 RX ORDER — SODIUM CHLORIDE 0.9 % (FLUSH) 0.9 %
SYRINGE (ML) INJECTION
Status: COMPLETED
Start: 2022-01-29 | End: 2022-01-29

## 2022-01-29 RX ADMIN — INSULIN GLARGINE 10 UNITS: 100 INJECTION, SOLUTION SUBCUTANEOUS at 20:46

## 2022-01-29 RX ADMIN — ATORVASTATIN CALCIUM 20 MG: 20 TABLET, FILM COATED ORAL at 08:28

## 2022-01-29 RX ADMIN — OXYCODONE HYDROCHLORIDE AND ACETAMINOPHEN 250 MG: 500 TABLET ORAL at 08:29

## 2022-01-29 RX ADMIN — APIXABAN 5 MG: 5 TABLET, FILM COATED ORAL at 08:29

## 2022-01-29 RX ADMIN — INSULIN LISPRO 3 UNITS: 100 INJECTION, SOLUTION INTRAVENOUS; SUBCUTANEOUS at 16:27

## 2022-01-29 RX ADMIN — PANCRELIPASE 36000 UNITS: 60000; 12000; 38000 CAPSULE, DELAYED RELEASE PELLETS ORAL at 08:28

## 2022-01-29 RX ADMIN — SUCRALFATE 1 G: 1 TABLET ORAL at 16:31

## 2022-01-29 RX ADMIN — PANTOPRAZOLE SODIUM 40 MG: 40 TABLET, DELAYED RELEASE ORAL at 08:29

## 2022-01-29 RX ADMIN — PANCRELIPASE 36000 UNITS: 60000; 12000; 38000 CAPSULE, DELAYED RELEASE PELLETS ORAL at 11:18

## 2022-01-29 RX ADMIN — LISINOPRIL 20 MG: 20 TABLET ORAL at 08:29

## 2022-01-29 RX ADMIN — INSULIN LISPRO 3 UNITS: 100 INJECTION, SOLUTION INTRAVENOUS; SUBCUTANEOUS at 16:26

## 2022-01-29 RX ADMIN — INSULIN LISPRO 5 UNITS: 100 INJECTION, SOLUTION INTRAVENOUS; SUBCUTANEOUS at 11:17

## 2022-01-29 RX ADMIN — LISINOPRIL 20 MG: 20 TABLET ORAL at 20:46

## 2022-01-29 RX ADMIN — METOPROLOL TARTRATE 25 MG: 25 TABLET, FILM COATED ORAL at 08:29

## 2022-01-29 RX ADMIN — SUCRALFATE 1 G: 1 TABLET ORAL at 12:15

## 2022-01-29 RX ADMIN — INSULIN LISPRO 3 UNITS: 100 INJECTION, SOLUTION INTRAVENOUS; SUBCUTANEOUS at 11:17

## 2022-01-29 RX ADMIN — METOPROLOL TARTRATE 25 MG: 25 TABLET, FILM COATED ORAL at 20:46

## 2022-01-29 RX ADMIN — FOLIC ACID 1 MG: 1 TABLET ORAL at 08:29

## 2022-01-29 RX ADMIN — SUCRALFATE 1 G: 1 TABLET ORAL at 20:46

## 2022-01-29 RX ADMIN — PANTOPRAZOLE SODIUM 40 MG: 40 TABLET, DELAYED RELEASE ORAL at 20:46

## 2022-01-29 RX ADMIN — PANCRELIPASE 36000 UNITS: 60000; 12000; 38000 CAPSULE, DELAYED RELEASE PELLETS ORAL at 16:31

## 2022-01-29 RX ADMIN — SODIUM CHLORIDE, PRESERVATIVE FREE: 5 INJECTION INTRAVENOUS at 22:54

## 2022-01-29 RX ADMIN — SUCRALFATE 1 G: 1 TABLET ORAL at 08:29

## 2022-01-29 RX ADMIN — ASPIRIN 81 MG: 81 TABLET, COATED ORAL at 08:29

## 2022-01-29 RX ADMIN — APIXABAN 5 MG: 5 TABLET, FILM COATED ORAL at 20:46

## 2022-01-29 ASSESSMENT — PAIN SCALES - GENERAL
PAINLEVEL_OUTOF10: 0

## 2022-01-29 NOTE — PLAN OF CARE
Problem:  Activity:  Goal: Risk for activity intolerance will decrease  Description: Risk for activity intolerance will decrease  1/29/2022 0949 by Nestor Carcamo RN  Outcome: Met This Shift  1/28/2022 2040 by Navjot Yap RN  Outcome: Ongoing     Problem: Coping:  Goal: Ability to adjust to condition or change in health will improve  Description: Ability to adjust to condition or change in health will improve  1/29/2022 0949 by Nestor Carcamo RN  Outcome: Met This Shift  1/28/2022 2040 by Navjot Yap RN  Outcome: Ongoing     Problem: Fluid Volume:  Goal: Ability to maintain a balanced intake and output will improve  Description: Ability to maintain a balanced intake and output will improve  1/29/2022 0949 by Nestor Carcamo RN  Outcome: Met This Shift  1/28/2022 2040 by Navjot Yap RN  Outcome: Ongoing     Problem: Health Behavior:  Goal: Ability to identify and utilize available resources and services will improve  Description: Ability to identify and utilize available resources and services will improve  1/29/2022 0949 by Nestor Carcamo RN  Outcome: Met This Shift  1/28/2022 2040 by Navjot Yap RN  Outcome: Ongoing  Goal: Ability to manage health-related needs will improve  Description: Ability to manage health-related needs will improve  1/29/2022 0949 by Nestro Carcamo RN  Outcome: Met This Shift  1/28/2022 2040 by Navjot Yap RN  Outcome: Ongoing     Problem: Metabolic:  Goal: Ability to maintain appropriate glucose levels will improve  Description: Ability to maintain appropriate glucose levels will improve  1/29/2022 0949 by Nestor Carcamo RN  Outcome: Met This Shift  1/28/2022 2040 by Navjot Yap RN  Outcome: Ongoing     Problem: Nutritional:  Goal: Maintenance of adequate nutrition will improve  Description: Maintenance of adequate nutrition will improve  1/29/2022 0949 by Nestor Carcamo RN  Outcome: Met This 0405 by Benedict Quiñonez RN  Outcome: Met This Shift  1/28/2022 2040 by Cas Younger RN  Outcome: Ongoing

## 2022-01-29 NOTE — CONSULTS
Department of Internal Medicine  Nephrology  Consult Note      Reason for Consult:  CKD, patient known to our service  Requesting Physician:  Dr. Yee Newer:  hypoglycemia    History Obtained From:  patient, domestic partner, electronic medical record    HISTORY OF PRESENT ILLNESS:    Briefly, Mr. Rajan Hernandez is a 66year old AA man with PMH of CKD stage IIIa, baseline creatinine 1.6-1.7 mg/dL,(acute kidney injury consistent with ATN for which he required transient renal replacement therapy) HTN, DM Type II, lipidemia, prostate cancer status post radiation therapy, PAF on apixaban, who was admitted on January 27, 2022, for hypoglycemia (blood sugar of 54). Patient had an episode of hypoglycemia while out at dinner, where EMS treated him with one amp of dextrose  and brought to the hospital.. Labs significant to this admission: CO2 19, BUN 26, creatinine 2.2, lactic acid 3.7, glucose 58. Nephrology was consulted for management of CKD, the patient is known to our service. Medications prior to admission include: Apixaban, metformin, lisinopril, Creon, folic acid, and metoprolol tartrate.       Past Medical History:        Diagnosis Date    [facility]     Cancer [facility]     Diabetes mellitus ([facility]     ED (erectile dysfunction)     Hyperlipidemia     Hypertension     Prostate cancer [facility])      Past Surgical History:        Procedure Laterality Date    COLON SURGERY      COLONOSCOPY N/A 10/18/2021    COLONOSCOPY WITH BIOPSY performed by Tye Pendleton MD at [address] [facility] [address], ESOPHAGUS      [address] N/A 1/12/2021    TEMPORARY HEMODIALYSIS CATHETER INSERTION performed by Mian Willingham MD at [address] Left 6 years ago    OTHER SURGICAL HISTORY      removal of HD catheter    PANCREAS BIOPSY N/A 6/14/2021    PANCREATIC PSEUDOCYST EXCISION DRAINAGE performed by Forrest Mahmood MD at [address] 6/23/2021    LAPAROSCOPIC ROBOTIC XI PANCREATIC NECROSECTOMY WITH CYST GASTROSTOMY , INTRAOPERATIVE  ULTRASOUND performed by Carlin Bhatia MD at [address] [place] [address]  6/14/2021    ENDOSCOPIC ULTRASOUND performed by Yovani Diaz MD at [address] [place] [address] N/A 10/18/2021    EGD BIOPSY performed by Jennifer Marques MD at [facility]     Current Medications:    Current Facility-Administered Medications: insulin glargine (LANTUS) injection vial 10 Units, 10 Units, SubCUTAneous, Nightly  insulin lispro (HUMALOG) injection vial 3 Units, 3 Units, SubCUTAneous, TID WC  insulin lispro (HUMALOG) injection vial 0-6 Units, 0-6 Units, SubCUTAneous, TID WC  insulin lispro (HUMALOG) injection vial 0-3 Units, 0-3 Units, SubCUTAneous, Nightly  dextrose 50 % IV solution, 25 g, IntraVENous, PRN  apixaban (ELIQUIS) tablet 5 mg, 5 mg, Oral, BID  ascorbic acid (VITAMIN C) tablet 250 mg, 250 mg, Oral, Daily  aspirin EC tablet 81 mg, 81 mg, Oral, Daily  atorvastatin (LIPITOR) tablet 20 mg, 20 mg, Oral, Daily  lipase-protease-amylase (CREON) delayed release capsule 36,000 Units, 36,000 Units, Oral, TID WC  lisinopril (PRINIVIL;ZESTRIL) tablet 20 mg, 20 mg, Oral, BID  metoprolol tartrate (LOPRESSOR) tablet 25 mg, 25 mg, Oral, BID  folic acid (FOLVITE) tablet 1 mg, 1 mg, Oral, Daily  pantoprazole (PROTONIX) tablet 40 mg, 40 mg, Oral, BID  sucralfate (CARAFATE) tablet 1 g, 1 g, Oral, 4x Daily  glucose (GLUTOSE) 40 % oral gel 15 g, 15 g, Oral, PRN  dextrose 50 % IV solution, 12.5 g, IntraVENous, PRN  glucagon (rDNA) injection 1 mg, 1 mg, IntraMUSCular, PRN  dextrose 5 % solution, 100 mL/hr, IntraVENous, PRN  Allergies:  Patient has no known allergies. Social History:    TOBACCO:   reports that he has quit smoking. His smoking use included cigarettes. He smoked 1.00 pack per day. He has never used smokeless tobacco.  ETOH:   reports previous alcohol use.     Family History:   History reviewed. No pertinent family history. REVIEW OF SYSTEMS:    CONSTITUTIONAL:  positive for  fatigue  HEENT:  negative  RESPIRATORY:  negative  CARDIOVASCULAR:  negative  GASTROINTESTINAL:  negative  GENITOURINARY:  negative  PHYSICAL EXAM:      Vitals:    VITALS:  BP (!) 164/100   Pulse 74   Temp 97.7 °F (36.5 °C) (Oral)   Resp 18   Ht 6' 2\" (1.88 m)   Wt 151 lb (68.5 kg)   SpO2 100%   BMI 19.39 kg/m²   24HR INTAKE/OUTPUT:    Intake/Output Summary (Last 24 hours) at 1/29/2022 1634  Last data filed at 1/29/2022 0858  Gross per 24 hour   Intake 300 ml   Output --   Net 300 ml       Constitutional:  Alert, pleasant, NAD  HEENT:  PERRLA, normocephalic, atraumatic  Respiratory:  CTAB  Cardiovascular/Edema:  S1/S2, RRR  Gastrointestinal:  Soft, non-tender  Neurologic: AxOx3, moves all extremities, no focal deficits   Skin:  Dry, no lesions or rash  Other: no edema    DATA:    CBC:   Lab Results   Component Value Date    WBC 14.7 01/27/2022    RBC 4.63 01/27/2022    HGB 13.2 01/27/2022    HCT 39.2 01/27/2022    MCV 84.7 01/27/2022    MCH 28.5 01/27/2022    MCHC 33.7 01/27/2022    RDW 13.9 01/27/2022     01/27/2022    MPV 11.3 01/27/2022     CMP:    Lab Results   Component Value Date     01/27/2022    K 4.5 01/27/2022    CL 98 01/27/2022    CO2 19 01/27/2022    BUN 26 01/27/2022    CREATININE 2.2 01/27/2022    GFRAA 36 01/27/2022    LABGLOM 36 01/27/2022    GLUCOSE 197 01/27/2022    PROT 7.8 01/27/2022    LABALBU 4.6 01/27/2022    CALCIUM 9.7 01/27/2022    BILITOT 0.4 01/27/2022    ALKPHOS 123 01/27/2022    AST 43 01/27/2022    ALT 25 01/27/2022       Radiology Review:      Chest X-Ray January 29th, 2022   No pneumonia or pleural effusion.          BRIEF SUMMARY OF INITIAL CONSULT:   Briefly, Mr. Chante Avalos is a 66year old AA man with PMH of CKD stage IIIa, baseline creatinine 1.6-1.7 mg/dL,(acute kidney injury consistent with ATN for which he required transient renal replacement therapy) HTN, DM Type II, lipidemia, prostate cancer status post radiation therapy, PAF on apixaban, who was admitted on January 27, 2022, for hypoglycemia (blood sugar of 54). Patient had an episode of hypoglycemia while out at dinner, where EMS treated him with one amp of dextrose  and brought to the hospital.. Labs significant to this admission: CO2 19, BUN 26, creatinine 2.2, lactic acid 3.7, glucose 58. Nephrology was consulted for management of CKD, the patient is known to our service. Medications prior to admission include: Apixaban, metformin, lisinopril, Creon, folic acid, and metoprolol tartrate. IMPRESSION/RECOMMENDATIONS:    1. BLAIR on CKD? stage IIIa, baseline creatinine approximately 1.6-1.7 mg/dL, with proteinuria, secondary to diabetic nephropathy. Creatinine on admission, 2.2mg/dL, will repeat BMP.       2. HTN, on metoprolol, on lisinopril   3. Acidemia, Lactic acid? on admission 3.1, CO2 19, will repeat lactic acid & BMP  4. Hypoglycemia, endocrinology following, on insulin  ----------------------------------------  6. History of prostate cancer status post radiation therapy  7. Cystic pancreatic mass, on Creon  8. PAF, on metoprolol, apixaban on hold  9. Hyperlipidemia, on statin  10. HX of Vitamin D deficiency, will obtain Vitamin D 25 level  11.  Nutrition: carb controlled diet     Plan:     · Obtain BMP today, recommendations to follow  · Continue with blood pressure medications  · Obtain vitamin D 25 level  · Continue to monitor renal function  · Strict I&Os  · Check urine indices      Agree as annotated  Codie Zapata MD

## 2022-01-29 NOTE — PROGRESS NOTES
[facility] Progress Note    Admitting Date and Time: 1/27/2022  2:48 PM  Admit Dx: Hypoglycemia [E16.2]    Subjective:  Patient is being followed for Hypoglycemia [E16.2]   Is comfortable at bed now, talked with him and his fiancée at bedside, he been diagnosed diabetic for 5 years but he is not following diabetic diet. ROS: denies fever, chills, cp, sob, n/v, HA unless stated above.       insulin glargine  10 Units SubCUTAneous Nightly    insulin lispro  3 Units SubCUTAneous TID WC    insulin lispro  0-6 Units SubCUTAneous TID WC    insulin lispro  0-3 Units SubCUTAneous Nightly    apixaban  5 mg Oral BID    vitamin C  250 mg Oral Daily    aspirin  81 mg Oral Daily    atorvastatin  20 mg Oral Daily    lipase-protease-amylase  36,000 Units Oral TID WC    lisinopril  20 mg Oral BID    metoprolol tartrate  25 mg Oral BID    folic acid  1 mg Oral Daily    pantoprazole  40 mg Oral BID    sucralfate  1 g Oral 4x Daily     dextrose, 25 g, PRN  glucose, 15 g, PRN  dextrose, 12.5 g, PRN  glucagon (rDNA), 1 mg, PRN  dextrose, 100 mL/hr, PRN         Objective:    BP (!) 164/100   Pulse 74   Temp 97.7 °F (36.5 °C) (Oral)   Resp 18   Ht 6' 2\" (1.88 m)   Wt 151 lb (68.5 kg)   SpO2 100%   BMI 19.39 kg/m²     General Appearance: alert and oriented to person, place and time and in no acute distress  Skin: warm and dry  Head: normocephalic and atraumatic  Eyes: pupils equal, round, and reactive to light, extraocular eye movements intact, conjunctivae normal  Neck: neck supple and non tender without mass   Pulmonary/Chest: clear to auscultation bilaterally- no wheezes, rales or rhonchi, normal air movement, no respiratory distress  Cardiovascular: normal rate, normal S1 and S2 and no carotid bruits  Abdomen: soft, non-tender, non-distended, normal bowel sounds, no masses or organomegaly  Extremities: no cyanosis, no clubbing and no edema  Neurologic: no cranial nerve deficit and speech normal        Recent Labs     01/27/22  1637 01/27/22  1701 01/27/22  1818 01/27/22  1917 01/27/22  2043     --   --   --   --    K 4.5  --   --   --   --    CL 98  --   --   --   --    CO2 19*  --   --   --   --    BUN 26*  --   --   --   --    CREATININE 2.2*  --   --   --   --    GLUCOSE 58*   < > 135 146 197   CALCIUM 9.7  --   --   --   --     < > = values in this interval not displayed. Recent Labs     01/27/22  1526   WBC 14.7*   RBC 4.63   HGB 13.2   HCT 39.2   MCV 84.7   MCH 28.5   MCHC 33.7   RDW 13.9      MPV 11.3           Assessment:    Active Problems:    Hypoglycemia  Resolved Problems:    * No resolved hospital problems. *      Plan:  1. Hypoglycemia and hyperglycemia: Patient is diagnosed with diabetes for 5 years hemoglobin A1c is 11.7, not well controlled, his sugar is all over and he admitted sugar with 47, and his sugar found to be more than 300 several times. Endocrinologist consulted and appreciated. We will keep him overnight to monitor his sugar to tract  insulin requirement. I discussed regarding his diet but he is not in any diabetic diet. Diabetic education consult. And nutrition consult. Endocrinologist following the patient, keeping the patient to titrate insulin requirement. 2. leukocytosis: May be due to stress related follow-up CBC. 3.  CKD stage III: Continue monitoring and I will consult nephrologist, Dr Noemy Duffyo. NOTE: This report was transcribed using voice recognition software. Every effort was made to ensure accuracy; however, inadvertent computerized transcription errors may be present.   Electronically signed by Jevon Castillo MD on 1/29/2022 at 2:22 PM

## 2022-01-29 NOTE — PROGRESS NOTES
ENDOCRINOLOGY PROGRESS NOTE      Date of admission: 1/27/2022  Date of service: 1/29/2022  Admitting physician: Lonnie Paula MD   Primary Care Physician: Loc Bull DO  Consultant physician: Kimani Mims MD     Reason for the consultation:  Uncontrolled DM    History of Present Illness: The history is provided by the patient. Accuracy of the patient data is excellent    Days HELADIO English is a very pleasant 66 y.o. old male with PMH of Afib, HTN, HLD, DM type 2 and other listed below admitted to [facility] on 1/27/2022 because of Hypoglycemia, endocrine service was consulted for diabetes management. Subjective   Seen and examined this AM, no acute events overnight, feeling good.  BG was 122 this AM then increase to 366 before lunch because he didn't receive humalog with breakfast     Inpatient diet:   Carb Restricted diet     Point of care glucose monitoring   (Independently reviewed)   Recent Labs     01/28/22  0405 01/28/22  0605 01/28/22  0801 01/28/22  1101 01/28/22  1611 01/28/22  1935 01/29/22  0606 01/29/22  1032   GLUMET 240* 229* 239* 335* 192* 168* 122* 366*       Past medical history:   Past Medical History:   Diagnosis Date    A-fib ([address]     Cancer ([facility] [address]     Diabetes mellitus ([facility]     ED (erectile dysfunction)     Hyperlipidemia     Hypertension     Prostate cancer ([facility]        Past surgical history:  Past Surgical History:   Procedure Laterality Date    COLON SURGERY      COLONOSCOPY N/A 10/18/2021    COLONOSCOPY WITH BIOPSY performed by Katelyn Gardner MD [facility] [facility], ESOPHAGUS      [address] N/A 1/12/2021    TEMPORARY HEMODIALYSIS CATHETER INSERTION performed by Erika Pierson MD at [address] Left 6 years ago    OTHER SURGICAL HISTORY      removal of HD catheter    PANCREAS BIOPSY N/A 6/14/2021    PANCREATIC PSEUDOCYST EXCISION DRAINAGE performed by Glenys Marie MD at [address] 6/23/2021    LAPAROSCOPIC examination:  General: awake alert, oriented x3  HEENT: normocephalic non traumatic, no exophthalmos   Neck: supple, No thyroid tenderness,  Pulm: good equal air entry no added sounds  CVS: S1 + S2  Abd: soft lax, no tenderness  Skin: warm, no lesions, no rash. No open wounds, no ulcers   Neuro: CN intact, sensation decreased bilateral , muscle power normal  Psych: normal mood, and affect    Review of Laboratory Data:  I personally reviewed the following labs:   Recent Labs     01/27/22  1526   WBC 14.7*   RBC 4.63   HGB 13.2   HCT 39.2   MCV 84.7   MCH 28.5   MCHC 33.7   RDW 13.9      MPV 11.3     Recent Labs     01/27/22  1637 01/27/22  1701 01/27/22  1818 01/27/22  1917 01/27/22  2043     --   --   --   --    K 4.5  --   --   --   --    CL 98  --   --   --   --    CO2 19*  --   --   --   --    BUN 26*  --   --   --   --    CREATININE 2.2*  --   --   --   --    GLUCOSE 58*   < > 135 146 197   CALCIUM 9.7  --   --   --   --    PROT 7.8  --   --   --   --    LABALBU 4.6  --   --   --   --    BILITOT 0.4  --   --   --   --    ALKPHOS 123  --   --   --   --    AST 43*  --   --   --   --    ALT 25  --   --   --   --     < > = values in this interval not displayed.      Beta-Hydroxybutyrate   Date Value Ref Range Status   06/17/2021 0.27 0.02 - 0.27 mmol/L Final   12/23/2020 0.80 (H) 0.02 - 0.27 mmol/L Final     Lab Results   Component Value Date    LABA1C 11.7 01/27/2022    LABA1C 11.1 12/23/2020    LABA1C 6.3 09/27/2013     Lab Results   Component Value Date/Time    TSH 0.540 12/23/2020 03:00 AM     Lab Results   Component Value Date    LABA1C 11.7 01/27/2022    GLUCOSE 197 01/27/2022    LABCREA 73 01/28/2022     Lab Results   Component Value Date    TRIG 103 06/23/2021    HDL 14 12/23/2020    LDLCALC - 12/23/2020    CHOL 160 12/23/2020       Blood culture   Lab Results   Component Value Date    BC 5 Days no growth 01/13/2021    BC 5 Days no growth 12/22/2020       Radiology:  XR CHEST (2 VW)   Final Result   No pneumonia or pleural effusion. Medical Records/Labs/Images review:   I personally reviewed and summarized previous records   All labs and imaging were reviewed independently     ASSESSMENT & PLAN   Days HELADIO Brown, a 66 y.o.-old male seen today for inpatient diabetes management     Diabetes Mellitus type 2   · Patient's diabetes is uncontrolled with frequent hypoglycemia  · BG was 122 this AM then increase to 366 before lunch because he didn't receive humalog with breakfast     · We recommend the following  diabetes regimen to:  · Lantus 10u nightly   · Humalog 3u with meals   · Low dose sliding scale   · Continue glucose check with meals and at bedtime   · Will titrate insulin dose based on the blood glucose trend & insulin requirement    Hypoglycemia   · Was on high dose of insulin at home   · Adjusted insulin regimen as per above     HLD  · On Lipitor 20 mg daily     The above issues were reviewed with the patient who understood and agreed with the plan. Thank you for allowing us to participate in the care of this patient. Please do not hesitate to contact us with any additional questions. Gloria Rosa MD  Endocrinologist, [facility]   [address] [place] [id number]   Phone: [phone]  Fax: [phone]  --------------------------------------------  An electronic signature was used to authenticate this note.  Shannon Lyn MD on 1/29/2022 at 11:59 AM

## 2022-01-30 LAB
ANION GAP SERPL CALCULATED.3IONS-SCNC: 11 MMOL/L (ref 7–16)
ANION GAP SERPL CALCULATED.3IONS-SCNC: 12 MMOL/L (ref 7–16)
BUN BLDV-MCNC: 28 MG/DL (ref 6–23)
BUN BLDV-MCNC: 30 MG/DL (ref 6–23)
CALCIUM SERPL-MCNC: 8.8 MG/DL (ref 8.6–10.2)
CALCIUM SERPL-MCNC: 9 MG/DL (ref 8.6–10.2)
CHLORIDE BLD-SCNC: 101 MMOL/L (ref 98–107)
CHLORIDE BLD-SCNC: 104 MMOL/L (ref 98–107)
CO2: 20 MMOL/L (ref 22–29)
CO2: 21 MMOL/L (ref 22–29)
CREAT SERPL-MCNC: 2.4 MG/DL (ref 0.7–1.2)
CREAT SERPL-MCNC: 2.6 MG/DL (ref 0.7–1.2)
GFR AFRICAN AMERICAN: 29
GFR AFRICAN AMERICAN: 32
GFR NON-AFRICAN AMERICAN: 29 ML/MIN/1.73
GFR NON-AFRICAN AMERICAN: 32 ML/MIN/1.73
GLUCOSE BLD-MCNC: 224 MG/DL (ref 74–99)
GLUCOSE BLD-MCNC: 279 MG/DL (ref 74–99)
HCT VFR BLD CALC: 30.7 % (ref 37–54)
HEMOGLOBIN: 10.2 G/DL (ref 12.5–16.5)
MCH RBC QN AUTO: 28.3 PG (ref 26–35)
MCHC RBC AUTO-ENTMCNC: 33.2 % (ref 32–34.5)
MCV RBC AUTO: 85.3 FL (ref 80–99.9)
METER GLUCOSE: 151 MG/DL (ref 74–99)
METER GLUCOSE: 197 MG/DL (ref 74–99)
METER GLUCOSE: 200 MG/DL (ref 74–99)
METER GLUCOSE: 238 MG/DL (ref 74–99)
PDW BLD-RTO: 14.1 FL (ref 11.5–15)
PLATELET # BLD: 144 E9/L (ref 130–450)
PMV BLD AUTO: 10.3 FL (ref 7–12)
POTASSIUM SERPL-SCNC: 4.8 MMOL/L (ref 3.5–5)
POTASSIUM SERPL-SCNC: 4.8 MMOL/L (ref 3.5–5)
RBC # BLD: 3.6 E12/L (ref 3.8–5.8)
SODIUM BLD-SCNC: 133 MMOL/L (ref 132–146)
SODIUM BLD-SCNC: 136 MMOL/L (ref 132–146)
SODIUM URINE: 55 MMOL/L
URINE CULTURE, ROUTINE: NORMAL
VITAMIN D 25-HYDROXY: 29 NG/ML (ref 30–100)
WBC # BLD: 5 E9/L (ref 4.5–11.5)

## 2022-01-30 PROCEDURE — 84300 ASSAY OF URINE SODIUM: CPT

## 2022-01-30 PROCEDURE — G0378 HOSPITAL OBSERVATION PER HR: HCPCS

## 2022-01-30 PROCEDURE — 36415 COLL VENOUS BLD VENIPUNCTURE: CPT

## 2022-01-30 PROCEDURE — 99225 PR SBSQ OBSERVATION CARE/DAY 25 MINUTES: CPT | Performed by: INTERNAL MEDICINE

## 2022-01-30 PROCEDURE — 82306 VITAMIN D 25 HYDROXY: CPT

## 2022-01-30 PROCEDURE — 85027 COMPLETE CBC AUTOMATED: CPT

## 2022-01-30 PROCEDURE — 6370000000 HC RX 637 (ALT 250 FOR IP): Performed by: INTERNAL MEDICINE

## 2022-01-30 PROCEDURE — 2580000003 HC RX 258

## 2022-01-30 PROCEDURE — 99232 SBSQ HOSP IP/OBS MODERATE 35: CPT | Performed by: INTERNAL MEDICINE

## 2022-01-30 PROCEDURE — 80048 BASIC METABOLIC PNL TOTAL CA: CPT

## 2022-01-30 PROCEDURE — 82962 GLUCOSE BLOOD TEST: CPT

## 2022-01-30 RX ORDER — AMLODIPINE BESYLATE 5 MG/1
5 TABLET ORAL DAILY
Status: DISCONTINUED | OUTPATIENT
Start: 2022-01-30 | End: 2022-01-31

## 2022-01-30 RX ORDER — INSULIN GLARGINE 100 [IU]/ML
12 INJECTION, SOLUTION SUBCUTANEOUS NIGHTLY
Status: DISCONTINUED | OUTPATIENT
Start: 2022-01-31 | End: 2022-01-31

## 2022-01-30 RX ORDER — LISINOPRIL 10 MG/1
10 TABLET ORAL 2 TIMES DAILY
Status: DISCONTINUED | OUTPATIENT
Start: 2022-01-30 | End: 2022-01-31 | Stop reason: HOSPADM

## 2022-01-30 RX ORDER — SODIUM CHLORIDE 9 MG/ML
INJECTION, SOLUTION INTRAVENOUS CONTINUOUS
Status: DISCONTINUED | OUTPATIENT
Start: 2022-01-30 | End: 2022-01-31

## 2022-01-30 RX ADMIN — INSULIN LISPRO 1 UNITS: 100 INJECTION, SOLUTION INTRAVENOUS; SUBCUTANEOUS at 06:54

## 2022-01-30 RX ADMIN — INSULIN LISPRO 3 UNITS: 100 INJECTION, SOLUTION INTRAVENOUS; SUBCUTANEOUS at 11:18

## 2022-01-30 RX ADMIN — INSULIN LISPRO 3 UNITS: 100 INJECTION, SOLUTION INTRAVENOUS; SUBCUTANEOUS at 16:26

## 2022-01-30 RX ADMIN — PANTOPRAZOLE SODIUM 40 MG: 40 TABLET, DELAYED RELEASE ORAL at 20:30

## 2022-01-30 RX ADMIN — SUCRALFATE 1 G: 1 TABLET ORAL at 08:19

## 2022-01-30 RX ADMIN — LISINOPRIL 10 MG: 10 TABLET ORAL at 20:30

## 2022-01-30 RX ADMIN — SUCRALFATE 1 G: 1 TABLET ORAL at 20:30

## 2022-01-30 RX ADMIN — FOLIC ACID 1 MG: 1 TABLET ORAL at 08:19

## 2022-01-30 RX ADMIN — METOPROLOL TARTRATE 25 MG: 25 TABLET, FILM COATED ORAL at 08:19

## 2022-01-30 RX ADMIN — ASPIRIN 81 MG: 81 TABLET, COATED ORAL at 08:19

## 2022-01-30 RX ADMIN — PANCRELIPASE 36000 UNITS: 60000; 12000; 38000 CAPSULE, DELAYED RELEASE PELLETS ORAL at 16:35

## 2022-01-30 RX ADMIN — METOPROLOL TARTRATE 25 MG: 25 TABLET, FILM COATED ORAL at 20:30

## 2022-01-30 RX ADMIN — INSULIN LISPRO 2 UNITS: 100 INJECTION, SOLUTION INTRAVENOUS; SUBCUTANEOUS at 11:14

## 2022-01-30 RX ADMIN — LISINOPRIL 20 MG: 20 TABLET ORAL at 08:19

## 2022-01-30 RX ADMIN — SUCRALFATE 1 G: 1 TABLET ORAL at 12:31

## 2022-01-30 RX ADMIN — AMLODIPINE BESYLATE 5 MG: 5 TABLET ORAL at 18:30

## 2022-01-30 RX ADMIN — INSULIN LISPRO 2 UNITS: 100 INJECTION, SOLUTION INTRAVENOUS; SUBCUTANEOUS at 16:29

## 2022-01-30 RX ADMIN — INSULIN GLARGINE 10 UNITS: 100 INJECTION, SOLUTION SUBCUTANEOUS at 19:40

## 2022-01-30 RX ADMIN — INSULIN LISPRO 3 UNITS: 100 INJECTION, SOLUTION INTRAVENOUS; SUBCUTANEOUS at 06:55

## 2022-01-30 RX ADMIN — OXYCODONE HYDROCHLORIDE AND ACETAMINOPHEN 250 MG: 500 TABLET ORAL at 08:19

## 2022-01-30 RX ADMIN — APIXABAN 5 MG: 5 TABLET, FILM COATED ORAL at 20:30

## 2022-01-30 RX ADMIN — PANCRELIPASE 36000 UNITS: 60000; 12000; 38000 CAPSULE, DELAYED RELEASE PELLETS ORAL at 11:19

## 2022-01-30 RX ADMIN — APIXABAN 5 MG: 5 TABLET, FILM COATED ORAL at 08:19

## 2022-01-30 RX ADMIN — ATORVASTATIN CALCIUM 20 MG: 20 TABLET, FILM COATED ORAL at 08:19

## 2022-01-30 RX ADMIN — SODIUM CHLORIDE: 9 INJECTION, SOLUTION INTRAVENOUS at 12:31

## 2022-01-30 RX ADMIN — INSULIN LISPRO 1 UNITS: 100 INJECTION, SOLUTION INTRAVENOUS; SUBCUTANEOUS at 19:40

## 2022-01-30 RX ADMIN — PANCRELIPASE 36000 UNITS: 60000; 12000; 38000 CAPSULE, DELAYED RELEASE PELLETS ORAL at 08:19

## 2022-01-30 RX ADMIN — SUCRALFATE 1 G: 1 TABLET ORAL at 16:35

## 2022-01-30 RX ADMIN — PANTOPRAZOLE SODIUM 40 MG: 40 TABLET, DELAYED RELEASE ORAL at 08:19

## 2022-01-30 ASSESSMENT — PAIN SCALES - GENERAL: PAINLEVEL_OUTOF10: 0

## 2022-01-30 NOTE — PLAN OF CARE
Problem:  Activity:  Goal: Risk for activity intolerance will decrease  Description: Risk for activity intolerance will decrease  1/30/2022 1158 by Yelena Vides RN  Outcome: Met This Shift  1/29/2022 2306 by Elizabeth Alvarez RN  Outcome: Met This Shift     Problem: Coping:  Goal: Ability to adjust to condition or change in health will improve  Description: Ability to adjust to condition or change in health will improve  1/30/2022 1158 by Yelena Vides RN  Outcome: Met This Shift  1/29/2022 2306 by Elizabeth Alvarez RN  Outcome: Met This Shift     Problem: Fluid Volume:  Goal: Ability to maintain a balanced intake and output will improve  Description: Ability to maintain a balanced intake and output will improve  1/30/2022 1158 by Yelena Vides RN  Outcome: Met This Shift  1/29/2022 2306 by Elizabeth Alvarez RN  Outcome: Met This Shift     Problem: Health Behavior:  Goal: Ability to identify and utilize available resources and services will improve  Description: Ability to identify and utilize available resources and services will improve  1/30/2022 1158 by Yelena Vides RN  Outcome: Met This Shift  1/29/2022 2306 by Elizabeth Alvarez RN  Outcome: Met This Shift  Goal: Ability to manage health-related needs will improve  Description: Ability to manage health-related needs will improve  1/30/2022 1158 by Yelena Vides RN  Outcome: Met This Shift  1/29/2022 2306 by Elizabeth Alvarez RN  Outcome: Met This Shift     Problem: Metabolic:  Goal: Ability to maintain appropriate glucose levels will improve  Description: Ability to maintain appropriate glucose levels will improve  1/30/2022 1158 by Yelena Vides RN  Outcome: Met This Shift  1/29/2022 2306 by Elizabeth Alvarez RN  Outcome: Met This Shift     Problem: Nutritional:  Goal: Maintenance of adequate nutrition will improve  Description: Maintenance of adequate nutrition will improve  1/30/2022 1158 by Yelena Vides RN  Outcome: Met This Shift  1/29/2022 2306 by Aurelio Hopper RN  Outcome: Met This Shift  Goal: Progress toward achieving an optimal weight will improve  Description: Progress toward achieving an optimal weight will improve  1/30/2022 1158 by Elina Etienne RN  Outcome: Met This Shift  1/29/2022 2306 by Aurelio Hopper RN  Outcome: Met This Shift     Problem: Physical Regulation:  Goal: Complications related to the disease process, condition or treatment will be avoided or minimized  Description: Complications related to the disease process, condition or treatment will be avoided or minimized  1/30/2022 1158 by Elina Etienne RN  Outcome: Met This Shift  1/29/2022 2306 by Aurelio Hopper RN  Outcome: Met This Shift  Goal: Diagnostic test results will improve  Description: Diagnostic test results will improve  1/30/2022 1158 by Elina Etienne RN  Outcome: Met This Shift  1/29/2022 2306 by Aurelio Hopper RN  Outcome: Met This Shift     Problem: Skin Integrity:  Goal: Risk for impaired skin integrity will decrease  Description: Risk for impaired skin integrity will decrease  1/30/2022 1158 by Elina Etienne RN  Outcome: Met This Shift  1/29/2022 2306 by Aurelio Hopper RN  Outcome: Met This Shift     Problem: Tissue Perfusion:  Goal: Adequacy of tissue perfusion will improve  Description: Adequacy of tissue perfusion will improve  1/29/2022 2306 by Aurelio Hopper RN  Outcome: Met This Shift     Problem: Pain:  Goal: Pain level will decrease  Description: Pain level will decrease  1/30/2022 1158 by Elina Etienne RN  Outcome: Met This Shift  1/29/2022 2306 by Aurelio Hopper RN  Outcome: Met This Shift  Goal: Control of acute pain  Description: Control of acute pain  1/30/2022 1158 by Elina Etienne RN  Outcome: Met This Shift  1/29/2022 2306 by Aurelio Hopper RN  Outcome: Met This Shift  Goal: Control of chronic pain  Description: Control of chronic pain  1/30/2022 1158 by Elina Etienne RN  Outcome: Met This Shift  1/29/2022 2306 by Ezra Padilla, HALLIE  Outcome: Met This Shift

## 2022-01-30 NOTE — PROGRESS NOTES
Department of Internal Medicine  Nephrology  Progress Note  Events reviewed:    SUBJECTIVE: We are following Mr. Brown for CKD. PHYSICAL EXAM:      Vitals:    VITALS:  BP (!) 170/91   Pulse 70   Temp 97.8 °F (36.6 °C) (Oral)   Resp 16   Ht 6' 2\" (1.88 m)   Wt 151 lb (68.5 kg)   SpO2 98%   BMI 19.39 kg/m²   24HR INTAKE/OUTPUT:      Intake/Output Summary (Last 24 hours) at 1/30/2022 1146  Last data filed at 1/30/2022 2491  Gross per 24 hour   Intake 480 ml   Output 1275 ml   Net -795 ml       Constitutional:  Alert, pleasant, NAD  HEENT:  PERRLA, normocephalic, atraumatic  Respiratory:  CTAB  Cardiovascular/Edema:  S1/S2, RRR  Gastrointestinal:  Soft, non-tender  Neurologic:   AxOx3, moves all extremities, no focal deficits   Skin:  Dry, no lesions or rash  Other: no edema    Scheduled Meds:   insulin glargine  10 Units SubCUTAneous Nightly    insulin lispro  3 Units SubCUTAneous TID WC    insulin lispro  0-6 Units SubCUTAneous TID WC    insulin lispro  0-3 Units SubCUTAneous Nightly    apixaban  5 mg Oral BID    vitamin C  250 mg Oral Daily    aspirin  81 mg Oral Daily    atorvastatin  20 mg Oral Daily    lipase-protease-amylase  36,000 Units Oral TID WC    lisinopril  20 mg Oral BID    metoprolol tartrate  25 mg Oral BID    folic acid  1 mg Oral Daily    pantoprazole  40 mg Oral BID    sucralfate  1 g Oral 4x Daily     Continuous Infusions:   sodium chloride      dextrose       PRN Meds:.dextrose, glucose, dextrose, glucagon (rDNA), dextrose      DATA:    CBC:   Lab Results   Component Value Date    WBC 5.0 01/30/2022    RBC 3.60 01/30/2022    HGB 10.2 01/30/2022    HCT 30.7 01/30/2022    MCV 85.3 01/30/2022    MCH 28.3 01/30/2022    MCHC 33.2 01/30/2022    RDW 14.1 01/30/2022     01/30/2022    MPV 10.3 01/30/2022     CMP:    Lab Results   Component Value Date     01/30/2022    K 4.8 01/30/2022    K 4.5 01/27/2022     01/30/2022    CO2 21 01/30/2022    BUN 30 01/30/2022    CREATININE 2.6 01/30/2022    GFRAA 29 01/30/2022    LABGLOM 29 01/30/2022    GLUCOSE 224 01/30/2022    PROT 7.8 01/27/2022    LABALBU 4.6 01/27/2022    CALCIUM 9.0 01/30/2022    BILITOT 0.4 01/27/2022    ALKPHOS 123 01/27/2022    AST 43 01/27/2022    ALT 25 01/27/2022       Radiology Review:      Chest X-Ray January 29th, 2022   No pneumonia or pleural effusion. BRIEF SUMMARY OF INITIAL CONSULT:   Briefly, Mr. Eveline Reyez is a 66year old AA man with PMH of CKD stage IIIa, baseline creatinine 1.6-1.7 mg/dL,(acute kidney injury consistent with ATN for which he required transient renal replacement therapy) HTN, DM Type II, lipidemia, prostate cancer status post radiation therapy, PAF on apixaban, who was admitted on January 27, 2022, for hypoglycemia (blood sugar of 54). Patient had an episode of hypoglycemia while out at dinner, where EMS treated him with one amp of dextrose  and brought to the hospital.. Labs significant to this admission: CO2 19, BUN 26, creatinine 2.2, lactic acid 3.7, glucose 58. Nephrology was consulted for management of CKD, the patient is known to our service. Medications prior to admission include: Apixaban, metformin, lisinopril, Creon, folic acid, and metoprolol tartrate. IMPRESSION/RECOMMENDATIONS:    1. BLAIR on CKD 2/2 poor oral intake vs hyperglycemia? stage IIIa, baseline creatinine approximately 1.6-1.7 mg/dL, with proteinuria, secondary to diabetic nephropathy. Creatinine on admission, 2.2mg/dL, increase in creatiniine, will start NS @ 100ml/hr      2. HTN, on metoprolol, on lisinopril   3. Acidemia, Lactic acid? on admission 3.1, CO2 19, will repeat lactic acid & BMP  4. Hypoglycemia, endocrinology following, on insulin  ----------------------------------------  6. History of prostate cancer status post radiation therapy  7. Cystic pancreatic mass, on Creon  8. PAF, on metoprolol, apixaban on hold  9. Hyperlipidemia, on statin  10.  HX of Vitamin D deficiency, Vitamin D 25 level (29) will start ergocalciferol. 11. Nutrition: carb controlled diet     Plan:     · Start NS @ 100ml/hr  · Decrease zestril to 10 mg po bid  · Add amlodipine 5 mg daily  · Monitor blood pressure   · Continue with blood pressure medications  · Start ergocalciferol 50,000 units weekly.    · Continue to monitor renal function, repeat BMP at 1600  · Strict I&Os  · Obtain urine indices  · Discharge planning    Case and treatment plan discussed with Dr. Ben Wills as annotated  Delicia Parr MD

## 2022-01-30 NOTE — PROGRESS NOTES
[facility] Progress Note    Admitting Date and Time: 1/27/2022  2:48 PM  Admit Dx: Hypoglycemia [E16.2]    Subjective:  Patient is being followed for Hypoglycemia [E16.2]   Is comfortable at bed now, talked with him and his fiancée at bedside, he been diagnosed diabetic for 5 years but he is not following diabetic diet. wants to go home. ROS: denies fever, chills, cp, sob, n/v, HA unless stated above.       insulin glargine  10 Units SubCUTAneous Nightly    insulin lispro  3 Units SubCUTAneous TID WC    insulin lispro  0-6 Units SubCUTAneous TID WC    insulin lispro  0-3 Units SubCUTAneous Nightly    apixaban  5 mg Oral BID    vitamin C  250 mg Oral Daily    aspirin  81 mg Oral Daily    atorvastatin  20 mg Oral Daily    lipase-protease-amylase  36,000 Units Oral TID WC    lisinopril  20 mg Oral BID    metoprolol tartrate  25 mg Oral BID    folic acid  1 mg Oral Daily    pantoprazole  40 mg Oral BID    sucralfate  1 g Oral 4x Daily     dextrose, 25 g, PRN  glucose, 15 g, PRN  dextrose, 12.5 g, PRN  glucagon (rDNA), 1 mg, PRN  dextrose, 100 mL/hr, PRN         Objective:    BP (!) 170/91   Pulse 70   Temp 97.8 °F (36.6 °C) (Oral)   Resp 16   Ht 6' 2\" (1.88 m)   Wt 151 lb (68.5 kg)   SpO2 98%   BMI 19.39 kg/m²     General Appearance: alert and oriented to person, place and time and in no acute distress  Skin: warm and dry  Head: normocephalic and atraumatic  Eyes: pupils equal, round, and reactive to light, extraocular eye movements intact, conjunctivae normal  Neck: neck supple and non tender without mass   Pulmonary/Chest: clear to auscultation bilaterally- no wheezes, rales or rhonchi, normal air movement, no respiratory distress  Cardiovascular: normal rate, normal S1 and S2 and no carotid bruits  Abdomen: soft, non-tender, non-distended, normal bowel sounds, no masses or organomegaly  Extremities: no cyanosis, no clubbing and no edema  Neurologic: no cranial nerve deficit and speech normal        Recent Labs     01/27/22  1637 01/27/22  1701 01/27/22  2043 01/29/22  1654 01/30/22  0324     --   --  130* 136   K 4.5  --   --  4.7 4.8   CL 98  --   --  99 104   CO2 19*  --   --  21* 21*   BUN 26*  --   --  27* 30*   CREATININE 2.2*  --   --  2.4* 2.6*   GLUCOSE 58*   < > 197 256* 224*   CALCIUM 9.7  --   --  9.1 9.0    < > = values in this interval not displayed. Recent Labs     01/27/22  1526 01/30/22  0324   WBC 14.7* 5.0   RBC 4.63 3.60*   HGB 13.2 10.2*   HCT 39.2 30.7*   MCV 84.7 85.3   MCH 28.5 28.3   MCHC 33.7 33.2   RDW 13.9 14.1    144   MPV 11.3 10.3           Assessment:    Active Problems:    Hypoglycemia  Resolved Problems:    * No resolved hospital problems. *      Plan:  1. Hypoglycemia and hyperglycemia: Patient is diagnosed with diabetes for 5 years hemoglobin A1c is 11.7, not well controlled, his sugar is all over and he admitted sugar with 47, and his sugar found to be more than 300 several times. Endocrinologist consulted and appreciated. We will keep him overnight to monitor his sugar to tract  insulin requirement. I discussed regarding his diet but he is not in any diabetic diet. Diabetic education consult. And nutrition consult. Endocrinologist following the patient, keeping the patient to titrate insulin requirement. His sugar still going up and down in wide range, can be discharged whenever endocrine clears to go home. 2.  leukocytosis: May be due to stress related follow-up CBC. 3.  CKD stage III: Continue monitoring and I will consult nephrologist, Dr Satinder Soni saw the patient and appreciated for consult. NOTE: This report was transcribed using voice recognition software. Every effort was made to ensure accuracy; however, inadvertent computerized transcription errors may be present.   Electronically signed by Jevon Castillo MD on 1/30/2022 at 2:24 PM

## 2022-01-31 VITALS
DIASTOLIC BLOOD PRESSURE: 83 MMHG | TEMPERATURE: 97.6 F | HEART RATE: 76 BPM | SYSTOLIC BLOOD PRESSURE: 155 MMHG | BODY MASS INDEX: 20.06 KG/M2 | WEIGHT: 156.3 LBS | HEIGHT: 74 IN | RESPIRATION RATE: 18 BRPM | OXYGEN SATURATION: 99 %

## 2022-01-31 LAB
ALBUMIN SERPL-MCNC: 3.3 G/DL (ref 3.5–5.2)
ALP BLD-CCNC: 84 U/L (ref 40–129)
ALT SERPL-CCNC: 16 U/L (ref 0–40)
ANION GAP SERPL CALCULATED.3IONS-SCNC: 8 MMOL/L (ref 7–16)
ANION GAP SERPL CALCULATED.3IONS-SCNC: 8 MMOL/L (ref 7–16)
AST SERPL-CCNC: 22 U/L (ref 0–39)
BILIRUB SERPL-MCNC: 0.2 MG/DL (ref 0–1.2)
BUN BLDV-MCNC: 29 MG/DL (ref 6–23)
BUN BLDV-MCNC: 30 MG/DL (ref 6–23)
CALCIUM SERPL-MCNC: 8.8 MG/DL (ref 8.6–10.2)
CALCIUM SERPL-MCNC: 9.3 MG/DL (ref 8.6–10.2)
CHLORIDE BLD-SCNC: 103 MMOL/L (ref 98–107)
CHLORIDE BLD-SCNC: 106 MMOL/L (ref 98–107)
CO2: 21 MMOL/L (ref 22–29)
CO2: 22 MMOL/L (ref 22–29)
CREAT SERPL-MCNC: 2.4 MG/DL (ref 0.7–1.2)
CREAT SERPL-MCNC: 2.4 MG/DL (ref 0.7–1.2)
GFR AFRICAN AMERICAN: 32
GFR AFRICAN AMERICAN: 32
GFR NON-AFRICAN AMERICAN: 32 ML/MIN/1.73
GFR NON-AFRICAN AMERICAN: 32 ML/MIN/1.73
GLUCOSE BLD-MCNC: 108 MG/DL (ref 74–99)
GLUCOSE BLD-MCNC: 158 MG/DL (ref 74–99)
METER GLUCOSE: 109 MG/DL (ref 74–99)
METER GLUCOSE: 115 MG/DL (ref 74–99)
METER GLUCOSE: 184 MG/DL (ref 74–99)
METER GLUCOSE: 231 MG/DL (ref 74–99)
PH VENOUS: 7.25 (ref 7.35–7.45)
POTASSIUM SERPL-SCNC: 4.4 MMOL/L (ref 3.5–5)
POTASSIUM SERPL-SCNC: 4.9 MMOL/L (ref 3.5–5)
SODIUM BLD-SCNC: 132 MMOL/L (ref 132–146)
SODIUM BLD-SCNC: 136 MMOL/L (ref 132–146)
TOTAL PROTEIN: 5.4 G/DL (ref 6.4–8.3)

## 2022-01-31 PROCEDURE — 6370000000 HC RX 637 (ALT 250 FOR IP): Performed by: INTERNAL MEDICINE

## 2022-01-31 PROCEDURE — 36415 COLL VENOUS BLD VENIPUNCTURE: CPT

## 2022-01-31 PROCEDURE — 82800 BLOOD PH: CPT

## 2022-01-31 PROCEDURE — 82962 GLUCOSE BLOOD TEST: CPT

## 2022-01-31 PROCEDURE — 80053 COMPREHEN METABOLIC PANEL: CPT

## 2022-01-31 PROCEDURE — 80048 BASIC METABOLIC PNL TOTAL CA: CPT

## 2022-01-31 PROCEDURE — 99225 PR SBSQ OBSERVATION CARE/DAY 25 MINUTES: CPT | Performed by: INTERNAL MEDICINE

## 2022-01-31 PROCEDURE — G0378 HOSPITAL OBSERVATION PER HR: HCPCS

## 2022-01-31 RX ORDER — INSULIN GLARGINE 100 [IU]/ML
10 INJECTION, SOLUTION SUBCUTANEOUS NIGHTLY
Qty: 5 PEN | Refills: 3 | Status: SHIPPED | OUTPATIENT
Start: 2022-01-31

## 2022-01-31 RX ORDER — GLUCOSAMINE HCL/CHONDROITIN SU 500-400 MG
CAPSULE ORAL
Qty: 250 STRIP | Refills: 5 | Status: SHIPPED | OUTPATIENT
Start: 2022-01-31

## 2022-01-31 RX ORDER — AMLODIPINE BESYLATE 10 MG/1
10 TABLET ORAL DAILY
Qty: 30 TABLET | Refills: 3 | Status: SHIPPED | OUTPATIENT
Start: 2022-02-01

## 2022-01-31 RX ORDER — INSULIN GLARGINE 100 [IU]/ML
10 INJECTION, SOLUTION SUBCUTANEOUS NIGHTLY
Status: DISCONTINUED | OUTPATIENT
Start: 2022-01-31 | End: 2022-01-31 | Stop reason: HOSPADM

## 2022-01-31 RX ORDER — INSULIN ASPART 100 [IU]/ML
INJECTION, SOLUTION INTRAVENOUS; SUBCUTANEOUS
Qty: 5 PEN | Refills: 3 | Status: SHIPPED | OUTPATIENT
Start: 2022-01-31

## 2022-01-31 RX ORDER — PEN NEEDLE, DIABETIC 32GX 5/32"
NEEDLE, DISPOSABLE MISCELLANEOUS
Qty: 250 EACH | Refills: 5 | Status: SHIPPED | OUTPATIENT
Start: 2022-01-31

## 2022-01-31 RX ORDER — AMLODIPINE BESYLATE 10 MG/1
10 TABLET ORAL DAILY
Status: DISCONTINUED | OUTPATIENT
Start: 2022-02-01 | End: 2022-01-31 | Stop reason: HOSPADM

## 2022-01-31 RX ADMIN — METOPROLOL TARTRATE 25 MG: 25 TABLET, FILM COATED ORAL at 08:35

## 2022-01-31 RX ADMIN — SUCRALFATE 1 G: 1 TABLET ORAL at 16:37

## 2022-01-31 RX ADMIN — INSULIN LISPRO 2 UNITS: 100 INJECTION, SOLUTION INTRAVENOUS; SUBCUTANEOUS at 16:37

## 2022-01-31 RX ADMIN — AMLODIPINE BESYLATE 5 MG: 5 TABLET ORAL at 08:34

## 2022-01-31 RX ADMIN — PANCRELIPASE 36000 UNITS: 60000; 12000; 38000 CAPSULE, DELAYED RELEASE PELLETS ORAL at 08:34

## 2022-01-31 RX ADMIN — LISINOPRIL 10 MG: 10 TABLET ORAL at 08:35

## 2022-01-31 RX ADMIN — OXYCODONE HYDROCHLORIDE AND ACETAMINOPHEN 250 MG: 500 TABLET ORAL at 08:34

## 2022-01-31 RX ADMIN — INSULIN LISPRO 1 UNITS: 100 INJECTION, SOLUTION INTRAVENOUS; SUBCUTANEOUS at 12:07

## 2022-01-31 RX ADMIN — SUCRALFATE 1 G: 1 TABLET ORAL at 12:10

## 2022-01-31 RX ADMIN — PANCRELIPASE 36000 UNITS: 60000; 12000; 38000 CAPSULE, DELAYED RELEASE PELLETS ORAL at 12:09

## 2022-01-31 RX ADMIN — INSULIN LISPRO 4 UNITS: 100 INJECTION, SOLUTION INTRAVENOUS; SUBCUTANEOUS at 08:35

## 2022-01-31 RX ADMIN — ASPIRIN 81 MG: 81 TABLET, COATED ORAL at 08:34

## 2022-01-31 RX ADMIN — INSULIN LISPRO 4 UNITS: 100 INJECTION, SOLUTION INTRAVENOUS; SUBCUTANEOUS at 12:06

## 2022-01-31 RX ADMIN — FOLIC ACID 1 MG: 1 TABLET ORAL at 08:34

## 2022-01-31 RX ADMIN — APIXABAN 5 MG: 5 TABLET, FILM COATED ORAL at 08:35

## 2022-01-31 RX ADMIN — PANTOPRAZOLE SODIUM 40 MG: 40 TABLET, DELAYED RELEASE ORAL at 08:35

## 2022-01-31 RX ADMIN — PANCRELIPASE 36000 UNITS: 60000; 12000; 38000 CAPSULE, DELAYED RELEASE PELLETS ORAL at 16:36

## 2022-01-31 RX ADMIN — ATORVASTATIN CALCIUM 20 MG: 20 TABLET, FILM COATED ORAL at 08:34

## 2022-01-31 RX ADMIN — INSULIN LISPRO 4 UNITS: 100 INJECTION, SOLUTION INTRAVENOUS; SUBCUTANEOUS at 16:37

## 2022-01-31 RX ADMIN — SUCRALFATE 1 G: 1 TABLET ORAL at 08:34

## 2022-01-31 ASSESSMENT — PAIN DESCRIPTION - DESCRIPTORS: DESCRIPTORS: ACHING

## 2022-01-31 ASSESSMENT — PAIN DESCRIPTION - PAIN TYPE: TYPE: CHRONIC PAIN;NEUROPATHIC PAIN

## 2022-01-31 ASSESSMENT — PAIN DESCRIPTION - LOCATION: LOCATION: LEG

## 2022-01-31 ASSESSMENT — PAIN DESCRIPTION - PROGRESSION: CLINICAL_PROGRESSION: NOT CHANGED

## 2022-01-31 ASSESSMENT — PAIN DESCRIPTION - FREQUENCY: FREQUENCY: CONTINUOUS

## 2022-01-31 ASSESSMENT — PAIN DESCRIPTION - ONSET: ONSET: ON-GOING

## 2022-01-31 ASSESSMENT — PAIN DESCRIPTION - ORIENTATION: ORIENTATION: RIGHT;LEFT

## 2022-01-31 ASSESSMENT — PAIN SCALES - GENERAL: PAINLEVEL_OUTOF10: 7

## 2022-01-31 NOTE — CONSULTS
Nutrition Education    · Verbally reviewed information with Patient and Family  · Educated on Diabetic Diet with carb counting guidelines and instructions. Patient and family were very receptive to information and ready to make the necessary lifestyle diet modifications. · Written educational materials provided. · Contact name and number provided.     Electronically signed by Landon Millan MS, RD, LD on 1/31/22 at 2:39 PM EST    Contact: [phone]

## 2022-01-31 NOTE — PROGRESS NOTES
Department of Internal Medicine  Nephrology  Progress Note    Events reviewed. SUBJECTIVE: We are following Mr. Brown for CKD. He reports no complaints. PHYSICAL EXAM:      Vitals:    VITALS:  BP (!) 155/83   Pulse 76   Temp 97.6 °F (36.4 °C) (Oral)   Resp 18   Ht 6' 2\" (1.88 m)   Wt 156 lb 4.8 oz (70.9 kg)   SpO2 99%   BMI 20.07 kg/m²   24HR INTAKE/OUTPUT:      Intake/Output Summary (Last 24 hours) at 1/31/2022 0949  Last data filed at 1/31/2022 0745  Gross per 24 hour   Intake 3718 ml   Output 2500 ml   Net 1218 ml       Constitutional:  Alert, pleasant, NAD  HEENT:  PERRLA, normocephalic, atraumatic  Respiratory:  CTAB  Cardiovascular/Edema:  S1/S2, RRR  Gastrointestinal:  Soft, non-tender  Neurologic:   AxOx3, moves all extremities, no focal deficits   Skin:  Dry, no lesions or rash  Other: no edema    Scheduled Meds:   amLODIPine  5 mg Oral Daily    [Held by provider] lisinopril  10 mg Oral BID    insulin lispro  4 Units SubCUTAneous TID WC    insulin glargine  12 Units SubCUTAneous Nightly    insulin lispro  0-6 Units SubCUTAneous TID WC    insulin lispro  0-3 Units SubCUTAneous Nightly    apixaban  5 mg Oral BID    vitamin C  250 mg Oral Daily    aspirin  81 mg Oral Daily    atorvastatin  20 mg Oral Daily    lipase-protease-amylase  36,000 Units Oral TID WC    metoprolol tartrate  25 mg Oral BID    folic acid  1 mg Oral Daily    pantoprazole  40 mg Oral BID    sucralfate  1 g Oral 4x Daily     Continuous Infusions:   sodium chloride 100 mL/hr at 01/30/22 1231    dextrose       PRN Meds:.dextrose, glucose, dextrose, glucagon (rDNA), dextrose      DATA:    CBC:   Lab Results   Component Value Date    WBC 5.0 01/30/2022    RBC 3.60 01/30/2022    HGB 10.2 01/30/2022    HCT 30.7 01/30/2022    MCV 85.3 01/30/2022    MCH 28.3 01/30/2022    MCHC 33.2 01/30/2022    RDW 14.1 01/30/2022     01/30/2022    MPV 10.3 01/30/2022     CMP:    Lab Results   Component Value Date     01/31/2022    K 4.4 01/31/2022    K 4.5 01/27/2022     01/31/2022    CO2 21 01/31/2022    BUN 30 01/31/2022    CREATININE 2.4 01/31/2022    GFRAA 32 01/31/2022    LABGLOM 32 01/31/2022    GLUCOSE 108 01/31/2022    PROT 5.4 01/31/2022    LABALBU 3.3 01/31/2022    CALCIUM 8.8 01/31/2022    BILITOT 0.2 01/31/2022    ALKPHOS 84 01/31/2022    AST 22 01/31/2022    ALT 16 01/31/2022       Radiology Review:      Chest X-Ray January 27th, 2022   No pneumonia or pleural effusion. BRIEF SUMMARY OF INITIAL CONSULT:   Briefly, Mr. Eveline Reyez is a 66year old AA man with PMH of CKD stage IIIa, baseline creatinine 1.7-1.8 mg/dL, (acute kidney injury consistent with ATN for which he required transient renal replacement therapy) HTN, DM Type II, lipidemia, prostate cancer status post radiation therapy, PAF on apixaban, who was admitted on January 27, 2022, for hypoglycemia (blood sugar of 54). Patient had an episode of hypoglycemia while out at dinner, where EMS treated him with one amp of dextrose  and brought to the hospital.. Labs significant to this admission: CO2 19, BUN 26, creatinine 2.2, lactic acid 3.7, glucose 58. Nephrology was consulted for management of CKD, the patient is known to our service. Medications prior to admission include: Apixaban, metformin, lisinopril, Creon, folic acid, and metoprolol tartrate. IMPRESSION/RECOMMENDATIONS:      1. BLAIR stage I on CKD, likely hemodynamically mediated secondary to drop in BP. Renal function stable the past 24 hours. We will stop IV fluids and hold lisinopril for now. 2. CKD stage IIIa, with proteinuria, baseline creatinine approximately 1.7-1.8 mg/dL, secondary to diabetic nephropathy.     2. HTN, on metoprolol, amlodipine, and lisinopril. To hold lisinopril due to BLAIR. 3. Acidemia (venous pH 7.25), with hyperchloremic metabolic acidosis secondary to saline administration. We will stop IV fluids.    4. Vitamin D deficiency, vitamin D 25 level 29, on ergocalciferol  ----------------------------------------------------------------  5. Hypoglycemia, endocrinology following, on insulin  6. History of prostate cancer status post radiation therapy  7. Cystic pancreatic mass, on Creon  8. PAF, on metoprolol, apixaban on hold  9. Hyperlipidemia, on statin  10.  Nutrition: carb controlled diet     Plan:     · Stop IV fluids   · Continue ergocalciferol 50,000 units weekly   · Hold lisinopril  · Increase amlodipine to 10 mg daily  · Continue to monitor blood pressure   · Continue with blood pressure medications   · Continue to monitor renal function, BMP 4 PM  · Strict I&Os      Electronically signed by GURPREET Sen CNP on 1/31/2022 at 11:42 AM

## 2022-01-31 NOTE — PROGRESS NOTES
ENDOCRINOLOGY PROGRESS NOTE      Date of admission: 1/27/2022  Date of service: 1/30/2022  Admitting physician: Danilele Johnson MD   Primary Care Physician: Oj Mckenzie DO  Consultant physician: Cayden Ambriz MD     Reason for the consultation:  Uncontrolled DM    History of Present Illness: The history is provided by the patient. Accuracy of the patient data is excellent    Days HELADIO Negron is a very pleasant 66 y.o. old male with PMH of Afib, HTN, HLD, DM type 2 and other listed below admitted to [facility] on 1/27/2022 because of Hypoglycemia, endocrine service was consulted for diabetes management.      Subjective   No acute events overnight, BG above goal today     Inpatient diet:   Carb Restricted diet     Point of care glucose monitoring   (Independently reviewed)   Recent Labs     01/29/22  0606 01/29/22  1032 01/29/22  1610 01/29/22  2033 01/30/22  0622 01/30/22  1113 01/30/22  1623 01/30/22  1940   GLUMET 122* 366* 250* 96 197* 200* 238* 151*       Past medical history:   Past Medical History:   Diagnosis Date    A-fib ([address]     Cancer [facility]     Diabetes mellitus ([address]     ED (erectile dysfunction)     Hyperlipidemia     Hypertension     Prostate cancer ([address]        Past surgical history:  Past Surgical History:   Procedure Laterality Date    COLON SURGERY      COLONOSCOPY N/A 10/18/2021    COLONOSCOPY WITH BIOPSY performed by Alexis Bonilla MD at [facility], ESOPHAGUS      [address] N/A 1/12/2021    TEMPORARY HEMODIALYSIS CATHETER INSERTION performed by Diana Rodriguez MD at [address] Left 6 years ago    OTHER SURGICAL HISTORY      removal of HD catheter    PANCREAS BIOPSY N/A 6/14/2021    PANCREATIC PSEUDOCYST EXCISION DRAINAGE performed by Janie Balderas MD at [address] 6/23/2021    LAPAROSCOPIC ROBOTIC XI PANCREATIC NECROSECTOMY WITH CYST GASTROSTOMY , INTRAOPERATIVE  ULTRASOUND performed by Matty Eckert MD [facility] at [facility]  6/14/2021    ENDOSCOPIC ULTRASOUND performed by Jad Jeffries MD at [facility] N/A 10/18/2021    EGD BIOPSY performed by Nathaniel Zabala MD at [address] [place] [address] history:   Tobacco:   reports that he has quit smoking. His smoking use included cigarettes. He smoked 1.00 pack per day. He has never used smokeless tobacco.  Alcohol:   reports previous alcohol use. Drugs:   reports no history of drug use. Family history:    History reviewed. No pertinent family history. Allergy and drug reactions:   No Known Allergies    Scheduled Meds:   amLODIPine  5 mg Oral Daily    lisinopril  10 mg Oral BID    [START ON 1/31/2022] insulin lispro  4 Units SubCUTAneous TID WC    [START ON 1/31/2022] insulin glargine  12 Units SubCUTAneous Nightly    insulin lispro  0-6 Units SubCUTAneous TID WC    insulin lispro  0-3 Units SubCUTAneous Nightly    apixaban  5 mg Oral BID    vitamin C  250 mg Oral Daily    aspirin  81 mg Oral Daily    atorvastatin  20 mg Oral Daily    lipase-protease-amylase  36,000 Units Oral TID WC    metoprolol tartrate  25 mg Oral BID    folic acid  1 mg Oral Daily    pantoprazole  40 mg Oral BID    sucralfate  1 g Oral 4x Daily       PRN Meds:   dextrose, 25 g, PRN  glucose, 15 g, PRN  dextrose, 12.5 g, PRN  glucagon (rDNA), 1 mg, PRN  dextrose, 100 mL/hr, PRN      Continuous Infusions:   sodium chloride 100 mL/hr at 01/30/22 1231    dextrose         Review of Systems  All systems reviewed.  All negative except for symptoms mentioned in HPI     OBJECTIVE    BP (!) 149/79   Pulse 60   Temp 98.2 °F (36.8 °C) (Oral)   Resp 18   Ht 6' 2\" (1.88 m)   Wt 151 lb (68.5 kg)   SpO2 99%   BMI 19.39 kg/m²     Intake/Output Summary (Last 24 hours) at 1/30/2022 2041  Last data filed at 1/30/2022 1946  Gross per 24 hour   Intake 1613 ml   Output 1675 ml   Net -62 ml       Physical examination:  General: awake alert, oriented x3  HEENT: normocephalic non traumatic, no exophthalmos   Neck: supple, No thyroid tenderness,  Pulm: good equal air entry no added sounds  CVS: S1 + S2  Abd: soft lax, no tenderness  Skin: warm, no lesions, no rash. No open wounds, no ulcers   Neuro: CN intact, sensation decreased bilateral , muscle power normal  Psych: normal mood, and affect    Review of Laboratory Data:  I personally reviewed the following labs:   Recent Labs     01/30/22  0324   WBC 5.0   RBC 3.60*   HGB 10.2*   HCT 30.7*   MCV 85.3   MCH 28.3   MCHC 33.2   RDW 14.1      MPV 10.3     Recent Labs     01/29/22  1654 01/30/22  0324 01/30/22  1640   * 136 133   K 4.7 4.8 4.8   CL 99 104 101   CO2 21* 21* 20*   BUN 27* 30* 28*   CREATININE 2.4* 2.6* 2.4*   GLUCOSE 256* 224* 279*   CALCIUM 9.1 9.0 8.8     Beta-Hydroxybutyrate   Date Value Ref Range Status   06/17/2021 0.27 0.02 - 0.27 mmol/L Final   12/23/2020 0.80 (H) 0.02 - 0.27 mmol/L Final     Lab Results   Component Value Date    LABA1C 11.7 01/27/2022    LABA1C 11.1 12/23/2020    LABA1C 6.3 09/27/2013     Lab Results   Component Value Date/Time    TSH 0.540 12/23/2020 03:00 AM     Lab Results   Component Value Date    LABA1C 11.7 01/27/2022    GLUCOSE 279 01/30/2022    LABCREA 73 01/28/2022     Lab Results   Component Value Date    TRIG 103 06/23/2021    HDL 14 12/23/2020    LDLCALC - 12/23/2020    CHOL 160 12/23/2020       Blood culture   Lab Results   Component Value Date    BC 5 Days no growth 01/13/2021    BC 5 Days no growth 12/22/2020       Radiology:  XR CHEST (2 VW)   Final Result   No pneumonia or pleural effusion.              Medical Records/Labs/Images review:   I personally reviewed and summarized previous records   All labs and imaging were reviewed independently     ASSESSMENT & PLAN   Days HELADIO Brown, a 66 y.o.-old male seen today for inpatient diabetes management     Diabetes Mellitus type 2   · Patient's diabetes is uncontrolled with frequent hypoglycemia  · BG was 122 this AM then increase to 366 before lunch because he didn't receive humalog with breakfast     · We recommend the following  diabetes regimen to:  · Lantus 12u nightly   · Humalog 4u with meals   · Low dose sliding scale   · Continue glucose check with meals and at bedtime   · Will titrate insulin dose based on the blood glucose trend & insulin requirement    Hypoglycemia   · Was on high dose of insulin at home   · Adjusted insulin regimen as per above     HLD  · On Lipitor 20 mg daily     The above issues were reviewed with the patient who understood and agreed with the plan. Thank you for allowing us to participate in the care of this patient. Please do not hesitate to contact us with any additional questions. Angely Wheat MD  Endocrinologist, [facility] Endocr[facility]ology   [address] [place] [id number]   Phone: [phone]  Fax: [phone]  --------------------------------------------  An electronic signature was used to authenticate this note.  Bo Hernadez MD on 1/30/2022 at 8:41 PM

## 2022-01-31 NOTE — PROGRESS NOTES
[facility] Progress Note    Admitting Date and Time: 1/27/2022  2:48 PM  Admit Dx: Hypoglycemia [E16.2]    Subjective:  Patient is being followed for Hypoglycemia [E16.2]   Is comfortable at bed now, talked with him and his fiancée at bedside, he been diagnosed diabetic for 5 years but he is not following diabetic diet. wants to go home. ROS: denies fever, chills, cp, sob, n/v, HA unless stated above.       [START ON 2/1/2022] amLODIPine  10 mg Oral Daily    [Held by provider] lisinopril  10 mg Oral BID    insulin lispro  4 Units SubCUTAneous TID WC    insulin glargine  12 Units SubCUTAneous Nightly    insulin lispro  0-6 Units SubCUTAneous TID WC    insulin lispro  0-3 Units SubCUTAneous Nightly    apixaban  5 mg Oral BID    vitamin C  250 mg Oral Daily    aspirin  81 mg Oral Daily    atorvastatin  20 mg Oral Daily    lipase-protease-amylase  36,000 Units Oral TID WC    metoprolol tartrate  25 mg Oral BID    folic acid  1 mg Oral Daily    pantoprazole  40 mg Oral BID    sucralfate  1 g Oral 4x Daily     dextrose, 25 g, PRN  glucose, 15 g, PRN  dextrose, 12.5 g, PRN  glucagon (rDNA), 1 mg, PRN  dextrose, 100 mL/hr, PRN         Objective:    BP (!) 155/83   Pulse 76   Temp 97.6 °F (36.4 °C) (Oral)   Resp 18   Ht 6' 2\" (1.88 m)   Wt 156 lb 4.8 oz (70.9 kg)   SpO2 99%   BMI 20.07 kg/m²     General Appearance: alert and oriented to person, place and time and in no acute distress  Skin: warm and dry  Head: normocephalic and atraumatic  Eyes: pupils equal, round, and reactive to light, extraocular eye movements intact, conjunctivae normal  Neck: neck supple and non tender without mass   Pulmonary/Chest: clear to auscultation bilaterally- no wheezes, rales or rhonchi, normal air movement, no respiratory distress  Cardiovascular: normal rate, normal S1 and S2 and no carotid bruits  Abdomen: soft, non-tender, non-distended, normal bowel sounds, no masses or organomegaly  Extremities: no cyanosis, no clubbing and no edema  Neurologic: no cranial nerve deficit and speech normal        Recent Labs     01/30/22  1640 01/31/22  0225 01/31/22  1100    132 136   K 4.8 4.4 4.9    103 106   CO2 20* 21* 22   BUN 28* 30* 29*   CREATININE 2.4* 2.4* 2.4*   GLUCOSE 279* 108* 158*   CALCIUM 8.8 8.8 9.3       Recent Labs     01/30/22  0324   WBC 5.0   RBC 3.60*   HGB 10.2*   HCT 30.7*   MCV 85.3   MCH 28.3   MCHC 33.2   RDW 14.1      MPV 10.3           Assessment:    Active Problems:    Hypoglycemia  Resolved Problems:    * No resolved hospital problems. *      Plan:  1. Hypoglycemia and hyperglycemia: Patient is diagnosed with diabetes for 5 years hemoglobin A1c is 11.7, not well controlled, his sugar is all over and he admitted sugar with 47, and his sugar found to be more than 300 several times. Endocrinologist consulted and appreciated. We will keep him overnight to monitor his sugar to tract  insulin requirement. I discussed regarding his diet but he is not in any diabetic diet. Diabetic education consult. And nutrition consult. Endocrinologist following the patient, keeping the patient to titrate insulin requirement. His sugar still going up and down in wide range, can be discharged whenever endocrine clears to go home. 2.  leukocytosis: May be due to stress related follow-up CBC. 3.  CKD stage III due to diabetic nephropathy: Continue monitoring and I will consult nephrologist, Dr Cesar Dent saw the patient and appreciated for consult. NOTE: This report was transcribed using voice recognition software. Every effort was made to ensure accuracy; however, inadvertent computerized transcription errors may be present.   Electronically signed by Juan Perea MD on 1/31/2022 at 2:59 PM

## 2022-01-31 NOTE — PATIENT CARE CONFERENCE
[facility] Quality Flow/Interdisciplinary Rounds Progress Note        Quality Flow Rounds held on January 31, 2022    Disciplines Attending:  Bedside Nurse, ,  and Nursing Unit Leadership    Days Marcelino Thorpe was admitted on 1/27/2022  2:48 PM    Anticipated Discharge Date:  Expected Discharge Date: 01/29/22    Disposition:    Demetrius Score:  Demetrius Scale Score: 22    Readmission Risk              Risk of Unplanned Readmission:  0           Discussed patient goal for the day, patient clinical progression, and barriers to discharge.   The following Goal(s) of the Day/Commitment(s) have been identified:  Discharge - Obtain Order      Kayode Mitchell RN  January 31, 2022

## 2022-02-01 NOTE — DISCHARGE SUMMARY
[facility] Physician Discharge Summary       Fior Robertson, [address] [address] [place] [address]  [phone]    On 2/7/2022  Endocrine follow up visit, Monday 2/7 at 11:30am       Activity level: As tolerated    Dispo: Home    Condition on discharge:  stable    Patient ID:  nAdre Whyte  [id number]  78 y.o.  1950    Admit date: 1/27/2022    Discharge date and time:  2/1/2022  2:43 PM    Admission Diagnoses: Active Problems:    Hypoglycemia  Resolved Problems:    * No resolved hospital problems. *      Discharge Diagnoses: Active Problems:    Hypoglycemia  Resolved Problems:    * No resolved hospital problems. *      Consults:  IP CONSULT TO DIABETES EDUCATOR  IP CONSULT TO ENDOCRINOLOGY  IP CONSULT TO DIETITIAN  IP CONSULT TO DIABETES EDUCATOR  IP CONSULT TO NEPHROLOGY    Procedures: none    Hospital Course:   Patient Andre Whyte is a 66 y.o. presented with Hypoglycemia [E16.2]  This is a 64-year-old -American male with past medical history of diabetes, chronic kidney disease came here due to hypoglycemic episode. His sugar found to be 47 at home. His insulin also on high-dose. Patient getting here for monitoring and titration of insulin. Endocrinologist saw the patient and monitor his insulin requirement. His sugar was going up and down and especially in the morning it goes up and at the end of the wrist goes down. His insulin dose titrated. Nephrology consulted for his CKD and follow the patient.   Patient will be discharged at home and he will follow with nephrologist and endocrinologist.    Discharge Exam:    General Appearance: alert and oriented to person, place and time and in no acute distress  Skin: warm and dry  Head: normocephalic and atraumatic  Eyes: pupils equal, round, and reactive to light, extraocular eye movements intact, conjunctivae normal  Neck: neck supple and non tender without mass   Pulmonary/Chest: clear to auscultation bilaterally- no wheezes, rales or rhonchi, normal air movement, no respiratory distress  Cardiovascular: normal rate, normal S1 and S2 and no carotid bruits  Abdomen: soft, non-tender, non-distended, normal bowel sounds, no masses or organomegaly  Extremities: no cyanosis, no clubbing and no edema  Neurologic: no cranial nerve deficit and speech normal    I/O last 3 completed shifts: In: 4772 [P.O.:240; I.V.:1985]  Out: 2000 [Urine:2000]  I/O this shift: In: 4131 [P.O.:1680; I.V.:2518]  Out: 3100 [Urine:3100]      LABS:  Recent Labs     01/30/22  1640 01/31/22  0225 01/31/22  1100    132 136   K 4.8 4.4 4.9    103 106   CO2 20* 21* 22   BUN 28* 30* 29*   CREATININE 2.4* 2.4* 2.4*   GLUCOSE 279* 108* 158*   CALCIUM 8.8 8.8 9.3       Recent Labs     01/30/22  0324   WBC 5.0   RBC 3.60*   HGB 10.2*   HCT 30.7*   MCV 85.3   MCH 28.3   MCHC 33.2   RDW 14.1      MPV 10.3       No results for input(s): POCGLU in the last 72 hours. Imaging:  XR CHEST (2 VW)    Result Date: 1/27/2022  EXAMINATION: TWO XRAY VIEWS OF THE CHEST 1/27/2022 3:52 pm COMPARISON: June 23, 2021 HISTORY: ORDERING SYSTEM PROVIDED HISTORY: concern for pneumonia TECHNOLOGIST PROVIDED HISTORY: Reason for exam:->concern for pneumonia FINDINGS: No airspace opacity or pleural effusion. The heart is normal size. No pneumothorax. No free air beneath the hemidiaphragms. No pneumonia or pleural effusion. Patient Instructions:      Medication List      START taking these medications    amLODIPine 10 MG tablet  Commonly known as: NORVASC  Take 1 tablet by mouth daily  Notes to patient: 2/1/2022     BD Pen Needle Tiffanie U/F 32G X 4 MM Misc  Generic drug: Insulin Pen Needle  Uses with insulin 4 times a day  Notes to patient: 2/1/2022     blood glucose test strips  ACCU-CHEK strips.  Chest 4 times/day before meals and at bedtime and as needed for symptoms of irregular blood glucose  Notes to patient: 2/1/2022     Lantus SoloStar 100 UNIT/ML injection pen  Generic drug: insulin glargine  Inject 10 Units into the skin nightly  Notes to patient: 2/1/2022        CHANGE how you take these medications    metoprolol tartrate 25 MG tablet  Commonly known as: LOPRESSOR  Take 1 tablet by mouth 2 times daily  What changed:   · when to take this  · additional instructions     NovoLOG FlexPen 100 UNIT/ML injection pen  Generic drug: insulin aspart  Inject 4 units with meals + following sl;iding scale.  -200 add 1U, -250 add 2U, -300 add 3U, -350 add 4U, -400 add 5U, BS over 400 add 5U  What changed:   · how much to take  · how to take this  · when to take this  · additional instructions        CONTINUE taking these medications    Aloe [place] Protective Oint ointment     apixaban 5 MG Tabs tablet  Commonly known as: ELIQUIS     aspirin 81 MG EC tablet     atorvastatin 20 MG tablet  Commonly known as: LIPITOR     Creon 93798-231419 units Cpep delayed release capsule  Generic drug: lipase-protease-amylase     folic acid 1 MG tablet  Commonly known as: FOLVITE  Take 1 tablet by mouth daily     Glucerna Advance Shake Liqd     pantoprazole 40 MG tablet  Commonly known as: PROTONIX  Take 1 tablet by mouth 2 times daily     pramoxine HCl 1 % foam  Apply to rectum three times daily or after a bowel movement     sucralfate 1 GM tablet  Commonly known as: Carafate  Take 1 tablet by mouth 4 times daily     VITAMIN B COMPLEX PO     vitamin C 250 MG tablet        STOP taking these medications    lisinopril 20 MG tablet  Commonly known as: PRINIVIL;ZESTRIL     metFORMIN 1000 MG tablet  Commonly known as: GLUCOPHAGE     tadalafil 20 MG tablet  Commonly known as: CIALIS           Where to Get Your Medications      These medications were sent to [place] [address] [address] - P [phone] - F [phone]  [address]    Phone: [phone]   · amLODIPine 10 MG tablet  · BD Pen Needle Tiffanie U/F 32G X 4 MM Misc  · blood glucose test strips  · Lantus SoloStar 100 UNIT/ML injection pen  · NovoLOG FlexPen 100 UNIT/ML injection pen           Note that more than 30 minutes was spent in preparing discharge papers, discussing discharge with patient, medication review, etc.    Signed:  Electronically signed by Jaret Rice MD on 2/1/2022 at 2:43 PM

## 2022-02-25 ENCOUNTER — TELEPHONE (OUTPATIENT)
Dept: SURGERY | Age: 72
End: 2022-02-25

## 2022-02-25 DIAGNOSIS — Z01.818 PRE-OPERATIVE CLEARANCE: Primary | ICD-10-CM

## 2022-03-15 ENCOUNTER — TELEPHONE (OUTPATIENT)
Dept: SURGERY | Age: 72
End: 2022-03-15

## 2022-03-15 NOTE — TELEPHONE ENCOUNTER
Prior Authorization Form:      DEMOGRAPHICS:                     Patient Name:  Days Viann Orf  Patient :  1950            Insurance:  Payor: MEDICARE / Plan: MEDICARE PART A AND B / Product Type: *No Product type* /   Insurance ID Number:    Payor/Plan Subscr  Sex Relation Sub. Ins. ID Effective Group Num   1. MEDICARE - Justine Godfreysom 1950 Male Self [id number] 1/1/15                                    [address] [address]   2.  [address] 1950 Male Self [id number] 1/1/15 [id number]                                   [address]         DIAGNOSIS & PROCEDURE:                       Procedure/Operation: EGD           CPT Code: 10802    Diagnosis:  GASTRIC ULCER     ICD10 Code: K25.9      Location:  [facility]    Surgeon:  Mena Ocampo    SCHEDULING INFORMATION:                          Date: 22    Time: 11:30              Anesthesia:  MAC/TIVA                                                       Status:  Outpatient        Special Comments:         Electronically signed by Mary Ann Segal MA on 3/15/2022 at 12:54 PM

## 2022-04-04 NOTE — PROGRESS NOTES
[facility] PRE-ADMISSION TESTING INSTRUCTIONS        Have you been tested for COVID  No           Have you been told you were positive for COVID No  Have you had any known exposure to someone that is positive for COVID No  Do you have a cough                   No              Do you have shortness of breath No                 Do you have a sore throat            No                Are you having chills                    No                Are you having muscle aches. No                    Please come to the hospital wearing a mask and have your significant other wear a mask as well. Both of you should check your temperature before leaving to come here,  if it is 100 or higher please call [phone] for instruction. ARRIVAL INSTRUCTIONS:  [x] Parking the day of Surgery is located in the [facility]. Upon entering the main door make an immediate right to the surgery reception desk. [x] Bring photo ID and insurance card    [] Bring in a copy of Living will or Durable Power of  papers.     [x] Please be sure to arrange for responsible adult to provide transportation to and from the hospital    [x] Please arrange for responsible adult to be with you for the 24 hour period post procedure due to having anesthesia      GENERAL INSTRUCTIONS:    [x] Nothing by mouth after midnight, including gum, candy, mints or water    [x] You may brush your teeth, but do not swallow any water    [x] Take medications as instructed with 1-2 oz of water    [x] Stop herbal supplements and vitamins 5 days prior to procedure    [x] Follow preop dosing of blood thinners per physician instructions    [x] Take 1/2 dose of evening insulin, but no insulin after midnight    [x] No oral diabetic medications after midnight    [x] If diabetic and have low blood sugar or feel symptomatic, take 1-2oz apple juice only    [] Bring inhalers day of surgery    [] Bring C-PAP/ Bi-Pap day of surgery    [] Bring urine specimen day of surgery    [x] Shower or bath with soap, lather and rinse well, AM of Surgery, no lotion, powders or creams to surgical site    [] Follow bowel prep as instructed per surgeon    [x] No tobacco products within 24 hours of surgery     [x] No alcohol or illegal drug use within 24 hours of surgery.     [x] Jewelry, body piercing's, eyeglasses, contact lenses and dentures are not permitted into surgery (bring cases)      [x] Please do not wear any nail polish, make up or hair products on the day of surgery    [x] You can expect a call the business day prior to procedure to notify you if your arrival time changes    [x] If you receive a survey after surgery we would greatly appreciate your comments    [x] Please notify surgeon if you develop any illness between now and time of surgery (cold, cough, sore throat, fever, nausea, vomiting) or any signs of infections  including skin, wounds, and dental.    []  The Outpatient Pharmacy is available to fill your prescription here on your day of surgery, ask your preop nurse for details

## 2022-04-06 ENCOUNTER — ANESTHESIA (OUTPATIENT)
Dept: ENDOSCOPY | Age: 72
End: 2022-04-06
Payer: MEDICARE

## 2022-04-06 ENCOUNTER — ANESTHESIA EVENT (OUTPATIENT)
Dept: ENDOSCOPY | Age: 72
End: 2022-04-06
Payer: MEDICARE

## 2022-04-06 ENCOUNTER — HOSPITAL ENCOUNTER (OUTPATIENT)
Age: 72
Setting detail: OUTPATIENT SURGERY
Discharge: HOME | End: 2022-04-06
Attending: SURGERY | Admitting: SURGERY
Payer: MEDICARE

## 2022-04-06 VITALS
HEART RATE: 71 BPM | WEIGHT: 170 LBS | BODY MASS INDEX: 21.82 KG/M2 | DIASTOLIC BLOOD PRESSURE: 65 MMHG | SYSTOLIC BLOOD PRESSURE: 122 MMHG | OXYGEN SATURATION: 97 % | RESPIRATION RATE: 16 BRPM | TEMPERATURE: 97.9 F | HEIGHT: 74 IN

## 2022-04-06 VITALS — SYSTOLIC BLOOD PRESSURE: 122 MMHG | DIASTOLIC BLOOD PRESSURE: 77 MMHG | OXYGEN SATURATION: 100 %

## 2022-04-06 LAB
METER GLUCOSE: 47 MG/DL (ref 74–99)
METER GLUCOSE: 67 MG/DL (ref 74–99)
METER GLUCOSE: 77 MG/DL (ref 74–99)
METER GLUCOSE: 86 MG/DL (ref 74–99)

## 2022-04-06 PROCEDURE — 43235 EGD DIAGNOSTIC BRUSH WASH: CPT | Performed by: SURGERY

## 2022-04-06 PROCEDURE — 3700000001 HC ADD 15 MINUTES (ANESTHESIA): Performed by: SURGERY

## 2022-04-06 PROCEDURE — 2709999900 HC NON-CHARGEABLE SUPPLY: Performed by: SURGERY

## 2022-04-06 PROCEDURE — 82962 GLUCOSE BLOOD TEST: CPT

## 2022-04-06 PROCEDURE — 7100000011 HC PHASE II RECOVERY - ADDTL 15 MIN: Performed by: SURGERY

## 2022-04-06 PROCEDURE — 7100000010 HC PHASE II RECOVERY - FIRST 15 MIN: Performed by: SURGERY

## 2022-04-06 PROCEDURE — 6360000002 HC RX W HCPCS: Performed by: NURSE ANESTHETIST, CERTIFIED REGISTERED

## 2022-04-06 PROCEDURE — 3700000000 HC ANESTHESIA ATTENDED CARE: Performed by: SURGERY

## 2022-04-06 PROCEDURE — 2580000003 HC RX 258: Performed by: NURSE ANESTHETIST, CERTIFIED REGISTERED

## 2022-04-06 PROCEDURE — 2580000003 HC RX 258: Performed by: ANESTHESIOLOGY

## 2022-04-06 PROCEDURE — 3609012400 HC EGD TRANSORAL BIOPSY SINGLE/MULTIPLE: Performed by: SURGERY

## 2022-04-06 RX ORDER — DEXTROSE MONOHYDRATE 50 MG/ML
250 INJECTION, SOLUTION INTRAVENOUS ONCE
Status: COMPLETED | OUTPATIENT
Start: 2022-04-06 | End: 2022-04-06

## 2022-04-06 RX ORDER — PROPOFOL 10 MG/ML
INJECTION, EMULSION INTRAVENOUS PRN
Status: DISCONTINUED | OUTPATIENT
Start: 2022-04-06 | End: 2022-04-06 | Stop reason: SDUPTHER

## 2022-04-06 RX ORDER — SODIUM CHLORIDE 9 MG/ML
INJECTION, SOLUTION INTRAVENOUS CONTINUOUS PRN
Status: DISCONTINUED | OUTPATIENT
Start: 2022-04-06 | End: 2022-04-06 | Stop reason: SDUPTHER

## 2022-04-06 RX ADMIN — SODIUM CHLORIDE: 9 INJECTION, SOLUTION INTRAVENOUS at 10:01

## 2022-04-06 RX ADMIN — DEXTROSE MONOHYDRATE 250 ML: 50 INJECTION, SOLUTION INTRAVENOUS at 08:49

## 2022-04-06 RX ADMIN — PROPOFOL 200 MG: 10 INJECTION, EMULSION INTRAVENOUS at 10:12

## 2022-04-06 ASSESSMENT — LIFESTYLE VARIABLES: SMOKING_STATUS: 0

## 2022-04-06 NOTE — H&P
Patient's office history and physical was reviewed. Patient examined. There has been no change in the patient's history and physical.      Physician Signature: Electronically signed by Dr. Toya Barboza and LEEANNA     Patient's Name/Date of Birth: Andre Brown / 1950     Date: 4/6/22     PCP: Arlyne Eisenmenger, DO     Referring Physician:   No ref. provider found  N/A          Chief Complaint   Patient presents with    Results       EGD, needs repeat in 8 weeks          HPI:     The patient said his pain is much better. He is eating better. He is taking his PPI and Carafate without any issues. No n/v/d/c. No abdominal pain anymore. No fevers or chills. He is watching his diet as well. He said he is no longer drinking alcohol.     Patient's medications, allergies, past medical, surgical, social and family histories were reviewed and updated as appropriate.     Review of Systems  Constitutional: negative  Respiratory: negative  Cardiovascular: negative  Gastrointestinal: as in hpi  Genitourinary:negative  Integument/breast: negative      Physical Exam:  BP (!) 144/85   Pulse 74   Temp 97.2 °F (36.2 °C) (Temporal)   Resp 18   Ht 6' 1\" (1.854 m)   Wt 150 lb (68 kg)   BMI 19.79 kg/m²   General appearance: alert, cooperative and in no acute distress. Lungs: normal work of breathing  Heart: regular rate  Abdomen: soft, nontender, nondistended  Musculoskeletal: symmetrical without edema. Skin: normal       Data Reviewed:   Pathology: Diagnosis:      A. Duodenum: No significant histopathologic abnormality.      B. Stomach: Benign ulcer with adjacent mild chronic gastritis without intestinal metaplasia.  Negative for Helicobacter pylori organisms on immunostained sections.      C. Stomach: Moderate chronic mildly active gastritis without        intestinal metaplasia.  Negative for Helicobacter pylori organisms on immunostained sections.      D.  Ileocolonic anastomosis: Ileal mucosa with no significant        histopathologic abnormalities.      E. Random colon: No significant histopathologic abnormality. Comment:   Sections of the ileocolonic anastomosis show ileal type mucosa with no significant histopathologic abnormalities. Bob Fail is no acute inflammation, metaplasia or granulomata identified.  The colon biopsies show no evidence of chronic or active colitis including lymphocytic and collagenous colitis. Bob Fail is mild increase in pigmented macrophages within the lamina propria suggestive of early changes of melanosis coli      Impression/Plan:  66y.o. year old male with peptic ulcer disease, alcohol abuse     The patient said he is no longer drinking alcohol. Continue Protonix and Carafate  Peptic ulcer diet  Discussed no alcohol, caffeine, NSAIDS  Repeat EGD in 8 weeks           Electronically by Beth Alaniz MD, General Surgery      Send copy of H&P to PCP, Benigno Damon DO and referring physician, No ref.  provider found

## 2022-04-06 NOTE — ANESTHESIA POSTPROCEDURE EVALUATION
Department of Anesthesiology  Postprocedure Note    Patient: Andre Arboleda  MRN: [id number]  YOB: 1950  Date of evaluation: 4/6/2022  Time:  10:22 AM     Procedure Summary     Date: 04/06/22 Room / Location: [facility] / [facility]    Anesthesia Start: 1006 Anesthesia Stop: 6709    Procedure: EGD ESOPHAGOGASTRODUODENOSCOPY (N/A ) Diagnosis: (GASTRIC ULCER)    Surgeons: Jimena Meade MD Responsible Provider: Jeffrey Brock MD    Anesthesia Type: MAC ASA Status: 3          Anesthesia Type: MAC    Blade Phase I: Blade Score: 10    Blade Phase II:      Last vitals: Reviewed and per EMR flowsheets.        Anesthesia Post Evaluation    Patient location during evaluation: PACU  Patient participation: complete - patient participated  Level of consciousness: awake  Airway patency: patent  Nausea & Vomiting: no nausea and no vomiting  Complications: no  Cardiovascular status: hemodynamically stable  Respiratory status: acceptable  Hydration status: euvolemic

## 2022-04-06 NOTE — ANESTHESIA PRE PROCEDURE
Department of Anesthesiology  Preprocedure Note       Name:  Andre Jurado   Age:  74 y.o.  :  1950                                          MRN:  [id number]         Date:  2022      Surgeon: Donte Burch):  Tono Trnih MD    Procedure: Procedure(s):  EGD ESOPHAGOGASTRODUODENOSCOPY    Medications prior to admission:   Prior to Admission medications    Medication Sig Start Date End Date Taking? Authorizing Provider   insulin aspart (NOVOLOG FLEXPEN) 100 UNIT/ML injection pen Inject 4 units with meals + following sl;iding scale. -200 add 1U, -250 add 2U, -300 add 3U, -350 add 4U, -400 add 5U, BS over 400 add 5U 22   Saul Rice MD   insulin glargine (LANTUS SOLOSTAR) 100 UNIT/ML injection pen Inject 10 Units into the skin nightly 22   Saul Rice MD   Insulin Pen Needle (BD PEN NEEDLE REJI U/F) 32G X 4 MM MISC Uses with insulin 4 times a day 22   Saul Rice MD   blood glucose monitor strips ACCU-CHEK strips.  Chest 4 times/day before meals and at bedtime and as needed for symptoms of irregular blood glucose 22   Saul Rice MD   amLODIPine (NORVASC) 10 MG tablet Take 1 tablet by mouth daily 22   Munir Segal DO   atorvastatin (LIPITOR) 20 MG tablet Take 20 mg by mouth daily    Historical Provider, MD   aspirin 81 MG EC tablet Take 81 mg by mouth daily Indications: last dose 22     Historical Provider, MD   apixaban (ELIQUIS) 5 MG TABS tablet Take by mouth 2 times daily Indications: last dose 4/3/22     Historical Provider, MD   Ascorbic Acid (VITAMIN C) 250 MG tablet Take 250 mg by mouth daily    Historical Provider, MD   B Complex Vitamins (VITAMIN B COMPLEX PO) Take by mouth    Historical Provider, MD   pantoprazole (PROTONIX) 40 MG tablet Take 1 tablet by mouth 2 times daily 10/18/21 1/27/22  Tono Trinh MD   Nutritional Supplements (GLUCERNA ADVANCE SHAKE) LIQD Take 1 Bottle by mouth 2 times daily    Historical Provider, MD   pramoxine HCl 1 % foam Apply to rectum three times daily or after a bowel movement 9/9/21   Cari Murdock III, MD   Skin Protectants, Misc. (ALOE VESTA PROTECTIVE) OINT ointment Apply topically daily as needed (apply to dry skin on feet and legs)    Historical Provider, MD   lipase-protease-amylase (CREON) 80790-250544 units CPEP delayed release capsule Take 1 capsule by mouth 3 times daily (with meals)    Historical Provider, MD   folic acid (FOLVITE) 1 MG tablet Take 1 tablet by mouth daily 3/1/21   Hernan Do MD   metoprolol tartrate (LOPRESSOR) 25 MG tablet Take 1 tablet by mouth 2 times daily  Patient taking differently: Take 25 mg by mouth daily Instructed to take morning of surgery with a sip of water 1/1/21   Bridger Das MD       Current medications:    No current facility-administered medications for this encounter.        Allergies:  No Known Allergies    Problem List:    Patient Active Problem List   Diagnosis Code    Hyperlipidemia E78.5    Prostate cancer ([facility] C61    Hypertension I10    Diabetes mellitus ([facility] E11.9    Shoulder pain, bilateral M25.511, M25.512    Abdominal pain, right lower quadrant R10.31    DKA, type 1, not at goal [facility]) E10.10    Acute metabolic encephalopathy Z87.05    Acute pancreatitis K85.90    Acute renal failure (ARF) (HCC) N17.9    High anion gap metabolic acidosis N62.8    Complicated UTI (urinary tract infection) N39.0    Acute cystitis with hematuria N30.01    Severe anemia D64.9    Moderate malnutrition (HCC) E44.0    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    Pancreatic pseudocyst K86.3    Acute on chronic pancreatitis (HCC) K85.90, K86.1    Abdominal pain R10.9    Pancreatic abscess K85.90    Necrotizing pancreatitis K85.91    Uncontrolled type 2 diabetes mellitus with hyperglycemia (HCC) E11.65    H/O exploratory laparotomy Z98.890    Chronic kidney disease due to type 2 diabetes mellitus ([facility] E11.22    Severe protein-calorie malnutrition ([facility] E43    Hypoglycemia E16.2       Past Medical History:        Diagnosis Date    A-fib ([facility]     on eliquis    Cancer ([facility]     CKD (chronic kidney disease)     stage 3a    Diabetes mellitus ([facility]     ED (erectile dysfunction)     Hyperlipidemia     Hypertension     Prostate cancer [facility])        Past Surgical History:        Procedure Laterality Date    COLON SURGERY      COLONOSCOPY N/A 10/18/2021    COLONOSCOPY WITH BIOPSY performed by Osmar Johnson MD at [address], ESOPHAGUS      [address] N/A 2021    TEMPORARY HEMODIALYSIS CATHETER INSERTION [facility] Lanita Riedel[facility]. Left 6 years ago    OTHER SURGICAL HISTORY      removal of HD catheter    PANCREAS BIOPSY N/A 2021    PANCREATIC PSEUDOCYST EXCISION DRAINAGE performed by Lea Wood MD at [address] [place] [address] 2021    LAPAROSCOPIC ROBOTIC XI PANCREATIC NECROSECTOMY WITH CYST GASTROSTOMY , INTRAOPERATIVE  ULTRASOUND performed by Tonia Lopez MD at [address]  2021    ENDOSCOPIC ULTRASOUND performed by Lea Wood MD at [address] N/A 10/18/2021    EGD BIOPSY [facility]ar Johnson MD at [facility] History:    Social History     Tobacco Use    Smoking status: Former Smoker     Packs/day: 1.00     Types: Cigarettes     Quit date: 2020     Years since quittin.0    Smokeless tobacco: Never Used   Substance Use Topics    Alcohol use: Not Currently     Comment: \"daily shots\", \"none in 1 1/2 yr\"                                Counseling given: Not Answered      Vital Signs (Current):   Vitals:    22 1358   Weight: 170 lb (77.1 kg)   Height: 6' 2\" (1.88 m)                                              BP Readings from Last 3 Encounters:   22 (!) 155/83 12/05/21 (!) 146/84   11/11/21 (!) 144/85       NPO Status:                                                                                 BMI:   Wt Readings from Last 3 Encounters:   04/04/22 170 lb (77.1 kg)   01/31/22 156 lb 4.8 oz (70.9 kg)   11/11/21 150 lb (68 kg)     Body mass index is 21.83 kg/m². CBC:   Lab Results   Component Value Date    WBC 5.0 01/30/2022    RBC 3.60 01/30/2022    HGB 10.2 01/30/2022    HCT 30.7 01/30/2022    MCV 85.3 01/30/2022    RDW 14.1 01/30/2022     01/30/2022       CMP:   Lab Results   Component Value Date     01/31/2022    K 4.9 01/31/2022    K 4.5 01/27/2022     01/31/2022    CO2 22 01/31/2022    BUN 29 01/31/2022    CREATININE 2.4 01/31/2022    GFRAA 32 01/31/2022    LABGLOM 32 01/31/2022    GLUCOSE 158 01/31/2022    PROT 5.4 01/31/2022    CALCIUM 9.3 01/31/2022    BILITOT 0.2 01/31/2022    ALKPHOS 84 01/31/2022    AST 22 01/31/2022    ALT 16 01/31/2022       POC Tests: No results for input(s): POCGLU, POCNA, POCK, POCCL, POCBUN, POCHEMO, POCHCT in the last 72 hours.     Coags:   Lab Results   Component Value Date    PROTIME 13.0 02/27/2021    INR 1.2 02/27/2021    APTT 32.8 02/27/2021       HCG (If Applicable): No results found for: PREGTESTUR, PREGSERUM, HCG, HCGQUANT     ABGs:   Lab Results   Component Value Date    PO2ART 133.9 06/23/2021    TOW5CZH 40.3 06/23/2021    CPY0NGB 20.4 06/23/2021        Type & Screen (If Applicable):  No results found for: LABABO, LABRH    Drug/Infectious Status (If Applicable):  No results found for: HIV, HEPCAB    COVID-19 Screening (If Applicable):   Lab Results   Component Value Date    COVID19 Not Detected 01/27/2022    COVID19 Not Detected 01/11/2021           Anesthesia Evaluation  Patient summary reviewed and Nursing notes reviewed no history of anesthetic complications:   Airway: Mallampati: II  TM distance: >3 FB   Neck ROM: full  Mouth opening: > = 3 FB Dental: normal exam         Pulmonary:Negative Pulmonary ROS and normal exam        (-) not a current smoker                           Cardiovascular:  Exercise tolerance: good (>4 METS),   (+) hypertension:, dysrhythmias: atrial fibrillation, hyperlipidemia        Rhythm: regular  Rate: normal           Beta Blocker:  Not on Beta Blocker         Neuro/Psych:   (+) psychiatric history:            GI/Hepatic/Renal:   (+) PUD,           Endo/Other:    (+) DiabetesType II DM, using insulin, malignancy/cancer. Abdominal:             Vascular: negative vascular ROS. Other Findings:             Anesthesia Plan      MAC     ASA 3       Induction: intravenous. Anesthetic plan and risks discussed with patient and spouse. Chart reviewed . Patient assessed before induction. I agree with the above note.   GURPREET Thorne MD   4/6/2022

## 2022-04-06 NOTE — PROGRESS NOTES
Patient verifies that a responsible adult will be with them for the next 24 hours (wife). Patient has a ride to go home. Discharge instructions reviewed with patient and wife, copy given. Anesthesia instructions reviewed and copy given. Denies questions or concerns.

## 2022-04-06 NOTE — OP NOTE
Operative Note: EGD    Andre Brown     DATE OF PROCEDURE: 4/6/2022  SURGEON: Dr. Jaky Abrams MD, M.D. PREOPERATIVE DIAGNOSES:   History of peptic ulcer disease    POSTOPERATIVE DIAGNOSES:  Healed ulcers    OPERATION:    EGD esophagogastroduodenoscopy     SPECIMENS:  * No specimens in log *    ANESTHESIA: LMAC    BLOOD LOSS: Minimal    CONSENT AND INDICATIONS:  This is a 74y.o. year old male who is having a repeat EGD to ensure gastric ulcer healing. I have discussed with the patient and/or the patient representative the indication, alternatives, and the possible risks and/or complications of the planned procedure and the anesthesia methods. The patient and/or patient representative appear to understand and agree to proceed. OPERATIONS: The patient was placed on the table and sedated by anesthesia. Bite block was placed. A lubricated scope was easily passed into the upper esophagus which looked normal. The distal esophagus looked abnormal: GERD. The gastroesophageal junction was at 43 cm. The scope was passed into the stomach and retroflexed. There was no hiatal hernia. The scope was passed down toward the pylorus. The antral mucosa all looked normal. The scope was then passed through the pylorus into the duodenal bulb which looked normal, then around to the distal duodenum which looked normal, and the scope was then withdrawn. The patient tolerated the procedure well. PLAN: ok to stop PPI    Physician Signature: Electronically signed by Dr. Jaky Abrams MD M.D. FACS    Send copy of H&P to PCP, Urban Meckel, DO and referring physician, No ref.  provider found

## 2022-08-18 ENCOUNTER — TELEPHONE (OUTPATIENT)
Dept: HEMATOLOGY | Age: 72
End: 2022-08-18

## 2022-08-18 DIAGNOSIS — K85.91 NECROTIZING PANCREATITIS: Primary | ICD-10-CM

## 2022-08-18 NOTE — TELEPHONE ENCOUNTER
Spoke to Days regarding his scheduled CT scan. He repeated back all appt informations to confirm it was correct. Pt was reminded that he has to come at 1pm on 8/23 to have labs completed in order to get the CT scan. He agreed. An appointment was made for him to follow up with Dr Fabio Fischer on 8/26.  BMP ordered and in Epic

## 2022-08-25 ENCOUNTER — TELEPHONE (OUTPATIENT)
Dept: HEMATOLOGY | Age: 72
End: 2022-08-25

## 2022-08-25 NOTE — TELEPHONE ENCOUNTER
I called and rescheduled patients CT scan for 9/7/22 at [facility]. I called patient and he stated that the [place] is getting the some scan done on him and he wants to wait and see what they do before he schedules anything with our office. He stated he will call us back after he gets the [place] scan completed and see if the CT is still needed.       Electronically signed by Blayne Garcia RN on 8/25/2022 at 9:00 AM

## 2022-08-29 ENCOUNTER — TELEPHONE (OUTPATIENT)
Dept: HEMATOLOGY | Age: 72
End: 2022-08-29

## 2022-09-02 ENCOUNTER — HOSPITAL ENCOUNTER (OUTPATIENT)
Dept: ULTRASOUND IMAGING | Age: 72
Discharge: HOME | End: 2022-09-04
Payer: MEDICARE

## 2022-09-02 DIAGNOSIS — R94.5 ABNORMAL RESULTS OF LIVER FUNCTION STUDIES: ICD-10-CM

## 2022-09-02 PROCEDURE — 76705 ECHO EXAM OF ABDOMEN: CPT

## 2022-09-15 ENCOUNTER — OFFICE VISIT (OUTPATIENT)
Dept: HEMATOLOGY | Age: 72
End: 2022-09-15
Payer: MEDICARE

## 2022-09-15 VITALS
RESPIRATION RATE: 18 BRPM | BODY MASS INDEX: 24.26 KG/M2 | HEIGHT: 74 IN | OXYGEN SATURATION: 99 % | WEIGHT: 189 LBS | HEART RATE: 58 BPM | SYSTOLIC BLOOD PRESSURE: 153 MMHG | TEMPERATURE: 97.3 F | DIASTOLIC BLOOD PRESSURE: 75 MMHG

## 2022-09-15 DIAGNOSIS — K76.0 FATTY LIVER DISEASE, NONALCOHOLIC: Primary | ICD-10-CM

## 2022-09-15 PROCEDURE — 1123F ACP DISCUSS/DSCN MKR DOCD: CPT | Performed by: TRANSPLANT SURGERY

## 2022-09-15 PROCEDURE — 1036F TOBACCO NON-USER: CPT | Performed by: TRANSPLANT SURGERY

## 2022-09-15 PROCEDURE — G8427 DOCREV CUR MEDS BY ELIG CLIN: HCPCS | Performed by: TRANSPLANT SURGERY

## 2022-09-15 PROCEDURE — 3017F COLORECTAL CA SCREEN DOC REV: CPT | Performed by: TRANSPLANT SURGERY

## 2022-09-15 PROCEDURE — 99213 OFFICE O/P EST LOW 20 MIN: CPT | Performed by: TRANSPLANT SURGERY

## 2022-09-15 PROCEDURE — G8420 CALC BMI NORM PARAMETERS: HCPCS | Performed by: TRANSPLANT SURGERY

## 2022-09-15 RX ORDER — PANCRELIPASE 36000; 180000; 114000 [USP'U]/1; [USP'U]/1; [USP'U]/1
2 CAPSULE, DELAYED RELEASE PELLETS ORAL
Qty: 250 CAPSULE | Refills: 5 | Status: SHIPPED | OUTPATIENT
Start: 2022-09-15

## 2022-09-15 NOTE — PROGRESS NOTES
Hepatobiliary and Pancreatic Surgery Progress Note    CC: Pancreatic necrosis    Subjective: Pt is a pleasant 64 y/o M with a past history of necrotizing pancreatitis with abscess formation. He underwent a robotic pancreatic necrosectomy with cyst gastrostomy on 6/21. He denies any pain with eating. He did a shot of liquor recently. He is complaining of some rectal discomfort. He had a colonosocpy many years ago with Dr. Sarah Cagle. He states that he ran out of creon and his diarrhea returned. He is only drinking 1 alcohol beverage a week. He has gained about 50 lbs since last year. OBJECTIVE      Physical    BP (!) 153/75   Pulse 58   Temp 97.3 °F (36.3 °C) (Temporal)   Resp 18   Ht 6' 2\" (1.88 m)   Wt 189 lb (85.7 kg)   SpO2 99%   BMI 24.27 kg/m²       General appearance: appears in no acute distress  Lungs:respiratory effort normal without accessory numbers  Heart: no pedal edema  Abdomen: soft, nondistended, nontympanic, no guarding, no peritoneal signs, normoactive bowel sounds. Appears to have a hemorrhoids vs. Polyp in his rectum  Extremities: ROM normal    ASSESSMENT: Necrotizing pancreatitis with pancreatic abscess s/p lap robotic pancreatic necrosectomy with cyst gastrostomy 6/21    PLAN:    - I reviewed their images prior to our office visit and we also reviewed them together today. - liver looks good  - re-order creon  - repeat CT scan    20 Minutes of which greater than 50% was spent counseling or coordinating his care. Thank you for the consultation and allowing me to take part in Mr. Brown's care.     Please send a copy of my note to Dr. Ravindra Wu with the [facility]    Electronically signed by Rebecca Chadwick MD on 9/15/2022 at 1:38 PM

## 2022-09-16 ENCOUNTER — TELEPHONE (OUTPATIENT)
Dept: HEMATOLOGY | Age: 72
End: 2022-09-16

## 2022-09-16 NOTE — TELEPHONE ENCOUNTER
I called the patients insurance [facility] and no prior Emry Massed is needed because they follow medicare guidelines. The patient is scheduled for his scan on 09/29/2022    ARRIVE 9:30 AM  SCAN 11:30 AM  NPO STARTING AT MIDNIGHT    I spoke with the patient and gave him the above time, date, location and instructions for his scan. I told the patient he would have to be here at 9:30 am because he will need labs drawn prior. I also made the patient aware that once he is done with his scan he can come right down to the office for follow up. The patient stated that he no longer takes metformin or any diabetic medication.  The patient verbalized understanding and all questions were answered  Electronically signed by Madison Rebolledo MA on 9/16/2022 at 9:16 AM

## 2022-09-29 ENCOUNTER — HOSPITAL ENCOUNTER (OUTPATIENT)
Dept: CT IMAGING | Age: 72
Discharge: HOME | End: 2022-10-01
Payer: MEDICARE

## 2022-09-29 ENCOUNTER — HOSPITAL ENCOUNTER (OUTPATIENT)
Age: 72
Discharge: HOME | End: 2022-09-29
Payer: MEDICARE

## 2022-09-29 DIAGNOSIS — K85.91 NECROTIZING PANCREATITIS: ICD-10-CM

## 2022-09-29 LAB
ANION GAP SERPL CALCULATED.3IONS-SCNC: 13 MMOL/L (ref 7–16)
BUN BLDV-MCNC: 35 MG/DL (ref 6–23)
CALCIUM SERPL-MCNC: 9.6 MG/DL (ref 8.6–10.2)
CHLORIDE BLD-SCNC: 104 MMOL/L (ref 98–107)
CO2: 20 MMOL/L (ref 22–29)
CREAT SERPL-MCNC: 2.7 MG/DL (ref 0.7–1.2)
GFR AFRICAN AMERICAN: 28
GFR NON-AFRICAN AMERICAN: 28 ML/MIN/1.73
GLUCOSE BLD-MCNC: 289 MG/DL (ref 74–99)
POTASSIUM SERPL-SCNC: 4.8 MMOL/L (ref 3.5–5)
SODIUM BLD-SCNC: 137 MMOL/L (ref 132–146)

## 2022-09-29 PROCEDURE — 80048 BASIC METABOLIC PNL TOTAL CA: CPT

## 2022-09-29 PROCEDURE — 36415 COLL VENOUS BLD VENIPUNCTURE: CPT

## 2022-10-28 DIAGNOSIS — K86.2 PANCREATIC CYST: Primary | ICD-10-CM

## 2022-11-01 ENCOUNTER — TELEPHONE (OUTPATIENT)
Dept: HEMATOLOGY | Age: 72
End: 2022-11-01

## 2022-11-01 NOTE — TELEPHONE ENCOUNTER
Cpt code 59336 does not require a prior auth. The patient is scheduled for his scan on 11/18/2022 [facility] with an arrival of 8:30. Npo starting at midnight. No jewelry, buttons, zippers or medal to be worn. I called the patient and gave him the above time, date, location and  instructions for his scan. While on the phone we also made the patient his follow up appt which we scheduled for 12/01/2022 at 12:45 pm [facility].  The patient confirmed and verbalized understanding  Electronically signed by Gui Leon MA on 11/1/2022 at 10:22 AM

## 2022-11-18 ENCOUNTER — HOSPITAL ENCOUNTER (OUTPATIENT)
Dept: MRI IMAGING | Age: 72
Discharge: HOME | End: 2022-11-20
Payer: MEDICARE

## 2022-11-18 DIAGNOSIS — K86.2 PANCREATIC CYST: ICD-10-CM

## 2022-11-18 PROCEDURE — 74181 MRI ABDOMEN W/O CONTRAST: CPT

## 2022-12-01 ENCOUNTER — OFFICE VISIT (OUTPATIENT)
Dept: HEMATOLOGY | Age: 72
End: 2022-12-01
Payer: MEDICARE

## 2022-12-01 VITALS
WEIGHT: 186 LBS | BODY MASS INDEX: 23.87 KG/M2 | OXYGEN SATURATION: 98 % | HEIGHT: 74 IN | HEART RATE: 62 BPM | DIASTOLIC BLOOD PRESSURE: 91 MMHG | RESPIRATION RATE: 16 BRPM | SYSTOLIC BLOOD PRESSURE: 188 MMHG | TEMPERATURE: 97.2 F

## 2022-12-01 DIAGNOSIS — K86.2 PANCREATIC CYST: ICD-10-CM

## 2022-12-01 DIAGNOSIS — D49.0 IPMN (INTRADUCTAL PAPILLARY MUCINOUS NEOPLASM): ICD-10-CM

## 2022-12-01 DIAGNOSIS — K85.91 NECROTIZING PANCREATITIS: Primary | ICD-10-CM

## 2022-12-01 PROCEDURE — G8420 CALC BMI NORM PARAMETERS: HCPCS | Performed by: TRANSPLANT SURGERY

## 2022-12-01 PROCEDURE — 1123F ACP DISCUSS/DSCN MKR DOCD: CPT | Performed by: TRANSPLANT SURGERY

## 2022-12-01 PROCEDURE — 3074F SYST BP LT 130 MM HG: CPT | Performed by: TRANSPLANT SURGERY

## 2022-12-01 PROCEDURE — 99212 OFFICE O/P EST SF 10 MIN: CPT | Performed by: TRANSPLANT SURGERY

## 2022-12-01 PROCEDURE — 1036F TOBACCO NON-USER: CPT | Performed by: TRANSPLANT SURGERY

## 2022-12-01 PROCEDURE — G8484 FLU IMMUNIZE NO ADMIN: HCPCS | Performed by: TRANSPLANT SURGERY

## 2022-12-01 PROCEDURE — 3078F DIAST BP <80 MM HG: CPT | Performed by: TRANSPLANT SURGERY

## 2022-12-01 PROCEDURE — 99213 OFFICE O/P EST LOW 20 MIN: CPT | Performed by: TRANSPLANT SURGERY

## 2022-12-01 PROCEDURE — G8427 DOCREV CUR MEDS BY ELIG CLIN: HCPCS | Performed by: TRANSPLANT SURGERY

## 2022-12-01 PROCEDURE — 3017F COLORECTAL CA SCREEN DOC REV: CPT | Performed by: TRANSPLANT SURGERY

## 2022-12-01 NOTE — PROGRESS NOTES
Hepatobiliary and Pancreatic Surgery Progress Note    CC: Pancreatic necrosis    Subjective: Pt is a pleasant 64 y/o M with a past history of necrotizing pancreatitis with abscess formation. He underwent a robotic pancreatic necrosectomy with cyst gastrostomy on 6/21. He denies any pain with eating. He did a shot of liquor recently. He is complaining of some rectal discomfort. He had a colonosocpy many years ago with Dr. Li Car. He states that he ran out of creon and his diarrhea returned. He is only drinking 1 alcohol beverage a week. He has gained about 50 lbs since last year. He is having some numbness in his fingers. OBJECTIVE      Physical    BP (!) 188/91   Pulse 62   Temp 97.2 °F (36.2 °C) (Temporal)   Resp 16   Ht 6' 2\" (1.88 m)   Wt 186 lb (84.4 kg)   SpO2 98%   BMI 23.88 kg/m²     General appearance: appears in no acute distress  Lungs:respiratory effort normal without accessory numbers  Heart: no pedal edema  Abdomen: soft, nondistended, nontympanic, no guarding, no peritoneal signs, normoactive bowel sounds. Appears to have a hemorrhoids vs. Polyp in his rectum  Extremities: ROM normal    ASSESSMENT: Necrotizing pancreatitis with pancreatic abscess s/p lap robotic pancreatic necrosectomy with cyst gastrostomy 6/21 with CKD (GFR = 28) Stage 4    PLAN:    - I reviewed their images prior to our office visit and we also reviewed them together today. - continue creon  - repeat MRI in 1 year  - definitely recommend he starts seeing a kidney doctor. 20 Minutes of which greater than 50% was spent counseling or coordinating his care. Thank you for the consultation and allowing me to take part in Mr. Brown's care.     Please send a copy of my note to Dr. Ike Dunlap with the [facility]    Electronically signed by Elsie Morris MD on 12/1/2022 at 1:32 PM

## 2023-01-18 ENCOUNTER — HOSPITAL ENCOUNTER (EMERGENCY)
Age: 73
Discharge: HOME | End: 2023-01-18
Attending: EMERGENCY MEDICINE
Payer: OTHER GOVERNMENT

## 2023-01-18 VITALS
WEIGHT: 185 LBS | OXYGEN SATURATION: 98 % | SYSTOLIC BLOOD PRESSURE: 153 MMHG | TEMPERATURE: 97.9 F | RESPIRATION RATE: 17 BRPM | HEART RATE: 62 BPM | BODY MASS INDEX: 23.74 KG/M2 | HEIGHT: 74 IN | DIASTOLIC BLOOD PRESSURE: 93 MMHG

## 2023-01-18 DIAGNOSIS — R73.9 HYPERGLYCEMIA: Primary | ICD-10-CM

## 2023-01-18 LAB
ALBUMIN SERPL-MCNC: 4.2 G/DL (ref 3.5–5.2)
ALP BLD-CCNC: 160 U/L (ref 40–129)
ALT SERPL-CCNC: 16 U/L (ref 0–40)
ANION GAP SERPL CALCULATED.3IONS-SCNC: 14 MMOL/L (ref 7–16)
AST SERPL-CCNC: 19 U/L (ref 0–39)
BACTERIA: ABNORMAL /HPF
BASOPHILS ABSOLUTE: 0.05 E9/L (ref 0–0.2)
BASOPHILS RELATIVE PERCENT: 0.6 % (ref 0–2)
BETA-HYDROXYBUTYRATE: 0.11 MMOL/L (ref 0.02–0.27)
BILIRUB SERPL-MCNC: 0.4 MG/DL (ref 0–1.2)
BILIRUBIN URINE: NEGATIVE
BLOOD, URINE: ABNORMAL
BUN BLDV-MCNC: 36 MG/DL (ref 6–23)
CALCIUM SERPL-MCNC: 10.2 MG/DL (ref 8.6–10.2)
CHLORIDE BLD-SCNC: 98 MMOL/L (ref 98–107)
CLARITY: CLEAR
CO2: 20 MMOL/L (ref 22–29)
COLOR: YELLOW
CREAT SERPL-MCNC: 3.2 MG/DL (ref 0.7–1.2)
EOSINOPHILS ABSOLUTE: 0.1 E9/L (ref 0.05–0.5)
EOSINOPHILS RELATIVE PERCENT: 1.1 % (ref 0–6)
EPITHELIAL CELLS, UA: ABNORMAL /HPF
GFR SERPL CREATININE-BSD FRML MDRD: 19 ML/MIN/1.73
GLUCOSE BLD-MCNC: 270 MG/DL (ref 74–99)
GLUCOSE URINE: >=1000 MG/DL
HCT VFR BLD CALC: 35.9 % (ref 37–54)
HEMOGLOBIN: 12.2 G/DL (ref 12.5–16.5)
IMMATURE GRANULOCYTES #: 0.06 E9/L
IMMATURE GRANULOCYTES %: 0.7 % (ref 0–5)
KETONES, URINE: NEGATIVE MG/DL
LEUKOCYTE ESTERASE, URINE: NEGATIVE
LYMPHOCYTES ABSOLUTE: 2.63 E9/L (ref 1.5–4)
LYMPHOCYTES RELATIVE PERCENT: 29.6 % (ref 20–42)
MCH RBC QN AUTO: 27.7 PG (ref 26–35)
MCHC RBC AUTO-ENTMCNC: 34 % (ref 32–34.5)
MCV RBC AUTO: 81.4 FL (ref 80–99.9)
METER GLUCOSE: 250 MG/DL (ref 74–99)
MONOCYTES ABSOLUTE: 0.68 E9/L (ref 0.1–0.95)
MONOCYTES RELATIVE PERCENT: 7.6 % (ref 2–12)
NEUTROPHILS ABSOLUTE: 5.38 E9/L (ref 1.8–7.3)
NEUTROPHILS RELATIVE PERCENT: 60.4 % (ref 43–80)
NITRITE, URINE: NEGATIVE
PDW BLD-RTO: 13.4 FL (ref 11.5–15)
PH UA: 6 (ref 5–9)
PLATELET # BLD: 204 E9/L (ref 130–450)
PMV BLD AUTO: 11.2 FL (ref 7–12)
POTASSIUM SERPL-SCNC: 4.8 MMOL/L (ref 3.5–5)
PROTEIN UA: >=300 MG/DL
RBC # BLD: 4.41 E12/L (ref 3.8–5.8)
RBC UA: ABNORMAL /HPF (ref 0–2)
REASON FOR REJECTION: NORMAL
REJECTED TEST: NORMAL
SODIUM BLD-SCNC: 132 MMOL/L (ref 132–146)
SPECIFIC GRAVITY UA: 1.02 (ref 1–1.03)
TOTAL PROTEIN: 7.7 G/DL (ref 6.4–8.3)
TROPONIN, HIGH SENSITIVITY: 35 NG/L (ref 0–11)
TROPONIN, HIGH SENSITIVITY: 37 NG/L (ref 0–11)
UROBILINOGEN, URINE: 0.2 E.U./DL
WBC # BLD: 8.9 E9/L (ref 4.5–11.5)
WBC UA: ABNORMAL /HPF (ref 0–5)

## 2023-01-18 PROCEDURE — 85025 COMPLETE CBC W/AUTO DIFF WBC: CPT

## 2023-01-18 PROCEDURE — 82962 GLUCOSE BLOOD TEST: CPT

## 2023-01-18 PROCEDURE — 81001 URINALYSIS AUTO W/SCOPE: CPT

## 2023-01-18 PROCEDURE — 82010 KETONE BODYS QUAN: CPT

## 2023-01-18 PROCEDURE — 84484 ASSAY OF TROPONIN QUANT: CPT

## 2023-01-18 PROCEDURE — 93005 ELECTROCARDIOGRAM TRACING: CPT | Performed by: PHYSICIAN ASSISTANT

## 2023-01-18 PROCEDURE — 80053 COMPREHEN METABOLIC PANEL: CPT

## 2023-01-18 PROCEDURE — 99284 EMERGENCY DEPT VISIT MOD MDM: CPT

## 2023-01-18 ASSESSMENT — ENCOUNTER SYMPTOMS
COUGH: 0
ABDOMINAL PAIN: 1
RHINORRHEA: 0
NAUSEA: 0
CONSTIPATION: 0
COLOR CHANGE: 0
VOMITING: 0
SHORTNESS OF BREATH: 0
BACK PAIN: 0
SORE THROAT: 0
DIARRHEA: 0

## 2023-01-18 NOTE — ED NOTES
Department of Emergency Medicine  FIRST PROVIDER TRIAGE NOTE             Independent MLP           1/18/23  1:39 PM EST    Date of Encounter: 1/18/23   MRN: [id number]      HPI: Andre Ramirez is a 74 y.o. male who presents to the ED for No chief complaint on file. Patient is presenting to the ED for abnormal labwork. Elevated glucose and low sodium. Patient is a known diabetic and sugars have been 500's-high over the past 2-3 days. Reports no recent illness or wounds. Neuropathy has been more painful though. ROS: Negative for cp or sob. PE: Gen Appearance/Constitutional: alert  Musculoskeletal: moves all extremities x 4     Initial Plan of Care: All treatment areas with department are currently occupied. Plan to order/Initiate the following while awaiting opening in ED: labs, EKG, and imaging studies.   Initiate Treatment-Testing, Proceed toTreatment Area When Bed Available for ED Attending/MLP to Continue Care    Electronically signed by ELE Donohue   DD: 1/18/23       Alejandro Donohue  01/18/23 8328

## 2023-01-18 NOTE — ED PROVIDER NOTES
[facility] ENCOUNTER        Pt Name: Andre Fisher  MRN: [id number]  [place] 1950  Date of evaluation: 1/18/2023  Provider: Izzy Evans DO  PCP: Joaquín Casas DO  Note Started: 3:09 PM EST 1/18/23    CHIEF COMPLAINT       Chief Complaint   Patient presents with    Hyperglycemia     [facility] and [facility] his BGL was over 500 and that his sodium was low        HISTORY OF PRESENT ILLNESS: 1 or more Elements   History From: Patient    Andre Fisher is a 74 y.o. male with PMH of HTN, A-Fib, CKD, and type II IDDM with peripheral neuropathy who presents for evaluation of hyperglycemia. [facility] and [facility] and instructed him to go to the ED due to lab work showing BG > 500 and hyponatremia. Patient denies eating/drinking of anything couple hours prior to lab work yesterday. He reports having poor glucose control for \"a while\", at least the past couple months. His glucose checks at home are typically in the 200s. He is currently asymptomatic, but does report frequent urination and intermittent generalized abdominal pain over the past couple months. No recent fever/chills, chest pain, palpitations, dyspnea, nausea/vomiting, or changes in bowel movements. Nursing Notes were all reviewed and agreed with or any disagreements were addressed in the HPI. REVIEW OF SYSTEMS :      Review of Systems   Constitutional:  Negative for chills and fever. HENT:  Negative for congestion, rhinorrhea and sore throat. Eyes:  Negative for visual disturbance. Respiratory:  Negative for cough and shortness of breath. Cardiovascular:  Negative for chest pain, palpitations and leg swelling. Gastrointestinal:  Positive for abdominal pain (generalized). Negative for constipation, diarrhea, nausea and vomiting. Genitourinary:  Positive for frequency.  Negative for difficulty urinating, dysuria, flank pain and urgency. Musculoskeletal:  Negative for back pain and neck pain. Skin:  Negative for color change, rash and wound. Neurological:  Positive for numbness (chronic BLE neuropathy). Negative for dizziness, weakness, light-headedness and headaches. Psychiatric/Behavioral: Negative.        PAST MEDICAL HISTORY     Past Medical History:   Diagnosis Date    A-fib [facility])     on eliquis    Cancer ([facility] [address]     CKD (chronic kidney disease)     stage 3a    Di[facility]     ED (erectile dysfunction)     Hyperlipidemia     Hypertension     Prostate cancer [facility])        SURGICAL HISTORY     Past Surgical History:   Procedure Laterality Date    COLON SURGERY      COLONOSCOPY N/A 10/18/2021    COLONOSCOPY WITH BIOPSY performed by Amber Mathur MD at [address], ESOPHAGUS      HC DIALYSIS CATHETER N/A 1/12/2021    TEMPORARY HEMODIALYSIS CATHETER INSERTION performed by Jasmine Boas, MD at [address] [facility] Left 6 years ago    OTHER SURGICAL HISTORY      removal of HD catheter    PANCREAS BIOPSY N/A 6/14/2021    PANCREATIC PSEUDOCYST EXCISION DRAINAGE performed by Juan Lantigua MD at [place] [address] 6/23/2021    LAPAROSCOPIC ROBOTIC XI PANCREATIC NECROSECTOMY WITH CYST GASTROSTOMY , INTRAOPERATIVE  ULTRASOUND performed by Greg Gonzalez MD at [address] [facility]  6/14/2021    ENDOSCOPIC ULTRASOUND performed by Juan Lantigua MD at [address] [place] [address] 10/18/2021    EGD BIOPSY performed by Amber Mathur MD at [address] [place] [address] Dr Grewal 4/6/2022    EGD BIOPSY performed by Amber Mathur MD at [address] [facility] [address]       Previous Medications    AMLODIPINE (NORVASC) 10 MG TABLET    Take 1 tablet by mouth daily    APIXABAN (ELIQUIS) 5 MG TABS TABLET    Take by mouth 2 times daily Indications: last dose 4/3/22     ASCORBIC ACID (VITAMIN C) 250 MG TABLET    Take 250 mg by mouth daily    ASPIRIN 81 MG EC TABLET    Take 81 mg by mouth daily Indications: last dose 22     ATORVASTATIN (LIPITOR) 20 MG TABLET    Take 20 mg by mouth daily    B COMPLEX VITAMINS (VITAMIN B COMPLEX PO)    Take by mouth    BLOOD GLUCOSE MONITOR STRIPS    ACCU-CHEK strips. Chest 4 times/day before meals and at bedtime and as needed for symptoms of irregular blood glucose    FOLIC ACID (FOLVITE) 1 MG TABLET    Take 1 tablet by mouth daily    INSULIN ASPART (NOVOLOG FLEXPEN) 100 UNIT/ML INJECTION PEN    Inject 4 units with meals + following sl;iding scale. -200 add 1U, -250 add 2U, -300 add 3U, -350 add 4U, -400 add 5U, BS over 400 add 5U    INSULIN GLARGINE (LANTUS SOLOSTAR) 100 UNIT/ML INJECTION PEN    Inject 10 Units into the skin nightly    INSULIN PEN NEEDLE (BD PEN NEEDLE REJI U/F) 32G X 4 MM MISC    Uses with insulin 4 times a day    LIPASE-PROTEASE-AMYLASE (CREON) 73368-022709 UNITS CPEP DELAYED RELEASE CAPSULE    Take 1 capsule by mouth 3 times daily (with meals)    LIPASE-PROTEASE-AMYLASE (CREON) 06755-893904 UNITS CPEP DELAYED RELEASE CAPSULE    Take 2 capsules by mouth 3 times daily (with meals)    METOPROLOL TARTRATE (LOPRESSOR) 25 MG TABLET    Take 1 tablet by mouth 2 times daily    NUTRITIONAL SUPPLEMENTS (GLUCERNA ADVANCE SHAKE) LIQD    Take 1 Bottle by mouth 2 times daily    PRAMOXINE HCL 1 % FOAM    Apply to rectum three times daily or after a bowel movement    SKIN PROTECTANTS, MISC. (ALOE VESTA PROTECTIVE) OINT OINTMENT    Apply topically daily as needed (apply to dry skin on feet and legs)       ALLERGIES     Patient has no known allergies. FAMILYHISTORY     History reviewed. No pertinent family history.      SOCIAL HISTORY       Social History     Tobacco Use    Smoking status: Former     Packs/day: 1.00     Types: Cigarettes     Quit date: 2020     Years since quittin.7 Smokeless tobacco: Never   Vaping Use    Vaping Use: Never used   Substance Use Topics    Alcohol use: Yes     Comment: occassional    Drug use: No       SCREENINGS        Gary Coma Scale  Eye Opening: Spontaneous  Best Verbal Response: Oriented  Best Motor Response: Obeys commands  Chauncey Coma Scale Score: 15                CIWA Assessment  BP: (!) 160/99  Heart Rate: 66           PHYSICAL EXAM  1 or more Elements     ED Triage Vitals   BP Temp Temp Source Heart Rate Resp SpO2 Height Weight   01/18/23 1341 01/18/23 1331 01/18/23 1344 01/18/23 1331 01/18/23 1341 01/18/23 1331 01/18/23 1341 01/18/23 1341   (!) 160/99 (!) 96.1 °F (35.6 °C) Oral 67 18 94 % 6' 2\" (1.88 m) 185 lb (83.9 kg)       Physical Exam  Vitals reviewed. Constitutional:       General: He is not in acute distress. Appearance: Normal appearance. He is not ill-appearing. HENT:      Head: Normocephalic and atraumatic. Mouth/Throat:      Mouth: Mucous membranes are moist.      Pharynx: Oropharynx is clear. Cardiovascular:      Rate and Rhythm: Normal rate and regular rhythm. Heart sounds: No murmur heard. No friction rub. No gallop. Pulmonary:      Effort: No respiratory distress. Breath sounds: Normal breath sounds. Abdominal:      General: There is no distension. Palpations: Abdomen is soft. Tenderness: There is no abdominal tenderness. Musculoskeletal:         General: No swelling or tenderness. Cervical back: Neck supple. Skin:     General: Skin is warm and dry. Neurological:      General: No focal deficit present. Mental Status: He is alert and oriented to person, place, and time. Mental status is at baseline.        DIAGNOSTIC RESULTS   LABS:    Labs Reviewed   CBC WITH AUTO DIFFERENTIAL - Abnormal; Notable for the following components:       Result Value    Hemoglobin 12.2 (*)     Hematocrit 35.9 (*)     All other components within normal limits   COMPREHENSIVE METABOLIC PANEL - Abnormal; Notable for the following components:    CO2 20 (*)     Glucose 270 (*)     BUN 36 (*)     Creatinine 3.2 (*)     Alkaline Phosphatase 160 (*)     All other components within normal limits   TROPONIN - Abnormal; Notable for the following components:    Troponin, High Sensitivity 37 (*)     All other components within normal limits   URINALYSIS - Abnormal; Notable for the following components:    Glucose, Ur >=1000 (*)     Blood, Urine SMALL (*)     Protein, UA >=300 (*)     All other components within normal limits   MICROSCOPIC URINALYSIS - Abnormal; Notable for the following components:    Bacteria, UA RARE (*)     All other components within normal limits   TROPONIN - Abnormal; Notable for the following components:    Troponin, High Sensitivity 35 (*)     All other components within normal limits   POCT GLUCOSE - Abnormal; Notable for the following components:    Meter Glucose 250 (*)     All other components within normal limits   BETA-HYDROXYBUTYRATE   SPECIMEN REJECTION    Narrative:     CALL  Joseph  H34 tel. ,  Rejected Test Name/Called to: [facility] raul ballard, 01/18/2023 16:39, by  Donna Chung       As interpreted by me, the above displayed labs are abnormal. All other labs obtained during this visit were within normal range or not returned as of this dictation.       EKG Interpretation  Interpreted by emergency department physician, Beverly Solares, DO    Atrial fibrillation with rate 68, no acute ischemic changes, no significant changes from prior EKG 1/27/2022      RADIOLOGY:   Non-plain film images such as CT, Ultrasound and MRI are read by the radiologist. Plain radiographic images are visualized and preliminarily interpreted by the ED Provider with the below findings:    Interpretation per the Radiologist below, if available at the time of this note:    No orders to display       PROCEDURES   Unless otherwise noted below, none    CRITICAL CARE TIME (.cct)   None    EMERGENCY DEPARTMENT COURSE Vitals:    Vitals:    01/18/23 1331 01/18/23 1341 01/18/23 1344   BP:  (!) 160/99    Pulse: 67 66    Resp:  18    Temp: (!) 96.1 °F (35.6 °C)  97.9 °F (36.6 °C)   TempSrc:   Oral   SpO2: 94% 99%    Weight:  185 lb (83.9 kg)    Height:  6' 2\" (1.88 m)        Patient was given the following medications:  Medications - No data to display        Is this patient to be included in the SEP-1 Core Measure due to severe sepsis or septic shock? No Exclusion criteria - the patient is NOT to be included for SEP-1 Core Measure due to: Infection is not suspected      Medical Decision Making/Differential Diagnosis:    CC/HPI Summary, Social Determinants of health, Records Reviewed, DDx, testing done/not done, ED Course, Reassessment, disposition considerations/shared decision making with patient, consults, disposition:        Medical Decision Making  Amount and/or Complexity of Data Reviewed  Labs: ordered. Patient is a 64-year-old male with PMH of HTN, A-Fib, CKD, and type II IDDM who presents from the [place] for hyperglycemia (>500) and hyponatremia found on lab work done yesterday. Concern for DKA, hyperglycemia, dehydration, among other pathologies. Upon presentation to the ED, he is hypertensive (160/99) with otherwise normal vital signs. Physical exam is unremarkable. EKG demonstrates A-Fib with no acute changes. Lab work notable for , anion gap 14, negative beta-hydroxybutyrate, BUN 36, Cr 3.2, and troponin 37 > 35. UA positive for glucose and protein; no ketones. Reevaluation, patient's resting comfortably. No symptoms or complaints. Remains hemodynamically stable. Shared decision-making was done, patient does not want to be admitted. Given his work-up, this is reasonable. Results discussed with patient, and patient instructed to follow up with Nephrology within 1 week to re-evaluate kidney function. He voices understanding.   He is also educated on the importance of following a diabetic diet, he is also in increase his water intake over the next couple of days as well. CONSULTS: (Who and What was discussed)  None      I am the Primary Clinician of Record. FINAL IMPRESSION      1. Hyperglycemia          DISPOSITION/PLAN     DISPOSITION Decision To Discharge 01/18/2023 06:27:04 PM      PATIENT REFERRED TO:  John Arnold DO  [address] [address]  [phone]    Schedule an appointment as soon as possible for a visit in 1 week      DISCHARGE MEDICATIONS:  New Prescriptions    No medications on file       DISCONTINUED MEDICATIONS:  Discontinued Medications    No medications on file              (Please note that portions of this note were completed with a voice recognition program.  Efforts were made to edit the dictations but occasionally words are mis-transcribed.)    Abhay Mckeon DO (electronically signed)  Resident, PGY-1        Abhay Mckeon DO  Resident  01/18/23 5164    ATTENDING PROVIDER ATTESTATION:     Andre HELADIO Brown presented to the emergency department for evaluation of Hyperglycemia ([facility] and told [facility] that his BGL was over 500 and that his sodium was low )   and was initially evaluated by the Medical Resident. See Original ED Note for H&P and ED course above. I have reviewed and discussed the case, including pertinent history (medical, surgical, family and social) and exam findings with the Medical Resident assigned to Andre Brown. I have personally performed and/or participated in the history, exam, medical decision making, EKG interpretation and procedures and agree with all pertinent clinical information. I have reviewed my findings and recommendations with the assigned Medical Resident, Andre Brown and members of family present at the time of disposition. My findings/plan: The encounter diagnosis was Hyperglycemia.   Discharge Medication List as of 1/18/2023  6:28 PM        MD Rigoberto Jaramillo MD  01/21/23 4202

## 2023-01-19 LAB
EKG ATRIAL RATE: 241 BPM
EKG Q-T INTERVAL: 404 MS
EKG QRS DURATION: 80 MS
EKG QTC CALCULATION (BAZETT): 429 MS
EKG R AXIS: 44 DEGREES
EKG T AXIS: -35 DEGREES
EKG VENTRICULAR RATE: 68 BPM

## 2023-07-28 LAB
ALBUMIN SERPL-MCNC: 4 G/DL (ref 3.5–5.2)
ALP SERPL-CCNC: 115 U/L (ref 40–129)
ALT SERPL-CCNC: 12 U/L (ref 0–40)
ANION GAP SERPL CALCULATED.3IONS-SCNC: 13 MMOL/L (ref 7–16)
AST SERPL-CCNC: 17 U/L (ref 0–39)
BASOPHILS # BLD: 0.06 K/UL (ref 0–0.2)
BASOPHILS NFR BLD: 1 % (ref 0–2)
BILIRUB SERPL-MCNC: 0.3 MG/DL (ref 0–1.2)
BUN SERPL-MCNC: 28 MG/DL (ref 6–23)
CALCIUM SERPL-MCNC: 9 MG/DL (ref 8.6–10.2)
CHLORIDE SERPL-SCNC: 107 MMOL/L (ref 98–107)
CO2 SERPL-SCNC: 17 MMOL/L (ref 22–29)
CREAT SERPL-MCNC: 3.6 MG/DL (ref 0.7–1.2)
EOSINOPHIL # BLD: 0.09 K/UL (ref 0.05–0.5)
EOSINOPHILS RELATIVE PERCENT: 2 % (ref 0–6)
ERYTHROCYTE [DISTWIDTH] IN BLOOD BY AUTOMATED COUNT: 15.6 % (ref 11.5–15)
GFR SERPL CREATININE-BSD FRML MDRD: 17 ML/MIN/1.73M2
GLUCOSE SERPL-MCNC: 206 MG/DL (ref 74–99)
HCT VFR BLD AUTO: 39.6 % (ref 37–54)
HGB BLD-MCNC: 12.7 G/DL (ref 12.5–16.5)
IMM GRANULOCYTES # BLD AUTO: <0.03 K/UL (ref 0–0.58)
IMM GRANULOCYTES NFR BLD: 0 % (ref 0–5)
LACTATE BLDV-SCNC: 1.2 MMOL/L (ref 0.5–2.2)
LIPASE SERPL-CCNC: 12 U/L (ref 13–60)
LYMPHOCYTES NFR BLD: 1.63 K/UL (ref 1.5–4)
LYMPHOCYTES RELATIVE PERCENT: 27 % (ref 20–42)
MCH RBC QN AUTO: 26.7 PG (ref 26–35)
MCHC RBC AUTO-ENTMCNC: 32.1 G/DL (ref 32–34.5)
MCV RBC AUTO: 83.2 FL (ref 80–99.9)
MONOCYTES NFR BLD: 0.46 K/UL (ref 0.1–0.95)
MONOCYTES NFR BLD: 8 % (ref 2–12)
NEUTROPHILS NFR BLD: 63 % (ref 43–80)
NEUTS SEG NFR BLD: 3.87 K/UL (ref 1.8–7.3)
PLATELET # BLD AUTO: 175 K/UL (ref 130–450)
PMV BLD AUTO: 11.5 FL (ref 7–12)
POTASSIUM SERPL-SCNC: 4.2 MMOL/L (ref 3.5–5)
PROT SERPL-MCNC: 7.2 G/DL (ref 6.4–8.3)
RBC # BLD AUTO: 4.76 M/UL (ref 3.8–5.8)
SODIUM SERPL-SCNC: 137 MMOL/L (ref 132–146)
WBC OTHER # BLD: 6.1 K/UL (ref 4.5–11.5)

## 2023-07-28 PROCEDURE — 99285 EMERGENCY DEPT VISIT HI MDM: CPT

## 2023-07-28 PROCEDURE — 83605 ASSAY OF LACTIC ACID: CPT

## 2023-07-28 PROCEDURE — 85027 COMPLETE CBC AUTOMATED: CPT

## 2023-07-28 PROCEDURE — 83690 ASSAY OF LIPASE: CPT

## 2023-07-28 PROCEDURE — 81001 URINALYSIS AUTO W/SCOPE: CPT

## 2023-07-28 PROCEDURE — 80053 COMPREHEN METABOLIC PANEL: CPT

## 2023-07-28 ASSESSMENT — PAIN - FUNCTIONAL ASSESSMENT: PAIN_FUNCTIONAL_ASSESSMENT: NONE - DENIES PAIN

## 2023-07-29 ENCOUNTER — HOSPITAL ENCOUNTER (INPATIENT)
Age: 73
LOS: 2 days | Discharge: HOME | DRG: 683 | End: 2023-07-31
Attending: EMERGENCY MEDICINE | Admitting: FAMILY MEDICINE
Payer: OTHER GOVERNMENT

## 2023-07-29 ENCOUNTER — APPOINTMENT (OUTPATIENT)
Dept: CT IMAGING | Age: 73
DRG: 683 | End: 2023-07-29
Attending: EMERGENCY MEDICINE
Payer: OTHER GOVERNMENT

## 2023-07-29 DIAGNOSIS — N18.9 ACUTE ON CHRONIC RENAL INSUFFICIENCY: ICD-10-CM

## 2023-07-29 DIAGNOSIS — N28.9 ACUTE ON CHRONIC RENAL INSUFFICIENCY: ICD-10-CM

## 2023-07-29 DIAGNOSIS — I10 HYPERTENSION, UNSPECIFIED TYPE: ICD-10-CM

## 2023-07-29 DIAGNOSIS — R10.9 ABDOMINAL PAIN, UNSPECIFIED ABDOMINAL LOCATION: Primary | ICD-10-CM

## 2023-07-29 LAB
BACTERIA URNS QL MICRO: ABNORMAL
BILIRUB UR QL STRIP: NEGATIVE
CHLORIDE UR-SCNC: 84 MMOL/L
CLARITY UR: CLEAR
COLOR UR: YELLOW
CREAT UR-MCNC: 63.4 MG/DL (ref 40–278)
EPI CELLS #/AREA URNS HPF: ABNORMAL /HPF
GLUCOSE UR STRIP-MCNC: 100 MG/DL
HGB UR QL STRIP.AUTO: ABNORMAL
KETONES UR STRIP-MCNC: NEGATIVE MG/DL
LEUKOCYTE ESTERASE UR QL STRIP: NEGATIVE
METER GLUCOSE: 340 MG/DL (ref 74–99)
METER GLUCOSE: 353 MG/DL (ref 74–99)
NITRITE UR QL STRIP: NEGATIVE
PH UR STRIP: 6 [PH] (ref 5–9)
POTASSIUM, UR: 19.6 MMOL/L
PROT UR STRIP-MCNC: >=300 MG/DL
RBC #/AREA URNS HPF: ABNORMAL /HPF
SODIUM UR-SCNC: 99 MMOL/L
SP GR UR STRIP: 1.02 (ref 1–1.03)
UROBILINOGEN UR STRIP-ACNC: 0.2 EU/DL (ref 0–1)
WBC #/AREA URNS HPF: ABNORMAL /HPF

## 2023-07-29 PROCEDURE — 84133 ASSAY OF URINE POTASSIUM: CPT

## 2023-07-29 PROCEDURE — 84300 ASSAY OF URINE SODIUM: CPT

## 2023-07-29 PROCEDURE — 1200000000 HC SEMI PRIVATE

## 2023-07-29 PROCEDURE — 74176 CT ABD & PELVIS W/O CONTRAST: CPT

## 2023-07-29 PROCEDURE — 82947 ASSAY GLUCOSE BLOOD QUANT: CPT

## 2023-07-29 PROCEDURE — 2500000003 HC RX 250 WO HCPCS: Performed by: NURSE PRACTITIONER

## 2023-07-29 PROCEDURE — 2580000003 HC RX 258: Performed by: FAMILY MEDICINE

## 2023-07-29 PROCEDURE — 2580000003 HC RX 258: Performed by: NURSE PRACTITIONER

## 2023-07-29 PROCEDURE — 6370000000 HC RX 637 (ALT 250 FOR IP): Performed by: FAMILY MEDICINE

## 2023-07-29 PROCEDURE — 82570 ASSAY OF URINE CREATININE: CPT

## 2023-07-29 PROCEDURE — 96374 THER/PROPH/DIAG INJ IV PUSH: CPT

## 2023-07-29 PROCEDURE — 6360000002 HC RX W HCPCS: Performed by: EMERGENCY MEDICINE

## 2023-07-29 PROCEDURE — 82436 ASSAY OF URINE CHLORIDE: CPT

## 2023-07-29 PROCEDURE — 6370000000 HC RX 637 (ALT 250 FOR IP): Performed by: EMERGENCY MEDICINE

## 2023-07-29 RX ORDER — HYDRALAZINE HYDROCHLORIDE 25 MG/1
25 TABLET, FILM COATED ORAL 2 TIMES DAILY
COMMUNITY

## 2023-07-29 RX ORDER — INSULIN GLARGINE-YFGN 100 [IU]/ML
26 INJECTION, SOLUTION SUBCUTANEOUS DAILY
Status: DISCONTINUED | OUTPATIENT
Start: 2023-07-30 | End: 2023-07-31 | Stop reason: HOSPADM

## 2023-07-29 RX ORDER — SODIUM CHLORIDE 0.9 % (FLUSH) 0.9 %
10 SYRINGE (ML) INJECTION PRN
Status: DISCONTINUED | OUTPATIENT
Start: 2023-07-29 | End: 2023-07-31 | Stop reason: HOSPADM

## 2023-07-29 RX ORDER — ATORVASTATIN CALCIUM 20 MG/1
10 TABLET, FILM COATED ORAL NIGHTLY
COMMUNITY

## 2023-07-29 RX ORDER — POLYETHYLENE GLYCOL 3350 17 G/17G
17 POWDER, FOR SOLUTION ORAL DAILY PRN
Status: DISCONTINUED | OUTPATIENT
Start: 2023-07-29 | End: 2023-07-31 | Stop reason: HOSPADM

## 2023-07-29 RX ORDER — ATORVASTATIN CALCIUM 10 MG/1
10 TABLET, FILM COATED ORAL DAILY
Status: DISCONTINUED | OUTPATIENT
Start: 2023-07-29 | End: 2023-07-31 | Stop reason: HOSPADM

## 2023-07-29 RX ORDER — SODIUM CHLORIDE 9 MG/ML
INJECTION, SOLUTION INTRAVENOUS CONTINUOUS
Status: DISCONTINUED | OUTPATIENT
Start: 2023-07-29 | End: 2023-07-29

## 2023-07-29 RX ORDER — CHOLECALCIFEROL (VITAMIN D3) 50 MCG
2000 TABLET ORAL DAILY
COMMUNITY
End: 2023-08-02

## 2023-07-29 RX ORDER — INSULIN GLARGINE 100 [IU]/ML
26 INJECTION, SOLUTION SUBCUTANEOUS EVERY MORNING
COMMUNITY

## 2023-07-29 RX ORDER — THIAMINE MONONITRATE (VIT B1) 100 MG
100 TABLET ORAL DAILY
COMMUNITY

## 2023-07-29 RX ORDER — SUCRALFATE 1 G/1
1 TABLET ORAL 4 TIMES DAILY
COMMUNITY
End: 2023-08-02

## 2023-07-29 RX ORDER — SODIUM CHLORIDE 9 MG/ML
INJECTION, SOLUTION INTRAVENOUS PRN
Status: DISCONTINUED | OUTPATIENT
Start: 2023-07-29 | End: 2023-07-31 | Stop reason: HOSPADM

## 2023-07-29 RX ORDER — INSULIN LISPRO 100 [IU]/ML
0-4 INJECTION, SOLUTION INTRAVENOUS; SUBCUTANEOUS
Status: DISCONTINUED | OUTPATIENT
Start: 2023-07-30 | End: 2023-07-31 | Stop reason: HOSPADM

## 2023-07-29 RX ORDER — ACETAMINOPHEN 325 MG/1
650 TABLET ORAL ONCE
Status: COMPLETED | OUTPATIENT
Start: 2023-07-29 | End: 2023-07-29

## 2023-07-29 RX ORDER — SODIUM CHLORIDE 0.9 % (FLUSH) 0.9 %
10 SYRINGE (ML) INJECTION EVERY 12 HOURS SCHEDULED
Status: DISCONTINUED | OUTPATIENT
Start: 2023-07-29 | End: 2023-07-31 | Stop reason: HOSPADM

## 2023-07-29 RX ORDER — LIDOCAINE 40 MG/G
CREAM TOPICAL 2 TIMES DAILY PRN
COMMUNITY
End: 2023-08-02

## 2023-07-29 RX ORDER — ACETAMINOPHEN 650 MG/1
650 SUPPOSITORY RECTAL EVERY 6 HOURS PRN
Status: DISCONTINUED | OUTPATIENT
Start: 2023-07-29 | End: 2023-07-31 | Stop reason: HOSPADM

## 2023-07-29 RX ORDER — HYDRALAZINE HYDROCHLORIDE 20 MG/ML
5 INJECTION INTRAMUSCULAR; INTRAVENOUS ONCE
Status: COMPLETED | OUTPATIENT
Start: 2023-07-29 | End: 2023-07-29

## 2023-07-29 RX ORDER — ASPIRIN 81 MG/1
81 TABLET ORAL DAILY
Status: DISCONTINUED | OUTPATIENT
Start: 2023-07-29 | End: 2023-07-31 | Stop reason: HOSPADM

## 2023-07-29 RX ORDER — TAMSULOSIN HYDROCHLORIDE 0.4 MG/1
0.4 CAPSULE ORAL DAILY
COMMUNITY
End: 2023-08-02

## 2023-07-29 RX ORDER — ACETAMINOPHEN 325 MG/1
650 TABLET ORAL EVERY 6 HOURS PRN
Status: DISCONTINUED | OUTPATIENT
Start: 2023-07-29 | End: 2023-07-31 | Stop reason: HOSPADM

## 2023-07-29 RX ORDER — PANTOPRAZOLE SODIUM 40 MG/1
40 TABLET, DELAYED RELEASE ORAL DAILY
COMMUNITY
End: 2023-08-02

## 2023-07-29 RX ORDER — ONDANSETRON 2 MG/ML
4 INJECTION INTRAMUSCULAR; INTRAVENOUS EVERY 6 HOURS PRN
Status: DISCONTINUED | OUTPATIENT
Start: 2023-07-29 | End: 2023-07-31 | Stop reason: HOSPADM

## 2023-07-29 RX ORDER — AMLODIPINE BESYLATE 10 MG/1
10 TABLET ORAL DAILY
Status: DISCONTINUED | OUTPATIENT
Start: 2023-07-29 | End: 2023-07-31 | Stop reason: HOSPADM

## 2023-07-29 RX ORDER — INSULIN LISPRO 100 [IU]/ML
0-4 INJECTION, SOLUTION INTRAVENOUS; SUBCUTANEOUS NIGHTLY
Status: DISCONTINUED | OUTPATIENT
Start: 2023-07-29 | End: 2023-07-31 | Stop reason: HOSPADM

## 2023-07-29 RX ORDER — DEXTROSE MONOHYDRATE 100 MG/ML
INJECTION, SOLUTION INTRAVENOUS CONTINUOUS PRN
Status: DISCONTINUED | OUTPATIENT
Start: 2023-07-29 | End: 2023-07-31 | Stop reason: HOSPADM

## 2023-07-29 RX ORDER — HYDRALAZINE HYDROCHLORIDE 20 MG/ML
10 INJECTION INTRAMUSCULAR; INTRAVENOUS EVERY 4 HOURS PRN
Status: DISCONTINUED | OUTPATIENT
Start: 2023-07-29 | End: 2023-07-31 | Stop reason: HOSPADM

## 2023-07-29 RX ORDER — FERROUS SULFATE 325(65) MG
325 TABLET ORAL 2 TIMES DAILY
COMMUNITY

## 2023-07-29 RX ORDER — PROMETHAZINE HYDROCHLORIDE 12.5 MG/1
12.5 TABLET ORAL EVERY 6 HOURS PRN
Status: DISCONTINUED | OUTPATIENT
Start: 2023-07-29 | End: 2023-07-31 | Stop reason: HOSPADM

## 2023-07-29 RX ADMIN — SODIUM BICARBONATE: 84 INJECTION, SOLUTION INTRAVENOUS at 15:32

## 2023-07-29 RX ADMIN — HYDRALAZINE HYDROCHLORIDE 5 MG: 20 INJECTION, SOLUTION INTRAMUSCULAR; INTRAVENOUS at 02:17

## 2023-07-29 RX ADMIN — AMLODIPINE BESYLATE 10 MG: 10 TABLET ORAL at 18:11

## 2023-07-29 RX ADMIN — METOPROLOL TARTRATE 37.5 MG: 25 TABLET, FILM COATED ORAL at 21:12

## 2023-07-29 RX ADMIN — ACETAMINOPHEN 650 MG: 325 TABLET ORAL at 02:18

## 2023-07-29 RX ADMIN — INSULIN LISPRO 4 UNITS: 100 INJECTION, SOLUTION INTRAVENOUS; SUBCUTANEOUS at 21:13

## 2023-07-29 RX ADMIN — APIXABAN 5 MG: 5 TABLET, FILM COATED ORAL at 21:12

## 2023-07-29 RX ADMIN — SODIUM CHLORIDE: 9 INJECTION, SOLUTION INTRAVENOUS at 03:30

## 2023-07-29 ASSESSMENT — LIFESTYLE VARIABLES
HOW MANY STANDARD DRINKS CONTAINING ALCOHOL DO YOU HAVE ON A TYPICAL DAY: PATIENT DOES NOT DRINK
HOW OFTEN DO YOU HAVE A DRINK CONTAINING ALCOHOL: NEVER

## 2023-07-29 NOTE — H&P
PANCREATECTOMY N/A 6/23/2021    LAPAROSCOPIC ROBOTIC XI PANCREATIC NECROSECTOMY WITH CYST GASTROSTOMY , INTRAOPERATIVE  ULTRASOUND performed by Silvia Davis MD at [address]   6/14/2021    ENDOSCOPIC ULTRASOUND performed by Kirstie Stark MD at [address] [facility] 10/18/2021    EGD BIOPSY performed by Nate Monzon MD at [address] [facility] N/A 4/6/2022    EGD BIOPSY performed by Nate Monzon MD at [address] [facility]       Prior to Admission medications    Medication Sig Start Date End Date Taking? Authorizing Provider   lipase-protease-amylase (CREON) 50123-402219 units CPEP delayed release capsule Take 2 capsules by mouth 3 times daily (with meals) 9/15/22   Robi Valenzuela III, MD   insulin aspart (NOVOLOG FLEXPEN) 100 UNIT/ML injection pen Inject 4 units with meals + following sl;iding scale. -200 add 1U, -250 add 2U, -300 add 3U, -350 add 4U, -400 add 5U, BS over 400 add 5U  Patient taking differently: 7 Units Inject 7units with meals + following sl;iding scale. -200 add 1U, -250 add 2U, -300 add 3U, -350 add 4U, -400 add 5U, BS over 400 add 5U 1/31/22   Wendi Prince MD   insulin glargine (LANTUS SOLOSTAR) 100 UNIT/ML injection pen Inject 10 Units into the skin nightly  Patient taking differently: Inject 20 Units into the skin nightly 1/31/22   Wendi Prince MD   Insulin Pen Needle (BD PEN NEEDLE REJI U/F) 32G X 4 MM MISC Uses with insulin 4 times a day 1/31/22   Wendi Prince MD   blood glucose monitor strips ACCU-CHEK strips.  Chest 4 times/day before meals and at bedtime and as needed for symptoms of irregular blood glucose 1/31/22   Wendi Prince MD   amLODIPine (NORVASC) 10 MG tablet Take 1 tablet by mouth daily 2/1/22   Munir Segal DO   atorvastatin (LIPITOR) 20 MG tablet Take 20 mg

## 2023-07-30 LAB
ALBUMIN SERPL-MCNC: 3.3 G/DL (ref 3.5–5.2)
ALP SERPL-CCNC: 104 U/L (ref 40–129)
ALT SERPL-CCNC: 9 U/L (ref 0–40)
ANION GAP SERPL CALCULATED.3IONS-SCNC: 7 MMOL/L (ref 7–16)
AST SERPL-CCNC: 15 U/L (ref 0–39)
BASOPHILS # BLD: 0.05 K/UL (ref 0–0.2)
BASOPHILS NFR BLD: 1 % (ref 0–2)
BILIRUB SERPL-MCNC: 0.4 MG/DL (ref 0–1.2)
BUN SERPL-MCNC: 26 MG/DL (ref 6–23)
CALCIUM SERPL-MCNC: 8.6 MG/DL (ref 8.6–10.2)
CHLORIDE SERPL-SCNC: 107 MMOL/L (ref 98–107)
CO2 SERPL-SCNC: 21 MMOL/L (ref 22–29)
CREAT SERPL-MCNC: 3.2 MG/DL (ref 0.7–1.2)
EOSINOPHIL # BLD: 0.14 K/UL (ref 0.05–0.5)
EOSINOPHILS RELATIVE PERCENT: 2 % (ref 0–6)
ERYTHROCYTE [DISTWIDTH] IN BLOOD BY AUTOMATED COUNT: 15.3 % (ref 11.5–15)
GFR SERPL CREATININE-BSD FRML MDRD: 19 ML/MIN/1.73M2
GLUCOSE SERPL-MCNC: 161 MG/DL (ref 74–99)
HCT VFR BLD AUTO: 35.9 % (ref 37–54)
HGB BLD-MCNC: 11.8 G/DL (ref 12.5–16.5)
IMM GRANULOCYTES # BLD AUTO: <0.03 K/UL (ref 0–0.58)
IMM GRANULOCYTES NFR BLD: 0 % (ref 0–5)
LYMPHOCYTES NFR BLD: 1.55 K/UL (ref 1.5–4)
LYMPHOCYTES RELATIVE PERCENT: 25 % (ref 20–42)
MCH RBC QN AUTO: 27.1 PG (ref 26–35)
MCHC RBC AUTO-ENTMCNC: 32.9 G/DL (ref 32–34.5)
MCV RBC AUTO: 82.5 FL (ref 80–99.9)
METER GLUCOSE: 160 MG/DL (ref 74–99)
METER GLUCOSE: 301 MG/DL (ref 74–99)
METER GLUCOSE: 304 MG/DL (ref 74–99)
METER GLUCOSE: 371 MG/DL (ref 74–99)
MONOCYTES NFR BLD: 0.47 K/UL (ref 0.1–0.95)
MONOCYTES NFR BLD: 8 % (ref 2–12)
NEUTROPHILS NFR BLD: 65 % (ref 43–80)
NEUTS SEG NFR BLD: 4.05 K/UL (ref 1.8–7.3)
PLATELET # BLD AUTO: 165 K/UL (ref 130–450)
PMV BLD AUTO: 11.5 FL (ref 7–12)
POTASSIUM SERPL-SCNC: 4.4 MMOL/L (ref 3.5–5)
PROT SERPL-MCNC: 6 G/DL (ref 6.4–8.3)
RBC # BLD AUTO: 4.35 M/UL (ref 3.8–5.8)
SODIUM SERPL-SCNC: 135 MMOL/L (ref 132–146)
WBC OTHER # BLD: 6.3 K/UL (ref 4.5–11.5)

## 2023-07-30 PROCEDURE — 85027 COMPLETE CBC AUTOMATED: CPT

## 2023-07-30 PROCEDURE — 6360000002 HC RX W HCPCS: Performed by: FAMILY MEDICINE

## 2023-07-30 PROCEDURE — 6370000000 HC RX 637 (ALT 250 FOR IP): Performed by: FAMILY MEDICINE

## 2023-07-30 PROCEDURE — 1200000000 HC SEMI PRIVATE

## 2023-07-30 PROCEDURE — S5553 INSULIN LONG ACTING 5 U: HCPCS | Performed by: FAMILY MEDICINE

## 2023-07-30 PROCEDURE — 82947 ASSAY GLUCOSE BLOOD QUANT: CPT

## 2023-07-30 PROCEDURE — 2580000003 HC RX 258: Performed by: NURSE PRACTITIONER

## 2023-07-30 PROCEDURE — 2580000003 HC RX 258: Performed by: FAMILY MEDICINE

## 2023-07-30 PROCEDURE — 6370000000 HC RX 637 (ALT 250 FOR IP): Performed by: NURSE PRACTITIONER

## 2023-07-30 PROCEDURE — 80053 COMPREHEN METABOLIC PANEL: CPT

## 2023-07-30 PROCEDURE — 2500000003 HC RX 250 WO HCPCS: Performed by: NURSE PRACTITIONER

## 2023-07-30 RX ORDER — CARVEDILOL 6.25 MG/1
12.5 TABLET ORAL 2 TIMES DAILY WITH MEALS
Status: DISCONTINUED | OUTPATIENT
Start: 2023-07-30 | End: 2023-07-31 | Stop reason: HOSPADM

## 2023-07-30 RX ORDER — HYDRALAZINE HYDROCHLORIDE 25 MG/1
25 TABLET, FILM COATED ORAL EVERY 8 HOURS SCHEDULED
Status: DISCONTINUED | OUTPATIENT
Start: 2023-07-30 | End: 2023-07-31

## 2023-07-30 RX ADMIN — INSULIN GLARGINE-YFGN 26 UNITS: 100 INJECTION, SOLUTION SUBCUTANEOUS at 08:43

## 2023-07-30 RX ADMIN — AMLODIPINE BESYLATE 10 MG: 10 TABLET ORAL at 08:43

## 2023-07-30 RX ADMIN — SODIUM CHLORIDE, PRESERVATIVE FREE 10 ML: 5 INJECTION INTRAVENOUS at 08:56

## 2023-07-30 RX ADMIN — CARVEDILOL 12.5 MG: 6.25 TABLET, FILM COATED ORAL at 17:58

## 2023-07-30 RX ADMIN — INSULIN LISPRO 4 UNITS: 100 INJECTION, SOLUTION INTRAVENOUS; SUBCUTANEOUS at 21:07

## 2023-07-30 RX ADMIN — METOPROLOL TARTRATE 37.5 MG: 25 TABLET, FILM COATED ORAL at 08:42

## 2023-07-30 RX ADMIN — INSULIN LISPRO 3 UNITS: 100 INJECTION, SOLUTION INTRAVENOUS; SUBCUTANEOUS at 12:17

## 2023-07-30 RX ADMIN — SODIUM BICARBONATE: 84 INJECTION, SOLUTION INTRAVENOUS at 02:51

## 2023-07-30 RX ADMIN — ATORVASTATIN CALCIUM 10 MG: 20 TABLET, FILM COATED ORAL at 08:43

## 2023-07-30 RX ADMIN — HYDRALAZINE HYDROCHLORIDE 25 MG: 25 TABLET, FILM COATED ORAL at 22:07

## 2023-07-30 RX ADMIN — SODIUM CHLORIDE, PRESERVATIVE FREE 10 ML: 5 INJECTION INTRAVENOUS at 21:07

## 2023-07-30 RX ADMIN — INSULIN LISPRO 3 UNITS: 100 INJECTION, SOLUTION INTRAVENOUS; SUBCUTANEOUS at 17:58

## 2023-07-30 RX ADMIN — HYDRALAZINE HYDROCHLORIDE 25 MG: 25 TABLET, FILM COATED ORAL at 15:43

## 2023-07-30 RX ADMIN — ACETAMINOPHEN 650 MG: 325 TABLET ORAL at 07:24

## 2023-07-30 RX ADMIN — HYDRALAZINE HYDROCHLORIDE 10 MG: 20 INJECTION INTRAMUSCULAR; INTRAVENOUS at 06:06

## 2023-07-30 RX ADMIN — APIXABAN 5 MG: 5 TABLET, FILM COATED ORAL at 08:42

## 2023-07-30 RX ADMIN — HYDRALAZINE HYDROCHLORIDE 10 MG: 20 INJECTION INTRAMUSCULAR; INTRAVENOUS at 10:43

## 2023-07-30 RX ADMIN — ASPIRIN 81 MG: 81 TABLET, COATED ORAL at 08:42

## 2023-07-30 RX ADMIN — APIXABAN 5 MG: 5 TABLET, FILM COATED ORAL at 21:07

## 2023-07-30 ASSESSMENT — PAIN SCALES - GENERAL: PAINLEVEL_OUTOF10: 5

## 2023-07-30 ASSESSMENT — PAIN DESCRIPTION - DESCRIPTORS: DESCRIPTORS: ACHING;THROBBING

## 2023-07-30 ASSESSMENT — PAIN DESCRIPTION - LOCATION: LOCATION: HEAD

## 2023-07-30 ASSESSMENT — PAIN DESCRIPTION - ORIENTATION: ORIENTATION: PROXIMAL

## 2023-07-30 NOTE — ED NOTES
Report called to nurse on floor at this time. Patient will go to room 5401d when cleaned.      Jolie Hamman, RN  07/30/23 5077

## 2023-07-30 NOTE — ED NOTES
Report given and care transferred to [facility], [facility],[address]  07/29/23 4934

## 2023-07-31 VITALS
WEIGHT: 210 LBS | SYSTOLIC BLOOD PRESSURE: 157 MMHG | DIASTOLIC BLOOD PRESSURE: 1 MMHG | TEMPERATURE: 97 F | OXYGEN SATURATION: 96 % | RESPIRATION RATE: 18 BRPM | HEART RATE: 64 BPM | HEIGHT: 74 IN | BODY MASS INDEX: 26.95 KG/M2

## 2023-07-31 LAB
ANION GAP SERPL CALCULATED.3IONS-SCNC: 8 MMOL/L (ref 7–16)
BUN SERPL-MCNC: 25 MG/DL (ref 6–23)
CALCIUM SERPL-MCNC: 8.7 MG/DL (ref 8.6–10.2)
CHLORIDE SERPL-SCNC: 107 MMOL/L (ref 98–107)
CO2 SERPL-SCNC: 22 MMOL/L (ref 22–29)
CREAT SERPL-MCNC: 3.1 MG/DL (ref 0.7–1.2)
ERYTHROCYTE [DISTWIDTH] IN BLOOD BY AUTOMATED COUNT: 15.2 % (ref 11.5–15)
GFR SERPL CREATININE-BSD FRML MDRD: 21 ML/MIN/1.73M2
GLUCOSE SERPL-MCNC: 134 MG/DL (ref 74–99)
HCT VFR BLD AUTO: 36.4 % (ref 37–54)
HGB BLD-MCNC: 12.2 G/DL (ref 12.5–16.5)
MCH RBC QN AUTO: 27.1 PG (ref 26–35)
MCHC RBC AUTO-ENTMCNC: 33.5 G/DL (ref 32–34.5)
MCV RBC AUTO: 80.7 FL (ref 80–99.9)
METER GLUCOSE: 123 MG/DL (ref 74–99)
METER GLUCOSE: 227 MG/DL (ref 74–99)
PLATELET # BLD AUTO: 168 K/UL (ref 130–450)
PMV BLD AUTO: 11.9 FL (ref 7–12)
POTASSIUM SERPL-SCNC: 4.5 MMOL/L (ref 3.5–5)
RBC # BLD AUTO: 4.51 M/UL (ref 3.8–5.8)
SODIUM SERPL-SCNC: 137 MMOL/L (ref 132–146)
WBC OTHER # BLD: 6.7 K/UL (ref 4.5–11.5)

## 2023-07-31 PROCEDURE — 6370000000 HC RX 637 (ALT 250 FOR IP)

## 2023-07-31 PROCEDURE — 82947 ASSAY GLUCOSE BLOOD QUANT: CPT

## 2023-07-31 PROCEDURE — 85027 COMPLETE CBC AUTOMATED: CPT

## 2023-07-31 PROCEDURE — 36415 COLL VENOUS BLD VENIPUNCTURE: CPT

## 2023-07-31 PROCEDURE — 6370000000 HC RX 637 (ALT 250 FOR IP): Performed by: FAMILY MEDICINE

## 2023-07-31 PROCEDURE — 51798 US URINE CAPACITY MEASURE: CPT

## 2023-07-31 PROCEDURE — S5553 INSULIN LONG ACTING 5 U: HCPCS | Performed by: FAMILY MEDICINE

## 2023-07-31 PROCEDURE — 2500000003 HC RX 250 WO HCPCS: Performed by: NURSE PRACTITIONER

## 2023-07-31 PROCEDURE — 6370000000 HC RX 637 (ALT 250 FOR IP): Performed by: NURSE PRACTITIONER

## 2023-07-31 PROCEDURE — 2580000003 HC RX 258: Performed by: NURSE PRACTITIONER

## 2023-07-31 PROCEDURE — 80048 BASIC METABOLIC PNL TOTAL CA: CPT

## 2023-07-31 RX ORDER — HYDRALAZINE HYDROCHLORIDE 50 MG/1
50 TABLET, FILM COATED ORAL EVERY 8 HOURS SCHEDULED
Status: DISCONTINUED | OUTPATIENT
Start: 2023-07-31 | End: 2023-07-31 | Stop reason: HOSPADM

## 2023-07-31 RX ADMIN — HYDRALAZINE HYDROCHLORIDE 25 MG: 25 TABLET, FILM COATED ORAL at 06:09

## 2023-07-31 RX ADMIN — APIXABAN 5 MG: 5 TABLET, FILM COATED ORAL at 10:01

## 2023-07-31 RX ADMIN — ATORVASTATIN CALCIUM 10 MG: 20 TABLET, FILM COATED ORAL at 10:01

## 2023-07-31 RX ADMIN — INSULIN LISPRO 1 UNITS: 100 INJECTION, SOLUTION INTRAVENOUS; SUBCUTANEOUS at 12:06

## 2023-07-31 RX ADMIN — CARVEDILOL 12.5 MG: 6.25 TABLET, FILM COATED ORAL at 11:06

## 2023-07-31 RX ADMIN — ASPIRIN 81 MG: 81 TABLET, COATED ORAL at 10:01

## 2023-07-31 RX ADMIN — AMLODIPINE BESYLATE 10 MG: 10 TABLET ORAL at 10:00

## 2023-07-31 RX ADMIN — HYDRALAZINE HYDROCHLORIDE 50 MG: 50 TABLET, FILM COATED ORAL at 14:25

## 2023-07-31 RX ADMIN — SODIUM BICARBONATE: 84 INJECTION, SOLUTION INTRAVENOUS at 06:09

## 2023-07-31 RX ADMIN — INSULIN GLARGINE-YFGN 26 UNITS: 100 INJECTION, SOLUTION SUBCUTANEOUS at 10:02

## 2023-07-31 NOTE — DISCHARGE SUMMARY
Hospitalist Discharge Summary    Patient ID: Andre Diamond   Patient : 1950  Patient's PCP: Mik Doherty DO    Admit Date: 2023   Admitting Physician: Verna Mayo DO    Discharge Date:  2023   Discharge Physician: Harinder Francis MD   Discharge Condition: Stable  Discharge Disposition: [facility] course in brief:  (Please refer to daily progress notes for a comprehensive review of the hospitalization by requesting medical records)    Patient admitted on 2023 for abnormal labs. Patient presented to the emergency department after receiving abnormal lab reports. Renal function has worsened. Patient comes from [place]. Patient also reporting some abdominal pain. Vital signs show the patient be hypertensive pressure 214/101. Meinders vital signs within normal limits and stable. Laboratory studies demonstrate BUN 28, creatinine 3.6, glucose 206. CT abdomen pelvis was unremarkable. Patient was given IV hydralazine for his blood pressure in the emergency department. Medicine consulted for admission. Patient was started on IV fluids by nephrology with some improvement in her creatinine. Patient alert awake oriented and denies any active complaint on the morning of . If cleared by nephrology, will discharge patient home on . Consults:   IP CONSULT TO NEPHROLOGY    Discharge Diagnoses:  ASSESSMENT:  -Acute on chronic renal failure, improving  -Poorly controlled hypertension  -History of atrial fibrillation, rate well controlled  -Diabetes mellitus  -Hyperlipidemia        PLAN:  -Admit to medicine  -Consult nephrology, follow recommendations  Continue IV fluids as per nephrology recommendations  Poorly controlled blood pressure, will discuss with nephrology  -Monitor renal function avoid nephrotoxic medications  -Continue home medications      Discharge Instructions / Follow up:    No future appointments.     Continued appropriate risk factor modification of blood

## 2023-07-31 NOTE — CARE COORDINATION
7/31. Met with the pt to discuss transition of care. The pt lives with his SO, Elias Gordon, and is independent. He is a pt of Dr Donny Raymond, at the [facility] clinic. He will return home when medically stable. BUN 25 Cr 3.1. Will follow. Shirin Jeffrey RN    Case Management Assessment  Initial Evaluation    Date/Time of Evaluation: 7/31/2023 12:55 PM  Assessment Completed by: Shirin Jeffrey RN    If patient is discharged prior to next notation, then this note serves as note for discharge by case management. Patient Name: Andre Daniels                   YOB: 1950  Diagnosis: Acute on chronic renal insufficiency [N28.9, N18.9]  Abdominal pain, unspecified abdominal location [R10.9]  Hypertension, unspecified type [I10]                   Date / Time: 7/29/2023  1:37 AM    Patient Admission Status: Inpatient   Readmission Risk (Low < 19, Mod (19-27), High > 27): Readmission Risk Score: 14.3    Current PCP: Williams Mcguire, DO  PCP verified by CM? Yes (Dr Donny Raymond)    Chart Reviewed: Yes      History Provided by: Patient  Patient Orientation: Alert and Oriented    Patient Cognition: Alert    Hospitalization in the last 30 days (Readmission):  No    If yes, Readmission Assessment in CM Navigator will be completed. Advance Directives:      Code Status: Full Code   Patient's Primary Decision Maker is: Named in [address] [place] [address]    Primary Decision MakerDoris Spotted - Brother/Sister - [phone]    Discharge Planning:    Patient lives with: Spouse/Significant Other Type of Home: House  Primary Care Giver: Self  Patient Support Systems include: Spouse/Significant Other   Current Financial resources:    Current community resources:    [facility]            Current DME:              Type of Home Care services:  None    ADLS  Prior functional level: Independent in ADLs/IADLs  Current functional level:  Independent in ADLs/IADLs    PT AM-PAC:   /24  OT AM-PAC:

## 2023-08-01 ENCOUNTER — CARE COORDINATION (OUTPATIENT)
Dept: CASE MANAGEMENT | Age: 73
End: 2023-08-01

## 2023-08-01 NOTE — CARE COORDINATION
Non MH PCP Care Transitions Outreach Attempt    Call within 2 business days of discharge: Yes     Attempted to reach the patient for initial Care Transition call post hospital discharge. Pt's daughter, Shannon Kirby, answered stating pt is not home. She took down this CTN's contact information and will ask that he call this CTN back. Addendum: 23 @5098  -Pt called back and left a VM  -CTN returned the pt's call, however, he states he is at the store right now and can't talk.  -Pt requested CTN to call him tomorrow.  -Will attempt to reach pt again tomorrow. Patient: Andre Parish Ards Patient : 1950 MRN: [id number]    Last Discharge [address]       Date Complaint Diagnosis Description Type Department Provider    23 Abnormal Lab Abdominal pain, unspecified abdominal location . .. ED to Hosp-Admission (Discharged) (ADMITTED) SEYZ 5WE Ignacia Marrufo MD; Arnold Garcia,... Was this an external facility discharge? No     Noted following upcoming appointments from discharge chart review:   [facility] follow up appointment(s): No future appointments.   Non-[facility] follow up appointment(s):

## 2023-08-02 ENCOUNTER — CARE COORDINATION (OUTPATIENT)
Dept: CASE MANAGEMENT | Age: 73
End: 2023-08-02

## 2023-08-02 NOTE — CARE COORDINATION
[address] [address] [facility] [address] [address] [facility] PCP Care Transitions Initial Follow Up Call    Call within 2 business days of discharge: Yes    Patient Current Location:  Home: [address] [place] [address]    Care Transition Nurse contacted the patient by telephone to perform post hospital discharge assessment. Provided introduction to self, and explanation of the Care Transition Nurse role. Patient: Andre Ponce Patient : 1950   MRN: [id number]  Reason for Admission: Abd pain  Discharge Date: 23 RARS: Readmission Risk Score: 14      Last Discharge [address] [facility]       Date Complaint Diagnosis Description Type Department Provider    23 Abnormal Lab Abdominal pain, unspecified abdominal location . .. ED to Hosp-Admission (Discharged) (ADMITTED) YENIFER 5WE Ulisses Newell MD; Barbara Cordova,... Was this an external facility discharge? No     Challenges to be reviewed by the provider   Additional needs identified to be addressed with provider: No  none               Method of communication with provider: none. CTN called and spoke with the pt for an initial care transition call post hospital discharge. Pt admitted with abd pain and abnormal lab (elevated creatinine). Pt also noted to have poorly controlled BP. Pt states that he is doing okay and offered no complaints today. Pt denies any abd pain, cramping, n/v, diarrhea/constipation, or bloody/dark stools. Pt reports to normal bowel patterns. Pt states that he is monitoring his BP readings at home and reports that his SBPs are ranging around 150. He keeps a log of readings and was advised to bring log to his f/u appt with his pcp. Pt voiced understanding. CTN reviewed the pt's medications in full. Updated med list per pt's list from the [place] he was reading. CTN encouraged pt to bring his AVS with med list to his f/u with his pcp tomorrow to review. Pt not started on any new rxs at discharge.  Pt states that he was planning on calling Dr. Sophia Knowles office today

## 2023-10-17 ENCOUNTER — TELEPHONE (OUTPATIENT)
Dept: HEMATOLOGY | Age: 73
End: 2023-10-17

## 2023-10-17 NOTE — TELEPHONE ENCOUNTER
MRI abdomen. Scheduled [facility] 11/9/23, arrive 830am, scan 9am, NPO 4-6 hours. Called and spoke to patient regarding his scheduled scan. He confirmed appt date, time, and location. Reviewed instructions for scan and he expressed understanding. A follow up appt was scheduled with Dr Aman Bobo at the [facility] office.

## 2023-11-09 ENCOUNTER — HOSPITAL ENCOUNTER (OUTPATIENT)
Dept: MRI IMAGING | Age: 73
Discharge: HOME | End: 2023-11-11
Attending: TRANSPLANT SURGERY
Payer: OTHER GOVERNMENT

## 2023-11-09 DIAGNOSIS — K86.2 PANCREATIC CYST: ICD-10-CM

## 2023-11-09 DIAGNOSIS — D49.0 IPMN (INTRADUCTAL PAPILLARY MUCINOUS NEOPLASM): ICD-10-CM

## 2023-11-09 PROCEDURE — 74181 MRI ABDOMEN W/O CONTRAST: CPT

## 2023-11-16 ENCOUNTER — OFFICE VISIT (OUTPATIENT)
Dept: HEMATOLOGY | Age: 73
End: 2023-11-16
Payer: MEDICARE

## 2023-11-16 VITALS
TEMPERATURE: 97.8 F | HEIGHT: 74 IN | RESPIRATION RATE: 15 BRPM | HEART RATE: 56 BPM | DIASTOLIC BLOOD PRESSURE: 93 MMHG | WEIGHT: 202 LBS | OXYGEN SATURATION: 98 % | SYSTOLIC BLOOD PRESSURE: 177 MMHG | BODY MASS INDEX: 25.93 KG/M2

## 2023-11-16 DIAGNOSIS — K86.3 PANCREATIC PSEUDOCYST: Primary | ICD-10-CM

## 2023-11-16 PROCEDURE — G8484 FLU IMMUNIZE NO ADMIN: HCPCS | Performed by: CLINICAL NURSE SPECIALIST

## 2023-11-16 PROCEDURE — 3080F DIAST BP >= 90 MM HG: CPT | Performed by: CLINICAL NURSE SPECIALIST

## 2023-11-16 PROCEDURE — G8427 DOCREV CUR MEDS BY ELIG CLIN: HCPCS | Performed by: CLINICAL NURSE SPECIALIST

## 2023-11-16 PROCEDURE — 3017F COLORECTAL CA SCREEN DOC REV: CPT | Performed by: CLINICAL NURSE SPECIALIST

## 2023-11-16 PROCEDURE — 99212 OFFICE O/P EST SF 10 MIN: CPT | Performed by: CLINICAL NURSE SPECIALIST

## 2023-11-16 PROCEDURE — 99213 OFFICE O/P EST LOW 20 MIN: CPT | Performed by: CLINICAL NURSE SPECIALIST

## 2023-11-16 PROCEDURE — G8419 CALC BMI OUT NRM PARAM NOF/U: HCPCS | Performed by: CLINICAL NURSE SPECIALIST

## 2023-11-16 PROCEDURE — 1036F TOBACCO NON-USER: CPT | Performed by: CLINICAL NURSE SPECIALIST

## 2023-11-16 PROCEDURE — 1123F ACP DISCUSS/DSCN MKR DOCD: CPT | Performed by: CLINICAL NURSE SPECIALIST

## 2023-11-16 PROCEDURE — 3077F SYST BP >= 140 MM HG: CPT | Performed by: CLINICAL NURSE SPECIALIST

## 2023-11-16 NOTE — PROGRESS NOTES
in Mr. Brown's care.     Case discussed with and plan per Dr Delicia Rojo      Please send a copy of my note to Dr. Josef Fong with the [facility]    Electronically signed by GURPREET Da Silva CNP on 11/16/2023 at 3:31 PM

## 2024-03-11 ENCOUNTER — APPOINTMENT (OUTPATIENT)
Dept: GENERAL RADIOLOGY | Age: 74
DRG: 637 | End: 2024-03-11
Payer: OTHER GOVERNMENT

## 2024-03-11 ENCOUNTER — HOSPITAL ENCOUNTER (INPATIENT)
Age: 74
LOS: 7 days | Discharge: LEFT BEFORE DISCHARGE | DRG: 637 | End: 2024-03-19
Attending: EMERGENCY MEDICINE | Admitting: INTERNAL MEDICINE
Payer: OTHER GOVERNMENT

## 2024-03-11 DIAGNOSIS — N18.9 ACUTE RENAL FAILURE SUPERIMPOSED ON CHRONIC KIDNEY DISEASE, UNSPECIFIED ACUTE RENAL FAILURE TYPE, UNSPECIFIED CKD STAGE (HCC): ICD-10-CM

## 2024-03-11 DIAGNOSIS — N28.9 ACUTE ON CHRONIC RENAL INSUFFICIENCY: ICD-10-CM

## 2024-03-11 DIAGNOSIS — E11.10 DIABETIC KETOACIDOSIS WITHOUT COMA ASSOCIATED WITH TYPE 2 DIABETES MELLITUS (HCC): ICD-10-CM

## 2024-03-11 DIAGNOSIS — N18.9 ACUTE ON CHRONIC RENAL INSUFFICIENCY: ICD-10-CM

## 2024-03-11 DIAGNOSIS — N17.9 ACUTE RENAL FAILURE, UNSPECIFIED ACUTE RENAL FAILURE TYPE (HCC): ICD-10-CM

## 2024-03-11 DIAGNOSIS — E87.5 HYPERKALEMIA: ICD-10-CM

## 2024-03-11 DIAGNOSIS — N17.9 ACUTE RENAL FAILURE SUPERIMPOSED ON CHRONIC KIDNEY DISEASE, UNSPECIFIED ACUTE RENAL FAILURE TYPE, UNSPECIFIED CKD STAGE (HCC): ICD-10-CM

## 2024-03-11 DIAGNOSIS — R06.02 SHORTNESS OF BREATH: Primary | ICD-10-CM

## 2024-03-11 LAB
ALBUMIN SERPL-MCNC: 3.9 G/DL (ref 3.5–5.2)
ALP SERPL-CCNC: 123 U/L (ref 40–129)
ALT SERPL-CCNC: 21 U/L (ref 0–40)
ANION GAP SERPL CALCULATED.3IONS-SCNC: 19 MMOL/L (ref 7–16)
AST SERPL-CCNC: 23 U/L (ref 0–39)
BASOPHILS # BLD: 0 K/UL (ref 0–0.2)
BASOPHILS NFR BLD: 0 % (ref 0–2)
BILIRUB SERPL-MCNC: 0.6 MG/DL (ref 0–1.2)
BNP SERPL-MCNC: ABNORMAL PG/ML (ref 0–450)
BUN SERPL-MCNC: 80 MG/DL (ref 6–23)
CALCIUM SERPL-MCNC: 8.7 MG/DL (ref 8.6–10.2)
CHLORIDE SERPL-SCNC: 94 MMOL/L (ref 98–107)
CO2 SERPL-SCNC: 9 MMOL/L (ref 22–29)
CREAT SERPL-MCNC: 5.6 MG/DL (ref 0.7–1.2)
EOSINOPHIL # BLD: 0 K/UL (ref 0.05–0.5)
EOSINOPHILS RELATIVE PERCENT: 0 % (ref 0–6)
ERYTHROCYTE [DISTWIDTH] IN BLOOD BY AUTOMATED COUNT: 14.8 % (ref 11.5–15)
GFR SERPL CREATININE-BSD FRML MDRD: 10 ML/MIN/1.73M2
GLUCOSE SERPL-MCNC: 574 MG/DL (ref 74–99)
HCT VFR BLD AUTO: 29.6 % (ref 37–54)
HGB BLD-MCNC: 10.1 G/DL (ref 12.5–16.5)
LYMPHOCYTES NFR BLD: 0.62 K/UL (ref 1.5–4)
LYMPHOCYTES RELATIVE PERCENT: 5 % (ref 20–42)
MCH RBC QN AUTO: 28.5 PG (ref 26–35)
MCHC RBC AUTO-ENTMCNC: 34.1 G/DL (ref 32–34.5)
MCV RBC AUTO: 83.4 FL (ref 80–99.9)
MONOCYTES NFR BLD: 0.72 K/UL (ref 0.1–0.95)
MONOCYTES NFR BLD: 6 % (ref 2–12)
MYELOCYTES ABSOLUTE COUNT: 0.1 K/UL
MYELOCYTES: 1 %
NEUTROPHILS NFR BLD: 88 % (ref 43–80)
NEUTS SEG NFR BLD: 10.35 K/UL (ref 1.8–7.3)
PLATELET # BLD AUTO: 199 K/UL (ref 130–450)
PMV BLD AUTO: 11.7 FL (ref 7–12)
POTASSIUM SERPL-SCNC: 5.7 MMOL/L (ref 3.5–5)
POTASSIUM SERPL-SCNC: 6.4 MMOL/L (ref 3.5–5)
PROT SERPL-MCNC: 7.3 G/DL (ref 6.4–8.3)
RBC # BLD AUTO: 3.55 M/UL (ref 3.8–5.8)
RBC # BLD: ABNORMAL 10*6/UL
SODIUM SERPL-SCNC: 122 MMOL/L (ref 132–146)
TROPONIN I SERPL HS-MCNC: NORMAL NG/L (ref 0–22)
TROPONIN INTERP: NORMAL
TROPONIN T SERPL-MCNC: NORMAL NG/ML
WBC OTHER # BLD: 11.8 K/UL (ref 4.5–11.5)

## 2024-03-11 PROCEDURE — 99285 EMERGENCY DEPT VISIT HI MDM: CPT

## 2024-03-11 PROCEDURE — 93005 ELECTROCARDIOGRAM TRACING: CPT | Performed by: EMERGENCY MEDICINE

## 2024-03-11 PROCEDURE — 84484 ASSAY OF TROPONIN QUANT: CPT

## 2024-03-11 PROCEDURE — 36556 INSERT NON-TUNNEL CV CATH: CPT

## 2024-03-11 PROCEDURE — 83880 ASSAY OF NATRIURETIC PEPTIDE: CPT

## 2024-03-11 PROCEDURE — 71045 X-RAY EXAM CHEST 1 VIEW: CPT

## 2024-03-11 PROCEDURE — 83036 HEMOGLOBIN GLYCOSYLATED A1C: CPT

## 2024-03-11 PROCEDURE — 85025 COMPLETE CBC W/AUTO DIFF WBC: CPT

## 2024-03-11 PROCEDURE — 80053 COMPREHEN METABOLIC PANEL: CPT

## 2024-03-11 PROCEDURE — 84132 ASSAY OF SERUM POTASSIUM: CPT

## 2024-03-11 PROCEDURE — 82010 KETONE BODYS QUAN: CPT

## 2024-03-11 RX ORDER — CALCIUM GLUCONATE 10 MG/ML
1000 INJECTION, SOLUTION INTRAVENOUS ONCE
Status: COMPLETED | OUTPATIENT
Start: 2024-03-11 | End: 2024-03-12

## 2024-03-11 RX ORDER — DEXTROSE AND SODIUM CHLORIDE 5; .45 G/100ML; G/100ML
INJECTION, SOLUTION INTRAVENOUS CONTINUOUS PRN
Status: DISCONTINUED | OUTPATIENT
Start: 2024-03-11 | End: 2024-03-11

## 2024-03-11 RX ORDER — 0.9 % SODIUM CHLORIDE 0.9 %
1000 INTRAVENOUS SOLUTION INTRAVENOUS ONCE
Status: COMPLETED | OUTPATIENT
Start: 2024-03-11 | End: 2024-03-12

## 2024-03-11 RX ORDER — SODIUM CHLORIDE 9 MG/ML
INJECTION, SOLUTION INTRAVENOUS CONTINUOUS
Status: DISCONTINUED | OUTPATIENT
Start: 2024-03-12 | End: 2024-03-11

## 2024-03-11 RX ORDER — POTASSIUM CHLORIDE 7.45 MG/ML
10 INJECTION INTRAVENOUS PRN
Status: DISCONTINUED | OUTPATIENT
Start: 2024-03-11 | End: 2024-03-11 | Stop reason: ALTCHOICE

## 2024-03-11 RX ORDER — MAGNESIUM SULFATE IN WATER 40 MG/ML
2000 INJECTION, SOLUTION INTRAVENOUS PRN
Status: DISCONTINUED | OUTPATIENT
Start: 2024-03-11 | End: 2024-03-11 | Stop reason: ALTCHOICE

## 2024-03-11 ASSESSMENT — PAIN DESCRIPTION - ORIENTATION: ORIENTATION: LOWER

## 2024-03-11 ASSESSMENT — PAIN - FUNCTIONAL ASSESSMENT: PAIN_FUNCTIONAL_ASSESSMENT: 0-10

## 2024-03-11 ASSESSMENT — PAIN DESCRIPTION - LOCATION: LOCATION: ABDOMEN

## 2024-03-11 ASSESSMENT — PAIN SCALES - GENERAL: PAINLEVEL_OUTOF10: 5

## 2024-03-11 ASSESSMENT — PAIN DESCRIPTION - DESCRIPTORS: DESCRIPTORS: NUMBNESS

## 2024-03-12 ENCOUNTER — APPOINTMENT (OUTPATIENT)
Age: 74
DRG: 637 | End: 2024-03-12
Payer: OTHER GOVERNMENT

## 2024-03-12 ENCOUNTER — APPOINTMENT (OUTPATIENT)
Dept: GENERAL RADIOLOGY | Age: 74
DRG: 637 | End: 2024-03-12
Payer: OTHER GOVERNMENT

## 2024-03-12 ENCOUNTER — ANESTHESIA (OUTPATIENT)
Dept: ICU | Age: 74
End: 2024-03-12
Payer: OTHER GOVERNMENT

## 2024-03-12 ENCOUNTER — ANESTHESIA EVENT (OUTPATIENT)
Dept: ICU | Age: 74
End: 2024-03-12
Payer: OTHER GOVERNMENT

## 2024-03-12 PROBLEM — E08.10 DIABETIC KETOACIDOSIS WITHOUT COMA ASSOCIATED WITH DIABETES MELLITUS DUE TO UNDERLYING CONDITION (HCC): Status: ACTIVE | Noted: 2024-03-12

## 2024-03-12 PROBLEM — R06.02 SHORTNESS OF BREATH: Status: ACTIVE | Noted: 2024-03-12

## 2024-03-12 PROBLEM — E11.10 DKA, TYPE 2, NOT AT GOAL (HCC): Status: ACTIVE | Noted: 2024-03-12

## 2024-03-12 LAB
AADO2: 257.1 MMHG
AADO2: 599.4 MMHG
AADO2: 599.4 MMHG
AMORPH SED URNS QL MICRO: PRESENT
AMPHET UR QL SCN: NEGATIVE
ANION GAP SERPL CALCULATED.3IONS-SCNC: 15 MMOL/L (ref 7–16)
ANION GAP SERPL CALCULATED.3IONS-SCNC: 16 MMOL/L (ref 7–16)
ANION GAP SERPL CALCULATED.3IONS-SCNC: 16 MMOL/L (ref 7–16)
ANION GAP SERPL CALCULATED.3IONS-SCNC: 17 MMOL/L (ref 7–16)
ANION GAP SERPL CALCULATED.3IONS-SCNC: 17 MMOL/L (ref 7–16)
ANION GAP SERPL CALCULATED.3IONS-SCNC: 30 MMOL/L (ref 7–16)
B PARAP IS1001 DNA NPH QL NAA+NON-PROBE: NOT DETECTED
B PERT DNA SPEC QL NAA+PROBE: NOT DETECTED
B-OH-BUTYR SERPL-MCNC: 0.49 MMOL/L (ref 0.02–0.27)
B.E.: -10.7 MMOL/L (ref -3–3)
B.E.: -13 MMOL/L (ref -3–3)
B.E.: -13 MMOL/L (ref -3–3)
BARBITURATES UR QL SCN: NEGATIVE
BENZODIAZ UR QL: NEGATIVE
BILIRUB UR QL STRIP: NEGATIVE
BNP SERPL-MCNC: ABNORMAL PG/ML (ref 0–450)
BUN BLD-MCNC: 79 MG/DL (ref 6–23)
BUN SERPL-MCNC: 80 MG/DL (ref 6–23)
BUN SERPL-MCNC: 83 MG/DL (ref 6–23)
BUN SERPL-MCNC: 85 MG/DL (ref 6–23)
BUN SERPL-MCNC: 85 MG/DL (ref 6–23)
BUN SERPL-MCNC: 86 MG/DL (ref 6–23)
BUN SERPL-MCNC: 86 MG/DL (ref 6–23)
BUPRENORPHINE UR QL: NEGATIVE
C PNEUM DNA NPH QL NAA+NON-PROBE: NOT DETECTED
CALCIUM SERPL-MCNC: 8.4 MG/DL (ref 8.6–10.2)
CALCIUM SERPL-MCNC: 8.5 MG/DL (ref 8.6–10.2)
CALCIUM SERPL-MCNC: 8.6 MG/DL (ref 8.6–10.2)
CALCIUM SERPL-MCNC: 8.8 MG/DL (ref 8.6–10.2)
CALCIUM SERPL-MCNC: 8.9 MG/DL (ref 8.6–10.2)
CALCIUM SERPL-MCNC: 9 MG/DL (ref 8.6–10.2)
CANNABINOIDS UR QL SCN: NEGATIVE
CHLORIDE BLD-SCNC: 104 MMOL/L (ref 100–108)
CHLORIDE SERPL-SCNC: 102 MMOL/L (ref 98–107)
CHLORIDE SERPL-SCNC: 105 MMOL/L (ref 98–107)
CHLORIDE SERPL-SCNC: 106 MMOL/L (ref 98–107)
CHLORIDE SERPL-SCNC: 98 MMOL/L (ref 98–107)
CLARITY UR: CLEAR
CO2 BLD CALC-SCNC: 13 MMOL/L (ref 22–29)
CO2 SERPL-SCNC: 12 MMOL/L (ref 22–29)
CO2 SERPL-SCNC: 12 MMOL/L (ref 22–29)
CO2 SERPL-SCNC: 13 MMOL/L (ref 22–29)
CO2 SERPL-SCNC: 14 MMOL/L (ref 22–29)
CO2 SERPL-SCNC: 15 MMOL/L (ref 22–29)
CO2 SERPL-SCNC: 16 MMOL/L (ref 22–29)
COCAINE UR QL SCN: NEGATIVE
COHB: 0.3 % (ref 0–1.5)
COHB: 0.4 % (ref 0–1.5)
COHB: 0.4 % (ref 0–1.5)
COLOR UR: YELLOW
CREAT BLD-MCNC: 6.2 MG/DL (ref 0.7–1.2)
CREAT SERPL-MCNC: 5.7 MG/DL (ref 0.7–1.2)
CREAT SERPL-MCNC: 5.8 MG/DL (ref 0.7–1.2)
CREAT SERPL-MCNC: 5.9 MG/DL (ref 0.7–1.2)
CREAT SERPL-MCNC: 6 MG/DL (ref 0.7–1.2)
CREAT UR-MCNC: 69.3 MG/DL (ref 40–278)
CRITICAL: ABNORMAL
DATE ANALYZED: ABNORMAL
DATE OF COLLECTION: ABNORMAL
ECHO AO ASC DIAM: 3.3 CM
ECHO AO ASCENDING AORTA INDEX: 1.51 CM/M2
ECHO AV AREA PEAK VELOCITY: 2.2 CM2
ECHO AV AREA VTI: 2.1 CM2
ECHO AV AREA/BSA PEAK VELOCITY: 1 CM2/M2
ECHO AV AREA/BSA VTI: 1 CM2/M2
ECHO AV CUSP MM: 2.1 CM
ECHO AV MEAN GRADIENT: 8 MMHG
ECHO AV MEAN VELOCITY: 1.3 M/S
ECHO AV PEAK GRADIENT: 11 MMHG
ECHO AV PEAK VELOCITY: 1.7 M/S
ECHO AV VELOCITY RATIO: 0.71
ECHO AV VTI: 38.8 CM
ECHO BSA: 2.2 M2
ECHO EST RA PRESSURE: 8 MMHG
ECHO LA DIAMETER INDEX: 1.96 CM/M2
ECHO LA DIAMETER: 4.3 CM
ECHO LA VOL A-L A2C: 84 ML (ref 18–58)
ECHO LA VOL A-L A4C: 77 ML (ref 18–58)
ECHO LA VOL MOD A2C: 79 ML (ref 18–58)
ECHO LA VOL MOD A4C: 73 ML (ref 18–58)
ECHO LA VOLUME AREA LENGTH: 83 ML
ECHO LA VOLUME INDEX A-L A2C: 38 ML/M2 (ref 16–34)
ECHO LA VOLUME INDEX A-L A4C: 35 ML/M2 (ref 16–34)
ECHO LA VOLUME INDEX AREA LENGTH: 38 ML/M2 (ref 16–34)
ECHO LA VOLUME INDEX MOD A2C: 36 ML/M2 (ref 16–34)
ECHO LA VOLUME INDEX MOD A4C: 33 ML/M2 (ref 16–34)
ECHO LV FRACTIONAL SHORTENING: 30 % (ref 28–44)
ECHO LV INTERNAL DIMENSION DIASTOLE INDEX: 2.01 CM/M2
ECHO LV INTERNAL DIMENSION DIASTOLIC: 4.4 CM (ref 4.2–5.9)
ECHO LV INTERNAL DIMENSION SYSTOLIC INDEX: 1.42 CM/M2
ECHO LV INTERNAL DIMENSION SYSTOLIC: 3.1 CM
ECHO LV IVSD: 0.9 CM (ref 0.6–1)
ECHO LV IVSS: 1.1 CM
ECHO LV MASS 2D: 137.8 G (ref 88–224)
ECHO LV MASS INDEX 2D: 62.9 G/M2 (ref 49–115)
ECHO LV POSTERIOR WALL DIASTOLIC: 1 CM (ref 0.6–1)
ECHO LV POSTERIOR WALL SYSTOLIC: 1.3 CM
ECHO LV RELATIVE WALL THICKNESS RATIO: 0.45
ECHO LVOT AREA: 3.1 CM2
ECHO LVOT AV VTI INDEX: 0.66
ECHO LVOT DIAM: 2 CM
ECHO LVOT MEAN GRADIENT: 4 MMHG
ECHO LVOT PEAK GRADIENT: 5 MMHG
ECHO LVOT PEAK VELOCITY: 1.2 M/S
ECHO LVOT STROKE VOLUME INDEX: 36.7 ML/M2
ECHO LVOT SV: 80.4 ML
ECHO LVOT VTI: 25.6 CM
ECHO MV "A" WAVE DURATION: 110.7 MSEC
ECHO MV A VELOCITY: 0.45 M/S
ECHO MV AREA PHT: 5.4 CM2
ECHO MV AREA VTI: 3.5 CM2
ECHO MV E DECELERATION TIME (DT): 114.3 MS
ECHO MV E VELOCITY: 1.17 M/S
ECHO MV E/A RATIO: 2.6
ECHO MV LVOT VTI INDEX: 0.9
ECHO MV MAX VELOCITY: 1.4 M/S
ECHO MV MEAN GRADIENT: 2 MMHG
ECHO MV MEAN VELOCITY: 0.7 M/S
ECHO MV PEAK GRADIENT: 8 MMHG
ECHO MV PRESSURE HALF TIME (PHT): 40.8 MS
ECHO MV VTI: 23.1 CM
ECHO PULMONARY ARTERY END DIASTOLIC PRESSURE: 7 MMHG
ECHO PULMONARY ARTERY SYSTOLIC PRESSURE (PASP): 71 MMHG
ECHO PV MAX VELOCITY: 1.2 M/S
ECHO PV MEAN GRADIENT: 3 MMHG
ECHO PV MEAN VELOCITY: 0.8 M/S
ECHO PV PEAK GRADIENT: 6 MMHG
ECHO PV REGURGITANT MAX VELOCITY: 1.3 M/S
ECHO PV VTI: 26 CM
ECHO RIGHT VENTRICULAR SYSTOLIC PRESSURE (RVSP): 71 MMHG
ECHO RV INTERNAL DIMENSION: 3.6 CM
ECHO TV REGURGITANT MAX VELOCITY: 3.98 M/S
ECHO TV REGURGITANT PEAK GRADIENT: 63 MMHG
EGFR, POC: 9 ML/MIN/1.73M2
FENTANYL UR QL: NEGATIVE
FIO2: 100 %
FIO2: 100 %
FIO2: 50 %
FLUAV RNA NPH QL NAA+NON-PROBE: NOT DETECTED
FLUBV RNA NPH QL NAA+NON-PROBE: NOT DETECTED
GFR SERPL CREATININE-BSD FRML MDRD: 10 ML/MIN/1.73M2
GFR SERPL CREATININE-BSD FRML MDRD: 10 ML/MIN/1.73M2
GFR SERPL CREATININE-BSD FRML MDRD: 9 ML/MIN/1.73M2
GLUCOSE BLD-MCNC: 134 MG/DL (ref 74–99)
GLUCOSE BLD-MCNC: 138 MG/DL (ref 74–99)
GLUCOSE BLD-MCNC: 140 MG/DL (ref 74–99)
GLUCOSE BLD-MCNC: 140 MG/DL (ref 74–99)
GLUCOSE BLD-MCNC: 144 MG/DL (ref 74–99)
GLUCOSE BLD-MCNC: 159 MG/DL (ref 74–99)
GLUCOSE BLD-MCNC: 169 MG/DL (ref 74–99)
GLUCOSE BLD-MCNC: 182 MG/DL (ref 74–99)
GLUCOSE BLD-MCNC: 182 MG/DL (ref 74–99)
GLUCOSE BLD-MCNC: 203 MG/DL (ref 74–99)
GLUCOSE BLD-MCNC: 265 MG/DL (ref 74–99)
GLUCOSE BLD-MCNC: 307 MG/DL (ref 74–99)
GLUCOSE BLD-MCNC: 411 MG/DL (ref 74–99)
GLUCOSE BLD-MCNC: 596 MG/DL (ref 74–99)
GLUCOSE BLD-MCNC: >500 MG/DL (ref 74–99)
GLUCOSE SERPL-MCNC: 126 MG/DL (ref 74–99)
GLUCOSE SERPL-MCNC: 160 MG/DL (ref 74–99)
GLUCOSE SERPL-MCNC: 179 MG/DL (ref 74–99)
GLUCOSE SERPL-MCNC: 183 MG/DL (ref 74–99)
GLUCOSE SERPL-MCNC: 185 MG/DL (ref 74–99)
GLUCOSE SERPL-MCNC: 421 MG/DL (ref 74–99)
GLUCOSE UR STRIP-MCNC: >=1000 MG/DL
HADV DNA NPH QL NAA+NON-PROBE: NOT DETECTED
HBA1C MFR BLD: 6.6 % (ref 4–5.6)
HCO3: 12.4 MMOL/L (ref 22–26)
HCO3: 12.4 MMOL/L (ref 22–26)
HCO3: 13.4 MMOL/L (ref 22–26)
HCOV 229E RNA NPH QL NAA+NON-PROBE: NOT DETECTED
HCOV HKU1 RNA NPH QL NAA+NON-PROBE: NOT DETECTED
HCOV NL63 RNA NPH QL NAA+NON-PROBE: NOT DETECTED
HCOV OC43 RNA NPH QL NAA+NON-PROBE: NOT DETECTED
HGB UR QL STRIP.AUTO: NEGATIVE
HHB: 10.4 % (ref 0–5)
HHB: 10.4 % (ref 0–5)
HHB: 9.7 % (ref 0–5)
HMPV RNA NPH QL NAA+NON-PROBE: NOT DETECTED
HPIV1 RNA NPH QL NAA+NON-PROBE: NOT DETECTED
HPIV2 RNA NPH QL NAA+NON-PROBE: NOT DETECTED
HPIV3 RNA NPH QL NAA+NON-PROBE: NOT DETECTED
HPIV4 RNA NPH QL NAA+NON-PROBE: NOT DETECTED
INR PPP: 1.5
KETONES UR STRIP-MCNC: NEGATIVE MG/DL
LAB: ABNORMAL
LACTATE BLDV-SCNC: 1.1 MMOL/L (ref 0.5–2.2)
LACTATE BLDV-SCNC: 2.3 MMOL/L (ref 0.5–2.2)
LEUKOCYTE ESTERASE UR QL STRIP: NEGATIVE
LIPASE SERPL-CCNC: 16 U/L (ref 13–60)
Lab: 1405
Lab: 1405
Lab: 1739
M PNEUMO DNA NPH QL NAA+NON-PROBE: NOT DETECTED
MAGNESIUM SERPL-MCNC: 2.2 MG/DL (ref 1.6–2.6)
MAGNESIUM SERPL-MCNC: 2.2 MG/DL (ref 1.6–2.6)
MAGNESIUM SERPL-MCNC: 2.3 MG/DL (ref 1.6–2.6)
MAGNESIUM SERPL-MCNC: 2.3 MG/DL (ref 1.6–2.6)
MAGNESIUM SERPL-MCNC: 2.4 MG/DL (ref 1.6–2.6)
METHADONE UR QL: NEGATIVE
METHB: 0.2 % (ref 0–1.5)
MICROORGANISM/AGENT SPEC: ABNORMAL
MODE: ABNORMAL
NITRITE UR QL STRIP: NEGATIVE
O2 CONTENT: 12.1 ML/DL
O2 CONTENT: 12.1 ML/DL
O2 CONTENT: 12.5 ML/DL
O2 SATURATION: 89.5 % (ref 92–98.5)
O2 SATURATION: 89.5 % (ref 92–98.5)
O2 SATURATION: 90.3 % (ref 92–98.5)
O2HB: 89 % (ref 94–97)
O2HB: 89 % (ref 94–97)
O2HB: 89.8 % (ref 94–97)
OPERATOR ID: 5323
OPERATOR ID: 8217
OPERATOR ID: 8217
OPIATES UR QL SCN: NEGATIVE
OXYCODONE UR QL SCN: NEGATIVE
PATIENT TEMP: 37 C
PCO2: 24.7 MMHG (ref 35–45)
PCO2: 27.1 MMHG (ref 35–45)
PCO2: 27.1 MMHG (ref 35–45)
PCP UR QL SCN: NEGATIVE
PEEP/CPAP: 6 CMH2O
PFO2: 0.62 MMHG/%
PFO2: 0.62 MMHG/%
PFO2: 1.18 MMHG/%
PH BLOOD GAS: 7.28 (ref 7.35–7.45)
PH BLOOD GAS: 7.28 (ref 7.35–7.45)
PH BLOOD GAS: 7.35 (ref 7.35–7.45)
PH UR STRIP: 5 [PH] (ref 5–9)
PH VENOUS: 7.25 (ref 7.35–7.45)
PHOSPHATE SERPL-MCNC: 6 MG/DL (ref 2.5–4.5)
PHOSPHATE SERPL-MCNC: 6 MG/DL (ref 2.5–4.5)
PHOSPHATE SERPL-MCNC: 6.2 MG/DL (ref 2.5–4.5)
PHOSPHATE SERPL-MCNC: 6.2 MG/DL (ref 2.5–4.5)
PHOSPHATE SERPL-MCNC: 6.8 MG/DL (ref 2.5–4.5)
PO2: 59.1 MMHG (ref 75–100)
PO2: 61.5 MMHG (ref 75–100)
PO2: 61.5 MMHG (ref 75–100)
POC ANION GAP: 10 MMOL/L (ref 7–16)
POTASSIUM BLD-SCNC: 6 MMOL/L (ref 3.5–5)
POTASSIUM SERPL-SCNC: 4.7 MMOL/L (ref 3.5–5)
POTASSIUM SERPL-SCNC: 4.9 MMOL/L (ref 3.5–5)
POTASSIUM SERPL-SCNC: 5 MMOL/L (ref 3.5–5)
POTASSIUM SERPL-SCNC: 5 MMOL/L (ref 3.5–5)
POTASSIUM SERPL-SCNC: 5.1 MMOL/L (ref 3.5–5)
POTASSIUM SERPL-SCNC: 5.1 MMOL/L (ref 3.5–5)
PROCALCITONIN SERPL-MCNC: 4.85 NG/ML (ref 0–0.08)
PROT UR STRIP-MCNC: 100 MG/DL
PROTHROMBIN TIME: 16.3 SEC (ref 9.3–12.4)
PS: 12 CMH20
RBC #/AREA URNS HPF: ABNORMAL /HPF
RI(T): 4.35
RI(T): 9.75
RI(T): 9.75
RSV RNA NPH QL NAA+NON-PROBE: NOT DETECTED
RV+EV RNA NPH QL NAA+NON-PROBE: NOT DETECTED
SARS-COV-2 RNA NPH QL NAA+NON-PROBE: NOT DETECTED
SODIUM BLD-SCNC: 127 MMOL/L (ref 132–146)
SODIUM SERPL-SCNC: 127 MMOL/L (ref 132–146)
SODIUM SERPL-SCNC: 131 MMOL/L (ref 132–146)
SODIUM SERPL-SCNC: 134 MMOL/L (ref 132–146)
SODIUM SERPL-SCNC: 136 MMOL/L (ref 132–146)
SODIUM SERPL-SCNC: 137 MMOL/L (ref 132–146)
SODIUM SERPL-SCNC: 149 MMOL/L (ref 132–146)
SODIUM UR-SCNC: 23 MMOL/L
SOURCE, BLOOD GAS: ABNORMAL
SP GR UR STRIP: 1.02 (ref 1–1.03)
SPECIMEN DESCRIPTION: ABNORMAL
SPECIMEN DESCRIPTION: NORMAL
TEST INFORMATION: NORMAL
THB: 9.6 G/DL (ref 11.5–16.5)
THB: 9.6 G/DL (ref 11.5–16.5)
THB: 9.9 G/DL (ref 11.5–16.5)
TIME ANALYZED: 1414
TIME ANALYZED: 1414
TIME ANALYZED: 1740
TROPONIN I SERPL HS-MCNC: 77 NG/L (ref 0–11)
TSH SERPL DL<=0.05 MIU/L-ACNC: 1.52 UIU/ML (ref 0.27–4.2)
UROBILINOGEN UR STRIP-ACNC: 0.2 EU/DL (ref 0–1)
WBC #/AREA URNS HPF: ABNORMAL /HPF

## 2024-03-12 PROCEDURE — 74018 RADEX ABDOMEN 1 VIEW: CPT

## 2024-03-12 PROCEDURE — 71045 X-RAY EXAM CHEST 1 VIEW: CPT

## 2024-03-12 PROCEDURE — 99291 CRITICAL CARE FIRST HOUR: CPT | Performed by: INTERNAL MEDICINE

## 2024-03-12 PROCEDURE — 36592 COLLECT BLOOD FROM PICC: CPT

## 2024-03-12 PROCEDURE — 82962 GLUCOSE BLOOD TEST: CPT

## 2024-03-12 PROCEDURE — 94660 CPAP INITIATION&MGMT: CPT

## 2024-03-12 PROCEDURE — 6360000002 HC RX W HCPCS: Performed by: INTERNAL MEDICINE

## 2024-03-12 PROCEDURE — 6370000000 HC RX 637 (ALT 250 FOR IP): Performed by: EMERGENCY MEDICINE

## 2024-03-12 PROCEDURE — 2580000003 HC RX 258

## 2024-03-12 PROCEDURE — 84100 ASSAY OF PHOSPHORUS: CPT

## 2024-03-12 PROCEDURE — 5A09357 ASSISTANCE WITH RESPIRATORY VENTILATION, LESS THAN 24 CONSECUTIVE HOURS, CONTINUOUS POSITIVE AIRWAY PRESSURE: ICD-10-PCS | Performed by: INTERNAL MEDICINE

## 2024-03-12 PROCEDURE — 02HV33Z INSERTION OF INFUSION DEVICE INTO SUPERIOR VENA CAVA, PERCUTANEOUS APPROACH: ICD-10-PCS | Performed by: INTERNAL MEDICINE

## 2024-03-12 PROCEDURE — 82800 BLOOD PH: CPT

## 2024-03-12 PROCEDURE — 84443 ASSAY THYROID STIM HORMONE: CPT

## 2024-03-12 PROCEDURE — 0202U NFCT DS 22 TRGT SARS-COV-2: CPT

## 2024-03-12 PROCEDURE — C9113 INJ PANTOPRAZOLE SODIUM, VIA: HCPCS

## 2024-03-12 PROCEDURE — 2500000003 HC RX 250 WO HCPCS: Performed by: INTERNAL MEDICINE

## 2024-03-12 PROCEDURE — 94664 DEMO&/EVAL PT USE INHALER: CPT

## 2024-03-12 PROCEDURE — 0BH18EZ INSERTION OF ENDOTRACHEAL AIRWAY INTO TRACHEA, VIA NATURAL OR ARTIFICIAL OPENING ENDOSCOPIC: ICD-10-PCS | Performed by: INTERNAL MEDICINE

## 2024-03-12 PROCEDURE — 2000000000 HC ICU R&B

## 2024-03-12 PROCEDURE — 36415 COLL VENOUS BLD VENIPUNCTURE: CPT

## 2024-03-12 PROCEDURE — 84300 ASSAY OF URINE SODIUM: CPT

## 2024-03-12 PROCEDURE — 83735 ASSAY OF MAGNESIUM: CPT

## 2024-03-12 PROCEDURE — 6360000002 HC RX W HCPCS: Performed by: EMERGENCY MEDICINE

## 2024-03-12 PROCEDURE — 80307 DRUG TEST PRSMV CHEM ANLYZR: CPT

## 2024-03-12 PROCEDURE — 6360000002 HC RX W HCPCS

## 2024-03-12 PROCEDURE — 82565 ASSAY OF CREATININE: CPT

## 2024-03-12 PROCEDURE — 87040 BLOOD CULTURE FOR BACTERIA: CPT

## 2024-03-12 PROCEDURE — 2700000000 HC OXYGEN THERAPY PER DAY

## 2024-03-12 PROCEDURE — 2580000003 HC RX 258: Performed by: INTERNAL MEDICINE

## 2024-03-12 PROCEDURE — 82805 BLOOD GASES W/O2 SATURATION: CPT

## 2024-03-12 PROCEDURE — 93306 TTE W/DOPPLER COMPLETE: CPT

## 2024-03-12 PROCEDURE — 2580000003 HC RX 258: Performed by: EMERGENCY MEDICINE

## 2024-03-12 PROCEDURE — 94002 VENT MGMT INPAT INIT DAY: CPT

## 2024-03-12 PROCEDURE — A4216 STERILE WATER/SALINE, 10 ML: HCPCS

## 2024-03-12 PROCEDURE — 6370000000 HC RX 637 (ALT 250 FOR IP)

## 2024-03-12 PROCEDURE — 31500 INSERT EMERGENCY AIRWAY: CPT

## 2024-03-12 PROCEDURE — 6370000000 HC RX 637 (ALT 250 FOR IP): Performed by: INTERNAL MEDICINE

## 2024-03-12 PROCEDURE — 99223 1ST HOSP IP/OBS HIGH 75: CPT

## 2024-03-12 PROCEDURE — 87205 SMEAR GRAM STAIN: CPT

## 2024-03-12 PROCEDURE — 87086 URINE CULTURE/COLONY COUNT: CPT

## 2024-03-12 PROCEDURE — 87081 CULTURE SCREEN ONLY: CPT

## 2024-03-12 PROCEDURE — 80051 ELECTROLYTE PANEL: CPT

## 2024-03-12 PROCEDURE — 84145 PROCALCITONIN (PCT): CPT

## 2024-03-12 PROCEDURE — 80048 BASIC METABOLIC PNL TOTAL CA: CPT

## 2024-03-12 PROCEDURE — 84520 ASSAY OF UREA NITROGEN: CPT

## 2024-03-12 PROCEDURE — 80074 ACUTE HEPATITIS PANEL: CPT

## 2024-03-12 PROCEDURE — 81001 URINALYSIS AUTO W/SCOPE: CPT

## 2024-03-12 PROCEDURE — 83880 ASSAY OF NATRIURETIC PEPTIDE: CPT

## 2024-03-12 PROCEDURE — 5A1945Z RESPIRATORY VENTILATION, 24-96 CONSECUTIVE HOURS: ICD-10-PCS | Performed by: INTERNAL MEDICINE

## 2024-03-12 PROCEDURE — 83690 ASSAY OF LIPASE: CPT

## 2024-03-12 PROCEDURE — 87070 CULTURE OTHR SPECIMN AEROBIC: CPT

## 2024-03-12 PROCEDURE — 83605 ASSAY OF LACTIC ACID: CPT

## 2024-03-12 PROCEDURE — 86317 IMMUNOASSAY INFECTIOUS AGENT: CPT

## 2024-03-12 PROCEDURE — 2500000003 HC RX 250 WO HCPCS: Performed by: EMERGENCY MEDICINE

## 2024-03-12 PROCEDURE — 82947 ASSAY GLUCOSE BLOOD QUANT: CPT

## 2024-03-12 PROCEDURE — 85610 PROTHROMBIN TIME: CPT

## 2024-03-12 PROCEDURE — 82570 ASSAY OF URINE CREATININE: CPT

## 2024-03-12 PROCEDURE — 93306 TTE W/DOPPLER COMPLETE: CPT | Performed by: INTERNAL MEDICINE

## 2024-03-12 RX ORDER — ACETAMINOPHEN 325 MG/1
650 TABLET ORAL EVERY 6 HOURS PRN
Status: DISCONTINUED | OUTPATIENT
Start: 2024-03-12 | End: 2024-03-19 | Stop reason: HOSPADM

## 2024-03-12 RX ORDER — HYDRALAZINE HYDROCHLORIDE 25 MG/1
25 TABLET, FILM COATED ORAL 2 TIMES DAILY
Status: DISCONTINUED | OUTPATIENT
Start: 2024-03-12 | End: 2024-03-19 | Stop reason: HOSPADM

## 2024-03-12 RX ORDER — DEXTROSE AND SODIUM CHLORIDE 5; .45 G/100ML; G/100ML
INJECTION, SOLUTION INTRAVENOUS CONTINUOUS PRN
Status: DISCONTINUED | OUTPATIENT
Start: 2024-03-12 | End: 2024-03-14

## 2024-03-12 RX ORDER — PROPOFOL 10 MG/ML
INJECTION, EMULSION INTRAVENOUS
Status: COMPLETED
Start: 2024-03-12 | End: 2024-03-12

## 2024-03-12 RX ORDER — POTASSIUM CHLORIDE 29.8 MG/ML
20 INJECTION INTRAVENOUS PRN
Status: DISCONTINUED | OUTPATIENT
Start: 2024-03-12 | End: 2024-03-13

## 2024-03-12 RX ORDER — ATORVASTATIN CALCIUM 10 MG/1
10 TABLET, FILM COATED ORAL NIGHTLY
Status: DISCONTINUED | OUTPATIENT
Start: 2024-03-12 | End: 2024-03-19 | Stop reason: HOSPADM

## 2024-03-12 RX ORDER — ASPIRIN 81 MG/1
81 TABLET ORAL DAILY
Status: DISCONTINUED | OUTPATIENT
Start: 2024-03-12 | End: 2024-03-13

## 2024-03-12 RX ORDER — MIDAZOLAM HYDROCHLORIDE 1 MG/ML
1-10 INJECTION, SOLUTION INTRAVENOUS CONTINUOUS
Status: DISCONTINUED | OUTPATIENT
Start: 2024-03-12 | End: 2024-03-13

## 2024-03-12 RX ORDER — BUMETANIDE 0.25 MG/ML
1 INJECTION INTRAMUSCULAR; INTRAVENOUS ONCE
Status: COMPLETED | OUTPATIENT
Start: 2024-03-12 | End: 2024-03-12

## 2024-03-12 RX ORDER — ISOSORBIDE DINITRATE 10 MG/1
20 TABLET ORAL ONCE
Status: COMPLETED | OUTPATIENT
Start: 2024-03-12 | End: 2024-03-12

## 2024-03-12 RX ORDER — FENTANYL CITRATE-0.9 % NACL/PF 10 MCG/ML
25-200 PLASTIC BAG, INJECTION (ML) INTRAVENOUS CONTINUOUS
Status: DISCONTINUED | OUTPATIENT
Start: 2024-03-12 | End: 2024-03-13

## 2024-03-12 RX ORDER — SODIUM CHLORIDE 0.9 % (FLUSH) 0.9 %
SYRINGE (ML) INJECTION
Status: COMPLETED
Start: 2024-03-12 | End: 2024-03-12

## 2024-03-12 RX ORDER — GUAIFENESIN 400 MG/1
400 TABLET ORAL 3 TIMES DAILY
Status: DISCONTINUED | OUTPATIENT
Start: 2024-03-12 | End: 2024-03-13

## 2024-03-12 RX ORDER — IPRATROPIUM BROMIDE AND ALBUTEROL SULFATE 2.5; .5 MG/3ML; MG/3ML
1 SOLUTION RESPIRATORY (INHALATION) EVERY 4 HOURS PRN
Status: DISCONTINUED | OUTPATIENT
Start: 2024-03-12 | End: 2024-03-19 | Stop reason: HOSPADM

## 2024-03-12 RX ORDER — 0.9 % SODIUM CHLORIDE 0.9 %
15 INTRAVENOUS SOLUTION INTRAVENOUS ONCE
Status: DISCONTINUED | OUTPATIENT
Start: 2024-03-12 | End: 2024-03-12

## 2024-03-12 RX ORDER — AMLODIPINE BESYLATE 10 MG/1
10 TABLET ORAL DAILY
Status: DISCONTINUED | OUTPATIENT
Start: 2024-03-12 | End: 2024-03-13

## 2024-03-12 RX ORDER — INSULIN LISPRO 100 [IU]/ML
0-4 INJECTION, SOLUTION INTRAVENOUS; SUBCUTANEOUS EVERY 4 HOURS
Status: DISCONTINUED | OUTPATIENT
Start: 2024-03-12 | End: 2024-03-13

## 2024-03-12 RX ORDER — SODIUM CHLORIDE 9 MG/ML
INJECTION, SOLUTION INTRAVENOUS CONTINUOUS
Status: DISCONTINUED | OUTPATIENT
Start: 2024-03-12 | End: 2024-03-12

## 2024-03-12 RX ORDER — MAGNESIUM SULFATE IN WATER 40 MG/ML
2000 INJECTION, SOLUTION INTRAVENOUS PRN
Status: DISCONTINUED | OUTPATIENT
Start: 2024-03-12 | End: 2024-03-14

## 2024-03-12 RX ORDER — INSULIN GLARGINE 100 [IU]/ML
10 INJECTION, SOLUTION SUBCUTANEOUS DAILY
Status: DISCONTINUED | OUTPATIENT
Start: 2024-03-12 | End: 2024-03-15

## 2024-03-12 RX ADMIN — PROPOFOL 1000 MG: 10 INJECTION, EMULSION INTRAVENOUS at 20:28

## 2024-03-12 RX ADMIN — VANCOMYCIN HYDROCHLORIDE 2000 MG: 10 INJECTION, POWDER, LYOPHILIZED, FOR SOLUTION INTRAVENOUS at 12:36

## 2024-03-12 RX ADMIN — PROPOFOL 200 MG: 10 INJECTION, EMULSION INTRAVENOUS at 20:27

## 2024-03-12 RX ADMIN — GUAIFENESIN 400 MG: 400 TABLET ORAL at 08:33

## 2024-03-12 RX ADMIN — ISOSORBIDE DINITRATE 20 MG: 10 TABLET ORAL at 05:35

## 2024-03-12 RX ADMIN — SODIUM BICARBONATE 50 MEQ: 84 INJECTION INTRAVENOUS at 01:07

## 2024-03-12 RX ADMIN — SODIUM CHLORIDE 1000 ML: 9 INJECTION, SOLUTION INTRAVENOUS at 01:09

## 2024-03-12 RX ADMIN — APIXABAN 5 MG: 5 TABLET, FILM COATED ORAL at 21:24

## 2024-03-12 RX ADMIN — Medication 25 MCG/HR: at 20:21

## 2024-03-12 RX ADMIN — ACETAMINOPHEN 650 MG: 325 TABLET ORAL at 21:24

## 2024-03-12 RX ADMIN — SODIUM BICARBONATE: 84 INJECTION, SOLUTION INTRAVENOUS at 12:47

## 2024-03-12 RX ADMIN — Medication 2 MG/HR: at 20:24

## 2024-03-12 RX ADMIN — METOPROLOL TARTRATE 37.5 MG: 25 TABLET, FILM COATED ORAL at 08:33

## 2024-03-12 RX ADMIN — POTASSIUM CHLORIDE 20 MEQ: 29.8 INJECTION, SOLUTION INTRAVENOUS at 03:40

## 2024-03-12 RX ADMIN — SODIUM CHLORIDE, PRESERVATIVE FREE 10 ML: 5 INJECTION INTRAVENOUS at 20:58

## 2024-03-12 RX ADMIN — INSULIN GLARGINE 10 UNITS: 100 INJECTION, SOLUTION SUBCUTANEOUS at 12:37

## 2024-03-12 RX ADMIN — SODIUM BICARBONATE 50 MEQ: 84 INJECTION INTRAVENOUS at 15:09

## 2024-03-12 RX ADMIN — ASPIRIN 81 MG: 81 TABLET, COATED ORAL at 08:33

## 2024-03-12 RX ADMIN — METOPROLOL TARTRATE 37.5 MG: 25 TABLET, FILM COATED ORAL at 20:53

## 2024-03-12 RX ADMIN — SODIUM CHLORIDE, PRESERVATIVE FREE 40 MG: 5 INJECTION INTRAVENOUS at 08:33

## 2024-03-12 RX ADMIN — PIPERACILLIN AND TAZOBACTAM 4500 MG: 4; .5 INJECTION, POWDER, FOR SOLUTION INTRAVENOUS at 20:58

## 2024-03-12 RX ADMIN — SODIUM CHLORIDE 10.72 UNITS/HR: 9 INJECTION, SOLUTION INTRAVENOUS at 01:25

## 2024-03-12 RX ADMIN — PIPERACILLIN AND TAZOBACTAM 4500 MG: 4; .5 INJECTION, POWDER, FOR SOLUTION INTRAVENOUS at 12:37

## 2024-03-12 RX ADMIN — APIXABAN 5 MG: 5 TABLET, FILM COATED ORAL at 08:33

## 2024-03-12 RX ADMIN — CALCIUM GLUCONATE 1000 MG: 10 INJECTION, SOLUTION INTRAVENOUS at 01:07

## 2024-03-12 RX ADMIN — BUMETANIDE 1 MG: 0.25 INJECTION INTRAMUSCULAR; INTRAVENOUS at 05:09

## 2024-03-12 RX ADMIN — SODIUM CHLORIDE: 9 INJECTION, SOLUTION INTRAVENOUS at 02:25

## 2024-03-12 RX ADMIN — BUMETANIDE 0.5 MG/HR: 0.25 INJECTION INTRAMUSCULAR; INTRAVENOUS at 10:53

## 2024-03-12 RX ADMIN — SODIUM CHLORIDE, PRESERVATIVE FREE 10 ML: 5 INJECTION INTRAVENOUS at 05:09

## 2024-03-12 ASSESSMENT — PAIN SCALES - GENERAL
PAINLEVEL_OUTOF10: 0

## 2024-03-12 ASSESSMENT — PULMONARY FUNCTION TESTS
PIF_VALUE: 13
PIF_VALUE: 14
PIF_VALUE: 50
PIF_VALUE: 20
PIF_VALUE: 23
PIF_VALUE: 21

## 2024-03-12 NOTE — ACP (ADVANCE CARE PLANNING)
Advance Care Planning   Healthcare Decision Maker:    Primary Decision Maker: Amaya Bautista \"Perla\" - Child - [phone]    Secondary Decision Maker: AkashCesilia - Brother/Sister - [phone]    Supplemental (Other) Decision Maker: ELEAZAR WOLFE - Brother/Sister - [phone]    Click here to complete Healthcare Decision Makers including selection of the Healthcare Decision Maker Relationship (ie \"Primary\").

## 2024-03-12 NOTE — ED NOTES
Department of Emergency Medicine  FIRST PROVIDER TRIAGE NOTE             Independent MLP           3/11/24  9:25 PM EDT    Date of Encounter: 3/11/24   MRN: [id number]      HPI: Andre Brown is a 73 y.o. male who presents to the ED for Shortness of Breath (Patient c/o SOB x 1 month that's worse with ambulation.) and Abdominal Pain (Patient c/o lower abdominal pain x 2 months. Denies N/V/D.)       ROS: Negative for cp.    PE: Gen Appearance/Constitutional: alert  HEENT: NC/NT. PERRLA,  Airway patent.  Neck: supple  CV: regular rate  Pulm: CTA bilat  GI: soft and NT  Musculoskeletal: moves all extremities x 4  Lymphatics: no edema     Initial Plan of Care: All treatment areas with department are currently occupied.Proceed toTreatment Area When Bed Available for ED Attending/MLP to Continue Care    Electronically signed by GURPREET Hays CNP   DD: 3/11/24    ATTENDING PROVIDER ATTESTATION:     Supervising Physician, on-site, available for consultation, non-participatory in the evaluation or care of this patient         Scooby Reilly MD  03/12/24 0416

## 2024-03-12 NOTE — H&P
Serve or at [phone] with any questions or concerns through 0700. After 0700 one of my colleagues with the [facility] will assume patient's care.     +++++++++++++++++++++++++++++++++++++++++++++++++  NOTE: This report was transcribed using voice recognition software. Every effort was made to ensure accuracy; however, inadvertent computerized transcription errors may be present.

## 2024-03-12 NOTE — ED PROVIDER NOTES
shortness of breath as well as dizziness for the last several days.  Patient reports dizziness with ambulation.  Patient reports also feeling short of breath with ambulation he also reports shortness of breath with laying flat down.  Patient reporting no chest pain he does report some abdominal pain but reports no vomiting reports no diarrhea patient reporting no black or tarry stools he reports no headache he reports no fever or chills.  Patient reporting no fall.  CC/HPI Summary, DDx, ED Course, Reassessment, Tests Considered, Patient expectation:        Andre Brown is a 73 y.o. male presenting to the ED for sob, beginning several days ago.  The complaint has been persistent, moderate in severity, and worsened by light exertion.  Patient presented because of shortness of breath as well as dizziness for the last several days.  Patient reports dizziness with ambulation.  Patient reports also feeling short of breath with ambulation he also reports shortness of breath with laying flat down.  Patient reporting no chest pain he does report some abdominal pain but reports no vomiting reports no diarrhea patient reporting no black or tarry stools he reports no headache he reports no fever or chills.  Patient reporting no fall.    Patient is awake alert orient x 3 heart exam irregular normal rate lungs are clear abdomen soft nontender.  Patient has no edema.  Patient has normal strength in all extremities.  Patient differential clues heart failure as well as PE as well as electro imbalance  Patient while here in emergency room had lab work obtained white count is 11.8 hemoglobin is 10.1 platelet count was 199.  Patient sodium is 122 potassium 6.4 it was hemolyzed with his redraw it was 5.7 patient CO2 is 9 glucose 574 BUN is 80 g 5.6 alk phos 123 troponin still pending proBNP was 24,000.  Troponin did come back at 77.  Patient EKG A-fib.  Patient was noted to be in DKA.  Patient was ordered  Patient chest x-ray showed

## 2024-03-12 NOTE — CARE COORDINATION
Care Coordination: admit today, MICU. DKA, hyperkalemia 6.4, na 122, BLAIR b 80 cr 5.8. Initially 02 5 ltr, currently increased to FIO2 100 50 ltrs. Insulin gtt, Vanc.  Endocrine, nephrology and diabetic ed following.  Met with pt at bedside to discuss transition of care. Pt states he was living with significant other Yamileth for 28 years- she passed away 4 months ago, and he does not feel he has been doing well since.  Erickson Duncan lives in [place], sisters nearby. Lives alone, one story home with basement, no hx of hhc, Dme or emrry. If DME needed he has no preference.  Hx of MERRY years ago in [place]. If MERRY is needed, he is agreeable for short term. Follow[facility] at [facility] Velasco[facility]but also has coverage for meds if needed as he is Medicare and [employer] retiree. Sister will transport home at discharge. [facility] spoke to boyd Xaviers faxed to .  Confirmed primary health care decision maker is Erickson Duncan primary, sisters secondary.    Electronically signed by Patricia Givens RN on 3/12/2024 at 12:25 PM     ADDENDUM: Clinical update given to Di at  [phone] ext [phone]    Electronically signed by Patricia Givens RN on 3/12/2024 at 3:39 PM             The Plan for Transition of Care is related to the following treatment goals: dc    The Patient was provided with a choice of provider and agrees   with the discharge plan. [x] Yes [] No    Freedom of choice list was provided with basic dialogue that supports the patient's individualized plan of care/goals, treatment preferences and shares the quality data associated with the providers. [x] Yes [] No

## 2024-03-13 ENCOUNTER — APPOINTMENT (OUTPATIENT)
Dept: GENERAL RADIOLOGY | Age: 74
DRG: 637 | End: 2024-03-13
Payer: OTHER GOVERNMENT

## 2024-03-13 LAB
AADO2: 370 MMHG
ANION GAP SERPL CALCULATED.3IONS-SCNC: 12 MMOL/L (ref 7–16)
ANION GAP SERPL CALCULATED.3IONS-SCNC: 16 MMOL/L (ref 7–16)
ANION GAP SERPL CALCULATED.3IONS-SCNC: 17 MMOL/L (ref 7–16)
B.E.: -9.7 MMOL/L (ref -3–3)
BASOPHILS # BLD: 0 K/UL (ref 0–0.2)
BASOPHILS NFR BLD: 0 % (ref 0–2)
BUN SERPL-MCNC: 61 MG/DL (ref 6–23)
BUN SERPL-MCNC: 84 MG/DL (ref 6–23)
BUN SERPL-MCNC: 89 MG/DL (ref 6–23)
CALCIUM SERPL-MCNC: 8.2 MG/DL (ref 8.6–10.2)
CALCIUM SERPL-MCNC: 8.2 MG/DL (ref 8.6–10.2)
CALCIUM SERPL-MCNC: 8.3 MG/DL (ref 8.6–10.2)
CHLORIDE SERPL-SCNC: 103 MMOL/L (ref 98–107)
CHLORIDE SERPL-SCNC: 105 MMOL/L (ref 98–107)
CHLORIDE SERPL-SCNC: 107 MMOL/L (ref 98–107)
CO2 SERPL-SCNC: 14 MMOL/L (ref 22–29)
CO2 SERPL-SCNC: 15 MMOL/L (ref 22–29)
CO2 SERPL-SCNC: 23 MMOL/L (ref 22–29)
COHB: 0.7 % (ref 0–1.5)
CREAT SERPL-MCNC: 4.6 MG/DL (ref 0.7–1.2)
CREAT SERPL-MCNC: 5.9 MG/DL (ref 0.7–1.2)
CREAT SERPL-MCNC: 6 MG/DL (ref 0.7–1.2)
CRITICAL: ABNORMAL
DATE ANALYZED: ABNORMAL
DATE OF COLLECTION: ABNORMAL
EKG ATRIAL RATE: 44 BPM
EKG Q-T INTERVAL: 382 MS
EKG QRS DURATION: 82 MS
EKG QTC CALCULATION (BAZETT): 406 MS
EKG R AXIS: 21 DEGREES
EKG T AXIS: 15 DEGREES
EKG VENTRICULAR RATE: 68 BPM
EOSINOPHIL # BLD: 0 K/UL (ref 0.05–0.5)
EOSINOPHILS RELATIVE PERCENT: 0 % (ref 0–6)
ERYTHROCYTE [DISTWIDTH] IN BLOOD BY AUTOMATED COUNT: 14.9 % (ref 11.5–15)
FIO2: 70 %
GFR SERPL CREATININE-BSD FRML MDRD: 12 ML/MIN/1.73M2
GFR SERPL CREATININE-BSD FRML MDRD: 9 ML/MIN/1.73M2
GFR SERPL CREATININE-BSD FRML MDRD: 9 ML/MIN/1.73M2
GLUCOSE BLD-MCNC: 110 MG/DL (ref 74–99)
GLUCOSE BLD-MCNC: 137 MG/DL (ref 74–99)
GLUCOSE BLD-MCNC: 151 MG/DL (ref 74–99)
GLUCOSE BLD-MCNC: 224 MG/DL (ref 74–99)
GLUCOSE BLD-MCNC: 239 MG/DL (ref 74–99)
GLUCOSE SERPL-MCNC: 146 MG/DL (ref 74–99)
GLUCOSE SERPL-MCNC: 208 MG/DL (ref 74–99)
GLUCOSE SERPL-MCNC: 213 MG/DL (ref 74–99)
HAV IGM SERPL QL IA: NONREACTIVE
HBV CORE IGM SERPL QL IA: NONREACTIVE
HBV SURFACE AB SERPL IA-ACNC: 31.97 MIU/ML (ref 0–9.99)
HBV SURFACE AG SERPL QL IA: NONREACTIVE
HCO3: 15.6 MMOL/L (ref 22–26)
HCT VFR BLD AUTO: 23.4 % (ref 37–54)
HCV AB SERPL QL IA: NONREACTIVE
HGB BLD-MCNC: 7.7 G/DL (ref 12.5–16.5)
HHB: 5.8 % (ref 0–5)
IMM GRANULOCYTES # BLD AUTO: 0.06 K/UL (ref 0–0.58)
IMM GRANULOCYTES NFR BLD: 1 % (ref 0–5)
LAB: ABNORMAL
LYMPHOCYTES NFR BLD: 0.37 K/UL (ref 1.5–4)
LYMPHOCYTES RELATIVE PERCENT: 5 % (ref 20–42)
Lab: 409
MAGNESIUM SERPL-MCNC: 2.3 MG/DL (ref 1.6–2.6)
MAGNESIUM SERPL-MCNC: 2.3 MG/DL (ref 1.6–2.6)
MCH RBC QN AUTO: 27.5 PG (ref 26–35)
MCHC RBC AUTO-ENTMCNC: 32.9 G/DL (ref 32–34.5)
MCV RBC AUTO: 83.6 FL (ref 80–99.9)
METHB: 0.2 % (ref 0–1.5)
MICROORGANISM SPEC CULT: NO GROWTH
MICROORGANISM SPEC CULT: NORMAL
MODE: AC
MONOCYTES NFR BLD: 0.45 K/UL (ref 0.1–0.95)
MONOCYTES NFR BLD: 6 % (ref 2–12)
NEUTROPHILS NFR BLD: 88 % (ref 43–80)
NEUTS SEG NFR BLD: 6.54 K/UL (ref 1.8–7.3)
O2 CONTENT: 11.5 ML/DL
O2 SATURATION: 94.1 % (ref 92–98.5)
O2HB: 93.3 % (ref 94–97)
OPERATOR ID: 2067
PATIENT TEMP: 37 C
PCO2: 31.7 MMHG (ref 35–45)
PEEP/CPAP: 5 CMH2O
PFO2: 1.11 MMHG/%
PH BLOOD GAS: 7.31 (ref 7.35–7.45)
PHOSPHATE SERPL-MCNC: 6.5 MG/DL (ref 2.5–4.5)
PHOSPHATE SERPL-MCNC: 7 MG/DL (ref 2.5–4.5)
PLATELET # BLD AUTO: 146 K/UL (ref 130–450)
PMV BLD AUTO: 10.7 FL (ref 7–12)
PO2: 77.5 MMHG (ref 75–100)
POTASSIUM SERPL-SCNC: 3.9 MMOL/L (ref 3.5–5)
POTASSIUM SERPL-SCNC: 4.5 MMOL/L (ref 3.5–5)
POTASSIUM SERPL-SCNC: 4.7 MMOL/L (ref 3.5–5)
RBC # BLD AUTO: 2.8 M/UL (ref 3.8–5.8)
RBC # BLD: ABNORMAL 10*6/UL
RI(T): 4.77
RR MECHANICAL: 18 B/MIN
SODIUM SERPL-SCNC: 136 MMOL/L (ref 132–146)
SODIUM SERPL-SCNC: 138 MMOL/L (ref 132–146)
SODIUM SERPL-SCNC: 138 MMOL/L (ref 132–146)
SOURCE, BLOOD GAS: ABNORMAL
SPECIMEN DESCRIPTION: NORMAL
SPECIMEN DESCRIPTION: NORMAL
THB: 8.7 G/DL (ref 11.5–16.5)
TIME ANALYZED: 417
VT MECHANICAL: 450 ML
WBC OTHER # BLD: 7.4 K/UL (ref 4.5–11.5)

## 2024-03-13 PROCEDURE — 02HV33Z INSERTION OF INFUSION DEVICE INTO SUPERIOR VENA CAVA, PERCUTANEOUS APPROACH: ICD-10-PCS | Performed by: INTERNAL MEDICINE

## 2024-03-13 PROCEDURE — 85025 COMPLETE CBC W/AUTO DIFF WBC: CPT

## 2024-03-13 PROCEDURE — 2580000003 HC RX 258: Performed by: INTERNAL MEDICINE

## 2024-03-13 PROCEDURE — 99291 CRITICAL CARE FIRST HOUR: CPT | Performed by: INTERNAL MEDICINE

## 2024-03-13 PROCEDURE — 6370000000 HC RX 637 (ALT 250 FOR IP)

## 2024-03-13 PROCEDURE — 93010 ELECTROCARDIOGRAM REPORT: CPT | Performed by: INTERNAL MEDICINE

## 2024-03-13 PROCEDURE — 4A143B0 MONITORING OF VENOUS PRESSURE, CENTRAL, PERCUTANEOUS APPROACH: ICD-10-PCS | Performed by: INTERNAL MEDICINE

## 2024-03-13 PROCEDURE — 2500000003 HC RX 250 WO HCPCS: Performed by: INTERNAL MEDICINE

## 2024-03-13 PROCEDURE — 2000000000 HC ICU R&B

## 2024-03-13 PROCEDURE — 80048 BASIC METABOLIC PNL TOTAL CA: CPT

## 2024-03-13 PROCEDURE — 90935 HEMODIALYSIS ONE EVALUATION: CPT

## 2024-03-13 PROCEDURE — 82805 BLOOD GASES W/O2 SATURATION: CPT

## 2024-03-13 PROCEDURE — 99231 SBSQ HOSP IP/OBS SF/LOW 25: CPT | Performed by: INTERNAL MEDICINE

## 2024-03-13 PROCEDURE — 71045 X-RAY EXAM CHEST 1 VIEW: CPT

## 2024-03-13 PROCEDURE — 82962 GLUCOSE BLOOD TEST: CPT

## 2024-03-13 PROCEDURE — 2580000003 HC RX 258

## 2024-03-13 PROCEDURE — 6360000002 HC RX W HCPCS: Performed by: INTERNAL MEDICINE

## 2024-03-13 PROCEDURE — 83735 ASSAY OF MAGNESIUM: CPT

## 2024-03-13 PROCEDURE — 6370000000 HC RX 637 (ALT 250 FOR IP): Performed by: INTERNAL MEDICINE

## 2024-03-13 PROCEDURE — 36556 INSERT NON-TUNNEL CV CATH: CPT | Performed by: INTERNAL MEDICINE

## 2024-03-13 PROCEDURE — C9113 INJ PANTOPRAZOLE SODIUM, VIA: HCPCS

## 2024-03-13 PROCEDURE — 94003 VENT MGMT INPAT SUBQ DAY: CPT

## 2024-03-13 PROCEDURE — 6360000002 HC RX W HCPCS

## 2024-03-13 PROCEDURE — A4216 STERILE WATER/SALINE, 10 ML: HCPCS

## 2024-03-13 PROCEDURE — 84100 ASSAY OF PHOSPHORUS: CPT

## 2024-03-13 RX ORDER — ASPIRIN 81 MG/1
81 TABLET, CHEWABLE ORAL DAILY
Status: DISCONTINUED | OUTPATIENT
Start: 2024-03-14 | End: 2024-03-19 | Stop reason: HOSPADM

## 2024-03-13 RX ORDER — MINERAL OIL AND WHITE PETROLATUM 150; 830 MG/G; MG/G
OINTMENT OPHTHALMIC 2 TIMES DAILY
Status: DISCONTINUED | OUTPATIENT
Start: 2024-03-13 | End: 2024-03-15

## 2024-03-13 RX ORDER — INSULIN LISPRO 100 [IU]/ML
0-6 INJECTION, SOLUTION INTRAVENOUS; SUBCUTANEOUS EVERY 4 HOURS
Status: DISCONTINUED | OUTPATIENT
Start: 2024-03-13 | End: 2024-03-15

## 2024-03-13 RX ORDER — CHLORHEXIDINE GLUCONATE ORAL RINSE 1.2 MG/ML
15 SOLUTION DENTAL 2 TIMES DAILY
Status: DISCONTINUED | OUTPATIENT
Start: 2024-03-13 | End: 2024-03-15

## 2024-03-13 RX ORDER — MIDAZOLAM HYDROCHLORIDE 1 MG/ML
1-10 INJECTION, SOLUTION INTRAVENOUS CONTINUOUS
Status: DISCONTINUED | OUTPATIENT
Start: 2024-03-13 | End: 2024-03-14

## 2024-03-13 RX ORDER — FENTANYL CITRATE-0.9 % NACL/PF 10 MCG/ML
25-200 PLASTIC BAG, INJECTION (ML) INTRAVENOUS CONTINUOUS
Status: DISCONTINUED | OUTPATIENT
Start: 2024-03-13 | End: 2024-03-15

## 2024-03-13 RX ADMIN — BUMETANIDE 0.5 MG/HR: 0.25 INJECTION INTRAMUSCULAR; INTRAVENOUS at 10:04

## 2024-03-13 RX ADMIN — MINERAL OIL, WHITE PETROLATUM: .03; .94 OINTMENT OPHTHALMIC at 20:14

## 2024-03-13 RX ADMIN — CHLORHEXIDINE GLUCONATE 15 ML: 1.2 RINSE ORAL at 20:08

## 2024-03-13 RX ADMIN — SODIUM CHLORIDE, PRESERVATIVE FREE 40 MG: 5 INJECTION INTRAVENOUS at 09:34

## 2024-03-13 RX ADMIN — METOPROLOL TARTRATE 37.5 MG: 25 TABLET, FILM COATED ORAL at 20:08

## 2024-03-13 RX ADMIN — APIXABAN 5 MG: 5 TABLET, FILM COATED ORAL at 09:34

## 2024-03-13 RX ADMIN — Medication 100 MCG/HR: at 14:19

## 2024-03-13 RX ADMIN — INSULIN LISPRO 1 UNITS: 100 INJECTION, SOLUTION INTRAVENOUS; SUBCUTANEOUS at 20:10

## 2024-03-13 RX ADMIN — CHLORHEXIDINE GLUCONATE 15 ML: 1.2 RINSE ORAL at 16:35

## 2024-03-13 RX ADMIN — INSULIN LISPRO 1 UNITS: 100 INJECTION, SOLUTION INTRAVENOUS; SUBCUTANEOUS at 05:28

## 2024-03-13 RX ADMIN — SODIUM BICARBONATE: 84 INJECTION, SOLUTION INTRAVENOUS at 09:56

## 2024-03-13 RX ADMIN — PANCRELIPASE LIPASE, PANCRELIPASE PROTEASE, PANCRELIPASE AMYLASE 5000 UNITS: 5000; 17000; 24000 CAPSULE, DELAYED RELEASE ORAL at 16:36

## 2024-03-13 RX ADMIN — INSULIN GLARGINE 10 UNITS: 100 INJECTION, SOLUTION SUBCUTANEOUS at 09:34

## 2024-03-13 RX ADMIN — INSULIN LISPRO 1 UNITS: 100 INJECTION, SOLUTION INTRAVENOUS; SUBCUTANEOUS at 09:50

## 2024-03-13 RX ADMIN — PIPERACILLIN AND TAZOBACTAM 4500 MG: 4; .5 INJECTION, POWDER, FOR SOLUTION INTRAVENOUS at 09:38

## 2024-03-13 RX ADMIN — ACETAMINOPHEN 650 MG: 325 TABLET ORAL at 22:44

## 2024-03-13 RX ADMIN — Medication 125 MCG/HR: at 22:43

## 2024-03-13 RX ADMIN — INSULIN LISPRO 1 UNITS: 100 INJECTION, SOLUTION INTRAVENOUS; SUBCUTANEOUS at 12:43

## 2024-03-13 RX ADMIN — Medication 75 MCG/HR: at 02:17

## 2024-03-13 RX ADMIN — ATORVASTATIN CALCIUM 10 MG: 10 TABLET, FILM COATED ORAL at 20:08

## 2024-03-13 RX ADMIN — APIXABAN 5 MG: 5 TABLET, FILM COATED ORAL at 20:08

## 2024-03-13 RX ADMIN — PIPERACILLIN AND TAZOBACTAM 4500 MG: 4; .5 INJECTION, POWDER, FOR SOLUTION INTRAVENOUS at 20:14

## 2024-03-13 RX ADMIN — ASPIRIN 81 MG: 81 TABLET, COATED ORAL at 09:34

## 2024-03-13 ASSESSMENT — PULMONARY FUNCTION TESTS
PIF_VALUE: 17
PIF_VALUE: 22
PIF_VALUE: 26
PIF_VALUE: 22
PIF_VALUE: 17
PIF_VALUE: 16
PIF_VALUE: 17
PIF_VALUE: 14
PIF_VALUE: 26
PIF_VALUE: 22
PIF_VALUE: 21
PIF_VALUE: 17
PIF_VALUE: 18
PIF_VALUE: 24
PIF_VALUE: 19
PIF_VALUE: 20
PIF_VALUE: 16
PIF_VALUE: 17
PIF_VALUE: 21
PIF_VALUE: 17
PIF_VALUE: 19
PIF_VALUE: 26
PIF_VALUE: 19
PIF_VALUE: 25
PIF_VALUE: 28
PIF_VALUE: 17
PIF_VALUE: 18

## 2024-03-13 ASSESSMENT — PAIN SCALES - GENERAL
PAINLEVEL_OUTOF10: 0
PAINLEVEL_OUTOF10: 0
PAINLEVEL_OUTOF10: 1
PAINLEVEL_OUTOF10: 0
PAINLEVEL_OUTOF10: 3
PAINLEVEL_OUTOF10: 0

## 2024-03-13 NOTE — FLOWSHEET NOTE
Patient attempting to pull at lines/tubes when unrestrained.      03/13/24 1456   Restraint Order   Length of Order (Only Document With Each New Order) Continuous Until 2359 of Next Calendar Day   Attending Physician Notified (Only Document With Each New Order) Yes   Order Upon Application (Only Document With Each New Order) Yes   NV Length of Order 33.07   Restraint Type   Non-Violent Restraint Type from Order question Soft Restraint Bilateral Wrist   Soft Restraint Bilateral Wrist CONTINUED   Assessment   Less Restrictive Alternative 1:1 patient care;Repositioning;Re-evaluate equipment;Disguise equipment;Re-orientation;Verbal re-direction;Diversional activities   Special Consideration/Risk Factors None   Justification from Order   Clinical Justification Pulling lines tubes dressing equipment   Education   Discontinuation Criteria Absence of behavior that required restraint   Criteria Explained Yes   Patient's Response No evidence of learning   Family Notification Already completed   Restraint  Monitoring Q2 Hours   Continued Justification  Continues to meet order criteria   Visual/Safety Check  Sedated;Subdued   Circulation No signs of injury   Range of Motion Performed   Fluids NPO   Food/Meal NPO   Elimination No   Reason Patient did not Eliminate Urinary catheter   Care Plan Interventions   Remains free of injury from restraints (restraint for interference with medical device) Determine that other, less restrictive measures have been tried or would not be effective before applying the restraint;Evaluate the patient's condition at the time of restraint application;Inform patient/family regarding the reason for restraint;Every 2 hours: Monitor safety, psychosocial status, comfort, nutrition and hydration

## 2024-03-13 NOTE — CONSULTS
Department of Internal Medicine  Nephrology Attending Consult Note      Reason for Consult: BLAIR on CKD, hyponatremia  Requesting Physician:  Celena Hall, GURPREET - CNP    CHIEF COMPLAINT: Shortness of breath    History Obtained From:  patient, electronic medical record    HISTORY OF PRESENT ILLNESS:  Briefly, Mr. Andre Brown is a 73 year old AA man with PMH of CKD stage IV, with proteinuria, baseline creatinine 3.1-3.2 mg/dL, 2/2 diabetic nephropathy, s/p ATN for which he required transient renal replacement therapy, HTN, type 2 DM, PAF on apixaban, hyperlipidemia, prostate cancer status post radiation therapy, , who was admitted on 3/12/24 after presenting to the ER reporting shortness of breath.  After evaluation in the ER he was found to have a BUN of 80 mg/dL, creatinine of 5.6 mg/dL, potassium level 6.4 mEq/L, bicarbonate level 9 mEq/L, reasons for this consultation.  His glucose level on admission was 574 mg/dL, beta hydroxybutyrate 0.49, proBNP level 24,362.  His initial chest x-ray showed no acute abnormalities.  In the ER patient was given at least 1 L of NS and he was admitted to MICU.  His medications prior to admission included hydralazine, metoprolol, amlodipine.      Past Medical History:        Diagnosis Date    A-fib (HCC)     on eliquis    Cancer (HCC)     CKD (chronic kidney disease)     stage 3a    Diabetes mellitus (HCC)     ED (erectile dysfunction)     Hyperlipidemia     Hypertension     Prostate cancer (HCC)      Past Surgical History:        Procedure Laterality Date    COLON SURGERY      COLONOSCOPY N/A 10/18/2021    COLONOSCOPY WITH BIOPSY performed by Luisa Sepulveda MD at [facility] ENDOSCOPY    DILATATION, ESOPHAGUS      HC DIALYSIS CATHETER N/A 1/12/2021    TEMPORARY HEMODIALYSIS CATHETER INSERTION performed by Sunny Blanc MD at [facility] OR    LEG SURGERY Left 6 years ago    OTHER SURGICAL HISTORY      removal of HD catheter    PANCREAS BIOPSY N/A 6/14/2021    PANCREATIC 
ENDOCRINOLOGY INITIAL CONSULTATION NOTE      Date of admission: 3/11/2024  Date of service: 3/13/2024  Admitting physician: Emmy Gutierrez MD   Primary Care Physician: Cruz Velasco DO  Consultant physician: Aubrey Quintero MD     Reason for the consultation:  Uncontrolled DM    History of Present Illness:  The history was provided from the medical records.  The patient is currently intubated sedated  Days HELADIO Brown is a 73 y.o. old male with PMH of A-fib, CKD, type 2 diabetes, hyperlipidemia, hypertension and other listed below admitted to [facility] on 3/11/2024 because of shortness of breath and respiratory failure and found to be in DKA, endocrine service was consulted for diabetes management.  The patient is currently intubated sedated in the medical ICU.    Labs on admission showed a glucose level 574, anion gap 19, bicarb 9, creatinine 5.6, potassium 6.4  The patient was started on insulin drip as per DKA protocol then transition to subcu insulin.    Prior to admission  The history is very limited due to patient current condition  Lab Results   Component Value Date/Time    LABA1C 6.6 03/11/2024 11:28 PM       Inpatient diet:   Carb Restricted diet     Point of care glucose monitoring   (Independently reviewed)   Recent Labs     03/12/24  0919 03/12/24  1002 03/12/24  1056 03/12/24  1159 03/12/24  1617 03/12/24  2058 03/13/24  0732 03/13/24  1238   POCGLU 144* 134* 138* 140* 169* 182* 224* 239*       Past medical history:   Past Medical History:   Diagnosis Date    A-fib (HCC)     on eliquis    Cancer (HCC)     CKD (chronic kidney disease)     stage 3a    Diabetes mellitus (HCC)     ED (erectile dysfunction)     Hyperlipidemia     Hypertension     Prostate cancer (HCC)        Past surgical history:  Past Surgical History:   Procedure Laterality Date    COLON SURGERY      COLONOSCOPY N/A 10/18/2021    COLONOSCOPY WITH BIOPSY performed by Luisa Sepulveda MD at [facility] ENDOSCOPY    DILATATION, ESOPHAGUS      
glucose, Disp: 250 strip, Rfl: 5    amLODIPine (NORVASC) 10 MG tablet, Take 1 tablet by mouth daily, Disp: 30 tablet, Rfl: 3    aspirin 81 MG EC tablet, Take 1 tablet by mouth daily Indications: last dose 4/1/22, Disp: , Rfl:     apixaban (ELIQUIS) 5 MG TABS tablet, Take by mouth 2 times daily Indications: last dose 4/3/22 , Disp: , Rfl:     Ascorbic Acid (VITAMIN C) 250 MG tablet, Take 1 tablet by mouth daily, Disp: , Rfl:     Review of Systems:   General: reports loss of appetite and generalized fatigue  HEENT: reports dizziness  Respiratory: reports dyspnea with exertion   Cardiovascular: reports edema   Gastrointestinal: denies pain, nausea vomiting, diarrhea, constipation, melena or bleeding.  Genitourinary: denies hematuria, frequency, urgency or dysuria  Neurology: denies syncope, seizures, paralysis, paraesthesia   Endocrine: Reports polyuria   Musculoskeletal: denies joint pain, swelling, arthritis or myalgia  Hematologic: denies bleeding, adenopathy and easy bruising  Skin: denies rashes and skin discoloration  Psychiatry: denies depression    Physical Exam:   Vital Signs:  BP (!) 170/79   Pulse 71   Temp 97.8 °F (36.6 °C) (Temporal)   Resp 18   Ht 1.88 m (6' 2\")   Wt 93 kg (205 lb)   SpO2 96%   BMI 26.32 kg/m²     Input/Output:  No intake/output data recorded.    Oxygen requirements: R/A    General appearance: Well developed, not in pain or distress, mild respiratory distress    HEENT: Atraumatic/normocephalic, EOMI, ABBEY, pharynx clear, dry mucosa, redness of the uvula appreciated,   Chest: Equal normal breath sounds, no wheezing, few crackles and no tenderness over ribs   Cardiovascular: Normal S1 , S2, irregular rate and rhythm, no murmur, rub or gallop  Abdomen: Normal sounds present, soft, lax with tenderness, old surgical scar noted  Extremities: Trace edema BLE. Pulses are equally present.   Skin: intact, no rashes   Neurologic: Alert and oriented x 3, No focal deficit, moving everything

## 2024-03-13 NOTE — FLOWSHEET NOTE
03/13/24 1630   Vital Signs   /80   Temp 97.9 °F (36.6 °C)   Pulse 95   Respirations 18   Weight - Scale   (not working)   Pain Assessment   Pain Assessment Critical Care Pain Observation Tool (CPOT)   Pain Level 0   Post-Hemodialysis Assessment   Post-Treatment Procedures Blood returned;Catheter capped, clamped with Citrate x 2 ports   Machine Disinfection Process Acid/Vinegar Clean;Heat Disinfect;Exterior Machine Disinfection   Blood Volume Processed (Liters) 23 L   Duration of Treatment (minutes) 120 minutes   Hemodialysis Intake (ml) 300 ml   Hemodialysis Output (ml) 1000 ml   NET Removed (ml) 700   Tolerated Treatment Good   Bilateral Breath Sounds Diminished   Edema Right upper extremity;Left upper extremity;Right lower extremity;Left lower extremity   RUE Edema +1;Pitting   LUE Edema +1;Pitting   RLE Edema +1;Pitting   LLE Edema +1;Pitting   Time Off 1615   Patient Disposition Remain in ICU/ED   Observations & Evaluations   Level of Consciousness 1   Heart Rhythm Regular   Respiratory Quality/Effort Unlabored   O2 Device Ventilator   Skin Color Pink   Skin Condition/Temp Dry;Warm   Abdomen Inspection Soft   Bowel Sounds (All Quadrants) Active

## 2024-03-13 NOTE — OP NOTE
Operative Note      Patient: Andre Brown  YOB: 1950  MRN: [id number]    Date of Procedure: today    [facility]    Department of Internal Medicine  Division of Pulmonary, Critical Care & Sleep Medicine    Procedure Note    Procedure:  Insertion of Central Line     Indications:  Hemodynamic/CVP monitoring and IV access    Anesthesia: Local infiltration of 1% lidocaine.     Consent:  Informed written consent obtained.     Surgeon: Munir Bright DO    Technique:  Procedure was done using strict aseptic technique. The patient's left femoral vein was prepped and draped in the usual sterile fashion.  Ultrasound was used.  Then using the Seldinger technique, a large-bore needle was placed into the femoral vein with good backflow. A guidewire was placed. Then using an 11 blade, a small incision was made in the patient's skin. The large-bore needle was removed. A dilator was passed over the guidewire. Once completed, a HD catheter was placed over the guidewire with the guidewire then subsequently removed. All ports were drawn and flushed with good flow. Then the catheter was sutured in place, and a sterile dressing was put in place.     Complications: No immediate complication.     Estimated blood loss: Minimal.     Comment: Patient tolerated the procedure well.         Munir Bright DO, NAELP, CULLEN, NICOLEP  3/13/2024  2:39 PM       1.89

## 2024-03-13 NOTE — CARE COORDINATION
Care Coordination: LOS 1 day. Intubated 3/12/24. Fentanyl, versed, Na bicarb, bumex gtt.  Received call from daughter Perla to discuss transition of care. Pt has hx of trach/peg and HD. Was at rehab facility in [place]. Also, was at a facility in [place] when trach/peg removed.  Significant other  4 months ago, and one sister  in December as well.  She is aware, he is not eating well and not caring for himself since significant other .  I updated her that [facility]. She is uncertain if he is service connected. [facility]. Daughter is from [place] and will be coming into town once he is discharge. Reviewed that [facility]  may be able to assist with home meals and home care as well.  Nephrology following and may consider renal replacement therapy and daughter is aware and states she gave permission to do so. She has my contact information.     Electronically signed by Patricia Givens RN on 3/13/2024 at 2:03 PM

## 2024-03-13 NOTE — ANESTHESIA PROCEDURE NOTES
Airway  Date/Time: 3/12/2024 8:06 PM  Urgency: emergent      General Information and Staff    Patient location during procedure: ICU  Resident/CRNA: Sam Santo APRN - CRNA  Performed: resident/CRNA   Performed by: Sam Santo APRN - CRNA  Authorized by: Sam Santo APRN - CRNA      Consent for Airway (if performed for an anesthetic, see related documentation for consents)  Patient identity confirmed: per hospital policy  Consent: The procedure was performed in an emergent situation. Verbal consent not obtained. Written consent not obtained.  Risks and benefits: risks, benefits and alternatives were not discussed      Code status verified:yes  Indications and Patient Condition  Indications for airway management: respiratory failure  Spontaneous ventilation: present  Sedation level: deep  Preoxygenated: yes  Patient position: sniffing  MILS not maintained throughout  Mask difficulty assessment: vent by bag mask    Final Airway Details  Final airway type: endotracheal airway      Successful airway: ETT  Cuffed: yes   Successful intubation technique: video laryngoscopy  Facilitating devices/methods: intubating stylet  Endotracheal tube insertion site: oral  Blade size: #4  ETT size (mm): 8.0  Cormack-Lehane Classification: grade I - full view of glottis  Placement verified by: chest auscultation and capnometry   Measured from: teeth  Number of attempts at approach: 1  Ventilation between attempts: bag mask

## 2024-03-14 ENCOUNTER — APPOINTMENT (OUTPATIENT)
Dept: GENERAL RADIOLOGY | Age: 74
DRG: 637 | End: 2024-03-14
Payer: OTHER GOVERNMENT

## 2024-03-14 PROBLEM — R73.9 HYPERGLYCEMIA: Status: ACTIVE | Noted: 2024-03-14

## 2024-03-14 PROBLEM — E11.65 POORLY CONTROLLED TYPE 2 DIABETES MELLITUS (HCC): Status: ACTIVE | Noted: 2024-03-12

## 2024-03-14 LAB
AADO2: 211.4 MMHG
ALBUMIN SERPL-MCNC: 2.6 G/DL (ref 3.5–5.2)
ALP SERPL-CCNC: 85 U/L (ref 40–129)
ALT SERPL-CCNC: 11 U/L (ref 0–40)
ANION GAP SERPL CALCULATED.3IONS-SCNC: 16 MMOL/L (ref 7–16)
AST SERPL-CCNC: 12 U/L (ref 0–39)
B.E.: -0.3 MMOL/L (ref -3–3)
BASOPHILS # BLD: 0 K/UL (ref 0–0.2)
BASOPHILS NFR BLD: 0 % (ref 0–2)
BILIRUB SERPL-MCNC: 0.6 MG/DL (ref 0–1.2)
BUN SERPL-MCNC: 62 MG/DL (ref 6–23)
CALCIUM SERPL-MCNC: 8.3 MG/DL (ref 8.6–10.2)
CHLORIDE SERPL-SCNC: 104 MMOL/L (ref 98–107)
CO2 SERPL-SCNC: 21 MMOL/L (ref 22–29)
COHB: 0.2 % (ref 0–1.5)
CREAT SERPL-MCNC: 5 MG/DL (ref 0.7–1.2)
CRITICAL: ABNORMAL
DATE ANALYZED: ABNORMAL
DATE OF COLLECTION: ABNORMAL
EOSINOPHIL # BLD: 0.11 K/UL (ref 0.05–0.5)
EOSINOPHILS RELATIVE PERCENT: 2 % (ref 0–6)
ERYTHROCYTE [DISTWIDTH] IN BLOOD BY AUTOMATED COUNT: 14.9 % (ref 11.5–15)
FIO2: 55 %
GFR SERPL CREATININE-BSD FRML MDRD: 11 ML/MIN/1.73M2
GLUCOSE BLD-MCNC: 127 MG/DL (ref 74–99)
GLUCOSE BLD-MCNC: 142 MG/DL (ref 74–99)
GLUCOSE BLD-MCNC: 145 MG/DL (ref 74–99)
GLUCOSE BLD-MCNC: 148 MG/DL (ref 74–99)
GLUCOSE BLD-MCNC: 159 MG/DL (ref 74–99)
GLUCOSE SERPL-MCNC: 110 MG/DL (ref 74–99)
HCO3: 23.4 MMOL/L (ref 22–26)
HCT VFR BLD AUTO: 24.4 % (ref 37–54)
HGB BLD-MCNC: 8.1 G/DL (ref 12.5–16.5)
HHB: 1.9 % (ref 0–5)
LAB: ABNORMAL
LYMPHOCYTES NFR BLD: 0.42 K/UL (ref 1.5–4)
LYMPHOCYTES RELATIVE PERCENT: 7 % (ref 20–42)
Lab: 415
MAGNESIUM SERPL-MCNC: 2 MG/DL (ref 1.6–2.6)
MCH RBC QN AUTO: 27.4 PG (ref 26–35)
MCHC RBC AUTO-ENTMCNC: 33.2 G/DL (ref 32–34.5)
MCV RBC AUTO: 82.4 FL (ref 80–99.9)
METHB: 0.2 % (ref 0–1.5)
MICROORGANISM SPEC CULT: NORMAL
MICROORGANISM/AGENT SPEC: NORMAL
MODE: AC
MONOCYTES NFR BLD: 0.42 K/UL (ref 0.1–0.95)
MONOCYTES NFR BLD: 7 % (ref 2–12)
NEUTROPHILS NFR BLD: 84 % (ref 43–80)
NEUTS SEG NFR BLD: 5.05 K/UL (ref 1.8–7.3)
O2 CONTENT: 13.7 ML/DL
O2 SATURATION: 98.1 % (ref 92–98.5)
O2HB: 97.7 % (ref 94–97)
OPERATOR ID: 2593
PATIENT TEMP: 37 C
PCO2: 34.5 MMHG (ref 35–45)
PEEP/CPAP: 8 CMH2O
PFO2: 2.34 MMHG/%
PH BLOOD GAS: 7.45 (ref 7.35–7.45)
PHOSPHATE SERPL-MCNC: 3.7 MG/DL (ref 2.5–4.5)
PLATELET # BLD AUTO: 173 K/UL (ref 130–450)
PMV BLD AUTO: 11.1 FL (ref 7–12)
PO2: 128.6 MMHG (ref 75–100)
POTASSIUM SERPL-SCNC: 4 MMOL/L (ref 3.5–5)
PROT SERPL-MCNC: 5.4 G/DL (ref 6.4–8.3)
RBC # BLD AUTO: 2.96 M/UL (ref 3.8–5.8)
RBC # BLD: ABNORMAL 10*6/UL
RI(T): 1.64
RR MECHANICAL: 18 B/MIN
SODIUM SERPL-SCNC: 141 MMOL/L (ref 132–146)
SOURCE, BLOOD GAS: ABNORMAL
SPECIMEN DESCRIPTION: NORMAL
THB: 9.8 G/DL (ref 11.5–16.5)
TIME ANALYZED: 426
VT MECHANICAL: 450 ML
WBC OTHER # BLD: 6 K/UL (ref 4.5–11.5)

## 2024-03-14 PROCEDURE — 82962 GLUCOSE BLOOD TEST: CPT

## 2024-03-14 PROCEDURE — 6360000002 HC RX W HCPCS

## 2024-03-14 PROCEDURE — 2580000003 HC RX 258: Performed by: INTERNAL MEDICINE

## 2024-03-14 PROCEDURE — 99232 SBSQ HOSP IP/OBS MODERATE 35: CPT | Performed by: INTERNAL MEDICINE

## 2024-03-14 PROCEDURE — 94640 AIRWAY INHALATION TREATMENT: CPT

## 2024-03-14 PROCEDURE — 90935 HEMODIALYSIS ONE EVALUATION: CPT

## 2024-03-14 PROCEDURE — 6370000000 HC RX 637 (ALT 250 FOR IP)

## 2024-03-14 PROCEDURE — 71045 X-RAY EXAM CHEST 1 VIEW: CPT

## 2024-03-14 PROCEDURE — 99291 CRITICAL CARE FIRST HOUR: CPT | Performed by: INTERNAL MEDICINE

## 2024-03-14 PROCEDURE — 36592 COLLECT BLOOD FROM PICC: CPT

## 2024-03-14 PROCEDURE — 6360000002 HC RX W HCPCS: Performed by: INTERNAL MEDICINE

## 2024-03-14 PROCEDURE — 6370000000 HC RX 637 (ALT 250 FOR IP): Performed by: INTERNAL MEDICINE

## 2024-03-14 PROCEDURE — 82805 BLOOD GASES W/O2 SATURATION: CPT

## 2024-03-14 PROCEDURE — P9047 ALBUMIN (HUMAN), 25%, 50ML: HCPCS | Performed by: INTERNAL MEDICINE

## 2024-03-14 PROCEDURE — 84100 ASSAY OF PHOSPHORUS: CPT

## 2024-03-14 PROCEDURE — 94003 VENT MGMT INPAT SUBQ DAY: CPT

## 2024-03-14 PROCEDURE — 99231 SBSQ HOSP IP/OBS SF/LOW 25: CPT | Performed by: INTERNAL MEDICINE

## 2024-03-14 PROCEDURE — C9113 INJ PANTOPRAZOLE SODIUM, VIA: HCPCS

## 2024-03-14 PROCEDURE — 85025 COMPLETE CBC W/AUTO DIFF WBC: CPT

## 2024-03-14 PROCEDURE — 2580000003 HC RX 258

## 2024-03-14 PROCEDURE — 83735 ASSAY OF MAGNESIUM: CPT

## 2024-03-14 PROCEDURE — 80053 COMPREHEN METABOLIC PANEL: CPT

## 2024-03-14 PROCEDURE — A4216 STERILE WATER/SALINE, 10 ML: HCPCS

## 2024-03-14 PROCEDURE — 2000000000 HC ICU R&B

## 2024-03-14 PROCEDURE — 2500000003 HC RX 250 WO HCPCS: Performed by: INTERNAL MEDICINE

## 2024-03-14 RX ORDER — SODIUM CHLORIDE 0.9 % (FLUSH) 0.9 %
5-40 SYRINGE (ML) INJECTION 2 TIMES DAILY
Status: DISCONTINUED | OUTPATIENT
Start: 2024-03-14 | End: 2024-03-19 | Stop reason: HOSPADM

## 2024-03-14 RX ORDER — SODIUM CHLORIDE 0.9 % (FLUSH) 0.9 %
5-40 SYRINGE (ML) INJECTION PRN
Status: DISCONTINUED | OUTPATIENT
Start: 2024-03-14 | End: 2024-03-19 | Stop reason: HOSPADM

## 2024-03-14 RX ORDER — ALBUMIN (HUMAN) 12.5 G/50ML
25 SOLUTION INTRAVENOUS ONCE
Status: COMPLETED | OUTPATIENT
Start: 2024-03-14 | End: 2024-03-14

## 2024-03-14 RX ADMIN — SODIUM CHLORIDE, PRESERVATIVE FREE 10 ML: 5 INJECTION INTRAVENOUS at 20:15

## 2024-03-14 RX ADMIN — Medication 175 MCG/HR: at 10:47

## 2024-03-14 RX ADMIN — ALBUMIN (HUMAN) 25 G: 0.25 INJECTION, SOLUTION INTRAVENOUS at 10:52

## 2024-03-14 RX ADMIN — ACETAMINOPHEN 650 MG: 325 TABLET ORAL at 06:47

## 2024-03-14 RX ADMIN — INSULIN GLARGINE 10 UNITS: 100 INJECTION, SOLUTION SUBCUTANEOUS at 08:17

## 2024-03-14 RX ADMIN — IPRATROPIUM BROMIDE AND ALBUTEROL SULFATE 1 DOSE: .5; 3 SOLUTION RESPIRATORY (INHALATION) at 19:34

## 2024-03-14 RX ADMIN — INSULIN LISPRO 1 UNITS: 100 INJECTION, SOLUTION INTRAVENOUS; SUBCUTANEOUS at 13:16

## 2024-03-14 RX ADMIN — MINERAL OIL, WHITE PETROLATUM: .03; .94 OINTMENT OPHTHALMIC at 20:15

## 2024-03-14 RX ADMIN — Medication 175 MCG/HR: at 04:54

## 2024-03-14 RX ADMIN — CHLORHEXIDINE GLUCONATE 15 ML: 1.2 RINSE ORAL at 07:53

## 2024-03-14 RX ADMIN — METOPROLOL TARTRATE 37.5 MG: 25 TABLET, FILM COATED ORAL at 07:53

## 2024-03-14 RX ADMIN — PIPERACILLIN AND TAZOBACTAM 4500 MG: 4; .5 INJECTION, POWDER, FOR SOLUTION INTRAVENOUS at 07:57

## 2024-03-14 RX ADMIN — APIXABAN 5 MG: 5 TABLET, FILM COATED ORAL at 20:14

## 2024-03-14 RX ADMIN — MINERAL OIL, WHITE PETROLATUM: .03; .94 OINTMENT OPHTHALMIC at 08:03

## 2024-03-14 RX ADMIN — SODIUM CHLORIDE, PRESERVATIVE FREE 40 MG: 5 INJECTION INTRAVENOUS at 07:54

## 2024-03-14 RX ADMIN — ATORVASTATIN CALCIUM 10 MG: 10 TABLET, FILM COATED ORAL at 20:14

## 2024-03-14 RX ADMIN — CHLORHEXIDINE GLUCONATE 15 ML: 1.2 RINSE ORAL at 20:15

## 2024-03-14 RX ADMIN — APIXABAN 5 MG: 5 TABLET, FILM COATED ORAL at 07:53

## 2024-03-14 RX ADMIN — PIPERACILLIN AND TAZOBACTAM 4500 MG: 4; .5 INJECTION, POWDER, FOR SOLUTION INTRAVENOUS at 20:16

## 2024-03-14 RX ADMIN — METOPROLOL TARTRATE 37.5 MG: 25 TABLET, FILM COATED ORAL at 20:14

## 2024-03-14 RX ADMIN — ACETAMINOPHEN 650 MG: 325 TABLET ORAL at 14:09

## 2024-03-14 RX ADMIN — PANCRELIPASE LIPASE, PANCRELIPASE PROTEASE, PANCRELIPASE AMYLASE 5000 UNITS: 5000; 17000; 24000 CAPSULE, DELAYED RELEASE ORAL at 08:03

## 2024-03-14 RX ADMIN — ASPIRIN 81 MG CHEWABLE TABLET 81 MG: 81 TABLET CHEWABLE at 07:54

## 2024-03-14 ASSESSMENT — PULMONARY FUNCTION TESTS
PIF_VALUE: 26
PIF_VALUE: 23
PIF_VALUE: 15
PIF_VALUE: 27
PIF_VALUE: 21
PIF_VALUE: 25
PIF_VALUE: 13
PIF_VALUE: 24
PIF_VALUE: 24
PIF_VALUE: 13
PIF_VALUE: 35
PIF_VALUE: 23
PIF_VALUE: 13
PIF_VALUE: 33
PIF_VALUE: 28
PIF_VALUE: 22
PIF_VALUE: 16
PIF_VALUE: 24
PIF_VALUE: 34
PIF_VALUE: 25
PIF_VALUE: 22
PIF_VALUE: 13
PIF_VALUE: 21
PIF_VALUE: 40
PIF_VALUE: 15
PIF_VALUE: 25
PIF_VALUE: 32
PIF_VALUE: 34

## 2024-03-14 ASSESSMENT — PAIN SCALES - GENERAL: PAINLEVEL_OUTOF10: 1

## 2024-03-14 NOTE — CARE COORDINATION
Care Coordination: LOS 2 day. Intubated 3/12/24.  Per nephrology- renal function w/o recovery.  Plan to proceed with HD today. Vent, fentanyl, hemoaccess, NGT/TF,restraints. Back door referral to [facility] to follow  Referral faxed to [facility] for possible need outpt  [phone].  [facility] but also has Medicare and BCBS GM retiree as needed for transition of care needs    Electronically signed by Patricia Givens RN on 3/14/2024 at 11:43 AM                     Proration Entered by DIMA BOYD on Wed Mar 13, 2024  5:13 PM    Expected Patient Liability before the change:  1. VACCN OPTUM - VACCN OPTUM [[id number]] ------ Copay: , Coinsurance: , Deductible:  2. MEDICARE - MEDICARE PART A AND B [[id number]] ------ Copay: , Coinsurance: , Deductible:  3. BCBS - BCBS OUT OF STATE [[id number]] ------ Copay: , Coinsurance: , Deductible:

## 2024-03-14 NOTE — FLOWSHEET NOTE
03/14/24 1300   Vital Signs   /85   Temp 100 °F (37.8 °C)   Pulse 80   Respirations 21   SpO2 100 %   Weight - Scale 92.5 kg (204 lb)   Percent Weight Change -1.45   Post-Hemodialysis Assessment   Machine Disinfection Process Acid/Vinegar Clean;Heat Disinfect;Exterior Machine Disinfection   Rinseback Volume (ml) 300 ml   Blood Volume Processed (Liters) 42 L   Dialyzer Clearance Lightly streaked   Duration of Treatment (minutes) 180 minutes   Hemodialysis Intake (ml) 500 ml   Hemodialysis Output (ml) 2300 ml   NET Removed (ml) 1800   Tolerated Treatment Good   Patient Response to Treatment Tolerated well   Bilateral Breath Sounds Diminished   Edema Generalized   Edema Generalized Trace   Physician Notified No   Time Off 1235   Patient Disposition Remain in ICU/ED   Observations & Evaluations   Level of Consciousness 1   Heart Rhythm Irregular   Respiratory Quality/Effort Unlabored   FiO2  45 %   Skin Color Pink   Skin Condition/Temp Warm;Dry   Abdomen Inspection Soft   Bowel Sounds (All Quadrants) Active   Comments Tolerated well, -1800cc net albumin given , L groin dressing changed, not  bleeding currently . -10% blood volume

## 2024-03-15 ENCOUNTER — APPOINTMENT (OUTPATIENT)
Dept: GENERAL RADIOLOGY | Age: 74
DRG: 637 | End: 2024-03-15
Payer: OTHER GOVERNMENT

## 2024-03-15 LAB
AADO2: 81.3 MMHG
ALBUMIN SERPL-MCNC: 2.9 G/DL (ref 3.5–5.2)
ALP SERPL-CCNC: 107 U/L (ref 40–129)
ALT SERPL-CCNC: 9 U/L (ref 0–40)
ANION GAP SERPL CALCULATED.3IONS-SCNC: 17 MMOL/L (ref 7–16)
AST SERPL-CCNC: 15 U/L (ref 0–39)
B-OH-BUTYR SERPL-MCNC: 1.82 MMOL/L (ref 0.02–0.27)
B.E.: -0.8 MMOL/L (ref -3–3)
B.E.: 1.9 MMOL/L (ref -3–3)
BACTERIA URNS QL MICRO: ABNORMAL
BASOPHILS # BLD: 0.03 K/UL (ref 0–0.2)
BASOPHILS NFR BLD: 0 % (ref 0–2)
BILIRUB SERPL-MCNC: 0.7 MG/DL (ref 0–1.2)
BILIRUB UR QL STRIP: NEGATIVE
BNP SERPL-MCNC: 9480 PG/ML (ref 0–450)
BUN SERPL-MCNC: 46 MG/DL (ref 6–23)
CALCIUM SERPL-MCNC: 8.5 MG/DL (ref 8.6–10.2)
CHLORIDE SERPL-SCNC: 99 MMOL/L (ref 98–107)
CLARITY UR: CLEAR
CO2 SERPL-SCNC: 22 MMOL/L (ref 22–29)
COHB: 0.4 % (ref 0–1.5)
COHB: 0.7 % (ref 0–1.5)
COLOR UR: YELLOW
CREAT SERPL-MCNC: 4.2 MG/DL (ref 0.7–1.2)
CRITICAL: ABNORMAL
CRITICAL: ABNORMAL
DATE ANALYZED: ABNORMAL
DATE ANALYZED: ABNORMAL
DATE OF COLLECTION: ABNORMAL
DATE OF COLLECTION: ABNORMAL
EOSINOPHIL # BLD: 0.09 K/UL (ref 0.05–0.5)
EOSINOPHILS RELATIVE PERCENT: 1 % (ref 0–6)
ERYTHROCYTE [DISTWIDTH] IN BLOOD BY AUTOMATED COUNT: 15 % (ref 11.5–15)
FERRITIN SERPL-MCNC: 245 NG/ML
FIO2: 40 %
FOLATE SERPL-MCNC: 11.8 NG/ML (ref 4.8–24.2)
GFR SERPL CREATININE-BSD FRML MDRD: 14 ML/MIN/1.73M2
GLUCOSE BLD-MCNC: 186 MG/DL (ref 74–99)
GLUCOSE BLD-MCNC: 211 MG/DL (ref 74–99)
GLUCOSE BLD-MCNC: 237 MG/DL (ref 74–99)
GLUCOSE BLD-MCNC: 238 MG/DL (ref 74–99)
GLUCOSE BLD-MCNC: 272 MG/DL (ref 74–99)
GLUCOSE SERPL-MCNC: 220 MG/DL (ref 74–99)
GLUCOSE UR STRIP-MCNC: 100 MG/DL
HCO3: 22.8 MMOL/L (ref 22–26)
HCO3: 24.4 MMOL/L (ref 22–26)
HCT VFR BLD AUTO: 23.5 % (ref 37–54)
HGB BLD-MCNC: 7.9 G/DL (ref 12.5–16.5)
HGB UR QL STRIP.AUTO: ABNORMAL
HHB: 1.5 % (ref 0–5)
HHB: 12.2 % (ref 0–5)
IMM GRANULOCYTES # BLD AUTO: 0.06 K/UL (ref 0–0.58)
IMM GRANULOCYTES NFR BLD: 1 % (ref 0–5)
IRON SATN MFR SERPL: 14 % (ref 20–55)
IRON SERPL-MCNC: 18 UG/DL (ref 59–158)
KETONES UR STRIP-MCNC: ABNORMAL MG/DL
LAB: ABNORMAL
LAB: ABNORMAL
LEUKOCYTE ESTERASE UR QL STRIP: ABNORMAL
LYMPHOCYTES NFR BLD: 0.65 K/UL (ref 1.5–4)
LYMPHOCYTES RELATIVE PERCENT: 9 % (ref 20–42)
Lab: 1840
Lab: 414
MAGNESIUM SERPL-MCNC: 2.1 MG/DL (ref 1.6–2.6)
MCH RBC QN AUTO: 27.7 PG (ref 26–35)
MCHC RBC AUTO-ENTMCNC: 33.6 G/DL (ref 32–34.5)
MCV RBC AUTO: 82.5 FL (ref 80–99.9)
METHB: 0.2 % (ref 0–1.5)
METHB: 0.7 % (ref 0–1.5)
MODE: ABNORMAL
MODE: AC
MONOCYTES NFR BLD: 0.79 K/UL (ref 0.1–0.95)
MONOCYTES NFR BLD: 11 % (ref 2–12)
NEUTROPHILS NFR BLD: 78 % (ref 43–80)
NEUTS SEG NFR BLD: 5.64 K/UL (ref 1.8–7.3)
NITRITE UR QL STRIP: NEGATIVE
O2 CONTENT: 10.9 ML/DL
O2 CONTENT: 12.6 ML/DL
O2 SATURATION: 87.6 % (ref 92–98.5)
O2 SATURATION: 98.5 % (ref 92–98.5)
O2HB: 86.4 % (ref 94–97)
O2HB: 97.9 % (ref 94–97)
OPERATOR ID: 3342
OPERATOR ID: 7490
PATIENT TEMP: 37 C
PATIENT TEMP: 37 C
PCO2: 30.3 MMHG (ref 35–45)
PCO2: 33.3 MMHG (ref 35–45)
PEEP/CPAP: 8 CMH2O
PFO2: 3.98 MMHG/%
PH BLOOD GAS: 7.45 (ref 7.35–7.45)
PH BLOOD GAS: 7.52 (ref 7.35–7.45)
PH UR STRIP: 6 [PH] (ref 5–9)
PHOSPHATE SERPL-MCNC: 4.3 MG/DL (ref 2.5–4.5)
PLATELET # BLD AUTO: 211 K/UL (ref 130–450)
PMV BLD AUTO: 11.4 FL (ref 7–12)
PO2: 159.1 MMHG (ref 75–100)
PO2: 51.8 MMHG (ref 75–100)
POTASSIUM SERPL-SCNC: 3.9 MMOL/L (ref 3.5–5)
PROCALCITONIN SERPL-MCNC: 4.53 NG/ML (ref 0–0.08)
PROT SERPL-MCNC: 5.7 G/DL (ref 6.4–8.3)
PROT UR STRIP-MCNC: 100 MG/DL
RBC # BLD AUTO: 2.85 M/UL (ref 3.8–5.8)
RBC #/AREA URNS HPF: ABNORMAL /HPF
RI(T): 0.51
RR MECHANICAL: 18 B/MIN
SODIUM SERPL-SCNC: 138 MMOL/L (ref 132–146)
SOURCE, BLOOD GAS: ABNORMAL
SOURCE, BLOOD GAS: ABNORMAL
SP GR UR STRIP: 1.02 (ref 1–1.03)
THB: 8.9 G/DL (ref 11.5–16.5)
THB: 8.9 G/DL (ref 11.5–16.5)
TIBC SERPL-MCNC: 127 UG/DL (ref 250–450)
TIME ANALYZED: 1842
TIME ANALYZED: 416
UROBILINOGEN UR STRIP-ACNC: 1 EU/DL (ref 0–1)
VIT B12 SERPL-MCNC: 1689 PG/ML (ref 211–946)
VT MECHANICAL: 450 ML
WBC #/AREA URNS HPF: ABNORMAL /HPF
WBC OTHER # BLD: 7.3 K/UL (ref 4.5–11.5)

## 2024-03-15 PROCEDURE — 87070 CULTURE OTHR SPECIMN AEROBIC: CPT

## 2024-03-15 PROCEDURE — 2700000000 HC OXYGEN THERAPY PER DAY

## 2024-03-15 PROCEDURE — 84145 PROCALCITONIN (PCT): CPT

## 2024-03-15 PROCEDURE — 51702 INSERT TEMP BLADDER CATH: CPT

## 2024-03-15 PROCEDURE — 81001 URINALYSIS AUTO W/SCOPE: CPT

## 2024-03-15 PROCEDURE — 82607 VITAMIN B-12: CPT

## 2024-03-15 PROCEDURE — 2500000003 HC RX 250 WO HCPCS: Performed by: INTERNAL MEDICINE

## 2024-03-15 PROCEDURE — 6360000002 HC RX W HCPCS

## 2024-03-15 PROCEDURE — 87040 BLOOD CULTURE FOR BACTERIA: CPT

## 2024-03-15 PROCEDURE — 2580000003 HC RX 258

## 2024-03-15 PROCEDURE — 99291 CRITICAL CARE FIRST HOUR: CPT | Performed by: INTERNAL MEDICINE

## 2024-03-15 PROCEDURE — 83540 ASSAY OF IRON: CPT

## 2024-03-15 PROCEDURE — 80053 COMPREHEN METABOLIC PANEL: CPT

## 2024-03-15 PROCEDURE — 94640 AIRWAY INHALATION TREATMENT: CPT

## 2024-03-15 PROCEDURE — 71045 X-RAY EXAM CHEST 1 VIEW: CPT

## 2024-03-15 PROCEDURE — 82728 ASSAY OF FERRITIN: CPT

## 2024-03-15 PROCEDURE — 83880 ASSAY OF NATRIURETIC PEPTIDE: CPT

## 2024-03-15 PROCEDURE — 84100 ASSAY OF PHOSPHORUS: CPT

## 2024-03-15 PROCEDURE — 2000000000 HC ICU R&B

## 2024-03-15 PROCEDURE — 83735 ASSAY OF MAGNESIUM: CPT

## 2024-03-15 PROCEDURE — 6370000000 HC RX 637 (ALT 250 FOR IP)

## 2024-03-15 PROCEDURE — 2580000003 HC RX 258: Performed by: INTERNAL MEDICINE

## 2024-03-15 PROCEDURE — A4216 STERILE WATER/SALINE, 10 ML: HCPCS

## 2024-03-15 PROCEDURE — 85025 COMPLETE CBC W/AUTO DIFF WBC: CPT

## 2024-03-15 PROCEDURE — 6370000000 HC RX 637 (ALT 250 FOR IP): Performed by: INTERNAL MEDICINE

## 2024-03-15 PROCEDURE — 90935 HEMODIALYSIS ONE EVALUATION: CPT

## 2024-03-15 PROCEDURE — 99232 SBSQ HOSP IP/OBS MODERATE 35: CPT | Performed by: INTERNAL MEDICINE

## 2024-03-15 PROCEDURE — 6360000002 HC RX W HCPCS: Performed by: INTERNAL MEDICINE

## 2024-03-15 PROCEDURE — 82746 ASSAY OF FOLIC ACID SERUM: CPT

## 2024-03-15 PROCEDURE — 83550 IRON BINDING TEST: CPT

## 2024-03-15 PROCEDURE — 82805 BLOOD GASES W/O2 SATURATION: CPT

## 2024-03-15 PROCEDURE — 87205 SMEAR GRAM STAIN: CPT

## 2024-03-15 PROCEDURE — 82962 GLUCOSE BLOOD TEST: CPT

## 2024-03-15 PROCEDURE — 36415 COLL VENOUS BLD VENIPUNCTURE: CPT

## 2024-03-15 PROCEDURE — 99231 SBSQ HOSP IP/OBS SF/LOW 25: CPT | Performed by: INTERNAL MEDICINE

## 2024-03-15 PROCEDURE — C9113 INJ PANTOPRAZOLE SODIUM, VIA: HCPCS

## 2024-03-15 PROCEDURE — 82010 KETONE BODYS QUAN: CPT

## 2024-03-15 PROCEDURE — 94003 VENT MGMT INPAT SUBQ DAY: CPT

## 2024-03-15 RX ORDER — BUMETANIDE 0.25 MG/ML
2 INJECTION INTRAMUSCULAR; INTRAVENOUS 2 TIMES DAILY
Status: DISCONTINUED | OUTPATIENT
Start: 2024-03-15 | End: 2024-03-17

## 2024-03-15 RX ORDER — HYDRALAZINE HYDROCHLORIDE 20 MG/ML
10 INJECTION INTRAMUSCULAR; INTRAVENOUS EVERY 6 HOURS PRN
Status: DISCONTINUED | OUTPATIENT
Start: 2024-03-15 | End: 2024-03-19 | Stop reason: HOSPADM

## 2024-03-15 RX ORDER — MIDAZOLAM HYDROCHLORIDE 2 MG/2ML
2 INJECTION, SOLUTION INTRAMUSCULAR; INTRAVENOUS ONCE
Status: COMPLETED | OUTPATIENT
Start: 2024-03-15 | End: 2024-03-15

## 2024-03-15 RX ORDER — ZIPRASIDONE MESYLATE 20 MG/ML
INJECTION, POWDER, LYOPHILIZED, FOR SOLUTION INTRAMUSCULAR
Status: DISCONTINUED
Start: 2024-03-15 | End: 2024-03-16

## 2024-03-15 RX ORDER — PHENOBARBITAL SODIUM 65 MG/ML
50 INJECTION, SOLUTION INTRAMUSCULAR; INTRAVENOUS ONCE
Status: COMPLETED | OUTPATIENT
Start: 2024-03-15 | End: 2024-03-15

## 2024-03-15 RX ORDER — INSULIN LISPRO 100 [IU]/ML
0-12 INJECTION, SOLUTION INTRAVENOUS; SUBCUTANEOUS EVERY 4 HOURS
Status: DISCONTINUED | OUTPATIENT
Start: 2024-03-15 | End: 2024-03-16

## 2024-03-15 RX ORDER — INSULIN GLARGINE 100 [IU]/ML
12 INJECTION, SOLUTION SUBCUTANEOUS DAILY
Status: DISCONTINUED | OUTPATIENT
Start: 2024-03-15 | End: 2024-03-19 | Stop reason: HOSPADM

## 2024-03-15 RX ORDER — DIMETHICONE, OXYBENZONE, AND PADIMATE O 2; 2.5; 6.6 G/100G; G/100G; G/100G
STICK TOPICAL PRN
Status: DISCONTINUED | OUTPATIENT
Start: 2024-03-15 | End: 2024-03-19 | Stop reason: HOSPADM

## 2024-03-15 RX ORDER — MIDAZOLAM HYDROCHLORIDE 1 MG/ML
INJECTION INTRAMUSCULAR; INTRAVENOUS
Status: COMPLETED
Start: 2024-03-15 | End: 2024-03-15

## 2024-03-15 RX ADMIN — INSULIN LISPRO 1 UNITS: 100 INJECTION, SOLUTION INTRAVENOUS; SUBCUTANEOUS at 11:59

## 2024-03-15 RX ADMIN — SODIUM CHLORIDE, PRESERVATIVE FREE 10 ML: 5 INJECTION INTRAVENOUS at 19:58

## 2024-03-15 RX ADMIN — IPRATROPIUM BROMIDE AND ALBUTEROL SULFATE 1 DOSE: .5; 3 SOLUTION RESPIRATORY (INHALATION) at 04:24

## 2024-03-15 RX ADMIN — INSULIN LISPRO 2 UNITS: 100 INJECTION, SOLUTION INTRAVENOUS; SUBCUTANEOUS at 04:19

## 2024-03-15 RX ADMIN — CHLORHEXIDINE GLUCONATE 15 ML: 1.2 RINSE ORAL at 11:49

## 2024-03-15 RX ADMIN — MIDAZOLAM 2 MG: 1 INJECTION INTRAMUSCULAR; INTRAVENOUS at 11:28

## 2024-03-15 RX ADMIN — Medication 75 MCG/HR: at 04:20

## 2024-03-15 RX ADMIN — PIPERACILLIN AND TAZOBACTAM 4500 MG: 4; .5 INJECTION, POWDER, FOR SOLUTION INTRAVENOUS at 12:03

## 2024-03-15 RX ADMIN — ACETAMINOPHEN 650 MG: 325 TABLET ORAL at 01:42

## 2024-03-15 RX ADMIN — SODIUM CHLORIDE, PRESERVATIVE FREE 10 ML: 5 INJECTION INTRAVENOUS at 12:00

## 2024-03-15 RX ADMIN — SODIUM CHLORIDE 125 MG: 9 INJECTION, SOLUTION INTRAVENOUS at 15:02

## 2024-03-15 RX ADMIN — PIPERACILLIN AND TAZOBACTAM 4500 MG: 4; .5 INJECTION, POWDER, FOR SOLUTION INTRAVENOUS at 20:29

## 2024-03-15 RX ADMIN — SODIUM CHLORIDE, PRESERVATIVE FREE 40 MG: 5 INJECTION INTRAVENOUS at 11:58

## 2024-03-15 RX ADMIN — THIAMINE HYDROCHLORIDE 500 MG: 100 INJECTION, SOLUTION INTRAMUSCULAR; INTRAVENOUS at 19:57

## 2024-03-15 RX ADMIN — INSULIN LISPRO 4 UNITS: 100 INJECTION, SOLUTION INTRAVENOUS; SUBCUTANEOUS at 16:00

## 2024-03-15 RX ADMIN — PANCRELIPASE LIPASE, PANCRELIPASE PROTEASE, PANCRELIPASE AMYLASE 5000 UNITS: 5000; 17000; 24000 CAPSULE, DELAYED RELEASE ORAL at 11:47

## 2024-03-15 RX ADMIN — BUMETANIDE 2 MG: 0.25 INJECTION, SOLUTION INTRAMUSCULAR; INTRAVENOUS at 19:52

## 2024-03-15 RX ADMIN — MINERAL OIL, WHITE PETROLATUM: .03; .94 OINTMENT OPHTHALMIC at 11:59

## 2024-03-15 RX ADMIN — METOPROLOL TARTRATE 37.5 MG: 25 TABLET, FILM COATED ORAL at 11:56

## 2024-03-15 RX ADMIN — INSULIN GLARGINE 12 UNITS: 100 INJECTION, SOLUTION SUBCUTANEOUS at 11:59

## 2024-03-15 RX ADMIN — ASPIRIN 81 MG CHEWABLE TABLET 81 MG: 81 TABLET CHEWABLE at 11:58

## 2024-03-15 RX ADMIN — PHENOBARBITAL SODIUM 50 MG: 65 INJECTION INTRAMUSCULAR at 18:17

## 2024-03-15 RX ADMIN — APIXABAN 5 MG: 5 TABLET, FILM COATED ORAL at 11:47

## 2024-03-15 RX ADMIN — ZIPRASIDONE MESYLATE 20 MG: 20 INJECTION, POWDER, LYOPHILIZED, FOR SOLUTION INTRAMUSCULAR at 18:16

## 2024-03-15 RX ADMIN — MIDAZOLAM HYDROCHLORIDE 2 MG: 2 INJECTION, SOLUTION INTRAMUSCULAR; INTRAVENOUS at 11:28

## 2024-03-15 RX ADMIN — HYDRALAZINE HYDROCHLORIDE 10 MG: 20 INJECTION INTRAMUSCULAR; INTRAVENOUS at 21:45

## 2024-03-15 RX ADMIN — INSULIN LISPRO 6 UNITS: 100 INJECTION, SOLUTION INTRAVENOUS; SUBCUTANEOUS at 19:52

## 2024-03-15 RX ADMIN — ACETAMINOPHEN 650 MG: 325 TABLET ORAL at 11:48

## 2024-03-15 RX ADMIN — INSULIN LISPRO 2 UNITS: 100 INJECTION, SOLUTION INTRAVENOUS; SUBCUTANEOUS at 00:06

## 2024-03-15 ASSESSMENT — PAIN SCALES - GENERAL
PAINLEVEL_OUTOF10: 0

## 2024-03-15 ASSESSMENT — PULMONARY FUNCTION TESTS
PIF_VALUE: 36
PIF_VALUE: 30
PIF_VALUE: 35
PIF_VALUE: 36
PIF_VALUE: 32
PIF_VALUE: 42
PIF_VALUE: 36
PIF_VALUE: 47
PIF_VALUE: 34
PIF_VALUE: 37
PIF_VALUE: 50
PIF_VALUE: 44
PIF_VALUE: 16
PIF_VALUE: 36
PIF_VALUE: 37
PIF_VALUE: 17

## 2024-03-15 NOTE — FLOWSHEET NOTE
Pt continues to reach for lines/lifesaving medical devices when restraints are released. Soft bilateral wrist restraints continued for pt safety.       03/14/24 2035   Restraint Order   Length of Order (Only Document With Each New Order) Continuous Until 2359 of Next Calendar Day   Attending Physician Notified (Only Document With Each New Order) Yes   Order Upon Application (Only Document With Each New Order) Yes   NV Length of Order 27.42   Restraint Type   Non-Violent Restraint Type from Order question Soft Restraint Bilateral Wrist   Soft Restraint Bilateral Wrist CONTINUED   Assessment   Less Restrictive Alternative Repositioning;Re-orientation;Verbal re-direction;Review medication (side effects);Pain management   Special Consideration/Risk Factors None   Justification from Order   Clinical Justification Pulling lines tubes dressing equipment   Education   Discontinuation Criteria Absence of behavior that required restraint   Criteria Explained Yes   Patient's Response No evidence of learning   Family Notification Already completed   Restraint  Monitoring Q2 Hours   Continued Justification  Continues to meet order criteria   Visual/Safety Check  Agitated/Restless   Circulation No signs of injury   Range of Motion Performed   Fluids NPO   Food/Meal Meal/Enteral feeding   Elimination No   Reason Patient did not Eliminate Urinary catheter   Care Plan Interventions   Remains free of injury from restraints (restraint for interference with medical device) Determine that other, less restrictive measures have been tried or would not be effective before applying the restraint;Evaluate the patient's condition at the time of restraint application;Inform patient/family regarding the reason for restraint;Every 2 hours: Monitor safety, psychosocial status, comfort, nutrition and hydration

## 2024-03-15 NOTE — FLOWSHEET NOTE
03/15/24 1140   Vital Signs   /72   Temp (!) 100.8 °F (38.2 °C)   Pulse (!) 104   Respirations 16   Weight - Scale 86.1 kg (189 lb 13.1 oz)   Weight Method Bed scale   Percent Weight Change -3.89   Pain Assessment   Pain Assessment Critical Care Pain Observation Tool (CPOT)   Pain Level 0   Post-Hemodialysis Assessment   Post-Treatment Procedures Blood returned;Catheter capped, clamped with Citrate x 2 ports   Machine Disinfection Process Exterior Machine Disinfection   Blood Volume Processed (Liters) 77.4 L   Dialyzer Clearance Lightly streaked   Duration of Treatment (minutes) 240 minutes   Hemodialysis Intake (ml) 300 ml   Hemodialysis Output (ml) 2100 ml   NET Removed (ml) 1800   Tolerated Treatment Good   Bilateral Breath Sounds Diminished   Edema Right upper extremity;Left upper extremity;Right lower extremity;Left lower extremity   RUE Edema Trace   LUE Edema Trace   RLE Edema Trace   LLE Edema Trace   Physician Notified Yes   Time Off 1125   Patient Disposition Remain in ICU/ED   Observations & Evaluations   Level of Consciousness 1   Heart Rhythm Irregular   Respiratory Quality/Effort Unlabored   O2 Device Ventilator   Skin Color Pink   Skin Condition/Temp Dry;Warm   Abdomen Inspection Soft   Bowel Sounds (All Quadrants) Active

## 2024-03-16 LAB
ALBUMIN SERPL-MCNC: 3.2 G/DL (ref 3.5–5.2)
ALP SERPL-CCNC: 114 U/L (ref 40–129)
ALT SERPL-CCNC: 12 U/L (ref 0–40)
ANION GAP SERPL CALCULATED.3IONS-SCNC: 16 MMOL/L (ref 7–16)
AST SERPL-CCNC: 21 U/L (ref 0–39)
BASOPHILS # BLD: 0.02 K/UL (ref 0–0.2)
BASOPHILS NFR BLD: 0 % (ref 0–2)
BILIRUB SERPL-MCNC: 0.6 MG/DL (ref 0–1.2)
BUN SERPL-MCNC: 31 MG/DL (ref 6–23)
CALCIUM SERPL-MCNC: 9.3 MG/DL (ref 8.6–10.2)
CHLORIDE SERPL-SCNC: 100 MMOL/L (ref 98–107)
CO2 SERPL-SCNC: 23 MMOL/L (ref 22–29)
CREAT SERPL-MCNC: 3.5 MG/DL (ref 0.7–1.2)
EOSINOPHIL # BLD: 0.1 K/UL (ref 0.05–0.5)
EOSINOPHILS RELATIVE PERCENT: 1 % (ref 0–6)
ERYTHROCYTE [DISTWIDTH] IN BLOOD BY AUTOMATED COUNT: 14.7 % (ref 11.5–15)
GFR SERPL CREATININE-BSD FRML MDRD: 17 ML/MIN/1.73M2
GLUCOSE BLD-MCNC: 133 MG/DL (ref 74–99)
GLUCOSE BLD-MCNC: 163 MG/DL (ref 74–99)
GLUCOSE BLD-MCNC: 197 MG/DL (ref 74–99)
GLUCOSE BLD-MCNC: 214 MG/DL (ref 74–99)
GLUCOSE SERPL-MCNC: 145 MG/DL (ref 74–99)
HCT VFR BLD AUTO: 26.4 % (ref 37–54)
HGB BLD-MCNC: 8.8 G/DL (ref 12.5–16.5)
IMM GRANULOCYTES # BLD AUTO: 0.05 K/UL (ref 0–0.58)
IMM GRANULOCYTES NFR BLD: 1 % (ref 0–5)
LYMPHOCYTES NFR BLD: 0.59 K/UL (ref 1.5–4)
LYMPHOCYTES RELATIVE PERCENT: 7 % (ref 20–42)
MAGNESIUM SERPL-MCNC: 2.3 MG/DL (ref 1.6–2.6)
MCH RBC QN AUTO: 27.3 PG (ref 26–35)
MCHC RBC AUTO-ENTMCNC: 33.3 G/DL (ref 32–34.5)
MCV RBC AUTO: 82 FL (ref 80–99.9)
MICROORGANISM SPEC CULT: ABNORMAL
MICROORGANISM/AGENT SPEC: ABNORMAL
MONOCYTES NFR BLD: 0.9 K/UL (ref 0.1–0.95)
MONOCYTES NFR BLD: 11 % (ref 2–12)
NEUTROPHILS NFR BLD: 81 % (ref 43–80)
NEUTS SEG NFR BLD: 6.9 K/UL (ref 1.8–7.3)
PHOSPHATE SERPL-MCNC: 4.1 MG/DL (ref 2.5–4.5)
PLATELET # BLD AUTO: 224 K/UL (ref 130–450)
PMV BLD AUTO: 11.3 FL (ref 7–12)
POTASSIUM SERPL-SCNC: 3.9 MMOL/L (ref 3.5–5)
PROT SERPL-MCNC: 6.6 G/DL (ref 6.4–8.3)
RBC # BLD AUTO: 3.22 M/UL (ref 3.8–5.8)
RBC # BLD: ABNORMAL 10*6/UL
SODIUM SERPL-SCNC: 139 MMOL/L (ref 132–146)
SPECIMEN DESCRIPTION: ABNORMAL
WBC OTHER # BLD: 8.6 K/UL (ref 4.5–11.5)

## 2024-03-16 PROCEDURE — 99231 SBSQ HOSP IP/OBS SF/LOW 25: CPT | Performed by: INTERNAL MEDICINE

## 2024-03-16 PROCEDURE — 85025 COMPLETE CBC W/AUTO DIFF WBC: CPT

## 2024-03-16 PROCEDURE — 2500000003 HC RX 250 WO HCPCS

## 2024-03-16 PROCEDURE — 6370000000 HC RX 637 (ALT 250 FOR IP)

## 2024-03-16 PROCEDURE — 2580000003 HC RX 258: Performed by: INTERNAL MEDICINE

## 2024-03-16 PROCEDURE — 93005 ELECTROCARDIOGRAM TRACING: CPT

## 2024-03-16 PROCEDURE — 99291 CRITICAL CARE FIRST HOUR: CPT | Performed by: INTERNAL MEDICINE

## 2024-03-16 PROCEDURE — 82962 GLUCOSE BLOOD TEST: CPT

## 2024-03-16 PROCEDURE — 2700000000 HC OXYGEN THERAPY PER DAY

## 2024-03-16 PROCEDURE — 2000000000 HC ICU R&B

## 2024-03-16 PROCEDURE — 80053 COMPREHEN METABOLIC PANEL: CPT

## 2024-03-16 PROCEDURE — 92610 EVALUATE SWALLOWING FUNCTION: CPT

## 2024-03-16 PROCEDURE — 99232 SBSQ HOSP IP/OBS MODERATE 35: CPT | Performed by: INTERNAL MEDICINE

## 2024-03-16 PROCEDURE — 6370000000 HC RX 637 (ALT 250 FOR IP): Performed by: INTERNAL MEDICINE

## 2024-03-16 PROCEDURE — 2580000003 HC RX 258

## 2024-03-16 PROCEDURE — 83735 ASSAY OF MAGNESIUM: CPT

## 2024-03-16 PROCEDURE — 84100 ASSAY OF PHOSPHORUS: CPT

## 2024-03-16 PROCEDURE — 6360000002 HC RX W HCPCS: Performed by: INTERNAL MEDICINE

## 2024-03-16 PROCEDURE — C9113 INJ PANTOPRAZOLE SODIUM, VIA: HCPCS

## 2024-03-16 PROCEDURE — 6360000002 HC RX W HCPCS

## 2024-03-16 PROCEDURE — A4216 STERILE WATER/SALINE, 10 ML: HCPCS

## 2024-03-16 RX ORDER — LORAZEPAM 2 MG/ML
2 INJECTION INTRAMUSCULAR EVERY 4 HOURS PRN
Status: DISCONTINUED | OUTPATIENT
Start: 2024-03-16 | End: 2024-03-17

## 2024-03-16 RX ORDER — INSULIN LISPRO 100 [IU]/ML
0-18 INJECTION, SOLUTION INTRAVENOUS; SUBCUTANEOUS EVERY 4 HOURS
Status: DISCONTINUED | OUTPATIENT
Start: 2024-03-16 | End: 2024-03-19 | Stop reason: HOSPADM

## 2024-03-16 RX ADMIN — METOPROLOL TARTRATE 37.5 MG: 25 TABLET, FILM COATED ORAL at 23:33

## 2024-03-16 RX ADMIN — THIAMINE HYDROCHLORIDE 500 MG: 100 INJECTION, SOLUTION INTRAMUSCULAR; INTRAVENOUS at 17:24

## 2024-03-16 RX ADMIN — PIPERACILLIN AND TAZOBACTAM 4500 MG: 4; .5 INJECTION, POWDER, FOR SOLUTION INTRAVENOUS at 20:39

## 2024-03-16 RX ADMIN — INSULIN LISPRO 4 UNITS: 100 INJECTION, SOLUTION INTRAVENOUS; SUBCUTANEOUS at 00:14

## 2024-03-16 RX ADMIN — LORAZEPAM 2 MG: 2 INJECTION INTRAMUSCULAR; INTRAVENOUS at 11:40

## 2024-03-16 RX ADMIN — SODIUM CHLORIDE 125 MG: 9 INJECTION, SOLUTION INTRAVENOUS at 10:04

## 2024-03-16 RX ADMIN — DEXMEDETOMIDINE 0.2 MCG/KG/HR: 100 INJECTION, SOLUTION INTRAVENOUS at 06:37

## 2024-03-16 RX ADMIN — BUMETANIDE 2 MG: 0.25 INJECTION, SOLUTION INTRAMUSCULAR; INTRAVENOUS at 08:36

## 2024-03-16 RX ADMIN — APIXABAN 5 MG: 5 TABLET, FILM COATED ORAL at 23:33

## 2024-03-16 RX ADMIN — PIPERACILLIN AND TAZOBACTAM 4500 MG: 4; .5 INJECTION, POWDER, FOR SOLUTION INTRAVENOUS at 11:47

## 2024-03-16 RX ADMIN — ATORVASTATIN CALCIUM 10 MG: 10 TABLET, FILM COATED ORAL at 23:33

## 2024-03-16 RX ADMIN — SODIUM CHLORIDE, PRESERVATIVE FREE 40 MG: 5 INJECTION INTRAVENOUS at 08:36

## 2024-03-16 RX ADMIN — BUMETANIDE 2 MG: 0.25 INJECTION, SOLUTION INTRAMUSCULAR; INTRAVENOUS at 20:40

## 2024-03-16 ASSESSMENT — PAIN SCALES - GENERAL
PAINLEVEL_OUTOF10: 0

## 2024-03-16 NOTE — FLOWSHEET NOTE
Pt pulled IV out & trying to crawl out of bed. Soft bilat wrist restraints applied for pt safety.       03/15/24 1949   Restraint Order   Length of Order (Only Document With Each New Order) Continuous Until 2359 of Next Calendar Day   Attending Physician Notified (Only Document With Each New Order) Yes   Order Upon Application (Only Document With Each New Order) Yes   NV Length of Order 28.18   Restraint Type   Non-Violent Restraint Type from Order question Soft Restraint Bilateral Wrist   Soft Restraint Bilateral Wrist START   Assessment   Less Restrictive Alternative Repositioning;Verbal re-direction;Re-orientation;Review medication (side effects)   Special Consideration/Risk Factors None   Justification from Order   Clinical Justification Pulling lines tubes dressing equipment   Education   Discontinuation Criteria Absence of behavior that required restraint   Criteria Explained Yes   Patient's Response No evidence of learning   Family Notification Already completed   Restraint  Monitoring Q2 Hours   Continued Justification  Continues to meet order criteria   Visual/Safety Check  Subdued   Circulation No signs of injury   Range of Motion Performed   Fluids Patient asleep   Food/Meal Patient asleep   Elimination No   Reason Patient did not Eliminate Urinary catheter  (external)   Care Plan Interventions   Remains free of injury from restraints (restraint for interference with medical device) Determine that other, less restrictive measures have been tried or would not be effective before applying the restraint;Evaluate the patient's condition at the time of restraint application;Inform patient/family regarding the reason for restraint;Every 2 hours: Monitor safety, psychosocial status, comfort, nutrition and hydration

## 2024-03-17 LAB
ALBUMIN SERPL-MCNC: 3 G/DL (ref 3.5–5.2)
ALP SERPL-CCNC: 97 U/L (ref 40–129)
ALT SERPL-CCNC: 14 U/L (ref 0–40)
ANION GAP SERPL CALCULATED.3IONS-SCNC: 16 MMOL/L (ref 7–16)
ANION GAP SERPL CALCULATED.3IONS-SCNC: 18 MMOL/L (ref 7–16)
AST SERPL-CCNC: 28 U/L (ref 0–39)
BASOPHILS # BLD: 0.03 K/UL (ref 0–0.2)
BASOPHILS NFR BLD: 0 % (ref 0–2)
BILIRUB SERPL-MCNC: 0.4 MG/DL (ref 0–1.2)
BUN SERPL-MCNC: 44 MG/DL (ref 6–23)
BUN SERPL-MCNC: 48 MG/DL (ref 6–23)
CALCIUM SERPL-MCNC: 8.8 MG/DL (ref 8.6–10.2)
CALCIUM SERPL-MCNC: 8.9 MG/DL (ref 8.6–10.2)
CHLORIDE SERPL-SCNC: 103 MMOL/L (ref 98–107)
CHLORIDE SERPL-SCNC: 105 MMOL/L (ref 98–107)
CO2 SERPL-SCNC: 20 MMOL/L (ref 22–29)
CO2 SERPL-SCNC: 23 MMOL/L (ref 22–29)
CREAT SERPL-MCNC: 4.9 MG/DL (ref 0.7–1.2)
CREAT SERPL-MCNC: 5.2 MG/DL (ref 0.7–1.2)
EKG ATRIAL RATE: 120 BPM
EKG Q-T INTERVAL: 346 MS
EKG QRS DURATION: 82 MS
EKG QTC CALCULATION (BAZETT): 444 MS
EKG R AXIS: 3 DEGREES
EKG T AXIS: 30 DEGREES
EKG VENTRICULAR RATE: 99 BPM
EOSINOPHIL # BLD: 0.22 K/UL (ref 0.05–0.5)
EOSINOPHILS RELATIVE PERCENT: 3 % (ref 0–6)
ERYTHROCYTE [DISTWIDTH] IN BLOOD BY AUTOMATED COUNT: 14.6 % (ref 11.5–15)
GFR SERPL CREATININE-BSD FRML MDRD: 11 ML/MIN/1.73M2
GFR SERPL CREATININE-BSD FRML MDRD: 12 ML/MIN/1.73M2
GLUCOSE BLD-MCNC: 123 MG/DL (ref 74–99)
GLUCOSE BLD-MCNC: 127 MG/DL (ref 74–99)
GLUCOSE BLD-MCNC: 179 MG/DL (ref 74–99)
GLUCOSE BLD-MCNC: 202 MG/DL (ref 74–99)
GLUCOSE BLD-MCNC: 203 MG/DL (ref 74–99)
GLUCOSE SERPL-MCNC: 113 MG/DL (ref 74–99)
GLUCOSE SERPL-MCNC: 210 MG/DL (ref 74–99)
HCT VFR BLD AUTO: 24.9 % (ref 37–54)
HGB BLD-MCNC: 8.1 G/DL (ref 12.5–16.5)
IMM GRANULOCYTES # BLD AUTO: 0.04 K/UL (ref 0–0.58)
IMM GRANULOCYTES NFR BLD: 1 % (ref 0–5)
LYMPHOCYTES NFR BLD: 0.64 K/UL (ref 1.5–4)
LYMPHOCYTES RELATIVE PERCENT: 8 % (ref 20–42)
MAGNESIUM SERPL-MCNC: 2.5 MG/DL (ref 1.6–2.6)
MCH RBC QN AUTO: 27.3 PG (ref 26–35)
MCHC RBC AUTO-ENTMCNC: 32.5 G/DL (ref 32–34.5)
MCV RBC AUTO: 83.8 FL (ref 80–99.9)
MICROORGANISM SPEC CULT: NORMAL
MICROORGANISM SPEC CULT: NORMAL
MONOCYTES NFR BLD: 0.74 K/UL (ref 0.1–0.95)
MONOCYTES NFR BLD: 10 % (ref 2–12)
NEUTROPHILS NFR BLD: 78 % (ref 43–80)
NEUTS SEG NFR BLD: 5.94 K/UL (ref 1.8–7.3)
PHOSPHATE SERPL-MCNC: 5.8 MG/DL (ref 2.5–4.5)
PLATELET # BLD AUTO: 200 K/UL (ref 130–450)
PMV BLD AUTO: 11.1 FL (ref 7–12)
POTASSIUM SERPL-SCNC: 4.5 MMOL/L (ref 3.5–5)
POTASSIUM SERPL-SCNC: 5.2 MMOL/L (ref 3.5–5)
PROT SERPL-MCNC: 6.3 G/DL (ref 6.4–8.3)
RBC # BLD AUTO: 2.97 M/UL (ref 3.8–5.8)
SERVICE CMNT-IMP: NORMAL
SERVICE CMNT-IMP: NORMAL
SODIUM SERPL-SCNC: 141 MMOL/L (ref 132–146)
SODIUM SERPL-SCNC: 144 MMOL/L (ref 132–146)
SPECIMEN DESCRIPTION: NORMAL
SPECIMEN DESCRIPTION: NORMAL
WBC OTHER # BLD: 7.6 K/UL (ref 4.5–11.5)

## 2024-03-17 PROCEDURE — 6360000002 HC RX W HCPCS

## 2024-03-17 PROCEDURE — 6360000002 HC RX W HCPCS: Performed by: INTERNAL MEDICINE

## 2024-03-17 PROCEDURE — 83735 ASSAY OF MAGNESIUM: CPT

## 2024-03-17 PROCEDURE — 82962 GLUCOSE BLOOD TEST: CPT

## 2024-03-17 PROCEDURE — 6370000000 HC RX 637 (ALT 250 FOR IP): Performed by: INTERNAL MEDICINE

## 2024-03-17 PROCEDURE — C9113 INJ PANTOPRAZOLE SODIUM, VIA: HCPCS

## 2024-03-17 PROCEDURE — 2580000003 HC RX 258

## 2024-03-17 PROCEDURE — 99233 SBSQ HOSP IP/OBS HIGH 50: CPT | Performed by: INTERNAL MEDICINE

## 2024-03-17 PROCEDURE — 2700000000 HC OXYGEN THERAPY PER DAY

## 2024-03-17 PROCEDURE — 84100 ASSAY OF PHOSPHORUS: CPT

## 2024-03-17 PROCEDURE — 93010 ELECTROCARDIOGRAM REPORT: CPT | Performed by: INTERNAL MEDICINE

## 2024-03-17 PROCEDURE — 2580000003 HC RX 258: Performed by: INTERNAL MEDICINE

## 2024-03-17 PROCEDURE — 2500000003 HC RX 250 WO HCPCS

## 2024-03-17 PROCEDURE — 85025 COMPLETE CBC W/AUTO DIFF WBC: CPT

## 2024-03-17 PROCEDURE — 6370000000 HC RX 637 (ALT 250 FOR IP)

## 2024-03-17 PROCEDURE — 2000000000 HC ICU R&B

## 2024-03-17 PROCEDURE — 99231 SBSQ HOSP IP/OBS SF/LOW 25: CPT | Performed by: INTERNAL MEDICINE

## 2024-03-17 PROCEDURE — 80048 BASIC METABOLIC PNL TOTAL CA: CPT

## 2024-03-17 PROCEDURE — 80053 COMPREHEN METABOLIC PANEL: CPT

## 2024-03-17 RX ORDER — BUMETANIDE 1 MG/1
2 TABLET ORAL 2 TIMES DAILY
Status: DISCONTINUED | OUTPATIENT
Start: 2024-03-17 | End: 2024-03-19 | Stop reason: HOSPADM

## 2024-03-17 RX ADMIN — SODIUM CHLORIDE 125 MG: 9 INJECTION, SOLUTION INTRAVENOUS at 09:38

## 2024-03-17 RX ADMIN — METOPROLOL TARTRATE 37.5 MG: 25 TABLET, FILM COATED ORAL at 09:08

## 2024-03-17 RX ADMIN — SODIUM ZIRCONIUM CYCLOSILICATE 10 G: 10 POWDER, FOR SUSPENSION ORAL at 20:11

## 2024-03-17 RX ADMIN — INSULIN LISPRO 3 UNITS: 100 INJECTION, SOLUTION INTRAVENOUS; SUBCUTANEOUS at 00:22

## 2024-03-17 RX ADMIN — PIPERACILLIN AND TAZOBACTAM 4500 MG: 4; .5 INJECTION, POWDER, FOR SOLUTION INTRAVENOUS at 20:14

## 2024-03-17 RX ADMIN — DEXMEDETOMIDINE 0.6 MCG/KG/HR: 100 INJECTION, SOLUTION INTRAVENOUS at 01:35

## 2024-03-17 RX ADMIN — SODIUM CHLORIDE, PRESERVATIVE FREE 40 MG: 5 INJECTION INTRAVENOUS at 09:09

## 2024-03-17 RX ADMIN — APIXABAN 5 MG: 5 TABLET, FILM COATED ORAL at 20:13

## 2024-03-17 RX ADMIN — ATORVASTATIN CALCIUM 10 MG: 10 TABLET, FILM COATED ORAL at 20:13

## 2024-03-17 RX ADMIN — ASPIRIN 81 MG CHEWABLE TABLET 81 MG: 81 TABLET CHEWABLE at 09:08

## 2024-03-17 RX ADMIN — BUMETANIDE 2 MG: 0.25 INJECTION, SOLUTION INTRAMUSCULAR; INTRAVENOUS at 09:08

## 2024-03-17 RX ADMIN — METOPROLOL TARTRATE 37.5 MG: 25 TABLET, FILM COATED ORAL at 20:11

## 2024-03-17 RX ADMIN — INSULIN LISPRO 6 UNITS: 100 INJECTION, SOLUTION INTRAVENOUS; SUBCUTANEOUS at 16:55

## 2024-03-17 RX ADMIN — THIAMINE HYDROCHLORIDE 500 MG: 100 INJECTION, SOLUTION INTRAMUSCULAR; INTRAVENOUS at 09:03

## 2024-03-17 RX ADMIN — APIXABAN 5 MG: 5 TABLET, FILM COATED ORAL at 09:08

## 2024-03-17 RX ADMIN — INSULIN LISPRO 6 UNITS: 100 INJECTION, SOLUTION INTRAVENOUS; SUBCUTANEOUS at 21:00

## 2024-03-17 RX ADMIN — LORAZEPAM 2 MG: 2 INJECTION INTRAMUSCULAR; INTRAVENOUS at 02:57

## 2024-03-17 RX ADMIN — INSULIN GLARGINE 12 UNITS: 100 INJECTION, SOLUTION SUBCUTANEOUS at 09:09

## 2024-03-17 RX ADMIN — PANCRELIPASE LIPASE, PANCRELIPASE PROTEASE, PANCRELIPASE AMYLASE 5000 UNITS: 5000; 17000; 24000 CAPSULE, DELAYED RELEASE ORAL at 09:57

## 2024-03-17 RX ADMIN — SODIUM CHLORIDE, PRESERVATIVE FREE 10 ML: 5 INJECTION INTRAVENOUS at 10:01

## 2024-03-17 RX ADMIN — PIPERACILLIN AND TAZOBACTAM 4500 MG: 4; .5 INJECTION, POWDER, FOR SOLUTION INTRAVENOUS at 10:00

## 2024-03-17 RX ADMIN — BUMETANIDE 2 MG: 1 TABLET ORAL at 21:00

## 2024-03-17 RX ADMIN — PANCRELIPASE LIPASE, PANCRELIPASE PROTEASE, PANCRELIPASE AMYLASE 5000 UNITS: 5000; 17000; 24000 CAPSULE, DELAYED RELEASE ORAL at 16:50

## 2024-03-17 RX ADMIN — PANCRELIPASE LIPASE, PANCRELIPASE PROTEASE, PANCRELIPASE AMYLASE 5000 UNITS: 5000; 17000; 24000 CAPSULE, DELAYED RELEASE ORAL at 11:36

## 2024-03-18 LAB
ALBUMIN SERPL-MCNC: 3.2 G/DL (ref 3.5–5.2)
ALP SERPL-CCNC: 114 U/L (ref 40–129)
ALT SERPL-CCNC: 20 U/L (ref 0–40)
ANION GAP SERPL CALCULATED.3IONS-SCNC: 21 MMOL/L (ref 7–16)
AST SERPL-CCNC: 46 U/L (ref 0–39)
BASOPHILS # BLD: 0.05 K/UL (ref 0–0.2)
BASOPHILS NFR BLD: 1 % (ref 0–2)
BILIRUB SERPL-MCNC: 0.5 MG/DL (ref 0–1.2)
BUN SERPL-MCNC: 49 MG/DL (ref 6–23)
CALCIUM SERPL-MCNC: 8.9 MG/DL (ref 8.6–10.2)
CHLORIDE SERPL-SCNC: 105 MMOL/L (ref 98–107)
CO2 SERPL-SCNC: 19 MMOL/L (ref 22–29)
CREAT SERPL-MCNC: 5.6 MG/DL (ref 0.7–1.2)
EOSINOPHIL # BLD: 0.17 K/UL (ref 0.05–0.5)
EOSINOPHILS RELATIVE PERCENT: 2 % (ref 0–6)
ERYTHROCYTE [DISTWIDTH] IN BLOOD BY AUTOMATED COUNT: 14.9 % (ref 11.5–15)
GFR SERPL CREATININE-BSD FRML MDRD: 10 ML/MIN/1.73M2
GLUCOSE BLD-MCNC: 111 MG/DL (ref 74–99)
GLUCOSE BLD-MCNC: 122 MG/DL (ref 74–99)
GLUCOSE BLD-MCNC: 143 MG/DL (ref 74–99)
GLUCOSE BLD-MCNC: 175 MG/DL (ref 74–99)
GLUCOSE BLD-MCNC: 179 MG/DL (ref 74–99)
GLUCOSE BLD-MCNC: 187 MG/DL (ref 74–99)
GLUCOSE SERPL-MCNC: 140 MG/DL (ref 74–99)
HCT VFR BLD AUTO: 27.2 % (ref 37–54)
HGB BLD-MCNC: 9 G/DL (ref 12.5–16.5)
IMM GRANULOCYTES # BLD AUTO: 0.05 K/UL (ref 0–0.58)
IMM GRANULOCYTES NFR BLD: 1 % (ref 0–5)
LYMPHOCYTES NFR BLD: 0.82 K/UL (ref 1.5–4)
LYMPHOCYTES RELATIVE PERCENT: 10 % (ref 20–42)
MAGNESIUM SERPL-MCNC: 2.6 MG/DL (ref 1.6–2.6)
MCH RBC QN AUTO: 27.6 PG (ref 26–35)
MCHC RBC AUTO-ENTMCNC: 33.1 G/DL (ref 32–34.5)
MCV RBC AUTO: 83.4 FL (ref 80–99.9)
MONOCYTES NFR BLD: 0.67 K/UL (ref 0.1–0.95)
MONOCYTES NFR BLD: 8 % (ref 2–12)
NEUTROPHILS NFR BLD: 79 % (ref 43–80)
NEUTS SEG NFR BLD: 6.81 K/UL (ref 1.8–7.3)
PHOSPHATE SERPL-MCNC: 4.6 MG/DL (ref 2.5–4.5)
PLATELET # BLD AUTO: 294 K/UL (ref 130–450)
PMV BLD AUTO: 11.4 FL (ref 7–12)
POTASSIUM SERPL-SCNC: 5.2 MMOL/L (ref 3.5–5)
PROT SERPL-MCNC: 7.1 G/DL (ref 6.4–8.3)
RBC # BLD AUTO: 3.26 M/UL (ref 3.8–5.8)
SODIUM SERPL-SCNC: 145 MMOL/L (ref 132–146)
WBC OTHER # BLD: 8.6 K/UL (ref 4.5–11.5)

## 2024-03-18 PROCEDURE — 80053 COMPREHEN METABOLIC PANEL: CPT

## 2024-03-18 PROCEDURE — 99291 CRITICAL CARE FIRST HOUR: CPT | Performed by: INTERNAL MEDICINE

## 2024-03-18 PROCEDURE — 6370000000 HC RX 637 (ALT 250 FOR IP): Performed by: INTERNAL MEDICINE

## 2024-03-18 PROCEDURE — 6360000002 HC RX W HCPCS

## 2024-03-18 PROCEDURE — 84100 ASSAY OF PHOSPHORUS: CPT

## 2024-03-18 PROCEDURE — 6360000002 HC RX W HCPCS: Performed by: INTERNAL MEDICINE

## 2024-03-18 PROCEDURE — 83735 ASSAY OF MAGNESIUM: CPT

## 2024-03-18 PROCEDURE — 2700000000 HC OXYGEN THERAPY PER DAY

## 2024-03-18 PROCEDURE — P9047 ALBUMIN (HUMAN), 25%, 50ML: HCPCS | Performed by: INTERNAL MEDICINE

## 2024-03-18 PROCEDURE — 97535 SELF CARE MNGMENT TRAINING: CPT

## 2024-03-18 PROCEDURE — 97162 PT EVAL MOD COMPLEX 30 MIN: CPT

## 2024-03-18 PROCEDURE — 6370000000 HC RX 637 (ALT 250 FOR IP)

## 2024-03-18 PROCEDURE — 97166 OT EVAL MOD COMPLEX 45 MIN: CPT

## 2024-03-18 PROCEDURE — 97530 THERAPEUTIC ACTIVITIES: CPT

## 2024-03-18 PROCEDURE — 85025 COMPLETE CBC W/AUTO DIFF WBC: CPT

## 2024-03-18 PROCEDURE — 5A1D70Z PERFORMANCE OF URINARY FILTRATION, INTERMITTENT, LESS THAN 6 HOURS PER DAY: ICD-10-PCS | Performed by: INTERNAL MEDICINE

## 2024-03-18 PROCEDURE — C9113 INJ PANTOPRAZOLE SODIUM, VIA: HCPCS

## 2024-03-18 PROCEDURE — 90935 HEMODIALYSIS ONE EVALUATION: CPT

## 2024-03-18 PROCEDURE — 2140000000 HC CCU INTERMEDIATE R&B

## 2024-03-18 PROCEDURE — 2580000003 HC RX 258: Performed by: INTERNAL MEDICINE

## 2024-03-18 PROCEDURE — 82962 GLUCOSE BLOOD TEST: CPT

## 2024-03-18 PROCEDURE — 99231 SBSQ HOSP IP/OBS SF/LOW 25: CPT | Performed by: INTERNAL MEDICINE

## 2024-03-18 PROCEDURE — A4216 STERILE WATER/SALINE, 10 ML: HCPCS

## 2024-03-18 PROCEDURE — 2580000003 HC RX 258

## 2024-03-18 RX ORDER — ALBUMIN (HUMAN) 12.5 G/50ML
25 SOLUTION INTRAVENOUS ONCE
Status: COMPLETED | OUTPATIENT
Start: 2024-03-18 | End: 2024-03-18

## 2024-03-18 RX ADMIN — METOPROLOL TARTRATE 37.5 MG: 25 TABLET, FILM COATED ORAL at 07:53

## 2024-03-18 RX ADMIN — PANCRELIPASE LIPASE, PANCRELIPASE PROTEASE, PANCRELIPASE AMYLASE 5000 UNITS: 5000; 17000; 24000 CAPSULE, DELAYED RELEASE ORAL at 17:39

## 2024-03-18 RX ADMIN — PANCRELIPASE LIPASE, PANCRELIPASE PROTEASE, PANCRELIPASE AMYLASE 5000 UNITS: 5000; 17000; 24000 CAPSULE, DELAYED RELEASE ORAL at 08:19

## 2024-03-18 RX ADMIN — ALBUMIN (HUMAN) 25 G: 0.25 INJECTION, SOLUTION INTRAVENOUS at 12:40

## 2024-03-18 RX ADMIN — BUMETANIDE 2 MG: 1 TABLET ORAL at 07:53

## 2024-03-18 RX ADMIN — METOPROLOL TARTRATE 37.5 MG: 25 TABLET, FILM COATED ORAL at 21:23

## 2024-03-18 RX ADMIN — INSULIN GLARGINE 12 UNITS: 100 INJECTION, SOLUTION SUBCUTANEOUS at 07:54

## 2024-03-18 RX ADMIN — SODIUM CHLORIDE, PRESERVATIVE FREE 10 ML: 5 INJECTION INTRAVENOUS at 21:25

## 2024-03-18 RX ADMIN — PIPERACILLIN AND TAZOBACTAM 4500 MG: 4; .5 INJECTION, POWDER, FOR SOLUTION INTRAVENOUS at 21:30

## 2024-03-18 RX ADMIN — SODIUM CHLORIDE, PRESERVATIVE FREE 10 ML: 5 INJECTION INTRAVENOUS at 08:20

## 2024-03-18 RX ADMIN — PIPERACILLIN AND TAZOBACTAM 4500 MG: 4; .5 INJECTION, POWDER, FOR SOLUTION INTRAVENOUS at 08:19

## 2024-03-18 RX ADMIN — HYDRALAZINE HYDROCHLORIDE 10 MG: 20 INJECTION INTRAMUSCULAR; INTRAVENOUS at 02:45

## 2024-03-18 RX ADMIN — INSULIN LISPRO 3 UNITS: 100 INJECTION, SOLUTION INTRAVENOUS; SUBCUTANEOUS at 17:39

## 2024-03-18 RX ADMIN — APIXABAN 5 MG: 5 TABLET, FILM COATED ORAL at 07:53

## 2024-03-18 RX ADMIN — BUMETANIDE 2 MG: 1 TABLET ORAL at 21:23

## 2024-03-18 RX ADMIN — SODIUM CHLORIDE 125 MG: 9 INJECTION, SOLUTION INTRAVENOUS at 08:17

## 2024-03-18 RX ADMIN — ASPIRIN 81 MG CHEWABLE TABLET 81 MG: 81 TABLET CHEWABLE at 08:10

## 2024-03-18 RX ADMIN — ATORVASTATIN CALCIUM 10 MG: 10 TABLET, FILM COATED ORAL at 21:26

## 2024-03-18 RX ADMIN — APIXABAN 5 MG: 5 TABLET, FILM COATED ORAL at 21:26

## 2024-03-18 RX ADMIN — SODIUM CHLORIDE, PRESERVATIVE FREE 40 MG: 5 INJECTION INTRAVENOUS at 07:53

## 2024-03-18 RX ADMIN — INSULIN LISPRO 3 UNITS: 100 INJECTION, SOLUTION INTRAVENOUS; SUBCUTANEOUS at 12:04

## 2024-03-18 RX ADMIN — INSULIN LISPRO 3 UNITS: 100 INJECTION, SOLUTION INTRAVENOUS; SUBCUTANEOUS at 08:26

## 2024-03-18 ASSESSMENT — PAIN SCALES - GENERAL
PAINLEVEL_OUTOF10: 0
PAINLEVEL_OUTOF10: 0

## 2024-03-18 NOTE — CARE COORDINATION
Care Coordination: LOS 6 day.  Extubated 3/15/24, 2 ltr nc. Nephrology following for renal function, currently HD, Hemoaccess.  Clinical faxed to [facility] on 0n 3/14/24- confirmed with Lani, it was received and she will follow. I left another message today with Lani to see if any update needed .   Met with pt briefly, sitter in room, 2 ltr nc. Adamant about returning home at discharge, states he will drive himself to HD if needed.  [facility] was following.  Ptx and otx ordered. Received call back from lani from [facility] and she will continue to follow. Call to daughter Perla, she will be in town tomorrow. She would like to see how he does with therapy evals prior to deciding merry needs.  Also she will discuss with father if he wants to continue with HD, otherwise she will speak to him regarding hospice care.  Will follow    Electronically signed by Patricia Givens RN on 3/18/2024 at 11:37 AM

## 2024-03-18 NOTE — FLOWSHEET NOTE
03/18/24 1447   Vital Signs   BP (!) 141/77   Temp 97 °F (36.1 °C)   Pulse 91   Respirations 18   SpO2 94 %   Weight - Scale 82.4 kg (181 lb 10.5 oz)   Weight Method Bed scale   Percent Weight Change -2.37   Post-Hemodialysis Assessment   Post-Treatment Procedures Blood returned;Catheter capped, clamped with Citrate x 2 ports   Machine Disinfection Process Acid/Vinegar Clean;Heat Disinfect;Exterior Machine Disinfection   Rinseback Volume (ml) 300 ml   Blood Volume Processed (Liters) 88.4 L   Dialyzer Clearance Moderately streaked   Duration of Treatment (minutes) 240 minutes   Heparin Amount Administered During Treatment (mL) 0 mL   Hemodialysis Intake (ml) 300 ml   Hemodialysis Output (ml) 2300 ml   NET Removed (ml) 2000   Tolerated Treatment Good   Patient Response to Treatment tolerated tx well, 2L fluid removed w/o diff   Bilateral Breath Sounds Clear;Diminished   Edema Generalized   Edema Generalized +1   Time Off 1437   Patient Disposition Remain in ICU/ED   Observations & Evaluations   Level of Consciousness 0   Heart Rhythm Irregular   Respiratory Quality/Effort Unlabored   O2 Device None (Room air)     Tolerated  HD 4 hr started on a 2K 2.5Ca switched custodial pre Dr. Norman orderer to a 3 k 2.5 ca bath, 2000 ml removed with out difficulty. 25G albumin given per orders.  HD CVC flushed, citrate to dwell, clamped and capped, dressing changed post tx per policy. Report to floor RN, remains in care of staff.

## 2024-03-19 VITALS
HEIGHT: 74 IN | SYSTOLIC BLOOD PRESSURE: 133 MMHG | WEIGHT: 187.39 LBS | DIASTOLIC BLOOD PRESSURE: 77 MMHG | HEART RATE: 87 BPM | RESPIRATION RATE: 17 BRPM | OXYGEN SATURATION: 98 % | TEMPERATURE: 97.9 F | BODY MASS INDEX: 24.05 KG/M2

## 2024-03-19 LAB
ALBUMIN SERPL-MCNC: 3.3 G/DL (ref 3.5–5.2)
ALP SERPL-CCNC: 106 U/L (ref 40–129)
ALT SERPL-CCNC: 15 U/L (ref 0–40)
ANION GAP SERPL CALCULATED.3IONS-SCNC: 16 MMOL/L (ref 7–16)
AST SERPL-CCNC: 21 U/L (ref 0–39)
BASOPHILS # BLD: 0.04 K/UL (ref 0–0.2)
BASOPHILS NFR BLD: 1 % (ref 0–2)
BILIRUB SERPL-MCNC: 0.4 MG/DL (ref 0–1.2)
BUN SERPL-MCNC: 27 MG/DL (ref 6–23)
CALCIUM SERPL-MCNC: 8.4 MG/DL (ref 8.6–10.2)
CHLORIDE SERPL-SCNC: 101 MMOL/L (ref 98–107)
CO2 SERPL-SCNC: 22 MMOL/L (ref 22–29)
CREAT SERPL-MCNC: 4.6 MG/DL (ref 0.7–1.2)
EOSINOPHIL # BLD: 0.14 K/UL (ref 0.05–0.5)
EOSINOPHILS RELATIVE PERCENT: 2 % (ref 0–6)
ERYTHROCYTE [DISTWIDTH] IN BLOOD BY AUTOMATED COUNT: 14.9 % (ref 11.5–15)
GFR SERPL CREATININE-BSD FRML MDRD: 13 ML/MIN/1.73M2
GLUCOSE BLD-MCNC: 132 MG/DL (ref 74–99)
GLUCOSE BLD-MCNC: 230 MG/DL (ref 74–99)
GLUCOSE BLD-MCNC: 241 MG/DL (ref 74–99)
GLUCOSE BLD-MCNC: 63 MG/DL (ref 74–99)
GLUCOSE SERPL-MCNC: 166 MG/DL (ref 74–99)
HCT VFR BLD AUTO: 23.3 % (ref 37–54)
HGB BLD-MCNC: 7.9 G/DL (ref 12.5–16.5)
IMM GRANULOCYTES # BLD AUTO: 0.06 K/UL (ref 0–0.58)
IMM GRANULOCYTES NFR BLD: 1 % (ref 0–5)
INR PPP: 1.4
LYMPHOCYTES NFR BLD: 1.21 K/UL (ref 1.5–4)
LYMPHOCYTES RELATIVE PERCENT: 17 % (ref 20–42)
MAGNESIUM SERPL-MCNC: 2.1 MG/DL (ref 1.6–2.6)
MCH RBC QN AUTO: 27.8 PG (ref 26–35)
MCHC RBC AUTO-ENTMCNC: 33.9 G/DL (ref 32–34.5)
MCV RBC AUTO: 82 FL (ref 80–99.9)
MONOCYTES NFR BLD: 0.73 K/UL (ref 0.1–0.95)
MONOCYTES NFR BLD: 10 % (ref 2–12)
NEUTROPHILS NFR BLD: 69 % (ref 43–80)
NEUTS SEG NFR BLD: 4.91 K/UL (ref 1.8–7.3)
PARTIAL THROMBOPLASTIN TIME: 34.3 SEC (ref 24.5–35.1)
PLATELET # BLD AUTO: 243 K/UL (ref 130–450)
PMV BLD AUTO: 10.7 FL (ref 7–12)
POTASSIUM SERPL-SCNC: 3.4 MMOL/L (ref 3.5–5)
PROT SERPL-MCNC: 6.1 G/DL (ref 6.4–8.3)
PROTHROMBIN TIME: 15.8 SEC (ref 9.3–12.4)
RBC # BLD AUTO: 2.84 M/UL (ref 3.8–5.8)
SODIUM SERPL-SCNC: 139 MMOL/L (ref 132–146)
WBC OTHER # BLD: 7.1 K/UL (ref 4.5–11.5)

## 2024-03-19 PROCEDURE — 97530 THERAPEUTIC ACTIVITIES: CPT

## 2024-03-19 PROCEDURE — 85610 PROTHROMBIN TIME: CPT

## 2024-03-19 PROCEDURE — 6370000000 HC RX 637 (ALT 250 FOR IP)

## 2024-03-19 PROCEDURE — 99239 HOSP IP/OBS DSCHRG MGMT >30: CPT | Performed by: STUDENT IN AN ORGANIZED HEALTH CARE EDUCATION/TRAINING PROGRAM

## 2024-03-19 PROCEDURE — 80053 COMPREHEN METABOLIC PANEL: CPT

## 2024-03-19 PROCEDURE — 85025 COMPLETE CBC W/AUTO DIFF WBC: CPT

## 2024-03-19 PROCEDURE — 2580000003 HC RX 258: Performed by: INTERNAL MEDICINE

## 2024-03-19 PROCEDURE — 6360000002 HC RX W HCPCS: Performed by: INTERNAL MEDICINE

## 2024-03-19 PROCEDURE — 83735 ASSAY OF MAGNESIUM: CPT

## 2024-03-19 PROCEDURE — A4216 STERILE WATER/SALINE, 10 ML: HCPCS

## 2024-03-19 PROCEDURE — 6360000002 HC RX W HCPCS

## 2024-03-19 PROCEDURE — 92526 ORAL FUNCTION THERAPY: CPT

## 2024-03-19 PROCEDURE — 6370000000 HC RX 637 (ALT 250 FOR IP): Performed by: INTERNAL MEDICINE

## 2024-03-19 PROCEDURE — 2580000003 HC RX 258

## 2024-03-19 PROCEDURE — C9113 INJ PANTOPRAZOLE SODIUM, VIA: HCPCS

## 2024-03-19 PROCEDURE — 36415 COLL VENOUS BLD VENIPUNCTURE: CPT

## 2024-03-19 PROCEDURE — 85730 THROMBOPLASTIN TIME PARTIAL: CPT

## 2024-03-19 PROCEDURE — 82962 GLUCOSE BLOOD TEST: CPT

## 2024-03-19 RX ORDER — POTASSIUM CHLORIDE 20 MEQ/1
20 TABLET, EXTENDED RELEASE ORAL ONCE
Status: COMPLETED | OUTPATIENT
Start: 2024-03-19 | End: 2024-03-19

## 2024-03-19 RX ADMIN — PANCRELIPASE LIPASE, PANCRELIPASE PROTEASE, PANCRELIPASE AMYLASE 5000 UNITS: 5000; 17000; 24000 CAPSULE, DELAYED RELEASE ORAL at 08:53

## 2024-03-19 RX ADMIN — SODIUM CHLORIDE, PRESERVATIVE FREE 10 ML: 5 INJECTION INTRAVENOUS at 08:55

## 2024-03-19 RX ADMIN — INSULIN LISPRO 6 UNITS: 100 INJECTION, SOLUTION INTRAVENOUS; SUBCUTANEOUS at 12:03

## 2024-03-19 RX ADMIN — INSULIN GLARGINE 12 UNITS: 100 INJECTION, SOLUTION SUBCUTANEOUS at 11:36

## 2024-03-19 RX ADMIN — SODIUM CHLORIDE, PRESERVATIVE FREE 40 MG: 5 INJECTION INTRAVENOUS at 08:54

## 2024-03-19 RX ADMIN — PIPERACILLIN AND TAZOBACTAM 4500 MG: 4; .5 INJECTION, POWDER, FOR SOLUTION INTRAVENOUS at 09:15

## 2024-03-19 RX ADMIN — PANCRELIPASE LIPASE, PANCRELIPASE PROTEASE, PANCRELIPASE AMYLASE 5000 UNITS: 5000; 17000; 24000 CAPSULE, DELAYED RELEASE ORAL at 17:23

## 2024-03-19 RX ADMIN — PANCRELIPASE LIPASE, PANCRELIPASE PROTEASE, PANCRELIPASE AMYLASE 5000 UNITS: 5000; 17000; 24000 CAPSULE, DELAYED RELEASE ORAL at 12:03

## 2024-03-19 RX ADMIN — BUMETANIDE 2 MG: 1 TABLET ORAL at 08:53

## 2024-03-19 RX ADMIN — METOPROLOL TARTRATE 37.5 MG: 25 TABLET, FILM COATED ORAL at 08:53

## 2024-03-19 RX ADMIN — ASPIRIN 81 MG CHEWABLE TABLET 81 MG: 81 TABLET CHEWABLE at 08:54

## 2024-03-19 RX ADMIN — POTASSIUM CHLORIDE 20 MEQ: 1500 TABLET, EXTENDED RELEASE ORAL at 14:47

## 2024-03-19 RX ADMIN — APIXABAN 5 MG: 5 TABLET, FILM COATED ORAL at 08:53

## 2024-03-19 RX ADMIN — INSULIN LISPRO 6 UNITS: 100 INJECTION, SOLUTION INTRAVENOUS; SUBCUTANEOUS at 04:29

## 2024-03-19 ASSESSMENT — PAIN SCALES - GENERAL
PAINLEVEL_OUTOF10: 0
PAINLEVEL_OUTOF10: 0

## 2024-03-19 NOTE — CARE COORDINATION
3/19/24  Transition of Care Update. Spoke with patient his daughter Perla and sister Radha in the room.   Patient is a LOS day 7 transfer from MICU.  Patient is being followed by nephrology for renal function and currently receiving HD.  A referral was made to the [facility] in [place] per the previous .  Call to Audrey [phone] at [facility] with flow sheets requested and faxed to [phone] .  Patient will  need a tunnel cath placed.  PT/OT updated evaluations requested and complete with AM-PAC of 16/24 for PT and 15/24 for OT.  A MARY stay was discussed with patient and family but patient adamant about returning home.  Patient is agreeable to home health and has no preference of provider with call placed to [facility] who has accepted and following.  Patient completed his course of Zosyn for aspiration pneumonia. Speech is following for dysphagia and patient is currently on a soft and bite size diet with nectar thick liquids.   Patient is on room air. Discharge goal is home at discharge.  EMILY/TRINH to follow.    Electronically signed by NICK Mcleod on 3/19/2024 at 3:19 PM

## 2024-03-19 NOTE — SIGNIFICANT EVENT
Received a call back from the nurse that patient wants to leave AMA    I talked with the patient, He stated he has some property matter which he needs to sort out so he needs to get discharge.    I explained to him of importance of getting tdc, dialysis setup. He adamantly stated he wants to leave AMA.    Patient is AO x 3    Updated nurse at bedside

## 2024-03-19 NOTE — PLAN OF CARE
Problem: Chronic Conditions and Co-morbidities  Goal: Patient's chronic conditions and co-morbidity symptoms are monitored and maintained or improved  3/12/2024 0856 by Kary Gimenez RN  Outcome: Progressing  Flowsheets (Taken 3/12/2024 0800)  Care Plan - Patient's Chronic Conditions and Co-Morbidity Symptoms are Monitored and Maintained or Improved:   Monitor and assess patient's chronic conditions and comorbid symptoms for stability, deterioration, or improvement   Collaborate with multidisciplinary team to address chronic and comorbid conditions and prevent exacerbation or deterioration   Update acute care plan with appropriate goals if chronic or comorbid symptoms are exacerbated and prevent overall improvement and discharge     Problem: Pain  Goal: Verbalizes/displays adequate comfort level or baseline comfort level  3/12/2024 0856 by Kary Gimenez RN  Outcome: Progressing  Flowsheets (Taken 3/12/2024 0800)  Verbalizes/displays adequate comfort level or baseline comfort level:   Encourage patient to monitor pain and request assistance   Administer analgesics based on type and severity of pain and evaluate response   Assess pain using appropriate pain scale   Implement non-pharmacological measures as appropriate and evaluate response   Consider cultural and social influences on pain and pain management   Notify Licensed Independent Practitioner if interventions unsuccessful or patient reports new pain     Problem: Safety - Adult  Goal: Free from fall injury  3/12/2024 0856 by Kary Gimenez RN  Outcome: Progressing  Flowsheets (Taken 3/12/2024 0800)  Free From Fall Injury:   Instruct family/caregiver on patient safety   Based on caregiver fall risk screen, instruct family/caregiver to ask for assistance with transferring infant if caregiver noted to have fall risk factors     Problem: Neurosensory - Adult  Goal: Achieves stable or improved neurological status  Outcome: 
  Problem: Chronic Conditions and Co-morbidities  Goal: Patient's chronic conditions and co-morbidity symptoms are monitored and maintained or improved  3/13/2024 0822 by Kary Gimenez RN  Outcome: Progressing  Flowsheets (Taken 3/13/2024 0800)  Care Plan - Patient's Chronic Conditions and Co-Morbidity Symptoms are Monitored and Maintained or Improved:   Monitor and assess patient's chronic conditions and comorbid symptoms for stability, deterioration, or improvement   Collaborate with multidisciplinary team to address chronic and comorbid conditions and prevent exacerbation or deterioration   Update acute care plan with appropriate goals if chronic or comorbid symptoms are exacerbated and prevent overall improvement and discharge     Problem: Pain  Goal: Verbalizes/displays adequate comfort level or baseline comfort level  3/13/2024 0822 by Kary Gimenez RN  Outcome: Progressing  Flowsheets (Taken 3/13/2024 0800)  Verbalizes/displays adequate comfort level or baseline comfort level:   Encourage patient to monitor pain and request assistance   Assess pain using appropriate pain scale   Administer analgesics based on type and severity of pain and evaluate response   Implement non-pharmacological measures as appropriate and evaluate response   Notify Licensed Independent Practitioner if interventions unsuccessful or patient reports new pain   Consider cultural and social influences on pain and pain management     Problem: Safety - Adult  Goal: Free from fall injury  3/13/2024 0822 by Kary Gimenez RN  Outcome: Progressing  Flowsheets (Taken 3/13/2024 0800)  Free From Fall Injury:   Instruct family/caregiver on patient safety   Based on caregiver fall risk screen, instruct family/caregiver to ask for assistance with transferring infant if caregiver noted to have fall risk factors     Problem: Respiratory - Adult  Goal: Achieves optimal ventilation and oxygenation  3/13/2024 0822 by Kary Gimenez 
  Problem: Chronic Conditions and Co-morbidities  Goal: Patient's chronic conditions and co-morbidity symptoms are monitored and maintained or improved  3/14/2024 2123 by Madeleine Rick RN  Outcome: Progressing  Flowsheets (Taken 3/14/2024 2123)  Care Plan - Patient's Chronic Conditions and Co-Morbidity Symptoms are Monitored and Maintained or Improved:   Monitor and assess patient's chronic conditions and comorbid symptoms for stability, deterioration, or improvement   Collaborate with multidisciplinary team to address chronic and comorbid conditions and prevent exacerbation or deterioration   Update acute care plan with appropriate goals if chronic or comorbid symptoms are exacerbated and prevent overall improvement and discharge  3/14/2024 1809 by Helder Morales RN  Outcome: Progressing     Problem: Pain  Goal: Verbalizes/displays adequate comfort level or baseline comfort level  3/14/2024 2123 by Madeleine Rick RN  Outcome: Progressing  Flowsheets (Taken 3/14/2024 2123)  Verbalizes/displays adequate comfort level or baseline comfort level:   Assess pain using appropriate pain scale   Administer analgesics based on type and severity of pain and evaluate response   Implement non-pharmacological measures as appropriate and evaluate response  3/14/2024 1809 by Helder Morales, RN  Outcome: Progressing  Flowsheets (Taken 3/14/2024 1200)  Verbalizes/displays adequate comfort level or baseline comfort level:   Encourage patient to monitor pain and request assistance   Assess pain using appropriate pain scale   Administer analgesics based on type and severity of pain and evaluate response   Implement non-pharmacological measures as appropriate and evaluate response   Consider cultural and social influences on pain and pain management   Notify Licensed Independent Practitioner if interventions unsuccessful or patient reports new pain     Problem: Safety - Adult  Goal: Free from fall injury  3/14/2024 2123 by 
  Problem: Chronic Conditions and Co-morbidities  Goal: Patient's chronic conditions and co-morbidity symptoms are monitored and maintained or improved  Outcome: Progressing     Problem: Discharge Planning  Goal: Discharge to home or other facility with appropriate resources  Outcome: Not Progressing     Problem: Pain  Goal: Verbalizes/displays adequate comfort level or baseline comfort level  Outcome: Progressing  Flowsheets (Taken 3/14/2024 1200)  Verbalizes/displays adequate comfort level or baseline comfort level:   Encourage patient to monitor pain and request assistance   Assess pain using appropriate pain scale   Administer analgesics based on type and severity of pain and evaluate response   Implement non-pharmacological measures as appropriate and evaluate response   Consider cultural and social influences on pain and pain management   Notify Licensed Independent Practitioner if interventions unsuccessful or patient reports new pain     Problem: Safety - Adult  Goal: Free from fall injury  Outcome: Progressing  Flowsheets (Taken 3/14/2024 0800)  Free From Fall Injury:   Instruct family/caregiver on patient safety   Based on caregiver fall risk screen, instruct family/caregiver to ask for assistance with transferring infant if caregiver noted to have fall risk factors     Problem: Neurosensory - Adult  Goal: Achieves stable or improved neurological status  Outcome: Not Progressing  Flowsheets (Taken 3/14/2024 0800)  Achieves stable or improved neurological status:   Assess for and report changes in neurological status   Initiate measures to prevent increased intracranial pressure   Monitor temperature, glucose, and sodium. Initiate appropriate interventions as ordered   Maintain blood pressure and fluid volume within ordered parameters to optimize cerebral perfusion and minimize risk of hemorrhage     Problem: Neurosensory - Adult  Goal: Achieves maximal functionality and self care  Outcome: Not 
  Problem: Chronic Conditions and Co-morbidities  Goal: Patient's chronic conditions and co-morbidity symptoms are monitored and maintained or improved  Outcome: Progressing     Problem: Discharge Planning  Goal: Discharge to home or other facility with appropriate resources  Outcome: Progressing     Problem: Pain  Goal: Verbalizes/displays adequate comfort level or baseline comfort level  Outcome: Progressing     Problem: Respiratory - Adult  Goal: Achieves optimal ventilation and oxygenation  Outcome: Progressing     
  Problem: Chronic Conditions and Co-morbidities  Goal: Patient's chronic conditions and co-morbidity symptoms are monitored and maintained or improved  Outcome: Progressing     Problem: Discharge Planning  Goal: Discharge to home or other facility with appropriate resources  Outcome: Progressing     Problem: Pain  Goal: Verbalizes/displays adequate comfort level or baseline comfort level  Outcome: Progressing     Problem: Safety - Adult  Goal: Free from fall injury  Outcome: Progressing     
  Problem: Chronic Conditions and Co-morbidities  Goal: Patient's chronic conditions and co-morbidity symptoms are monitored and maintained or improved  Outcome: Progressing     Problem: Discharge Planning  Goal: Discharge to home or other facility with appropriate resources  Outcome: Progressing     Problem: Pain  Goal: Verbalizes/displays adequate comfort level or baseline comfort level  Outcome: Progressing     Problem: Safety - Adult  Goal: Free from fall injury  Outcome: Progressing     Problem: Neurosensory - Adult  Goal: Achieves stable or improved neurological status  3/18/2024 2146 by Ernestina Clark RN  Outcome: Progressing  3/18/2024 1212 by Janneth Hart RN  Outcome: Progressing  Goal: Achieves maximal functionality and self care  3/18/2024 2146 by Ernestina Clark RN  Outcome: Progressing  3/18/2024 1212 by Janneth Hart RN  Outcome: Progressing     Problem: Respiratory - Adult  Goal: Achieves optimal ventilation and oxygenation  3/18/2024 2146 by Ernestina Clark RN  Outcome: Progressing  3/18/2024 1212 by Janneth Hart RN  Outcome: Progressing     Problem: Cardiovascular - Adult  Goal: Maintains optimal cardiac output and hemodynamic stability  Outcome: Progressing  Goal: Absence of cardiac dysrhythmias or at baseline  Outcome: Progressing     Problem: Skin/Tissue Integrity - Adult  Goal: Skin integrity remains intact  3/18/2024 2146 by Ernestina Clark RN  Outcome: Progressing  3/18/2024 1212 by Janneth Hart RN  Outcome: Progressing  Goal: Oral mucous membranes remain intact  Outcome: Progressing     Problem: Gastrointestinal - Adult  Goal: Minimal or absence of nausea and vomiting  Outcome: Progressing  Goal: Maintains or returns to baseline bowel function  Outcome: Progressing  Goal: Maintains adequate nutritional intake  3/18/2024 2146 by Ernestina Clark RN  Outcome: Progressing  3/18/2024 1212 by Janneth Hart RN  Outcome: Progressing     Problem: Genitourinary - 
  Problem: Chronic Conditions and Co-morbidities  Goal: Patient's chronic conditions and co-morbidity symptoms are monitored and maintained or improved  Outcome: Progressing     Problem: Discharge Planning  Goal: Discharge to home or other facility with appropriate resources  Outcome: Progressing     Problem: Pain  Goal: Verbalizes/displays adequate comfort level or baseline comfort level  Outcome: Progressing     Problem: Safety - Adult  Goal: Free from fall injury  Outcome: Progressing     Problem: Neurosensory - Adult  Goal: Achieves stable or improved neurological status  Outcome: Progressing  Goal: Achieves maximal functionality and self care  Outcome: Progressing     Problem: Respiratory - Adult  Goal: Achieves optimal ventilation and oxygenation  Outcome: Progressing     Problem: Cardiovascular - Adult  Goal: Maintains optimal cardiac output and hemodynamic stability  Outcome: Progressing  Goal: Absence of cardiac dysrhythmias or at baseline  Outcome: Progressing     Problem: Skin/Tissue Integrity - Adult  Goal: Skin integrity remains intact  Outcome: Progressing  Goal: Oral mucous membranes remain intact  Outcome: Progressing     Problem: Gastrointestinal - Adult  Goal: Minimal or absence of nausea and vomiting  Outcome: Progressing  Goal: Maintains or returns to baseline bowel function  Outcome: Progressing  Goal: Maintains adequate nutritional intake  Outcome: Progressing     Problem: Genitourinary - Adult  Goal: Absence of urinary retention  Outcome: Progressing  Goal: Urinary catheter remains patent  Outcome: Progressing     Problem: Metabolic/Fluid and Electrolytes - Adult  Goal: Electrolytes maintained within normal limits  Outcome: Progressing  Goal: Glucose maintained within prescribed range  Outcome: Progressing     Problem: Safety - Medical Restraint  Goal: Remains free of injury from restraints (Restraint for Interference with Medical Device)  Description: INTERVENTIONS:  1. Determine that other, 
  Problem: Gastrointestinal - Adult  Goal: Maintains adequate nutritional intake  3/16/2024 0628 by Madeleine Rick RN  Outcome: Not Progressing  Flowsheets (Taken 3/16/2024 0628)  Maintains adequate nutritional intake: Identify factors contributing to decreased intake, treat as appropriate     Problem: Nutrition Deficit:  Goal: Optimize nutritional status  3/16/2024 0628 by Madeleine Rick, RN  Outcome: Not Progressing  Flowsheets (Taken 3/16/2024 0628)  Nutrient intake appropriate for improving, restoring, or maintaining nutritional needs: Assess nutritional status and recommend course of action     Problem: Musculoskeletal - Adult  Goal: Return mobility to safest level of function  3/16/2024 0628 by Madeleine Rick RN  Outcome: Not Progressing  Flowsheets (Taken 3/16/2024 0628)  Return Mobility to Safest Level of Function: Instruct patient/family in ordered activity level     Problem: Safety - Medical Restraint  Goal: Remains free of injury from restraints (Restraint for Interference with Medical Device)  Description: INTERVENTIONS:  1. Determine that other, less restrictive measures have been tried or would not be effective before applying the restraint  2. Evaluate the patient's condition at the time of restraint application  3. Inform patient/family regarding the reason for restraint  4. Q2H: Monitor safety, psychosocial status, comfort, nutrition and hydration  3/16/2024 1615 by Audrey Martinez RN  Outcome: Not Progressing  Flowsheets  Taken 3/16/2024 1600  Remains free of injury from restraints (restraint for interference with medical device): Every 2 hours: Monitor safety, psychosocial status, comfort, nutrition and hydration  Taken 3/16/2024 1400  Remains free of injury from restraints (restraint for interference with medical device): Every 2 hours: Monitor safety, psychosocial status, comfort, nutrition and hydration  Taken 3/16/2024 1200  Remains free of injury from restraints (restraint for 
  Problem: Neurosensory - Adult  Goal: Achieves stable or improved neurological status  Outcome: Progressing  Goal: Achieves maximal functionality and self care  Outcome: Progressing     Problem: Respiratory - Adult  Goal: Achieves optimal ventilation and oxygenation  Outcome: Progressing     Problem: Cardiovascular - Adult  Goal: Maintains optimal cardiac output and hemodynamic stability  Outcome: Progressing     Problem: Skin/Tissue Integrity - Adult  Goal: Skin integrity remains intact  Outcome: Progressing     Problem: Gastrointestinal - Adult  Goal: Maintains adequate nutritional intake  Outcome: Progressing     
  Problem: Neurosensory - Adult  Goal: Achieves stable or improved neurological status  Outcome: Progressing  Goal: Achieves maximal functionality and self care  Outcome: Progressing     Problem: Respiratory - Adult  Goal: Achieves optimal ventilation and oxygenation  Outcome: Progressing     Problem: Skin/Tissue Integrity - Adult  Goal: Skin integrity remains intact  Outcome: Progressing     Problem: Gastrointestinal - Adult  Goal: Maintains adequate nutritional intake  Outcome: Progressing     
  Problem: Safety - Medical Restraint  Goal: Remains free of injury from restraints (Restraint for Interference with Medical Device)  Description: INTERVENTIONS:  1. Determine that other, less restrictive measures have been tried or would not be effective before applying the restraint  2. Evaluate the patient's condition at the time of restraint application  3. Inform patient/family regarding the reason for restraint  4. Q2H: Monitor safety, psychosocial status, comfort, nutrition and hydration  3/13/2024 1509 by Kary Gimenez RN  Outcome: Progressing  Flowsheets (Taken 3/13/2024 1451)  Remains free of injury from restraints (restraint for interference with medical device):   Determine that other, less restrictive measures have been tried or would not be effective before applying the restraint   Evaluate the patient's condition at the time of restraint application   Inform patient/family regarding the reason for restraint   Every 2 hours: Monitor safety, psychosocial status, comfort, nutrition and hydration     
  Problem: Safety - Medical Restraint  Goal: Remains free of injury from restraints (Restraint for Interference with Medical Device)  Description: INTERVENTIONS:  1. Determine that other, less restrictive measures have been tried or would not be effective before applying the restraint  2. Evaluate the patient's condition at the time of restraint application  3. Inform patient/family regarding the reason for restraint  4. Q2H: Monitor safety, psychosocial status, comfort, nutrition and hydration  Outcome: Completed  Flowsheets  Taken 3/17/2024 0600 by Demetria Jordan RN  Remains free of injury from restraints (restraint for interference with medical device): Determine that other, less restrictive measures have been tried or would not be effective before applying the restraint  Taken 3/17/2024 0400 by Demetria Jordan RN  Remains free of injury from restraints (restraint for interference with medical device): Determine that other, less restrictive measures have been tried or would not be effective before applying the restraint  Taken 3/17/2024 0200 by Demetria Jordan RN  Remains free of injury from restraints (restraint for interference with medical device): Determine that other, less restrictive measures have been tried or would not be effective before applying the restraint  Taken 3/17/2024 0000 by Demetria Jordan RN  Remains free of injury from restraints (restraint for interference with medical device): Determine that other, less restrictive measures have been tried or would not be effective before applying the restraint  Taken 3/16/2024 2200 by Demetria Jordan RN  Remains free of injury from restraints (restraint for interference with medical device): Determine that other, less restrictive measures have been tried or would not be effective before applying the restraint     
  Problem: Safety - Medical Restraint  Goal: Remains free of injury from restraints (Restraint for Interference with Medical Device)  Description: INTERVENTIONS:  1. Determine that other, less restrictive measures have been tried or would not be effective before applying the restraint  2. Evaluate the patient's condition at the time of restraint application  3. Inform patient/family regarding the reason for restraint  4. Q2H: Monitor safety, psychosocial status, comfort, nutrition and hydration  Outcome: Completed  Flowsheets (Taken 3/15/2024 0800)  Remains free of injury from restraints (restraint for interference with medical device): Every 2 hours: Monitor safety, psychosocial status, comfort, nutrition and hydration     
Brief internal medicine progress note:     Patient seen and examined, H&P and billing done on this calendar day by admitting service.     I did also seen and examined patient.     Admitted with SOB   on admission, bicarb 9  BHB 0.49  Endocrinology consulted, insulin gtt.     Acute hypoxic respiratory failure   CHF exacerbation   Has increased oxygen requirement since admission with increased SOB and wheezing. Initially on RA, placed on oxygen and up to heated high flow canula.   started on Zosyn and Vancomycin.   Follow cultures   Molecular panel negative.     BLAIR on CKD  Hyperkalemia   HAGMA, lactic acidosis, bicarb 12  Nephrology on board, on bicarb gtt.  Strict I&O's  Cr up to 5.9  May need RRT     Decompensated CHF  Elevated pro-BNP, 24k > 22k  Elevated troponin 2/2 above, 77  on bumex gtt.  Echo with EF 55-60%, mild MR, mild-mod TR, severe pulm hypertension, R to L shunt.       Electronically signed by Maribeth Contreras MD on 3/12/2024 at 1:16 PM  
as ordered   Instruct and encourage patient and family to use good hand hygiene technique  Goal: Absence of infection during hospitalization  Outcome: Progressing  Flowsheets (Taken 3/14/2024 2123)  Absence of infection during hospitalization:   Assess and monitor for signs and symptoms of infection   Monitor lab/diagnostic results   Monitor all insertion sites i.e., indwelling lines, tubes and drains   Monitor endotracheal (as able) and nasal secretions for changes in amount and color   Administer medications as ordered   Instruct and encourage patient and family to use good hand hygiene technique  Goal: Absence of fever/infection during anticipated neutropenic period  Outcome: Progressing  Flowsheets (Taken 3/14/2024 2123)  Absence of fever/infection during anticipated neutropenic period: Monitor white blood cell count     Problem: Hematologic - Adult  Goal: Maintains hematologic stability  Outcome: Progressing  Flowsheets (Taken 3/14/2024 2123)  Maintains hematologic stability:   Assess for signs and symptoms of bleeding or hemorrhage   Monitor labs for bleeding or clotting disorders     Problem: Safety - Medical Restraint  Goal: Remains free of injury from restraints (Restraint for Interference with Medical Device)  Description: INTERVENTIONS:  1. Determine that other, less restrictive measures have been tried or would not be effective before applying the restraint  2. Evaluate the patient's condition at the time of restraint application  3. Inform patient/family regarding the reason for restraint  4. Q2H: Monitor safety, psychosocial status, comfort, nutrition and hydration  Outcome: Progressing  Flowsheets (Taken 3/15/2024 1949)  Remains free of injury from restraints (restraint for interference with medical device):   Determine that other, less restrictive measures have been tried or would not be effective before applying the restraint   Evaluate the patient's condition at the time of restraint application

## 2024-03-19 NOTE — DISCHARGE SUMMARY
Hospital Medicine Discharge Summary    Patient ID: Andre Brown      Patient's PCP: Cruz Velasco DO    Admit Date: 3/11/2024     Discharge Date:   03/19/2024    Admitting Physician: Emmy Gutierrez MD     Discharge Physician: Maycol Khan MD     Discharge Diagnoses:  AHRF, ESRD on HD     Active Hospital Problems    Diagnosis Date Noted    Hyperglycemia [R73.9] 03/14/2024    Poorly controlled type 2 diabetes mellitus (HCC) [E11.65] 03/12/2024    Shortness of breath [R06.02] 03/12/2024       The patient was seen and examined on day of discharge and this discharge summary is in conjunction with any daily progress note from day of discharge.    Hospital Course:   Mr. Ander Brown is a 73 year old who was admitted on March 12 th, 2024 after presenting to the ED for shortness of breath and abdominal pain.    While in the ED, the patient was found to have a blood glucose of 574, sodium of 122, potassium 6.4, chloride 94 and bicarbonate 9. Other significant labs for this admission include: proBNP 24,362, creatinine of 5.6. The patient was given 1 amp of sodium bicarbonate, 1L of NS, and calcium chloride. The patient was then transferred to MICU for further management.     Patient was started on Insulin drip, bumex gtt, nephrology consulted.    Intubated on 03/13    Extubated 3/15.      Patient underwent HD during his stay in icu  For CKD stage IIIb, with large proteinuria, 2/2 diabetic nephropathy, baseline creatinine 3.1-3.2 mg/dL     Completed treatment with Zosyn for aspiration pneumonia,     Patrient was started on Precedex drip for agitation which was discontinued    03/18 Transferred out of the intensive care unit    03/19 Plan for IR guided TDC placement. Patient refused the procedure, left AMA.      Exam:     /77   Pulse 87   Temp 97.9 °F (36.6 °C) (Temporal)   Resp 17   Ht 1.88 m (6' 2.02\")   Wt 85 kg (187 lb 6.3 oz)   SpO2 98%   BMI 24.05 kg/m²     General appearance:  awake, alert,

## 2024-03-20 LAB
MICROORGANISM SPEC CULT: NORMAL
MICROORGANISM SPEC CULT: NORMAL
SERVICE CMNT-IMP: NORMAL
SERVICE CMNT-IMP: NORMAL
SPECIMEN DESCRIPTION: NORMAL
SPECIMEN DESCRIPTION: NORMAL

## 2024-03-20 NOTE — PROGRESS NOTES
[facility] Hospitalist Progress Note    Admitting Date and Time: 3/11/2024 11:39 PM  Admit Dx: Shortness of breath [R06.02]  Hyperkalemia [E87.5]  DKA, type 2, not at goal (HCC) [E11.10]  Diabetic ketoacidosis without coma associated with type 2 diabetes mellitus (HCC) [E11.10]  Diabetic ketoacidosis without coma associated with diabetes mellitus due to underlying condition (HCC) [E08.10]  Acute renal failure superimposed on chronic kidney disease, unspecified acute renal failure type, unspecified CKD stage (HCC) [N17.9, N18.9]  Patient presented with SOB, increased oxygen requirement and intubated, remains in the ICU for management. Hyperglycemia on admission, off insulin gtt. Extubated 3/15.    Subjective:  Patient is being followed for Shortness of breath [R06.02]  Hyperkalemia [E87.5]  DKA, type 2, not at goal (HCC) [E11.10]  Diabetic ketoacidosis without coma associated with type 2 diabetes mellitus (HCC) [E11.10]  Diabetic ketoacidosis without coma associated with diabetes mellitus due to underlying condition (HCC) [E08.10]  Acute renal failure superimposed on chronic kidney disease, unspecified acute renal failure type, unspecified CKD stage (HCC) [N17.9, N18.9]   Patient seen and examined.   Drowsy this morning.   Sitter bedside, received Ativan overnight.   On precedex gtt.    ROS: denies sob, cp, n/v, f/c     bumetanide  2 mg Oral BID    insulin lispro  0-18 Units SubCUTAneous Q4H    insulin glargine  12 Units SubCUTAneous Daily    ferric gluconate (FERRLECIT) 125 mg in sodium chloride 0.9 % 100 mL IVPB  125 mg IntraVENous Daily    sodium chloride flush  5-40 mL IntraVENous BID    aspirin  81 mg Oral Daily    piperacillin-tazobactam  4,500 mg IntraVENous Q12H    apixaban  5 mg Oral BID    atorvastatin  10 mg Oral Nightly    [Held by provider] hydrALAZINE  25 mg Oral BID    lipase-protease-amylase  5,000 Units Oral TID WC    metoprolol tartrate  37.5 mg Oral BID    pantoprazole (PROTONIX) 40 mg in 
       [facility] Hospitalist Progress Note    Admitting Date and Time: 3/11/2024 11:39 PM  Admit Dx: Shortness of breath [R06.02]  Hyperkalemia [E87.5]  DKA, type 2, not at goal (HCC) [E11.10]  Diabetic ketoacidosis without coma associated with type 2 diabetes mellitus (HCC) [E11.10]  Diabetic ketoacidosis without coma associated with diabetes mellitus due to underlying condition (HCC) [E08.10]  Acute renal failure superimposed on chronic kidney disease, unspecified acute renal failure type, unspecified CKD stage (HCC) [N17.9, N18.9]    Subjective:  Patient is being followed for Shortness of breath [R06.02]  Hyperkalemia [E87.5]  DKA, type 2, not at goal (HCC) [E11.10]  Diabetic ketoacidosis without coma associated with type 2 diabetes mellitus (HCC) [E11.10]  Diabetic ketoacidosis without coma associated with diabetes mellitus due to underlying condition (HCC) [E08.10]  Acute renal failure superimposed on chronic kidney disease, unspecified acute renal failure type, unspecified CKD stage (HCC) [N17.9, N18.9]   Patient seen and examined.   Intubated overnight.   Started on dialysis today.     ROS: unable to obtain.      chlorhexidine  15 mL Mouth/Throat BID    [START ON 3/14/2024] aspirin  81 mg Oral Daily    piperacillin-tazobactam  4,500 mg IntraVENous Q12H    artificial tears   Both Eyes BID    apixaban  5 mg Oral BID    atorvastatin  10 mg Oral Nightly    [Held by provider] hydrALAZINE  25 mg Oral BID    lipase-protease-amylase  5,000 Units Oral TID WC    metoprolol tartrate  37.5 mg Oral BID    pantoprazole (PROTONIX) 40 mg in sodium chloride (PF) 0.9 % 10 mL injection  40 mg IntraVENous Daily    insulin glargine  10 Units SubCUTAneous Daily    insulin lispro  0-4 Units SubCUTAneous Q4H     magnesium sulfate, 2,000 mg, PRN  [Held by provider] dextrose 5 % and 0.45 % NaCl, , Continuous PRN  ipratropium 0.5 mg-albuterol 2.5 mg, 1 Dose, Q4H PRN  acetaminophen, 650 mg, Q6H PRN  dextrose bolus, 125 mL, PRN   
       [facility] Hospitalist Progress Note    Admitting Date and Time: 3/11/2024 11:39 PM  Admit Dx: Shortness of breath [R06.02]  Hyperkalemia [E87.5]  DKA, type 2, not at goal (HCC) [E11.10]  Diabetic ketoacidosis without coma associated with type 2 diabetes mellitus (HCC) [E11.10]  Diabetic ketoacidosis without coma associated with diabetes mellitus due to underlying condition (HCC) [E08.10]  Acute renal failure superimposed on chronic kidney disease, unspecified acute renal failure type, unspecified CKD stage (HCC) [N17.9, N18.9]  Patient presented with SOB, increased oxygen requirement and intubated, remains in the ICU for management. Hyperglycemia on admission, off insulin gtt.     Subjective:  Patient is being followed for Shortness of breath [R06.02]  Hyperkalemia [E87.5]  DKA, type 2, not at goal (HCC) [E11.10]  Diabetic ketoacidosis without coma associated with type 2 diabetes mellitus (HCC) [E11.10]  Diabetic ketoacidosis without coma associated with diabetes mellitus due to underlying condition (HCC) [E08.10]  Acute renal failure superimposed on chronic kidney disease, unspecified acute renal failure type, unspecified CKD stage (HCC) [N17.9, N18.9]   Patient seen and examined.   Intubated and sedated.    ROS: unable to obtain.      chlorhexidine  15 mL Mouth/Throat BID    aspirin  81 mg Oral Daily    piperacillin-tazobactam  4,500 mg IntraVENous Q12H    artificial tears   Both Eyes BID    insulin lispro  0-6 Units SubCUTAneous Q4H    apixaban  5 mg Oral BID    atorvastatin  10 mg Oral Nightly    [Held by provider] hydrALAZINE  25 mg Oral BID    lipase-protease-amylase  5,000 Units Oral TID WC    metoprolol tartrate  37.5 mg Oral BID    pantoprazole (PROTONIX) 40 mg in sodium chloride (PF) 0.9 % 10 mL injection  40 mg IntraVENous Daily    insulin glargine  10 Units SubCUTAneous Daily     magnesium sulfate, 2,000 mg, PRN  [Held by provider] dextrose 5 % and 0.45 % NaCl, , Continuous PRN  ipratropium 0.5 
       [facility] Hospitalist Progress Note    Admitting Date and Time: 3/11/2024 11:39 PM  Admit Dx: Shortness of breath [R06.02]  Hyperkalemia [E87.5]  DKA, type 2, not at goal (HCC) [E11.10]  Diabetic ketoacidosis without coma associated with type 2 diabetes mellitus (HCC) [E11.10]  Diabetic ketoacidosis without coma associated with diabetes mellitus due to underlying condition (HCC) [E08.10]  Acute renal failure superimposed on chronic kidney disease, unspecified acute renal failure type, unspecified CKD stage (HCC) [N17.9, N18.9]  Patient presented with SOB, increased oxygen requirement and intubated, remains in the ICU for management. Hyperglycemia on admission, off insulin gtt. Extubated 3/15.    Subjective:  Patient is being followed for Shortness of breath [R06.02]  Hyperkalemia [E87.5]  DKA, type 2, not at goal (HCC) [E11.10]  Diabetic ketoacidosis without coma associated with type 2 diabetes mellitus (HCC) [E11.10]  Diabetic ketoacidosis without coma associated with diabetes mellitus due to underlying condition (HCC) [E08.10]  Acute renal failure superimposed on chronic kidney disease, unspecified acute renal failure type, unspecified CKD stage (HCC) [N17.9, N18.9]   Patient seen and examined.   Mentation better today, calm, AAOx3    Sitter bedside  Off precedex gtt.     ROS: denies sob, cp, n/v, f/c     bumetanide  2 mg Oral BID    insulin lispro  0-18 Units SubCUTAneous Q4H    insulin glargine  12 Units SubCUTAneous Daily    sodium chloride flush  5-40 mL IntraVENous BID    aspirin  81 mg Oral Daily    piperacillin-tazobactam  4,500 mg IntraVENous Q12H    apixaban  5 mg Oral BID    atorvastatin  10 mg Oral Nightly    [Held by provider] hydrALAZINE  25 mg Oral BID    lipase-protease-amylase  5,000 Units Oral TID WC    metoprolol tartrate  37.5 mg Oral BID    pantoprazole (PROTONIX) 40 mg in sodium chloride (PF) 0.9 % 10 mL injection  40 mg IntraVENous Daily     medicated lip balm, , PRN  hydrALAZINE, 
    Hospitalist Progress Note      SYNOPSIS: Patient admitted on 3/11/2024 for Shortness of breath  Patient presented with SOB, increased oxygen requirement was intubated  Extubated 3/15.  Completed treatment with Zosyn for aspiration pneumonia, was started on Precedex drip for agitation which was discontinued   Transferred out of the intensive care unit      SUBJECTIVE:  Stable overnight. No other overnight issues reported.   Patient seen and examined at bedside today a.m. patient was alert oriented x 3, cooperative with me answering to my questions, obeying command  Records reviewed.         Temp (24hrs), Av.8 °F (36.6 °C), Min:97 °F (36.1 °C), Max:98.7 °F (37.1 °C)    DIET: ADULT DIET; Dysphagia - Soft and Bite Sized; 3 carb choices (45 gm/meal); Low Potassium (Less than 3000 mg/day); 60 to 80 gm; Mildly Thick (Nectar)  CODE: Full Code    Intake/Output Summary (Last 24 hours) at 3/19/2024 1200  Last data filed at 3/19/2024 1116  Gross per 24 hour   Intake 753.83 ml   Output 2550 ml   Net -1796.17 ml       Review of Systems  All bolded are positive; please see HPI  General:  Fever, chills, diaphoresis, fatigue, malaise, night sweats, weight loss  Psychological:  Anxiety, disorientation, hallucinations.  ENT:  Epistaxis, headaches, vertigo, visual changes.  Cardiovascular:  Chest pain, irregular heartbeats, palpitations, paroxysmal nocturnal dyspnea.  Respiratory:  Shortness of breath, coughing, sputum production, hemoptysis, wheezing, orthopnea.  Gastrointestinal:  Nausea, vomiting, diarrhea, heartburn, constipation, abdominal pain, hematemesis, hematochezia, melena, acholic stools  Genito-Urinary:  Dysuria, urgency, frequency, hematuria  Musculoskeletal:  Joint pain, joint stiffness, joint swelling, muscle pain  Neurology:  Headache, focal neurological deficits, weakness, numbness, paresthesia  Derm:  Rashes, ulcers, excoriations, bruising  Extremities:  Decreased ROM, peripheral edema, 
   03/13/24 0846   Patient Observation   Pulse 72   Respirations 18   SpO2 99 %   Breath Sounds   Right Upper Lobe Clear   Right Middle Lobe Clear   Right Lower Lobe Clear   Left Upper Lobe Clear   Left Lower Lobe Clear   Vent Information   Ventilator -37   Vent Mode AC/VC   Ventilator Settings   FiO2  65 %   Vt (Set, mL) 450 mL   Resp Rate (Set) 18 bpm   PEEP/CPAP (cmH2O) 5   Peak Inspiratory Flow (Set) 60 L/sec   Vent Patient Data (Readings)   Vt (Measured) 465 mL   Peak Inspiratory Pressure (cmH2O) 18 cmH2O   Rate Measured 18 br/min   Minute Volume (L/min) 7.8 Liters   Mean Airway Pressure (cmH2O) 9 cmH20   Plateau Pressure (cm H2O) 15 cm H2O   Driving Pressure 10   I:E Ratio 1:3.10   Static Compliance (L/cm H2O) 44   Vent Alarm Settings   High Pressure (cmH2O) 50 cmH2O   Low Minute Volume (lpm) 7 L/min   High Minute Volume (lpm) 20 L/min   Low Exhaled Vt (ml) 300 mL   High Exhaled Vt (ml) 850 mL   RR High (bpm) 35 br/min   Apnea (secs) 20 secs   Additional Respiratoray Assessments   Humidification Source Heated wire   Humidification Temp 36.8   Circuit Condensation Drained   Ambu Bag With Mask At Bedside Yes   ETT    Placement Date/Time: 03/12/24 (c) 2006   Placement Verified By: Auscultation;Capnometry  Preoxygenation: Yes  Mask Ventilation: Ventilated by mask (1)  Technique: Video laryngoscopy  Airway Tube Size: 8 mm  Blade Size: 4  Location: Oral  Grade View: F...   Secured At 24 cm   Measured From Lips   ETT Placement Center   Secured By Commercial tube spence   Site Assessment Dry       
   03/13/24 1653   Patient Observation   Pulse 82   Respirations 18   SpO2 96 %   Breath Sounds   Right Upper Lobe Rhonchi   Right Middle Lobe Rhonchi   Right Lower Lobe Rhonchi   Left Upper Lobe Rhonchi   Left Lower Lobe Rhonchi   Vent Information   Ventilator -37   Vent Mode AC/VC   Ventilator Settings   FiO2  60 %   Vt (Set, mL) 450 mL   Resp Rate (Set) 18 bpm   PEEP/CPAP (cmH2O) 8   Peak Inspiratory Flow (Set) 60 L/sec   Vent Patient Data (Readings)   Vt (Measured) 414 mL   Peak Inspiratory Pressure (cmH2O) 26 cmH2O   Rate Measured 18 br/min   Minute Volume (L/min) 8.12 Liters   Mean Airway Pressure (cmH2O) 12 cmH20   Plateau Pressure (cm H2O) 18 cm H2O   Driving Pressure 10   I:E Ratio 1:3.10   Static Compliance (L/cm H2O) 46   Vent Alarm Settings   High Pressure (cmH2O) 50 cmH2O   Low Minute Volume (lpm) 7 L/min   High Minute Volume (lpm) 20 L/min   Low Exhaled Vt (ml) 300 mL   High Exhaled Vt (ml) 800 mL   RR High (bpm) 35 br/min   Apnea (secs) 20 secs   Additional Respiratoray Assessments   Humidification Source Heated wire   Humidification Temp 37   Circuit Condensation Drained   Ambu Bag With Mask At Bedside Yes   Airway Clearance   Suction ET Tube   Suction Device Inline suction catheter   Sputum Method Obtained Endotracheal   Sputum Amount Moderate   Sputum Color/Odor Pink tinged;Tan   Sputum Consistency Thick   ETT    Placement Date/Time: 03/12/24 (c) 2006   Placement Verified By: Auscultation;Capnometry  Preoxygenation: Yes  Mask Ventilation: Ventilated by mask (1)  Technique: Video laryngoscopy  Airway Tube Size: 8 mm  Blade Size: 4  Location: Oral  Grade View: F...   Secured At 24 cm   Measured From Lips   ETT Placement Center   Secured By Commercial tube spence   Site Assessment Dry       
   03/14/24 0839   Patient Observation   Pulse 90   Respirations 14   SpO2 100 %   Breath Sounds   Right Upper Lobe Rhonchi;Expiratory wheezes   Right Middle Lobe Rhonchi;Expiratory wheezes   Right Lower Lobe Rhonchi;Expiratory wheezes   Left Upper Lobe Rhonchi;Expiratory wheezes   Left Lower Lobe Rhonchi;Expiratory wheezes   Vent Information   Ventilator -37   Vent Mode AC/VC   Ventilator Settings   FiO2  50 %   Vt (Set, mL) 450 mL   Resp Rate (Set) 18 bpm   PEEP/CPAP (cmH2O) 8   Peak Inspiratory Flow (Set) 60 L/sec   Vent Patient Data (Readings)   Vt (Measured) 158 mL   Peak Inspiratory Pressure (cmH2O) 33 cmH2O   Rate Measured 34 br/min   Minute Volume (L/min) 8.16 Liters   Mean Airway Pressure (cmH2O) 15 cmH20   Plateau Pressure (cm H2O) 17 cm H2O   Driving Pressure 9   I:E Ratio 1:3.10   Static Compliance (L/cm H2O) 52   Vent Alarm Settings   High Pressure (cmH2O) 50 cmH2O   Low Minute Volume (lpm) 7 L/min   High Minute Volume (lpm) 20 L/min   Low Exhaled Vt (ml) 300 mL   High Exhaled Vt (ml) 800 mL   RR High (bpm) 35 br/min   Apnea (secs) 20 secs   Additional Respiratoray Assessments   Humidification Source Heated wire   Humidification Temp 37   Circuit Condensation Drained   Ambu Bag With Mask At Bedside Yes   Airway Clearance   Suction ET Tube   Suction Device Inline suction catheter   Sputum Method Obtained Endotracheal   Sputum Amount Large   Sputum Color/Odor Pink tinged;Yellow   Sputum Consistency Thick   ETT    Placement Date/Time: 03/12/24 (c) 2006   Placement Verified By: Auscultation;Capnometry  Preoxygenation: Yes  Mask Ventilation: Ventilated by mask (1)  Technique: Video laryngoscopy  Airway Tube Size: 8 mm  Blade Size: 4  Location: Oral  Grade View: F...   Secured At 24 cm   Measured From Lips   ETT Placement Center   Secured By Commercial tube spence   Site Assessment Dry       
   03/14/24 1345   Patient Observation   Pulse 77   Respirations 15   SpO2 100 %   Vent Information   Ventilator -37   Vent Mode (S)  CPAP/PS   Ventilator Settings   FiO2  40 %   PEEP/CPAP (cmH2O) 5   Pressure Support (cm H2O) (S)  8 cm H2O   Vent Patient Data (Readings)   Vt (Measured) 470 mL   Peak Inspiratory Pressure (cmH2O) 15 cmH2O   Rate Measured 8 br/min   Minute Volume (L/min) 3.82 Liters   Mean Airway Pressure (cmH2O) 8 cmH20   Plateau Pressure (cm H2O) 14 cm H2O   Driving Pressure 9   I:E Ratio 1:2.00   Vent Alarm Settings   High Pressure (cmH2O) 50 cmH2O   Low Minute Volume (lpm) (S)  4 L/min   High Minute Volume (lpm) 20 L/min   Low Exhaled Vt (ml) 300 mL   High Exhaled Vt (ml) 800 mL   RR High (bpm) 35 br/min   Apnea (secs) 20 secs     SBT started at this time.  
   [facility]  Internal Medicine Department  Division of Pulmonary, Critical Care and Sleep Medicine  Daily Intensive Care Unit Progress  Note    Admit Date: 3/11/2024    MRN: [id number]        Patient:  Andre Brown 73 y.o. male     PCP: Cruz Velasco DO    Date of Service: 3/14/2024      Allergy: Patient has no known allergies.    Subjective   Length of stay during current admission: 2  Events of the past 24 hours are in the residents notes and we discussed at bedside briefly...       Concerned whether not this is a chronic condition slowly developed over time or areas of 100% acute renal failure.  started on Zosyn and Vancomycin. Procal was high  BS ok not DKA  Follow cultures       Physical Exam:   VITALS:  /71   Pulse 74   Temp 100 °F (37.8 °C) (Bladder)   Resp 18   Ht 1.88 m (6' 2.02\")   Wt 93.9 kg (207 lb)   SpO2 100%   BMI 26.57 kg/m²   24HR INTAKE/OUTPUT:    Intake/Output Summary (Last 24 hours) at 3/14/2024 1140  Last data filed at 3/14/2024 0800  Gross per 24 hour   Intake 1587.83 ml   Output 3015 ml   Net -1427.17 ml       8HR INTAKE OUTPUT:  In: -   Out: 50 [Urine:50]  General: Alert  HEENT: Conjunctiva/corneas clear, No icterus. No exudates.   Neck: Supple, No JVD  Chest wall: Symmetric excursion  Lung: Course to auscultation No rales or wheezing  Heart: Regular rate and rhythm, KATJA murmur, rub or  gallop.   Abdomen: BS +, soft, no rigidity, rebound or guarding  Extremities: Trace edema. Palp pulses. Right TCL and Left HD cath  Skin: No rashes  Musculokeletal: No trauma signs   Lymph nodes: No adenopathy  Neurologic: Non focal examination    Medications   Home and Current medications were discussed & reviewed on rounds with team and pharmacy liaison.  Labs &  Imaging Studies   The data collected below information that was obtained, reviewed, analyzed and interpreted today. Imaging test are reviewed with the radiologist during rounds. Comparison to previous images are 
   [facility]  Internal Medicine Department  Division of Pulmonary, Critical Care and Sleep Medicine  Daily Intensive Care Unit Progress  Note    Admit Date: 3/11/2024    MRN: [id number]        Patient:  Andre Brown 73 y.o. male     PCP: Cruz Velasco DO    Date of Service: 3/13/2024      Allergy: Patient has no known allergies.    Subjective   Length of stay during current admission: 1  Events of the past 24 hours are in the residents notes and we discussed at bedside briefly...       Intubated overnight  Started on hemodialysis for severe acidosis  Concerned whether not this is a chronic condition slowly developed over time or areas of 100% acute renal failure.  started on Zosyn and Vancomycin. Procal was high  BS ok not DKA  Follow cultures   Molecular panel = negative.     Physical Exam:   VITALS:  /81   Pulse 75   Temp 97.5 °F (36.4 °C) (Bladder)   Resp 19   Ht 1.88 m (6' 2\")   Wt 92.5 kg (203 lb 14.4 oz)   SpO2 98%   BMI 26.18 kg/m²   24HR INTAKE/OUTPUT:    Intake/Output Summary (Last 24 hours) at 3/13/2024 1443  Last data filed at 3/13/2024 1419  Gross per 24 hour   Intake 1804.03 ml   Output 2730 ml   Net -925.97 ml       8HR INTAKE OUTPUT:  In: 588.3 [I.V.:488.8]  Out: 825 [Urine:825]  General: Alert  HEENT: Conjunctiva/corneas clear, No icterus. No exudates.   Neck: Supple, No JVD  Chest wall: Symmetric excursion  Lung: Course to auscultation No rales or wheezing  Heart: Regular rate and rhythm, KATJA murmur, rub or  gallop.   Abdomen: BS +, soft, no rigidity, rebound or guarding  Extremities: Trace edema. Palp pulses. Right TCL and Left HD cath  Skin: No rashes  Musculokeletal: No trauma signs   Lymph nodes: No adenopathy  Neurologic: Non focal examination    Medications   Home and Current medications were discussed & reviewed on rounds with team and pharmacy liaison.  Labs &  Imaging Studies   The data collected below information that was obtained, reviewed, analyzed and 
   [facility]  Internal Medicine Department  Division of Pulmonary, Critical Care and Sleep Medicine  Daily Intensive Care Unit Progress  Note    Admit Date: 3/11/2024    MRN: [id number]        Patient:  Andre Brown 73 y.o. male     PCP: Cruz Velasco DO    Date of Service: 3/17/2024      Allergy: Patient has no known allergies.    Subjective   Length of stay during current admission: 5        Extubated  Confused overnight.  Was given 2 mg of Ativan IV.  Much more drowsy this morning.  Currently on Precedex drip.      Physical Exam:   VITALS:  BP (!) 150/88   Pulse 77   Temp 96.9 °F (36.1 °C) (Temporal)   Resp 22   Ht 1.88 m (6' 2.02\")   Wt 83.7 kg (184 lb 8.4 oz)   SpO2 91%   BMI 23.68 kg/m²   24HR INTAKE/OUTPUT:    Intake/Output Summary (Last 24 hours) at 3/17/2024 1527  Last data filed at 3/17/2024 1400  Gross per 24 hour   Intake 777.46 ml   Output 1800 ml   Net -1022.54 ml     8HR INTAKE OUTPUT:  In: 240 [P.O.:240]  Out: 800 [Urine:800]  General: Alert  HEENT: Conjunctiva/corneas clear, No icterus. No exudates.   Neck: Supple, No JVD  Chest wall: Symmetric excursion  Lung: Course to auscultation No rales or wheezing  Heart: Regular rate and rhythm, KATJA murmur, rub or  gallop.   Abdomen: BS +, soft, no rigidity, rebound or guarding  Extremities: Trace edema. Palp pulses. Right TCL and Left HD cath  Skin: No rashes  Musculokeletal: No trauma signs   Lymph nodes: No adenopathy  Neurologic: Non focal examination    Medications   Home and Current medications were discussed & reviewed on rounds with team and pharmacy liaison.  Labs &  Imaging Studies   The data collected below information that was obtained, reviewed, analyzed and interpreted today. Imaging test are reviewed with the radiologist during rounds. Comparison to previous images are always explored.     CBC:   Lab Results   Component Value Date/Time    WBC 7.6 03/17/2024 05:28 AM    RBC 2.97 03/17/2024 05:28 AM    HGB 8.1 03/17/2024 
  P Quality Flow/Interdisciplinary Rounds Progress Note        Quality Flow Rounds held on March 19, 2024    Disciplines Attending:  Bedside Nurse, , , and Nursing Unit Leadership    Days HELADIO Brown was admitted on 3/11/2024 11:39 PM    Anticipated Discharge Date:       Disposition:    Demetrius Score:  Demetrius Scale Score: 14    Readmission Risk              Risk of Unplanned Readmission:  29           Discussed patient goal for the day, patient clinical progression, and barriers to discharge.  The following Goal(s) of the Day/Commitment(s) have been identified:  Diagnostics - Report Results and Labs - Report Results      Brianna Salamanca RN  March 19, 2024        
  Physician Progress Note      PATIENT:               ALEJANDRO HINKLE  CSN #:                  [id number]  :                       1950  ADMIT DATE:       3/11/2024 11:39 PM  DISCH DATE:        3/19/2024 7:43 PM  RESPONDING  PROVIDER #:        Maycol Khan MD          QUERY TEXT:    Pt admitted with Acute on chronic heart failure and has Acute respiratory   failure documented. If possible, please document in progress notes and   discharge summary further specificity regarding the type and acuity of   respiratory failure:    The medical record reflects the following:  Risk Factors: Diastolic heart failure  Clinical Indicators: Respiratory failure noted in progress notes as :   \"...Acute respiratory failure...Acute respiratory failure now resolved...\",   Blood gases: B.E. -13 HCO3 12.4 HHb% 10.4 O2 Hb 89 PCo2 27.1 PH 7.278 PO2 61.5   sO2 89.5 tHB 9.6  Treatment: Continuous pt monitoring, Serial labs, Intubated, ICU admission    Thank you,  Tatiana Mcpherson BSN, RN, CRCR  Clinical Documentation Improvement  Options provided:  -- Acute respiratory failure with hypoxia  -- Acute respiratory failure with hypercapnia  -- Acute on chronic respiratory failure with hypoxia  -- Acute on chronic respiratory failure with hypercapnia  -- Other - I will add my own diagnosis  -- Disagree - Not applicable / Not valid  -- Disagree - Clinically unable to determine / Unknown  -- Refer to Clinical Documentation Reviewer    PROVIDER RESPONSE TEXT:    This patient is in acute respiratory failure with hypoxia.    Query created by: Tatiana Beltran on 3/19/2024 4:23 PM      Electronically signed by:  Maycol Khan MD 3/20/2024 7:42 AM          
  SPEECH/LANGUAGE PATHOLOGY  CLINICAL ASSESSMENT OF SWALLOWING FUNCTION   and PLAN OF CARE    PATIENT NAME:  Andre Brown  (male)     MRN:  [id number]    :  1950  (73 y.o.)  STATUS:  Inpatient: Room 4403/4403-A    TODAY'S DATE:  3/16/2024  REFERRING PROVIDER:   Dr. Del Rio  SPECIFIC PROVIDER ORDER: SLP eval and treat Date of order:  3/16/24  REASON FOR REFERRAL: Assess swallow function    EVALUATING THERAPIST: CYNTHIA Escudero                 RESULTS:    DYSPHAGIA DIAGNOSIS:   Clinical indicators of mild  oropharyngeal phase dysphagia     Per chart review, patient with hx of dysphagia and need for thickened liquids. Per niece at bedside, patient was tolerating thin liquids with regular solids prior to admission.       DIET RECOMMENDATIONS:  Soft and bite size consistency solids (IDDSI level 6) with  nectar consistency (mildly thick - IDDSI level 2) liquids     FEEDING RECOMMENDATIONS:     Assistance level:  Full assistance is needed during all oral intake      Compensatory strategies recommended: Thorough oral care to prevent colonization of oral bacteria , Upright in bed/ chair as tolerated, Fully alert for all PO, and Small bites/sips      Discussed recommendations with nursing and/or faxed report to referring provider: Yes    SPEECH THERAPY  PLAN OF CARE   The dysphagia POC is established based on physician order, dysphagia diagnosis and results of clinical assessment     Skilled SLP intervention for dysphagia management up to 6 x per week until goals met, pt plateaus in function and/or discharged from hospital    Conditions Requiring Skilled Therapeutic Intervention for dysphagia:    Patient is performing below functional baseline d/t  current acute condition, respiratory compromise, multiple medications, and/or increased dependency upon caregivers.  impaired mastication   Throat clearing during PO intake   Coughing during PO intake    Underlying moist cough decreases ability to determine presence or 
4 Eyes Skin Assessment     NAME:  Andre Brown  YOB: 1950  MEDICAL RECORD NUMBER:  [id number]    The patient is being assessed for  Transfer to New Unit    I agree that at least one RN has performed a thorough Head to Toe Skin Assessment on the patient. ALL assessment sites listed below have been assessed.      Areas assessed by both nurses:    Head, Face, Ears, Shoulders, Back, Chest, Arms, Elbows, Hands, Sacrum. Buttock, Coccyx, Ischium, Legs. Feet and Heels, and Under Medical Devices         Does the Patient have a Wound? No noted wound(s)       Demetrius Prevention initiated by RN: Yes  Wound Care Orders initiated by RN: No    Pressure Injury (Stage 3,4, Unstageable, DTI, NWPT, and Complex wounds) if present, place Wound referral order by RN under : No    New Ostomies, if present place, Ostomy referral order under : No     Nurse 1 eSignature: Electronically signed by Kinjal Jarvis RN on 3/18/24 at 11:35 PM EDT    **SHARE this note so that the co-signing nurse can place an eSignature**    Nurse 2 eSignature: Electronically signed by Dalia Calle RN on 3/18/24 at 11:36 PM EDT  
4 Eyes Skin Assessment     NAME:  Andre Brown  YOB: 1950  MEDICAL RECORD NUMBER:  [id number]    The patient is being assessed for  Transfer to New Unit    I agree that at least one RN has performed a thorough Head to Toe Skin Assessment on the patient. ALL assessment sites listed below have been assessed.      Areas assessed by both nurses:    Head, Face, Ears, Shoulders, Back, Chest, Arms, Elbows, Hands, Sacrum. Buttock, Coccyx, Ischium, Legs. Feet and Heels, and Under Medical Devices         Does the Patient have a Wound? No noted wound(s)       Demetrius Prevention initiated by RN: Yes  Wound Care Orders initiated by RN: No    Pressure Injury (Stage 3,4, Unstageable, DTI, NWPT, and Complex wounds) if present, place Wound referral order by RN under : No    New Ostomies, if present place, Ostomy referral order under : No     Nurse 1 eSignature: Electronically signed by Kinjal Jarvis RN on 3/18/24 at 11:35 PM EDT    **SHARE this note so that the co-signing nurse can place an eSignature**    Nurse 2 eSignature: Electronically signed by Dalia Calle RN on 3/18/24 at 11:37 PM EDT  
Attempted to call stat lab ext [phone] for stat blood cultures, on hold for 7 minutes.   
Called to the bedside for increasing shortness of breath and wheezing. The patient was previously on room air and currently on 5L NC.  Expiratory wheezing wheezing heard throughout and patient coughing up pink/bloody sputum.  Updated nephrology, will give one-time dose Bumex and Imdur 20 mg.  As needed breathing treatment ordered and stat chest x-ray obtained.  IV fluids on hold for now.    Electronically signed by GURPREET Vargas CNP on 3/12/2024 at 6:23 AM    
Consult noted and Chart reviewed. Due to medical status, patient is not appropriate for diabetes education. Please re-consult us if status changes. Please call ext. [phone] for any other questions/concerns.   
DM note:  Chart reviewed and will follow.  Pt just admitted with DKA.   A1C 6.6%.   Will attempt to see tomorrow.  Chasity Diaz RN [facility]  
Department of Internal Medicine  Nephrology Attending Consult Note    Events reviewed.      SUBJECTIVE:  We are following Mr. Andre Brown for BLAIR on CKD, hyperkalemia and metabolic acidosis.  Reports no complaints      PHYSICAL EXAM:      Vitals:    VITALS:  BP (!) 166/95   Pulse 73   Temp (!) 96.7 °F (35.9 °C) (Temporal)   Resp (!) 34   Ht 1.88 m (6' 2.02\")   Wt 83.7 kg (184 lb 8.4 oz)   SpO2 97%   BMI 23.68 kg/m²   24HR INTAKE/OUTPUT:    Intake/Output Summary (Last 24 hours) at 3/17/2024 1022  Last data filed at 3/17/2024 0800  Gross per 24 hour   Intake 537.46 ml   Output 1000 ml   Net -462.54 ml       Access: left femoral dialysis catheter in place.   Constitutional: Patient is lethargic today.  HEENT: Pupils are equal reactive, mucous membranes are moist  Respiratory: Decreased breath sounds at the bases  Cardiovascular/Edema: Heart sounds irregularly irregular  Gastrointestinal: Abdomen soft  Neurologic: Nonfocal  Skin: No lesions  Other: Trace edema    Scheduled Meds:   insulin lispro  0-18 Units SubCUTAneous Q4H    insulin glargine  12 Units SubCUTAneous Daily    bumetanide  2 mg IntraVENous BID    ferric gluconate (FERRLECIT) 125 mg in sodium chloride 0.9 % 100 mL IVPB  125 mg IntraVENous Daily    sodium chloride flush  5-40 mL IntraVENous BID    aspirin  81 mg Oral Daily    piperacillin-tazobactam  4,500 mg IntraVENous Q12H    apixaban  5 mg Oral BID    atorvastatin  10 mg Oral Nightly    [Held by provider] hydrALAZINE  25 mg Oral BID    lipase-protease-amylase  5,000 Units Oral TID WC    metoprolol tartrate  37.5 mg Oral BID    pantoprazole (PROTONIX) 40 mg in sodium chloride (PF) 0.9 % 10 mL injection  40 mg IntraVENous Daily     Continuous Infusions:   dexmedeTOMIDine (PRECEDEX) 1,000 mcg in sodium chloride 0.9 % 250 mL infusion 0.7 mcg/kg/hr (03/17/24 0655)       PRN Meds:.medicated lip balm, hydrALAZINE, sodium chloride flush **AND** sodium chloride flush, ipratropium 0.5 mg-albuterol 2.5 mg, 
Department of Internal Medicine  Nephrology Attending Consult Note    Events reviewed.   Had dialysis yesterday, tolerated well.    SUBJECTIVE:  We are following Mr. Andre Brown for BLAIR on CKD, hyperkalemia and metabolic acidosis. Presently intubated.      PHYSICAL EXAM:      Vitals:    VITALS:  BP (!) 144/78   Pulse (!) 111   Temp (!) 100.8 °F (38.2 °C) (Bladder)   Resp 20   Ht 1.88 m (6' 2.02\")   Wt 86.1 kg (189 lb 13.1 oz)   SpO2 99%   BMI 24.36 kg/m²   24HR INTAKE/OUTPUT:    Intake/Output Summary (Last 24 hours) at 3/15/2024 1519  Last data filed at 3/15/2024 1300  Gross per 24 hour   Intake 1046.02 ml   Output 2795 ml   Net -1748.98 ml       Access: left femoral dialysis catheter in place.   Constitutional: Patient is in mild respiratory distress  HEENT: Pupils are equal reactive, mucous membranes are moist  Respiratory: Decreased breath sounds at the bases  Cardiovascular/Edema: Heart sounds irregularly irregular  Gastrointestinal: Abdomen soft  Neurologic: Nonfocal  Skin: No lesions  Other: Trace edema    Scheduled Meds:   insulin glargine  12 Units SubCUTAneous Daily    insulin lispro  0-12 Units SubCUTAneous Q4H    ferric gluconate (FERRLECIT) 125 mg in sodium chloride 0.9 % 100 mL IVPB  125 mg IntraVENous Once    sodium chloride flush  5-40 mL IntraVENous BID    chlorhexidine  15 mL Mouth/Throat BID    aspirin  81 mg Oral Daily    piperacillin-tazobactam  4,500 mg IntraVENous Q12H    artificial tears   Both Eyes BID    apixaban  5 mg Oral BID    atorvastatin  10 mg Oral Nightly    [Held by provider] hydrALAZINE  25 mg Oral BID    lipase-protease-amylase  5,000 Units Oral TID     metoprolol tartrate  37.5 mg Oral BID    pantoprazole (PROTONIX) 40 mg in sodium chloride (PF) 0.9 % 10 mL injection  40 mg IntraVENous Daily     Continuous Infusions:      PRN Meds:.medicated lip balm, sodium chloride flush **AND** sodium chloride flush, ipratropium 0.5 mg-albuterol 2.5 mg, acetaminophen, dextrose bolus 
Department of Internal Medicine  Nephrology Attending Consult Note    Events reviewed.   Patient was intubated overnight.     SUBJECTIVE:  We are following Mr. Andre Brown for BLAIR on CKD, hyperkalemia and metabolic acidosis. Presently intubated.      PHYSICAL EXAM:      Vitals:    VITALS:  /71   Pulse 80   Temp 97.3 °F (36.3 °C) (Bladder)   Resp 18   Ht 1.88 m (6' 2\")   Wt 92.5 kg (203 lb 14.4 oz)   SpO2 98%   BMI 26.18 kg/m²   24HR INTAKE/OUTPUT:    Intake/Output Summary (Last 24 hours) at 3/13/2024 1042  Last data filed at 3/13/2024 1000  Gross per 24 hour   Intake 1783.92 ml   Output 2840 ml   Net -1056.08 ml       Access: left femoral dialysis catheter in place.   Constitutional: Patient is in mild respiratory distress  HEENT: Pupils are equal reactive, mucous membranes are moist  Respiratory: Decreased breath sounds at the bases  Cardiovascular/Edema: Heart sounds irregularly irregular  Gastrointestinal: Abdomen soft  Neurologic: Nonfocal  Skin: No lesions  Other: Trace edema    Scheduled Meds:   chlorhexidine  15 mL Mouth/Throat BID    [START ON 3/14/2024] aspirin  81 mg Oral Daily    piperacillin-tazobactam  4,500 mg IntraVENous Q12H    artificial tears   Both Eyes BID    apixaban  5 mg Oral BID    atorvastatin  10 mg Oral Nightly    [Held by provider] hydrALAZINE  25 mg Oral BID    lipase-protease-amylase  5,000 Units Oral TID WC    metoprolol tartrate  37.5 mg Oral BID    pantoprazole (PROTONIX) 40 mg in sodium chloride (PF) 0.9 % 10 mL injection  40 mg IntraVENous Daily    insulin glargine  10 Units SubCUTAneous Daily    insulin lispro  0-4 Units SubCUTAneous Q4H     Continuous Infusions:   midazolam      fentaNYL 100 mcg/hr (03/13/24 1419)    [Held by provider] dextrose 5 % and 0.45 % NaCl       PRN Meds:.magnesium sulfate, [Held by provider] dextrose 5 % and 0.45 % NaCl, ipratropium 0.5 mg-albuterol 2.5 mg, acetaminophen, dextrose bolus **OR** dextrose bolus      DATA:    CBC:   Lab 
Department of Internal Medicine  Nephrology Attending Progress Note    Events reviewed.      SUBJECTIVE:  We are following Mr. Andre Brown for BLAIR on CKD, hyperkalemia and metabolic acidosis.  Reports no complaints on HD today      PHYSICAL EXAM:      Vitals:    VITALS:  /80   Pulse 82   Temp 97.4 °F (36.3 °C) (Axillary)   Resp 20   Ht 1.88 m (6' 2.02\")   Wt 84.4 kg (186 lb 1.1 oz)   SpO2 95%   BMI 23.88 kg/m²   24HR INTAKE/OUTPUT:    Intake/Output Summary (Last 24 hours) at 3/18/2024 1436  Last data filed at 3/18/2024 1100  Gross per 24 hour   Intake 1047.96 ml   Output 1200 ml   Net -152.04 ml       Access: left femoral dialysis catheter in place.   Constitutional: Patient is lethargic today.  HEENT: Pupils are equal reactive, mucous membranes are moist  Respiratory: Decreased breath sounds at the bases  Cardiovascular/Edema: Heart sounds irregularly irregular  Gastrointestinal: Abdomen soft  Neurologic: Nonfocal  Skin: No lesions  Other: Trace edema    Scheduled Meds:   bumetanide  2 mg Oral BID    insulin lispro  0-18 Units SubCUTAneous Q4H    insulin glargine  12 Units SubCUTAneous Daily    sodium chloride flush  5-40 mL IntraVENous BID    aspirin  81 mg Oral Daily    piperacillin-tazobactam  4,500 mg IntraVENous Q12H    apixaban  5 mg Oral BID    atorvastatin  10 mg Oral Nightly    [Held by provider] hydrALAZINE  25 mg Oral BID    lipase-protease-amylase  5,000 Units Oral TID WC    metoprolol tartrate  37.5 mg Oral BID    pantoprazole (PROTONIX) 40 mg in sodium chloride (PF) 0.9 % 10 mL injection  40 mg IntraVENous Daily     Continuous Infusions:   dexmedeTOMIDine (PRECEDEX) 1,000 mcg in sodium chloride 0.9 % 250 mL infusion Stopped (03/17/24 0957)       PRN Meds:.medicated lip balm, hydrALAZINE, sodium chloride flush **AND** sodium chloride flush, ipratropium 0.5 mg-albuterol 2.5 mg, acetaminophen, dextrose bolus **OR** dextrose bolus      DATA:    CBC:   Lab Results   Component Value Date/Time 
Department of Internal Medicine  Nephrology Attending Progress Note    Events reviewed.      SUBJECTIVE:  We are following Mr. Andre Brown for BLAIR on CKD, hyperkalemia and metabolic acidosis.  Reports no complaints.      PHYSICAL EXAM:      Vitals:    VITALS:  /72   Pulse 91   Temp 98 °F (36.7 °C) (Temporal)   Resp 17   Ht 1.88 m (6' 2.02\")   Wt 85 kg (187 lb 6.3 oz)   SpO2 99%   BMI 24.05 kg/m²   24HR INTAKE/OUTPUT:    Intake/Output Summary (Last 24 hours) at 3/19/2024 1145  Last data filed at 3/19/2024 1116  Gross per 24 hour   Intake 753.83 ml   Output 2550 ml   Net -1796.17 ml       Access: left femoral dialysis catheter in place.   Constitutional: Patient is lethargic today.  HEENT: Pupils are equal reactive, mucous membranes are moist  Respiratory: Decreased breath sounds at the bases  Cardiovascular/Edema: Heart sounds irregularly irregular  Gastrointestinal: Abdomen soft  Neurologic: Nonfocal  Skin: No lesions  Other: Trace edema    Scheduled Meds:   bumetanide  2 mg Oral BID    insulin lispro  0-18 Units SubCUTAneous Q4H    insulin glargine  12 Units SubCUTAneous Daily    sodium chloride flush  5-40 mL IntraVENous BID    aspirin  81 mg Oral Daily    piperacillin-tazobactam  4,500 mg IntraVENous Q12H    apixaban  5 mg Oral BID    atorvastatin  10 mg Oral Nightly    [Held by provider] hydrALAZINE  25 mg Oral BID    lipase-protease-amylase  5,000 Units Oral TID WC    metoprolol tartrate  37.5 mg Oral BID    pantoprazole (PROTONIX) 40 mg in sodium chloride (PF) 0.9 % 10 mL injection  40 mg IntraVENous Daily     Continuous Infusions:   dexmedeTOMIDine (PRECEDEX) 1,000 mcg in sodium chloride 0.9 % 250 mL infusion Stopped (03/17/24 0957)       PRN Meds:.medicated lip balm, hydrALAZINE, sodium chloride flush **AND** sodium chloride flush, ipratropium 0.5 mg-albuterol 2.5 mg, acetaminophen, dextrose bolus **OR** dextrose bolus      DATA:    CBC:   Lab Results   Component Value Date/Time    WBC 7.1 
ENDOCRINOLOGY PROGRESS NOTE      Date of admission: 3/11/2024  Date of service: 3/15/2024  Admitting physician: Emmy Gutierrez MD   Primary Care Physician: Cruz Velasco DO  Consultant physician: Aubrey Quintero MD     Reason for the consultation:  Uncontrolled DM    History of Present Illness:  The history was provided from the medical records.  The patient is currently intubated sedated  Days HELADIO Brown is a 73 y.o. old male with PMH of A-fib, CKD, type 2 diabetes, hyperlipidemia, hypertension and other listed below admitted to [facility] on 3/11/2024 because of shortness of breath and respiratory failure and found to be in DKA, endocrine service was consulted for diabetes management.    Subjective  No acute events, started on Tube feeds, BS started to increase     Inpatient diet:   Carb Restricted diet     Point of care glucose monitoring   (Independently reviewed)   Recent Labs     03/13/24  2329 03/14/24  0410 03/14/24  0753 03/14/24  1258 03/14/24  1606 03/14/24  2000 03/15/24  0000 03/15/24  0414   POCGLU 110* 127* 145* 159* 142* 148* 211* 238*       Scheduled Meds:   insulin glargine  12 Units SubCUTAneous Daily    sodium chloride flush  5-40 mL IntraVENous BID    chlorhexidine  15 mL Mouth/Throat BID    aspirin  81 mg Oral Daily    piperacillin-tazobactam  4,500 mg IntraVENous Q12H    artificial tears   Both Eyes BID    insulin lispro  0-6 Units SubCUTAneous Q4H    apixaban  5 mg Oral BID    atorvastatin  10 mg Oral Nightly    [Held by provider] hydrALAZINE  25 mg Oral BID    lipase-protease-amylase  5,000 Units Oral TID WC    metoprolol tartrate  37.5 mg Oral BID    pantoprazole (PROTONIX) 40 mg in sodium chloride (PF) 0.9 % 10 mL injection  40 mg IntraVENous Daily       PRN Meds:   medicated lip balm, , PRN  sodium chloride flush, 5-40 mL, PRN  ipratropium 0.5 mg-albuterol 2.5 mg, 1 Dose, Q4H PRN  acetaminophen, 650 mg, Q6H PRN  dextrose bolus, 125 mL, PRN   Or  dextrose bolus, 250 mL, PRN      Continuous 
ENDOCRINOLOGY PROGRESS NOTE      Date of admission: 3/11/2024  Date of service: 3/16/2024  Admitting physician: Emmy Gutierrez MD   Primary Care Physician: Cruz Velasco DO  Consultant physician: Aubrey Quintero MD     Reason for the consultation:  Uncontrolled DM    History of Present Illness:  The history was provided from the medical records.  The patient is currently intubated sedated  Days HELADIO Brown is a 73 y.o. old male with PMH of A-fib, CKD, type 2 diabetes, hyperlipidemia, hypertension and other listed below admitted to [facility] on 3/11/2024 because of shortness of breath and respiratory failure and found to be in DKA, endocrine service was consulted for diabetes management.    Subjective  The patient was seen and examined at bedside this morning.  Glucose level improving.  No hypoglycemia    Inpatient diet:   Carb Restricted diet     Point of care glucose monitoring   (Independently reviewed)   Recent Labs     03/14/24  2000 03/15/24  0000 03/15/24  0414 03/15/24  1146 03/15/24  1551 03/15/24  1940 03/16/24  0010 03/16/24  0414   POCGLU 148* 211* 238* 186* 237* 272* 214* 133*       Scheduled Meds:   insulin lispro  0-18 Units SubCUTAneous Q4H    insulin glargine  12 Units SubCUTAneous Daily    bumetanide  2 mg IntraVENous BID    ferric gluconate (FERRLECIT) 125 mg in sodium chloride 0.9 % 100 mL IVPB  125 mg IntraVENous Daily    sodium chloride flush  5-40 mL IntraVENous BID    aspirin  81 mg Oral Daily    piperacillin-tazobactam  4,500 mg IntraVENous Q12H    apixaban  5 mg Oral BID    atorvastatin  10 mg Oral Nightly    [Held by provider] hydrALAZINE  25 mg Oral BID    lipase-protease-amylase  5,000 Units Oral TID WC    metoprolol tartrate  37.5 mg Oral BID    pantoprazole (PROTONIX) 40 mg in sodium chloride (PF) 0.9 % 10 mL injection  40 mg IntraVENous Daily       PRN Meds:   medicated lip balm, , PRN  hydrALAZINE, 10 mg, Q6H PRN  sodium chloride flush, 5-40 mL, PRN  ipratropium 0.5 mg-albuterol 2.5 mg, 
Notified Dr Khan of K at 3.4. Will continue to monitor.   
OCCUPATIONAL THERAPY INITIAL EVALUATION    [facility]  [address] [place], [place]          Date:3/18/2024                                                   Patient Name: Andre Brown     MRN: [id number]     : 1950     Room: [address] [place], [place] [address]       Evaluating OT: Leann Willams OTD, OTR/L, [id number]      Referring Provider: Celena Hall APRN - CNP     Specific Provider Orders/Date: OT eval and treat (3/16/24)    Diagnosis: Shortness of breath [R06.02]  Hyperkalemia [E87.5]  DKA, type 2, not at goal (HCC) [E11.10]  Diabetic ketoacidosis without coma associated with type 2 diabetes mellitus (HCC) [E11.10]  Diabetic ketoacidosis without coma associated with diabetes mellitus due to underlying condition (HCC) [E08.10]  Acute renal failure superimposed on chronic kidney disease, unspecified acute renal failure type, unspecified CKD stage (HCC) [N17.9, N18.9]    Surgeries/Procedures: 3/12-3/15: intubated       Pt admitted with SOB, abdominal pain.      Pertinent Medical History:       has a past medical history of A-fib (HCC), Cancer (HCC), CKD (chronic kidney disease), Diabetes mellitus (HCC), ED (erectile dysfunction), Hyperlipidemia, Hypertension, and Prostate cancer (HCC).         Precautions:  Fall Risk, , HD, nectar thick liquids.     Assessment of current deficits    [x] Functional mobility  [x]ADLs  [x] Strength               [x]Cognition    [x] Functional transfers   [x] IADLs         [x] Safety Awareness   [x]Endurance    [x] Fine Coordination              [x] Balance      [] Vision/perception   [x]Sensation     []Gross Motor Coordination  [] ROM  [] Delirium                   [] Motor Control     OT PLAN OF CARE   OT POC based on physician orders, patient diagnosis and results of clinical assessment    Frequency/Duration 1-3 days/wk for 2 weeks PRN   Specific OT Treatment Interventions to include:   * Instruction/training on 
Occupational Therapy  OT BEDSIDE TREATMENT NOTE   [facility]  [address] [place], [place]      Date:3/19/2024  Patient Name: Andre Brown  MRN: [id number]  : 1950  Room: [address] [place] [address]     Evaluating OT: Leann Willams OTD, OTR/L, [id number]       Referring Provider: Celena Hall APRN - CNP     Specific Provider Orders/Date: OT eval and treat (3/16/24)    Diagnosis: Shortness of breath [R06.02]  Hyperkalemia [E87.5]  DKA, type 2, not at goal (HCC) [E11.10]  Diabetic ketoacidosis without coma associated with type 2 diabetes mellitus (HCC) [E11.10]  Diabetic ketoacidosis without coma associated with diabetes mellitus due to underlying condition (HCC) [E08.10]  Acute renal failure superimposed on chronic kidney disease, unspecified acute renal failure type, unspecified CKD stage (HCC) [N17.9, N18.9]    Surgeries/Procedures: 3/12-3/15: intubated        Pt admitted with SOB, abdominal pain.       Pertinent Medical History:       has a past medical history of A-fib (HCC), Cancer (HCC), CKD (chronic kidney disease), Diabetes mellitus (HCC), ED (erectile dysfunction), Hyperlipidemia, Hypertension, and Prostate cancer (HCC).           Precautions:  Fall Risk, , HD, nectar thick liquids.      Assessment of current deficits    [x] Functional mobility            [x]ADLs           [x] Strength                   [x]Cognition    [x] Functional transfers          [x] IADLs          [x] Safety Awareness   [x]Endurance    [x] Fine Coordination              [x] Balance      [] Vision/perception    [x]Sensation      []Gross Motor Coordination  [] ROM           [] Delirium                   [] Motor Control      OT PLAN OF CARE   OT POC based on physician orders, patient diagnosis and results of clinical assessment     Frequency/Duration 1-3 days/wk for 2 weeks PRN   Specific OT Treatment Interventions to include:   * Instruction/training on 
Patient admitted to MICU with the following belongings:  Glasses, Jewelry, Cell Phone, Cell Phone , Pants, Shirt, Socks, Shoes, Jacket, Hat, and Other rings & sunglasses . The following belongings admitted with the patient, None, were sent home with the patient's family.     4 Eyes Skin Assessment     NAME:  Andre Brown  YOB: 1950  MEDICAL RECORD NUMBER:  [id number]    The patient is being assessed for  Admission    I agree that at least one RN has performed a thorough Head to Toe Skin Assessment on the patient. ALL assessment sites listed below have been assessed.      Areas assessed by both nurses:    Head, Face, Ears, Shoulders, Back, Chest, Arms, Elbows, Hands, Sacrum. Buttock, Coccyx, Ischium, Legs. Feet and Heels, and Under Medical Devices         Does the Patient have a Wound? No noted wound(s)    Scratches & old healed scars - generalized        Demetrius Prevention initiated by RN: Yes  Wound Care Orders initiated by RN: No    Pressure Injury (Stage 3,4, Unstageable, DTI, NWPT, and Complex wounds) if present, place Wound referral order by RN under : No    New Ostomies, if present place, Ostomy referral order under : No     Nurse 1 eSignature: Electronically signed by Mary Espinoza RN on 3/12/24 at 3:14 AM EDT    **SHARE this note so that the co-signing nurse can place an eSignature**    Nurse 2 eSignature: Electronically signed by Maki Carlton RN on 3/12/24 at 3:27 AM EDT   
Patient arrived from MICU with no belongings.  
Patient confused and getting out of bed, setting off bed alarm. Patient reoriented and redirected; RN reeducated patient on using the call light, patient verbalizes understanding. Bed alarm active,call light within reach.  Virtual  ordered as patient is high risk for falls. Per virtual Nora, there is currently a wait list for virtual safety companions.   
Patient family member, Kimberlee, came to nurses station stating that patients daughter,   Perla, wanted her (Kimberlee) to take the patient's belongings home. This RN called and spoke with Perla. Perla stated that she will be flying in from [facility] and confirmed that she wanted the patient's belongings to go home with Kimberlee. ALL of patients belongings sent home with Candy. Patient belongings include Glasses, Jewelry, Cell Phone, Cell Phone , Pants, Shirt, Socks, Shoes, Jacket, Hat, and Other rings & sunglasses.  
Patient was extubated to 2 liters/min via nasal cannula. Stridor was not present post extubation. SPO2 was 100%. Patient suctioned pre and post extubation.       Performed by  Radha Martinez RCP  
Physical Therapy  Physical Therapy  Daily Treatment Note    Name: Andre Brown  : 1950  MRN: [id number]      Date of Service: 3/19/2024    Evaluating PT:  Shirley Cheema PT, DPT [id number]    Room #:  6501/6501-B  Diagnosis:  Shortness of breath [R06.02]  Hyperkalemia [E87.5]  DKA, type 2, not at goal (HCC) [E11.10]  Diabetic ketoacidosis without coma associated with type 2 diabetes mellitus (HCC) [E11.10]  Diabetic ketoacidosis without coma associated with diabetes mellitus due to underlying condition (HCC) [E08.10]  Acute renal failure superimposed on chronic kidney disease, unspecified acute renal failure type, unspecified CKD stage (HCC) [N17.9, N18.9]  PMHx/PSHx:   has a past medical history of A-fib (HCC), Cancer (HCC), CKD (chronic kidney disease), Diabetes mellitus (HCC), ED (erectile dysfunction), Hyperlipidemia, Hypertension, and Prostate cancer (HCC).   has a past surgical history that includes Colon surgery; Dilatation, esophagus; Leg Surgery (Left, 6 years ago); hc dialysis catheter (N/A, 2021); Pancreas Biopsy (N/A, 2021); Upper gastrointestinal endoscopy (2021); Pancreatectomy (N/A, 2021); other surgical history; Upper gastrointestinal endoscopy (N/A, 10/18/2021); Colonoscopy (N/A, 10/18/2021); and Upper gastrointestinal endoscopy (N/A, 2022).  Procedure/Surgery:  Intubated 3/12, Extubated 3/15  Precautions:  Falls, alarms, , HD, nectar thick liquids  Equipment Needs:  TBD    SUBJECTIVE:    Pt lives alone in a 2 story home with 5 step(s) to enter and 1 rail(s).    His bed and bath are on the second floor with 13 steps and 1HR up, but he reports he can have first floor setup if necessary.  Pt ambulated with no PTA. He reports owning SPC, FWW, Rollator    OBJECTIVE:   Initial Evaluation  Date: 3/18/24 Treatment Date:  3/19/24 Short Term/ Long Term   Goals   AM-PAC 6 Clicks 10/24 16/24    Was pt agreeable to Eval/treatment? Yes Yes with encouragement    Does pt have pain? 
Physical Therapy  Physical Therapy Initial Assessment     Name: Andre Brown  : 1950  MRN: [id number]      Date of Service: 3/18/2024    Evaluating PT:  Shirley Cheema PT, DPPONCE [id number]    Room #:  4403/4403-A  Diagnosis:  Shortness of breath [R06.02]  Hyperkalemia [E87.5]  DKA, type 2, not at goal (HCC) [E11.10]  Diabetic ketoacidosis without coma associated with type 2 diabetes mellitus (HCC) [E11.10]  Diabetic ketoacidosis without coma associated with diabetes mellitus due to underlying condition (HCC) [E08.10]  Acute renal failure superimposed on chronic kidney disease, unspecified acute renal failure type, unspecified CKD stage (HCC) [N17.9, N18.9]  PMHx/PSHx:   has a past medical history of A-fib (HCC), Cancer (HCC), CKD (chronic kidney disease), Diabetes mellitus (HCC), ED (erectile dysfunction), Hyperlipidemia, Hypertension, and Prostate cancer (HCC).   has a past surgical history that includes Colon surgery; Dilatation, esophagus; Leg Surgery (Left, 6 years ago); hc dialysis catheter (N/A, 2021); Pancreas Biopsy (N/A, 2021); Upper gastrointestinal endoscopy (2021); Pancreatectomy (N/A, 2021); other surgical history; Upper gastrointestinal endoscopy (N/A, 10/18/2021); Colonoscopy (N/A, 10/18/2021); and Upper gastrointestinal endoscopy (N/A, 2022).  Procedure/Surgery:  Intubated 3/12, Extubated 3/15  Precautions:  Falls, alarms, live sitter, HD, nectar thick liquids  Equipment Needs:  TBD    SUBJECTIVE:    Pt lives alone in a 2 story home with 5 step(s) to enter and 1 rail(s).    His bed and bath are on the second floor with 13 steps and 1HR up, but he reports he can have first floor setup if necessary.  Pt ambulated with no PTA. He reports owning SPC, FWW, Rollator    OBJECTIVE:   Initial Evaluation  Date: 3/18/24 Treatment Short Term/ Long Term   Goals   AM-PAC 6 Clicks 10/24     Was pt agreeable to Eval/treatment? Yes     Does pt have pain? 4/10 knee pain (R>L)     Bed 
Pt has eaten today, eliquis and asa 81 has been given as well. This rn called pt's floor rn and asked to make NPO @ midnight and keep thinner free from now on. Will follow up in the am to see if pts labs are wnl and can be sent for.   
Pt left Against medical Advice. Stated he needed to take care of things at home and would come back. He understands he will need to go to the ER when coming back. IVs and lines removed before leaving.   Held pressure on Central line access 15 minutes with no oozing present when stopping pressure. Advised pt to avoid strenuous activity or walking stairs to decrease bleeding risk.   
RN called Dr. Richards. Discussed ABG and patient presentation. Verbal orders to intubate.  
SPEECH LANGUAGE PATHOLOGY  DAILY PROGRESS NOTE      PATIENT NAME:  Andre Brown      :  1950          TODAY'S DATE:  3/19/2024 [facility]    Current Diet: ADULT DIET; Dysphagia - Soft and Bite Sized; 3 carb choices (45 gm/meal); Low Potassium (Less than 3000 mg/day); 60 to 80 gm    Patient seen for ongoing dysphagia tx during breakfast meal. Patient seated upright in bed and feeding self. Patient with throat clearing throughout meal, which increased with sips of thin liquid. Patient reported that he previously had MBSS that he \"passed with flying colors.\" When given nectar thick liquids, no overt s/s of aspiration were noted. Recommend patient diet change to soft and bite size with nectar thick liquids.     Recommendation: change diet to soft and bite size with nectar thick liquids      CPT code(s) 69719  dysphagia tx  Total minutes :  15 minutes    Candace Gomes M.S. CCC-SLP/L  Speech Language Pathologist  [id number]     
Spontaneous Parameters performed on PS 5/5. Patient had a positive leak test present when cuff deflated and was able to follow commands for parameters. Concerns for patient continuing to fall asleep when done performing parameters.    VT = 636 ml  f = 12  B/M  Ve = 7.6 L/M  NIF = -50  cmH2O  VC = 2006 L  RSBI = 18      Performed by Kvng Burton RCP  
Stat blood cultures called to ex [phone]  
The patients daughter (Amaya) has been updated. She has asked if she could be updated with any changes. Phone number has been confirmed [phone].     
**AND** sodium chloride flush, ipratropium 0.5 mg-albuterol 2.5 mg, acetaminophen, dextrose bolus **OR** dextrose bolus      DATA:    CBC:   Lab Results   Component Value Date/Time    WBC 8.6 03/16/2024 04:07 AM    RBC 3.22 03/16/2024 04:07 AM    HGB 8.8 03/16/2024 04:07 AM    HCT 26.4 03/16/2024 04:07 AM    MCV 82.0 03/16/2024 04:07 AM    MCH 27.3 03/16/2024 04:07 AM    MCHC 33.3 03/16/2024 04:07 AM    RDW 14.7 03/16/2024 04:07 AM     03/16/2024 04:07 AM    MPV 11.3 03/16/2024 04:07 AM     CMP:    Lab Results   Component Value Date/Time     03/16/2024 05:38 AM    K 3.9 03/16/2024 05:38 AM    K 4.5 01/27/2022 04:37 PM     03/16/2024 05:38 AM    CO2 23 03/16/2024 05:38 AM    BUN 31 03/16/2024 05:38 AM    CREATININE 3.5 03/16/2024 05:38 AM    GFRAA 28 09/29/2022 11:05 AM    LABGLOM 17 03/16/2024 05:38 AM    GLUCOSE 145 03/16/2024 05:38 AM    PROT 6.6 03/16/2024 05:38 AM    LABALBU 3.2 03/16/2024 05:38 AM    CALCIUM 9.3 03/16/2024 05:38 AM    BILITOT 0.6 03/16/2024 05:38 AM    ALKPHOS 114 03/16/2024 05:38 AM    AST 21 03/16/2024 05:38 AM    ALT 12 03/16/2024 05:38 AM     Magnesium:    Lab Results   Component Value Date/Time    MG 2.3 03/16/2024 05:38 AM     Phosphorus:    Lab Results   Component Value Date/Time    PHOS 4.1 03/16/2024 05:38 AM     Radiology Review:      XR CHEST PORTABLE 3/14/24             BRIEF SUMMARY OF INITIAL CONSULT:    Briefly, Mr. Andre Brown is a 73 year old AA man with PMH of CKD stage IV, with proteinuria, baseline creatinine 3.1-3.2 mg/dL, 2/2 diabetic nephropathy, s/p ATN for which he required transient renal replacement therapy, HTN, type 2 DM, PAF on apixaban, hyperlipidemia, prostate cancer status post radiation therapy, , who was admitted on 3/12/24 after presenting to the ER reporting shortness of breath.  After evaluation in the ER he was found to have a BUN of 80 mg/dL, creatinine of 5.6 mg/dL, potassium level 6.4 mEq/L, bicarbonate level 9 mEq/L, reasons for 
523ml free water, 613 ml total water w/ flushes    Anthropometric Measures:  Height: 188 cm (6' 2.02\")  Ideal Body Weight (IBW): 190 lbs (86 kg)    Admission Body Weight: 92.1 kg (203 lb) (3/12; bed scale)  Current Body Weight: 93.9 kg (207 lb) (3/14; bed scale), 108.9 % IBW. Weight Source: Bed Scale  Current BMI (kg/m2): 26.6  Usual Body Weight:  (only measured wt EMR outside encounter 2/2023 205lb 6.4oz)                BMI Categories: Overweight (BMI 25.0-29.9)    Estimated Daily Nutrient Needs:  Energy Requirements Based On: Formula  Weight Used for Energy Requirements: Current  Energy (kcal/day): PS3B: 2061, 2000-2100kcals/day  Weight Used for Protein Requirements: Ideal  Protein (g/day): 1.3-1.5 g/kg IBW; 110-130  Method Used for Fluid Requirements: Standard Renal  Fluid (ml/day): refer to nephrology    Nutrition Diagnosis:   Inadequate oral intake related to impaired respiratory function as evidenced by nutrition support - enteral nutrition, NPO or clear liquid status due to medical condition, intubation    Nutrition Interventions:   Nutrition Education/Counseling: Education not appropriate  Coordination of Nutrition Care: Continue to monitor while inpatient       Goals:    Goals: Tolerate nutrition support at goal rate       Nutrition Monitoring and Evaluation:      Food/Nutrient Intake Outcomes: Enteral Nutrition Intake/Tolerance  Physical Signs/Symptoms Outcomes: Biochemical Data, Nutrition Focused Physical Findings, Skin, Weight, GI Status, Fluid Status or Edema, Hemodynamic Status    Discharge Planning:    Too soon to determine     Sindy Quintanilla RD, LD  Contact: Ext [phone]    
always explored.     CBC:   Lab Results   Component Value Date/Time    WBC 8.6 03/16/2024 04:07 AM    RBC 3.22 03/16/2024 04:07 AM    HGB 8.8 03/16/2024 04:07 AM    HCT 26.4 03/16/2024 04:07 AM     03/16/2024 04:07 AM    MCV 82.0 03/16/2024 04:07 AM       BMP:    Lab Results   Component Value Date/Time     03/16/2024 05:38 AM    K 3.9 03/16/2024 05:38 AM    K 4.5 01/27/2022 04:37 PM     03/16/2024 05:38 AM    CO2 23 03/16/2024 05:38 AM    BUN 31 03/16/2024 05:38 AM    CREATININE 3.5 03/16/2024 05:38 AM    GLUCOSE 145 03/16/2024 05:38 AM    CALCIUM 9.3 03/16/2024 05:38 AM       TSH:    Lab Results   Component Value Date/Time    TSH 1.52 03/12/2024 12:05 PM       Imaging Studies:    RADIOLOGY: Films were read/reviewed and or discussed with radiology and the consulting teams which show cardiomegaly and vascular congestion, line in place    EKG: ICU Rhythm Strips were reviewed and discussed. Sinus, Rare Ectopy    Assessment    Days HELADIO Brown is a 73 y.o. male was seen, examined and discussed with on multi-disciplinary team rounds. This note may be separate or in addition to the resident records. I have personally seen and examined the patient and the key elements of the encounter were performed by me.  The medications & laboratory data was discussed and adjusted where necessary. The radiographic images were reviewed either as a group or with radiologist if felt dis-concordant with the exam or history. The above findings were corroborated, plans confirmed and changes made if needed.   CCT 45  Current issues are :  Acute respiratory failure   Acute hyper delirium  HFpEF  Hx of CKD, ARF  Volume overload  NOT DKA BHB was not > 3 more likely HHK, changed to Lantus  AKD on CKD - may need HD, Nephrology consulted  [facility] - work up expanded  Severe pulm hypertension,   R to L shunt.    DM Type II with noncompliance  BLAIR on CKD, previous baseline 2.2-2.4 mg/dL, recent admission with creatinine of 3.1 mg/dL, 
always explored.     CBC:   Lab Results   Component Value Date/Time    WBC 8.6 03/16/2024 04:07 AM    RBC 3.22 03/16/2024 04:07 AM    HGB 8.8 03/16/2024 04:07 AM    HCT 26.4 03/16/2024 04:07 AM     03/16/2024 04:07 AM    MCV 82.0 03/16/2024 04:07 AM       BMP:    Lab Results   Component Value Date/Time     03/16/2024 05:38 AM    K 3.9 03/16/2024 05:38 AM    K 4.5 01/27/2022 04:37 PM     03/16/2024 05:38 AM    CO2 23 03/16/2024 05:38 AM    BUN 31 03/16/2024 05:38 AM    CREATININE 3.5 03/16/2024 05:38 AM    GLUCOSE 145 03/16/2024 05:38 AM    CALCIUM 9.3 03/16/2024 05:38 AM       TSH:    Lab Results   Component Value Date/Time    TSH 1.52 03/12/2024 12:05 PM       Imaging Studies:    RADIOLOGY: Films were read/reviewed and or discussed with radiology and the consulting teams which show cardiomegaly and vascular congestion, line in place    EKG: ICU Rhythm Strips were reviewed and discussed. Sinus, Rare Ectopy    Assessment    Days HELADIO Brown is a 73 y.o. male was seen, examined and discussed with on multi-disciplinary team rounds. This note may be separate or in addition to the resident records. I have personally seen and examined the patient and the key elements of the encounter were performed by me.  The medications & laboratory data was discussed and adjusted where necessary. The radiographic images were reviewed either as a group or with radiologist if felt dis-concordant with the exam or history. The above findings were corroborated, plans confirmed and changes made if needed.   CCT 45  Current issues are :  Acute respiratory failure   HFpEF  Hx of CKD, ARF  Volume overload  NOT DKA BHB was not > 3 more likely HHK, changed to Lantus  AKD on CKD - may need HD, Nephrology consulted  [facility] - work up expanded  Severe pulm hypertension,   R to L shunt.    DM Type II with noncompliance  BLAIR on CKD, previous baseline 2.2-2.4 mg/dL, recent admission with creatinine of 3.1 mg/dL, most likely volume 
BLAIR in the setting of DKA vs. Obstructive uropathy  Hyperkalemia   Translocational hyponatremia in the setting of DKA vs. decompensated heart failure    HFpEF with decompensation, proBNP 24,362  PAF, on metoprolol and apixaban  HTN, on amlodipine, metoprolol  HLD  Hx prostate cancer  Hx pancreatitis in the setting of uncontrolled DM, s/p robotic pancreatic necrosectomy on 6/2021 Had Infection         Plan   Family was updated at the bedside if present and available. Key issues of the case were discussed among consultants.  Critical Care time is documented if appropriate.    Bumex gtt   HCO3 supplementation  ABG is compensated respiratory alk  CPAP till out of edema  Echo reported to be negative  UDS sent   Hx of Etoh 1 drink per week,   Pan culture and empiric abx for now   Jolly  Stict I& O  NPO  Respiratory support with high flow or CPAP  Emperic Abx  Spoke with family   GI prophylaxis: PPI  DVT prophylaxis: oral anticoagulation     Munir Bright DO, MPH, FCCP, FACP, MACOI  Professor of Internal Medicine       
PAF on apixaban, hyperlipidemia, prostate cancer status post radiation therapy, , who was admitted on 3/12/24 after presenting to the ER reporting shortness of breath.  After evaluation in the ER he was found to have a BUN of 80 mg/dL, creatinine of 5.6 mg/dL, potassium level 6.4 mEq/L, bicarbonate level 9 mEq/L, reasons for this consultation.  His glucose level on admission was 574 mg/dL, beta hydroxybutyrate 0.49, proBNP level 24,362.  His initial chest x-ray showed no acute abnormalities.  In the ER patient was given at least 1 L of NS and he was admitted to MICU.  His medications prior to admission included hydralazine, metoprolol, amlodipine.    Problems resolved:    Hyperkalemia, 2/2 #1, potassium levels improve  Translocation hyponatremia 2/2 hyperglycemia, corrected sodium 133.4  Mixed metabolic acidosis (pH: 7.31, pCO2 31.7), hyperchloremic and anion gap, 2/2 uremia, mild DKA, bicarbonate level is slowly improving      IMPRESSION/RECOMMENDATIONS:      BLAIR on CKD, hemodynamically mediated 2/2 decompensated heart failure versus progression of CKD.  Had first dialysis treatment on 3/13/2024, tolerated well.  For second treatment today.    CKD stage IIIb, with large proteinuria, 2/2 diabetic nephropathy, baseline creatinine 3.1-3.2 mg/dL    HTN, on metoprolol  HFpEF 55-60%, proBNP 24,362, repeated 22,252  Normocytic anemia  ------------------------------------------------------------  Probably pneumonia, on piperacillin-tazobactam  Type II DM  PAF, on metoprolol  Hyperlipidemia, atorvastatin on hold    Plan:    HD x 3 hours today   Continue metoprolol tartrate 37.5 mg twice daily  Continue to monitor renal function  Repeat proBNP  Obtain iron studies, B12 and folate      Gillian Sharma MD      
recognition software. Every effort was made to ensure accuracy; however, inadvertent computerized transcription errors may be present.  Electronically signed by Maribeth Contreras MD on 3/15/2024 at 1:13 PM    
started on dialysis 3/13/24. diuresis per nephrology.   MRSA screen negative, hepatitis panel negative.   Endocrinology on board, Lantus 12u, HDSS.   Started on high dose thiamine today.       DVT ppx: Eliquis   Code status: full code       NOTE: This report was transcribed using voice recognition software. Every effort was made to ensure accuracy; however, inadvertent computerized transcription errors may be present.  Electronically signed by Maribeth Contreras MD on 3/16/2024 at 4:09 PM    
sedated today  We recommend the following DM regimen  Lantus 10 units daily  Low-dose sliding scale every 4 hours  Continue glucose check with meals and at bedtime  Will titrate insulin dose based on the blood glucose trend & insulin requirement  Upon discharge, I will arrange for the patient to be seen in the endocrinology clinic for routine diabetes maintenance and prevention    Diabetes ketoacidosis  Improved.  The patient was placed on insulin drip on admission then transitioned to subcu insulin  Adjust subcu insulin regimen as per above    Interdisciplinary plan for communication with healthcare providers:   Consult recommendations were discussed with the Primary Service/Nursing staff      Thank you for allowing us to participate in the care of this patient. Please do not hesitate to contact us with any additional questions.     Aubrey Quintero MD  Endocrinologist, [facility]   [address] [place] [address]   Phone: [phone]  Fax: [phone]  --------------------------------------------  An electronic signature was used to authenticate this note. Aubrey Quintero MD on 3/14/2024 at 7:09 PM     
severe pulmonary hypertension. Est RA pressure is 8 mmHg. The estimated PASP is 71 mmHg.   Left Atrium: Left atrium is mildly dilated.   Interatrial Septum: Agitated saline study was positive without provocation. Right to left shunt was noted.   Right Atrium: Right atrium is mildly dilated.   Image quality is adequate.     XR CHEST PORTABLE    Result Date: 3/12/2024  EXAMINATION: ONE XRAY VIEW OF THE CHEST 3/12/2024 5:26 am COMPARISON: 03/11/2024 HISTORY: ORDERING SYSTEM PROVIDED HISTORY: CHF TECHNOLOGIST PROVIDED HISTORY: Reason for exam:->CHF What reading provider will be dictating this exam?->CRC FINDINGS: Portable chest reveals heart to be borderline enlarged.  Patchy airspace disease identified within the right upper and lower lung field.  Findings are slightly worsened when compared to the prior study.  Left lung remains grossly clear with underlying chronic change.  No definite pleural effusion. Degenerative changes seen within the spine.     Slight worsening of patchy airspace disease within the right upper and lower lung field.  Remainder of the chest is stable.     XR CHEST PORTABLE    Result Date: 3/11/2024  EXAMINATION: ONE XRAY VIEW OF THE CHEST 3/11/2024 11:37 pm COMPARISON: /27/22 HISTORY: ORDERING SYSTEM PROVIDED HISTORY: sob TECHNOLOGIST PROVIDED HISTORY: Reason for exam:->sob What reading provider will be dictating this exam?->CRC FINDINGS: The lungs are without acute focal process.  There is no effusion or pneumothorax. The cardiomediastinal silhouette is without acute process. The osseous structures are without acute process.     No acute abnormalities.       All Others since admission  XR CHEST PORTABLE    Result Date: 3/15/2024  No acute process. The ET and enteric tubes have been removed.     XR CHEST PORTABLE    Result Date: 3/14/2024  Stable appearance of the chest compared to 03/13/2024.     XR CHEST PORTABLE    Result Date: 3/13/2024  Veins of the chest compared to 03/12/2024.     XR CHEST

## 2024-03-20 NOTE — CARE COORDINATION
3/20/24  Patient left AMA yesterday evening.  Patient never had his tunnel cath placed and temporary line was pulled prior to discharge.  Call to Audrey at [facility] [place] 2[place] to update on discharge.  Spoke with attending who did agree to still ok home care orders.  Orders obtained and placed with call to Chiqui to update on discharge. EMILY/Abiel to follow.    Electronically signed by NICK Mcleod on 3/20/2024 at 1:23 PM

## 2024-03-21 ENCOUNTER — APPOINTMENT (OUTPATIENT)
Dept: GENERAL RADIOLOGY | Age: 74
DRG: 917 | End: 2024-03-21
Payer: OTHER GOVERNMENT

## 2024-03-21 ENCOUNTER — HOSPITAL ENCOUNTER (INPATIENT)
Age: 74
LOS: 6 days | Discharge: NURSING, REHAB OR LONG-TERM CARE | DRG: 917 | End: 2024-03-28
Attending: EMERGENCY MEDICINE | Admitting: INTERNAL MEDICINE
Payer: OTHER GOVERNMENT

## 2024-03-21 DIAGNOSIS — E16.2 HYPOGLYCEMIA: Primary | ICD-10-CM

## 2024-03-21 LAB
ALBUMIN SERPL-MCNC: 4 G/DL (ref 3.5–5.2)
ALP SERPL-CCNC: 112 U/L (ref 40–129)
ALT SERPL-CCNC: 22 U/L (ref 0–40)
ANION GAP SERPL CALCULATED.3IONS-SCNC: 22 MMOL/L (ref 7–16)
AST SERPL-CCNC: 30 U/L (ref 0–39)
BASOPHILS # BLD: 0.04 K/UL (ref 0–0.2)
BASOPHILS NFR BLD: 0 % (ref 0–2)
BILIRUB SERPL-MCNC: 0.3 MG/DL (ref 0–1.2)
BUN SERPL-MCNC: 44 MG/DL (ref 6–23)
CALCIUM SERPL-MCNC: 9.3 MG/DL (ref 8.6–10.2)
CHLORIDE SERPL-SCNC: 97 MMOL/L (ref 98–107)
CO2 SERPL-SCNC: 19 MMOL/L (ref 22–29)
CREAT SERPL-MCNC: 7.5 MG/DL (ref 0.7–1.2)
EOSINOPHIL # BLD: 0.14 K/UL (ref 0.05–0.5)
EOSINOPHILS RELATIVE PERCENT: 1 % (ref 0–6)
ERYTHROCYTE [DISTWIDTH] IN BLOOD BY AUTOMATED COUNT: 16.1 % (ref 11.5–15)
GFR SERPL CREATININE-BSD FRML MDRD: 7 ML/MIN/1.73M2
GLUCOSE BLD-MCNC: 78 MG/DL (ref 74–99)
GLUCOSE BLD-MCNC: <40 MG/DL (ref 74–99)
GLUCOSE SERPL-MCNC: 73 MG/DL (ref 74–99)
HCT VFR BLD AUTO: 23.2 % (ref 37–54)
HGB BLD-MCNC: 7.7 G/DL (ref 12.5–16.5)
IMM GRANULOCYTES # BLD AUTO: 0.16 K/UL (ref 0–0.58)
IMM GRANULOCYTES NFR BLD: 1 % (ref 0–5)
LYMPHOCYTES NFR BLD: 1.2 K/UL (ref 1.5–4)
LYMPHOCYTES RELATIVE PERCENT: 8 % (ref 20–42)
MCH RBC QN AUTO: 27.9 PG (ref 26–35)
MCHC RBC AUTO-ENTMCNC: 33.2 G/DL (ref 32–34.5)
MCV RBC AUTO: 84.1 FL (ref 80–99.9)
MONOCYTES NFR BLD: 0.92 K/UL (ref 0.1–0.95)
MONOCYTES NFR BLD: 6 % (ref 2–12)
NEUTROPHILS NFR BLD: 83 % (ref 43–80)
NEUTS SEG NFR BLD: 12.34 K/UL (ref 1.8–7.3)
PLATELET # BLD AUTO: 327 K/UL (ref 130–450)
PMV BLD AUTO: 11 FL (ref 7–12)
POTASSIUM SERPL-SCNC: 3.6 MMOL/L (ref 3.5–5)
PROT SERPL-MCNC: 7.9 G/DL (ref 6.4–8.3)
RBC # BLD AUTO: 2.76 M/UL (ref 3.8–5.8)
SODIUM SERPL-SCNC: 138 MMOL/L (ref 132–146)
WBC OTHER # BLD: 14.8 K/UL (ref 4.5–11.5)

## 2024-03-21 PROCEDURE — 82962 GLUCOSE BLOOD TEST: CPT

## 2024-03-21 PROCEDURE — 85025 COMPLETE CBC W/AUTO DIFF WBC: CPT

## 2024-03-21 PROCEDURE — 2580000003 HC RX 258: Performed by: EMERGENCY MEDICINE

## 2024-03-21 PROCEDURE — 80053 COMPREHEN METABOLIC PANEL: CPT

## 2024-03-21 PROCEDURE — 99285 EMERGENCY DEPT VISIT HI MDM: CPT

## 2024-03-21 PROCEDURE — 71045 X-RAY EXAM CHEST 1 VIEW: CPT

## 2024-03-21 PROCEDURE — 93005 ELECTROCARDIOGRAM TRACING: CPT | Performed by: EMERGENCY MEDICINE

## 2024-03-21 PROCEDURE — 2500000003 HC RX 250 WO HCPCS

## 2024-03-21 PROCEDURE — 96365 THER/PROPH/DIAG IV INF INIT: CPT

## 2024-03-21 RX ORDER — DEXTROSE MONOHYDRATE 25 G/50ML
25 INJECTION, SOLUTION INTRAVENOUS ONCE
Status: DISCONTINUED | OUTPATIENT
Start: 2024-03-21 | End: 2024-03-21

## 2024-03-21 RX ORDER — DEXTROSE MONOHYDRATE 25 G/50ML
INJECTION, SOLUTION INTRAVENOUS
Status: COMPLETED
Start: 2024-03-21 | End: 2024-03-21

## 2024-03-21 RX ORDER — DEXTROSE MONOHYDRATE 25 G/50ML
25 INJECTION, SOLUTION INTRAVENOUS PRN
Status: DISCONTINUED | OUTPATIENT
Start: 2024-03-21 | End: 2024-03-21

## 2024-03-21 RX ORDER — DEXTROSE MONOHYDRATE 100 MG/ML
INJECTION, SOLUTION INTRAVENOUS CONTINUOUS
Status: DISCONTINUED | OUTPATIENT
Start: 2024-03-21 | End: 2024-03-22

## 2024-03-21 RX ADMIN — DEXTROSE MONOHYDRATE: 25 INJECTION, SOLUTION INTRAVENOUS at 20:21

## 2024-03-21 RX ADMIN — DEXTROSE MONOHYDRATE 25 G: 25 INJECTION, SOLUTION INTRAVENOUS at 22:57

## 2024-03-21 RX ADMIN — DEXTROSE MONOHYDRATE: 100 INJECTION, SOLUTION INTRAVENOUS at 23:04

## 2024-03-22 LAB
ANION GAP SERPL CALCULATED.3IONS-SCNC: 16 MMOL/L (ref 7–16)
ANION GAP SERPL CALCULATED.3IONS-SCNC: 19 MMOL/L (ref 7–16)
BACTERIA URNS QL MICRO: ABNORMAL
BASOPHILS # BLD: 0 K/UL (ref 0–0.2)
BASOPHILS NFR BLD: 0 % (ref 0–2)
BILIRUB UR QL STRIP: NEGATIVE
BUN SERPL-MCNC: 41 MG/DL (ref 6–23)
BUN SERPL-MCNC: 44 MG/DL (ref 6–23)
CALCIUM SERPL-MCNC: 7.1 MG/DL (ref 8.6–10.2)
CALCIUM SERPL-MCNC: 8.7 MG/DL (ref 8.6–10.2)
CHLORIDE SERPL-SCNC: 104 MMOL/L (ref 98–107)
CHLORIDE SERPL-SCNC: 96 MMOL/L (ref 98–107)
CLARITY UR: CLEAR
CO2 SERPL-SCNC: 17 MMOL/L (ref 22–29)
CO2 SERPL-SCNC: 19 MMOL/L (ref 22–29)
COLOR UR: YELLOW
CREAT SERPL-MCNC: 6.1 MG/DL (ref 0.7–1.2)
CREAT SERPL-MCNC: 7.1 MG/DL (ref 0.7–1.2)
EOSINOPHIL # BLD: 0.07 K/UL (ref 0.05–0.5)
EOSINOPHILS RELATIVE PERCENT: 1 % (ref 0–6)
EPI CELLS #/AREA URNS HPF: ABNORMAL /HPF
ERYTHROCYTE [DISTWIDTH] IN BLOOD BY AUTOMATED COUNT: 15.4 % (ref 11.5–15)
GFR SERPL CREATININE-BSD FRML MDRD: 7 ML/MIN/1.73M2
GFR SERPL CREATININE-BSD FRML MDRD: 9 ML/MIN/1.73M2
GLUCOSE BLD-MCNC: 104 MG/DL (ref 74–99)
GLUCOSE BLD-MCNC: 139 MG/DL (ref 74–99)
GLUCOSE BLD-MCNC: 188 MG/DL (ref 74–99)
GLUCOSE BLD-MCNC: 210 MG/DL (ref 74–99)
GLUCOSE BLD-MCNC: 264 MG/DL (ref 74–99)
GLUCOSE BLD-MCNC: 40 MG/DL (ref 74–99)
GLUCOSE BLD-MCNC: 69 MG/DL (ref 74–99)
GLUCOSE BLD-MCNC: <40 MG/DL (ref 74–99)
GLUCOSE SERPL-MCNC: 231 MG/DL (ref 74–99)
GLUCOSE SERPL-MCNC: 28 MG/DL (ref 74–99)
GLUCOSE UR STRIP-MCNC: NEGATIVE MG/DL
HCT VFR BLD AUTO: 24.2 % (ref 37–54)
HGB BLD-MCNC: 8.3 G/DL (ref 12.5–16.5)
HGB UR QL STRIP.AUTO: ABNORMAL
INR PPP: 1.4
KETONES UR STRIP-MCNC: NEGATIVE MG/DL
LEUKOCYTE ESTERASE UR QL STRIP: NEGATIVE
LYMPHOCYTES NFR BLD: 1.46 K/UL (ref 1.5–4)
LYMPHOCYTES RELATIVE PERCENT: 17 % (ref 20–42)
MAGNESIUM SERPL-MCNC: 2 MG/DL (ref 1.6–2.6)
MCH RBC QN AUTO: 28.2 PG (ref 26–35)
MCHC RBC AUTO-ENTMCNC: 34.3 G/DL (ref 32–34.5)
MCV RBC AUTO: 82.3 FL (ref 80–99.9)
MONOCYTES NFR BLD: 0.8 K/UL (ref 0.1–0.95)
MONOCYTES NFR BLD: 10 % (ref 2–12)
NEUTROPHILS NFR BLD: 72 % (ref 43–80)
NEUTS SEG NFR BLD: 6.06 K/UL (ref 1.8–7.3)
NITRITE UR QL STRIP: NEGATIVE
PH UR STRIP: 6 [PH] (ref 5–9)
PLATELET # BLD AUTO: 282 K/UL (ref 130–450)
PMV BLD AUTO: 10.5 FL (ref 7–12)
POTASSIUM SERPL-SCNC: 3.3 MMOL/L (ref 3.5–5)
POTASSIUM SERPL-SCNC: 3.5 MMOL/L (ref 3.5–5)
PROT UR STRIP-MCNC: 100 MG/DL
PROTHROMBIN TIME: 15.1 SEC (ref 9.3–12.4)
RBC # BLD AUTO: 2.94 M/UL (ref 3.8–5.8)
RBC # BLD: ABNORMAL 10*6/UL
RBC #/AREA URNS HPF: ABNORMAL /HPF
SODIUM SERPL-SCNC: 134 MMOL/L (ref 132–146)
SODIUM SERPL-SCNC: 137 MMOL/L (ref 132–146)
SP GR UR STRIP: 1.02 (ref 1–1.03)
UROBILINOGEN UR STRIP-ACNC: 0.2 EU/DL (ref 0–1)
WBC #/AREA URNS HPF: ABNORMAL /HPF
WBC OTHER # BLD: 8.4 K/UL (ref 4.5–11.5)

## 2024-03-22 PROCEDURE — 6370000000 HC RX 637 (ALT 250 FOR IP)

## 2024-03-22 PROCEDURE — 2140000000 HC CCU INTERMEDIATE R&B

## 2024-03-22 PROCEDURE — 6370000000 HC RX 637 (ALT 250 FOR IP): Performed by: STUDENT IN AN ORGANIZED HEALTH CARE EDUCATION/TRAINING PROGRAM

## 2024-03-22 PROCEDURE — 80048 BASIC METABOLIC PNL TOTAL CA: CPT

## 2024-03-22 PROCEDURE — 81001 URINALYSIS AUTO W/SCOPE: CPT

## 2024-03-22 PROCEDURE — 83036 HEMOGLOBIN GLYCOSYLATED A1C: CPT

## 2024-03-22 PROCEDURE — 36415 COLL VENOUS BLD VENIPUNCTURE: CPT

## 2024-03-22 PROCEDURE — 97530 THERAPEUTIC ACTIVITIES: CPT

## 2024-03-22 PROCEDURE — 2500000003 HC RX 250 WO HCPCS: Performed by: EMERGENCY MEDICINE

## 2024-03-22 PROCEDURE — 2580000003 HC RX 258: Performed by: EMERGENCY MEDICINE

## 2024-03-22 PROCEDURE — 85025 COMPLETE CBC W/AUTO DIFF WBC: CPT

## 2024-03-22 PROCEDURE — 82962 GLUCOSE BLOOD TEST: CPT

## 2024-03-22 PROCEDURE — 97161 PT EVAL LOW COMPLEX 20 MIN: CPT

## 2024-03-22 PROCEDURE — 83735 ASSAY OF MAGNESIUM: CPT

## 2024-03-22 PROCEDURE — 6360000002 HC RX W HCPCS

## 2024-03-22 PROCEDURE — 2580000003 HC RX 258

## 2024-03-22 PROCEDURE — 99223 1ST HOSP IP/OBS HIGH 75: CPT

## 2024-03-22 PROCEDURE — 85610 PROTHROMBIN TIME: CPT

## 2024-03-22 RX ORDER — DEXTROSE MONOHYDRATE 100 MG/ML
INJECTION, SOLUTION INTRAVENOUS CONTINUOUS PRN
Status: DISCONTINUED | OUTPATIENT
Start: 2024-03-22 | End: 2024-03-28 | Stop reason: HOSPADM

## 2024-03-22 RX ORDER — ASCORBIC ACID 500 MG
250 TABLET ORAL DAILY
Status: DISCONTINUED | OUTPATIENT
Start: 2024-03-22 | End: 2024-03-28 | Stop reason: HOSPADM

## 2024-03-22 RX ORDER — ACETAMINOPHEN 650 MG/1
650 SUPPOSITORY RECTAL EVERY 6 HOURS PRN
Status: DISCONTINUED | OUTPATIENT
Start: 2024-03-22 | End: 2024-03-28 | Stop reason: HOSPADM

## 2024-03-22 RX ORDER — INSULIN GLARGINE 100 [IU]/ML
22 INJECTION, SOLUTION SUBCUTANEOUS EVERY MORNING
Status: DISCONTINUED | OUTPATIENT
Start: 2024-03-22 | End: 2024-03-25

## 2024-03-22 RX ORDER — POLYETHYLENE GLYCOL 3350 17 G/17G
17 POWDER, FOR SOLUTION ORAL DAILY PRN
Status: DISCONTINUED | OUTPATIENT
Start: 2024-03-22 | End: 2024-03-28 | Stop reason: HOSPADM

## 2024-03-22 RX ORDER — SODIUM CHLORIDE 9 MG/ML
INJECTION, SOLUTION INTRAVENOUS PRN
Status: DISCONTINUED | OUTPATIENT
Start: 2024-03-22 | End: 2024-03-28 | Stop reason: HOSPADM

## 2024-03-22 RX ORDER — ONDANSETRON 4 MG/1
4 TABLET, ORALLY DISINTEGRATING ORAL EVERY 8 HOURS PRN
Status: DISCONTINUED | OUTPATIENT
Start: 2024-03-22 | End: 2024-03-28 | Stop reason: HOSPADM

## 2024-03-22 RX ORDER — BUMETANIDE 1 MG/1
2 TABLET ORAL 2 TIMES DAILY
Status: DISCONTINUED | OUTPATIENT
Start: 2024-03-22 | End: 2024-03-28 | Stop reason: HOSPADM

## 2024-03-22 RX ORDER — INSULIN LISPRO 100 [IU]/ML
0-4 INJECTION, SOLUTION INTRAVENOUS; SUBCUTANEOUS NIGHTLY
Status: DISCONTINUED | OUTPATIENT
Start: 2024-03-22 | End: 2024-03-22

## 2024-03-22 RX ORDER — INSULIN GLARGINE-YFGN 100 [IU]/ML
18 INJECTION, SOLUTION SUBCUTANEOUS EVERY MORNING
Status: ON HOLD | COMMUNITY
End: 2024-03-28 | Stop reason: HOSPADM

## 2024-03-22 RX ORDER — SODIUM BICARBONATE 650 MG/1
650 TABLET ORAL 4 TIMES DAILY
Status: DISCONTINUED | OUTPATIENT
Start: 2024-03-22 | End: 2024-03-22

## 2024-03-22 RX ORDER — SODIUM BICARBONATE 650 MG/1
1300 TABLET ORAL 2 TIMES DAILY
Status: DISCONTINUED | OUTPATIENT
Start: 2024-03-23 | End: 2024-03-28 | Stop reason: HOSPADM

## 2024-03-22 RX ORDER — INSULIN LISPRO 100 [IU]/ML
0-16 INJECTION, SOLUTION INTRAVENOUS; SUBCUTANEOUS
Status: DISCONTINUED | OUTPATIENT
Start: 2024-03-22 | End: 2024-03-25

## 2024-03-22 RX ORDER — MAGNESIUM HYDROXIDE/ALUMINUM HYDROXICE/SIMETHICONE 120; 1200; 1200 MG/30ML; MG/30ML; MG/30ML
30 SUSPENSION ORAL EVERY 6 HOURS PRN
Status: DISCONTINUED | OUTPATIENT
Start: 2024-03-22 | End: 2024-03-28 | Stop reason: HOSPADM

## 2024-03-22 RX ORDER — ASPIRIN 81 MG/1
81 TABLET ORAL DAILY
Status: DISCONTINUED | OUTPATIENT
Start: 2024-03-22 | End: 2024-03-28 | Stop reason: HOSPADM

## 2024-03-22 RX ORDER — ONDANSETRON 2 MG/ML
4 INJECTION INTRAMUSCULAR; INTRAVENOUS EVERY 6 HOURS PRN
Status: DISCONTINUED | OUTPATIENT
Start: 2024-03-22 | End: 2024-03-28 | Stop reason: HOSPADM

## 2024-03-22 RX ORDER — INSULIN LISPRO 100 [IU]/ML
0-4 INJECTION, SOLUTION INTRAVENOUS; SUBCUTANEOUS NIGHTLY
Status: DISCONTINUED | OUTPATIENT
Start: 2024-03-22 | End: 2024-03-25

## 2024-03-22 RX ORDER — SODIUM CHLORIDE 0.9 % (FLUSH) 0.9 %
5-40 SYRINGE (ML) INJECTION EVERY 12 HOURS SCHEDULED
Status: DISCONTINUED | OUTPATIENT
Start: 2024-03-22 | End: 2024-03-28 | Stop reason: HOSPADM

## 2024-03-22 RX ORDER — SODIUM CHLORIDE 0.9 % (FLUSH) 0.9 %
5-40 SYRINGE (ML) INJECTION PRN
Status: DISCONTINUED | OUTPATIENT
Start: 2024-03-22 | End: 2024-03-28 | Stop reason: HOSPADM

## 2024-03-22 RX ORDER — GLUCAGON 1 MG/ML
1 KIT INJECTION PRN
Status: DISCONTINUED | OUTPATIENT
Start: 2024-03-22 | End: 2024-03-28 | Stop reason: HOSPADM

## 2024-03-22 RX ORDER — AMLODIPINE BESYLATE 10 MG/1
10 TABLET ORAL DAILY
Status: DISCONTINUED | OUTPATIENT
Start: 2024-03-22 | End: 2024-03-28 | Stop reason: HOSPADM

## 2024-03-22 RX ORDER — ACETAMINOPHEN 325 MG/1
650 TABLET ORAL EVERY 6 HOURS PRN
Status: DISCONTINUED | OUTPATIENT
Start: 2024-03-22 | End: 2024-03-28 | Stop reason: HOSPADM

## 2024-03-22 RX ORDER — DEXTROSE MONOHYDRATE 25 G/50ML
25 INJECTION, SOLUTION INTRAVENOUS ONCE
Status: COMPLETED | OUTPATIENT
Start: 2024-03-22 | End: 2024-03-22

## 2024-03-22 RX ORDER — ATORVASTATIN CALCIUM 10 MG/1
10 TABLET, FILM COATED ORAL NIGHTLY
Status: DISCONTINUED | OUTPATIENT
Start: 2024-03-22 | End: 2024-03-28 | Stop reason: HOSPADM

## 2024-03-22 RX ORDER — INSULIN LISPRO 100 [IU]/ML
0-4 INJECTION, SOLUTION INTRAVENOUS; SUBCUTANEOUS
Status: DISCONTINUED | OUTPATIENT
Start: 2024-03-22 | End: 2024-03-22

## 2024-03-22 RX ORDER — LANOLIN ALCOHOL/MO/W.PET/CERES
3 CREAM (GRAM) TOPICAL NIGHTLY
Status: DISCONTINUED | OUTPATIENT
Start: 2024-03-22 | End: 2024-03-28 | Stop reason: HOSPADM

## 2024-03-22 RX ORDER — HYDRALAZINE HYDROCHLORIDE 25 MG/1
25 TABLET, FILM COATED ORAL 2 TIMES DAILY
Status: DISCONTINUED | OUTPATIENT
Start: 2024-03-22 | End: 2024-03-28 | Stop reason: HOSPADM

## 2024-03-22 RX ADMIN — SODIUM CHLORIDE, PRESERVATIVE FREE 10 ML: 5 INJECTION INTRAVENOUS at 12:12

## 2024-03-22 RX ADMIN — APIXABAN 5 MG: 5 TABLET, FILM COATED ORAL at 21:53

## 2024-03-22 RX ADMIN — ATORVASTATIN CALCIUM 10 MG: 10 TABLET, FILM COATED ORAL at 21:53

## 2024-03-22 RX ADMIN — PANCRELIPASE LIPASE, PANCRELIPASE PROTEASE, PANCRELIPASE AMYLASE 20000 UNITS: 20000; 63000; 84000 CAPSULE, DELAYED RELEASE ORAL at 16:43

## 2024-03-22 RX ADMIN — MELATONIN 3 MG ORAL TABLET 3 MG: 3 TABLET ORAL at 21:53

## 2024-03-22 RX ADMIN — DEXTROSE MONOHYDRATE: 100 INJECTION, SOLUTION INTRAVENOUS at 08:43

## 2024-03-22 RX ADMIN — INSULIN LISPRO 4 UNITS: 100 INJECTION, SOLUTION INTRAVENOUS; SUBCUTANEOUS at 16:42

## 2024-03-22 RX ADMIN — APIXABAN 5 MG: 5 TABLET, FILM COATED ORAL at 12:11

## 2024-03-22 RX ADMIN — SODIUM BICARBONATE 650 MG: 650 TABLET ORAL at 16:43

## 2024-03-22 RX ADMIN — BUMETANIDE 2 MG: 1 TABLET ORAL at 21:53

## 2024-03-22 RX ADMIN — METOPROLOL TARTRATE 37.5 MG: 25 TABLET, FILM COATED ORAL at 21:53

## 2024-03-22 RX ADMIN — INSULIN GLARGINE 22 UNITS: 100 INJECTION, SOLUTION SUBCUTANEOUS at 12:09

## 2024-03-22 RX ADMIN — METOPROLOL TARTRATE 37.5 MG: 25 TABLET, FILM COATED ORAL at 12:10

## 2024-03-22 RX ADMIN — PANCRELIPASE LIPASE, PANCRELIPASE PROTEASE, PANCRELIPASE AMYLASE 15000 UNITS: 15000; 47000; 63000 CAPSULE, DELAYED RELEASE ORAL at 16:43

## 2024-03-22 RX ADMIN — HYDRALAZINE HYDROCHLORIDE 25 MG: 25 TABLET, FILM COATED ORAL at 12:13

## 2024-03-22 RX ADMIN — ASPIRIN 81 MG: 81 TABLET, COATED ORAL at 12:13

## 2024-03-22 RX ADMIN — DEXTROSE MONOHYDRATE 25 G: 25 INJECTION, SOLUTION INTRAVENOUS at 02:32

## 2024-03-22 RX ADMIN — Medication 250 MG: at 12:12

## 2024-03-22 RX ADMIN — HYDRALAZINE HYDROCHLORIDE 25 MG: 25 TABLET, FILM COATED ORAL at 21:53

## 2024-03-22 RX ADMIN — AMLODIPINE BESYLATE 10 MG: 10 TABLET ORAL at 12:11

## 2024-03-22 RX ADMIN — EPOETIN ALFA-EPBX 3000 UNITS: 3000 INJECTION, SOLUTION INTRAVENOUS; SUBCUTANEOUS at 16:43

## 2024-03-22 RX ADMIN — SODIUM CHLORIDE, PRESERVATIVE FREE 10 ML: 5 INJECTION INTRAVENOUS at 21:53

## 2024-03-22 RX ADMIN — INSULIN LISPRO 2 UNITS: 100 INJECTION, SOLUTION INTRAVENOUS; SUBCUTANEOUS at 12:24

## 2024-03-22 ASSESSMENT — PAIN SCALES - GENERAL: PAINLEVEL_OUTOF10: 0

## 2024-03-22 NOTE — ED NOTES
Pt noted to be very diaphoretic and confused. BGL read RR LO. Amp D50 given IVP, D10 drip started. Pt quickly became oriented again within a few minutes. MD aware.

## 2024-03-22 NOTE — PROGRESS NOTES
Physical Therapy  Physical Therapy Initial Assessment     Name: Andre Brown  : 1950  MRN: [id number]      Date of Service: 3/22/2024    Evaluating PT:  Antionette Abdi, PT, DPT, [id number]    Room #:  6421/6421-A  Diagnosis:  Hypoglycemia [E16.2]  PMHx/PSHx:    Past Medical History:   Diagnosis Date    A-fib (HCC)     on eliquis    Cancer (HCC)     CKD (chronic kidney disease)     stage 3a    Diabetes mellitus (HCC)     ED (erectile dysfunction)     Hyperlipidemia     Hypertension     Prostate cancer (HCC)       Past Surgical History:   Procedure Laterality Date    COLON SURGERY      COLONOSCOPY N/A 10/18/2021    COLONOSCOPY WITH BIOPSY performed by Luisa Sepulveda MD at [facility] ENDOSCOPY    DILATATION, ESOPHAGUS      HC DIALYSIS CATHETER N/A 2021    TEMPORARY HEMODIALYSIS CATHETER INSERTION performed by Sunny Blanc MD at [facility] OR    LEG SURGERY Left 6 years ago    OTHER SURGICAL HISTORY      removal of HD catheter    PANCREAS BIOPSY N/A 2021    PANCREATIC PSEUDOCYST EXCISION DRAINAGE performed by Faina Colon MD at [facility] OR    PANCREATECTOMY N/A 2021    LAPAROSCOPIC ROBOTIC XI PANCREATIC NECROSECTOMY WITH CYST GASTROSTOMY , INTRAOPERATIVE  ULTRASOUND performed by Manuel Ortega III, MD at [facility] OR    UPPER GASTROINTESTINAL ENDOSCOPY  2021    ENDOSCOPIC ULTRASOUND performed by Faina Colon MD at [facility] OR    UPPER GASTROINTESTINAL ENDOSCOPY N/A 10/18/2021    EGD BIOPSY performed by Luisa Sepulveda MD at [facility] ENDOSCOPY    UPPER GASTROINTESTINAL ENDOSCOPY N/A 2022    EGD BIOPSY performed by Luisa Sepulveda MD at [facility] ENDOSCOPY      Procedure/Surgery:  none this admission   Precautions:  Fall Risk, HD, + Alarms  Equipment Needs:  TBD    SUBJECTIVE:    Pt lives alone in a 2 story home with 5 stairs to enter and 2 rail.  Bed is on 1 floor and bath is on 1 floor. Pt has bedroom and bathroom on 2nd floor as well.  Pt ambulated with QC for short  time PTA. Equipment Owned: [employer], [employer], [employer]     OBJECTIVE:   Initial Evaluation  Date: 3/22/24 Treatment Short Term/ Long Term   Goals   AM-PAC 6 Clicks 16/24     Was pt agreeable to Eval/treatment? yes     Does pt have pain? 5/10 BLE     Bed Mobility  Rolling: SBA  Supine to sit: SBA  Sit to supine: SBA  Scooting: SBA  Rolling: Independent   Supine to sit: Independent   Sit to supine: Independent   Scooting: Independent    Transfers Sit to stand: Monique  Stand to sit: Monique  Stand pivot: Monique with no AD  Sit to stand: Independent   Stand to sit: Independent   Stand pivot: mod Independent with AAD   Ambulation    100 feet with no AD Monique  300+ feet with AAD mod Independent    Stair negotiation: ascended and descended  NT  5+ steps with 1 rail mod Independent    ROM BUE:  Refer to OT note  BLE:  WFL     Strength BUE:  Refer to OT note  BLE:  4/5     Balance Sitting EOB:  SBA  Dynamic Standing:  Monique   Sitting EOB:  Independent   Dynamic Standing:  mod Independent with AAD     Pt is A & O x 4  Sensation:  Pt reports numbness and tingling to B feet  Edema:  unremarkable     Patient education  Pt educated on role of PT and safety with functional mobility    Patient response to education:   Pt verbalized understanding Pt demonstrated skill Pt requires further education in this area   x x x     ASSESSMENT:    Conditions Requiring Skilled Therapeutic Intervention:    [x]Decreased strength     []Decreased ROM  [x]Decreased functional mobility  [x]Decreased balance   [x]Decreased endurance   []Decreased posture  [x]Decreased sensation  []Decreased coordination   []Decreased vision  [x]Decreased safety awareness   [x]Increased pain       Comments:  Medically cleared for session by RN. Pt was in bed upon PT entry and agreeable to participate. Pt was able to transfer to EOB with no hands on assist. Ambulated with no AD with slowed pace and decreased balance. Pt intermittently reached for furniture for stability. Pt was returned to

## 2024-03-22 NOTE — ED NOTES
Pt recently had temporary dialysis port removed from groin. Pt supposed to have permanent dialysis port placed.

## 2024-03-22 NOTE — CONSULTS
Department of Internal Medicine  Nephrology Attending Progress Note    No need for full consultation, patient recently left AMA and was readmitted.    Events reviewed.      SUBJECTIVE:  We are following Mr. Andre Brown for BLAIR on CKD.  Reports no complaints.      PHYSICAL EXAM:      Vitals:    VITALS:  BP (!) 141/84   Pulse 65   Temp 97.5 °F (36.4 °C) (Oral)   Resp 16   Ht 1.88 m (6' 2\")   Wt 90.7 kg (200 lb)   SpO2 99%   BMI 25.68 kg/m²   24HR INTAKE/OUTPUT:    Intake/Output Summary (Last 24 hours) at 3/22/2024 1424  Last data filed at 3/22/2024 1226  Gross per 24 hour   Intake --   Output 500 ml   Net -500 ml       Access:   Constitutional: Patient is lethargic today.  HEENT: Pupils are equal reactive, mucous membranes are moist  Respiratory: Decreased breath sounds at the bases  Cardiovascular/Edema: Heart sounds irregularly irregular  Gastrointestinal: Abdomen soft  Neurologic: Nonfocal  Skin: No lesions  Other: Trace edema    Scheduled Meds:   amLODIPine  10 mg Oral Daily    apixaban  5 mg Oral BID    vitamin C  250 mg Oral Daily    aspirin  81 mg Oral Daily    atorvastatin  10 mg Oral Nightly    hydrALAZINE  25 mg Oral BID    insulin glargine  22 Units SubCUTAneous QAM    metoprolol tartrate  37.5 mg Oral BID    sodium chloride flush  5-40 mL IntraVENous 2 times per day    insulin lispro  0-4 Units SubCUTAneous TID WC    insulin lispro  0-4 Units SubCUTAneous Nightly    melatonin  3 mg Oral Nightly    lipase-protease-amylas  15,000 Units Oral TID WC    And    lipase-protease-amylase  20,000 Units Oral TID WC    sodium bicarbonate  650 mg Oral 4x Daily     Continuous Infusions:   sodium chloride      dextrose      dextrose 100 mL/hr at 03/22/24 0843       PRN Meds:.sodium chloride flush, sodium chloride, ondansetron **OR** ondansetron, polyethylene glycol, acetaminophen **OR** acetaminophen, glucose, dextrose bolus **OR** dextrose bolus, glucagon (rDNA), dextrose, aluminum & magnesium

## 2024-03-22 NOTE — ED NOTES
Pt from home. Family called for seizure like activity. EMS noted pt BGL 39.  1mg of glucagon and 250 of D10 administered PTA. BGL 58 upon arrival, amp D50 given. Pt alert and oriented x4. Pt clothes wet from sweat PTA, changed into gown and placed on monitor.

## 2024-03-22 NOTE — PROGRESS NOTES
Patient seen by my collegue today  Hypoglycemia has resolved; For Ckd patient is getting evaluated by nephrology for possible need dialysis  IR consulted for TDC placement; HD on monday  Please call or perfect serve, Dr. Pritchard if needed

## 2024-03-22 NOTE — ED PROVIDER NOTES
HPI:  3/21/24, Time: 8:17 PM EDT         Days HELADIO Brown is a 74 y.o. male history of atrial fibrillation history of chronic kidney disease history of diabetes history of hyperlipidemia hypertension prostate cancer presenting to the ED for hypoglycemia, beginning 1 day ago.  The complaint has been persistent, moderate in severity, and worsened by nothing.  Patient presenting here because of low blood sugar.  Patient reports he last ate around 1:00 he states he ate chicken.  He did take NovoLog about 7:45 PM.  Patient took 8 units.  Patient reporting no abdominal pain no vomiting.  Patient did receive D10 as well as glucagon prior to arrival blood sugar was still low he was given D50 here in the emergency department.  Patient reporting no chest pain or difficulty breathing reports no fall or injury reports no headache.  Patient reportedly was tremoring while at home and EMS was called blood sugar was in the 30s.    ROS:   Pertinent positives and negatives are stated within HPI, all other systems reviewed and are negative.  --------------------------------------------- PAST HISTORY ---------------------------------------------  Past Medical History:  has a past medical history of A-fib (HCC), Cancer (HCC), CKD (chronic kidney disease), Diabetes mellitus (HCC), ED (erectile dysfunction), Hyperlipidemia, Hypertension, and Prostate cancer (HCC).    Past Surgical History:  has a past surgical history that includes Colon surgery; Dilatation, esophagus; Leg Surgery (Left, 6 years ago); hc dialysis catheter (N/A, 1/12/2021); Pancreas Biopsy (N/A, 6/14/2021); Upper gastrointestinal endoscopy (6/14/2021); Pancreatectomy (N/A, 6/23/2021); other surgical history; Upper gastrointestinal endoscopy (N/A, 10/18/2021); Colonoscopy (N/A, 10/18/2021); and Upper gastrointestinal endoscopy (N/A, 4/6/2022).    Social History:  reports that he quit smoking about 3 years ago. His smoking use included cigarettes. He has never used smokeless  medicine    Critical Care:     Please note that the withdrawal or failure to initiate urgent interventions for this patient would likely result in a life threatening deterioration or permanent disability.      Accordingly this patient received 30 minutes of critical care time, excluding separately billable procedures.      This patient's ED course included: a personal history and physicial eaxmination    This patient has been closely monitored during their ED course.    Counseling:   The emergency provider has spoken with the patient and discussed today’s results, in addition to providing specific details for the plan of care and counseling regarding the diagnosis and prognosis.  Questions are answered at this time and they are agreeable with the plan.       --------------------------------- IMPRESSION AND DISPOSITION ---------------------------------    IMPRESSION  1. Hypoglycemia        DISPOSITION  Disposition: Admit to intermediate bed.  Patient condition is stable        NOTE: This report was transcribed using voice recognition software. Every effort was made to ensure accuracy; however, inadvertent computerized transcription errors may be present          Scooby Reilly MD  03/22/24 0906       Scooby Reilly MD  03/22/24 0910

## 2024-03-22 NOTE — H&P
Hospitalist History & Physical      PCP: Cruz Velasco DO    Date of Service: Pt seen/examined on 3/22/2024 and is home    admitted to Inpatient with expected LOS greater than two midnights due to medical therapy.      Placed in Observation.    Chief Complaint:  had concerns including Hypoglycemia (Took his fast acting insulin. Patient's BGL 39 for EMS, 1mg of glucagon and 250 of D10 administered. BGL now 58.).    History of Present Illness:    Mr. Andre Brown, a 74 y.o. year old male  who  has a past medical history of A-fib (HCC), Cancer (HCC), CKD (chronic kidney disease), Diabetes mellitus (HCC), ED (erectile dysfunction), Hyperlipidemia, Hypertension, and Prostate cancer (HCC).  Patient presented to ED on 3/21/2024 for hypoglycemia.  Of note, patient was admitted and recently discharged AMA from hospital on 3/19/2024.  Patient was admitted for shortness of breath was intubated and expanded on 3/15/2024 completed treatment with Zosyn for aspiration pneumonia. Patient allegedly left [place] because it was his birthday and he wanted to go home to eat chicken.  Patient reportedly ate lots of chicken and other sorts of food and thought it wise to then take extra insulin, this strategy proved to be less than effective because patient subsequently ended up hypoglycemic.  Also of note, patient was being set up for tunneled dialysis catheter for dialysis as he has end-stage renal disease in need of hemodialysis.  That was not able to be completed prior to him leaving [place].  Current hospital course significant for creatinine 7.5, blood glucose level less than 40, WBC 14.8, hemoglobin 7.7.  Patient was started on D10 drip and admitted to medicine for further management of hypoglycemia.    Past Medical History:        Diagnosis Date    A-fib (HCC)     on eliquis    Cancer (HCC)     CKD (chronic kidney disease)     stage 3a    Diabetes mellitus (HCC)     ED (erectile dysfunction)     Hyperlipidemia     Hypertension      patient signed out AMA to get to his birthday party prior to getting tunneled dialysis catheter  Check urinalysis and urine studies  Avoid nephrotoxic medications  Continue to follow labs     Other chronic comorbidities: Diabetes mellitus HPL HTN, prostate cancer, A-fib, history of pancreatitis due to poorly controlled DM, s/p robotic pancreatic necrosectomy 6-2021  Continue Toprol, apixaban, amlodipine, continue rest of home medications as appropriate  Continue to follow labs replete as indicated    Diet: No diet orders on file  Code Status: Prior  Surrogate decision maker confirmed with patient:   Extended Emergency Contact Information  Primary Emergency Contact: Amaya Bautista \"Perla\"  Home Phone: [phone]  Mobile Phone: [phone]  Relation: Child  Preferred language: English   needed? No  Secondary Emergency Contact: ELEAZAR WOLFE  Home Phone: [phone]  Relation: Brother/Sister    DVT Prophylaxis: []Lovenox []Heparin []PCD [x] Warfarin/NOAC []Encouraged ambulation  Disposition: []Med/Surg [x] Intermediate [] ICU/CCU  Admit status: [] Observation [x] Inpatient     +++++++++++++++++++++++++++++++++++++++++++++++++  GURPREET Patterson  [facility] Hospitalist  [place], [place]    I can be reached via Perfect [employer] or at [phone] with any questions or concerns through 0700. After 0700 one of my colleagues with the [facility] Hospitalist team will assume patient's care.     +++++++++++++++++++++++++++++++++++++++++++++++++  NOTE: This report was transcribed using voice recognition software. Every effort was made to ensure accuracy; however, inadvertent computerized transcription errors may be present.

## 2024-03-23 LAB
ALBUMIN SERPL-MCNC: 3.5 G/DL (ref 3.5–5.2)
ALP SERPL-CCNC: 94 U/L (ref 40–129)
ALT SERPL-CCNC: 16 U/L (ref 0–40)
ANION GAP SERPL CALCULATED.3IONS-SCNC: 18 MMOL/L (ref 7–16)
AST SERPL-CCNC: 22 U/L (ref 0–39)
BASOPHILS # BLD: 0.29 K/UL (ref 0–0.2)
BASOPHILS NFR BLD: 4 % (ref 0–2)
BILIRUB SERPL-MCNC: 0.3 MG/DL (ref 0–1.2)
BUN SERPL-MCNC: 45 MG/DL (ref 6–23)
CALCIUM SERPL-MCNC: 8.4 MG/DL (ref 8.6–10.2)
CHLORIDE SERPL-SCNC: 99 MMOL/L (ref 98–107)
CO2 SERPL-SCNC: 19 MMOL/L (ref 22–29)
CREAT SERPL-MCNC: 6.7 MG/DL (ref 0.7–1.2)
EKG ATRIAL RATE: 277 BPM
EKG Q-T INTERVAL: 368 MS
EKG QRS DURATION: 80 MS
EKG QTC CALCULATION (BAZETT): 391 MS
EKG R AXIS: 12 DEGREES
EKG T AXIS: -20 DEGREES
EKG VENTRICULAR RATE: 68 BPM
EOSINOPHIL # BLD: 0.14 K/UL (ref 0.05–0.5)
EOSINOPHILS RELATIVE PERCENT: 2 % (ref 0–6)
ERYTHROCYTE [DISTWIDTH] IN BLOOD BY AUTOMATED COUNT: 15.7 % (ref 11.5–15)
GFR SERPL CREATININE-BSD FRML MDRD: 8 ML/MIN/1.73M2
GLUCOSE BLD-MCNC: 164 MG/DL (ref 74–99)
GLUCOSE BLD-MCNC: 165 MG/DL (ref 74–99)
GLUCOSE BLD-MCNC: 181 MG/DL (ref 74–99)
GLUCOSE BLD-MCNC: 247 MG/DL (ref 74–99)
GLUCOSE BLD-MCNC: 336 MG/DL (ref 74–99)
GLUCOSE SERPL-MCNC: 133 MG/DL (ref 74–99)
HBA1C MFR BLD: 6.8 % (ref 4–5.6)
HCT VFR BLD AUTO: 25.2 % (ref 37–54)
HGB BLD-MCNC: 8.4 G/DL (ref 12.5–16.5)
LYMPHOCYTES NFR BLD: 1.35 K/UL (ref 1.5–4)
LYMPHOCYTES RELATIVE PERCENT: 17 % (ref 20–42)
MCH RBC QN AUTO: 27.8 PG (ref 26–35)
MCHC RBC AUTO-ENTMCNC: 33.3 G/DL (ref 32–34.5)
MCV RBC AUTO: 83.4 FL (ref 80–99.9)
MONOCYTES NFR BLD: 0.57 K/UL (ref 0.1–0.95)
MONOCYTES NFR BLD: 7 % (ref 2–12)
NEUTROPHILS NFR BLD: 71 % (ref 43–80)
NEUTS SEG NFR BLD: 5.85 K/UL (ref 1.8–7.3)
PLATELET # BLD AUTO: 287 K/UL (ref 130–450)
PMV BLD AUTO: 10.3 FL (ref 7–12)
POTASSIUM SERPL-SCNC: 3.8 MMOL/L (ref 3.5–5)
PROT SERPL-MCNC: 6.4 G/DL (ref 6.4–8.3)
RBC # BLD AUTO: 3.02 M/UL (ref 3.8–5.8)
RBC # BLD: ABNORMAL 10*6/UL
SODIUM SERPL-SCNC: 136 MMOL/L (ref 132–146)
WBC OTHER # BLD: 8.2 K/UL (ref 4.5–11.5)

## 2024-03-23 PROCEDURE — 6360000002 HC RX W HCPCS: Performed by: STUDENT IN AN ORGANIZED HEALTH CARE EDUCATION/TRAINING PROGRAM

## 2024-03-23 PROCEDURE — 36415 COLL VENOUS BLD VENIPUNCTURE: CPT

## 2024-03-23 PROCEDURE — 6370000000 HC RX 637 (ALT 250 FOR IP)

## 2024-03-23 PROCEDURE — 2140000000 HC CCU INTERMEDIATE R&B

## 2024-03-23 PROCEDURE — 6370000000 HC RX 637 (ALT 250 FOR IP): Performed by: STUDENT IN AN ORGANIZED HEALTH CARE EDUCATION/TRAINING PROGRAM

## 2024-03-23 PROCEDURE — 6370000000 HC RX 637 (ALT 250 FOR IP): Performed by: INTERNAL MEDICINE

## 2024-03-23 PROCEDURE — 99233 SBSQ HOSP IP/OBS HIGH 50: CPT | Performed by: STUDENT IN AN ORGANIZED HEALTH CARE EDUCATION/TRAINING PROGRAM

## 2024-03-23 PROCEDURE — 85025 COMPLETE CBC W/AUTO DIFF WBC: CPT

## 2024-03-23 PROCEDURE — 2580000003 HC RX 258

## 2024-03-23 PROCEDURE — 93010 ELECTROCARDIOGRAM REPORT: CPT | Performed by: INTERNAL MEDICINE

## 2024-03-23 PROCEDURE — 80053 COMPREHEN METABOLIC PANEL: CPT

## 2024-03-23 PROCEDURE — 82962 GLUCOSE BLOOD TEST: CPT

## 2024-03-23 RX ORDER — HEPARIN SODIUM 10000 [USP'U]/ML
5000 INJECTION, SOLUTION INTRAVENOUS; SUBCUTANEOUS EVERY 8 HOURS
Status: DISCONTINUED | OUTPATIENT
Start: 2024-03-23 | End: 2024-03-28 | Stop reason: HOSPADM

## 2024-03-23 RX ADMIN — SODIUM CHLORIDE, PRESERVATIVE FREE 10 ML: 5 INJECTION INTRAVENOUS at 09:35

## 2024-03-23 RX ADMIN — Medication 250 MG: at 09:34

## 2024-03-23 RX ADMIN — APIXABAN 5 MG: 5 TABLET, FILM COATED ORAL at 09:34

## 2024-03-23 RX ADMIN — INSULIN GLARGINE 22 UNITS: 100 INJECTION, SOLUTION SUBCUTANEOUS at 09:35

## 2024-03-23 RX ADMIN — ASPIRIN 81 MG: 81 TABLET, COATED ORAL at 09:34

## 2024-03-23 RX ADMIN — PANCRELIPASE LIPASE, PANCRELIPASE PROTEASE, PANCRELIPASE AMYLASE 15000 UNITS: 15000; 47000; 63000 CAPSULE, DELAYED RELEASE ORAL at 13:12

## 2024-03-23 RX ADMIN — SODIUM BICARBONATE 1300 MG: 650 TABLET ORAL at 22:15

## 2024-03-23 RX ADMIN — BUMETANIDE 2 MG: 1 TABLET ORAL at 09:35

## 2024-03-23 RX ADMIN — MELATONIN 3 MG ORAL TABLET 3 MG: 3 TABLET ORAL at 22:15

## 2024-03-23 RX ADMIN — SODIUM CHLORIDE, PRESERVATIVE FREE 10 ML: 5 INJECTION INTRAVENOUS at 22:16

## 2024-03-23 RX ADMIN — SODIUM BICARBONATE 1300 MG: 650 TABLET ORAL at 09:34

## 2024-03-23 RX ADMIN — BUMETANIDE 2 MG: 1 TABLET ORAL at 22:15

## 2024-03-23 RX ADMIN — PANCRELIPASE LIPASE, PANCRELIPASE PROTEASE, PANCRELIPASE AMYLASE 20000 UNITS: 20000; 63000; 84000 CAPSULE, DELAYED RELEASE ORAL at 18:54

## 2024-03-23 RX ADMIN — METOPROLOL TARTRATE 37.5 MG: 25 TABLET, FILM COATED ORAL at 22:15

## 2024-03-23 RX ADMIN — PANCRELIPASE LIPASE, PANCRELIPASE PROTEASE, PANCRELIPASE AMYLASE 15000 UNITS: 15000; 47000; 63000 CAPSULE, DELAYED RELEASE ORAL at 18:54

## 2024-03-23 RX ADMIN — PANCRELIPASE LIPASE, PANCRELIPASE PROTEASE, PANCRELIPASE AMYLASE 15000 UNITS: 15000; 47000; 63000 CAPSULE, DELAYED RELEASE ORAL at 09:33

## 2024-03-23 RX ADMIN — METOPROLOL TARTRATE 37.5 MG: 25 TABLET, FILM COATED ORAL at 09:35

## 2024-03-23 RX ADMIN — HYDRALAZINE HYDROCHLORIDE 25 MG: 25 TABLET, FILM COATED ORAL at 22:15

## 2024-03-23 RX ADMIN — INSULIN LISPRO 4 UNITS: 100 INJECTION, SOLUTION INTRAVENOUS; SUBCUTANEOUS at 22:22

## 2024-03-23 RX ADMIN — ATORVASTATIN CALCIUM 10 MG: 10 TABLET, FILM COATED ORAL at 22:15

## 2024-03-23 RX ADMIN — PANCRELIPASE LIPASE, PANCRELIPASE PROTEASE, PANCRELIPASE AMYLASE 20000 UNITS: 20000; 63000; 84000 CAPSULE, DELAYED RELEASE ORAL at 13:12

## 2024-03-23 RX ADMIN — HEPARIN SODIUM 5000 UNITS: 10000 INJECTION INTRAVENOUS; SUBCUTANEOUS at 22:15

## 2024-03-23 RX ADMIN — PANCRELIPASE LIPASE, PANCRELIPASE PROTEASE, PANCRELIPASE AMYLASE 20000 UNITS: 20000; 63000; 84000 CAPSULE, DELAYED RELEASE ORAL at 09:36

## 2024-03-23 RX ADMIN — AMLODIPINE BESYLATE 10 MG: 10 TABLET ORAL at 09:35

## 2024-03-23 NOTE — PROGRESS NOTES
Department of Internal Medicine  Nephrology Attending Progress Note    Events reviewed.      SUBJECTIVE:  We are following Mr. Andre Brown for BLAIR on CKD.  Reports no complaints.      PHYSICAL EXAM:      Vitals:    VITALS:  /70   Pulse 72   Temp 98 °F (36.7 °C) (Oral)   Resp 16   Ht 1.88 m (6' 2\")   Wt 89.9 kg (198 lb 1.6 oz)   SpO2 98%   BMI 25.43 kg/m²   24HR INTAKE/OUTPUT:    Intake/Output Summary (Last 24 hours) at 3/23/2024 0953  Last data filed at 3/23/2024 0721  Gross per 24 hour   Intake --   Output 2680 ml   Net -2680 ml       Access:   Constitutional: Patient is lethargic today.  HEENT: Pupils are equal reactive, mucous membranes are moist  Respiratory: Decreased breath sounds at the bases  Cardiovascular/Edema: Heart sounds irregularly irregular  Gastrointestinal: Abdomen soft  Neurologic: Nonfocal  Skin: No lesions  Other: Trace edema    Scheduled Meds:   heparin (porcine)  5,000 Units SubCUTAneous Q8H    amLODIPine  10 mg Oral Daily    vitamin C  250 mg Oral Daily    aspirin  81 mg Oral Daily    atorvastatin  10 mg Oral Nightly    hydrALAZINE  25 mg Oral BID    insulin glargine  22 Units SubCUTAneous QAM    metoprolol tartrate  37.5 mg Oral BID    sodium chloride flush  5-40 mL IntraVENous 2 times per day    melatonin  3 mg Oral Nightly    lipase-protease-amylas  15,000 Units Oral TID WC    And    lipase-protease-amylase  20,000 Units Oral TID WC    epoetin joann-epbx  3,000 Units SubCUTAneous Once per day on Mon Wed Fri    bumetanide  2 mg Oral BID    insulin lispro  0-16 Units SubCUTAneous TID WC    insulin lispro  0-4 Units SubCUTAneous Nightly    sodium bicarbonate  1,300 mg Oral BID     Continuous Infusions:   sodium chloride      dextrose      dextrose         PRN Meds:.sodium chloride flush, sodium chloride, ondansetron **OR** ondansetron, polyethylene glycol, acetaminophen **OR** acetaminophen, glucagon (rDNA), dextrose, aluminum & magnesium hydroxide-simethicone, glucose, dextrose  0.49, proBNP level 24,362.  His initial chest x-ray showed no acute abnormalities.  In the ER patient was given at least 1 L of NS and he was admitted to MICU.  Patient was started on HD and was supposed to have a tunneled dialysis catheter placed but left AMA. Patient was readmitted on 3/21/24 for hypoglycemia and was started on D10. ED lab work revealed creatinine 7.5 mg/dL, reason for this consultation.      IMPRESSION/RECOMMENDATIONS:      BLAIR on CKD, hemodynamically mediated 2/2 decompensated heart failure versus progression of CKD.  HD 3 times weekly MWF, HD Monday, consult IR to place tunneled dialysis catheter    CKD stage IIIb, with large proteinuria, 2/2 diabetic nephropathy, baseline creatinine 3.1-3.2 mg/dL    HTN, on metoprolol  HFpEF 55-60%, obtain pro-BNP  Normocytic anemia, hemoglobin 7.7, to start epo  ------------------------------------------------------------  Type II DM, hypoglycemic, on D10  PAF, on metoprolol  Hyperlipidemia, atorvastatin     Plan:    HD 3 times weekly MWF, HD Monday  Continue sodium bicarbonate 1300 mg p.o. twice daily  Consult IR to place tunneled dialysis catheter  Continue EPO 3,000 units three times weekly  Continue Bumex 2 mg p.o. twice daily  Continue metoprolol tartrate 37.5 mg twice daily  Continue to monitor renal function    Electronically signed by GURPREET Coppola CNP on 3/23/2024 at 9:53 AM     MD: Patient seen. Case reviewed with NP.  Tolerating dialysis.  Agree with above.

## 2024-03-23 NOTE — PROGRESS NOTES
[facility] Hospitalist Progress Note    SYNOPSIS: Patient admitted on 3/21/2024 for Hypoglycemia     74 y.o. year old male  who  has a past medical history of A-fib (HCC), Cancer (HCC), CKD (chronic kidney disease), Diabetes mellitus (HCC), ED (erectile dysfunction), Hyperlipidemia, Hypertension, and Prostate cancer (HCC).  Patient presented to ED on 3/21/2024 for hypoglycemia.  Of note, patient was admitted and recently discharged AMA from hospital on 3/19/2024.  Patient was admitted for shortness of breath was intubated and expanded on 3/15/2024 completed treatment with Zosyn for aspiration pneumonia. Patient allegedly left AMA because it was his birthday and he wanted to go home to eat chicken.  Patient reportedly ate lots of chicken and other sorts of food and thought it wise to then take extra insulin, this strategy proved to be less than effective because patient subsequently ended up hypoglycemic.  Also of note, patient was being set up for tunneled dialysis catheter for dialysis as he has end-stage renal disease in need of hemodialysis.  That was not able to be completed prior to him leaving [place].  Current hospital course significant for creatinine 7.5, blood glucose level less than 40,-IMPROVED  with D10 drip;   Nephrology evaluated the patient and is planning for HD after TDC placement on Monday by IR    SUBJECTIVE:  Stable overnight. No other overnight issues reported.   Patient seen and examined; denies any complains  Records reviewed.           Temp (24hrs), Av.7 °F (36.5 °C), Min:97.3 °F (36.3 °C), Max:98 °F (36.7 °C)    DIET: ADULT DIET; Regular; 3 carb choices (45 gm/meal); Low Sodium (2 gm); 1000 ml  Diet NPO  CODE: Full Code    Intake/Output Summary (Last 24 hours) at 3/23/2024 1305  Last data filed at 3/23/2024 1017  Gross per 24 hour   Intake 180 ml   Output 2180 ml   Net -2000 ml       Review of Systems  All bolded are positive; please see HPI  General:  Fever, chills, diaphoresis,  for diabetic ketoacidosis possibly secondary to infection  Recently T/amy Wilkes; signed out AMA prior to getting tunneled dialysis catheter     Acute kidney injury in the setting of chronic kidney disease  Nephrology on board   Planning for HD on Monday after TDC placement  IR consulted for TDC placement  Monitor BMP  Avoid nephrotoxic medications       Other chronic comorbidities: Diabetes mellitus HPL HTN, prostate cancer, A-fib, history of pancreatitis due to poorly controlled DM, s/p robotic pancreatic necrosectomy 6-2021  Continue Toprol, apixaban, amlodipine, continue rest of home medications as appropriate  Continue to follow labs replete as indicated              DVT Prophylaxis [] Lovenox, []  Heparin, [] SCDs, [] Ambulation   GI Prophylaxis [] PPI,  [] H2 Blocker,  [] Carafate,  [] Diet/Tube Feeds   Disposition Patient requires continued admission due to pending TDC placement-scheduled for Monday   MDM [] Low, [] Moderate,[]  High       Medications:  REVIEWED DAILY    Infusion Medications    sodium chloride      dextrose      dextrose       Scheduled Medications    heparin (porcine)  5,000 Units SubCUTAneous Q8H    amLODIPine  10 mg Oral Daily    vitamin C  250 mg Oral Daily    aspirin  81 mg Oral Daily    atorvastatin  10 mg Oral Nightly    hydrALAZINE  25 mg Oral BID    insulin glargine  22 Units SubCUTAneous QAM    metoprolol tartrate  37.5 mg Oral BID    sodium chloride flush  5-40 mL IntraVENous 2 times per day    melatonin  3 mg Oral Nightly    lipase-protease-amylas  15,000 Units Oral TID WC    And    lipase-protease-amylase  20,000 Units Oral TID WC    epoetin joann-epbx  3,000 Units SubCUTAneous Once per day on Mon Wed Fri    bumetanide  2 mg Oral BID    insulin lispro  0-16 Units SubCUTAneous TID WC    insulin lispro  0-4 Units SubCUTAneous Nightly    sodium bicarbonate  1,300 mg Oral BID     PRN Meds: sodium chloride flush, sodium chloride, ondansetron **OR** ondansetron, polyethylene  glycol, acetaminophen **OR** acetaminophen, glucagon (rDNA), dextrose, aluminum & magnesium hydroxide-simethicone, glucose, dextrose bolus **OR** dextrose bolus, dextrose    Labs:     Recent Labs     03/21/24 2116 03/22/24  1500 03/23/24  0636   WBC 14.8* 8.4 8.2   HGB 7.7* 8.3* 8.4*   HCT 23.2* 24.2* 25.2*    282 287       Recent Labs     03/22/24  0220 03/22/24  1500 03/23/24  0636    134 136   K 3.5 3.3* 3.8    96* 99   CO2 17* 19* 19*   BUN 41* 44* 45*   CREATININE 6.1* 7.1* 6.7*   CALCIUM 7.1* 8.7 8.4*       Recent Labs     03/21/24 2116 03/23/24  0636   PROT 7.9 6.4   ALKPHOS 112 94   ALT 22 16   AST 30 22   BILITOT 0.3 0.3       Recent Labs     03/22/24  1659   INR 1.4       No results for input(s): \"CKTOTAL\", \"TROPONINI\" in the last 72 hours.    Chronic labs:    Lab Results   Component Value Date    CHOL 160 12/23/2020    TRIG 103 06/23/2021    HDL 14 12/23/2020    LDLCALC - (AA) 12/23/2020    TSH 1.52 03/12/2024    INR 1.4 03/22/2024    LABA1C 6.8 (H) 03/22/2024       Radiology: REVIEWED DAILY    +++++++++++++++++++++++++++++++++++++++++++++++++  Judith Pritchard MD  [facility] [place], [place]  +++++++++++++++++++++++++++++++++++++++++++++++++  NOTE: This report was transcribed using voice recognition software. Every effort was made to ensure accuracy; however, inadvertent computerized transcription errors may be present.

## 2024-03-23 NOTE — PLAN OF CARE
Problem: Safety - Adult  Goal: Free from fall injury  Outcome: Progressing     Problem: Chronic Conditions and Co-morbidities  Goal: Patient's chronic conditions and co-morbidity symptoms are monitored and maintained or improved  Outcome: Progressing     Problem: Discharge Planning  Goal: Discharge to home or other facility with appropriate resources  Outcome: Progressing

## 2024-03-24 LAB
ANION GAP SERPL CALCULATED.3IONS-SCNC: 17 MMOL/L (ref 7–16)
BASOPHILS # BLD: 0 K/UL (ref 0–0.2)
BASOPHILS NFR BLD: 0 % (ref 0–2)
BUN SERPL-MCNC: 48 MG/DL (ref 6–23)
CALCIUM SERPL-MCNC: 8.5 MG/DL (ref 8.6–10.2)
CHLORIDE SERPL-SCNC: 101 MMOL/L (ref 98–107)
CO2 SERPL-SCNC: 21 MMOL/L (ref 22–29)
CREAT SERPL-MCNC: 7 MG/DL (ref 0.7–1.2)
EOSINOPHIL # BLD: 0 K/UL (ref 0.05–0.5)
EOSINOPHILS RELATIVE PERCENT: 0 % (ref 0–6)
ERYTHROCYTE [DISTWIDTH] IN BLOOD BY AUTOMATED COUNT: 15.8 % (ref 11.5–15)
GFR SERPL CREATININE-BSD FRML MDRD: 8 ML/MIN/1.73M2
GLUCOSE BLD-MCNC: 120 MG/DL (ref 74–99)
GLUCOSE BLD-MCNC: 142 MG/DL (ref 74–99)
GLUCOSE BLD-MCNC: 195 MG/DL (ref 74–99)
GLUCOSE BLD-MCNC: 318 MG/DL (ref 74–99)
GLUCOSE BLD-MCNC: 403 MG/DL (ref 74–99)
GLUCOSE BLD-MCNC: 49 MG/DL (ref 74–99)
GLUCOSE BLD-MCNC: 69 MG/DL (ref 74–99)
GLUCOSE BLD-MCNC: 70 MG/DL (ref 74–99)
GLUCOSE BLD-MCNC: 86 MG/DL (ref 74–99)
GLUCOSE BLD-MCNC: <40 MG/DL (ref 74–99)
GLUCOSE SERPL-MCNC: 273 MG/DL (ref 74–99)
GLUCOSE SERPL-MCNC: 74 MG/DL (ref 74–99)
HCT VFR BLD AUTO: 26.3 % (ref 37–54)
HGB BLD-MCNC: 8.5 G/DL (ref 12.5–16.5)
INR PPP: 1.2
LYMPHOCYTES NFR BLD: 1.15 K/UL (ref 1.5–4)
LYMPHOCYTES RELATIVE PERCENT: 17 % (ref 20–42)
MCH RBC QN AUTO: 27.1 PG (ref 26–35)
MCHC RBC AUTO-ENTMCNC: 32.3 G/DL (ref 32–34.5)
MCV RBC AUTO: 83.8 FL (ref 80–99.9)
MONOCYTES NFR BLD: 0.54 K/UL (ref 0.1–0.95)
MONOCYTES NFR BLD: 8 % (ref 2–12)
NEUTROPHILS NFR BLD: 75 % (ref 43–80)
NEUTS SEG NFR BLD: 5.21 K/UL (ref 1.8–7.3)
PARTIAL THROMBOPLASTIN TIME: 32.9 SEC (ref 24.5–35.1)
PLATELET # BLD AUTO: 296 K/UL (ref 130–450)
PMV BLD AUTO: 10.7 FL (ref 7–12)
POTASSIUM SERPL-SCNC: 4 MMOL/L (ref 3.5–5)
PROTHROMBIN TIME: 13.3 SEC (ref 9.3–12.4)
RBC # BLD AUTO: 3.14 M/UL (ref 3.8–5.8)
RBC # BLD: ABNORMAL 10*6/UL
SODIUM SERPL-SCNC: 139 MMOL/L (ref 132–146)
WBC OTHER # BLD: 6.9 K/UL (ref 4.5–11.5)

## 2024-03-24 PROCEDURE — 2580000003 HC RX 258

## 2024-03-24 PROCEDURE — 6360000002 HC RX W HCPCS: Performed by: STUDENT IN AN ORGANIZED HEALTH CARE EDUCATION/TRAINING PROGRAM

## 2024-03-24 PROCEDURE — 85730 THROMBOPLASTIN TIME PARTIAL: CPT

## 2024-03-24 PROCEDURE — 6370000000 HC RX 637 (ALT 250 FOR IP): Performed by: INTERNAL MEDICINE

## 2024-03-24 PROCEDURE — 6370000000 HC RX 637 (ALT 250 FOR IP)

## 2024-03-24 PROCEDURE — 82947 ASSAY GLUCOSE BLOOD QUANT: CPT

## 2024-03-24 PROCEDURE — 80048 BASIC METABOLIC PNL TOTAL CA: CPT

## 2024-03-24 PROCEDURE — 85025 COMPLETE CBC W/AUTO DIFF WBC: CPT

## 2024-03-24 PROCEDURE — 2580000003 HC RX 258: Performed by: STUDENT IN AN ORGANIZED HEALTH CARE EDUCATION/TRAINING PROGRAM

## 2024-03-24 PROCEDURE — 1200000000 HC SEMI PRIVATE

## 2024-03-24 PROCEDURE — 82962 GLUCOSE BLOOD TEST: CPT

## 2024-03-24 PROCEDURE — 36415 COLL VENOUS BLD VENIPUNCTURE: CPT

## 2024-03-24 PROCEDURE — 99233 SBSQ HOSP IP/OBS HIGH 50: CPT | Performed by: STUDENT IN AN ORGANIZED HEALTH CARE EDUCATION/TRAINING PROGRAM

## 2024-03-24 PROCEDURE — 85610 PROTHROMBIN TIME: CPT

## 2024-03-24 PROCEDURE — 6370000000 HC RX 637 (ALT 250 FOR IP): Performed by: STUDENT IN AN ORGANIZED HEALTH CARE EDUCATION/TRAINING PROGRAM

## 2024-03-24 RX ORDER — INSULIN LISPRO 100 [IU]/ML
8 INJECTION, SOLUTION INTRAVENOUS; SUBCUTANEOUS
Status: DISCONTINUED | OUTPATIENT
Start: 2024-03-24 | End: 2024-03-25

## 2024-03-24 RX ADMIN — HYDRALAZINE HYDROCHLORIDE 25 MG: 25 TABLET, FILM COATED ORAL at 22:24

## 2024-03-24 RX ADMIN — BUMETANIDE 2 MG: 1 TABLET ORAL at 14:14

## 2024-03-24 RX ADMIN — SODIUM BICARBONATE 1300 MG: 650 TABLET ORAL at 22:27

## 2024-03-24 RX ADMIN — ASPIRIN 81 MG: 81 TABLET, COATED ORAL at 13:33

## 2024-03-24 RX ADMIN — SODIUM BICARBONATE 1300 MG: 650 TABLET ORAL at 14:15

## 2024-03-24 RX ADMIN — PANCRELIPASE LIPASE, PANCRELIPASE PROTEASE, PANCRELIPASE AMYLASE 20000 UNITS: 20000; 63000; 84000 CAPSULE, DELAYED RELEASE ORAL at 14:15

## 2024-03-24 RX ADMIN — PANCRELIPASE LIPASE, PANCRELIPASE PROTEASE, PANCRELIPASE AMYLASE 15000 UNITS: 15000; 47000; 63000 CAPSULE, DELAYED RELEASE ORAL at 16:47

## 2024-03-24 RX ADMIN — Medication 250 MG: at 13:28

## 2024-03-24 RX ADMIN — ATORVASTATIN CALCIUM 10 MG: 10 TABLET, FILM COATED ORAL at 22:24

## 2024-03-24 RX ADMIN — METOPROLOL TARTRATE 37.5 MG: 25 TABLET, FILM COATED ORAL at 14:16

## 2024-03-24 RX ADMIN — HEPARIN SODIUM 5000 UNITS: 10000 INJECTION INTRAVENOUS; SUBCUTANEOUS at 22:28

## 2024-03-24 RX ADMIN — INSULIN LISPRO 8 UNITS: 100 INJECTION, SOLUTION INTRAVENOUS; SUBCUTANEOUS at 16:46

## 2024-03-24 RX ADMIN — DEXTROSE MONOHYDRATE 125 ML: 100 INJECTION, SOLUTION INTRAVENOUS at 20:00

## 2024-03-24 RX ADMIN — SODIUM CHLORIDE, PRESERVATIVE FREE 10 ML: 5 INJECTION INTRAVENOUS at 22:24

## 2024-03-24 RX ADMIN — INSULIN LISPRO 12 UNITS: 100 INJECTION, SOLUTION INTRAVENOUS; SUBCUTANEOUS at 16:47

## 2024-03-24 RX ADMIN — BUMETANIDE 2 MG: 1 TABLET ORAL at 22:23

## 2024-03-24 RX ADMIN — INSULIN LISPRO 16 UNITS: 100 INJECTION, SOLUTION INTRAVENOUS; SUBCUTANEOUS at 13:02

## 2024-03-24 RX ADMIN — DEXTROSE MONOHYDRATE 250 ML: 100 INJECTION, SOLUTION INTRAVENOUS at 20:08

## 2024-03-24 RX ADMIN — HEPARIN SODIUM 5000 UNITS: 10000 INJECTION INTRAVENOUS; SUBCUTANEOUS at 06:01

## 2024-03-24 RX ADMIN — PANCRELIPASE LIPASE, PANCRELIPASE PROTEASE, PANCRELIPASE AMYLASE 20000 UNITS: 20000; 63000; 84000 CAPSULE, DELAYED RELEASE ORAL at 16:47

## 2024-03-24 RX ADMIN — MELATONIN 3 MG ORAL TABLET 3 MG: 3 TABLET ORAL at 22:24

## 2024-03-24 RX ADMIN — SODIUM CHLORIDE, PRESERVATIVE FREE 10 ML: 5 INJECTION INTRAVENOUS at 13:34

## 2024-03-24 RX ADMIN — HEPARIN SODIUM 5000 UNITS: 10000 INJECTION INTRAVENOUS; SUBCUTANEOUS at 14:22

## 2024-03-24 RX ADMIN — METOPROLOL TARTRATE 37.5 MG: 25 TABLET, FILM COATED ORAL at 22:23

## 2024-03-24 RX ADMIN — PANCRELIPASE LIPASE, PANCRELIPASE PROTEASE, PANCRELIPASE AMYLASE 15000 UNITS: 15000; 47000; 63000 CAPSULE, DELAYED RELEASE ORAL at 14:15

## 2024-03-24 RX ADMIN — AMLODIPINE BESYLATE 10 MG: 10 TABLET ORAL at 13:28

## 2024-03-24 NOTE — PROGRESS NOTES
Department of Internal Medicine  Nephrology Attending Progress Note    Events reviewed.      SUBJECTIVE:  We are following Mr. Andre Brown for BLAIR on CKD.  Reports no complaints.      PHYSICAL EXAM:      Vitals:    VITALS:  /88   Pulse 86   Temp 98 °F (36.7 °C) (Temporal)   Resp 16   Ht 1.88 m (6' 2\")   Wt 90 kg (198 lb 7.4 oz)   SpO2 96%   BMI 25.48 kg/m²   24HR INTAKE/OUTPUT:    Intake/Output Summary (Last 24 hours) at 3/24/2024 0905  Last data filed at 3/23/2024 2215  Gross per 24 hour   Intake 190 ml   Output 825 ml   Net -635 ml       Access:   Constitutional: Patient is lethargic today.  HEENT: Pupils are equal reactive, mucous membranes are moist  Respiratory: Decreased breath sounds at the bases  Cardiovascular/Edema: Heart sounds irregularly irregular  Gastrointestinal: Abdomen soft  Neurologic: Nonfocal  Skin: No lesions  Other: Trace edema    Scheduled Meds:   heparin (porcine)  5,000 Units SubCUTAneous Q8H    amLODIPine  10 mg Oral Daily    vitamin C  250 mg Oral Daily    aspirin  81 mg Oral Daily    atorvastatin  10 mg Oral Nightly    hydrALAZINE  25 mg Oral BID    insulin glargine  22 Units SubCUTAneous QAM    metoprolol tartrate  37.5 mg Oral BID    sodium chloride flush  5-40 mL IntraVENous 2 times per day    melatonin  3 mg Oral Nightly    lipase-protease-amylas  15,000 Units Oral TID WC    And    lipase-protease-amylase  20,000 Units Oral TID WC    epoetin joann-epbx  3,000 Units SubCUTAneous Once per day on Mon Wed Fri    bumetanide  2 mg Oral BID    insulin lispro  0-16 Units SubCUTAneous TID WC    insulin lispro  0-4 Units SubCUTAneous Nightly    sodium bicarbonate  1,300 mg Oral BID     Continuous Infusions:   sodium chloride      dextrose      dextrose         PRN Meds:.sodium chloride flush, sodium chloride, ondansetron **OR** ondansetron, polyethylene glycol, acetaminophen **OR** acetaminophen, glucagon (rDNA), dextrose, aluminum & magnesium hydroxide-simethicone, glucose,

## 2024-03-24 NOTE — PROGRESS NOTES
4 Eyes Skin Assessment     NAME:  Andre Brown  YOB: 1950  MEDICAL RECORD NUMBER:  [id number]    The patient is being assessed for  {Reason for Assessment:64168}    I agree that at least one RN has performed a thorough Head to Toe Skin Assessment on the patient. ALL assessment sites listed below have been assessed.      Areas assessed by both nurses:    Head, Face, Ears, Shoulders, Back, Chest, Arms, Elbows, Hands, Sacrum. Buttock, Coccyx, Ischium, Legs. Feet and Heels, and Under Medical Devices         Does the Patient have a Wound? No noted wound(s)       Demetrius Prevention initiated by RN: {YES/NO:19726}  Wound Care Orders initiated by RN: {YES/NO:19726}    Pressure Injury (Stage 3,4, Unstageable, DTI, NWPT, and Complex wounds) if present, place Wound referral order by RN under : {YES/NO:19726}    New Ostomies, if present place, Ostomy referral order under : {YES/NO:19726}     Nurse 1 eSignature: Electronically signed by Erica Mckay RN on 3/24/24 at 4:34 PM EDT    **SHARE this note so that the co-signing nurse can place an eSignature**    Nurse 2 eSignature: {Esignature:[id number]}

## 2024-03-24 NOTE — PLAN OF CARE
Problem: Safety - Adult  Goal: Free from fall injury  3/24/2024 1706 by Erica Vang RN  Outcome: Progressing     Problem: Chronic Conditions and Co-morbidities  Goal: Patient's chronic conditions and co-morbidity symptoms are monitored and maintained or improved  3/24/2024 1706 by Erica Vang RN  Outcome: Progressing     Problem: Discharge Planning  Goal: Discharge to home or other facility with appropriate resources  3/24/2024 1706 by Erica Vang RN  Outcome: Progressing     Problem: Risk for Elopement  Goal: Patient will not exit the unit/facility without proper excort  Outcome: Progressing     Problem: Skin/Tissue Integrity  Goal: Absence of new skin breakdown  Description: 1.  Monitor for areas of redness and/or skin breakdown  2.  Assess vascular access sites hourly  3.  Every 4-6 hours minimum:  Change oxygen saturation probe site  4.  Every 4-6 hours:  If on nasal continuous positive airway pressure, respiratory therapy assess nares and determine need for appliance change or resting period.  Outcome: Progressing

## 2024-03-24 NOTE — PROGRESS NOTES
[facility] Hospitalist Progress Note    SYNOPSIS: Patient admitted on 3/21/2024 for Hypoglycemia     74 y.o. year old male  who  has a past medical history of A-fib (HCC), Cancer (HCC), CKD (chronic kidney disease), Diabetes mellitus (HCC), ED (erectile dysfunction), Hyperlipidemia, Hypertension, and Prostate cancer (HCC).  Patient presented to ED on 3/21/2024 for hypoglycemia.  Of note, patient was admitted and recently discharged AMA from hospital on 3/19/2024.  Patient was admitted for shortness of breath was intubated and expanded on 3/15/2024 completed treatment with Zosyn for aspiration pneumonia. Patient allegedly left AMA because it was his birthday and he wanted to go home to eat chicken.  Patient reportedly ate lots of chicken and other sorts of food and thought it wise to then take extra insulin, this strategy proved to be less than effective because patient subsequently ended up hypoglycemic.  Also of note, patient was being set up for tunneled dialysis catheter for dialysis as he has end-stage renal disease in need of hemodialysis.  That was not able to be completed prior to him leaving [facility].  Current hospital course significant for creatinine 7.5, blood glucose level less than 40,-IMPROVED  with D10 drip;   Nephrology evaluated the patient and is planning for HD after TDC placement on Monday by IR; currently on bumex 2 mg po bid and metoprolol     SUBJECTIVE:  Stable overnight. No other overnight issues reported.   Patient seen and examined; denies any complains  Records reviewed.           Temp (24hrs), Av.6 °F (36.4 °C), Min:97.4 °F (36.3 °C), Max:98 °F (36.7 °C)    DIET: ADULT DIET; Regular; 3 carb choices (45 gm/meal); Low Sodium (2 gm); 1000 ml  Diet NPO  CODE: Full Code    Intake/Output Summary (Last 24 hours) at 3/24/2024 1341  Last data filed at 3/23/2024 2215  Gross per 24 hour   Intake 10 ml   Output 200 ml   Net -190 ml       Review of Systems  All bolded are positive; please see  further recs  Goal [id number]  Continue home dose of lantus; SSI; hypoglycemia protocol     Poorly controlled diabetes mellitus, recently admitted for diabetic ketoacidosis possibly secondary to infection  Recently T/amy Wilkes; signed out AMA prior to getting tunneled dialysis catheter     Acute kidney injury in the setting of chronic kidney disease  Nephrology on board   Planning for HD on Monday after TDC placement  IR consulted for TDC placement  Monitor BMP  Avoid nephrotoxic medications       Other chronic comorbidities: Diabetes mellitus [facility] HTN, prostate cancer, A-fib, history of pancreatitis due to poorly controlled DM, s/p robotic pancreatic necrosectomy 6-2021  Continue Toprol, apixaban, amlodipine, continue rest of home medications as appropriate  Continue to follow labs replete as indicated              DVT Prophylaxis [] Lovenox, []  Heparin, [] SCDs, [] Ambulation   GI Prophylaxis [] PPI,  [] H2 Blocker,  [] Carafate,  [] Diet/Tube Feeds   Disposition Patient requires continued admission due to pending TDC placement-scheduled for Monday   MDM [] Low, [] Moderate,[]  High       Medications:  REVIEWED DAILY    Infusion Medications    sodium chloride      dextrose      dextrose       Scheduled Medications    heparin (porcine)  5,000 Units SubCUTAneous Q8H    amLODIPine  10 mg Oral Daily    vitamin C  250 mg Oral Daily    aspirin  81 mg Oral Daily    atorvastatin  10 mg Oral Nightly    hydrALAZINE  25 mg Oral BID    insulin glargine  22 Units SubCUTAneous QAM    metoprolol tartrate  37.5 mg Oral BID    sodium chloride flush  5-40 mL IntraVENous 2 times per day    melatonin  3 mg Oral Nightly    lipase-protease-amylas  15,000 Units Oral TID WC    And    lipase-protease-amylase  20,000 Units Oral TID WC    epoetin joann-epbx  3,000 Units SubCUTAneous Once per day on Mon Wed Fri    bumetanide  2 mg Oral BID    insulin lispro  0-16 Units SubCUTAneous TID WC    insulin lispro  0-4 Units SubCUTAneous Nightly     sodium bicarbonate  1,300 mg Oral BID     PRN Meds: sodium chloride flush, sodium chloride, ondansetron **OR** ondansetron, polyethylene glycol, acetaminophen **OR** acetaminophen, glucagon (rDNA), dextrose, aluminum & magnesium hydroxide-simethicone, glucose, dextrose bolus **OR** dextrose bolus, dextrose    Labs:     Recent Labs     03/21/24 2116 03/22/24  1500 03/23/24  0636   WBC 14.8* 8.4 8.2   HGB 7.7* 8.3* 8.4*   HCT 23.2* 24.2* 25.2*    282 287       Recent Labs     03/22/24  0220 03/22/24  1500 03/23/24  0636    134 136   K 3.5 3.3* 3.8    96* 99   CO2 17* 19* 19*   BUN 41* 44* 45*   CREATININE 6.1* 7.1* 6.7*   CALCIUM 7.1* 8.7 8.4*       Recent Labs     03/21/24 2116 03/23/24  0636   PROT 7.9 6.4   ALKPHOS 112 94   ALT 22 16   AST 30 22   BILITOT 0.3 0.3       Recent Labs     03/22/24  1659   INR 1.4       No results for input(s): \"CKTOTAL\", \"TROPONINI\" in the last 72 hours.    Chronic labs:    Lab Results   Component Value Date    CHOL 160 12/23/2020    TRIG 103 06/23/2021    HDL 14 12/23/2020    LDLCALC - (AA) 12/23/2020    TSH 1.52 03/12/2024    INR 1.4 03/22/2024    LABA1C 6.8 (H) 03/22/2024       Radiology: REVIEWED DAILY    +++++++++++++++++++++++++++++++++++++++++++++++++  Judith Pritchard MD  [facility]- Hospitalist  [facility] - [place], [place]  +++++++++++++++++++++++++++++++++++++++++++++++++  NOTE: This report was transcribed using voice recognition software. Every effort was made to ensure accuracy; however, inadvertent computerized transcription errors may be present.

## 2024-03-25 ENCOUNTER — APPOINTMENT (OUTPATIENT)
Dept: INTERVENTIONAL RADIOLOGY/VASCULAR | Age: 74
DRG: 917 | End: 2024-03-25
Payer: OTHER GOVERNMENT

## 2024-03-25 LAB
ANION GAP SERPL CALCULATED.3IONS-SCNC: 15 MMOL/L (ref 7–16)
BASOPHILS # BLD: 0.04 K/UL (ref 0–0.2)
BASOPHILS NFR BLD: 1 % (ref 0–2)
BUN SERPL-MCNC: 48 MG/DL (ref 6–23)
CALCIUM SERPL-MCNC: 8.3 MG/DL (ref 8.6–10.2)
CHLORIDE SERPL-SCNC: 98 MMOL/L (ref 98–107)
CO2 SERPL-SCNC: 21 MMOL/L (ref 22–29)
CREAT SERPL-MCNC: 6.6 MG/DL (ref 0.7–1.2)
EOSINOPHIL # BLD: 0.05 K/UL (ref 0.05–0.5)
EOSINOPHILS RELATIVE PERCENT: 1 % (ref 0–6)
ERYTHROCYTE [DISTWIDTH] IN BLOOD BY AUTOMATED COUNT: 15.5 % (ref 11.5–15)
GFR SERPL CREATININE-BSD FRML MDRD: 8 ML/MIN/1.73M2
GLUCOSE BLD-MCNC: 165 MG/DL (ref 74–99)
GLUCOSE BLD-MCNC: 174 MG/DL (ref 74–99)
GLUCOSE BLD-MCNC: 184 MG/DL (ref 74–99)
GLUCOSE BLD-MCNC: 193 MG/DL (ref 74–99)
GLUCOSE BLD-MCNC: 202 MG/DL (ref 74–99)
GLUCOSE BLD-MCNC: 263 MG/DL (ref 74–99)
GLUCOSE SERPL-MCNC: 175 MG/DL (ref 74–99)
HCT VFR BLD AUTO: 24.2 % (ref 37–54)
HGB BLD-MCNC: 8 G/DL (ref 12.5–16.5)
IMM GRANULOCYTES # BLD AUTO: 0.05 K/UL (ref 0–0.58)
IMM GRANULOCYTES NFR BLD: 1 % (ref 0–5)
INR PPP: 1.1
LYMPHOCYTES NFR BLD: 1.67 K/UL (ref 1.5–4)
LYMPHOCYTES RELATIVE PERCENT: 19 % (ref 20–42)
MCH RBC QN AUTO: 27.3 PG (ref 26–35)
MCHC RBC AUTO-ENTMCNC: 33.1 G/DL (ref 32–34.5)
MCV RBC AUTO: 82.6 FL (ref 80–99.9)
MONOCYTES NFR BLD: 0.68 K/UL (ref 0.1–0.95)
MONOCYTES NFR BLD: 8 % (ref 2–12)
NEUTROPHILS NFR BLD: 72 % (ref 43–80)
NEUTS SEG NFR BLD: 6.38 K/UL (ref 1.8–7.3)
PLATELET # BLD AUTO: 275 K/UL (ref 130–450)
PMV BLD AUTO: 10.5 FL (ref 7–12)
POTASSIUM SERPL-SCNC: 4 MMOL/L (ref 3.5–5)
PROTHROMBIN TIME: 11.9 SEC (ref 9.3–12.4)
RBC # BLD AUTO: 2.93 M/UL (ref 3.8–5.8)
SODIUM SERPL-SCNC: 134 MMOL/L (ref 132–146)
WBC OTHER # BLD: 8.9 K/UL (ref 4.5–11.5)

## 2024-03-25 PROCEDURE — 05HM33Z INSERTION OF INFUSION DEVICE INTO RIGHT INTERNAL JUGULAR VEIN, PERCUTANEOUS APPROACH: ICD-10-PCS | Performed by: RADIOLOGY

## 2024-03-25 PROCEDURE — 36415 COLL VENOUS BLD VENIPUNCTURE: CPT

## 2024-03-25 PROCEDURE — 1200000000 HC SEMI PRIVATE

## 2024-03-25 PROCEDURE — 6370000000 HC RX 637 (ALT 250 FOR IP)

## 2024-03-25 PROCEDURE — 36558 INSERT TUNNELED CV CATH: CPT

## 2024-03-25 PROCEDURE — 82962 GLUCOSE BLOOD TEST: CPT

## 2024-03-25 PROCEDURE — 2500000003 HC RX 250 WO HCPCS: Performed by: RADIOLOGY

## 2024-03-25 PROCEDURE — 85025 COMPLETE CBC W/AUTO DIFF WBC: CPT

## 2024-03-25 PROCEDURE — 90935 HEMODIALYSIS ONE EVALUATION: CPT

## 2024-03-25 PROCEDURE — C1894 INTRO/SHEATH, NON-LASER: HCPCS

## 2024-03-25 PROCEDURE — 2580000003 HC RX 258

## 2024-03-25 PROCEDURE — 99232 SBSQ HOSP IP/OBS MODERATE 35: CPT | Performed by: STUDENT IN AN ORGANIZED HEALTH CARE EDUCATION/TRAINING PROGRAM

## 2024-03-25 PROCEDURE — 6360000002 HC RX W HCPCS: Performed by: RADIOLOGY

## 2024-03-25 PROCEDURE — 6360000002 HC RX W HCPCS: Performed by: STUDENT IN AN ORGANIZED HEALTH CARE EDUCATION/TRAINING PROGRAM

## 2024-03-25 PROCEDURE — 77001 FLUOROGUIDE FOR VEIN DEVICE: CPT

## 2024-03-25 PROCEDURE — 6360000002 HC RX W HCPCS

## 2024-03-25 PROCEDURE — 2580000003 HC RX 258: Performed by: RADIOLOGY

## 2024-03-25 PROCEDURE — 76937 US GUIDE VASCULAR ACCESS: CPT

## 2024-03-25 PROCEDURE — 6370000000 HC RX 637 (ALT 250 FOR IP): Performed by: STUDENT IN AN ORGANIZED HEALTH CARE EDUCATION/TRAINING PROGRAM

## 2024-03-25 PROCEDURE — 6370000000 HC RX 637 (ALT 250 FOR IP): Performed by: INTERNAL MEDICINE

## 2024-03-25 PROCEDURE — 80048 BASIC METABOLIC PNL TOTAL CA: CPT

## 2024-03-25 PROCEDURE — 0JH63XZ INSERTION OF TUNNELED VASCULAR ACCESS DEVICE INTO CHEST SUBCUTANEOUS TISSUE AND FASCIA, PERCUTANEOUS APPROACH: ICD-10-PCS | Performed by: RADIOLOGY

## 2024-03-25 PROCEDURE — 85610 PROTHROMBIN TIME: CPT

## 2024-03-25 PROCEDURE — 5A1D70Z PERFORMANCE OF URINARY FILTRATION, INTERMITTENT, LESS THAN 6 HOURS PER DAY: ICD-10-PCS | Performed by: RADIOLOGY

## 2024-03-25 RX ORDER — INSULIN LISPRO 100 [IU]/ML
3 INJECTION, SOLUTION INTRAVENOUS; SUBCUTANEOUS
Status: DISCONTINUED | OUTPATIENT
Start: 2024-03-26 | End: 2024-03-28 | Stop reason: HOSPADM

## 2024-03-25 RX ORDER — INSULIN LISPRO 100 [IU]/ML
0-6 INJECTION, SOLUTION INTRAVENOUS; SUBCUTANEOUS
Status: DISCONTINUED | OUTPATIENT
Start: 2024-03-26 | End: 2024-03-28 | Stop reason: HOSPADM

## 2024-03-25 RX ORDER — LIDOCAINE HYDROCHLORIDE 20 MG/ML
INJECTION, SOLUTION INFILTRATION; PERINEURAL PRN
Status: COMPLETED | OUTPATIENT
Start: 2024-03-25 | End: 2024-03-25

## 2024-03-25 RX ORDER — DIPHENHYDRAMINE HYDROCHLORIDE 50 MG/ML
INJECTION INTRAMUSCULAR; INTRAVENOUS PRN
Status: COMPLETED | OUTPATIENT
Start: 2024-03-25 | End: 2024-03-25

## 2024-03-25 RX ORDER — LIDOCAINE HYDROCHLORIDE AND EPINEPHRINE BITARTRATE 20; .01 MG/ML; MG/ML
INJECTION, SOLUTION SUBCUTANEOUS PRN
Status: COMPLETED | OUTPATIENT
Start: 2024-03-25 | End: 2024-03-25

## 2024-03-25 RX ORDER — INSULIN GLARGINE 100 [IU]/ML
10 INJECTION, SOLUTION SUBCUTANEOUS EVERY MORNING
Status: DISCONTINUED | OUTPATIENT
Start: 2024-03-26 | End: 2024-03-25

## 2024-03-25 RX ORDER — MIDAZOLAM HYDROCHLORIDE 2 MG/2ML
INJECTION, SOLUTION INTRAMUSCULAR; INTRAVENOUS PRN
Status: COMPLETED | OUTPATIENT
Start: 2024-03-25 | End: 2024-03-25

## 2024-03-25 RX ORDER — FENTANYL CITRATE 50 UG/ML
INJECTION, SOLUTION INTRAMUSCULAR; INTRAVENOUS PRN
Status: COMPLETED | OUTPATIENT
Start: 2024-03-25 | End: 2024-03-25

## 2024-03-25 RX ORDER — INSULIN LISPRO 100 [IU]/ML
0-4 INJECTION, SOLUTION INTRAVENOUS; SUBCUTANEOUS NIGHTLY
Status: DISCONTINUED | OUTPATIENT
Start: 2024-03-25 | End: 2024-03-28 | Stop reason: HOSPADM

## 2024-03-25 RX ORDER — INSULIN GLARGINE 100 [IU]/ML
7 INJECTION, SOLUTION SUBCUTANEOUS EVERY MORNING
Status: DISCONTINUED | OUTPATIENT
Start: 2024-03-26 | End: 2024-03-27

## 2024-03-25 RX ADMIN — EPOETIN ALFA-EPBX 3000 UNITS: 3000 INJECTION, SOLUTION INTRAVENOUS; SUBCUTANEOUS at 17:47

## 2024-03-25 RX ADMIN — MIDAZOLAM HYDROCHLORIDE 0.5 MG: 1 INJECTION, SOLUTION INTRAMUSCULAR; INTRAVENOUS at 09:49

## 2024-03-25 RX ADMIN — MIDAZOLAM HYDROCHLORIDE 0.5 MG: 1 INJECTION, SOLUTION INTRAMUSCULAR; INTRAVENOUS at 09:44

## 2024-03-25 RX ADMIN — MELATONIN 3 MG ORAL TABLET 3 MG: 3 TABLET ORAL at 20:02

## 2024-03-25 RX ADMIN — SODIUM CHLORIDE, PRESERVATIVE FREE 10 ML: 5 INJECTION INTRAVENOUS at 09:11

## 2024-03-25 RX ADMIN — METOPROLOL TARTRATE 37.5 MG: 25 TABLET, FILM COATED ORAL at 09:07

## 2024-03-25 RX ADMIN — PANCRELIPASE LIPASE, PANCRELIPASE PROTEASE, PANCRELIPASE AMYLASE 20000 UNITS: 20000; 63000; 84000 CAPSULE, DELAYED RELEASE ORAL at 12:26

## 2024-03-25 RX ADMIN — AMLODIPINE BESYLATE 10 MG: 10 TABLET ORAL at 09:08

## 2024-03-25 RX ADMIN — SODIUM CHLORIDE, PRESERVATIVE FREE 10 ML: 5 INJECTION INTRAVENOUS at 20:02

## 2024-03-25 RX ADMIN — METOPROLOL TARTRATE 37.5 MG: 25 TABLET, FILM COATED ORAL at 20:01

## 2024-03-25 RX ADMIN — Medication 250 MG: at 09:08

## 2024-03-25 RX ADMIN — ACETAMINOPHEN 650 MG: 325 TABLET ORAL at 12:20

## 2024-03-25 RX ADMIN — ACETAMINOPHEN 650 MG: 325 TABLET ORAL at 17:52

## 2024-03-25 RX ADMIN — INSULIN LISPRO 8 UNITS: 100 INJECTION, SOLUTION INTRAVENOUS; SUBCUTANEOUS at 12:31

## 2024-03-25 RX ADMIN — PANCRELIPASE LIPASE, PANCRELIPASE PROTEASE, PANCRELIPASE AMYLASE 15000 UNITS: 15000; 47000; 63000 CAPSULE, DELAYED RELEASE ORAL at 17:46

## 2024-03-25 RX ADMIN — LIDOCAINE HYDROCHLORIDE 7 ML: 20 INJECTION, SOLUTION INFILTRATION; PERINEURAL at 09:49

## 2024-03-25 RX ADMIN — FENTANYL CITRATE 25 MCG: 50 INJECTION, SOLUTION INTRAMUSCULAR; INTRAVENOUS at 09:45

## 2024-03-25 RX ADMIN — DIPHENHYDRAMINE HYDROCHLORIDE 25 MG: 50 INJECTION, SOLUTION INTRAMUSCULAR; INTRAVENOUS at 09:44

## 2024-03-25 RX ADMIN — BUMETANIDE 2 MG: 1 TABLET ORAL at 09:07

## 2024-03-25 RX ADMIN — HYDRALAZINE HYDROCHLORIDE 25 MG: 25 TABLET, FILM COATED ORAL at 09:08

## 2024-03-25 RX ADMIN — LIDOCAINE HYDROCHLORIDE,EPINEPHRINE BITARTRATE 7 ML: 20; .01 INJECTION, SOLUTION INFILTRATION; PERINEURAL at 09:48

## 2024-03-25 RX ADMIN — SODIUM BICARBONATE 1300 MG: 650 TABLET ORAL at 20:01

## 2024-03-25 RX ADMIN — PANCRELIPASE LIPASE, PANCRELIPASE PROTEASE, PANCRELIPASE AMYLASE 20000 UNITS: 20000; 63000; 84000 CAPSULE, DELAYED RELEASE ORAL at 17:47

## 2024-03-25 RX ADMIN — INSULIN LISPRO 8 UNITS: 100 INJECTION, SOLUTION INTRAVENOUS; SUBCUTANEOUS at 17:47

## 2024-03-25 RX ADMIN — ATORVASTATIN CALCIUM 10 MG: 10 TABLET, FILM COATED ORAL at 20:01

## 2024-03-25 RX ADMIN — HEPARIN SODIUM 5000 UNITS: 10000 INJECTION INTRAVENOUS; SUBCUTANEOUS at 12:36

## 2024-03-25 RX ADMIN — BUMETANIDE 2 MG: 1 TABLET ORAL at 20:01

## 2024-03-25 RX ADMIN — HEPARIN SODIUM 5000 UNITS: 10000 INJECTION INTRAVENOUS; SUBCUTANEOUS at 20:02

## 2024-03-25 RX ADMIN — SODIUM BICARBONATE 1300 MG: 650 TABLET ORAL at 09:07

## 2024-03-25 RX ADMIN — FENTANYL CITRATE 25 MCG: 50 INJECTION, SOLUTION INTRAMUSCULAR; INTRAVENOUS at 09:50

## 2024-03-25 RX ADMIN — WATER 2000 MG: 1 INJECTION INTRAMUSCULAR; INTRAVENOUS; SUBCUTANEOUS at 09:38

## 2024-03-25 RX ADMIN — INSULIN LISPRO 4 UNITS: 100 INJECTION, SOLUTION INTRAVENOUS; SUBCUTANEOUS at 12:32

## 2024-03-25 RX ADMIN — PANCRELIPASE LIPASE, PANCRELIPASE PROTEASE, PANCRELIPASE AMYLASE 15000 UNITS: 15000; 47000; 63000 CAPSULE, DELAYED RELEASE ORAL at 12:26

## 2024-03-25 RX ADMIN — HYDRALAZINE HYDROCHLORIDE 25 MG: 25 TABLET, FILM COATED ORAL at 20:01

## 2024-03-25 ASSESSMENT — PAIN SCALES - GENERAL: PAINLEVEL_OUTOF10: 3

## 2024-03-25 NOTE — PROCEDURES
1009 Patient returned from procedure. Dressing checked, clean, dry, and intact. Patient stable. No s/s of complications noted or reported. Vitals will be checked q 15min, see flow sheets.  Report called to erica STERLING ny procedural RN    1030Site was checked with every set of vitals. Site clean dry and intact. Patient in stable condition, no s/s of complications noted or reported.  D/c to floor.

## 2024-03-25 NOTE — FLOWSHEET NOTE
03/25/24 1403   Vital Signs   /63   Temp 97 °F (36.1 °C)   Pulse 71   Respirations 16   Weight - Scale 85.3 kg (188 lb 0.8 oz)   Weight Method Bed scale   Percent Weight Change -1.61   Post-Hemodialysis Assessment   Post-Treatment Procedures Blood returned;Catheter capped, clamped and heparinized x 2 ports   Machine Disinfection Process Acid/Vinegar Clean;Heat Disinfect;Exterior Machine Disinfection   Rinseback Volume (ml) 300 ml   Blood Volume Processed (Liters) 33.9 L   Dialyzer Clearance Lightly streaked   Duration of Treatment (minutes) 240 minutes   Heparin Amount Administered During Treatment (mL) 0 mL   Hemodialysis Intake (ml) 300 ml   Hemodialysis Output (ml) 1300 ml   NET Removed (ml) 1000   Tolerated Treatment Good   Patient Response to Treatment tolerated well, blood returned, cath care per policy/procedure, lines flushed, heparin instilled, ports capped

## 2024-03-25 NOTE — BRIEF OP NOTE
Brief Postoperative Note      Patient: Andre Brown  YOB: 1950  MRN: [id number]    Date of Procedure: 3/25/2024    End stage renal disease  dialysis dependent    Post-Op Diagnosis: Same       Tunneled dialysis catheter placement    RAYMOND Mckenzie    Assistant:  * No surgical staff found *    Anesthesia: *Moderate sedation    Estimated Blood Loss (mL): Minimal    Complications: None    Specimens:   * No specimens in log *    Implants:  * No implants in log *      Drains:   [REMOVED] NG/OG/NJ/NE Tube Orogastric Center mouth (Removed)   Surrounding Skin Clean, dry & intact 03/15/24 0800   Securement device Tape 03/15/24 0800   Status Continuous feeding 03/15/24 0800   Placement Verified Respiratory Status;External Catheter Length;Gastric Contents;X-Ray (repeat) 03/15/24 0800   NG/OG/NJ/NE External Measurement (cm) 56 cm 03/15/24 0800   Tube Feeding Renal Formula 03/15/24 0800   Tube feeding/verify rate (mL/hr) 30 mL/hr 03/15/24 0800   Tube Feeding Intake (mL) 269 ml 03/15/24 0600   Tube Feeding Supplement Amount (mL) 0 03/13/24 1400   Free Water/Flush (mL) 45 mL 03/15/24 0600   Output (mL) 0 ml 03/13/24 1400   Residual Volume (ml) 0 ml 03/14/24 2000       [REMOVED] Urinary Catheter 03/12/24 Jolly-Temperature (Removed)   Catheter Indications Need for fluid volume management of the critically ill patient in a critical care setting 03/15/24 0400   Site Assessment No urethral drainage 03/15/24 0400   Urine Color Yellow 03/15/24 0400   Urine Appearance Clear 03/15/24 0400   Collection Container Standard 03/15/24 0400   Securement Method Securing device (Describe) 03/15/24 0400   Catheter Care  Soap and water 03/15/24 0000   Catheter Best Practices  Drainage tube clipped to bed;Catheter secured to thigh;Tamper seal intact;Bag below bladder;Bag not on floor;Lack of dependent loop in tubing;Drainage bag less than half full 03/15/24 0400   Status Draining;Patent 03/15/24 0400   Output (mL) 50 mL 03/15/24 0654        [REMOVED] Urinary Catheter 03/15/24 Jolly-Temperature (Removed)   $ Urethral catheter insertion $ Not inserted for procedure 03/15/24 0800   Catheter Indications Need for fluid volume management of the critically ill patient in a critical care setting 03/15/24 0800   Site Assessment No urethral drainage 03/15/24 0800   Urine Color Yellow 03/15/24 0800   Urine Appearance Clear 03/15/24 0800   Collection Container Standard 03/15/24 0800   Securement Method Securing device (Describe) 03/15/24 0800   Catheter Care  Soap and water 03/15/24 0800   Catheter Best Practices  Drainage tube clipped to bed;Catheter secured to thigh;Tamper seal intact;Bag below bladder;Bag not on floor;Lack of dependent loop in tubing;Drainage bag less than half full 03/15/24 0800   Status Draining 03/15/24 0800   Output (mL) 10 mL 03/15/24 1300       [REMOVED] External Urinary Catheter (Removed)   Site Assessment Clean,dry & intact 03/18/24 0800   Placement Replaced 03/15/24 2000   Securement Method Tape 03/18/24 0800   Catheter Care Catheter/Wick replaced;Suction Canister/Tubing changed 03/15/24 2000   Perineal Care Yes 03/18/24 0800   Suction 40 mmgHg continuous 03/18/24 0800   Urine Color Yellow 03/18/24 0800   Urine Appearance Cloudy 03/15/24 2100   Output (mL) 200 mL 03/18/24 0800       [REMOVED] External Urinary Catheter (Removed)       Findings: asp 28 Tunneled dialysis catheter      Electronically signed by LISS Mckenzie MD on 3/25/2024 at 11:16 AM

## 2024-03-25 NOTE — PROGRESS NOTES
OT SESSION ATTEMPT     Date:3/25/2024  Patient Name: Andre Brown  MRN: [id number]  : 1950  Room: [address] [place] [address]-A     Occupational therapy orders received/chart review completed and OT session attempted this date:   Attempt x2. Pt off the floor for HD catheter placement and then for HD.       Will reattempt OT at a later time/date.    Linwood Marion OTR/L [id number]

## 2024-03-25 NOTE — PROGRESS NOTES
1010- Patient returned from procedure. Dressing checked, clean, dry, and intact. Patient stable. No s/s of complications noted or reported. Vitals will be checked q 15min, see flow sheets.  Report called to floor and pt placed in transport.

## 2024-03-25 NOTE — PROGRESS NOTES
Hospitalist Progress Note      SYNOPSIS: Patient admitted on 3/21/2024 for Hypoglycemia  74 y.o. year old male  who  has a past medical history of A-fib (HCC), Cancer (HCC), CKD (chronic kidney disease), Diabetes mellitus (HCC), ED (erectile dysfunction), Hyperlipidemia, Hypertension, and Prostate cancer (HCC).  Patient presented to ED on 3/21/2024 for hypoglycemia.  Of note, patient was admitted and recently discharged AMA from hospital on 3/19/2024.  Patient was admitted for shortness of breath was intubated and extubated on 3/15/2024 completed treatment with Zosyn for aspiration pneumonia. Patient allegedly left AMA because it was his birthday and he wanted to go home to eat chicken.  Patient reportedly ate lots of chicken and other sorts of food and thought it wise to then take extra insulin, this strategy proved to be less than effective because patient subsequently ended up hypoglycemic.  Also of note, patient was being set up for tunneled dialysis catheter for dialysis as he has end-stage renal disease in need of hemodialysis.  That was not able to be completed prior to him leaving [facility].  Current hospital course significant for creatinine 7.5, blood glucose level less than 40,-IMPROVED  with D10 drip;   Nephrology evaluated the patient and is planning for HD after TDC placement on Monday by IR; currently on bumex 2 mg po bid and metoprolol    underwent TDC placement    SUBJECTIVE:  Stable overnight. No other overnight issues reported.   Patient seen and examined at bedside today and denies any other complaint  Records reviewed.         Temp (24hrs), Av.6 °F (36.4 °C), Min:97 °F (36.1 °C), Max:98.3 °F (36.8 °C)    DIET: Diet NPO  CODE: Full Code    Intake/Output Summary (Last 24 hours) at 3/25/2024 1055  Last data filed at 3/25/2024 0603  Gross per 24 hour   Intake 440 ml   Output 1700 ml   Net -1260 ml       Review of Systems  All bolded are positive; please see HPI  General:  Fever, chills,  Oral BID     PRN Meds: sodium chloride flush, sodium chloride, ondansetron **OR** ondansetron, polyethylene glycol, acetaminophen **OR** acetaminophen, glucagon (rDNA), dextrose, aluminum & magnesium hydroxide-simethicone, glucose, dextrose bolus **OR** dextrose bolus, dextrose    Labs:     Recent Labs     03/23/24  0636 03/24/24  1310 03/25/24  0421   WBC 8.2 6.9 8.9   HGB 8.4* 8.5* 8.0*   HCT 25.2* 26.3* 24.2*    296 275       Recent Labs     03/23/24  0636 03/24/24  1310 03/25/24  0421    139 134   K 3.8 4.0 4.0   CL 99 101 98   CO2 19* 21* 21*   BUN 45* 48* 48*   CREATININE 6.7* 7.0* 6.6*   CALCIUM 8.4* 8.5* 8.3*       Recent Labs     03/23/24  0636   PROT 6.4   ALKPHOS 94   ALT 16   AST 22   BILITOT 0.3       Recent Labs     03/22/24  1659 03/24/24  1546 03/25/24  0421   INR 1.4 1.2 1.1       No results for input(s): \"CKTOTAL\", \"TROPONINI\" in the last 72 hours.    Chronic labs:    Lab Results   Component Value Date    CHOL 160 12/23/2020    TRIG 103 06/23/2021    HDL 14 12/23/2020    LDLCALC - (AA) 12/23/2020    TSH 1.52 03/12/2024    INR 1.1 03/25/2024    LABA1C 6.8 (H) 03/22/2024       Radiology: REVIEWED DAILY    +++++++++++++++++++++++++++++++++++++++++++++++++  Maycol Khan MD   Hospitalist  [facility], [place]  +++++++++++++++++++++++++++++++++++++++++++++++++  NOTE: This report was transcribed using voice recognition software. Every effort was made to ensure accuracy; however, inadvertent computerized transcription errors may be present.

## 2024-03-25 NOTE — PLAN OF CARE
Problem: Safety - Adult  Goal: Free from fall injury  3/25/2024 0418 by Veena Tavera RN  Outcome: Progressing     Problem: Chronic Conditions and Co-morbidities  Goal: Patient's chronic conditions and co-morbidity symptoms are monitored and maintained or improved  3/25/2024 0418 by Veena Tavera, RN  Outcome: Progressing     Problem: Discharge Planning  Goal: Discharge to home or other facility with appropriate resources  3/25/2024 0418 by Veena Tavera RN  Outcome: Progressing     Problem: Risk for Elopement  Goal: Patient will not exit the unit/facility without proper excort  3/25/2024 0418 by Veena Tavera RN  Outcome: Progressing  Flowsheets (Taken 3/24/2024 1943)  Nursing Interventions for Elopement Risk: Make sure patient has all necessary personal care items

## 2024-03-25 NOTE — PROGRESS NOTES
Department of Internal Medicine  Nephrology Attending Progress Note    Events reviewed.      SUBJECTIVE:  We are following Mr. Andre Brown for BLAIR on CKD.  Reports no complaints.      PHYSICAL EXAM:      Vitals:    VITALS:  /72   Pulse 61   Temp 97 °F (36.1 °C) (Temporal)   Resp 16   Ht 1.88 m (6' 2\")   Wt 86.7 kg (191 lb 2.2 oz)   SpO2 100%   BMI 24.54 kg/m²   24HR INTAKE/OUTPUT:    Intake/Output Summary (Last 24 hours) at 3/25/2024 1256  Last data filed at 3/25/2024 1246  Gross per 24 hour   Intake 440 ml   Output 1500 ml   Net -1060 ml       Access:   Constitutional: Patient is lethargic today.  HEENT: Pupils are equal reactive, mucous membranes are moist  Respiratory: Decreased breath sounds at the bases  Cardiovascular/Edema: Heart sounds irregularly irregular  Gastrointestinal: Abdomen soft  Neurologic: Nonfocal  Skin: No lesions  Other: Trace edema    Scheduled Meds:   insulin lispro  8 Units SubCUTAneous TID AC    heparin (porcine)  5,000 Units SubCUTAneous Q8H    amLODIPine  10 mg Oral Daily    vitamin C  250 mg Oral Daily    aspirin  81 mg Oral Daily    atorvastatin  10 mg Oral Nightly    hydrALAZINE  25 mg Oral BID    insulin glargine  22 Units SubCUTAneous QAM    metoprolol tartrate  37.5 mg Oral BID    sodium chloride flush  5-40 mL IntraVENous 2 times per day    melatonin  3 mg Oral Nightly    lipase-protease-amylas  15,000 Units Oral TID WC    And    lipase-protease-amylase  20,000 Units Oral TID WC    epoetin joann-epbx  3,000 Units SubCUTAneous Once per day on Mon Wed Fri    bumetanide  2 mg Oral BID    insulin lispro  0-16 Units SubCUTAneous TID WC    insulin lispro  0-4 Units SubCUTAneous Nightly    sodium bicarbonate  1,300 mg Oral BID     Continuous Infusions:   sodium chloride      dextrose      dextrose         PRN Meds:.sodium chloride flush, sodium chloride, ondansetron **OR** ondansetron, polyethylene glycol, acetaminophen **OR** acetaminophen, glucagon (rDNA), dextrose,  glucose level on admission was 574 mg/dL, beta hydroxybutyrate 0.49, proBNP level 24,362.  His initial chest x-ray showed no acute abnormalities.  In the ER patient was given at least 1 L of NS and he was admitted to MICU.  Patient was started on HD and was supposed to have a tunneled dialysis catheter placed but left AMA. Patient was readmitted on 3/21/24 for hypoglycemia and was started on D10. ED lab work revealed creatinine 7.5 mg/dL, reason for this consultation.      IMPRESSION/RECOMMENDATIONS:      BLAIR on CKD, hemodynamically mediated 2/2 decompensated heart failure versus progression of CKD. Tunneled dialysis catheter placement 3/25/24. HD today and tomorrow    CKD stage IIIb, with large proteinuria, 2/2 diabetic nephropathy, baseline creatinine 3.1-3.2 mg/dL    HTN, on metoprolol  HFpEF 55-60%, obtain pro-BNP  Normocytic anemia, hemoglobin 7.7, to start epo  ------------------------------------------------------------  Type II DM, hypoglycemic, on D10  PAF, on metoprolol  Hyperlipidemia, atorvastatin     Plan:    HD today after tunneled dialysis catheter placement  Continue sodium bicarbonate 1300 mg p.o. twice daily  Continue EPO 3,000 units three times weekly  Continue Bumex 2 mg p.o. twice daily  Continue metoprolol tartrate 37.5 mg twice daily  Continue to monitor renal function    Electronically signed by GURPREET BRAY NP on 3/25/2024 at 12:56 PM

## 2024-03-25 NOTE — CONSULTS
ENDOCRINOLOGY INITIAL CONSULTATION NOTE      Date of admission: 3/21/2024  Date of service: 3/25/2024  Admitting physician: Chasity De Guzman DO   Primary Care Physician: Cruz Velasco DO  Consultant physician: Aubrey Quintero MD     Reason for the consultation:  Uncontrolled DM    History of Present Illness:  The history was provided from the medical records.  The patient is currently intubated sedated  Days HELADIO Brown is a 74 y.o. old male with PMH of A-fib, CKD, type 2 diabetes, hyperlipidemia, hypertension and other listed below admitted to [facility] on 3/21/2024 because of shortness of breath and respiratory failure and found to be in DKA, endocrine service was consulted for diabetes management.  The patient is currently intubated sedated in the medical ICU.\  Patient was found to have a glucose level of less than 40 on admission    Prior to admission  Prior to the admission the patient was on Lantus 18 units daily, Humalog 5 units 3 times daily with meals.  Patient reports highly fluctuating glucose level.  He reports frequent hypoglycemic prior to the admissions.  Patient admits to poor compliance with following diabetic diet  Lab Results   Component Value Date/Time    LABA1C 6.8 03/22/2024 03:00 PM       Inpatient diet:   Carb Restricted diet     Point of care glucose monitoring   (Independently reviewed)   Recent Labs     03/24/24  2116 03/24/24  2204 03/24/24  2334 03/25/24  0252 03/25/24  0511 03/25/24  0904 03/25/24  1222 03/25/24  1742   POCGLU 70* 86 142* 193* 184* 165* 202* 174*       Past medical history:   Past Medical History:   Diagnosis Date    A-fib (HCC)     on eliquis    Cancer (HCC)     CKD (chronic kidney disease)     stage 3a    Diabetes mellitus (HCC)     ED (erectile dysfunction)     Hyperlipidemia     Hypertension     Prostate cancer (HCC)        Past surgical history:  Past Surgical History:   Procedure Laterality Date    COLON SURGERY      COLONOSCOPY N/A 10/18/2021    COLONOSCOPY WITH  lower lung consistent with suggestive of   subsegmental atelectasis without focal consolidation.             Medical Records/Labs/Images review:   I personally reviewed and summarized previous records   All labs and imaging were reviewed independently     ASSESSMENT & PLAN   Days HELADIO Brown, a 74 y.o.-old male seen today for inpatient diabetes management    Diabetes Mellitus type 2 admitted with hypoglycemia  Patient's diabetes is uncontrolled and is very brittle  We recommend the following DM regimen  Lantus 7 units daily  Humalog 3 units 3 times daily with meals  Low-dose sliding scale ACHS  Continue glucose check with meals and at bedtime  Will titrate insulin dose based on the blood glucose trend & insulin requirement  We will try to provide the patient with continuous glucose monitor CGM before discharge    Hypoglycemia  With advanced CKD and pancreatic insufficiency the patient at high risk of hypoglycemia  Adjusted insulin regimen as per above  Will closely monitor glucose level    Interdisciplinary plan for communication with healthcare providers:   Consult recommendations were discussed with the Primary Service/Nursing staff      Thank you for allowing us to participate in the care of this patient. Please do not hesitate to contact us with any additional questions.     Aubrey Quintero MD  Endocrinologist, [facility]   [address] [place] [address]   Phone: [phone]  Fax: [phone]  --------------------------------------------  An electronic signature was used to authenticate this note. Aubrey Quintero MD on 3/25/2024 at 6:16 PM

## 2024-03-25 NOTE — PRE SEDATION
Sedation Pre-Procedure Note    Patient Name: Andre Brown   YOB: 1950  Room/Bed: 8403/8403-A  Medical Record Number: [id number]  Date: 3/25/2024   Time: 11:15 AM       Indication:  end stage renal diseease    Consent: I have discussed with the patient and/or the patient representative the indication, alternatives, and the possible risks and/or complications of the planned procedure and the anesthesia methods. The patient and/or patient representative appear to understand and agree to proceed.    Vital Signs:   Vitals:    03/25/24 1030   BP: 119/72   Pulse: 61   Resp: 16   Temp:    SpO2: 100%       Past Medical History:   has a past medical history of A-fib (HCC), Cancer (HCC), CKD (chronic kidney disease), Diabetes mellitus (HCC), ED (erectile dysfunction), Hyperlipidemia, Hypertension, and Prostate cancer (HCC).    Past Surgical History:   has a past surgical history that includes Colon surgery; Dilatation, esophagus; Leg Surgery (Left, 6 years ago); hc dialysis catheter (N/A, 1/12/2021); Pancreas Biopsy (N/A, 6/14/2021); Upper gastrointestinal endoscopy (6/14/2021); Pancreatectomy (N/A, 6/23/2021); other surgical history; Upper gastrointestinal endoscopy (N/A, 10/18/2021); Colonoscopy (N/A, 10/18/2021); and Upper gastrointestinal endoscopy (N/A, 4/6/2022).    Medications:   Scheduled Meds:    insulin lispro  8 Units SubCUTAneous TID AC    heparin (porcine)  5,000 Units SubCUTAneous Q8H    amLODIPine  10 mg Oral Daily    vitamin C  250 mg Oral Daily    aspirin  81 mg Oral Daily    atorvastatin  10 mg Oral Nightly    hydrALAZINE  25 mg Oral BID    insulin glargine  22 Units SubCUTAneous QAM    metoprolol tartrate  37.5 mg Oral BID    sodium chloride flush  5-40 mL IntraVENous 2 times per day    melatonin  3 mg Oral Nightly    lipase-protease-amylas  15,000 Units Oral TID WC    And    lipase-protease-amylase  20,000 Units Oral TID WC    epoetin joann-epbx  3,000 Units SubCUTAneous Once per day on Mon  MG TABS tablet Take 1 tablet by mouth 2 times daily    ProviderMichelet MD   Ascorbic Acid (VITAMIN C) 250 MG tablet Take 1 tablet by mouth daily    Provider, MD Michelet     Coumadin Use Last 7 Days:  no  Antiplatelet drug therapy use last 7 days: no  Other anticoagulant use last 7 days: no  Additional Medication Information:  N/A      Pre-Sedation Documentation and Exam:   I have reviewed the patient's history and review of systems.    Mallampati Airway Assessment:  Mallampati Class II - (soft palate, fauces & uvula are visible)    Prior History of Anesthesia Complications:   none    ASA Classification:  Class 3 - A patient with severe systemic disease that limits activity but is not incapacitating    Sedation/ Anesthesia Plan:   intravenous sedation    Medications Planned:   fentanyl intravenously    Patient is an appropriate candidate for plan of sedation: yes    Electronically signed by LISS Mckenzie MD on 3/25/2024 at 11:15 AM

## 2024-03-25 NOTE — PROGRESS NOTES
Spoke with RN regarding patient's ordered HD cath placement. Please keep patient NPO, and hold all thinners for procedure. Patient is able to sign consent.

## 2024-03-25 NOTE — CARE COORDINATION
Patient had his Tunneled dialysis catheter placement today for End stage renal disease dialysis dependent. Per internal med note today, Pending endocrinology consult for DM; uncontrolled blood sugar. Await input & plan. Patient requires continued admission due to will need dialysis set up, nephrology clearance before discharge. I attempted to meet with patient in room to explain role and discuss transition of care but he was out of the room at dialysis. Per previous chart review, A referral was made to the [facility] in [place] per the previous . Call to Audrey [phone] at [facility]. Patient is agreeable to home health...call placed to [facility] who has accepted and following. Daughter is from [place] and will be coming into town once he is discharge. Per PT note from Friday. Pt lives alone in a 2 story home with 5 stairs to enter and 2 rail.  Bed is on 1 floor and bath is on 1 floor. Pt has bedroom and bathroom on 2nd floor as well.  Pt ambulated with QC for short time PTA. Equipment Owned: [employer] PT [facility] 16/24 and he ambulated 100 feet with no AD Monique. Will re attempt to see patient once he returns from dialysis. [facility] petra Xavier faxed to .   Araseli Merlos RN   [phone]        Case Management Assessment  Initial Evaluation    Date/Time of Evaluation: 3/25/2024 1:02 PM  Assessment Completed by: Araseli Merlos RN    If patient is discharged prior to next notation, then this note serves as note for discharge by case management.    Patient Name: Andre Brown                   YOB: 1950  Diagnosis: Hypoglycemia [E16.2]                   Date / Time: 3/21/2024  8:19 PM    Patient Admission Status: Inpatient   Readmission Risk (Low < 19, Mod (19-27), High > 27): Readmission Risk Score: 25.1    Current PCP: Cruz Velasco DO  PCP verified by CM? Yes (Cruz Velasco DO)    Chart Reviewed: Yes      History Provided by: Medical  [phone] - F [phone]  [address] [facility] [address]  Phone: [phone] Fax: [phone]      Notes:    Factors facilitating achievement of predicted outcomes: Family support    Barriers to discharge: Impulsivity and Limited safety awareness    Additional Case Management Notes:     The Plan for Transition of Care is related to the following treatment goals of Hypoglycemia [E16.2]    IF APPLICABLE: The Patient and/or patient representative Days and his family were provided with a choice of provider and agrees with the discharge plan. Freedom of choice list with basic dialogue that supports the patient's individualized plan of care/goals and shares the quality data associated with the providers was provided to:     Patient Representative Name:       The Patient and/or Patient Representative Agree with the Discharge Plan     Araseli Merlos RN  Case Management Department  Ph: [phone]  Fax: [phone]  '

## 2024-03-26 LAB
ANION GAP SERPL CALCULATED.3IONS-SCNC: 22 MMOL/L (ref 7–16)
BASOPHILS # BLD: 0.05 K/UL (ref 0–0.2)
BASOPHILS NFR BLD: 1 % (ref 0–2)
BNP SERPL-MCNC: ABNORMAL PG/ML (ref 0–450)
BUN SERPL-MCNC: 35 MG/DL (ref 6–23)
CALCIUM SERPL-MCNC: 9 MG/DL (ref 8.6–10.2)
CHLORIDE SERPL-SCNC: 101 MMOL/L (ref 98–107)
CO2 SERPL-SCNC: 18 MMOL/L (ref 22–29)
CREAT SERPL-MCNC: 5.1 MG/DL (ref 0.7–1.2)
EOSINOPHIL # BLD: 0.04 K/UL (ref 0.05–0.5)
EOSINOPHILS RELATIVE PERCENT: 1 % (ref 0–6)
ERYTHROCYTE [DISTWIDTH] IN BLOOD BY AUTOMATED COUNT: 15.9 % (ref 11.5–15)
GFR SERPL CREATININE-BSD FRML MDRD: 11 ML/MIN/1.73M2
GLUCOSE BLD-MCNC: 104 MG/DL (ref 74–99)
GLUCOSE BLD-MCNC: 224 MG/DL (ref 74–99)
GLUCOSE BLD-MCNC: 242 MG/DL (ref 74–99)
GLUCOSE BLD-MCNC: 256 MG/DL (ref 74–99)
GLUCOSE BLD-MCNC: 298 MG/DL (ref 74–99)
GLUCOSE BLD-MCNC: 302 MG/DL (ref 74–99)
GLUCOSE BLD-MCNC: 56 MG/DL (ref 74–99)
GLUCOSE BLD-MCNC: 59 MG/DL (ref 74–99)
GLUCOSE BLD-MCNC: 61 MG/DL (ref 74–99)
GLUCOSE BLD-MCNC: 92 MG/DL (ref 74–99)
GLUCOSE SERPL-MCNC: 271 MG/DL (ref 74–99)
HCT VFR BLD AUTO: 25 % (ref 37–54)
HGB BLD-MCNC: 8.2 G/DL (ref 12.5–16.5)
IMM GRANULOCYTES # BLD AUTO: <0.03 K/UL (ref 0–0.58)
IMM GRANULOCYTES NFR BLD: 0 % (ref 0–5)
LYMPHOCYTES NFR BLD: 1.5 K/UL (ref 1.5–4)
LYMPHOCYTES RELATIVE PERCENT: 21 % (ref 20–42)
MCH RBC QN AUTO: 27.6 PG (ref 26–35)
MCHC RBC AUTO-ENTMCNC: 32.8 G/DL (ref 32–34.5)
MCV RBC AUTO: 84.2 FL (ref 80–99.9)
MONOCYTES NFR BLD: 0.7 K/UL (ref 0.1–0.95)
MONOCYTES NFR BLD: 10 % (ref 2–12)
NEUTROPHILS NFR BLD: 67 % (ref 43–80)
NEUTS SEG NFR BLD: 4.7 K/UL (ref 1.8–7.3)
PLATELET # BLD AUTO: 297 K/UL (ref 130–450)
PMV BLD AUTO: 10.8 FL (ref 7–12)
POTASSIUM SERPL-SCNC: 4.2 MMOL/L (ref 3.5–5)
RBC # BLD AUTO: 2.97 M/UL (ref 3.8–5.8)
SODIUM SERPL-SCNC: 141 MMOL/L (ref 132–146)
WBC OTHER # BLD: 7 K/UL (ref 4.5–11.5)

## 2024-03-26 PROCEDURE — 6360000002 HC RX W HCPCS: Performed by: STUDENT IN AN ORGANIZED HEALTH CARE EDUCATION/TRAINING PROGRAM

## 2024-03-26 PROCEDURE — 1200000000 HC SEMI PRIVATE

## 2024-03-26 PROCEDURE — 85025 COMPLETE CBC W/AUTO DIFF WBC: CPT

## 2024-03-26 PROCEDURE — 6370000000 HC RX 637 (ALT 250 FOR IP): Performed by: INTERNAL MEDICINE

## 2024-03-26 PROCEDURE — 6370000000 HC RX 637 (ALT 250 FOR IP)

## 2024-03-26 PROCEDURE — 80048 BASIC METABOLIC PNL TOTAL CA: CPT

## 2024-03-26 PROCEDURE — 82962 GLUCOSE BLOOD TEST: CPT

## 2024-03-26 PROCEDURE — 90935 HEMODIALYSIS ONE EVALUATION: CPT

## 2024-03-26 PROCEDURE — 2580000003 HC RX 258: Performed by: STUDENT IN AN ORGANIZED HEALTH CARE EDUCATION/TRAINING PROGRAM

## 2024-03-26 PROCEDURE — 2580000003 HC RX 258

## 2024-03-26 PROCEDURE — 99232 SBSQ HOSP IP/OBS MODERATE 35: CPT | Performed by: STUDENT IN AN ORGANIZED HEALTH CARE EDUCATION/TRAINING PROGRAM

## 2024-03-26 PROCEDURE — 83880 ASSAY OF NATRIURETIC PEPTIDE: CPT

## 2024-03-26 PROCEDURE — 36415 COLL VENOUS BLD VENIPUNCTURE: CPT

## 2024-03-26 PROCEDURE — 99232 SBSQ HOSP IP/OBS MODERATE 35: CPT | Performed by: INTERNAL MEDICINE

## 2024-03-26 RX ADMIN — BUMETANIDE 2 MG: 1 TABLET ORAL at 08:33

## 2024-03-26 RX ADMIN — PANCRELIPASE LIPASE, PANCRELIPASE PROTEASE, PANCRELIPASE AMYLASE 20000 UNITS: 20000; 63000; 84000 CAPSULE, DELAYED RELEASE ORAL at 13:07

## 2024-03-26 RX ADMIN — INSULIN LISPRO 3 UNITS: 100 INJECTION, SOLUTION INTRAVENOUS; SUBCUTANEOUS at 08:36

## 2024-03-26 RX ADMIN — HYDRALAZINE HYDROCHLORIDE 25 MG: 25 TABLET, FILM COATED ORAL at 21:31

## 2024-03-26 RX ADMIN — HEPARIN SODIUM 5000 UNITS: 10000 INJECTION INTRAVENOUS; SUBCUTANEOUS at 21:32

## 2024-03-26 RX ADMIN — INSULIN LISPRO 3 UNITS: 100 INJECTION, SOLUTION INTRAVENOUS; SUBCUTANEOUS at 13:05

## 2024-03-26 RX ADMIN — Medication 250 MG: at 08:34

## 2024-03-26 RX ADMIN — HEPARIN SODIUM 5000 UNITS: 10000 INJECTION INTRAVENOUS; SUBCUTANEOUS at 13:06

## 2024-03-26 RX ADMIN — PANCRELIPASE LIPASE, PANCRELIPASE PROTEASE, PANCRELIPASE AMYLASE 15000 UNITS: 15000; 47000; 63000 CAPSULE, DELAYED RELEASE ORAL at 17:00

## 2024-03-26 RX ADMIN — PANCRELIPASE LIPASE, PANCRELIPASE PROTEASE, PANCRELIPASE AMYLASE 15000 UNITS: 15000; 47000; 63000 CAPSULE, DELAYED RELEASE ORAL at 13:07

## 2024-03-26 RX ADMIN — DEXTROSE MONOHYDRATE 125 ML: 100 INJECTION, SOLUTION INTRAVENOUS at 20:02

## 2024-03-26 RX ADMIN — HYDRALAZINE HYDROCHLORIDE 25 MG: 25 TABLET, FILM COATED ORAL at 08:35

## 2024-03-26 RX ADMIN — AMLODIPINE BESYLATE 10 MG: 10 TABLET ORAL at 08:35

## 2024-03-26 RX ADMIN — ASPIRIN 81 MG: 81 TABLET, COATED ORAL at 08:34

## 2024-03-26 RX ADMIN — INSULIN LISPRO 3 UNITS: 100 INJECTION, SOLUTION INTRAVENOUS; SUBCUTANEOUS at 17:16

## 2024-03-26 RX ADMIN — SODIUM CHLORIDE, PRESERVATIVE FREE 10 ML: 5 INJECTION INTRAVENOUS at 08:36

## 2024-03-26 RX ADMIN — HEPARIN SODIUM 5000 UNITS: 10000 INJECTION INTRAVENOUS; SUBCUTANEOUS at 04:44

## 2024-03-26 RX ADMIN — SODIUM BICARBONATE 1300 MG: 650 TABLET ORAL at 21:31

## 2024-03-26 RX ADMIN — METOPROLOL TARTRATE 37.5 MG: 25 TABLET, FILM COATED ORAL at 21:31

## 2024-03-26 RX ADMIN — INSULIN LISPRO 3 UNITS: 100 INJECTION, SOLUTION INTRAVENOUS; SUBCUTANEOUS at 13:04

## 2024-03-26 RX ADMIN — BUMETANIDE 2 MG: 1 TABLET ORAL at 21:31

## 2024-03-26 RX ADMIN — INSULIN LISPRO 2 UNITS: 100 INJECTION, SOLUTION INTRAVENOUS; SUBCUTANEOUS at 08:33

## 2024-03-26 RX ADMIN — ACETAMINOPHEN 650 MG: 325 TABLET ORAL at 21:31

## 2024-03-26 RX ADMIN — METOPROLOL TARTRATE 37.5 MG: 25 TABLET, FILM COATED ORAL at 08:35

## 2024-03-26 RX ADMIN — PANCRELIPASE LIPASE, PANCRELIPASE PROTEASE, PANCRELIPASE AMYLASE 20000 UNITS: 20000; 63000; 84000 CAPSULE, DELAYED RELEASE ORAL at 08:34

## 2024-03-26 RX ADMIN — ATORVASTATIN CALCIUM 10 MG: 10 TABLET, FILM COATED ORAL at 21:31

## 2024-03-26 RX ADMIN — MELATONIN 3 MG ORAL TABLET 3 MG: 3 TABLET ORAL at 21:31

## 2024-03-26 RX ADMIN — SODIUM BICARBONATE 1300 MG: 650 TABLET ORAL at 08:33

## 2024-03-26 RX ADMIN — PANCRELIPASE LIPASE, PANCRELIPASE PROTEASE, PANCRELIPASE AMYLASE 15000 UNITS: 15000; 47000; 63000 CAPSULE, DELAYED RELEASE ORAL at 08:35

## 2024-03-26 RX ADMIN — PANCRELIPASE LIPASE, PANCRELIPASE PROTEASE, PANCRELIPASE AMYLASE 20000 UNITS: 20000; 63000; 84000 CAPSULE, DELAYED RELEASE ORAL at 17:00

## 2024-03-26 RX ADMIN — INSULIN GLARGINE 7 UNITS: 100 INJECTION, SOLUTION SUBCUTANEOUS at 08:34

## 2024-03-26 RX ADMIN — SODIUM CHLORIDE, PRESERVATIVE FREE 10 ML: 5 INJECTION INTRAVENOUS at 21:32

## 2024-03-26 ASSESSMENT — PAIN SCALES - GENERAL: PAINLEVEL_OUTOF10: 2

## 2024-03-26 NOTE — CARE COORDINATION
I attempted again to meet with patient in room to discuss transition of care but he was out of room at dialysis. I spoke with patient's sister Perla who lives in [place] to discuss transition of care. She said the plan is for her father to return home and she would like home health care. She does not have a preference for an agency. She would also like to see if patient could go to dialysis closer to his home in [place] as he will be driving himself to and from dialysis and he may be tired and weak post dialysis. Message sent to Dr. Sharma to see if patient could go HD facility closer to [place]. Dr. Sharma said [facility] would be fine. Referral made to Marisol at [facility]. [facility] [place] [facility] faxed. Await acceptance, approval and chair time. I also made a referral to Gin at [facility]. Home health care orders are in. Perla also requested a referral to [facility] for Services at home such as meals on wheels etc. Will dede in the referral tomorrow due to time of day. I will also meet with patient when he returns from dialysis to make sure he is in agreement with all of the above discharge plan.   Araseli Merlos RN   [phone]

## 2024-03-26 NOTE — PROGRESS NOTES
Department of Internal Medicine  Nephrology Attending Progress Note    Events reviewed.      SUBJECTIVE:  We are following Mr. Andre Brown for BLAIR on CKD.  Reports no complaints.      PHYSICAL EXAM:      Vitals:    VITALS:  /87   Pulse 70   Temp 98.6 °F (37 °C) (Temporal)   Resp 16   Ht 1.88 m (6' 2\")   Wt 85.3 kg (188 lb 0.8 oz)   SpO2 98%   BMI 24.14 kg/m²   24HR INTAKE/OUTPUT:    Intake/Output Summary (Last 24 hours) at 3/26/2024 0923  Last data filed at 3/25/2024 1403  Gross per 24 hour   Intake 420 ml   Output 1600 ml   Net -1180 ml       Access:   Constitutional: Patient is lethargic today.  HEENT: Pupils are equal reactive, mucous membranes are moist  Respiratory: Decreased breath sounds at the bases  Cardiovascular/Edema: Heart sounds irregularly irregular  Gastrointestinal: Abdomen soft  Neurologic: Nonfocal  Skin: No lesions  Other: Trace edema    Scheduled Meds:   insulin lispro  0-6 Units SubCUTAneous TID WC    insulin lispro  0-4 Units SubCUTAneous Nightly    insulin lispro  3 Units SubCUTAneous TID AC    insulin glargine  7 Units SubCUTAneous QAM    heparin (porcine)  5,000 Units SubCUTAneous Q8H    amLODIPine  10 mg Oral Daily    vitamin C  250 mg Oral Daily    aspirin  81 mg Oral Daily    atorvastatin  10 mg Oral Nightly    hydrALAZINE  25 mg Oral BID    metoprolol tartrate  37.5 mg Oral BID    sodium chloride flush  5-40 mL IntraVENous 2 times per day    melatonin  3 mg Oral Nightly    lipase-protease-amylas  15,000 Units Oral TID WC    And    lipase-protease-amylase  20,000 Units Oral TID WC    epoetin joann-epbx  3,000 Units SubCUTAneous Once per day on Mon Wed Fri    bumetanide  2 mg Oral BID    sodium bicarbonate  1,300 mg Oral BID     Continuous Infusions:   sodium chloride      dextrose      dextrose         PRN Meds:.sodium chloride flush, sodium chloride, ondansetron **OR** ondansetron, polyethylene glycol, acetaminophen **OR** acetaminophen, glucagon (rDNA), dextrose, aluminum  & magnesium hydroxide-simethicone, glucose, dextrose bolus **OR** dextrose bolus, dextrose      DATA:    CBC:   Lab Results   Component Value Date/Time    WBC 8.9 03/25/2024 04:21 AM    RBC 2.93 03/25/2024 04:21 AM    HGB 8.0 03/25/2024 04:21 AM    HCT 24.2 03/25/2024 04:21 AM    MCV 82.6 03/25/2024 04:21 AM    MCH 27.3 03/25/2024 04:21 AM    MCHC 33.1 03/25/2024 04:21 AM    RDW 15.5 03/25/2024 04:21 AM     03/25/2024 04:21 AM    MPV 10.5 03/25/2024 04:21 AM     CMP:    Lab Results   Component Value Date/Time     03/25/2024 04:21 AM    K 4.0 03/25/2024 04:21 AM    K 4.5 01/27/2022 04:37 PM    CL 98 03/25/2024 04:21 AM    CO2 21 03/25/2024 04:21 AM    BUN 48 03/25/2024 04:21 AM    CREATININE 6.6 03/25/2024 04:21 AM    GFRAA 28 09/29/2022 11:05 AM    LABGLOM 8 03/25/2024 04:21 AM    GLUCOSE 175 03/25/2024 04:21 AM    PROT 6.4 03/23/2024 06:36 AM    LABALBU 3.5 03/23/2024 06:36 AM    CALCIUM 8.3 03/25/2024 04:21 AM    BILITOT 0.3 03/23/2024 06:36 AM    ALKPHOS 94 03/23/2024 06:36 AM    AST 22 03/23/2024 06:36 AM    ALT 16 03/23/2024 06:36 AM     Magnesium:    Lab Results   Component Value Date/Time    MG 2.0 03/22/2024 03:00 PM     Phosphorus:    Lab Results   Component Value Date/Time    PHOS 4.6 03/18/2024 03:46 AM     Radiology Review:      XR CHEST PORTABLE 3/14/24             BRIEF SUMMARY OF INITIAL CONSULT:    Briefly, Mr. Andre Brown is a 73 year old AA man with PMH of CKD stage IV, with proteinuria, baseline creatinine 3.1-3.2 mg/dL, 2/2 diabetic nephropathy, s/p ATN for which he required transient renal replacement therapy, HTN, type 2 DM, PAF on apixaban, hyperlipidemia, prostate cancer status post radiation therapy, who was admitted on 3/12/24 after presenting to the ER reporting shortness of breath.  After evaluation in the ER he was found to have a BUN of 80 mg/dL, creatinine of 5.6 mg/dL, potassium level 6.4 mEq/L, bicarbonate level 9 mEq/L, reasons for this consultation.  His glucose

## 2024-03-26 NOTE — FLOWSHEET NOTE
03/26/24 1613   Vital Signs   /71   Temp 97.6 °F (36.4 °C)   Pulse 76   Respirations 16   Weight - Scale 86 kg (189 lb 9.5 oz)   Weight Method Estimated   Percent Weight Change 0.82   Pain Assessment   Pain Assessment None - Denies Pain   Post-Hemodialysis Assessment   Post-Treatment Procedures Blood returned;Catheter capped, clamped and heparinized x 2 ports   Machine Disinfection Process Acid/Vinegar Clean;Heat Disinfect;Exterior Machine Disinfection   Rinseback Volume (ml) 300 ml   Blood Volume Processed (Liters) 47.6 L   Dialyzer Clearance Clear   Duration of Treatment (minutes) 150 minutes   Hemodialysis Intake (ml) 300 ml   Hemodialysis Output (ml) 1500 ml   NET Removed (ml) 1200   Tolerated Treatment Good   Patient Response to Treatment Tolerated tx well, 1200 net removed, catheter clamped and capped, stable at dc. drsg CDI, to be changed later in week   Bilateral Breath Sounds Clear   Edema None   Time Off 1608   Patient Disposition Return to room   Observations & Evaluations   Level of Consciousness 0   Oriented X 3   Respiratory Quality/Effort Unlabored   O2 Device None (Room air)   Skin Color Pink   Skin Condition/Temp Dry;Warm   Comments stable at dc

## 2024-03-26 NOTE — PROGRESS NOTES
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  continue rest of home medications as appropriate  Continue to follow labs replete as indicated         DVT Prophylaxis [] Lovenox, []  Heparin, [] SCDs, [] Ambulation   GI Prophylaxis [] PPI,  [] H2 Blocker,  [] Carafate,  [] Diet/Tube Feeds   Disposition Patient requires continued admission due to discharge tomorrow a.m. after dialysis   MDM [] Low, [] Moderate,[]  High  Patient's risk as above due to        Medications:  REVIEWED DAILY    Infusion Medications    sodium chloride      dextrose      dextrose       Scheduled Medications    insulin lispro  0-6 Units SubCUTAneous TID WC    insulin lispro  0-4 Units SubCUTAneous Nightly    insulin lispro  3 Units SubCUTAneous TID AC    insulin glargine  7 Units SubCUTAneous QAM    heparin (porcine)  5,000 Units SubCUTAneous Q8H    amLODIPine  10 mg Oral Daily    vitamin C  250 mg Oral Daily    aspirin  81 mg Oral Daily    atorvastatin  10 mg Oral Nightly    hydrALAZINE  25 mg Oral BID    metoprolol tartrate  37.5 mg Oral BID    sodium chloride flush  5-40 mL IntraVENous 2 times per day    melatonin  3 mg Oral Nightly    lipase-protease-amylas  15,000 Units Oral TID WC    And    lipase-protease-amylase  20,000 Units Oral TID WC    epoetin joann-epbx  3,000 Units SubCUTAneous Once per day on Mon Wed Fri     bumetanide  2 mg Oral BID    sodium bicarbonate  1,300 mg Oral BID     PRN Meds: sodium chloride flush, sodium chloride, ondansetron **OR** ondansetron, polyethylene glycol, acetaminophen **OR** acetaminophen, glucagon (rDNA), dextrose, aluminum & magnesium hydroxide-simethicone, glucose, dextrose bolus **OR** dextrose bolus, dextrose    Labs:     Recent Labs     03/24/24  1310 03/25/24  0421   WBC 6.9 8.9   HGB 8.5* 8.0*   HCT 26.3* 24.2*    275       Recent Labs     03/24/24  1310 03/25/24  0421    134   K 4.0 4.0    98   CO2 21* 21*   BUN 48* 48*   CREATININE 7.0* 6.6*   CALCIUM 8.5* 8.3*       No results for input(s): \"PROT\", \"ALB\", \"ALKPHOS\", \"ALT\", \"AST\", \"BILITOT\", \"AMYLASE\", \"LIPASE\" in the last 72 hours.    Recent Labs     03/24/24  1546 03/25/24  0421   INR 1.2 1.1       No results for input(s): \"CKTOTAL\", \"TROPONINI\" in the last 72 hours.    Chronic labs:    Lab Results   Component Value Date    CHOL 160 12/23/2020    TRIG 103 06/23/2021    HDL 14 12/23/2020    LDLCALC - (AA) 12/23/2020    TSH 1.52 03/12/2024    INR 1.1 03/25/2024    LABA1C 6.8 (H) 03/22/2024       Radiology: REVIEWED DAILY    +++++++++++++++++++++++++++++++++++++++++++++++++  Maycol Khan MD   Hospitalist  [facility] [place]  +++++++++++++++++++++++++++++++++++++++++++++++++  NOTE: This report was transcribed using voice recognition software. Every effort was made to ensure accuracy; however, inadvertent computerized transcription errors may be present.

## 2024-03-26 NOTE — ACP (ADVANCE CARE PLANNING)
Advance Care Planning   The patient has the following advanced directives on file:  Advance Directives       Power of  Living Will ACP-Advance Directive ACP-Power of     Not on File Filed on 09/30/13 Filed Not on File            The patient has appointed the following active healthcare agents:    Primary Decision Maker (Active): Amaya Bautista \"Perla\" - Child - [phone]    Secondary Decision Maker: jazmine brian - Child - [phone]    Supplemental (Other) Decision Maker: ELEAZAR WOLFE - Brother/Sister - [phone]    Supplemental (Other) Decision Maker: Coco Hilton - Brother/Sister - [phone]      Araseli Merlos RN  3/26/2024

## 2024-03-26 NOTE — PLAN OF CARE
Problem: Safety - Adult  Goal: Free from fall injury  3/26/2024 0450 by Veena Tavera RN  Outcome: Progressing     Problem: Chronic Conditions and Co-morbidities  Goal: Patient's chronic conditions and co-morbidity symptoms are monitored and maintained or improved  3/26/2024 0450 by Veena Tavera RN  Outcome: Progressing     Problem: Discharge Planning  Goal: Discharge to home or other facility with appropriate resources  3/26/2024 0450 by Veena Tavera RN  Outcome: Progressing     Problem: Risk for Elopement  Goal: Patient will not exit the unit/facility without proper excort  3/26/2024 0450 by Veena Tavera RN  Outcome: Progressing     Problem: Skin/Tissue Integrity  Goal: Absence of new skin breakdown  Description: 1.  Monitor for areas of redness and/or skin breakdown  2.  Assess vascular access sites hourly  3.  Every 4-6 hours minimum:  Change oxygen saturation probe site  4.  Every 4-6 hours:  If on nasal continuous positive airway pressure, respiratory therapy assess nares and determine need for appliance change or resting period.  3/26/2024 0450 by Veena Tavera, RN  Outcome: Progressing

## 2024-03-26 NOTE — PROGRESS NOTES
ENDOCRINOLOGY PROGRESS NOTE      Date of Service: 3/26/2024  Date of Admission: 3/21/2024  Admitting Physician: Chasity De Guzman DO   Primary Care Physician: Cruz Velasco DO  Consultant physician: Aubrey Quintero MD     Reason for the consultation:  Uncontrolled DM    History of Present Illness:  The history is provided by the patient. Accuracy of the patient data is excellent.      Subjective:  Complains of right \"shoulder\" pain after tunneled HD catheter was placed yesterday. Otherwise, denies complaints. Appetite is good.    Inpatient diet:   Carb Restricted diet     Point of care glucose monitoring (Independently reviewed)   Recent Labs     03/25/24  0511 03/25/24  0904 03/25/24  1222 03/25/24  1742 03/25/24  1925 03/26/24  0444 03/26/24  1227 03/26/24  1654   POCGLU 184* 165* 202* 174* 263* 224* 298* 104*       Scheduled Meds:   insulin lispro  0-6 Units SubCUTAneous TID WC    insulin lispro  0-4 Units SubCUTAneous Nightly    insulin lispro  3 Units SubCUTAneous TID AC    insulin glargine  7 Units SubCUTAneous QAM    heparin (porcine)  5,000 Units SubCUTAneous Q8H    amLODIPine  10 mg Oral Daily    vitamin C  250 mg Oral Daily    aspirin  81 mg Oral Daily    atorvastatin  10 mg Oral Nightly    hydrALAZINE  25 mg Oral BID    metoprolol tartrate  37.5 mg Oral BID    sodium chloride flush  5-40 mL IntraVENous 2 times per day    melatonin  3 mg Oral Nightly    lipase-protease-amylas  15,000 Units Oral TID WC    And    lipase-protease-amylase  20,000 Units Oral TID WC    epoetin joann-epbx  3,000 Units SubCUTAneous Once per day on Mon Wed Fri    bumetanide  2 mg Oral BID    sodium bicarbonate  1,300 mg Oral BID     PRN Meds:   sodium chloride flush, 5-40 mL, PRN  sodium chloride, , PRN  ondansetron, 4 mg, Q8H PRN   Or  ondansetron, 4 mg, Q6H PRN  polyethylene glycol, 17 g, Daily PRN  acetaminophen, 650 mg, Q6H PRN   Or  acetaminophen, 650 mg, Q6H PRN  glucagon (rDNA), 1 mg, PRN  dextrose, , Continuous

## 2024-03-26 NOTE — PLAN OF CARE
Problem: Safety - Adult  Goal: Free from fall injury  3/26/2024 1023 by Maribel Hale RN  Outcome: Progressing  3/26/2024 0450 by Veena Tavera RN  Outcome: Progressing     Problem: Chronic Conditions and Co-morbidities  Goal: Patient's chronic conditions and co-morbidity symptoms are monitored and maintained or improved  3/26/2024 1023 by Maribel Hale RN  Outcome: Progressing  3/26/2024 0450 by Veena Tavera RN  Outcome: Progressing     Problem: Discharge Planning  Goal: Discharge to home or other facility with appropriate resources  3/26/2024 1023 by Maribel Hale RN  Outcome: Progressing  3/26/2024 0450 by Veena Tavera RN  Outcome: Progressing     Problem: Risk for Elopement  Goal: Patient will not exit the unit/facility without proper excort  3/26/2024 1023 by Maribel Hale RN  Outcome: Progressing  3/26/2024 0450 by Veena Tavera RN  Outcome: Progressing     Problem: Skin/Tissue Integrity  Goal: Absence of new skin breakdown  Description: 1.  Monitor for areas of redness and/or skin breakdown  2.  Assess vascular access sites hourly  3.  Every 4-6 hours minimum:  Change oxygen saturation probe site  4.  Every 4-6 hours:  If on nasal continuous positive airway pressure, respiratory therapy assess nares and determine need for appliance change or resting period.  3/26/2024 0450 by Veena Tavera RN  Outcome: Progressing     Problem: Pain  Goal: Verbalizes/displays adequate comfort level or baseline comfort level  3/26/2024 0450 by Veena Tavera RN  Outcome: Progressing

## 2024-03-27 LAB
ANION GAP SERPL CALCULATED.3IONS-SCNC: 16 MMOL/L (ref 7–16)
BASOPHILS # BLD: 0.04 K/UL (ref 0–0.2)
BASOPHILS NFR BLD: 1 % (ref 0–2)
BUN SERPL-MCNC: 20 MG/DL (ref 6–23)
CALCIUM SERPL-MCNC: 8.9 MG/DL (ref 8.6–10.2)
CHLORIDE SERPL-SCNC: 96 MMOL/L (ref 98–107)
CO2 SERPL-SCNC: 22 MMOL/L (ref 22–29)
CREAT SERPL-MCNC: 3.6 MG/DL (ref 0.7–1.2)
EOSINOPHIL # BLD: 0.06 K/UL (ref 0.05–0.5)
EOSINOPHILS RELATIVE PERCENT: 1 % (ref 0–6)
ERYTHROCYTE [DISTWIDTH] IN BLOOD BY AUTOMATED COUNT: 15.5 % (ref 11.5–15)
GFR SERPL CREATININE-BSD FRML MDRD: 17 ML/MIN/1.73M2
GLUCOSE BLD-MCNC: 203 MG/DL (ref 74–99)
GLUCOSE BLD-MCNC: 216 MG/DL (ref 74–99)
GLUCOSE BLD-MCNC: 241 MG/DL (ref 74–99)
GLUCOSE BLD-MCNC: 256 MG/DL (ref 74–99)
GLUCOSE BLD-MCNC: 97 MG/DL (ref 74–99)
GLUCOSE SERPL-MCNC: 181 MG/DL (ref 74–99)
HCT VFR BLD AUTO: 26.3 % (ref 37–54)
HGB BLD-MCNC: 8.7 G/DL (ref 12.5–16.5)
IMM GRANULOCYTES # BLD AUTO: 0.03 K/UL (ref 0–0.58)
IMM GRANULOCYTES NFR BLD: 1 % (ref 0–5)
LYMPHOCYTES NFR BLD: 1.64 K/UL (ref 1.5–4)
LYMPHOCYTES RELATIVE PERCENT: 25 % (ref 20–42)
MCH RBC QN AUTO: 27.4 PG (ref 26–35)
MCHC RBC AUTO-ENTMCNC: 33.1 G/DL (ref 32–34.5)
MCV RBC AUTO: 83 FL (ref 80–99.9)
MONOCYTES NFR BLD: 0.76 K/UL (ref 0.1–0.95)
MONOCYTES NFR BLD: 12 % (ref 2–12)
NEUTROPHILS NFR BLD: 61 % (ref 43–80)
NEUTS SEG NFR BLD: 3.99 K/UL (ref 1.8–7.3)
PLATELET # BLD AUTO: 276 K/UL (ref 130–450)
PMV BLD AUTO: 11.2 FL (ref 7–12)
POTASSIUM SERPL-SCNC: 3.8 MMOL/L (ref 3.5–5)
RBC # BLD AUTO: 3.17 M/UL (ref 3.8–5.8)
SODIUM SERPL-SCNC: 134 MMOL/L (ref 132–146)
WBC OTHER # BLD: 6.5 K/UL (ref 4.5–11.5)

## 2024-03-27 PROCEDURE — 6370000000 HC RX 637 (ALT 250 FOR IP)

## 2024-03-27 PROCEDURE — 6370000000 HC RX 637 (ALT 250 FOR IP): Performed by: INTERNAL MEDICINE

## 2024-03-27 PROCEDURE — 6360000002 HC RX W HCPCS: Performed by: STUDENT IN AN ORGANIZED HEALTH CARE EDUCATION/TRAINING PROGRAM

## 2024-03-27 PROCEDURE — 6360000002 HC RX W HCPCS

## 2024-03-27 PROCEDURE — 82962 GLUCOSE BLOOD TEST: CPT

## 2024-03-27 PROCEDURE — 1200000000 HC SEMI PRIVATE

## 2024-03-27 PROCEDURE — 36415 COLL VENOUS BLD VENIPUNCTURE: CPT

## 2024-03-27 PROCEDURE — 99232 SBSQ HOSP IP/OBS MODERATE 35: CPT | Performed by: INTERNAL MEDICINE

## 2024-03-27 PROCEDURE — 80048 BASIC METABOLIC PNL TOTAL CA: CPT

## 2024-03-27 PROCEDURE — 90935 HEMODIALYSIS ONE EVALUATION: CPT

## 2024-03-27 PROCEDURE — 97530 THERAPEUTIC ACTIVITIES: CPT

## 2024-03-27 PROCEDURE — 97166 OT EVAL MOD COMPLEX 45 MIN: CPT

## 2024-03-27 PROCEDURE — 85025 COMPLETE CBC W/AUTO DIFF WBC: CPT

## 2024-03-27 PROCEDURE — 2580000003 HC RX 258

## 2024-03-27 RX ORDER — INSULIN GLARGINE 100 [IU]/ML
8 INJECTION, SOLUTION SUBCUTANEOUS EVERY MORNING
Status: DISCONTINUED | OUTPATIENT
Start: 2024-03-28 | End: 2024-03-28 | Stop reason: HOSPADM

## 2024-03-27 RX ORDER — INSULIN GLARGINE 100 [IU]/ML
12 INJECTION, SOLUTION SUBCUTANEOUS EVERY MORNING
Status: DISCONTINUED | OUTPATIENT
Start: 2024-03-28 | End: 2024-03-27

## 2024-03-27 RX ADMIN — HEPARIN SODIUM 5000 UNITS: 10000 INJECTION INTRAVENOUS; SUBCUTANEOUS at 05:03

## 2024-03-27 RX ADMIN — PANCRELIPASE LIPASE, PANCRELIPASE PROTEASE, PANCRELIPASE AMYLASE 15000 UNITS: 15000; 47000; 63000 CAPSULE, DELAYED RELEASE ORAL at 09:07

## 2024-03-27 RX ADMIN — ASPIRIN 81 MG: 81 TABLET, COATED ORAL at 09:01

## 2024-03-27 RX ADMIN — AMLODIPINE BESYLATE 10 MG: 10 TABLET ORAL at 09:01

## 2024-03-27 RX ADMIN — PANCRELIPASE LIPASE, PANCRELIPASE PROTEASE, PANCRELIPASE AMYLASE 20000 UNITS: 20000; 63000; 84000 CAPSULE, DELAYED RELEASE ORAL at 12:26

## 2024-03-27 RX ADMIN — BUMETANIDE 2 MG: 1 TABLET ORAL at 09:01

## 2024-03-27 RX ADMIN — EPOETIN ALFA-EPBX 3000 UNITS: 3000 INJECTION, SOLUTION INTRAVENOUS; SUBCUTANEOUS at 17:31

## 2024-03-27 RX ADMIN — PANCRELIPASE LIPASE, PANCRELIPASE PROTEASE, PANCRELIPASE AMYLASE 15000 UNITS: 15000; 47000; 63000 CAPSULE, DELAYED RELEASE ORAL at 12:26

## 2024-03-27 RX ADMIN — HYDRALAZINE HYDROCHLORIDE 25 MG: 25 TABLET, FILM COATED ORAL at 20:47

## 2024-03-27 RX ADMIN — INSULIN LISPRO 3 UNITS: 100 INJECTION, SOLUTION INTRAVENOUS; SUBCUTANEOUS at 09:05

## 2024-03-27 RX ADMIN — Medication 250 MG: at 09:00

## 2024-03-27 RX ADMIN — INSULIN LISPRO 3 UNITS: 100 INJECTION, SOLUTION INTRAVENOUS; SUBCUTANEOUS at 12:28

## 2024-03-27 RX ADMIN — METOPROLOL TARTRATE 37.5 MG: 25 TABLET, FILM COATED ORAL at 09:01

## 2024-03-27 RX ADMIN — INSULIN LISPRO 2 UNITS: 100 INJECTION, SOLUTION INTRAVENOUS; SUBCUTANEOUS at 09:06

## 2024-03-27 RX ADMIN — HEPARIN SODIUM 5000 UNITS: 10000 INJECTION INTRAVENOUS; SUBCUTANEOUS at 20:47

## 2024-03-27 RX ADMIN — HYDRALAZINE HYDROCHLORIDE 25 MG: 25 TABLET, FILM COATED ORAL at 09:02

## 2024-03-27 RX ADMIN — MELATONIN 3 MG ORAL TABLET 3 MG: 3 TABLET ORAL at 20:47

## 2024-03-27 RX ADMIN — PANCRELIPASE LIPASE, PANCRELIPASE PROTEASE, PANCRELIPASE AMYLASE 20000 UNITS: 20000; 63000; 84000 CAPSULE, DELAYED RELEASE ORAL at 17:31

## 2024-03-27 RX ADMIN — METOPROLOL TARTRATE 37.5 MG: 25 TABLET, FILM COATED ORAL at 20:47

## 2024-03-27 RX ADMIN — INSULIN LISPRO 3 UNITS: 100 INJECTION, SOLUTION INTRAVENOUS; SUBCUTANEOUS at 12:26

## 2024-03-27 RX ADMIN — BUMETANIDE 2 MG: 1 TABLET ORAL at 20:47

## 2024-03-27 RX ADMIN — PANCRELIPASE LIPASE, PANCRELIPASE PROTEASE, PANCRELIPASE AMYLASE 20000 UNITS: 20000; 63000; 84000 CAPSULE, DELAYED RELEASE ORAL at 09:08

## 2024-03-27 RX ADMIN — INSULIN GLARGINE 7 UNITS: 100 INJECTION, SOLUTION SUBCUTANEOUS at 09:03

## 2024-03-27 RX ADMIN — ATORVASTATIN CALCIUM 10 MG: 10 TABLET, FILM COATED ORAL at 20:47

## 2024-03-27 RX ADMIN — SODIUM CHLORIDE, PRESERVATIVE FREE 10 ML: 5 INJECTION INTRAVENOUS at 20:55

## 2024-03-27 RX ADMIN — SODIUM CHLORIDE, PRESERVATIVE FREE 10 ML: 5 INJECTION INTRAVENOUS at 09:03

## 2024-03-27 RX ADMIN — HEPARIN SODIUM 5000 UNITS: 10000 INJECTION INTRAVENOUS; SUBCUTANEOUS at 12:26

## 2024-03-27 RX ADMIN — SODIUM BICARBONATE 1300 MG: 650 TABLET ORAL at 20:47

## 2024-03-27 RX ADMIN — SODIUM BICARBONATE 1300 MG: 650 TABLET ORAL at 09:01

## 2024-03-27 RX ADMIN — PANCRELIPASE LIPASE, PANCRELIPASE PROTEASE, PANCRELIPASE AMYLASE 15000 UNITS: 15000; 47000; 63000 CAPSULE, DELAYED RELEASE ORAL at 17:31

## 2024-03-27 ASSESSMENT — PAIN DESCRIPTION - LOCATION: LOCATION: FOOT

## 2024-03-27 ASSESSMENT — PAIN SCALES - GENERAL
PAINLEVEL_OUTOF10: 0
PAINLEVEL_OUTOF10: 4
PAINLEVEL_OUTOF10: 0

## 2024-03-27 ASSESSMENT — PAIN DESCRIPTION - PAIN TYPE: TYPE: CHRONIC PAIN

## 2024-03-27 NOTE — CARE COORDINATION
Plant at this time is to return home with [facility]. Home health care orders are in. Referral was also made to Marisol at [facility]. [facility] checklist faxed. Await acceptance, approval and chair time. Referral also made to Margarita at [facility]. They will contact patient and come out to his home within a week to assess for needs. I attempted to meet with patient in room to review the discharge plan but he was out of room at dialysis. I spoke with patient's daughter Perla and she said that her father is aware of the discharge plan and is in agreement. OT [facility] 19/24. PT eval ordered as well to make sure the patient is safe to return home at discharge.    Araseli Merlos RN   [phone]

## 2024-03-27 NOTE — PROGRESS NOTES
glucose level on admission was 574 mg/dL, beta hydroxybutyrate 0.49, proBNP level 24,362.  His initial chest x-ray showed no acute abnormalities.  In the ER patient was given at least 1 L of NS and he was admitted to MICU.  Patient was started on HD and was supposed to have a tunneled dialysis catheter placed but left AMA. Patient was readmitted on 3/21/24 for hypoglycemia and was started on D10. ED lab work revealed creatinine 7.5 mg/dL, reason for this consultation.      IMPRESSION/RECOMMENDATIONS:      BLAIR on CKD, hemodynamically mediated 2/2 decompensated heart failure versus progression of CKD. Tunneled dialysis catheter placement 3/25/24.  Started on renal replacement therapy on March 24, 2024, tolerated well.  For third HD treatment today    CKD stage IIIb, with large proteinuria, 2/2 diabetic nephropathy, baseline creatinine 3.1-3.2 mg/dL    HTN, on metoprolol  HFpEF 55-60%, obtain pro-BNP  Normocytic anemia, hemoglobin 7.7, to continue epo  ------------------------------------------------------------  Type II DM, hypoglycemic, on D10  PAF, on metoprolol  Hyperlipidemia, atorvastatin     Plan:    HD today x 3.5 hours  Continue sodium bicarbonate 1300 mg p.o. twice daily  Continue EPO 3,000 units three times weekly  Continue Bumex 2 mg p.o. twice daily  Continue metoprolol tartrate 37.5 mg twice daily  Continue to monitor renal function  Needs outpatient dialysis set up at [facility] to discharge from renal standpoint after dialysis as long as outpatient is set up    Electronically signed by GURPREET Coppola CNP on 3/27/2024 at 8:58 AM       I saw and evaluated the patient, performing the key elements of the service.  I discussed the findings, assessment and plan with nurse practitioner and agree with her findings and plan as documented.     Patient had HD yesterday, tolerated well.  He is to have third dialysis treatment today and he can be discharged as long as his outpatient dialysis is

## 2024-03-27 NOTE — PROGRESS NOTES
Date: 3/27/2024       Patient Name: Andre Brown  : 1950      MRN: [id number]    Attempted PT, pt off the floor for HD.   Will follow up as able.     Artis Miguel PT, DPT  [id number]

## 2024-03-27 NOTE — PLAN OF CARE
Problem: Safety - Adult  Goal: Free from fall injury  Outcome: Progressing     Problem: Chronic Conditions and Co-morbidities  Goal: Patient's chronic conditions and co-morbidity symptoms are monitored and maintained or improved  Outcome: Progressing     Problem: Discharge Planning  Goal: Discharge to home or other facility with appropriate resources  Outcome: Progressing     Problem: Risk for Elopement  Goal: Patient will not exit the unit/facility without proper excort  Outcome: Progressing     Problem: Skin/Tissue Integrity  Goal: Absence of new skin breakdown  Description: 1.  Monitor for areas of redness and/or skin breakdown  2.  Assess vascular access sites hourly  3.  Every 4-6 hours minimum:  Change oxygen saturation probe site  4.  Every 4-6 hours:  If on nasal continuous positive airway pressure, respiratory therapy assess nares and determine need for appliance change or resting period.  Outcome: Progressing     Problem: Pain  Goal: Verbalizes/displays adequate comfort level or baseline comfort level  Outcome: Progressing

## 2024-03-27 NOTE — PROGRESS NOTES
Hospitalist Progress Note      SYNOPSIS: Patient admitted on 3/21/2024 for Hypoglycemia  74 y.o. year old male  who  has a past medical history of A-fib (HCC), Cancer (HCC), CKD (chronic kidney disease), Diabetes mellitus (HCC), ED (erectile dysfunction), Hyperlipidemia, Hypertension, and Prostate cancer (HCC).  Patient presented to ED on 3/21/2024 for hypoglycemia.  Of note, patient was admitted and recently discharged AMA from hospital on 3/19/2024.  Patient was admitted for shortness of breath was intubated and extubated on 3/15/2024 completed treatment with Zosyn for aspiration pneumonia. Patient allegedly left AMA because it was his birthday and he wanted to go home to eat chicken.  Patient reportedly ate lots of chicken and other sorts of food and thought it wise to then take extra insulin, this strategy proved to be less than effective because patient subsequently ended up hypoglycemic.  Also of note, patient was being set up for tunneled dialysis catheter for dialysis as he has end-stage renal disease in need of hemodialysis.  That was not able to be completed prior to him leaving AM.  Current hospital course significant for creatinine 7.5, blood glucose level less than 40,-IMPROVED  with D10 drip;   Nephrology evaluated the patient and is planning for HD after TDC placement on Monday by IR; currently on bumex 2 mg po bid and metoprolol    underwent TDC placement    SUBJECTIVE:  No overnight events  Pleasantly confused  Breathing is okay  Generalized weakness        Temp (24hrs), Av.6 °F (36.4 °C), Min:97.6 °F (36.4 °C), Max:97.6 °F (36.4 °C)    DIET: ADULT DIET; Regular; 3 carb choices (45 gm/meal)  CODE: Full Code    Intake/Output Summary (Last 24 hours) at 3/27/2024 1316  Last data filed at 3/26/2024 1915  Gross per 24 hour   Intake 660 ml   Output 1500 ml   Net -840 ml       Review of Systems  All bolded are positive; please see HPI  General:  Fever, chills, diaphoresis, fatigue, malaise,  night sweats, weight loss  Psychological:  Anxiety, disorientation, hallucinations.  ENT:  Epistaxis, headaches, vertigo, visual changes.  Cardiovascular:  Chest pain, irregular heartbeats, palpitations, paroxysmal nocturnal dyspnea.  Respiratory:  Shortness of breath, coughing, sputum production, hemoptysis, wheezing, orthopnea.  Gastrointestinal:  Nausea, vomiting, diarrhea, heartburn, constipation, abdominal pain, hematemesis, hematochezia, melena, acholic stools  Genito-Urinary:  Dysuria, urgency, frequency, hematuria  Musculoskeletal:  Joint pain, joint stiffness, joint swelling, muscle pain  Neurology:  Headache, focal neurological deficits, weakness, numbness, paresthesia  Derm:  Rashes, ulcers, excoriations, bruising  Extremities:  Decreased ROM, peripheral edema, mottling      OBJECTIVE:    /72   Pulse 68   Temp 97.6 °F (36.4 °C) (Temporal)   Resp 16   Ht 1.88 m (6' 2\")   Wt 86.9 kg (191 lb 9.3 oz)   SpO2 98%   BMI 24.60 kg/m²     General appearance:  awake, alert, and oriented to person, place, time, and purpose; appears stated age and cooperative; no apparent distress no labored breathing  HEENT:  Conjunctivae/corneas clear.   Neck: Supple. No jugular venous distention.  Right TDC without any discharge  Respiratory: symmetrical; clear to auscultation bilaterally; no wheezes; no rhonchi; no rales  Cardiovascular: rhythm regular; rate controlled; no murmurs  Abdomen: Soft, nontender, nondistended  Extremities:  peripheral pulses present; no peripheral edema; no ulcers  Musculoskeletal: No clubbing, cyanosis, no bilateral lower extremity edema. Brisk capillary refill.   Skin:  No rashes  on visible skin  Neurologic: awake, alert and following commands        ASSESSMENT and PLAN:  Hypoglycemia secondary to insulin overdose  S/p D10 drip  Discussed with endocrinology resumed his Lantus 7 unit nightly  Goal BS-140-180  Continue; SSI; hypoglycemia protocol  Sugars running somewhat elevated  Patient  Units SubCUTAneous Once per day on Mon Wed Fri    bumetanide  2 mg Oral BID    sodium bicarbonate  1,300 mg Oral BID     PRN Meds: sodium chloride flush, sodium chloride, ondansetron **OR** ondansetron, polyethylene glycol, acetaminophen **OR** acetaminophen, glucagon (rDNA), dextrose, aluminum & magnesium hydroxide-simethicone, glucose, dextrose bolus **OR** dextrose bolus, dextrose    Labs:     Recent Labs     03/25/24  0421 03/26/24  1350 03/27/24  0509   WBC 8.9 7.0 6.5   HGB 8.0* 8.2* 8.7*   HCT 24.2* 25.0* 26.3*    297 276       Recent Labs     03/25/24  0421 03/26/24  1350 03/27/24  0509    141 134   K 4.0 4.2 3.8   CL 98 101 96*   CO2 21* 18* 22   BUN 48* 35* 20   CREATININE 6.6* 5.1* 3.6*   CALCIUM 8.3* 9.0 8.9       No results for input(s): \"PROT\", \"ALB\", \"ALKPHOS\", \"ALT\", \"AST\", \"BILITOT\", \"AMYLASE\", \"LIPASE\" in the last 72 hours.    Recent Labs     03/24/24  1546 03/25/24  0421   INR 1.2 1.1       No results for input(s): \"CKTOTAL\", \"TROPONINI\" in the last 72 hours.    Chronic labs:    Lab Results   Component Value Date    CHOL 160 12/23/2020    TRIG 103 06/23/2021    HDL 14 12/23/2020    LDLCALC - (AA) 12/23/2020    TSH 1.52 03/12/2024    INR 1.1 03/25/2024    LABA1C 6.8 (H) 03/22/2024       Radiology: REVIEWED DAILY    +++++++++++++++++++++++++++++++++++++++++++++++++  Al Hartman MD   Hospitalist  [place], [place]  +++++++++++++++++++++++++++++++++++++++++++++++++  NOTE: This report was transcribed using voice recognition software. Every effort was made to ensure accuracy; however, inadvertent computerized transcription errors may be present.

## 2024-03-27 NOTE — PROGRESS NOTES
OCCUPATIONAL THERAPY INITIAL EVALUATION    [facility] [address], [place]       Date:3/27/2024                                                  Patient Name: Andre Brown  MRN: [id number]  : 1950  Room: [address] [place] [address]    Evaluating OT: Linwood Marion OTR/L [id number]    Referring Provider: Judith Pritchard MD      Specific Provider Orders/Date: OT evaluation and treatment 3/22/24 1300    Diagnosis:  Hypoglycemia [E16.2]      Pertinent Medical History:  has a past medical history of A-fib (HCC), Cancer (HCC), CKD (chronic kidney disease), Diabetes mellitus (HCC), ED (erectile dysfunction), Hyperlipidemia, Hypertension, and Prostate cancer (HCC).   Past Surgical History:   Procedure Laterality Date    COLON SURGERY      COLONOSCOPY N/A 10/18/2021    COLONOSCOPY WITH BIOPSY performed by Luisa Sepulveda MD at [facility] ENDOSCOPY    DILATATION, ESOPHAGUS      HC DIALYSIS CATHETER N/A 2021    TEMPORARY HEMODIALYSIS CATHETER INSERTION performed by Sunny Blanc MD at [facility] OR    IR TUNNELED CATHETER PLACEMENT GREATER THAN 5 YEARS  3/25/2024    IR TUNNELED CATHETER PLACEMENT GREATER THAN 5 YEARS 3/25/2024 JonahLISS MD [facility] PROCEDURES    LEG SURGERY Left 6 years ago    OTHER SURGICAL HISTORY      removal of HD catheter    PANCREAS BIOPSY N/A 2021    PANCREATIC PSEUDOCYST EXCISION DRAINAGE performed by Faina Colon MD at [facility] OR    PANCREATECTOMY N/A 2021    LAPAROSCOPIC ROBOTIC XI PANCREATIC NECROSECTOMY WITH CYST GASTROSTOMY , INTRAOPERATIVE  ULTRASOUND performed by Manuel Ortega III, MD at [facility] [place]    UPPER GASTROINTESTINAL ENDOSCOPY  2021    ENDOSCOPIC ULTRASOUND performed by Faina Colon MD at [facility] OR    UPPER GASTROINTESTINAL ENDOSCOPY N/A 10/18/2021    EGD BIOPSY performed by Luisa Sepulveda MD at [facility] ENDOSCOPY    UPPER GASTROINTESTINAL ENDOSCOPY N/A 2022    EGD      RUE: ROM WFL      Strength: grossly 4+/5      Strength: WFL      Coordination:  WFL     LUE: ROM WFL      Strength: grossly 4+/5      Strength: WFL      Coordination: WFL      Safety   Fair  Good during functional activity       Hearing: WFL  Sensation:  neuropathy in B feet   Tone: WFL   Edema: None noted    Comments:   RN cleared patient for OT.  Upon arrival, patient was semi-supine in bed  and agreeable to OT session. no visitors present throughout session. At end of session, patient sitting in chair with arms ; with call light and phone within reach; all lines and tubes intact.   Patient presents with decreased safety awareness, activity tolerance , and balance . Pt demonstrated decreased independence during ADLs, bed mobility , functional transfers, and functional mobility. Pt would benefit from continued skilled OT to increase safety and independence with completion of ADL tasks and functional mobility for improved quality of life and return to PLOF.       Treatment: OT treatment provided this date includes:  OT edu pt/family on role of OT in the acute care setting. Pt verbalized understanding   Therapist facilitated and instructed pt on adapted techniques & compensatory strategies to improve safety and independence with ADLs, bed mobility , functional transfers, and functional mobility as noted above to allow pt to achieve highest level of independence and safely. Pt provided verbal instructions,and cuing  in order to promote maximum level of independence.   Pt edu on use of call light and waiting until staff present to attempt any functional mobility. Pt verbalized understanding and demonstrated ability to locate and press call button.     Rehab Potential: Good for established goals     Patient / Family Goal: to go home      Patient and/or family were instructed on functional diagnosis, prognosis/goals and OT plan of care. Pt/family demonstrated understanding.       Eval Complexity:       Description  Performance deficits  Clinical decision making  Co-morbidities affecting occupational performance  Modification or assistance to complete eval    Low Complexity   1 to 3 []  Low []  None []  None []   Moderate Complexity   3 to 5 [x]  Mod [x]  Maybe []  Min to Mod [x]   High Complexity   5 or more []  High []  Yes [x]  Max []     The above evaluation is classified as moderate complexity based off the noted performance deficits, personal factors, co-morbidities, assistance required, and other factors as noted in the clinical evaluation and functional testing.     Time In: 1045  Time Out: 1110  Total Treatment Time: 8 minutes    Min Units   OT Eval Low 16057       OT Eval Moderate 97166  x 1   OT Eval High 85967       OT Re-Eval 96436       Therapeutic Ex 65675       Therapeutic Activities 84359  8 1   ADL/Self Care 35168      Orthotic Management 60912       Neuro Re-Ed 29319       Non-Billable Time          Evaluation Time includes thorough review of current medical information, gathering information on past medical history/social history and prior level of function, completion of standardized testing/informal observation of tasks, assessment of data and education on plan of care and goals.          Linwood Marion OTR/L [id number]

## 2024-03-27 NOTE — PROGRESS NOTES
ENDOCRINOLOGY PROGRESS NOTE      Date of Service: 3/27/2024  Date of Admission: 3/21/2024  Admitting Physician: Chasity De Guzman DO   Primary Care Physician: Cruz Velasco DO  Consultant physician: Aubrey Quintero MD     Reason for the consultation:  Uncontrolled DM    History of Present Illness:  The history is provided by the patient. Accuracy of the patient data is excellent.      Subjective:  Patient was seen this afternoon, glucose level highly fluctuating.    Inpatient diet:   Carb Restricted diet     Point of care glucose monitoring (Independently reviewed)   Recent Labs     03/26/24 2013 03/26/24  2047 03/26/24  2124 03/26/24  2354 03/27/24  0149 03/27/24  0502 03/27/24  1113 03/27/24  1718   POCGLU 92 256* 242* 302* 241* 203* 256* 97       Scheduled Meds:   [START ON 3/28/2024] insulin glargine  8 Units SubCUTAneous QAM    insulin lispro  0-6 Units SubCUTAneous TID WC    insulin lispro  0-4 Units SubCUTAneous Nightly    insulin lispro  3 Units SubCUTAneous TID AC    heparin (porcine)  5,000 Units SubCUTAneous Q8H    amLODIPine  10 mg Oral Daily    vitamin C  250 mg Oral Daily    aspirin  81 mg Oral Daily    atorvastatin  10 mg Oral Nightly    hydrALAZINE  25 mg Oral BID    metoprolol tartrate  37.5 mg Oral BID    sodium chloride flush  5-40 mL IntraVENous 2 times per day    melatonin  3 mg Oral Nightly    lipase-protease-amylas  15,000 Units Oral TID WC    And    lipase-protease-amylase  20,000 Units Oral TID WC    epoetin joann-epbx  3,000 Units SubCUTAneous Once per day on Mon Wed Fri    bumetanide  2 mg Oral BID    sodium bicarbonate  1,300 mg Oral BID     PRN Meds:   sodium chloride flush, 5-40 mL, PRN  sodium chloride, , PRN  ondansetron, 4 mg, Q8H PRN   Or  ondansetron, 4 mg, Q6H PRN  polyethylene glycol, 17 g, Daily PRN  acetaminophen, 650 mg, Q6H PRN   Or  acetaminophen, 650 mg, Q6H PRN  glucagon (rDNA), 1 mg, PRN  dextrose, , Continuous PRN  aluminum & magnesium hydroxide-simethicone, 30 mL,  Q6H PRN  glucose, 4 tablet, PRN  dextrose bolus, 125 mL, PRN   Or  dextrose bolus, 250 mL, PRN  dextrose, , Continuous PRN      Continuous Infusions:   sodium chloride      dextrose      dextrose         Review of Systems  All systems reviewed. All negative except for symptoms mentioned in HPI     OBJECTIVE    /66   Pulse 71   Temp 97.3 °F (36.3 °C) (Temporal)   Resp 18   Ht 1.88 m (6' 2\")   Wt 83 kg (182 lb 15.7 oz)   SpO2 100%   BMI 23.49 kg/m²     Intake/Output Summary (Last 24 hours) at 3/27/2024 1919  Last data filed at 3/27/2024 1640  Gross per 24 hour   Intake 540 ml   Output 2200 ml   Net -1660 ml       General: A&Ox3, not intubated  HEENT: normocephalic non traumatic, no exophthalmos   Neck: supple,   Pulm: good equal air entry no added sounds  CVS: S1 + S2, no crackles  Abd: soft lax, no tenderness   Skin: no rash, No open wounds, no ulcers   Psych: Follows commands    Review of Laboratory Data:  I have reviewed the following:  Recent Labs     03/25/24  0421 03/26/24  1350 03/27/24  0509   WBC 8.9 7.0 6.5   RBC 2.93* 2.97* 3.17*   HGB 8.0* 8.2* 8.7*   HCT 24.2* 25.0* 26.3*   MCV 82.6 84.2 83.0   MCH 27.3 27.6 27.4   MCHC 33.1 32.8 33.1   RDW 15.5* 15.9* 15.5*    297 276   MPV 10.5 10.8 11.2     Recent Labs     03/25/24  0421 03/26/24  1350 03/27/24  0509    141 134   K 4.0 4.2 3.8   CL 98 101 96*   CO2 21* 18* 22   BUN 48* 35* 20   CREATININE 6.6* 5.1* 3.6*   GLUCOSE 175* 271* 181*   CALCIUM 8.3* 9.0 8.9     Beta-Hydroxybutyrate   Date Value Ref Range Status   03/15/2024 1.82 (H) 0.02 - 0.27 mmol/L Final   03/11/2024 0.49 (H) 0.02 - 0.27 mmol/L Final   01/18/2023 0.11 0.02 - 0.27 mmol/L Final     Lab Results   Component Value Date/Time    LABA1C 6.8 03/22/2024 03:00 PM    LABA1C 6.6 03/11/2024 11:28 PM    LABA1C 11.7 01/27/2022 03:26 PM     Lab Results   Component Value Date/Time    TSH 1.52 03/12/2024 12:05 PM     Lab Results   Component Value Date/Time    LABA1C 6.8 03/22/2024

## 2024-03-27 NOTE — FLOWSHEET NOTE
03/27/24 1640   Vital Signs   BP (!) 143/65   Temp 96.9 °F (36.1 °C)   Pulse 66   Respirations 18   Weight - Scale 83 kg (182 lb 15.7 oz)   Weight Method Bed scale   Percent Weight Change -4.49   Pain Assessment   Pain Assessment 0-10   Pain Level 4   Pain Type Chronic pain   Pain Location Foot   Post-Hemodialysis Assessment   Post-Treatment Procedures Blood returned;Catheter capped, clamped and heparinized x 2 ports   Machine Disinfection Process Acid/Vinegar Clean;Heat Disinfect;Exterior Machine Disinfection   Rinseback Volume (ml) 300 ml   Blood Volume Processed (Liters) 77.2 L   Dialyzer Clearance Lightly streaked   Duration of Treatment (minutes) 210 minutes   Heparin Amount Administered During Treatment (mL) 0 mL   Hemodialysis Intake (ml) 300 ml   Hemodialysis Output (ml) 1400 ml   NET Removed (ml) 1100   Tolerated Treatment Good   Patient Response to Treatment tolerated minimal fluid removal   Bilateral Breath Sounds Clear   Edema None   Physician Notified No   Time Off 1635   Patient Disposition Return to room   Observations & Evaluations   Level of Consciousness 0   Oriented X 3

## 2024-03-28 VITALS
DIASTOLIC BLOOD PRESSURE: 70 MMHG | HEIGHT: 74 IN | WEIGHT: 182.98 LBS | BODY MASS INDEX: 23.48 KG/M2 | OXYGEN SATURATION: 100 % | SYSTOLIC BLOOD PRESSURE: 107 MMHG | TEMPERATURE: 98.4 F | RESPIRATION RATE: 18 BRPM | HEART RATE: 71 BPM

## 2024-03-28 LAB
ANION GAP SERPL CALCULATED.3IONS-SCNC: 11 MMOL/L (ref 7–16)
BASOPHILS # BLD: 0.06 K/UL (ref 0–0.2)
BASOPHILS NFR BLD: 1 % (ref 0–2)
BUN SERPL-MCNC: 16 MG/DL (ref 6–23)
CALCIUM SERPL-MCNC: 9 MG/DL (ref 8.6–10.2)
CHLORIDE SERPL-SCNC: 95 MMOL/L (ref 98–107)
CO2 SERPL-SCNC: 27 MMOL/L (ref 22–29)
CREAT SERPL-MCNC: 3.1 MG/DL (ref 0.7–1.2)
EOSINOPHIL # BLD: 0.04 K/UL (ref 0.05–0.5)
EOSINOPHILS RELATIVE PERCENT: 1 % (ref 0–6)
ERYTHROCYTE [DISTWIDTH] IN BLOOD BY AUTOMATED COUNT: 15.7 % (ref 11.5–15)
GFR SERPL CREATININE-BSD FRML MDRD: 20 ML/MIN/1.73M2
GLUCOSE BLD-MCNC: 165 MG/DL (ref 74–99)
GLUCOSE BLD-MCNC: 193 MG/DL (ref 74–99)
GLUCOSE BLD-MCNC: 202 MG/DL (ref 74–99)
GLUCOSE BLD-MCNC: 207 MG/DL (ref 74–99)
GLUCOSE SERPL-MCNC: 198 MG/DL (ref 74–99)
HCT VFR BLD AUTO: 27.4 % (ref 37–54)
HGB BLD-MCNC: 8.8 G/DL (ref 12.5–16.5)
IMM GRANULOCYTES # BLD AUTO: <0.03 K/UL (ref 0–0.58)
IMM GRANULOCYTES NFR BLD: 0 % (ref 0–5)
LYMPHOCYTES NFR BLD: 1.59 K/UL (ref 1.5–4)
LYMPHOCYTES RELATIVE PERCENT: 27 % (ref 20–42)
MCH RBC QN AUTO: 27.2 PG (ref 26–35)
MCHC RBC AUTO-ENTMCNC: 32.1 G/DL (ref 32–34.5)
MCV RBC AUTO: 84.8 FL (ref 80–99.9)
MONOCYTES NFR BLD: 0.66 K/UL (ref 0.1–0.95)
MONOCYTES NFR BLD: 11 % (ref 2–12)
NEUTROPHILS NFR BLD: 60 % (ref 43–80)
NEUTS SEG NFR BLD: 3.5 K/UL (ref 1.8–7.3)
PLATELET # BLD AUTO: 254 K/UL (ref 130–450)
PMV BLD AUTO: 11.4 FL (ref 7–12)
POTASSIUM SERPL-SCNC: 4.5 MMOL/L (ref 3.5–5)
RBC # BLD AUTO: 3.23 M/UL (ref 3.8–5.8)
SODIUM SERPL-SCNC: 133 MMOL/L (ref 132–146)
WBC OTHER # BLD: 5.9 K/UL (ref 4.5–11.5)

## 2024-03-28 PROCEDURE — 2580000003 HC RX 258

## 2024-03-28 PROCEDURE — 6360000002 HC RX W HCPCS: Performed by: STUDENT IN AN ORGANIZED HEALTH CARE EDUCATION/TRAINING PROGRAM

## 2024-03-28 PROCEDURE — 6370000000 HC RX 637 (ALT 250 FOR IP): Performed by: INTERNAL MEDICINE

## 2024-03-28 PROCEDURE — 85025 COMPLETE CBC W/AUTO DIFF WBC: CPT

## 2024-03-28 PROCEDURE — 82962 GLUCOSE BLOOD TEST: CPT

## 2024-03-28 PROCEDURE — 36415 COLL VENOUS BLD VENIPUNCTURE: CPT

## 2024-03-28 PROCEDURE — 80048 BASIC METABOLIC PNL TOTAL CA: CPT

## 2024-03-28 PROCEDURE — 6370000000 HC RX 637 (ALT 250 FOR IP)

## 2024-03-28 PROCEDURE — 99232 SBSQ HOSP IP/OBS MODERATE 35: CPT | Performed by: INTERNAL MEDICINE

## 2024-03-28 PROCEDURE — 97530 THERAPEUTIC ACTIVITIES: CPT

## 2024-03-28 PROCEDURE — 99239 HOSP IP/OBS DSCHRG MGMT >30: CPT | Performed by: INTERNAL MEDICINE

## 2024-03-28 RX ORDER — INSULIN LISPRO 100 [IU]/ML
3 INJECTION, SOLUTION INTRAVENOUS; SUBCUTANEOUS
DISCHARGE
Start: 2024-03-28

## 2024-03-28 RX ORDER — INSULIN GLARGINE 100 [IU]/ML
8 INJECTION, SOLUTION SUBCUTANEOUS EVERY MORNING
Qty: 10 ML | Refills: 3 | DISCHARGE
Start: 2024-03-29

## 2024-03-28 RX ORDER — BUMETANIDE 2 MG/1
2 TABLET ORAL 2 TIMES DAILY
Qty: 30 TABLET | Refills: 3 | DISCHARGE
Start: 2024-03-28

## 2024-03-28 RX ORDER — POLYETHYLENE GLYCOL 3350 17 G/17G
17 POWDER, FOR SOLUTION ORAL DAILY PRN
Refills: 1 | DISCHARGE
Start: 2024-03-28 | End: 2024-04-27

## 2024-03-28 RX ORDER — INSULIN LISPRO 100 [IU]/ML
0-6 INJECTION, SOLUTION INTRAVENOUS; SUBCUTANEOUS
DISCHARGE
Start: 2024-03-28

## 2024-03-28 RX ORDER — SODIUM BICARBONATE 650 MG/1
1300 TABLET ORAL 2 TIMES DAILY
DISCHARGE
Start: 2024-03-28

## 2024-03-28 RX ORDER — LANOLIN ALCOHOL/MO/W.PET/CERES
3 CREAM (GRAM) TOPICAL NIGHTLY
Refills: 3 | DISCHARGE
Start: 2024-03-28

## 2024-03-28 RX ORDER — INSULIN LISPRO 100 [IU]/ML
0-4 INJECTION, SOLUTION INTRAVENOUS; SUBCUTANEOUS NIGHTLY
DISCHARGE
Start: 2024-03-28

## 2024-03-28 RX ADMIN — INSULIN LISPRO 2 UNITS: 100 INJECTION, SOLUTION INTRAVENOUS; SUBCUTANEOUS at 11:50

## 2024-03-28 RX ADMIN — ASPIRIN 81 MG: 81 TABLET, COATED ORAL at 08:17

## 2024-03-28 RX ADMIN — PANCRELIPASE LIPASE, PANCRELIPASE PROTEASE, PANCRELIPASE AMYLASE 20000 UNITS: 20000; 63000; 84000 CAPSULE, DELAYED RELEASE ORAL at 11:51

## 2024-03-28 RX ADMIN — INSULIN LISPRO 1 UNITS: 100 INJECTION, SOLUTION INTRAVENOUS; SUBCUTANEOUS at 08:16

## 2024-03-28 RX ADMIN — INSULIN LISPRO 1 UNITS: 100 INJECTION, SOLUTION INTRAVENOUS; SUBCUTANEOUS at 17:24

## 2024-03-28 RX ADMIN — HYDRALAZINE HYDROCHLORIDE 25 MG: 25 TABLET, FILM COATED ORAL at 08:18

## 2024-03-28 RX ADMIN — PANCRELIPASE LIPASE, PANCRELIPASE PROTEASE, PANCRELIPASE AMYLASE 15000 UNITS: 15000; 47000; 63000 CAPSULE, DELAYED RELEASE ORAL at 08:18

## 2024-03-28 RX ADMIN — SODIUM CHLORIDE, PRESERVATIVE FREE 10 ML: 5 INJECTION INTRAVENOUS at 08:18

## 2024-03-28 RX ADMIN — PANCRELIPASE LIPASE, PANCRELIPASE PROTEASE, PANCRELIPASE AMYLASE 15000 UNITS: 15000; 47000; 63000 CAPSULE, DELAYED RELEASE ORAL at 11:51

## 2024-03-28 RX ADMIN — INSULIN LISPRO 3 UNITS: 100 INJECTION, SOLUTION INTRAVENOUS; SUBCUTANEOUS at 05:52

## 2024-03-28 RX ADMIN — PANCRELIPASE LIPASE, PANCRELIPASE PROTEASE, PANCRELIPASE AMYLASE 20000 UNITS: 20000; 63000; 84000 CAPSULE, DELAYED RELEASE ORAL at 17:24

## 2024-03-28 RX ADMIN — METOPROLOL TARTRATE 37.5 MG: 25 TABLET, FILM COATED ORAL at 08:17

## 2024-03-28 RX ADMIN — Medication 250 MG: at 08:17

## 2024-03-28 RX ADMIN — INSULIN LISPRO 3 UNITS: 100 INJECTION, SOLUTION INTRAVENOUS; SUBCUTANEOUS at 17:24

## 2024-03-28 RX ADMIN — INSULIN GLARGINE 8 UNITS: 100 INJECTION, SOLUTION SUBCUTANEOUS at 08:16

## 2024-03-28 RX ADMIN — INSULIN LISPRO 3 UNITS: 100 INJECTION, SOLUTION INTRAVENOUS; SUBCUTANEOUS at 11:49

## 2024-03-28 RX ADMIN — HEPARIN SODIUM 5000 UNITS: 10000 INJECTION INTRAVENOUS; SUBCUTANEOUS at 12:28

## 2024-03-28 RX ADMIN — PANCRELIPASE LIPASE, PANCRELIPASE PROTEASE, PANCRELIPASE AMYLASE 15000 UNITS: 15000; 47000; 63000 CAPSULE, DELAYED RELEASE ORAL at 17:24

## 2024-03-28 RX ADMIN — AMLODIPINE BESYLATE 10 MG: 10 TABLET ORAL at 08:17

## 2024-03-28 RX ADMIN — BUMETANIDE 2 MG: 1 TABLET ORAL at 08:17

## 2024-03-28 RX ADMIN — SODIUM BICARBONATE 1300 MG: 650 TABLET ORAL at 08:17

## 2024-03-28 RX ADMIN — PANCRELIPASE LIPASE, PANCRELIPASE PROTEASE, PANCRELIPASE AMYLASE 20000 UNITS: 20000; 63000; 84000 CAPSULE, DELAYED RELEASE ORAL at 08:19

## 2024-03-28 RX ADMIN — HEPARIN SODIUM 5000 UNITS: 10000 INJECTION INTRAVENOUS; SUBCUTANEOUS at 05:51

## 2024-03-28 NOTE — PROGRESS NOTES
Physical Therapy  Treatment Note    Name: Andre Brown  : 1950  MRN: [id number]      Date of Service: 3/28/2024    Evaluating PT:  Antionette Abdi, PT, DPT, [id number]    Room #:  8403/8403-A  Diagnosis:  Hypoglycemia [E16.2]  PMHx/PSHx:    Past Medical History:   Diagnosis Date    A-fib (HCC)     on eliquis    Cancer (HCC)     CKD (chronic kidney disease)     stage 3a    Diabetes mellitus (HCC)     ED (erectile dysfunction)     Hyperlipidemia     Hypertension     Prostate cancer (HCC)       Past Surgical History:   Procedure Laterality Date    COLON SURGERY      COLONOSCOPY N/A 10/18/2021    COLONOSCOPY WITH BIOPSY performed by Luisa Sepulveda MD at [facility] ENDOSCOPY    DILATATION, ESOPHAGUS      HC DIALYSIS CATHETER N/A 2021    TEMPORARY HEMODIALYSIS CATHETER INSERTION performed by Sunny Blanc MD at [facility] OR    IR TUNNELED CATHETER PLACEMENT GREATER THAN 5 YEARS  3/25/2024    IR TUNNELED CATHETER PLACEMENT GREATER THAN 5 YEARS 3/25/2024 [place]ISS [place]enzie [place] [facility] [place]    LEG SURGERY Left 6 years ago    OTHER SURGICAL HISTORY      removal of HD catheter    PANCREAS BIOPSY N/A 2021    PANCREATIC PSEUDOCYST EXCISION DRAINAGE performed by Faina Colon MD at [facility] OR    PANCREATECTOMY N/A 2021    LAPAROSCOPIC ROBOTIC XI PANCREATIC NECROSECTOMY WITH CYST GASTROSTOMY , INTRAOPERATIVE  ULTRASOUND performed by Manuel Ortega III, MD at [facility] [place]    UPPER GASTROINTESTINAL ENDOSCOPY  2021    ENDOSCOPIC ULTRASOUND performed by Faina Colon MD at [facility] OR    UPPER GASTROINTESTINAL ENDOSCOPY N/A 10/18/2021    EGD BIOPSY performed by Luisa Sepulveda MD at [facility] ENDOSCOPY    UPPER GASTROINTESTINAL ENDOSCOPY N/A 2022    EGD BIOPSY performed by Luisa Sepulveda MD at [facility] ENDOSCOPY      Procedure/Surgery:  none this admission   Precautions:  Fall Risk, HD, + Alarms  Equipment Needs:  TBD    SUBJECTIVE:    Pt lives alone in a 2 story home with 5 stairs  during functional mobility   [x] Transfer Training to improve safety and independence with all functional transfers   [x] Gait Training to improve gait mechanics, endurance and assess need for appropriate assistive device  [x] Stair Training in preparation for safe discharge home and/or into the community   [] Positioning to prevent skin breakdown and contractures  [x] Safety and Education Training   [x] Patient/Caregiver Education   [] HEP  [x] Other     PT long term treatment goals are located in above grid    Frequency of treatments: 2-5x/week x 1-2 weeks.    Time in  0742  Time out  0805    Total Treatment Time  23 minutes     Evaluation Time includes thorough review of current medical information, gathering information on past medical history/social history and prior level of function, completion of standardized testing/informal observation of tasks, assessment of data and education on plan of care and goals.    CPT codes:  [] Low Complexity PT evaluation 73972  [] Moderate Complexity PT evaluation 63290  [] High Complexity PT evaluation 20362  [] PT Re-evaluation 54025  [] Gait training 26294 0 minutes  [] Manual therapy 81905 0 minutes  [x] Therapeutic activities 55861 23 minutes  [] Therapeutic exercises 44248 0 minutes  [] Neuromuscular reeducation 34853 0 minutes     Alexandra Rogel, SPT  Artis Miguel, PT, DPT  [id number]

## 2024-03-28 NOTE — DISCHARGE INSTR - COC
Continuity of Care Form    Patient Name: Andre Brown   :  1950  MRN:  [id number]    Admit date:  3/21/2024  Discharge date:  2024    Code Status Order: Full Code   Advance Directives:     Admitting Physician:  Chasity De Guzman DO  PCP: Cruz Velasco DO    Discharging Nurse: Karen Ott RN  Discharging Hospital Unit/Room#: 8403/8403-A  Discharging Unit Phone Number: [phone]    Emergency Contact:   Extended Emergency Contact Information  Primary Emergency Contact: BautistaTonyAmaya \"Perla\"  Home Phone: [phone]  Mobile Phone: [phone]  Relation: Child  Preferred language: English   needed? No  Secondary Emergency Contact: ELEAZAR WOLFE  Home Phone: [phone]  Relation: Brother/Sister    Past Surgical History:  Past Surgical History:   Procedure Laterality Date    COLON SURGERY      COLONOSCOPY N/A 10/18/2021    COLONOSCOPY WITH BIOPSY performed by Luisa Sepulveda MD at [facility] ENDOSCOPY    DILATATION, ESOPHAGUS      HC DIALYSIS CATHETER N/A 2021    TEMPORARY HEMODIALYSIS CATHETER INSERTION performed by Sunny Blanc MD at [facility] OR    IR TUNNELED CATHETER PLACEMENT GREATER THAN 5 YEARS  3/25/2024    IR TUNNELED CATHETER PLACEMENT GREATER THAN 5 YEARS 3/25/2024 [place] [facility] [place]    LEG SURGERY Left 6 years ago    OTHER SURGICAL HISTORY      removal of HD catheter    PANCREAS BIOPSY N/A 2021    PANCREATIC PSEUDOCYST EXCISION DRAINAGE performed by Faina Colon MD at [facility] OR    PANCREATECTOMY N/A 2021    LAPAROSCOPIC ROBOTIC XI PANCREATIC NECROSECTOMY WITH CYST GASTROSTOMY , INTRAOPERATIVE  ULTRASOUND performed by Manuel Ortega III, [place] [facility] [place]    UPPER GASTROINTESTINAL ENDOSCOPY  2021    ENDOSCOPIC ULTRASOUND performed by Faina Colon MD at [facility] OR    UPPER GASTROINTESTINAL ENDOSCOPY N/A 10/18/2021    EGD BIOPSY performed by Luisa Sepulveda MD at [facility] ENDOSCOPY    UPPER GASTROINTESTINAL  documented pain score (0-10 scale): Pain Level: 0  Last Weight:   Wt Readings from Last 1 Encounters:   03/27/24 83 kg (182 lb 15.7 oz)     Mental Status:  oriented, alert, coherent, logical, thought processes intact, and able to concentrate and follow conversation    IV Access:  - None    Nursing Mobility/ADLs:  Walking   Independent  Transfer  Independent  Bathing  Independent  Dressing  Independent  Toileting  Independent  Feeding  Independent  Med Admin  Independent  Med Delivery   whole    Wound Care Documentation and Therapy:        Elimination:  Continence:   Bowel: Yes  Bladder: Yes  Urinary Catheter: None   Colostomy/Ileostomy/Ileal Conduit: No       Date of Last BM: 03/27/2024      I/O last 3 completed shifts:  In: 1020 [P.O.:720]  Out: 2200 [Urine:800]    Safety Concerns:     None    Impairments/Disabilities:      None    Nutrition Therapy:  Current Nutrition Therapy:   - Oral Diet:  Carb Control 3 carbs/meal (1500kcals/day)    Routes of Feeding: Oral  Liquids: No Restrictions  Daily Fluid Restriction: no  Last Modified Barium Swallow with Video (Video Swallowing Test): not done    Treatments at the Time of Hospital Discharge:   Respiratory Treatments: none  Oxygen Therapy:  is not on home oxygen therapy.  Ventilator:    - No ventilator support    Rehab Therapies: Physical Therapy and Occupational Therapy  Weight Bearing Status/Restrictions: No weight bearing restrictions  Other Medical Equipment (for information only, NOT a DME order):  walker  Other Treatments: none    Patient's personal belongings (please select all that are sent with patient):  None    RN SIGNATURE:  Electronically signed by Karen Ott RN on 3/28/24 at 4:25 PM EDT    CASE MANAGEMENT/SOCIAL WORK SECTION    Inpatient Status Date: ***    Readmission Risk Assessment Score:  Readmission Risk              Risk of Unplanned Readmission:  27           Discharging to Facility/ Agency   Name:   Address:  Phone:  Fax:    Dialysis  Facility (if applicable)   Name:  Address:  Dialysis Schedule:  Phone:  Fax:    / signature: {Esignature:[id number]}    PHYSICIAN SECTION    Prognosis: {Prognosis:6767985249}    Condition at Discharge: { Patient Condition:024312572}    Rehab Potential (if transferring to Rehab): {Prognosis:6882962940}    Recommended Labs or Other Treatments After Discharge: ***    Physician Certification: I certify the above information and transfer of Days HELADIO Brown  is necessary for the continuing treatment of the diagnosis listed and that he requires {Admit to Appropriate Level of Care:52829} for {GREATER/LESS:933232486} 30 days.     Update Admission H&P: {CHP DME Changes in HandP:213160210}    PHYSICIAN SIGNATURE:  Electronically signed by Tree Ortega MD on 3/28/24 at 3:42 PM EDT

## 2024-03-28 NOTE — PROGRESS NOTES
Hospitalist Progress Note      SYNOPSIS: Patient admitted on 3/21/2024 for Hypoglycemia  74 y.o. year old male  who  has a past medical history of A-fib (HCC), Cancer (HCC), CKD (chronic kidney disease), Diabetes mellitus (HCC), ED (erectile dysfunction), Hyperlipidemia, Hypertension, and Prostate cancer (HCC).  Patient presented to ED on 3/21/2024 for hypoglycemia.  Of note, patient was admitted and recently discharged AMA from hospital on 3/19/2024.  Patient was admitted for shortness of breath was intubated and extubated on 3/15/2024 completed treatment with Zosyn for aspiration pneumonia. Patient allegedly left AMA because it was his birthday and he wanted to go home to eat chicken.  Patient reportedly ate lots of chicken and other sorts of food and thought it wise to then take extra insulin, this strategy proved to be less than effective because patient subsequently ended up hypoglycemic.  Also of note, patient was being set up for tunneled dialysis catheter for dialysis as he has end-stage renal disease in need of hemodialysis.  That was not able to be completed prior to him leaving [place].  Current hospital course significant for creatinine 7.5, blood glucose level less than 40,-IMPROVED  with D10 drip;   Nephrology evaluated the patient and is planning for HD after TDC placement on Monday by IR; currently on bumex 2 mg po bid and metoprolol    underwent TDC placement  Stable for discharge to SNF  Patient needs chair time prior to discharge    SUBJECTIVE:  Feeling okay no complaints  No CP or SOB  No fever or chills   No uncontrolled pain  No vomiting or diarrhea   No events reported overnight  Chart reviewed         Temp (24hrs), Av.7 °F (36.5 °C), Min:96.9 °F (36.1 °C), Max:98.5 °F (36.9 °C)    DIET: ADULT DIET; Regular; 3 carb choices (45 gm/meal)  CODE: Full Code    Intake/Output Summary (Last 24 hours) at 3/28/2024 0832  Last data filed at 3/27/2024 1900  Gross per 24 hour   Intake 780 ml

## 2024-03-28 NOTE — DISCHARGE SUMMARY
Physician Discharge Summary     Patient ID:  Andre Brown  [id number]  74 y.o.  1950    Admit date: 3/21/2024    Discharge date and time:  3/28/2024    Discharge Diagnoses: Principal Problem:    Hypoglycemia  Active Problems:    Poorly controlled diabetes mellitus (HCC)  Resolved Problems:    * No resolved hospital problems. *      Consults: IP CONSULT TO NEPHROLOGY  IP CONSULT TO ENDOCRINOLOGY  IP CONSULT TO HOME CARE NEEDS    Procedures: See below    Hospital Course: Patient admitted on 3/21/2024 for Hypoglycemia  74 y.o. year old male  who  has a past medical history of A-fib (HCC), Cancer (HCC), CKD (chronic kidney disease), Diabetes mellitus (HCC), ED (erectile dysfunction), Hyperlipidemia, Hypertension, and Prostate cancer (HCC).  Patient presented to ED on 3/21/2024 for hypoglycemia.  Of note, patient was admitted and recently discharged AMA from hospital on 3/19/2024.  Patient was admitted for shortness of breath was intubated and extubated on 3/15/2024 completed treatment with Zosyn for aspiration pneumonia. Patient allegedly left [place] because it was his birthday and he wanted to go home to eat chicken.  Patient reportedly ate lots of chicken and other sorts of food and thought it wise to then take extra insulin, this strategy proved to be less than effective because patient subsequently ended up hypoglycemic.  Also of note, patient was being set up for tunneled dialysis catheter for dialysis as he has end-stage renal disease in need of hemodialysis.  That was not able to be completed prior to him leaving [place].  Current hospital course significant for creatinine 7.5, blood glucose level less than 40,-IMPROVED  with D10 drip;   Nephrology evaluated the patient and is planning for HD after TDC placement on Monday by IR; currently on bumex 2 mg po bid and metoprolol   03/25 underwent TDC placement  Stable for discharge to SNF  Patient needs chair time prior to discharge    Hypoglycemia secondary to insulin

## 2024-03-28 NOTE — PROGRESS NOTES
ENDOCRINOLOGY PROGRESS NOTE      Date of Service: 3/28/2024  Date of Admission: 3/21/2024  Admitting Physician: Chasity De Guzman DO   Primary Care Physician: Cruz Velasco DO  Consultant physician: Aubrey Quintero MD     Reason for the consultation:  Uncontrolled DM    History of Present Illness:  The history is provided by the patient. Accuracy of the patient data is excellent.      Subjective:  The patient was seen this afternoon, he was eating his lunch with fair appetite.  No acute events overnight    Inpatient diet:   Carb Restricted diet     Point of care glucose monitoring (Independently reviewed)   Recent Labs     03/27/24  0502 03/27/24  1113 03/27/24  1718 03/27/24  2039 03/28/24  0541 03/28/24  0813 03/28/24  1144 03/28/24  1652   POCGLU 203* 256* 97 216* 202* 193* 207* 165*       Scheduled Meds:   insulin glargine  8 Units SubCUTAneous QAM    insulin lispro  0-6 Units SubCUTAneous TID WC    insulin lispro  0-4 Units SubCUTAneous Nightly    insulin lispro  3 Units SubCUTAneous TID AC    heparin (porcine)  5,000 Units SubCUTAneous Q8H    amLODIPine  10 mg Oral Daily    vitamin C  250 mg Oral Daily    aspirin  81 mg Oral Daily    atorvastatin  10 mg Oral Nightly    hydrALAZINE  25 mg Oral BID    metoprolol tartrate  37.5 mg Oral BID    sodium chloride flush  5-40 mL IntraVENous 2 times per day    melatonin  3 mg Oral Nightly    lipase-protease-amylas  15,000 Units Oral TID WC    And    lipase-protease-amylase  20,000 Units Oral TID WC    epoetin joann-epbx  3,000 Units SubCUTAneous Once per day on Mon Wed Fri    bumetanide  2 mg Oral BID    sodium bicarbonate  1,300 mg Oral BID     PRN Meds:   sodium chloride flush, 5-40 mL, PRN  sodium chloride, , PRN  ondansetron, 4 mg, Q8H PRN   Or  ondansetron, 4 mg, Q6H PRN  polyethylene glycol, 17 g, Daily PRN  acetaminophen, 650 mg, Q6H PRN   Or  acetaminophen, 650 mg, Q6H PRN  glucagon (rDNA), 1 mg, PRN  dextrose, , Continuous PRN  aluminum & magnesium  hydroxide-simethicone, 30 mL, Q6H PRN  glucose, 4 tablet, PRN  dextrose bolus, 125 mL, PRN   Or  dextrose bolus, 250 mL, PRN  dextrose, , Continuous PRN      Continuous Infusions:   sodium chloride      dextrose      dextrose         Review of Systems  All systems reviewed. All negative except for symptoms mentioned in HPI     OBJECTIVE    /70   Pulse 71   Temp 98.4 °F (36.9 °C) (Temporal)   Resp 18   Ht 1.88 m (6' 2\")   Wt 83 kg (182 lb 15.7 oz)   SpO2 100%   BMI 23.49 kg/m²     Intake/Output Summary (Last 24 hours) at 3/28/2024 1718  Last data filed at 3/28/2024 1656  Gross per 24 hour   Intake 800 ml   Output 400 ml   Net 400 ml       General: A&Ox3, not intubated  HEENT: normocephalic non traumatic, no exophthalmos   Neck: supple,   Pulm: good equal air entry no added sounds  CVS: S1 + S2, no crackles  Abd: soft lax, no tenderness   Skin: no rash, No open wounds, no ulcers   Psych: Follows commands    Review of Laboratory Data:  I have reviewed the following:  Recent Labs     03/26/24  1350 03/27/24  0509 03/28/24  0423   WBC 7.0 6.5 5.9   RBC 2.97* 3.17* 3.23*   HGB 8.2* 8.7* 8.8*   HCT 25.0* 26.3* 27.4*   MCV 84.2 83.0 84.8   MCH 27.6 27.4 27.2   MCHC 32.8 33.1 32.1   RDW 15.9* 15.5* 15.7*    276 254   MPV 10.8 11.2 11.4     Recent Labs     03/26/24  1350 03/27/24  0509 03/28/24  0423    134 133   K 4.2 3.8 4.5    96* 95*   CO2 18* 22 27   BUN 35* 20 16   CREATININE 5.1* 3.6* 3.1*   GLUCOSE 271* 181* 198*   CALCIUM 9.0 8.9 9.0     Beta-Hydroxybutyrate   Date Value Ref Range Status   03/15/2024 1.82 (H) 0.02 - 0.27 mmol/L Final   03/11/2024 0.49 (H) 0.02 - 0.27 mmol/L Final   01/18/2023 0.11 0.02 - 0.27 mmol/L Final     Lab Results   Component Value Date/Time    LABA1C 6.8 03/22/2024 03:00 PM    LABA1C 6.6 03/11/2024 11:28 PM    LABA1C 11.7 01/27/2022 03:26 PM     Lab Results   Component Value Date/Time    TSH 1.52 03/12/2024 12:05 PM     Lab Results   Component Value Date/Time  high risk of hypoglycemia  Adjusted insulin regimen as per above  Will closely monitor glucose level    Interdisciplinary plan for communication with healthcare providers:   Consult recommendations were discussed with the Primary Service/Nursing staff      The above issues were reviewed with the patient who understood and agreed with the plan.    Thank you for allowing us to participate in the care of this patient. Please do not hesitate to contact us with any additional questions.     Aubrey Quintero MD  Endocrinologist, [facility] and Endocrinology   [address] [place] [address]   Phone: [phone]  Fax: [phone]  --------------------------  An electronic signature was used to authenticate this note. Aubrey Quintero MD on 3/28/2024 at 5:18 PM

## 2024-03-28 NOTE — PLAN OF CARE
Problem: Safety - Adult  Goal: Free from fall injury  3/28/2024 1910 by Karen Quiroz RN  Outcome: Adequate for Discharge  3/28/2024 1104 by Karen Quiroz RN  Outcome: Progressing     Problem: Chronic Conditions and Co-morbidities  Goal: Patient's chronic conditions and co-morbidity symptoms are monitored and maintained or improved  3/28/2024 1910 by Karne Quiroz RN  Outcome: Adequate for Discharge  3/28/2024 1104 by Karen Quiroz RN  Outcome: Progressing     Problem: Discharge Planning  Goal: Discharge to home or other facility with appropriate resources  3/28/2024 1910 by Karen Quiroz RN  Outcome: Adequate for Discharge  3/28/2024 1104 by Karen Quiroz RN  Outcome: Progressing     Problem: Risk for Elopement  Goal: Patient will not exit the unit/facility without proper excort  3/28/2024 1910 by Karen Quiroz RN  Outcome: Adequate for Discharge  3/28/2024 1104 by Karen Quiroz RN  Outcome: Progressing     Problem: Skin/Tissue Integrity  Goal: Absence of new skin breakdown  Description: 1.  Monitor for areas of redness and/or skin breakdown  2.  Assess vascular access sites hourly  3.  Every 4-6 hours minimum:  Change oxygen saturation probe site  4.  Every 4-6 hours:  If on nasal continuous positive airway pressure, respiratory therapy assess nares and determine need for appliance change or resting period.  3/28/2024 1910 by Karen Quiroz RN  Outcome: Adequate for Discharge  3/28/2024 1104 by Karen Quiroz RN  Outcome: Progressing

## 2024-03-28 NOTE — PROGRESS NOTES
Attempted to call back Belia otero again, but still no answer.     Patient will be  by a taxi at 1800H

## 2024-03-28 NOTE — PROGRESS NOTES
Department of Internal Medicine  Nephrology Attending Progress Note    Events reviewed.      SUBJECTIVE:  We are following Mr. Andre Brown for BLAIR on CKD.  Reports no complaints.      PHYSICAL EXAM:      Vitals:    VITALS:  /70   Pulse 71   Temp 98.4 °F (36.9 °C) (Temporal)   Resp 18   Ht 1.88 m (6' 2\")   Wt 83 kg (182 lb 15.7 oz)   SpO2 100%   BMI 23.49 kg/m²   24HR INTAKE/OUTPUT:    Intake/Output Summary (Last 24 hours) at 3/28/2024 0952  Last data filed at 3/27/2024 1900  Gross per 24 hour   Intake 780 ml   Output 2200 ml   Net -1420 ml       Access:   Constitutional: Patient is lethargic today.  HEENT: Pupils are equal reactive, mucous membranes are moist  Respiratory: Decreased breath sounds at the bases  Cardiovascular/Edema: Heart sounds irregularly irregular  Gastrointestinal: Abdomen soft  Neurologic: Nonfocal  Skin: No lesions  Other: Trace edema    Scheduled Meds:   insulin glargine  8 Units SubCUTAneous QAM    insulin lispro  0-6 Units SubCUTAneous TID WC    insulin lispro  0-4 Units SubCUTAneous Nightly    insulin lispro  3 Units SubCUTAneous TID AC    heparin (porcine)  5,000 Units SubCUTAneous Q8H    amLODIPine  10 mg Oral Daily    vitamin C  250 mg Oral Daily    aspirin  81 mg Oral Daily    atorvastatin  10 mg Oral Nightly    hydrALAZINE  25 mg Oral BID    metoprolol tartrate  37.5 mg Oral BID    sodium chloride flush  5-40 mL IntraVENous 2 times per day    melatonin  3 mg Oral Nightly    lipase-protease-amylas  15,000 Units Oral TID WC    And    lipase-protease-amylase  20,000 Units Oral TID WC    epoetin joann-epbx  3,000 Units SubCUTAneous Once per day on Mon Wed Fri    bumetanide  2 mg Oral BID    sodium bicarbonate  1,300 mg Oral BID     Continuous Infusions:   sodium chloride      dextrose      dextrose         PRN Meds:.sodium chloride flush, sodium chloride, ondansetron **OR** ondansetron, polyethylene glycol, acetaminophen **OR** acetaminophen, glucagon (rDNA), dextrose,

## 2024-03-28 NOTE — CARE COORDINATION
Per Marisol from [facility], patient is approved to go there for dialysis. She will call me back with a chair time. Per endocrinology note from yesterday, Patient's diabetes is uncontrolled and is very brittle. We recommend the following DM regimen: Change Lantus to 8 units daily, Humalog 3 units 3 times daily with meals, Low-dose sliding scale ACHS, Continue glucose check with meals and at bedtime, Will titrate insulin dose based on the blood glucose trend & insulin requirement, We will try to provide the patient with continuous glucose monitor CGM before discharge, Will also provide detailed instructions on sliding scale regimen in discharge summary. I discussed this with patient and transition of care. He is not sure if he is able to manage the above regimen at home and is thinking about a [facility]. He asked me to call his daughter Perla to discuss this as well. I attempted to call Perla but had to leave a voicemail.  As of now the plan is home with [facility]. Home health care orders are in. Await call back from Perla to discuss transition of care.   Araseli Merlos RN CM  [phone]      I received a callback from patient's daughter Perla. She spoke with her father and they would like [facility]. Referral made to Sheila at [facility]. Await acceptance. Message left with Marisol from [facility] to check on chair times so that transportation can be arranged by the facility. Await call back. A 7000 will need to be completed for the accepting facility. Will arrange [facility] transportation at time of discharge.  Araseli Merlos RN CM  [phone]        Met with Dr. Ortega, internal med. He said he will discharge patient once we get a chair time. Message left with [facility] from [facility] again to let her know of need for the chair time ASAP.  Araseli Merlos RN CM  [phone]      Per Marisol from [facility], patient's chair time is Tuesday, Thursday, Saturday at 5 am. Sheila from [facility] notified. They can

## 2024-03-28 NOTE — CARE COORDINATION
Discharge order noted. I attempted to call Monique for transportation to [facility] and they are booked. Sheila from [facility] said they can pay for the Taxi but taxi does not take payment over the phone. I called and spoke to my director Ivania who said we can cover the cost of taxi. Taxi voucher given to RN. Sheila from [facility], patient and daughter Perla all notified of the discharge today and taxi transport. 7000 completed and placed in envelope in soft chart.     Araseli Merlos RN CM  [phone]

## 2024-03-28 NOTE — PLAN OF CARE
Problem: Safety - Adult  Goal: Free from fall injury  3/28/2024 1104 by Karen Quiroz RN  Outcome: Progressing  3/27/2024 2129 by Meño Baig RN  Outcome: Progressing     Problem: Chronic Conditions and Co-morbidities  Goal: Patient's chronic conditions and co-morbidity symptoms are monitored and maintained or improved  3/28/2024 1104 by Karen Quiroz RN  Outcome: Progressing  3/27/2024 2129 by Meño Baig RN  Outcome: Progressing     Problem: Discharge Planning  Goal: Discharge to home or other facility with appropriate resources  3/28/2024 1104 by Karen Quiroz RN  Outcome: Progressing  3/27/2024 2129 by Meño Baig RN  Outcome: Progressing     Problem: Skin/Tissue Integrity  Goal: Absence of new skin breakdown  Description: 1.  Monitor for areas of redness and/or skin breakdown  2.  Assess vascular access sites hourly  3.  Every 4-6 hours minimum:  Change oxygen saturation probe site  4.  Every 4-6 hours:  If on nasal continuous positive airway pressure, respiratory therapy assess nares and determine need for appliance change or resting period.  3/28/2024 1104 by Karen Quiroz RN  Outcome: Progressing  3/27/2024 2129 by Meño Baig RN  Outcome: Progressing     Problem: Pain  Goal: Verbalizes/displays adequate comfort level or baseline comfort level  3/28/2024 1104 by Karen Quiroz RN  Outcome: Progressing  Flowsheets (Taken 3/28/2024 0724)  Verbalizes/displays adequate comfort level or baseline comfort level: Encourage patient to monitor pain and request assistance  3/27/2024 2129 by Meño Baig RN  Outcome: Progressing

## 2024-03-28 NOTE — PROGRESS NOTES
Called Belia Hernandez several times for a nurse to nurse report but to no avail, no one is answering my call.    Will call later.

## 2024-04-13 NOTE — PROGRESS NOTES
Physician Progress Note      PATIENT:               ALEJANDRO HINKLE #:                  [id number]  :                       1950  ADMIT DATE:       3/21/2024 8:19 PM  DISCH DATE:        3/28/2024 7:16 PM  RESPONDING  PROVIDER #:        Tree Ortega MD          QUERY TEXT:    Dear Provider,    Pt presents with hypoglycemia, noted documentation of BLAIR stage III on CKD.   Tunneled HD catheter placed for ESRD. In order to support the diagnosis of   BLAIR, please include additional clinical indicators in your documentation.? Or   please document if the diagnosis of BLAIR has been ruled out after further   study.    The medical record reflects the following:  Risk Factors: HTN, CKD  Clinical Indicators: Noted Acute kidney injury stage III on CKD on dialysis  3/22 Nephrology consult: BLAIR on CKD, hemodynamically mediated 2/2   decompensated heart failure versus progression of CKD.  CKD stage IIIb, with large proteinuria, 2/2 diabetic nephropathy, baseline   creatinine 3.1-3.2 mg/dL  3/27 Nephrology consult: BLAIR on CKD, hemodynamically mediated 2/2   decompensated heart failure versus progression of CKD.  Tunneled dialysis catheter placement 3/25/24.  Started on renal replacement   therapy on 2024, tolerated well.  For  third HD treatment today  3/21 BUN 44/Cr 7.5/GFR 7  3/22  BUN 41/Cr 6.1/GFR 9--BUN 44/7.1/GFR 7  3/23  BUN 45/Cr 6.7/GFR 8  3/24  BUN 48/Cr 7.0/GFR 8  3/25  BUN 48/Cr 6.6/GFR 8  3/25 Tunneled HD catheter placed and started on hemodialysis  3/26 BUN 35/Cr 5.1/GFR 11  3/27 BUN 20/Cr 3.6/GFR 17  Treatment: Labs, Nephrology consult, Hemodialysis catheter placed and started   on hemodialysis      Thank you,  Araseli Wright RN  Clinical Documentation Integrity  [phone]    Defined by Kidney Disease Improving Global Outcomes (KDIGO) clinical practice   guideline for acute kidney injury:  -Increase in SCr by greater than or equal to 0.3 mg/dl within 48 hours; or  -Increase or decrease  in SCr to greater than or equal to 1.5 times baseline,   which is known or presumed to have occurred within the prior 7 days; or  -Urine volume < 0.5ml/kg/h for 6 hours.  Options provided:  -- Acute kidney injury evidenced by, Please document evidence as well as a   numerical baseline creatinine, if known.  -- Acute kidney injury ruled out after study  -- CKD 3 progressed to ESRD on admission. BLAIR ruled out after study  -- CKD 3 progressed to ESRD after admission. BLAIR ruled out after study  -- Other - I will add my own diagnosis  -- Disagree - Not applicable / Not valid  -- Disagree - Clinically unable to determine / Unknown  -- Refer to Clinical Documentation Reviewer    PROVIDER RESPONSE TEXT:    This patient has acute kidney injury as evidenced by acute elevated Cr on CKD    Query created by: Araseli Wright on 3/27/2024 3:12 PM      Electronically signed by:  Tree Ortega MD 4/13/2024 7:45 AM

## 2024-07-18 NOTE — PROGRESS NOTES
[address] [place] [address] [facility]  Progress Note      Chief Complaint   Patient presents with    Chest Pain     left side, radiating into LUQ/LLQ. states abd pain has been ongoing since 6/15 but chest pain started around 1600 yesterday. denies n/v.  denies sob. denies diaphoresis. recent dx of afib       SUBJECTIVE:  Patient is tolerating medications. No reported adverse drug reactions. No nausea, vomiting, diarrhea. Seen in recovery room NG tube in place but stable  Afebrile  Surgical note read  Review of systems:  As stated above in the chief complaint, otherwise negative. Medications:  Scheduled Meds:   insulin lispro  0-18 Units Subcutaneous Q4H    lisinopril  20 mg Oral BID    metoprolol tartrate  25 mg Oral BID    meropenem  1,000 mg Intravenous Q8H    fluconazole  400 mg Intravenous Q24H    lidocaine PF  5 mL Intradermal Once    sodium chloride flush  5-40 mL Intravenous 2 times per day    heparin flush  3 mL Intravenous 2 times per day     Continuous Infusions:   dextrose      sodium chloride      sodium chloride      sodium chloride 95 mL/hr at 21 1427    PN-Adult  3 IN 1 Central Line (Custom)      lactated ringers 125 mL/hr at 21 0923    sodium chloride      PN-Adult  3 IN 1 Central Line (Standard) 37.5 mL/hr at 21 1427     PRN Meds:glucose, dextrose, glucagon (rDNA), dextrose, morphine, morphine, HYDROmorphone, HYDROmorphone, ondansetron, promethazine, meperidine, hydrALAZINE, sodium chloride, sodium chloride, bupivacaine (PF), labetalol, ondansetron, sodium chloride flush, sodium chloride, heparin flush    OBJECTIVE:  BP (!) 148/111   Pulse 112   Temp 97.8 °F (36.6 °C) (Temporal)   Resp 16   Ht 6' 2\" (1.88 m)   Wt 159 lb 9.6 oz (72.4 kg)   SpO2 96%   BMI 20.49 kg/m²   Temp  Av.5 °F (36.9 °C)  Min: 97.3 °F (36.3 °C)  Max: 98.8 °F (37.1 °C)  Constitutional: The patient is awake,   Skin: Warm and dry. No rashes were noted.    HEENT: Date of visit: 7/18/2024      Chief Complaint   Patient presents with    Office Visit    Refill Request       MA note appreciated and accepted.      HISTORY OF PRESENT ILLNESS:  Ms Nelli Martin, who is a pt of Pcp, No presents for:  1- c/o 2-3 mo hx of swollen painful legs. Hurts to walk.   2- c/o vag odor.  Ok for std screening  3- hx of bilat PE - was on eliquis for a time, none recently.  We called pharmacy from [place] - he saw 5 mg eliquid from Aug to Nov of 2022.  Per chart review: + factor 5 Leiden & Prothrombin gene mutation, but at another point it said they were negative.   3- c/o intermittent sharp pain in L chest x 5-10 min. Throbbing pain shoots up to the back of the head. Had clots around her heart at one time.   4- hx of iron defic anemia. Not taking iron.   5- hx of large fibroids, irreg & very heavy, painful periods - is hoping for a hysterectomy.  Had her tubes tied.   6- worried about HTN - was on amlodipine per pharmacist.  7- hx of bad asthma  8- interested in handicapped parking.  9 - hx of migraines - states this is low importance at present. Per pharmacist was on imitrex 25 mg.      Pt is new to [place] from [place] 6 mo ago.  Just now got insurance.        Past Medical History:   Diagnosis Date    Anxiety     Asthma (CMD)     Fibroid          REVIEW OF SYSTEMS:  See HPI         EXAM:  Visit Vitals  /68 (BP Location: LUE - Left upper extremity, Patient Position: Sitting, Cuff Size: Regular)   Resp 20   Ht 5' 5\" (1.651 m)   Wt 82.1 kg (181 lb)   LMP 07/11/2024   BMI 30.12 kg/m²     General: 40 year old female who looks uncomfortable; not ill appearing.   Cardiovascular: RRR, no murmur appreciated, 3+ bilat pitting edema in feet, ankles & 2/3 of the way up her legs.  A bit of edema up to knees.   Respiratory: CTA bilateral, normal resp effort  Abdomen: very enlarged, firm, uterus - R>L.  Nontender.   Calves are tender.   Knees: painful, but full ROM, no undue swelling, erythema or warmth.   No increased pain with palpation. No bruising.  No laxity.        ASSESSMENT:   1. Iron deficiency anemia due to chronic blood loss    2. Low iron    3. Benign essential HTN    4. Screen for STD (sexually transmitted disease)    5. Bilateral swelling of feet    6. Possible pregnancy    7. Elevated blood sugar    8. Trichomonas vaginitis    9. Synovial cyst of knee, unspecified laterality    10. Migraine without status migrainosus, not intractable, unspecified migraine type    Trich treatment prescribed with expedited partner therapy.      PLAN:   Orders Placed This Encounter    Ferritin    Iron And total Iron Binding Capacity    Chlamydia/Gonorrhea by Nucleic Acid Amplification    RPR (Rapid Plasma Reagin) Traditional Syphilis Screen Algorithm    WET MOUNT    HIV 1/HIV 2 Antigen/Antibody Screen    Urinalysis With Microscopy & Culture If Indicated    Pregnancy Test, Serum Qualitative    CBC No Differential    Comprehensive Metabolic Panel    Glycohemoglobin    DISCONTD: metroNIDAZOLE (FLAGYL) 500 MG tablet    metroNIDAZOLE (FLAGYL) 500 MG tablet    Ferrous Sulfate (Iron) 325 (65 Fe) MG Tab    SUMAtriptan (IMITREX) 25 MG tablet    US VASC LOWER EXTREMITY VENOUS DUPLEX BILATERAL   We reviewed all available labs in detail & doppler negative for DVT but showing bilat Crook's cysts.    Start iron tab daily - even when periods are done, you will need to take iron for 3-6 months to refill iron stores.    For Trichomonas  - antibiotic - no alcohol with this- it will make you sick.  Antibiotic for partner too.   No sex until you & partner are treated & symptom free.  Recheck in 3 months.    Awaiting for iron & ferritin  Other STD results    Continue with tylenol (acetaminophen) as needed for pain.  Avoid ibuprofen, aleve, aspirin.      FU: keep appts with ob/gyn on 8-7 & to establish care with Dr Mcintosh on 8-27.  Come to see me again sooner if problems.     ADDENDUM 7/19/2024:  TC to pt - we discussed her remaining lab  Round and reactive pupils. Moist mucous membranes. No ulcerations or thrush. Neck: Supple to movements. Chest: No use of accessory muscles to breathe. Symmetrical expansion. No wheezing, crackles or rhonchi. Cardiovascular: S1 and S2 are rhythmic and regular. No murmurs appreciated. Abdomen: Positive bowel sounds to auscultation. Benign to palpation. No masses felt. No hepatosplenomegaly. Laparoscopy sites noted  Genitourinary: Male  Extremities: No clubbing, no cyanosis, no edema.   Lines: peripheral    Laboratory and Tests Review:  Lab Results   Component Value Date    WBC 9.8 06/23/2021    WBC 7.5 06/22/2021    WBC 6.9 06/17/2021    HGB 10.6 (L) 06/23/2021    HCT 30.6 (L) 06/23/2021    MCV 77.7 (L) 06/23/2021     06/23/2021     Lab Results   Component Value Date    NEUTROABS 6.20 06/22/2021    NEUTROABS 5.27 06/17/2021    NEUTROABS 2.23 03/01/2021     No results found for: CRP  Lab Results   Component Value Date    ALT 7 06/23/2021    AST 8 06/23/2021    ALKPHOS 129 06/23/2021    BILITOT 0.5 06/23/2021     Lab Results   Component Value Date     06/23/2021    K 4.1 06/23/2021    K 4.3 06/17/2021     06/23/2021    CO2 21 06/23/2021    BUN 26 06/23/2021    CREATININE 1.6 06/23/2021    CREATININE 1.7 06/23/2021    CREATININE 2.0 06/22/2021    GFRAA 51 06/23/2021    LABGLOM 51 06/23/2021    GLUCOSE 302 06/23/2021    PROT 6.4 06/23/2021    LABALBU 2.9 06/23/2021    CALCIUM 8.7 06/23/2021    BILITOT 0.5 06/23/2021    ALKPHOS 129 06/23/2021    AST 8 06/23/2021    ALT 7 06/23/2021     Lab Results   Component Value Date    CRP 7.9 (H) 01/12/2021    CRP 5.1 (H) 01/01/2021    CRP 5.5 (H) 12/31/2020     Lab Results   Component Value Date    SEDRATE 55 (H) 01/01/2021    SEDRATE 55 (H) 12/31/2020    SEDRATE 65 (H) 12/30/2020     Radiology:  Reviewed    Microbiology:   Lab Results   Component Value Date    BC 5 Days no growth 01/13/2021    BC 5 Days no growth 12/22/2020    ORG Yeast, not C. albicans results, except iron & ferritin not back yet.  Discussed the importance of starting iron tabs.  Prescription sent for iron & metronidazole for pt & EPT.       ADDENDUM 7/22/2024: TC to pt - we discussed her very low iron & ferritin levels.  She has not started her iron tabs yet as she has been out of town.  Coming back today & will start.  Requesting imitrex for migraines.          Written information given   Patient states understanding and agreement with plan

## 2024-08-17 ENCOUNTER — APPOINTMENT (OUTPATIENT)
Dept: CT IMAGING | Age: 74
End: 2024-08-17
Payer: MEDICARE

## 2024-08-17 ENCOUNTER — HOSPITAL ENCOUNTER (EMERGENCY)
Age: 74
Discharge: HOME | End: 2024-08-18
Attending: STUDENT IN AN ORGANIZED HEALTH CARE EDUCATION/TRAINING PROGRAM
Payer: MEDICARE

## 2024-08-17 ENCOUNTER — APPOINTMENT (OUTPATIENT)
Dept: GENERAL RADIOLOGY | Age: 74
End: 2024-08-17
Payer: MEDICARE

## 2024-08-17 VITALS
DIASTOLIC BLOOD PRESSURE: 96 MMHG | BODY MASS INDEX: 23.1 KG/M2 | HEART RATE: 92 BPM | WEIGHT: 180 LBS | SYSTOLIC BLOOD PRESSURE: 165 MMHG | RESPIRATION RATE: 18 BRPM | HEIGHT: 74 IN | TEMPERATURE: 98.4 F | OXYGEN SATURATION: 96 %

## 2024-08-17 DIAGNOSIS — E11.65 HYPERGLYCEMIA DUE TO DIABETES MELLITUS (HCC): ICD-10-CM

## 2024-08-17 DIAGNOSIS — I10 HYPERTENSION, UNSPECIFIED TYPE: ICD-10-CM

## 2024-08-17 DIAGNOSIS — R20.2 PARESTHESIA: Primary | ICD-10-CM

## 2024-08-17 DIAGNOSIS — H53.8 BLURRED VISION, BILATERAL: ICD-10-CM

## 2024-08-17 LAB
ALBUMIN SERPL-MCNC: 4.2 G/DL (ref 3.5–5.2)
ALP SERPL-CCNC: 90 U/L (ref 40–129)
ALT SERPL-CCNC: 17 U/L (ref 0–40)
ANION GAP SERPL CALCULATED.3IONS-SCNC: 14 MMOL/L (ref 7–16)
AST SERPL-CCNC: 20 U/L (ref 0–39)
BASOPHILS # BLD: 0.05 K/UL (ref 0–0.2)
BASOPHILS NFR BLD: 1 % (ref 0–2)
BILIRUB SERPL-MCNC: 0.3 MG/DL (ref 0–1.2)
BNP SERPL-MCNC: 4891 PG/ML (ref 0–450)
BUN SERPL-MCNC: 12 MG/DL (ref 6–23)
CALCIUM SERPL-MCNC: 9.5 MG/DL (ref 8.6–10.2)
CHLORIDE SERPL-SCNC: 95 MMOL/L (ref 98–107)
CO2 SERPL-SCNC: 23 MMOL/L (ref 22–29)
CREAT SERPL-MCNC: 2.5 MG/DL (ref 0.7–1.2)
EKG ATRIAL RATE: 76 BPM
EKG P AXIS: 83 DEGREES
EKG Q-T INTERVAL: 386 MS
EKG QRS DURATION: 76 MS
EKG QTC CALCULATION (BAZETT): 434 MS
EKG R AXIS: -10 DEGREES
EKG T AXIS: 12 DEGREES
EKG VENTRICULAR RATE: 76 BPM
EOSINOPHIL # BLD: 0.07 K/UL (ref 0.05–0.5)
EOSINOPHILS RELATIVE PERCENT: 1 % (ref 0–6)
ERYTHROCYTE [DISTWIDTH] IN BLOOD BY AUTOMATED COUNT: 14.5 % (ref 11.5–15)
GFR, ESTIMATED: 26 ML/MIN/1.73M2
GLUCOSE SERPL-MCNC: 250 MG/DL (ref 74–99)
HCT VFR BLD AUTO: 33.3 % (ref 37–54)
HGB BLD-MCNC: 11.4 G/DL (ref 12.5–16.5)
IMM GRANULOCYTES # BLD AUTO: 0.04 K/UL (ref 0–0.58)
IMM GRANULOCYTES NFR BLD: 1 % (ref 0–5)
LYMPHOCYTES NFR BLD: 1.62 K/UL (ref 1.5–4)
LYMPHOCYTES RELATIVE PERCENT: 26 % (ref 20–42)
MAGNESIUM SERPL-MCNC: 2 MG/DL (ref 1.6–2.6)
MCH RBC QN AUTO: 29.1 PG (ref 26–35)
MCHC RBC AUTO-ENTMCNC: 34.2 G/DL (ref 32–34.5)
MCV RBC AUTO: 84.9 FL (ref 80–99.9)
MONOCYTES NFR BLD: 0.51 K/UL (ref 0.1–0.95)
MONOCYTES NFR BLD: 8 % (ref 2–12)
NEUTROPHILS NFR BLD: 64 % (ref 43–80)
NEUTS SEG NFR BLD: 4.04 K/UL (ref 1.8–7.3)
PLATELET # BLD AUTO: 199 K/UL (ref 130–450)
PMV BLD AUTO: 10.9 FL (ref 7–12)
POTASSIUM SERPL-SCNC: 4.4 MMOL/L (ref 3.5–5)
PROT SERPL-MCNC: 7.4 G/DL (ref 6.4–8.3)
RBC # BLD AUTO: 3.92 M/UL (ref 3.8–5.8)
SODIUM SERPL-SCNC: 132 MMOL/L (ref 132–146)
TROPONIN I SERPL HS-MCNC: 84 NG/L (ref 0–11)
TROPONIN I SERPL HS-MCNC: 89 NG/L (ref 0–11)
WBC OTHER # BLD: 6.3 K/UL (ref 4.5–11.5)

## 2024-08-17 PROCEDURE — 93005 ELECTROCARDIOGRAM TRACING: CPT | Performed by: STUDENT IN AN ORGANIZED HEALTH CARE EDUCATION/TRAINING PROGRAM

## 2024-08-17 PROCEDURE — 83735 ASSAY OF MAGNESIUM: CPT

## 2024-08-17 PROCEDURE — 84484 ASSAY OF TROPONIN QUANT: CPT

## 2024-08-17 PROCEDURE — 99285 EMERGENCY DEPT VISIT HI MDM: CPT

## 2024-08-17 PROCEDURE — 72125 CT NECK SPINE W/O DYE: CPT

## 2024-08-17 PROCEDURE — 80053 COMPREHEN METABOLIC PANEL: CPT

## 2024-08-17 PROCEDURE — 71045 X-RAY EXAM CHEST 1 VIEW: CPT

## 2024-08-17 PROCEDURE — 85025 COMPLETE CBC W/AUTO DIFF WBC: CPT

## 2024-08-17 PROCEDURE — 70450 CT HEAD/BRAIN W/O DYE: CPT

## 2024-08-17 PROCEDURE — 83880 ASSAY OF NATRIURETIC PEPTIDE: CPT

## 2024-08-17 NOTE — ED PROVIDER NOTES
Name: Andre Brown    MRN: [id number]     Date / Time Roomed:  8/17/2024 11:20 PM  ED Bed Assignment:  ST01/ST-01    ------------------ History of Present Illness --------------------  8/17/24, Time: 7:56 PM EDT   Chief Complaint   Patient presents with    Numbness     For \"about a week or two\" , patient noticed numbness in his jaw, neck and forehead. Patient also states he is having blurry vision. Pt also complaining of pain in his arms. Dialysis Tuesday Thursday Saturday, pt went today.       HPI    Andre Brown is a 74 y.o. male, with hx of A-fib, ESRD on dialysis, diabetes, ED, hyperlipidemia, hypertension, prostate cancer, on Eliquis who presents to the ED today for bilateral facial numbness as well as forearm pains over the past month.  He is also had some blurry vision and states he has had to use reading glasses starting about a month ago which she did not have to before.  Symptoms been constant, moderate in severity, no exacerbating relieving factors.  He states he was told his hemoglobin A1c was quite elevated.  He does admit to having neuropathy in his lower extremities.  He was concerned about possible diabetic neuropathy causing his blurry vision as well as forearm pains.  He denies any history of stroke.  Denies any weakness.  He denies any headache, neck pain chest pain, shortness of breath, abdominal pain, GI or  complaints.  Patient denies missing any dialysis sessions.  He does see Dr. Sharma, nephrology, and goes to dialysis Tuesday Thursday Saturday. the pt denies other ROS at this time.     PCP: Cruz Velasco, .    -------------------- [facility] --------------------    Past Medical History:   has a past medical history of A-fib (HCC), Cancer (HCC), CKD (chronic kidney disease), Diabetes mellitus (HCC), ED (erectile dysfunction), Hyperlipidemia, Hypertension, and Prostate cancer (HCC).     Surgical History:  Past Surgical History:   Procedure Laterality Date    COLON SURGERY       1.20 mg/dL    Est, Glofuentes Filt Rate 26 (L) >60 mL/min/1.73m2    Calcium 9.5 8.6 - 10.2 mg/dL    Total Protein 7.4 6.4 - 8.3 g/dL    Albumin 4.2 3.5 - 5.2 g/dL    Total Bilirubin 0.3 0.0 - 1.2 mg/dL    Alkaline Phosphatase 90 40 - 129 U/L    ALT 17 0 - 40 U/L    AST 20 0 - 39 U/L   Brain Natriuretic Peptide   Result Value Ref Range    Pro-BNP 4,891 (H) 0 - 450 pg/mL   Magnesium   Result Value Ref Range    Magnesium 2.0 1.6 - 2.6 mg/dL   Troponin   Result Value Ref Range    Troponin, High Sensitivity 84 (H) 0 - 11 ng/L   EKG 12 Lead   Result Value Ref Range    Ventricular Rate 76 BPM    Atrial Rate 76 BPM    QRS Duration 76 ms    Q-T Interval 386 ms    QTc Calculation (Bazett) 434 ms    P Axis 83 degrees    R Axis -10 degrees    T Axis 12 degrees         Radiology:   Non-plain film images such as CT, Ultrasound and MRI are read by the radiologist. Plain radiographic images are visualized and preliminarily interpreted by the ED Provider in the MDM section.    Interpretation per the Radiologist below, if available at the time of this note:    CT HEAD WO CONTRAST   Final Result   No acute intracranial abnormality.         CT CERVICAL SPINE WO CONTRAST   Final Result   No acute abnormality of the cervical spine.         XR CHEST 1 VIEW   Final Result   1. No acute cardiopulmonary process.   2. Right-sided hemodialysis catheter with the tip in the right atrium.           No results found.    No results found.    ------------------- NURSING NOTES & VITALS -------------------    The nursing notes within the ED encounter and vital signs were reviewed.     Vitals:    08/17/24 2321   BP: (!) 165/96   Pulse: 92   Resp: 18   Temp:    SpO2: 96%        Patient Vitals for the past 8 hrs:   BP Temp Temp src Pulse Resp SpO2 Height Weight   08/17/24 2321 (!) 165/96 -- -- 92 18 96 % -- --   08/17/24 1949 (!) 200/117 -- -- -- -- -- 1.88 m (6' 2\") 81.6 kg (180 lb)   08/17/24 1934 -- 98.4 °F (36.9 °C) Oral 78 17 99 % -- --

## 2024-08-18 NOTE — DISCHARGE INSTRUCTIONS
Please follow-up with your primary care doctor.  Please return to the hospital for any new or worsening symptoms.    Please make appointment to see your eye doctor.  Please take your blood pressure medications as well as diabetic medication as prescribed.    You were seen in the emergency department today for your numbness in your face and your hands and your blurry vision.  Your CAT scan of your head did not show any signs of stroke, we did get imaging of your neck which appear to show arthritis, I gave you a neurosurgeon that you can follow-up with for further discussion about this, your symptoms of the numbness in your hands and face may be related to this, versus your diabetes.  I would recommend following up with your PCP, as well as your eye doctor for repeat eye check as you may be developing some diabetic retinopathy.  Please return to the emergency department if you are having chest pain, shortness of breath, strokelike symptoms.

## 2024-08-18 NOTE — ED PROVIDER NOTES
spine showed multi degenerative disc disease which may explain his arm numbness.  Considered myelopathy no weakness on exam.  Neurologic exam reassuring with no signs of facial droop, no noted weakness, no absence of sensation, no abnormal extraocular motion to suggest strokelike symptoms with CT head not showing evidence of infarct.  Patient's ocular changes were bilateral, described as blurred vision, history of poorly controlled diabetes, considered patient may have diabetic retinopathy, and recommended following up with his eye doctor for this.  Given the jaw numbness in the arm numbness considered ACS, EKG and troponins were not consistent with this, patient denied any chest pain.  Patient was informed of his laboratory workup and imaging results, he was discharged, referred to his primary care physician, recommended following up with his eye doctor, given follow-up information for neurosurgery as well, repeat blood pressure significant improved, return precautions given he was agreeable this plan. [JG]      ED Course User Index  [JG] Law Wick MD  [PW] Luis Salcedo, DO       Medical Decision Making  Problems Addressed:  Hyperglycemia due to diabetes mellitus (HCC): chronic illness or injury  Hypertension, unspecified type: chronic illness or injury  Paresthesia: acute illness or injury    Amount and/or Complexity of Data Reviewed  External Data Reviewed: notes.  Labs: ordered. Decision-making details documented in ED Course.  Radiology: ordered and independent interpretation performed. Decision-making details documented in ED Course.  ECG/medicine tests: ordered and independent interpretation performed. Decision-making details documented in ED Course.            CONSULTS: (Who and What was discussed)  None        I am the Primary Clinician of Record.    FINAL IMPRESSION      1. Paresthesia    2. Hypertension, unspecified type    3. Hyperglycemia due to diabetes mellitus (HCC)    4. Blurred vision,

## 2024-08-19 LAB
EKG ATRIAL RATE: 76 BPM
EKG P AXIS: 83 DEGREES
EKG Q-T INTERVAL: 386 MS
EKG QRS DURATION: 76 MS
EKG QTC CALCULATION (BAZETT): 434 MS
EKG R AXIS: -10 DEGREES
EKG T AXIS: 12 DEGREES
EKG VENTRICULAR RATE: 76 BPM

## 2024-08-19 PROCEDURE — 93010 ELECTROCARDIOGRAM REPORT: CPT | Performed by: INTERNAL MEDICINE

## 2024-08-22 ENCOUNTER — TELEPHONE (OUTPATIENT)
Dept: NEUROSURGERY | Age: 74
End: 2024-08-22

## 2024-08-22 NOTE — TELEPHONE ENCOUNTER
Called patient back to make appointment   the next available is in October  he said he will call his family doctor   he did not wish to make appointment at this time

## 2024-09-09 ENCOUNTER — HOSPITAL ENCOUNTER (OUTPATIENT)
Age: 74
Discharge: HOME | End: 2024-09-09
Payer: OTHER GOVERNMENT

## 2024-09-09 PROCEDURE — 36415 COLL VENOUS BLD VENIPUNCTURE: CPT

## 2024-09-09 PROCEDURE — 85130 CHROMOGENIC SUBSTRATE ASSAY: CPT

## 2024-09-12 LAB
SEND OUT REPORT: NORMAL
TEST NAME: NORMAL

## 2024-10-11 ENCOUNTER — TELEPHONE (OUTPATIENT)
Dept: VASCULAR SURGERY | Age: 74
End: 2024-10-11

## 2024-10-11 NOTE — TELEPHONE ENCOUNTER
Received referral from Dr. Sharma for Dr. Kuhn regarding AVF evaluation, left message for patient to return call to schedule.

## 2024-10-14 ENCOUNTER — TELEPHONE (OUTPATIENT)
Dept: VASCULAR SURGERY | Age: 74
End: 2024-10-14

## 2024-10-28 ENCOUNTER — TELEPHONE (OUTPATIENT)
Dept: HEMATOLOGY | Age: 74
End: 2024-10-28

## 2024-10-28 DIAGNOSIS — D49.0 IPMN (INTRADUCTAL PAPILLARY MUCINOUS NEOPLASM): Primary | ICD-10-CM

## 2024-10-28 NOTE — TELEPHONE ENCOUNTER
Andre is scheduled for MRI at [facility] 11/20/24. Arrive 1130am, Scan 12pm, NPO 4-6 hours. Labs to be completed prior to scan. Called and spoke to Andre who stated he would like me to call back 10/29/24 around 9 am to give him the information and scheduled a follow up appt.

## 2024-11-06 ENCOUNTER — OFFICE VISIT (OUTPATIENT)
Dept: VASCULAR SURGERY | Age: 74
End: 2024-11-06
Payer: MEDICARE

## 2024-11-06 VITALS — BODY MASS INDEX: 24.27 KG/M2 | WEIGHT: 189 LBS

## 2024-11-06 DIAGNOSIS — N18.6 ESRD (END STAGE RENAL DISEASE) (HCC): Primary | ICD-10-CM

## 2024-11-06 PROCEDURE — 99203 OFFICE O/P NEW LOW 30 MIN: CPT | Performed by: SURGERY

## 2024-11-06 PROCEDURE — 1123F ACP DISCUSS/DSCN MKR DOCD: CPT | Performed by: SURGERY

## 2024-11-06 PROCEDURE — 1159F MED LIST DOCD IN RCRD: CPT | Performed by: SURGERY

## 2024-11-06 RX ORDER — GABAPENTIN 100 MG/1
100 CAPSULE ORAL 2 TIMES DAILY
COMMUNITY

## 2024-11-06 NOTE — PROGRESS NOTES
Vascular Surgery Outpatient Consultation      Chief Complaint   Patient presents with    Consultation     New pt. AVF        Reason for Consult: Discussed access    Requesting Physician:  Dr. Sharma    HISTORY OF PRESENT ILLNESS:                The patient is a 74 y.o. male who presents to discuss dialysis access.  He has been on dialysis for the past 9 months via a right IJ tunneled dialysis catheter.  He is left-hand dominant.    Past Medical History:        Diagnosis Date    A-fib (HCC)     on eliquis    Cancer (HCC)     CKD (chronic kidney disease)     stage 3a    Diabetes mellitus (HCC)     ED (erectile dysfunction)     Hyperlipidemia     Hypertension     Prostate cancer (HCC)      Past Surgical History:        Procedure Laterality Date    COLON SURGERY      COLONOSCOPY N/A 10/18/2021    COLONOSCOPY WITH BIOPSY performed by Luisa Sepulveda MD at [facility] ENDOSCOPY    DILATATION, ESOPHAGUS      HC DIALYSIS CATHETER N/A 1/12/2021    TEMPORARY HEMODIALYSIS CATHETER INSERTION performed by Sunny Blanc MD at [facility] OR    IR TUNNELED CATHETER PLACEMENT GREATER THAN 5 YEARS  3/25/2024    IR TUNNELED CATHETER PLACEMENT GREATER THAN 5 YEARS 3/25/2024 JonahLISS MD [facility] SPECIAL PROCEDURES    LEG SURGERY Left 6 years ago    OTHER SURGICAL HISTORY      removal of HD catheter    PANCREAS BIOPSY N/A 6/14/2021    PANCREATIC PSEUDOCYST EXCISION DRAINAGE performed by Faina Colon MD at [facility] OR    PANCREATECTOMY N/A 6/23/2021    LAPAROSCOPIC ROBOTIC XI PANCREATIC NECROSECTOMY WITH CYST GASTROSTOMY , INTRAOPERATIVE  ULTRASOUND performed by Manuel Ortega III, MD at [facility] OR    UPPER GASTROINTESTINAL ENDOSCOPY  6/14/2021    ENDOSCOPIC ULTRASOUND performed by Faina Colon MD at [facility] OR    UPPER GASTROINTESTINAL ENDOSCOPY N/A 10/18/2021    EGD BIOPSY performed by Luisa Sepulveda MD at [facility] ENDOSCOPY    UPPER GASTROINTESTINAL ENDOSCOPY N/A 4/6/2022    EGD BIOPSY performed by Luisa LEIJA

## 2024-11-08 ENCOUNTER — PREP FOR PROCEDURE (OUTPATIENT)
Dept: VASCULAR SURGERY | Age: 74
End: 2024-11-08

## 2024-11-08 ENCOUNTER — TELEPHONE (OUTPATIENT)
Dept: VASCULAR SURGERY | Age: 74
End: 2024-11-08

## 2024-11-08 ENCOUNTER — TELEPHONE (OUTPATIENT)
Dept: HEMATOLOGY | Age: 74
End: 2024-11-08

## 2024-11-08 DIAGNOSIS — D49.0 IPMN (INTRADUCTAL PAPILLARY MUCINOUS NEOPLASM): Primary | ICD-10-CM

## 2024-11-08 DIAGNOSIS — N18.6 END STAGE RENAL DISEASE (HCC): ICD-10-CM

## 2024-11-08 NOTE — TELEPHONE ENCOUNTER
Mri scheduled for JOSÉ LUIS on 11/20/24. Arrive at 1130, Scan at 12, NPO 4-6 hours. Called and spoke to Days who confirmed appt date, time, and location. We discussed scan instructions and she expressed understanding. A follow up appt was scheduled.

## 2024-11-08 NOTE — TELEPHONE ENCOUNTER
Spoke with the pt, scheduled creation (L) AVF with Dr. Kuhn 12/9/24.  Pt was instructed to be NPO after midnight the night before except heart and/or BP meds the morning of with sips of water, last dose of Eliquis on 12/6 and to have transportation.

## 2024-11-20 ENCOUNTER — HOSPITAL ENCOUNTER (OUTPATIENT)
Age: 74
Discharge: HOME | End: 2024-11-20
Payer: MEDICARE

## 2024-11-20 ENCOUNTER — HOSPITAL ENCOUNTER (OUTPATIENT)
Dept: MRI IMAGING | Age: 74
Discharge: HOME | End: 2024-11-22
Payer: MEDICARE

## 2024-11-20 DIAGNOSIS — K86.3 PANCREATIC PSEUDOCYST: ICD-10-CM

## 2024-11-20 DIAGNOSIS — D49.0 IPMN (INTRADUCTAL PAPILLARY MUCINOUS NEOPLASM): ICD-10-CM

## 2024-11-20 LAB
BUN SERPL-MCNC: 36 MG/DL (ref 6–23)
CREAT SERPL-MCNC: 4.4 MG/DL (ref 0.7–1.2)
GFR, ESTIMATED: 13 ML/MIN/1.73M2

## 2024-11-20 PROCEDURE — 74183 MRI ABD W/O CNTR FLWD CNTR: CPT

## 2024-11-20 PROCEDURE — 84520 ASSAY OF UREA NITROGEN: CPT

## 2024-11-20 PROCEDURE — 36415 COLL VENOUS BLD VENIPUNCTURE: CPT

## 2024-11-20 PROCEDURE — A9577 INJ MULTIHANCE: HCPCS | Performed by: RADIOLOGY

## 2024-11-20 PROCEDURE — 6360000004 HC RX CONTRAST MEDICATION: Performed by: RADIOLOGY

## 2024-11-20 PROCEDURE — 82565 ASSAY OF CREATININE: CPT

## 2024-11-20 RX ADMIN — GADOBENATE DIMEGLUMINE 20 ML: 529 INJECTION, SOLUTION INTRAVENOUS at 13:30

## 2024-12-04 ENCOUNTER — TELEPHONE (OUTPATIENT)
Dept: VASCULAR SURGERY | Age: 74
End: 2024-12-04

## 2024-12-04 NOTE — TELEPHONE ENCOUNTER
Patient called to cancel his creation AVF, wants to wait until after Lisandro so family can be in town.

## 2024-12-05 ENCOUNTER — TELEPHONE (OUTPATIENT)
Dept: HEMATOLOGY | Age: 74
End: 2024-12-05

## 2024-12-05 NOTE — TELEPHONE ENCOUNTER
Patient was scheduled today for an appt and did not show up so I called and to see if he needed to reschedule.  I left a VM to call the office back to reschedule this appt.    Electronically signed by Katie Zabala RN on 12/5/2024 at 1:49 PM     Benzoyl Peroxide Counseling: Patient counseled that medicine may cause skin irritation and bleach clothing.  In the event of skin irritation, the patient was advised to reduce the amount of the drug applied or use it less frequently.   The patient verbalized understanding of the proper use and possible adverse effects of benzoyl peroxide.  All of the patient's questions and concerns were addressed.

## 2025-01-02 NOTE — TELEPHONE ENCOUNTER
[facility]  CARDIOLOGY CONSULTATION    Patient:  Mal Tsang Date of Service:  2025   YOB: 1975 Admission Date:  2025   MRN:  [id number] Attending:  Kit Hinds MD   PCP:  No primary care provider on file. Hospital Day:  Hospital Day: 2     Requesting Physician:  Kit Hinds MD  Reason for Consult:  afib/aflutter  Primary Cardiologist:  jose francisco  [facility] Cardiologist:  jose francisco  Primary EP:  jose francisco    Chief Complaint:    Chief Complaint   Patient presents with    Shortness of Breath    Cough       HISTORY OF PRESENT ILLNESS     Mal Tsang is a 49 year old male with history of copd, asthma, htn, who presented to the ER 2025 with worsening sob and chest discomfort.      In the ED the patient presented with A-fib with variable conduction with RVR.  The patient also had evidence of volume overload and was given 40 mg Lasix IV and 5 mg of Lopressor for the heart rate.  This allowed the heart rate to return to rates below 90 bpm.  The patient remains in irregular rhythm this morning.  NT proBNP elevated at 3718.  High-sensitivity troponin 36.  CBC and CMP otherwise unremarkable.  Patient quite hypertensive this morning with systolic blood pressure 172 and diastolic of 110.    Patient has family history of heart disease in father who had an MI in his 60s. Previously a smoker. Echo done and eF is 30%with global hypokinesis.     CURRENT AND PREVIOUS CARDIAC STUDIES     EK25      Transthoracic Echo:   Date: No results found for: \"EF\"   pending    PAST HISTORY     Past Medical History:   No past medical history on file.  Patient Active Problem List    Diagnosis Date Noted    Acute on chronic congestive heart failure, unspecified heart failure type  (CMD) 2025     Priority: Low     Home Medications:  No current facility-administered medications on file prior to encounter.     Current Outpatient Medications on File Prior to Encounter   Medication Sig Dispense Refill  Per the order of Dr. Miryam Bhatia, patient has been scheduled for EUS with possible cyst gastrostomy on 6.14.2021. Patient provided with procedure information during office visit and scheduled for follow up with Dr. Miryam Bhatia. Patient instructed to please contact our office with any questions. Surgery scheduling form faxed to [facility] surgery scheduling and fax confirmation received. Dr. Miryam Bhatia to enter orders.     Electronically signed by Jameel Warner on 6/1/21 at 10:23 AM EDT    atorvastatin (LIPITOR) 40 MG tablet Take 1 tablet by mouth at bedtime. 90 tablet 3    ibuprofen (MOTRIN) 600 MG tablet Take 1 tablet by mouth 3 times daily as needed. 60 tablet 1    amLODIPine (NORVASC) 10 MG tablet Take 1 tablet by mouth daily. 90 tablet 3    losartan-hydrochlorothiazide (HYZAAR) 100-25 MG per tablet Take 1 tablet by mouth every morning. 90 tablet 3    atorvastatin (LIPITOR) 40 MG tablet Take 1 tablet by mouth every evening. 90 tablet 3    hydroCORTisone (CORTIZONE) 2.5 % cream Apply topically to affected area 2 times daily. 28 g 1    atorvastatin (LIPITOR) 40 MG tablet Take 1 tablet by mouth every evening. 90 tablet 3    losartan-hydrochlorothiazide (HYZAAR) 100-25 MG per tablet Take 1 tablet by mouth every morning. 90 tablet 3     Allergies:  ALLERGIES:   Allergen Reactions    Aspirin PRURITUS       Family History:  family history is not on file.     Surgical History:  No past surgical history on file.    Social History:   reports that he has never smoked. He has never used smokeless tobacco. He reports current alcohol use. He reports that he does not use drugs.      PHYSICAL EXAM     Weight over the past 48 Hours:  Patient Vitals for the past 48 hrs:   Weight   01/01/25 1445 101.2 kg (223 lb 1.7 oz)   01/01/25 2021 99.5 kg (219 lb 5.7 oz)        Vital Signs:  Blood pressure (!) 172/110, pulse 82, temperature 99.4 °F (37.4 °C), temperature source Oral, resp. rate 18, height 5' 4\" (1.626 m), weight 99.5 kg (219 lb 5.7 oz), SpO2 100%.    Intake & Output:  Intake/Output Summary (Last 24 hours) at 1/2/2025 0601  Last data filed at 1/2/2025 0247  Gross per 24 hour   Intake --   Output 300 ml   Net -300 ml       Physical Exam:    General:  Awake, alert, no apparent distress, conversant  HEENT:  Normocephalic/atraumatic, anicteric sclerae  Neck:  Supple, + JVP, unable to measure adequately due to habitus  Chest: Bilateral symmetrical excursions  Heart:  regular rate and irregular rhyhm, +S1S2, no  overt murmur appreciated  Lungs:  crackles diffusely bilaterally   Abdomen:  Soft, non-tender, non-distended  Extremities:  Extremities grossly normal in appearance, 1+lower extremity edema bilaterally  Neuro:  AAOx3 regards examiner, tracks across room, follows commands, moving extremities spontaneously  Integu: Warm and dry, no rashes or skin discoloration noted except to what was noted above.      CURRENT MEDICATIONS     Scheduled:   Current Facility-Administered Medications   Medication Dose Route Frequency Provider Last Rate Last Admin    sodium chloride 0.9 % injection 2 mL  2 mL Intracatheter 2 times per day Feng Rossi DO        amLODIPine (NORVASC) tablet 10 mg  10 mg Oral Daily Yovany Orosco MD        atorvastatin (LIPITOR) tablet 40 mg  40 mg Oral Q Evening Yovany Orosco MD        furosemide (LASIX) tablet 20 mg  20 mg Oral Daily Yvoany Orosco MD        Magnesium Standard Replacement Protocol   Does not apply See Admin Instructions Yovany Orosco MD        Phosphorus Standard Replacement Protocol   Does not apply See Admin Instructions Yovany Orosco MD           Infusions:   Current Facility-Administered Medications   Medication Dose Route Frequency Provider Last Rate Last Admin       PRN:   Current Facility-Administered Medications   Medication Dose Route Frequency Provider Last Rate Last Admin    sodium chloride 0.9 % injection 10 mL  10 mL Intravenous PRN Feng Rossi DO        ondansetron (ZOFRAN) injection 4 mg  4 mg Intravenous BID PRN Yovany Orosco MD        acetaminophen (TYLENOL) tablet 650 mg  650 mg Oral Q4H PRN Yovany Orosco MD   650 mg at 01/02/25 0343    docusate sodium-sennosides (SENOKOT S) 50-8.6 MG 2 tablet  2 tablet Oral Daily PRN Yovany Orosco MD        bisacodyl (DULCOLAX) suppository 10 mg  10 mg Rectal Daily PRN Yovany Orosco MD        sodium chloride (NORMAL SALINE) 0.9 % bolus 500 mL  500 mL Intravenous PRN  Yovany Orosco MD        hydrALAZINE (APRESOLINE) tablet 10 mg  10 mg Oral Q6H PRN Yovany Orosco MD   10 mg at 01/02/25 0410       LABORATORY RESULTS     Recent Labs   Lab 01/02/25  0406 01/01/25  1619 01/01/25  1615 01/01/25  1531   HGB 13.1  --  13.4  --      --  232  --    Sodium  --   --  141  --    Potassium  --   --  3.5  --    BUN  --   --  26*  --    Creatinine  --  1.10 0.98 1.00   Glomerular Filtration Rate  --  81 >90 >90   Troponin I, High Sensitivity  --   --  36  --    NT-proBNP  --   --  3,718*  --         ASSESSMENT & RECOMMENDATIONS     # Acute decompensated heart failure with reduced ejection fraction  #HTN urgency, medication noncompliance  # New onset atrial fibrillation: New onset atrial fibrillation now rate controlled. Target HR <110 per RACE II trial. TTE to rule out structural heart disease as underlying etiology.   - TTE ordered and EF is 30% with global hypokinesis  - TSH ordered and WNL  - CHADSVASC Stroke Risk Score = CHADSVASc Stroke Risk Score = 2  - Bleeding Risk  Epic Calculated HASBLED Score = 0    - Continue rate control strategy   - metoprolol succinate 25mg bid  - Maintain K >4 and Mag >2   - Discussed diagnosis of atrial fibrillation with patient including risk of rapid ventricular response and stroke risk  - Anticoagulation plan: eliquis 5mg bid    - Diuresis- lasix 40mg IV bid  - GDMT: metoprolol succinate 25mg bid, losartan 100mg, aldactone 25mg, jardiance 10mg   - Monitor daily BMPs  - Telemetry monitoring   - Fluid restriction, low sodium diet, daily weights, strict I/Os   - no lifevest on discharge  - no ischemic evaluation at this time, plan for tikosyn load and dccv/cv with EP  - treat HF and recheck echo in 3 months      Mal is ready for discharge from my viewpoint: No  Workup needed prior to discharge:  echo  Medications changes at discharge:   tbd  Follow up appointments needed after discharge:   tbd    Estimated Date of Discharge documented:  1/4/2025     The above was discussed with the attending physician, who will add an addendum as needed.    Thank you for the opportunity to be involved in this patient's care. Please do not hesitate to reach out with any questions or concerns.    Teofilo Voss MD  Cardiology Fellow PGY-IV  Pager# [phone]      Overnight & Weekend Fellow Coverage:  Monday - Thursday (5pm-7am):  Please page the on call fellow at [phone].  Friday 5pm - Monday 7am:  Please refer to the weekend call schedule (page personal pager).  If patient underwent a cardiac procedure this admission, please page the on call cath fellow for procedure-related questions from 5pm-7am.    The patient was seen and examined with the medical fellow or resident. Pertinent labs, medications, imaging studies reviewed. Newly obtained ECGs, echocardiograms and/or coronary angiograms were reviewed. Agree with the findings as documented in the note above with edits by me as needed. I attest that I performed the medical decision making for the \"substantive\" portion of this visit.     Decompensated HFrEF but seems to be improving. Suspect tachy related/non ischemic. Will need CAD eval if EF does not improve with medical management of his heart failure and afib.     Max Muñoz MD  [facility]

## 2025-03-14 NOTE — PROGRESS NOTES
Hepatobiliary and Pancreatic Surgery Progress Note    CC: Pancreatic necrosis    Subjective: Pt is a pleasant 66 y/o M with a past history of smoking but quit 2 years ago. He also reports EtOH abuse in the past, but quit in December. He denies ever having any episodes of acute pancreatitis. He denies any family hx of colon CA or pancreatic CA. He denies any diarrhea, abdominal pain, early satiety or weight loss. He was found to have an 8cm acute necrotic collection back in March 2021. Recently he has been complaining of early satiety and has had a 30lb weight loss. He denies any abdominal discomfort and is avoiding alcohol. OBJECTIVE      Physical    /82 (Site: Right Upper Arm, Position: Sitting, Cuff Size: Medium Adult)   Pulse 70   Temp 96.8 °F (36 °C) (Temporal)   Resp 18   Ht 6' 2\" (1.88 m)   Wt 165 lb 8 oz (75.1 kg)   SpO2 96%   BMI 21.25 kg/m²       General appearance: appears in no acute distress  Lungs:respiratory effort normal without accessory numbers  Heart: no pedal edema  Abdomen: soft, nondistended, nontympanic, no guarding, no peritoneal signs, normoactive bowel sounds  Extremities: ROM normal    ASSESSMENT: Acute necrotic collections secondary to pancreatitis     PLAN:    - I reviewed his images prior to his office visit and we also reviewed them together today  - continue creon  - will ask Dr. Cesar Pierce for an endoscopic cyst gastrostomy and necrosectomy  - follow up with me after the EUS    20 Minutes of which greater than 50% was spent counseling or coordinating his care. Thank you for the consultation and allowing me to take part in Mr. Brown's care.     Please send a copy of my note to Dr. Jaxon Mari with the [facility] 5/13/2021 1:42 PM
Name band;

## 2025-06-05 NOTE — ANESTHESIA PRE PROCEDURE
Department of Anesthesiology  Preprocedure Note       Name:  Andre Layton   Age:  65 y.o.  :  1950                                          MRN:  [id number]         Date:  2021      Surgeon: Jorgito Hernandez):  Bret Brock MD    Procedure: Procedure(s):  TUNNELLED DIALYSIS CATHETER    Medications prior to admission:   Prior to Admission medications    Medication Sig Start Date End Date Taking?  Authorizing Provider   dexamethasone (DECADRON) 4 MG/ML injection Infuse 6 mg intravenously daily   Yes Historical Provider, MD   amiodarone (CORDARONE) 200 MG tablet 200 mg by Per NG tube route 2 times daily   Yes Historical Provider, MD   omeprazole (PRILOSEC) 20 MG delayed release capsule Take 20 mg by mouth daily NG   Yes Historical Provider, MD   folic acid (FOLVITE) 1 MG tablet 1 mg by Per NG tube route daily   Yes Historical Provider, MD   chlorhexidine (PERIDEX) 0.12 % solution Take 15 mLs by mouth 2 times daily   Yes Historical Provider, MD   insulin lispro (HUMALOG) 100 UNIT/ML injection vial Inject into the skin every 6 hours   Yes Historical Provider, MD   vitamin D (CHOLECALCIFEROL) 50 MCG (2000 UT) TABS tablet Take 1 tablet by mouth daily 21   José Luis Way MD   ascorbic acid (VITAMIN C) 500 MG tablet Take 1 tablet by mouth daily 21   José Luis Way MD   zinc sulfate (ZINCATE) 220 (50 Zn) MG capsule Take 1 capsule by mouth daily 21   José Luis Way MD   polyethylene glycol (GLYCOLAX) 17 g packet Take 17 g by mouth daily as needed for Constipation 21  José Luis Way MD   sennosides (SENOKOT) 8.8 MG/5ML syrup Take 5 mLs by mouth 2 times daily as needed (Constipation) 21  José Luis Way MD   metoprolol tartrate (LOPRESSOR) 25 MG tablet Take 1 tablet by mouth 2 times daily 21   José Luis Way MD   QUEtiapine (SEROQUEL) 50 MG tablet Take 1 tablet by mouth 2 times daily 21   José Luis Way MD   insulin glargine (LANTUS) 100 UNIT/ML injection vial Inject 25 Units into the skin nightly  Patient taking differently: Inject 14 Units into the skin nightly  1/1/21   Edmar Alvarado MD   apixaban (ELIQUIS) 5 MG TABS tablet Take 1 tablet by mouth 2 times daily 1/1/21   Edmar Alvarado MD   pantoprazole (PROTONIX) 40 MG tablet Take 1 tablet by mouth daily (with breakfast) 1/1/21   Edmar Alvarado MD   simvastatin (ZOCOR) 20 MG tablet Take 20 mg by mouth nightly.     Historical Provider, MD       Current medications:    Current Facility-Administered Medications   Medication Dose Route Frequency Provider Last Rate Last Admin    haloperidol lactate (HALDOL) injection 5 mg  5 mg Intramuscular Once PRN Erik Márquez MD        vancomycin 1.5 g in dextrose 5% 300 mL IVPB  1,500 mg Intravenous Once Amanda Tavares MD        chlorhexidine (PERIDEX) 0.12 % solution 15 mL  15 mL Mouth/Throat 4x Daily [place],    15 mL at 01/11/21 2059    insulin lispro (HUMALOG) injection vial 0-12 Units  0-12 Units Subcutaneous Q6H Aubrey Cobian MD   8 Units at 01/11/21 2200    Calcium Acetate (Phos Binder) CAPS 1,334 mg  1,334 mg Oral BID  Kinjal Herrera APRN - CNP        [Held by provider] insulin lispro (HUMALOG) injection vial 5 Units  5 Units Subcutaneous TID  Aubrey Cobian MD   5 Units at 01/11/21 1718    insulin glargine (LANTUS) injection vial 22 Units  22 Units Subcutaneous Nightly Jessica Apple MD   22 Units at 01/11/21 2104    ascorbic acid (VITAMIN C) tablet 500 mg  500 mg Oral Daily Selina Bliss MD        metoprolol tartrate (LOPRESSOR) tablet 25 mg  25 mg Oral BID Selina Bliss MD   25 mg at 01/11/21 2059    pantoprazole (PROTONIX) tablet 40 mg  40 mg Oral Daily with breakfast Selina Bliss MD   Stopped at 01/10/21 0600    QUEtiapine (SEROQUEL) tablet 50 mg  50 mg Oral BID Selina Bliss MD   50 mg at 01/11/21 2059    senna (SENOKOT) tablet 8.6 mg  1 tablet Oral BID PRN Selina Bliss MD        vitamin D (CHOLECALCIFEROL) tablet 2,000 Units  2,000 Units Oral ([facility]     Hyperlipidemia     Hypertension     Prostate cancer ([facility]        Past Surgical History:        Procedure Laterality Date    COLON SURGERY      DILATATION, ESOPHAGUS      LEG SURGERY Left 6 years ago       Social History:    Social History     Tobacco Use    Smoking status: Former Smoker     Packs/day: 1.00     Types: Cigarettes    Smokeless tobacco: Never Used   Substance Use Topics    Alcohol use: Yes     Comment: \"daily shots\"                                Counseling given: Not Answered      Vital Signs (Current):   Vitals:    01/11/21 1425 01/11/21 2045 01/12/21 0530 01/12/21 0830   BP:  (!) 160/86  138/82   Pulse:  99  98   Resp:  17  16   Temp:  97.1 °F (36.2 °C)  97.4 °F (36.3 °C)   TempSrc:  Oral  Oral   SpO2:  97%     Weight:   171 lb 9.6 oz (77.8 kg)    Height: 6' (1.829 m)                                                 BP Readings from Last 3 Encounters:   01/12/21 138/82   01/01/21 (!) 129/95   10/22/20 (!) 170/99       NPO Status:                                                                                 BMI:   Wt Readings from Last 3 Encounters:   01/12/21 171 lb 9.6 oz (77.8 kg)   12/31/20 249 lb 1.9 oz (113 kg)   10/22/20 210 lb (95.3 kg)     Body mass index is 23.27 kg/m².     CBC:   Lab Results   Component Value Date    WBC 6.9 01/12/2021    RBC 3.85 01/12/2021    HGB 10.8 01/12/2021    HCT 34.5 01/12/2021    MCV 89.6 01/12/2021    RDW 14.5 01/12/2021     01/12/2021       CMP:   Lab Results   Component Value Date     01/12/2021    K 4.6 01/12/2021    K 6.4 01/09/2021     01/12/2021    CO2 21 01/12/2021    BUN 92 01/12/2021    CREATININE 4.3 01/12/2021    GFRAA 16 01/12/2021    LABGLOM 16 01/12/2021    GLUCOSE 100 01/12/2021    PROT 7.9 01/12/2021    CALCIUM 9.6 01/12/2021    BILITOT 0.4 01/12/2021    ALKPHOS 126 01/12/2021    AST 16 01/12/2021    ALT 20 01/12/2021       POC Tests: No results for input(s): POCGLU, POCNA, POCK, POCCL, POCBUN, POCHEMO, POCHCT [Hyperlipidemia] : hyperlipidemia [Lipids Test Panel] : a fasting lipid profile in the last 72 hours. Coags:   Lab Results   Component Value Date    PROTIME 17.6 01/01/2021    INR 1.5 01/01/2021    APTT 27.4 01/09/2021       HCG (If Applicable): No results found for: PREGTESTUR, PREGSERUM, HCG, HCGQUANT     ABGs: No results found for: PHART, PO2ART, JVS4WIJ, XJJ1NMG, BEART, H0SCPGGO     Type & Screen (If Applicable):  No results found for: LABABO, LABRH    Drug/Infectious Status (If Applicable):  No results found for: HIV, HEPCAB    COVID-19 Screening (If Applicable):   Lab Results   Component Value Date    COVID19 Not Detected 01/11/2021         Anesthesia Evaluation  Patient summary reviewed and Nursing notes reviewed no history of anesthetic complications:   Airway: Mallampati: III  TM distance: >3 FB   Neck ROM: limited  Mouth opening: > = 3 FB Dental:          Pulmonary:   (+) shortness of breath (With conversation):  rhonchi,  decreased breath sounds,                             Cardiovascular:  Exercise tolerance: poor (<4 METS),   (+) hypertension: moderate, dysrhythmias: atrial fibrillation, HAZEL: after ambulating 1 flight of stairs, murmur, hyperlipidemia      ECG reviewed  Rhythm: irregular  Rate: normal           Beta Blocker:  Dose within 24 Hrs      ROS comment: Atrial fibrillation  Minimal voltage criteria for LVH, may be normal variant  No previous ECGs available  Confirmed by Chandu Contreras ([id number]) on 1/10/2021 8:28:30 AM     Neuro/Psych:   Negative Neuro/Psych ROS              GI/Hepatic/Renal:   (+) GERD:, renal disease (Creatinine 4.3 & GFR 16): ARF,          ROS comment: Acute pancreatitis. Endo/Other:    (+) DiabetesType II DM, poorly controlled, using insulin, blood dyscrasia: anemia and anticoagulation therapy, arthritis: OA., malignancy/cancer (Prostate cancer). Pt had no PAT visit        ROS comment: Hypernatremia with a Na+ of 150 Abdominal:           Vascular: negative vascular ROS.                                  Anesthesia Plan      MAC     ASA 4 (PONV prophylaxis)  Induction: intravenous. MIPS: Postoperative opioids intended and Prophylactic antiemetics administered. Anesthetic plan and risks discussed with patient. Plan discussed with CRNA.                   Minor Christian DO   1/12/2021 [Known CAD] : known coronary artery disease [Medication Changes Per Orders] : Medication changes are as documented in orders [Diet Modification] : diet modification [Exercise] : exercise [Weight Loss] : weight loss [<100] : goal is <100 [At Goal] : The LDL is not at goal [Asthma] : asthma [Stable] : stable [Echocardiogram] : an echocardiogram [de-identified] : coronary calcium on abd ct, f/u cardiac cta [de-identified] : start crestor 5 [de-identified] : associated with blury vision and near syncope in past, not recurrent with increased salt [de-identified] : intermittent asthma, small pericardial effusion on prior echo, also seen on recent abdom ct with cor calcium , consider f/u cardiac cta

## (undated) DEVICE — PACK PROCEDURE SURG GEN CUST

## (undated) DEVICE — ELECTRODE PT RET AD L9FT HI MOIST COND ADH HYDRGEL CORDED

## (undated) DEVICE — Z INACTIVE USE 2660664 SOLUTION IRRIG 3000ML 0.9% SOD CHL USP UROMATIC PLAS CONT

## (undated) DEVICE — ARM DRAPE

## (undated) DEVICE — SUCTION IRRIGATOR: Brand: ENDOWRIST

## (undated) DEVICE — CANNULA SEAL

## (undated) DEVICE — DOUBLE BASIN SET: Brand: MEDLINE INDUSTRIES, INC.

## (undated) DEVICE — MASK,FACE,MAXFLUIDPROTECT,SHIELD/ERLPS: Brand: MEDLINE

## (undated) DEVICE — TROCAR: Brand: KII FIOS FIRST ENTRY

## (undated) DEVICE — SPONGE GZ W4XL4IN RAYON POLY FILL CVR W/ NONWOVEN FAB

## (undated) DEVICE — FORCEPS BX OVL CUP FEN DISPOSABLE CAP L 160CM RAD JAW 4

## (undated) DEVICE — SET INST DAVINCI LAP

## (undated) DEVICE — TOWEL,OR,DSP,ST,BLUE,STD,6/PK,12PK/CS: Brand: MEDLINE

## (undated) DEVICE — DRAPE C ARM W41XL74IN UNIV MOB W RUBBERBAND CLP

## (undated) DEVICE — CADIERE FORCEPS: Brand: ENDOWRIST

## (undated) DEVICE — TISSUE RETRIEVAL SYSTEM: Brand: INZII RETRIEVAL SYSTEM

## (undated) DEVICE — TTL1LYR 16FR10ML 100%SIL TMPST TR: Brand: MEDLINE

## (undated) DEVICE — GOWN ISOLATN REG YEL M WT MULTIPLY SIDETIE LEV 2

## (undated) DEVICE — 4-PORT MANIFOLD: Brand: NEPTUNE 2

## (undated) DEVICE — DRESSING COMP W4XL4IN N ADH PD W2.5XL2.5IN GZ BORDERED ADH

## (undated) DEVICE — NEEDLE HYPO 25GA L1.5IN BLU POLYPR HUB S STL REG BVL STR

## (undated) DEVICE — SET INST DAVINCI ACCESSORIES

## (undated) DEVICE — BLADELESS OBTURATOR, LONG: Brand: WECK VISTA

## (undated) DEVICE — SOLUTION IV 500ML 0.9% SOD CHL PH 5 INJ USP VIAFLX PLAS

## (undated) DEVICE — GRADUATE TRIANG MEASURE 1000ML BLK PRNT

## (undated) DEVICE — GLOVE SURG SZ 75 L12IN FNGR THK94MIL TRNSLUC YEL LTX

## (undated) DEVICE — YANKAUER,BULB TIP,W/O VENT,RIGID,STERILE: Brand: MEDLINE

## (undated) DEVICE — BLOCK BITE 60FR CAREGUARD

## (undated) DEVICE — ENDOSCOPIC ULTRASOUND FINE NEEDLE BIOPSY (FNB) DEVICE: Brand: ACQUIRE

## (undated) DEVICE — ADHESIVE SKIN CLOSURE TOP 36 CC HI VISC DERMBND MINI

## (undated) DEVICE — TUBING, SUCTION, 1/4" X 10', STRAIGHT: Brand: MEDLINE

## (undated) DEVICE — FORCEPS BX L240CM JAW DIA2.8MM L CAP W/ NDL MIC MESH TOOTH

## (undated) DEVICE — FENESTRATED BIPOLAR FORCEPS: Brand: ENDOWRIST

## (undated) DEVICE — 6 X 9  1.75MIL 4-WALL LABGUARD: Brand: MINIGRIP COMMERCIAL LLC

## (undated) DEVICE — ADHESIVE SKIN CLSR 0.7ML TOP DERMBND ADV

## (undated) DEVICE — SHEET, T, LAPAROTOMY, STERILE: Brand: MEDLINE

## (undated) DEVICE — COLUMN DRAPE

## (undated) DEVICE — [HIGH FLOW INSUFFLATOR,  DO NOT USE IF PACKAGE IS DAMAGED,  KEEP DRY,  KEEP AWAY FROM SUNLIGHT,  PROTECT FROM HEAT AND RADIOACTIVE SOURCES.]: Brand: PNEUMOSURE

## (undated) DEVICE — SYRINGE MED 10ML POLYPR LUERSLIP TIP FLAT TOP W/O SFTY DISP

## (undated) DEVICE — BASIC SINGLE BASIN 1-LF: Brand: MEDLINE INDUSTRIES, INC.

## (undated) DEVICE — INSUFFLATION NEEDLE TO ESTABLISH PNEUMOPERITONEUM.: Brand: INSUFFLATION NEEDLE

## (undated) DEVICE — CHLORAPREP 26ML ORANGE

## (undated) DEVICE — MEDIUM-LARGE CLIP APPLIER: Brand: ENDOWRIST

## (undated) DEVICE — NEEDLE HYPO 21GA L1.5IN GRN POLYPR HUB S STL REG BVL STR

## (undated) DEVICE — PLUMEPORT LAPAROSCOPIC SMOKE FILTRATION DEVICE: Brand: PLUMEPORT ACTIV

## (undated) DEVICE — CONTAINER SPEC COLL 960ML POLYPR TRIANG GRAD INTAKE/OUTPUT

## (undated) DEVICE — BLADELESS OBTURATOR: Brand: WECK VISTA

## (undated) DEVICE — KIT BEDSIDE REVITAL OX 500ML

## (undated) DEVICE — SYRINGE 20ML LL S/C 50

## (undated) DEVICE — SPONGE GZ 4IN 4IN 4 PLY N WVN AVANT

## (undated) DEVICE — Z DISCONTINUED PER MEDLINE USE 2425483 TAPE UMB L30IN DIA1/8IN WHT COT NONABSORBABLE W/O NDL FOR

## (undated) DEVICE — BANDAGE ADH W0.75XL3IN UNIV WVN FAB NAT GEN USE STRP N ADH

## (undated) DEVICE — SCISSORS SURG DIA8MM MPLR CRV ENDOWRIST

## (undated) DEVICE — TRAP,MUCUS SPECIMEN,40CC: Brand: MEDLINE

## (undated) DEVICE — SURGICAL PROCEDURE PACK ROBOTIC

## (undated) DEVICE — APPLICATOR MEDICATED 26 CC SOLUTION HI LT ORNG CHLORAPREP

## (undated) DEVICE — CATHETER HAD ADMIN SET LNG TERM PRECRV W/ SIDE H

## (undated) DEVICE — BLOCK BITE 60FR RUBBER ADLT DENTAL

## (undated) DEVICE — CAESAR GRASPING FORCEPS: Brand: CAESAR

## (undated) DEVICE — TRAY VCF/TESIO TRAY  REUSABLE

## (undated) DEVICE — SYRINGE MED 10ML LUERLOCK TIP W/O SFTY DISP

## (undated) DEVICE — GARMENT,MEDLINE,DVT,INT,CALF,MED, GEN2: Brand: MEDLINE

## (undated) DEVICE — TUBING, SUCTION, 3/16" X 12', STRAIGHT: Brand: MEDLINE

## (undated) DEVICE — CLIP INT M L POLYMER LOK LIG HEM O LOK

## (undated) DEVICE — CLOTH SURG PREP PREOPERATIVE CHLORHEXIDINE GLUC 2% READYPREP

## (undated) DEVICE — KENDALL 450 SERIES MONITORING FOAM ELECTRODE - RECTANGULAR SHAPE ( 3/PK): Brand: KENDALL

## (undated) DEVICE — VESSEL SEALER EXTEND: Brand: ENDOWRIST

## (undated) DEVICE — GOWN,SIRUS,FABRNF,XL,20/CS: Brand: MEDLINE

## (undated) DEVICE — DECANTER: Brand: UNBRANDED

## (undated) DEVICE — LUBRICANT SURG JELLY ST BACTER TUBE 4.25OZ

## (undated) DEVICE — Device: Brand: DEFENDO VALVE AND CONNECTOR KIT

## (undated) DEVICE — TIBURON GENERAL ENDOSCOPY DRAPE: Brand: CONVERTORS

## (undated) DEVICE — SCOPE DAVINCI XI 30 DEG W/CORD